# Patient Record
Sex: MALE | NOT HISPANIC OR LATINO | Employment: OTHER | URBAN - METROPOLITAN AREA
[De-identification: names, ages, dates, MRNs, and addresses within clinical notes are randomized per-mention and may not be internally consistent; named-entity substitution may affect disease eponyms.]

---

## 2017-02-23 ENCOUNTER — COMMUNICATION - HEALTHEAST (OUTPATIENT)
Dept: INTERNAL MEDICINE | Facility: CLINIC | Age: 72
End: 2017-02-23

## 2017-03-24 ENCOUNTER — RECORDS - HEALTHEAST (OUTPATIENT)
Dept: ADMINISTRATIVE | Facility: OTHER | Age: 72
End: 2017-03-24

## 2017-03-27 ENCOUNTER — RECORDS - HEALTHEAST (OUTPATIENT)
Dept: ADMINISTRATIVE | Facility: OTHER | Age: 72
End: 2017-03-27

## 2017-04-03 ENCOUNTER — RECORDS - HEALTHEAST (OUTPATIENT)
Dept: ADMINISTRATIVE | Facility: OTHER | Age: 72
End: 2017-04-03

## 2017-04-04 ENCOUNTER — RECORDS - HEALTHEAST (OUTPATIENT)
Dept: ADMINISTRATIVE | Facility: OTHER | Age: 72
End: 2017-04-04

## 2017-04-04 ENCOUNTER — COMMUNICATION - HEALTHEAST (OUTPATIENT)
Dept: SCHEDULING | Facility: CLINIC | Age: 72
End: 2017-04-04

## 2017-04-07 ENCOUNTER — RECORDS - HEALTHEAST (OUTPATIENT)
Dept: ADMINISTRATIVE | Facility: OTHER | Age: 72
End: 2017-04-07

## 2017-04-24 ENCOUNTER — RECORDS - HEALTHEAST (OUTPATIENT)
Dept: ADMINISTRATIVE | Facility: OTHER | Age: 72
End: 2017-04-24

## 2017-05-03 ENCOUNTER — RECORDS - HEALTHEAST (OUTPATIENT)
Dept: ADMINISTRATIVE | Facility: OTHER | Age: 72
End: 2017-05-03

## 2017-05-09 ENCOUNTER — RECORDS - HEALTHEAST (OUTPATIENT)
Dept: ADMINISTRATIVE | Facility: OTHER | Age: 72
End: 2017-05-09

## 2017-05-16 ENCOUNTER — OFFICE VISIT - HEALTHEAST (OUTPATIENT)
Dept: INTERNAL MEDICINE | Facility: CLINIC | Age: 72
End: 2017-05-16

## 2017-05-16 DIAGNOSIS — R42 VERTIGO: ICD-10-CM

## 2017-05-16 DIAGNOSIS — E78.00 PURE HYPERCHOLESTEROLEMIA: ICD-10-CM

## 2017-05-16 DIAGNOSIS — E55.9 VITAMIN D DEFICIENCY: ICD-10-CM

## 2017-05-16 DIAGNOSIS — S86.012A ACHILLES RUPTURE, LEFT: ICD-10-CM

## 2017-05-16 DIAGNOSIS — E06.3 CHRONIC LYMPHOCYTIC THYROIDITIS: ICD-10-CM

## 2017-05-16 DIAGNOSIS — C61 MALIGNANT NEOPLASM OF PROSTATE (H): ICD-10-CM

## 2017-05-16 LAB
CHOLEST SERPL-MCNC: 196 MG/DL
FASTING STATUS PATIENT QL REPORTED: YES
HDLC SERPL-MCNC: 32 MG/DL
LDLC SERPL CALC-MCNC: 114 MG/DL
PSA SERPL-MCNC: 15.9 NG/ML (ref 0–6.5)
TRIGL SERPL-MCNC: 249 MG/DL

## 2017-05-16 ASSESSMENT — MIFFLIN-ST. JEOR: SCORE: 1594.01

## 2017-05-19 ENCOUNTER — COMMUNICATION - HEALTHEAST (OUTPATIENT)
Dept: INTERNAL MEDICINE | Facility: CLINIC | Age: 72
End: 2017-05-19

## 2017-05-20 ENCOUNTER — COMMUNICATION - HEALTHEAST (OUTPATIENT)
Dept: INTERNAL MEDICINE | Facility: CLINIC | Age: 72
End: 2017-05-20

## 2017-06-13 ENCOUNTER — RECORDS - HEALTHEAST (OUTPATIENT)
Dept: ADMINISTRATIVE | Facility: OTHER | Age: 72
End: 2017-06-13

## 2017-08-10 ENCOUNTER — RECORDS - HEALTHEAST (OUTPATIENT)
Dept: ADMINISTRATIVE | Facility: OTHER | Age: 72
End: 2017-08-10

## 2017-08-17 ENCOUNTER — RECORDS - HEALTHEAST (OUTPATIENT)
Dept: ADMINISTRATIVE | Facility: OTHER | Age: 72
End: 2017-08-17

## 2017-08-18 ENCOUNTER — RECORDS - HEALTHEAST (OUTPATIENT)
Dept: ADMINISTRATIVE | Facility: OTHER | Age: 72
End: 2017-08-18

## 2017-10-05 ENCOUNTER — RECORDS - HEALTHEAST (OUTPATIENT)
Dept: ADMINISTRATIVE | Facility: OTHER | Age: 72
End: 2017-10-05

## 2017-10-26 ENCOUNTER — RECORDS - HEALTHEAST (OUTPATIENT)
Dept: ADMINISTRATIVE | Facility: OTHER | Age: 72
End: 2017-10-26

## 2017-11-03 ENCOUNTER — COMMUNICATION - HEALTHEAST (OUTPATIENT)
Dept: INTERNAL MEDICINE | Facility: CLINIC | Age: 72
End: 2017-11-03

## 2017-11-07 ENCOUNTER — OFFICE VISIT - HEALTHEAST (OUTPATIENT)
Dept: INTERNAL MEDICINE | Facility: CLINIC | Age: 72
End: 2017-11-07

## 2017-11-07 ENCOUNTER — COMMUNICATION - HEALTHEAST (OUTPATIENT)
Dept: INTERNAL MEDICINE | Facility: CLINIC | Age: 72
End: 2017-11-07

## 2017-11-07 DIAGNOSIS — E06.3 CHRONIC LYMPHOCYTIC THYROIDITIS: ICD-10-CM

## 2017-11-07 DIAGNOSIS — C61 MALIGNANT NEOPLASM OF PROSTATE (H): ICD-10-CM

## 2017-11-07 DIAGNOSIS — E78.00 HYPERCHOLESTEREMIA: ICD-10-CM

## 2017-11-07 DIAGNOSIS — M79.606 LEG PAIN: ICD-10-CM

## 2017-11-07 DIAGNOSIS — E03.9 HYPOTHYROIDISM: ICD-10-CM

## 2017-11-07 LAB
CHOLEST SERPL-MCNC: 218 MG/DL
FASTING STATUS PATIENT QL REPORTED: YES
HDLC SERPL-MCNC: 33 MG/DL
LDLC SERPL CALC-MCNC: 146 MG/DL
TRIGL SERPL-MCNC: 193 MG/DL

## 2017-11-07 ASSESSMENT — MIFFLIN-ST. JEOR: SCORE: 1603.08

## 2017-11-08 ENCOUNTER — COMMUNICATION - HEALTHEAST (OUTPATIENT)
Dept: INTERNAL MEDICINE | Facility: CLINIC | Age: 72
End: 2017-11-08

## 2017-11-09 ENCOUNTER — COMMUNICATION - HEALTHEAST (OUTPATIENT)
Dept: INTERNAL MEDICINE | Facility: CLINIC | Age: 72
End: 2017-11-09

## 2017-11-10 ENCOUNTER — RECORDS - HEALTHEAST (OUTPATIENT)
Dept: ADMINISTRATIVE | Facility: OTHER | Age: 72
End: 2017-11-10

## 2017-11-18 ENCOUNTER — COMMUNICATION - HEALTHEAST (OUTPATIENT)
Dept: INTERNAL MEDICINE | Facility: CLINIC | Age: 72
End: 2017-11-18

## 2017-11-20 ENCOUNTER — RECORDS - HEALTHEAST (OUTPATIENT)
Dept: ADMINISTRATIVE | Facility: OTHER | Age: 72
End: 2017-11-20

## 2017-11-21 ENCOUNTER — RECORDS - HEALTHEAST (OUTPATIENT)
Dept: ADMINISTRATIVE | Facility: OTHER | Age: 72
End: 2017-11-21

## 2017-11-28 ENCOUNTER — COMMUNICATION - HEALTHEAST (OUTPATIENT)
Dept: INTERNAL MEDICINE | Facility: CLINIC | Age: 72
End: 2017-11-28

## 2017-12-04 ENCOUNTER — RECORDS - HEALTHEAST (OUTPATIENT)
Dept: ADMINISTRATIVE | Facility: OTHER | Age: 72
End: 2017-12-04

## 2017-12-07 ENCOUNTER — COMMUNICATION - HEALTHEAST (OUTPATIENT)
Dept: INTERNAL MEDICINE | Facility: CLINIC | Age: 72
End: 2017-12-07

## 2017-12-07 ENCOUNTER — COMMUNICATION - HEALTHEAST (OUTPATIENT)
Dept: SCHEDULING | Facility: CLINIC | Age: 72
End: 2017-12-07

## 2017-12-08 ENCOUNTER — RECORDS - HEALTHEAST (OUTPATIENT)
Dept: ADMINISTRATIVE | Facility: OTHER | Age: 72
End: 2017-12-08

## 2017-12-13 ENCOUNTER — OFFICE VISIT - HEALTHEAST (OUTPATIENT)
Dept: INTERNAL MEDICINE | Facility: CLINIC | Age: 72
End: 2017-12-13

## 2017-12-13 DIAGNOSIS — E03.9 HYPOTHYROIDISM, UNSPECIFIED TYPE: ICD-10-CM

## 2017-12-13 DIAGNOSIS — K57.92 ACUTE DIVERTICULITIS: ICD-10-CM

## 2017-12-13 DIAGNOSIS — R20.0 LEG NUMBNESS: ICD-10-CM

## 2017-12-13 DIAGNOSIS — R29.898 LEG WEAKNESS: ICD-10-CM

## 2017-12-13 DIAGNOSIS — C61 MALIGNANT NEOPLASM OF PROSTATE (H): ICD-10-CM

## 2017-12-13 DIAGNOSIS — E78.00 PURE HYPERCHOLESTEROLEMIA: ICD-10-CM

## 2017-12-13 LAB — PSA SERPL-MCNC: 15.2 NG/ML (ref 0–6.5)

## 2017-12-13 ASSESSMENT — MIFFLIN-ST. JEOR: SCORE: 1612.16

## 2017-12-14 ENCOUNTER — COMMUNICATION - HEALTHEAST (OUTPATIENT)
Dept: INTERNAL MEDICINE | Facility: CLINIC | Age: 72
End: 2017-12-14

## 2018-01-15 ENCOUNTER — COMMUNICATION - HEALTHEAST (OUTPATIENT)
Dept: INTERNAL MEDICINE | Facility: CLINIC | Age: 73
End: 2018-01-15

## 2018-01-18 ENCOUNTER — RECORDS - HEALTHEAST (OUTPATIENT)
Dept: ADMINISTRATIVE | Facility: OTHER | Age: 73
End: 2018-01-18

## 2018-01-24 ENCOUNTER — RECORDS - HEALTHEAST (OUTPATIENT)
Dept: ADMINISTRATIVE | Facility: OTHER | Age: 73
End: 2018-01-24

## 2018-01-29 ENCOUNTER — COMMUNICATION - HEALTHEAST (OUTPATIENT)
Dept: INTERNAL MEDICINE | Facility: CLINIC | Age: 73
End: 2018-01-29

## 2018-02-08 ENCOUNTER — OFFICE VISIT - HEALTHEAST (OUTPATIENT)
Dept: INTERNAL MEDICINE | Facility: CLINIC | Age: 73
End: 2018-02-08

## 2018-02-08 ENCOUNTER — COMMUNICATION - HEALTHEAST (OUTPATIENT)
Dept: INTERNAL MEDICINE | Facility: CLINIC | Age: 73
End: 2018-02-08

## 2018-02-08 DIAGNOSIS — D86.9 SARCOIDOSIS: ICD-10-CM

## 2018-02-08 DIAGNOSIS — E03.9 HYPOTHYROIDISM, UNSPECIFIED TYPE: ICD-10-CM

## 2018-02-08 DIAGNOSIS — Z01.818 PRE-OP EXAM: ICD-10-CM

## 2018-02-08 DIAGNOSIS — C61 MALIGNANT NEOPLASM OF PROSTATE (H): ICD-10-CM

## 2018-02-08 DIAGNOSIS — E78.00 PURE HYPERCHOLESTEROLEMIA: ICD-10-CM

## 2018-02-08 DIAGNOSIS — I45.10 RIGHT BUNDLE BRANCH BLOCK: ICD-10-CM

## 2018-02-08 DIAGNOSIS — R49.0 PERSISTENT HOARSENESS: ICD-10-CM

## 2018-02-08 LAB
ANION GAP SERPL CALCULATED.3IONS-SCNC: 10 MMOL/L (ref 5–18)
ATRIAL RATE - MUSE: 56 BPM
BUN SERPL-MCNC: 15 MG/DL (ref 8–28)
CALCIUM SERPL-MCNC: 9 MG/DL (ref 8.5–10.5)
CHLORIDE BLD-SCNC: 109 MMOL/L (ref 98–107)
CO2 SERPL-SCNC: 22 MMOL/L (ref 22–31)
CREAT SERPL-MCNC: 0.88 MG/DL (ref 0.7–1.3)
DIASTOLIC BLOOD PRESSURE - MUSE: NORMAL MMHG
ERYTHROCYTE [DISTWIDTH] IN BLOOD BY AUTOMATED COUNT: 11.8 % (ref 11–14.5)
GFR SERPL CREATININE-BSD FRML MDRD: >60 ML/MIN/1.73M2
GLUCOSE BLD-MCNC: 108 MG/DL (ref 70–125)
HCT VFR BLD AUTO: 47.5 % (ref 40–54)
HGB BLD-MCNC: 15.5 G/DL (ref 14–18)
INTERPRETATION ECG - MUSE: NORMAL
MCH RBC QN AUTO: 30.1 PG (ref 27–34)
MCHC RBC AUTO-ENTMCNC: 32.7 G/DL (ref 32–36)
MCV RBC AUTO: 92 FL (ref 80–100)
P AXIS - MUSE: 14 DEGREES
PLATELET # BLD AUTO: 241 THOU/UL (ref 140–440)
PMV BLD AUTO: 8.6 FL (ref 7–10)
POTASSIUM BLD-SCNC: 3.9 MMOL/L (ref 3.5–5)
PR INTERVAL - MUSE: 184 MS
QRS DURATION - MUSE: 122 MS
QT - MUSE: 444 MS
QTC - MUSE: 428 MS
R AXIS - MUSE: -26 DEGREES
RBC # BLD AUTO: 5.16 MILL/UL (ref 4.4–6.2)
SODIUM SERPL-SCNC: 141 MMOL/L (ref 136–145)
SYSTOLIC BLOOD PRESSURE - MUSE: NORMAL MMHG
T AXIS - MUSE: 6 DEGREES
VENTRICULAR RATE- MUSE: 56 BPM
WBC: 6 THOU/UL (ref 4–11)

## 2018-02-08 ASSESSMENT — MIFFLIN-ST. JEOR: SCORE: 1603.08

## 2018-02-15 ENCOUNTER — RECORDS - HEALTHEAST (OUTPATIENT)
Dept: ADMINISTRATIVE | Facility: OTHER | Age: 73
End: 2018-02-15

## 2018-02-19 ENCOUNTER — HOSPITAL ENCOUNTER (OUTPATIENT)
Dept: CARDIOLOGY | Facility: CLINIC | Age: 73
Discharge: HOME | End: 2018-02-19
Attending: INTERNAL MEDICINE

## 2018-02-19 DIAGNOSIS — Z01.818 PRE-OP EXAM: ICD-10-CM

## 2018-02-19 DIAGNOSIS — I45.10 RIGHT BUNDLE BRANCH BLOCK: ICD-10-CM

## 2018-02-19 LAB
AORTIC ROOT: 3.1 CM
ASCENDING AORTA: 4.5 CM
BSA FOR ECHO PROCEDURE: 2.06 M2
CV BLOOD PRESSURE: NORMAL MMHG
CV ECHO HEIGHT: 70 IN
CV ECHO WEIGHT: 190 LBS
DOP CALC LVOT AREA: 3.8 CM2
DOP CALC LVOT DIAMETER: 2.2 CM
DOP CALC LVOT PEAK VEL: 99.4 CM/S
DOP CALC LVOT STROKE VOLUME: 90 CM3
DOP CALCLVOT PEAK VEL VTI: 23.7 CM
EJECTION FRACTION: 67 % (ref 55–75)
FRACTIONAL SHORTENING: 40.5 % (ref 28–44)
INTERVENTRICULAR SEPTUM IN END DIASTOLE: 1.3 CM (ref 0.6–1)
IVS/PW RATIO: 1.2
LA AREA 1: 19.2 CM2
LA AREA 2: 19.2 CM2
LEFT ATRIUM LENGTH: 5 CM
LEFT ATRIUM SIZE: 3.7 CM
LEFT ATRIUM TO AORTIC ROOT RATIO: 1.19 NO UNITS
LEFT ATRIUM VOLUME INDEX: 30.4 ML/M2
LEFT ATRIUM VOLUME: 62.7 ML
LEFT VENTRICLE CARDIAC INDEX: 2.4 L/MIN/M2
LEFT VENTRICLE CARDIAC OUTPUT: 5 L/MIN
LEFT VENTRICLE DIASTOLIC VOLUME INDEX: 39.3 CM3/M2 (ref 34–74)
LEFT VENTRICLE DIASTOLIC VOLUME: 81 CM3 (ref 62–150)
LEFT VENTRICLE HEART RATE: 55 BPM
LEFT VENTRICLE MASS INDEX: 86.5 G/M2
LEFT VENTRICLE SYSTOLIC VOLUME INDEX: 13.1 CM3/M2 (ref 11–31)
LEFT VENTRICLE SYSTOLIC VOLUME: 27 CM3 (ref 21–61)
LEFT VENTRICULAR INTERNAL DIMENSION IN DIASTOLE: 4.2 CM (ref 4.2–5.8)
LEFT VENTRICULAR INTERNAL DIMENSION IN SYSTOLE: 2.5 CM (ref 2.5–4)
LEFT VENTRICULAR MASS: 178.2 G
LEFT VENTRICULAR OUTFLOW TRACT MEAN GRADIENT: 2 MMHG
LEFT VENTRICULAR OUTFLOW TRACT MEAN VELOCITY: 68.2 CM/S
LEFT VENTRICULAR OUTFLOW TRACT PEAK GRADIENT: 4 MMHG
LEFT VENTRICULAR POSTERIOR WALL IN END DIASTOLE: 1.1 CM (ref 0.6–1)
LV STROKE VOLUME INDEX: 43.7 ML/M2
MITRAL VALVE E/A RATIO: 0.9
MV AVERAGE E/E' RATIO: 10.7 CM/S
MV DECELERATION TIME: 183 MS
MV E'TISSUE VEL-LAT: 6.73 CM/S
MV E'TISSUE VEL-MED: 5.17 CM/S
MV LATERAL E/E' RATIO: 9.5
MV MEDIAL E/E' RATIO: 12.3
MV PEAK A VELOCITY: 69.6 CM/S
MV PEAK E VELOCITY: 63.7 CM/S
NUC REST DIASTOLIC VOLUME INDEX: 3040 LBS
NUC REST SYSTOLIC VOLUME INDEX: 70 IN
RIGHT VENTRICULAR INTERNAL DIMENSION IN DYSTOLE: 3.6 CM
TRICUSPID REGURGITATION PEAK PRESSURE GRADIENT: 16.5 MMHG
TRICUSPID VALVE ANULAR PLANE SYSTOLIC EXCURSION: 2.3 CM
TRICUSPID VALVE PEAK REGURGITANT VELOCITY: 203 CM/S

## 2018-02-19 ASSESSMENT — MIFFLIN-ST. JEOR: SCORE: 1603.08

## 2018-02-21 ENCOUNTER — COMMUNICATION - HEALTHEAST (OUTPATIENT)
Dept: INTERNAL MEDICINE | Facility: CLINIC | Age: 73
End: 2018-02-21

## 2018-02-22 ENCOUNTER — RECORDS - HEALTHEAST (OUTPATIENT)
Dept: ADMINISTRATIVE | Facility: OTHER | Age: 73
End: 2018-02-22

## 2018-02-26 ENCOUNTER — COMMUNICATION - HEALTHEAST (OUTPATIENT)
Dept: INTERNAL MEDICINE | Facility: CLINIC | Age: 73
End: 2018-02-26

## 2018-02-26 ASSESSMENT — MIFFLIN-ST. JEOR: SCORE: 1603.08

## 2018-02-27 ENCOUNTER — ANESTHESIA - HEALTHEAST (OUTPATIENT)
Dept: SURGERY | Facility: HOSPITAL | Age: 73
End: 2018-02-27

## 2018-02-27 ENCOUNTER — RECORDS - HEALTHEAST (OUTPATIENT)
Dept: ADMINISTRATIVE | Facility: OTHER | Age: 73
End: 2018-02-27

## 2018-02-27 ENCOUNTER — SURGERY - HEALTHEAST (OUTPATIENT)
Dept: SURGERY | Facility: HOSPITAL | Age: 73
End: 2018-02-27

## 2018-02-27 ASSESSMENT — MIFFLIN-ST. JEOR: SCORE: 1607.62

## 2018-03-07 ENCOUNTER — TRANSFERRED RECORDS (OUTPATIENT)
Dept: HEALTH INFORMATION MANAGEMENT | Facility: CLINIC | Age: 73
End: 2018-03-07

## 2018-03-07 ENCOUNTER — COMMUNICATION - HEALTHEAST (OUTPATIENT)
Dept: SCHEDULING | Facility: CLINIC | Age: 73
End: 2018-03-07

## 2018-03-13 ENCOUNTER — RECORDS - HEALTHEAST (OUTPATIENT)
Dept: ADMINISTRATIVE | Facility: OTHER | Age: 73
End: 2018-03-13

## 2018-03-14 ENCOUNTER — OFFICE VISIT - HEALTHEAST (OUTPATIENT)
Dept: INTERNAL MEDICINE | Facility: CLINIC | Age: 73
End: 2018-03-14

## 2018-03-14 DIAGNOSIS — R49.0 HOARSENESS: ICD-10-CM

## 2018-03-14 DIAGNOSIS — R63.4 ABNORMAL WEIGHT LOSS: ICD-10-CM

## 2018-03-14 DIAGNOSIS — C61 MALIGNANT NEOPLASM OF PROSTATE (H): ICD-10-CM

## 2018-03-14 DIAGNOSIS — N50.9 SCROTAL LESION: ICD-10-CM

## 2018-03-14 DIAGNOSIS — K57.32 DIVERTICULITIS OF LARGE INTESTINE WITHOUT PERFORATION OR ABSCESS WITHOUT BLEEDING: ICD-10-CM

## 2018-03-14 DIAGNOSIS — F43.20 ADJUSTMENT DISORDER: ICD-10-CM

## 2018-03-14 LAB
ALBUMIN SERPL-MCNC: 3.7 G/DL (ref 3.5–5)
ALP SERPL-CCNC: 90 U/L (ref 45–120)
ALT SERPL W P-5'-P-CCNC: 86 U/L (ref 0–45)
ANION GAP SERPL CALCULATED.3IONS-SCNC: 12 MMOL/L (ref 5–18)
AST SERPL W P-5'-P-CCNC: 55 U/L (ref 0–40)
BILIRUB SERPL-MCNC: 0.3 MG/DL (ref 0–1)
BUN SERPL-MCNC: 19 MG/DL (ref 8–28)
CALCIUM SERPL-MCNC: 9.5 MG/DL (ref 8.5–10.5)
CHLORIDE BLD-SCNC: 105 MMOL/L (ref 98–107)
CO2 SERPL-SCNC: 20 MMOL/L (ref 22–31)
CREAT SERPL-MCNC: 1.11 MG/DL (ref 0.7–1.3)
ERYTHROCYTE [DISTWIDTH] IN BLOOD BY AUTOMATED COUNT: 11.9 % (ref 11–14.5)
GFR SERPL CREATININE-BSD FRML MDRD: >60 ML/MIN/1.73M2
GLUCOSE BLD-MCNC: 97 MG/DL (ref 70–125)
HCT VFR BLD AUTO: 45.9 % (ref 40–54)
HGB BLD-MCNC: 15.3 G/DL (ref 14–18)
MCH RBC QN AUTO: 30.4 PG (ref 27–34)
MCHC RBC AUTO-ENTMCNC: 33.4 G/DL (ref 32–36)
MCV RBC AUTO: 91 FL (ref 80–100)
PLATELET # BLD AUTO: 380 THOU/UL (ref 140–440)
PMV BLD AUTO: 8.5 FL (ref 7–10)
POTASSIUM BLD-SCNC: 4.3 MMOL/L (ref 3.5–5)
PROT SERPL-MCNC: 7.6 G/DL (ref 6–8)
RBC # BLD AUTO: 5.03 MILL/UL (ref 4.4–6.2)
SODIUM SERPL-SCNC: 137 MMOL/L (ref 136–145)
WBC: 9.2 THOU/UL (ref 4–11)

## 2018-03-14 ASSESSMENT — MIFFLIN-ST. JEOR: SCORE: 1571.33

## 2018-03-15 ENCOUNTER — COMMUNICATION - HEALTHEAST (OUTPATIENT)
Dept: INTERNAL MEDICINE | Facility: CLINIC | Age: 73
End: 2018-03-15

## 2018-03-16 ENCOUNTER — RECORDS - HEALTHEAST (OUTPATIENT)
Dept: ADMINISTRATIVE | Facility: OTHER | Age: 73
End: 2018-03-16

## 2018-04-04 ENCOUNTER — OFFICE VISIT - HEALTHEAST (OUTPATIENT)
Dept: INTERNAL MEDICINE | Facility: CLINIC | Age: 73
End: 2018-04-04

## 2018-04-04 DIAGNOSIS — R63.4 WEIGHT LOSS: ICD-10-CM

## 2018-04-04 DIAGNOSIS — E03.9 HYPOTHYROIDISM, UNSPECIFIED TYPE: ICD-10-CM

## 2018-04-04 DIAGNOSIS — C61 MALIGNANT NEOPLASM OF PROSTATE (H): ICD-10-CM

## 2018-04-04 DIAGNOSIS — E78.00 PURE HYPERCHOLESTEROLEMIA: ICD-10-CM

## 2018-04-04 DIAGNOSIS — F43.21 ADJUSTMENT DISORDER WITH DEPRESSED MOOD: ICD-10-CM

## 2018-04-04 LAB
ALBUMIN SERPL-MCNC: 3.5 G/DL (ref 3.5–5)
ALP SERPL-CCNC: 101 U/L (ref 45–120)
ALT SERPL W P-5'-P-CCNC: 11 U/L (ref 0–45)
ANION GAP SERPL CALCULATED.3IONS-SCNC: 9 MMOL/L (ref 5–18)
AST SERPL W P-5'-P-CCNC: 11 U/L (ref 0–40)
BILIRUB SERPL-MCNC: 0.5 MG/DL (ref 0–1)
BUN SERPL-MCNC: 14 MG/DL (ref 8–28)
CALCIUM SERPL-MCNC: 9.3 MG/DL (ref 8.5–10.5)
CHLORIDE BLD-SCNC: 108 MMOL/L (ref 98–107)
CHOLEST SERPL-MCNC: 187 MG/DL
CO2 SERPL-SCNC: 25 MMOL/L (ref 22–31)
CREAT SERPL-MCNC: 0.84 MG/DL (ref 0.7–1.3)
ERYTHROCYTE [DISTWIDTH] IN BLOOD BY AUTOMATED COUNT: 12.3 % (ref 11–14.5)
FASTING STATUS PATIENT QL REPORTED: NO
GFR SERPL CREATININE-BSD FRML MDRD: >60 ML/MIN/1.73M2
GLUCOSE BLD-MCNC: 89 MG/DL (ref 70–125)
HCT VFR BLD AUTO: 45.2 % (ref 40–54)
HDLC SERPL-MCNC: 33 MG/DL
HGB BLD-MCNC: 15 G/DL (ref 14–18)
LDLC SERPL CALC-MCNC: 120 MG/DL
MCH RBC QN AUTO: 30.2 PG (ref 27–34)
MCHC RBC AUTO-ENTMCNC: 33.1 G/DL (ref 32–36)
MCV RBC AUTO: 91 FL (ref 80–100)
PLATELET # BLD AUTO: 236 THOU/UL (ref 140–440)
PMV BLD AUTO: 8.1 FL (ref 7–10)
POTASSIUM BLD-SCNC: 4.4 MMOL/L (ref 3.5–5)
PROT SERPL-MCNC: 7 G/DL (ref 6–8)
RBC # BLD AUTO: 4.95 MILL/UL (ref 4.4–6.2)
SODIUM SERPL-SCNC: 142 MMOL/L (ref 136–145)
TRIGL SERPL-MCNC: 171 MG/DL
TSH SERPL DL<=0.005 MIU/L-ACNC: 4.57 UIU/ML (ref 0.3–5)
WBC: 5.4 THOU/UL (ref 4–11)

## 2018-04-04 ASSESSMENT — MIFFLIN-ST. JEOR: SCORE: 1594.01

## 2018-04-05 ENCOUNTER — COMMUNICATION - HEALTHEAST (OUTPATIENT)
Dept: INTERNAL MEDICINE | Facility: CLINIC | Age: 73
End: 2018-04-05

## 2018-06-04 ENCOUNTER — COMMUNICATION - HEALTHEAST (OUTPATIENT)
Dept: INTERNAL MEDICINE | Facility: CLINIC | Age: 73
End: 2018-06-04

## 2018-06-04 ENCOUNTER — OFFICE VISIT - HEALTHEAST (OUTPATIENT)
Dept: INTERNAL MEDICINE | Facility: CLINIC | Age: 73
End: 2018-06-04

## 2018-06-04 DIAGNOSIS — F43.21 ADJUSTMENT DISORDER WITH DEPRESSED MOOD: ICD-10-CM

## 2018-06-04 DIAGNOSIS — C61 MALIGNANT NEOPLASM OF PROSTATE (H): ICD-10-CM

## 2018-06-04 DIAGNOSIS — R32 URINARY INCONTINENCE: ICD-10-CM

## 2018-06-04 LAB
ALBUMIN UR-MCNC: NEGATIVE MG/DL
APPEARANCE UR: CLEAR
BILIRUB UR QL STRIP: NEGATIVE
COLOR UR AUTO: YELLOW
GLUCOSE UR STRIP-MCNC: NEGATIVE MG/DL
HGB UR QL STRIP: NEGATIVE
KETONES UR STRIP-MCNC: NEGATIVE MG/DL
LEUKOCYTE ESTERASE UR QL STRIP: NEGATIVE
NITRATE UR QL: NEGATIVE
PH UR STRIP: 5 [PH] (ref 5–8)
SP GR UR STRIP: >=1.03 (ref 1–1.03)
UROBILINOGEN UR STRIP-ACNC: NORMAL

## 2018-06-04 ASSESSMENT — MIFFLIN-ST. JEOR: SCORE: 1607.62

## 2018-06-07 ENCOUNTER — RECORDS - HEALTHEAST (OUTPATIENT)
Dept: ADMINISTRATIVE | Facility: OTHER | Age: 73
End: 2018-06-07

## 2018-06-14 ENCOUNTER — RECORDS - HEALTHEAST (OUTPATIENT)
Dept: ADMINISTRATIVE | Facility: OTHER | Age: 73
End: 2018-06-14

## 2018-07-26 ENCOUNTER — OFFICE VISIT - HEALTHEAST (OUTPATIENT)
Dept: INTERNAL MEDICINE | Facility: CLINIC | Age: 73
End: 2018-07-26

## 2018-07-26 ENCOUNTER — COMMUNICATION - HEALTHEAST (OUTPATIENT)
Dept: SCHEDULING | Facility: CLINIC | Age: 73
End: 2018-07-26

## 2018-07-26 ENCOUNTER — COMMUNICATION - HEALTHEAST (OUTPATIENT)
Dept: INTERNAL MEDICINE | Facility: CLINIC | Age: 73
End: 2018-07-26

## 2018-07-26 ENCOUNTER — HOSPITAL ENCOUNTER (OUTPATIENT)
Dept: CT IMAGING | Facility: CLINIC | Age: 73
Discharge: HOME | End: 2018-07-26
Attending: INTERNAL MEDICINE

## 2018-07-26 DIAGNOSIS — R10.32 ABDOMINAL PAIN, LEFT LOWER QUADRANT: ICD-10-CM

## 2018-07-26 LAB
CREAT BLD-MCNC: 1 MG/DL
POC GFR AMER AF HE - HISTORICAL: >60 ML/MIN/1.73M2
POC GFR NON AMER AF HE - HISTORICAL: >60 ML/MIN/1.73M2

## 2018-07-26 ASSESSMENT — MIFFLIN-ST. JEOR: SCORE: 1625.76

## 2018-07-27 ENCOUNTER — COMMUNICATION - HEALTHEAST (OUTPATIENT)
Dept: INTERNAL MEDICINE | Facility: CLINIC | Age: 73
End: 2018-07-27

## 2018-08-13 ENCOUNTER — RECORDS - HEALTHEAST (OUTPATIENT)
Dept: ADMINISTRATIVE | Facility: OTHER | Age: 73
End: 2018-08-13

## 2018-09-04 ENCOUNTER — OFFICE VISIT - HEALTHEAST (OUTPATIENT)
Dept: INTERNAL MEDICINE | Facility: CLINIC | Age: 73
End: 2018-09-04

## 2018-09-04 DIAGNOSIS — Z87.19 HX OF DIVERTICULITIS OF COLON: ICD-10-CM

## 2018-09-04 DIAGNOSIS — F43.22 ADJUSTMENT DISORDER WITH ANXIOUS MOOD: ICD-10-CM

## 2018-09-04 DIAGNOSIS — L98.9 HAND LESION: ICD-10-CM

## 2018-09-04 DIAGNOSIS — C61 MALIGNANT NEOPLASM OF PROSTATE (H): ICD-10-CM

## 2018-09-04 DIAGNOSIS — H26.9 CATARACTS, BILATERAL: ICD-10-CM

## 2018-09-04 DIAGNOSIS — N52.9 MALE ERECTILE DISORDER: ICD-10-CM

## 2018-09-04 ASSESSMENT — MIFFLIN-ST. JEOR: SCORE: 1607.62

## 2018-09-06 ENCOUNTER — RECORDS - HEALTHEAST (OUTPATIENT)
Dept: ADMINISTRATIVE | Facility: OTHER | Age: 73
End: 2018-09-06

## 2018-09-17 ENCOUNTER — RECORDS - HEALTHEAST (OUTPATIENT)
Dept: ADMINISTRATIVE | Facility: OTHER | Age: 73
End: 2018-09-17

## 2018-10-08 ENCOUNTER — RECORDS - HEALTHEAST (OUTPATIENT)
Dept: ADMINISTRATIVE | Facility: OTHER | Age: 73
End: 2018-10-08

## 2018-10-18 ENCOUNTER — RECORDS - HEALTHEAST (OUTPATIENT)
Dept: ADMINISTRATIVE | Facility: OTHER | Age: 73
End: 2018-10-18

## 2018-11-20 ENCOUNTER — OFFICE VISIT - HEALTHEAST (OUTPATIENT)
Dept: INTERNAL MEDICINE | Facility: CLINIC | Age: 73
End: 2018-11-20

## 2018-11-20 DIAGNOSIS — C61 MALIGNANT NEOPLASM OF PROSTATE (H): ICD-10-CM

## 2018-11-20 DIAGNOSIS — Z01.818 PREOPERATIVE EXAMINATION: ICD-10-CM

## 2018-11-20 DIAGNOSIS — H26.9 CATARACT OF RIGHT EYE, UNSPECIFIED CATARACT TYPE: ICD-10-CM

## 2018-11-20 ASSESSMENT — MIFFLIN-ST. JEOR: SCORE: 1630.3

## 2018-12-17 ENCOUNTER — RECORDS - HEALTHEAST (OUTPATIENT)
Dept: ADMINISTRATIVE | Facility: OTHER | Age: 73
End: 2018-12-17

## 2019-01-21 ENCOUNTER — RECORDS - HEALTHEAST (OUTPATIENT)
Dept: ADMINISTRATIVE | Facility: OTHER | Age: 74
End: 2019-01-21

## 2019-02-04 ENCOUNTER — RECORDS - HEALTHEAST (OUTPATIENT)
Dept: ADMINISTRATIVE | Facility: OTHER | Age: 74
End: 2019-02-04

## 2019-03-07 ENCOUNTER — RECORDS - HEALTHEAST (OUTPATIENT)
Dept: ADMINISTRATIVE | Facility: OTHER | Age: 74
End: 2019-03-07

## 2019-03-14 ENCOUNTER — RECORDS - HEALTHEAST (OUTPATIENT)
Dept: ADMINISTRATIVE | Facility: OTHER | Age: 74
End: 2019-03-14

## 2019-03-26 ENCOUNTER — COMMUNICATION - HEALTHEAST (OUTPATIENT)
Dept: INTERNAL MEDICINE | Facility: CLINIC | Age: 74
End: 2019-03-26

## 2019-04-02 ENCOUNTER — OFFICE VISIT - HEALTHEAST (OUTPATIENT)
Dept: INTERNAL MEDICINE | Facility: CLINIC | Age: 74
End: 2019-04-02

## 2019-04-02 DIAGNOSIS — K57.32 DIVERTICULITIS OF LARGE INTESTINE WITHOUT PERFORATION OR ABSCESS WITHOUT BLEEDING: ICD-10-CM

## 2019-04-02 DIAGNOSIS — F43.22 ADJUSTMENT DISORDER WITH ANXIOUS MOOD: ICD-10-CM

## 2019-04-02 DIAGNOSIS — C61 MALIGNANT NEOPLASM OF PROSTATE (H): ICD-10-CM

## 2019-04-02 ASSESSMENT — MIFFLIN-ST. JEOR: SCORE: 1621.23

## 2019-05-10 ENCOUNTER — COMMUNICATION - HEALTHEAST (OUTPATIENT)
Dept: INTERNAL MEDICINE | Facility: CLINIC | Age: 74
End: 2019-05-10

## 2019-05-16 ENCOUNTER — COMMUNICATION - HEALTHEAST (OUTPATIENT)
Dept: INTERNAL MEDICINE | Facility: CLINIC | Age: 74
End: 2019-05-16

## 2019-05-20 ENCOUNTER — OFFICE VISIT - HEALTHEAST (OUTPATIENT)
Dept: INTERNAL MEDICINE | Facility: CLINIC | Age: 74
End: 2019-05-20

## 2019-05-20 DIAGNOSIS — E78.00 PURE HYPERCHOLESTEROLEMIA: ICD-10-CM

## 2019-05-20 DIAGNOSIS — R45.89 ANXIETY ABOUT HEALTH: ICD-10-CM

## 2019-05-20 DIAGNOSIS — Z87.19 HX OF DIVERTICULITIS OF COLON: ICD-10-CM

## 2019-05-20 DIAGNOSIS — C61 MALIGNANT NEOPLASM OF PROSTATE (H): ICD-10-CM

## 2019-05-20 DIAGNOSIS — E03.9 HYPOTHYROIDISM, UNSPECIFIED TYPE: ICD-10-CM

## 2019-05-20 ASSESSMENT — MIFFLIN-ST. JEOR: SCORE: 1625.76

## 2019-08-06 ENCOUNTER — RECORDS - HEALTHEAST (OUTPATIENT)
Dept: ADMINISTRATIVE | Facility: OTHER | Age: 74
End: 2019-08-06

## 2019-10-16 ENCOUNTER — RECORDS - HEALTHEAST (OUTPATIENT)
Dept: ADMINISTRATIVE | Facility: OTHER | Age: 74
End: 2019-10-16

## 2019-10-24 ENCOUNTER — OFFICE VISIT - HEALTHEAST (OUTPATIENT)
Dept: INTERNAL MEDICINE | Facility: CLINIC | Age: 74
End: 2019-10-24

## 2019-10-24 DIAGNOSIS — Z12.11 SCREEN FOR COLON CANCER: ICD-10-CM

## 2019-10-24 DIAGNOSIS — Z00.00 ENCOUNTER FOR MEDICARE ANNUAL WELLNESS EXAM: ICD-10-CM

## 2019-10-24 DIAGNOSIS — B35.3 TINEA PEDIS OF BOTH FEET: ICD-10-CM

## 2019-10-24 DIAGNOSIS — E04.2 NONTOXIC MULTINODULAR GOITER: ICD-10-CM

## 2019-10-24 DIAGNOSIS — E78.5 DYSLIPIDEMIA: ICD-10-CM

## 2019-10-24 DIAGNOSIS — E06.3 HYPOTHYROIDISM DUE TO HASHIMOTO'S THYROIDITIS: ICD-10-CM

## 2019-10-24 DIAGNOSIS — C61 MALIGNANT NEOPLASM OF PROSTATE (H): ICD-10-CM

## 2019-10-24 LAB
ALBUMIN SERPL-MCNC: 3.8 G/DL (ref 3.5–5)
ALP SERPL-CCNC: 116 U/L (ref 45–120)
ALT SERPL W P-5'-P-CCNC: 17 U/L (ref 0–45)
ANION GAP SERPL CALCULATED.3IONS-SCNC: 8 MMOL/L (ref 5–18)
AST SERPL W P-5'-P-CCNC: 15 U/L (ref 0–40)
BILIRUB SERPL-MCNC: 0.8 MG/DL (ref 0–1)
BUN SERPL-MCNC: 14 MG/DL (ref 8–28)
CALCIUM SERPL-MCNC: 9.4 MG/DL (ref 8.5–10.5)
CHLORIDE BLD-SCNC: 106 MMOL/L (ref 98–107)
CHOLEST SERPL-MCNC: 192 MG/DL
CO2 SERPL-SCNC: 26 MMOL/L (ref 22–31)
CREAT SERPL-MCNC: 1.03 MG/DL (ref 0.7–1.3)
FASTING STATUS PATIENT QL REPORTED: YES
GFR SERPL CREATININE-BSD FRML MDRD: >60 ML/MIN/1.73M2
GLUCOSE BLD-MCNC: 88 MG/DL (ref 70–125)
HDLC SERPL-MCNC: 34 MG/DL
LDLC SERPL CALC-MCNC: 116 MG/DL
POTASSIUM BLD-SCNC: 4.1 MMOL/L (ref 3.5–5)
PROT SERPL-MCNC: 6.5 G/DL (ref 6–8)
SODIUM SERPL-SCNC: 140 MMOL/L (ref 136–145)
TRIGL SERPL-MCNC: 210 MG/DL
TSH SERPL DL<=0.005 MIU/L-ACNC: 4.39 UIU/ML (ref 0.3–5)

## 2019-10-24 ASSESSMENT — MIFFLIN-ST. JEOR: SCORE: 1621.23

## 2019-10-25 ENCOUNTER — COMMUNICATION - HEALTHEAST (OUTPATIENT)
Dept: INTERNAL MEDICINE | Facility: CLINIC | Age: 74
End: 2019-10-25

## 2019-10-25 DIAGNOSIS — Z00.00 HEALTH CARE MAINTENANCE: ICD-10-CM

## 2019-10-25 DIAGNOSIS — E78.00 HYPERCHOLESTEROLEMIA: ICD-10-CM

## 2019-11-04 ENCOUNTER — COMMUNICATION - HEALTHEAST (OUTPATIENT)
Dept: INTERNAL MEDICINE | Facility: CLINIC | Age: 74
End: 2019-11-04

## 2019-11-04 DIAGNOSIS — B35.3 TINEA PEDIS OF BOTH FEET: ICD-10-CM

## 2019-11-19 ENCOUNTER — HOSPITAL ENCOUNTER (OUTPATIENT)
Dept: CT IMAGING | Facility: CLINIC | Age: 74
Discharge: HOME | End: 2019-11-19
Attending: INTERNAL MEDICINE

## 2019-11-19 DIAGNOSIS — E78.00 HYPERCHOLESTEROLEMIA: ICD-10-CM

## 2019-11-19 DIAGNOSIS — Z00.00 HEALTH CARE MAINTENANCE: ICD-10-CM

## 2019-11-19 LAB
CV CALCIUM SCORE AGATSTON LM: 0
CV CALCIUM SCORING AGATSON LAD: 21
CV CALCIUM SCORING AGATSTON CX: 0
CV CALCIUM SCORING AGATSTON RCA: 0
CV CALCIUM SCORING AGATSTON TOTAL: 21

## 2019-11-20 ENCOUNTER — COMMUNICATION - HEALTHEAST (OUTPATIENT)
Dept: INTERNAL MEDICINE | Facility: CLINIC | Age: 74
End: 2019-11-20

## 2019-11-20 DIAGNOSIS — I25.10 ASYMPTOMATIC ARTERIOSCLEROSIS OF CORONARY ARTERY: ICD-10-CM

## 2019-11-20 DIAGNOSIS — I71.20 THORACIC AORTIC ANEURYSM WITHOUT RUPTURE (H): ICD-10-CM

## 2019-11-20 DIAGNOSIS — I71.20: ICD-10-CM

## 2019-11-21 ENCOUNTER — COMMUNICATION - HEALTHEAST (OUTPATIENT)
Dept: INTERNAL MEDICINE | Facility: CLINIC | Age: 74
End: 2019-11-21

## 2019-11-22 ENCOUNTER — AMBULATORY - HEALTHEAST (OUTPATIENT)
Dept: INTERNAL MEDICINE | Facility: CLINIC | Age: 74
End: 2019-11-22

## 2019-11-22 DIAGNOSIS — I25.10 ASYMPTOMATIC ARTERIOSCLEROSIS OF CORONARY ARTERY: ICD-10-CM

## 2019-11-22 DIAGNOSIS — I71.20 THORACIC AORTIC ANEURYSM WITHOUT RUPTURE (H): ICD-10-CM

## 2019-12-02 ENCOUNTER — COMMUNICATION - HEALTHEAST (OUTPATIENT)
Dept: INTERNAL MEDICINE | Facility: CLINIC | Age: 74
End: 2019-12-02

## 2019-12-03 ENCOUNTER — DOCUMENTATION ONLY (OUTPATIENT)
Dept: CARE COORDINATION | Facility: CLINIC | Age: 74
End: 2019-12-03

## 2019-12-04 ENCOUNTER — AMBULATORY - HEALTHEAST (OUTPATIENT)
Dept: INTERNAL MEDICINE | Facility: CLINIC | Age: 74
End: 2019-12-04

## 2019-12-04 DIAGNOSIS — I71.20 THORACIC AORTIC ANEURYSM WITHOUT RUPTURE (H): ICD-10-CM

## 2019-12-08 NOTE — TELEPHONE ENCOUNTER
RECORDS RECEIVED FROM: External  Lazaro notes TAA // per patient // Refd by PCP Dr Angel Hernandez @ [facility] // wants referral to see Dr Haider Mathews   DATE RECEIVED: 2.4.2020   NOTES STATUS DETAILS   OFFICE NOTE from referring provider    Care Everywhere 11.20.19 telephone encounter  10.24.19   2.8.18   OFFICE NOTE from other cardiologist    N/A    DISCHARGE SUMMARY from hospital    N/A    DISCHARGE REPORT from the ER   N/A    OPERATIVE REPORT    N/A    MEDICATION LIST   Care Everywhere    LABS     BMP   Care Everywhere 3.8.18   CBC   N/A    CMP   Care Everywhere 10.24.19   Lipids   Care Everywhere 10.24.19   TSH   N/A    DIAGNOSTIC PROCEDURES     EKG   In process 3.8.18 EKG strips, HE  2.27.18 EKG strip, HE   Monitor Reports   N/A    IMAGING (DISC & REPORT)      Echo   In process 2.8.18 HE   Stress Tests   N/A    Cath   N/A    MRI/MRA   N/A    CT/CTA   In process 11.19.19 CT cardiac [facility]  11.19.19 CT chest,      12.8.19 1:12 PM MJ  Requested CT images, ekg strips and echo image from [facility]'s   12-16: Verified images are in PACS, sent EKG strips to scanning

## 2019-12-10 ENCOUNTER — OFFICE VISIT - HEALTHEAST (OUTPATIENT)
Dept: CARDIOLOGY | Facility: CLINIC | Age: 74
End: 2019-12-10

## 2019-12-10 DIAGNOSIS — I71.20 THORACIC AORTIC ANEURYSM WITHOUT RUPTURE (H): ICD-10-CM

## 2019-12-10 ASSESSMENT — MIFFLIN-ST. JEOR: SCORE: 1607.62

## 2019-12-12 ENCOUNTER — COMMUNICATION - HEALTHEAST (OUTPATIENT)
Dept: INTERNAL MEDICINE | Facility: CLINIC | Age: 74
End: 2019-12-12

## 2019-12-16 ENCOUNTER — COMMUNICATION - HEALTHEAST (OUTPATIENT)
Dept: INTERNAL MEDICINE | Facility: CLINIC | Age: 74
End: 2019-12-16

## 2019-12-16 ENCOUNTER — RECORDS - HEALTHEAST (OUTPATIENT)
Dept: ADMINISTRATIVE | Facility: OTHER | Age: 74
End: 2019-12-16

## 2019-12-18 ENCOUNTER — COMMUNICATION - HEALTHEAST (OUTPATIENT)
Dept: INTERNAL MEDICINE | Facility: CLINIC | Age: 74
End: 2019-12-18

## 2019-12-27 ENCOUNTER — OFFICE VISIT - HEALTHEAST (OUTPATIENT)
Dept: INTERNAL MEDICINE | Facility: CLINIC | Age: 74
End: 2019-12-27

## 2019-12-27 DIAGNOSIS — E78.00 PURE HYPERCHOLESTEROLEMIA: ICD-10-CM

## 2019-12-27 DIAGNOSIS — I25.9 ASYMPTOMATIC CORONARY HEART DISEASE: ICD-10-CM

## 2019-12-27 DIAGNOSIS — R45.89 ANXIETY ABOUT HEALTH: ICD-10-CM

## 2019-12-27 DIAGNOSIS — I25.10 ASYMPTOMATIC ARTERIOSCLEROSIS OF CORONARY ARTERY: ICD-10-CM

## 2019-12-27 DIAGNOSIS — I71.20 THORACIC AORTIC ANEURYSM WITHOUT RUPTURE (H): ICD-10-CM

## 2019-12-27 ASSESSMENT — MIFFLIN-ST. JEOR: SCORE: 1603.08

## 2020-02-04 ENCOUNTER — PRE VISIT (OUTPATIENT)
Dept: CARDIOLOGY | Facility: CLINIC | Age: 75
End: 2020-02-04

## 2020-02-08 ENCOUNTER — HEALTH MAINTENANCE LETTER (OUTPATIENT)
Age: 75
End: 2020-02-08

## 2020-02-26 ENCOUNTER — RECORDS - HEALTHEAST (OUTPATIENT)
Dept: ADMINISTRATIVE | Facility: OTHER | Age: 75
End: 2020-02-26

## 2020-02-29 ENCOUNTER — COMMUNICATION - HEALTHEAST (OUTPATIENT)
Dept: INTERNAL MEDICINE | Facility: CLINIC | Age: 75
End: 2020-02-29

## 2020-03-04 ENCOUNTER — COMMUNICATION - HEALTHEAST (OUTPATIENT)
Dept: SCHEDULING | Facility: CLINIC | Age: 75
End: 2020-03-04

## 2020-03-05 ENCOUNTER — AMBULATORY - HEALTHEAST (OUTPATIENT)
Dept: CARE COORDINATION | Facility: CLINIC | Age: 75
End: 2020-03-05

## 2020-03-05 ENCOUNTER — COMMUNICATION - HEALTHEAST (OUTPATIENT)
Dept: NURSING | Facility: CLINIC | Age: 75
End: 2020-03-05

## 2020-03-05 DIAGNOSIS — Z85.46 H/O PROSTATE CANCER: ICD-10-CM

## 2020-03-05 DIAGNOSIS — E78.00 PURE HYPERCHOLESTEROLEMIA: ICD-10-CM

## 2020-03-05 DIAGNOSIS — D86.9 SARCOIDOSIS: ICD-10-CM

## 2020-03-05 DIAGNOSIS — I25.9 ASYMPTOMATIC CORONARY HEART DISEASE: ICD-10-CM

## 2020-03-06 ENCOUNTER — RECORDS - HEALTHEAST (OUTPATIENT)
Dept: ADMINISTRATIVE | Facility: OTHER | Age: 75
End: 2020-03-06

## 2020-03-09 ENCOUNTER — OFFICE VISIT - HEALTHEAST (OUTPATIENT)
Dept: INTERNAL MEDICINE | Facility: CLINIC | Age: 75
End: 2020-03-09

## 2020-03-09 DIAGNOSIS — I25.9 ASYMPTOMATIC CORONARY HEART DISEASE: ICD-10-CM

## 2020-03-09 DIAGNOSIS — G45.3 AMAUROSIS FUGAX: ICD-10-CM

## 2020-03-09 DIAGNOSIS — F41.9 ANXIETY: ICD-10-CM

## 2020-03-09 DIAGNOSIS — R07.9 CHEST PAIN, UNSPECIFIED TYPE: ICD-10-CM

## 2020-03-09 DIAGNOSIS — R00.0 TACHYCARDIA: ICD-10-CM

## 2020-03-09 DIAGNOSIS — I71.20 THORACIC AORTIC ANEURYSM WITHOUT RUPTURE (H): ICD-10-CM

## 2020-03-09 DIAGNOSIS — R42 DIZZINESS: ICD-10-CM

## 2020-03-09 DIAGNOSIS — H53.9 VISUAL DISTURBANCE: ICD-10-CM

## 2020-03-09 LAB
C REACTIVE PROTEIN LHE: <0.1 MG/DL (ref 0–0.8)
ERYTHROCYTE [SEDIMENTATION RATE] IN BLOOD BY WESTERGREN METHOD: 3 MM/HR (ref 0–15)

## 2020-03-09 ASSESSMENT — MIFFLIN-ST. JEOR: SCORE: 1607.62

## 2020-03-10 ENCOUNTER — AMBULATORY - HEALTHEAST (OUTPATIENT)
Dept: INTERNAL MEDICINE | Facility: CLINIC | Age: 75
End: 2020-03-10

## 2020-03-10 ENCOUNTER — COMMUNICATION - HEALTHEAST (OUTPATIENT)
Dept: INTERNAL MEDICINE | Facility: CLINIC | Age: 75
End: 2020-03-10

## 2020-03-10 DIAGNOSIS — R07.9 CHEST PAIN, UNSPECIFIED TYPE: ICD-10-CM

## 2020-03-11 ASSESSMENT — ANXIETY QUESTIONNAIRES
3. WORRYING TOO MUCH ABOUT DIFFERENT THINGS: SEVERAL DAYS
1. FEELING NERVOUS, ANXIOUS, OR ON EDGE: SEVERAL DAYS
GAD7 TOTAL SCORE: 5
2. NOT BEING ABLE TO STOP OR CONTROL WORRYING: SEVERAL DAYS
5. BEING SO RESTLESS THAT IT IS HARD TO SIT STILL: NOT AT ALL
7. FEELING AFRAID AS IF SOMETHING AWFUL MIGHT HAPPEN: SEVERAL DAYS
4. TROUBLE RELAXING: NOT AT ALL
6. BECOMING EASILY ANNOYED OR IRRITABLE: SEVERAL DAYS
IF YOU CHECKED OFF ANY PROBLEMS ON THIS QUESTIONNAIRE, HOW DIFFICULT HAVE THESE PROBLEMS MADE IT FOR YOU TO DO YOUR WORK, TAKE CARE OF THINGS AT HOME, OR GET ALONG WITH OTHER PEOPLE: SOMEWHAT DIFFICULT

## 2020-03-16 ENCOUNTER — AMBULATORY - HEALTHEAST (OUTPATIENT)
Dept: INTERNAL MEDICINE | Facility: CLINIC | Age: 75
End: 2020-03-16

## 2020-03-16 ENCOUNTER — COMMUNICATION - HEALTHEAST (OUTPATIENT)
Dept: INTERNAL MEDICINE | Facility: CLINIC | Age: 75
End: 2020-03-16

## 2020-03-16 DIAGNOSIS — R07.9 CHEST PAIN, UNSPECIFIED TYPE: ICD-10-CM

## 2020-03-19 ENCOUNTER — COMMUNICATION - HEALTHEAST (OUTPATIENT)
Dept: INTERNAL MEDICINE | Facility: CLINIC | Age: 75
End: 2020-03-19

## 2020-03-19 ENCOUNTER — COMMUNICATION - HEALTHEAST (OUTPATIENT)
Dept: CARDIOLOGY | Facility: CLINIC | Age: 75
End: 2020-03-19

## 2020-03-20 ENCOUNTER — OFFICE VISIT - HEALTHEAST (OUTPATIENT)
Dept: CARDIOLOGY | Facility: CLINIC | Age: 75
End: 2020-03-20

## 2020-03-20 ENCOUNTER — AMBULATORY - HEALTHEAST (OUTPATIENT)
Dept: CARE COORDINATION | Facility: CLINIC | Age: 75
End: 2020-03-20

## 2020-03-20 DIAGNOSIS — I25.9 ASYMPTOMATIC CORONARY HEART DISEASE: ICD-10-CM

## 2020-03-20 DIAGNOSIS — R00.2 PALPITATIONS: ICD-10-CM

## 2020-03-20 DIAGNOSIS — I10 ESSENTIAL HYPERTENSION: ICD-10-CM

## 2020-03-20 DIAGNOSIS — I71.21 ASCENDING AORTIC ANEURYSM (H): ICD-10-CM

## 2020-03-20 DIAGNOSIS — D86.9 SARCOIDOSIS: ICD-10-CM

## 2020-03-20 DIAGNOSIS — R07.89 CHEST DISCOMFORT: ICD-10-CM

## 2020-03-24 ENCOUNTER — COMMUNICATION - HEALTHEAST (OUTPATIENT)
Dept: NURSING | Facility: CLINIC | Age: 75
End: 2020-03-24

## 2020-03-27 ENCOUNTER — HOSPITAL ENCOUNTER (OUTPATIENT)
Dept: NUCLEAR MEDICINE | Facility: HOSPITAL | Age: 75
Discharge: HOME | End: 2020-03-27
Attending: INTERNAL MEDICINE

## 2020-03-27 ENCOUNTER — HOSPITAL ENCOUNTER (OUTPATIENT)
Dept: CARDIOLOGY | Facility: HOSPITAL | Age: 75
Discharge: HOME | End: 2020-03-27
Attending: INTERNAL MEDICINE

## 2020-03-27 DIAGNOSIS — R07.9 CHEST PAIN, UNSPECIFIED TYPE: ICD-10-CM

## 2020-03-27 DIAGNOSIS — R42 DIZZINESS: ICD-10-CM

## 2020-03-27 DIAGNOSIS — R00.0 TACHYCARDIA: ICD-10-CM

## 2020-03-27 LAB
CV STRESS CURRENT BP HE: NORMAL
CV STRESS CURRENT HR HE: 100
CV STRESS CURRENT HR HE: 104
CV STRESS CURRENT HR HE: 107
CV STRESS CURRENT HR HE: 109
CV STRESS CURRENT HR HE: 110
CV STRESS CURRENT HR HE: 112
CV STRESS CURRENT HR HE: 125
CV STRESS CURRENT HR HE: 125
CV STRESS CURRENT HR HE: 126
CV STRESS CURRENT HR HE: 129
CV STRESS CURRENT HR HE: 130
CV STRESS CURRENT HR HE: 64
CV STRESS CURRENT HR HE: 70
CV STRESS CURRENT HR HE: 71
CV STRESS CURRENT HR HE: 71
CV STRESS CURRENT HR HE: 73
CV STRESS CURRENT HR HE: 74
CV STRESS CURRENT HR HE: 76
CV STRESS CURRENT HR HE: 78
CV STRESS CURRENT HR HE: 82
CV STRESS CURRENT HR HE: 87
CV STRESS CURRENT HR HE: 89
CV STRESS CURRENT HR HE: 91
CV STRESS CURRENT HR HE: 95
CV STRESS CURRENT HR HE: 95
CV STRESS CURRENT HR HE: 96
CV STRESS DEVIATION TIME HE: NORMAL
CV STRESS ECHO PERCENT HR HE: NORMAL
CV STRESS EXERCISE STAGE HE: NORMAL
CV STRESS FINAL RESTING BP HE: NORMAL
CV STRESS FINAL RESTING HR HE: 73
CV STRESS MAX HR HE: 131
CV STRESS MAX TREADMILL GRADE HE: 14
CV STRESS MAX TREADMILL SPEED HE: 3.4
CV STRESS PEAK DIA BP HE: NORMAL
CV STRESS PEAK SYS BP HE: NORMAL
CV STRESS PHASE HE: NORMAL
CV STRESS PROTOCOL HE: NORMAL
CV STRESS RESTING PT POSITION HE: NORMAL
CV STRESS RESTING PT POSITION HE: NORMAL
CV STRESS ST DEVIATION AMOUNT HE: NORMAL
CV STRESS ST DEVIATION ELEVATION HE: NORMAL
CV STRESS ST EVELATION AMOUNT HE: NORMAL
CV STRESS TEST TYPE HE: NORMAL
CV STRESS TOTAL STAGE TIME MIN 1 HE: NORMAL
NUC STRESS EJECTION FRACTION: 70 %
RATE PRESSURE PRODUCT: NORMAL
STRESS ECHO BASELINE DIASTOLIC HE: 80
STRESS ECHO BASELINE HR: 64
STRESS ECHO BASELINE SYSTOLIC BP: 128
STRESS ECHO CALCULATED PERCENT HR: 89 %
STRESS ECHO LAST STRESS DIASTOLIC BP: 76
STRESS ECHO LAST STRESS HR: 130
STRESS ECHO LAST STRESS SYSTOLIC BP: 200
STRESS ECHO POST ESTIMATED WORKLOAD: 10.3
STRESS ECHO POST EXERCISE DUR MIN: 9
STRESS ECHO POST EXERCISE DUR SEC: 0
STRESS ECHO TARGET HR: 147

## 2020-03-30 ENCOUNTER — COMMUNICATION - HEALTHEAST (OUTPATIENT)
Dept: INTERNAL MEDICINE | Facility: CLINIC | Age: 75
End: 2020-03-30

## 2020-03-31 ENCOUNTER — COMMUNICATION - HEALTHEAST (OUTPATIENT)
Dept: INTERNAL MEDICINE | Facility: CLINIC | Age: 75
End: 2020-03-31

## 2020-03-31 ENCOUNTER — AMBULATORY - HEALTHEAST (OUTPATIENT)
Dept: INTERNAL MEDICINE | Facility: CLINIC | Age: 75
End: 2020-03-31

## 2020-03-31 ENCOUNTER — OFFICE VISIT - HEALTHEAST (OUTPATIENT)
Dept: INTERNAL MEDICINE | Facility: CLINIC | Age: 75
End: 2020-03-31

## 2020-03-31 DIAGNOSIS — I25.9 ASYMPTOMATIC CORONARY HEART DISEASE: ICD-10-CM

## 2020-03-31 DIAGNOSIS — R42 DIZZINESS: ICD-10-CM

## 2020-03-31 DIAGNOSIS — F41.9 ANXIETY: ICD-10-CM

## 2020-03-31 DIAGNOSIS — I10 ESSENTIAL HYPERTENSION: ICD-10-CM

## 2020-03-31 DIAGNOSIS — I71.21 ASCENDING AORTIC ANEURYSM (H): ICD-10-CM

## 2020-03-31 DIAGNOSIS — G45.3 AMAUROSIS FUGAX: ICD-10-CM

## 2020-03-31 ASSESSMENT — ANXIETY QUESTIONNAIRES
6. BECOMING EASILY ANNOYED OR IRRITABLE: SEVERAL DAYS
2. NOT BEING ABLE TO STOP OR CONTROL WORRYING: SEVERAL DAYS
IF YOU CHECKED OFF ANY PROBLEMS ON THIS QUESTIONNAIRE, HOW DIFFICULT HAVE THESE PROBLEMS MADE IT FOR YOU TO DO YOUR WORK, TAKE CARE OF THINGS AT HOME, OR GET ALONG WITH OTHER PEOPLE: NOT DIFFICULT AT ALL
7. FEELING AFRAID AS IF SOMETHING AWFUL MIGHT HAPPEN: SEVERAL DAYS
5. BEING SO RESTLESS THAT IT IS HARD TO SIT STILL: NOT AT ALL
GAD7 TOTAL SCORE: 5
4. TROUBLE RELAXING: NOT AT ALL
1. FEELING NERVOUS, ANXIOUS, OR ON EDGE: SEVERAL DAYS
3. WORRYING TOO MUCH ABOUT DIFFERENT THINGS: SEVERAL DAYS

## 2020-04-02 ENCOUNTER — HOSPITAL ENCOUNTER (OUTPATIENT)
Dept: CARDIOLOGY | Facility: HOSPITAL | Age: 75
Discharge: HOME | End: 2020-04-02
Attending: INTERNAL MEDICINE

## 2020-04-02 ENCOUNTER — COMMUNICATION - HEALTHEAST (OUTPATIENT)
Dept: INTERNAL MEDICINE | Facility: CLINIC | Age: 75
End: 2020-04-02

## 2020-04-02 DIAGNOSIS — R42 DIZZINESS: ICD-10-CM

## 2020-04-07 ENCOUNTER — VIRTUAL VISIT (OUTPATIENT)
Dept: CARDIOLOGY | Facility: CLINIC | Age: 75
End: 2020-04-07
Attending: INTERNAL MEDICINE
Payer: COMMERCIAL

## 2020-04-07 ENCOUNTER — TELEPHONE (OUTPATIENT)
Dept: CARDIOLOGY | Facility: CLINIC | Age: 75
End: 2020-04-07

## 2020-04-07 ENCOUNTER — RECORDS - HEALTHEAST (OUTPATIENT)
Dept: ADMINISTRATIVE | Facility: OTHER | Age: 75
End: 2020-04-07

## 2020-04-07 DIAGNOSIS — I20.89 STABLE ANGINA PECTORIS (H): ICD-10-CM

## 2020-04-07 DIAGNOSIS — E78.00 HYPERCHOLESTEROLEMIA: Primary | ICD-10-CM

## 2020-04-07 PROBLEM — I45.10 RIGHT BUNDLE BRANCH BLOCK: Status: ACTIVE | Noted: 2018-02-08

## 2020-04-07 PROBLEM — E55.9 VITAMIN D DEFICIENCY: Status: ACTIVE | Noted: 2020-04-07

## 2020-04-07 PROBLEM — I71.21 ASCENDING AORTIC ANEURYSM (H): Status: ACTIVE | Noted: 2020-03-31

## 2020-04-07 PROBLEM — I25.9 ASYMPTOMATIC CORONARY HEART DISEASE: Status: ACTIVE | Noted: 2019-12-27

## 2020-04-07 PROBLEM — D86.9 SARCOIDOSIS: Status: ACTIVE | Noted: 2020-04-07

## 2020-04-07 PROCEDURE — 99204 OFFICE O/P NEW MOD 45 MIN: CPT | Mod: TEL | Performed by: INTERNAL MEDICINE

## 2020-04-07 RX ORDER — PILOCARPINE HYDROCHLORIDE 20 MG/ML
1-2 SOLUTION/ DROPS OPHTHALMIC DAILY
COMMUNITY

## 2020-04-07 RX ORDER — GARLIC 200 MG
1 TABLET ORAL DAILY
COMMUNITY
End: 2021-09-08

## 2020-04-07 RX ORDER — LOSARTAN POTASSIUM 50 MG/1
50 TABLET ORAL DAILY
COMMUNITY
Start: 2020-03-31 | End: 2021-09-24

## 2020-04-07 RX ORDER — ASPIRIN 81 MG/1
81 TABLET ORAL EVERY OTHER DAY
COMMUNITY
Start: 2019-12-27 | End: 2024-01-24

## 2020-04-07 RX ORDER — ATORVASTATIN CALCIUM 40 MG/1
40 TABLET, FILM COATED ORAL DAILY
Qty: 90 TABLET | Refills: 3 | Status: SHIPPED | OUTPATIENT
Start: 2020-04-07 | End: 2021-09-20

## 2020-04-07 NOTE — PROGRESS NOTES
"CARDIOLOGY CONSULTATION    Referring Provider:  Angel Hernandez  Primary Care Provider:   Angel Hernandez  Indication for Consultation: Chest Discomfort, Thoracic Aortic Aneurysm    Artis Galvin is a 74 year old male who is being evaluated via a billable telephone visit.      The patient has been notified of following:     \"This telephone visit will be conducted via a call between you and your physician/provider. We have found that certain health care needs can be provided without the need for a physical exam.  This service lets us provide the care you need with a short phone conversation.  If a prescription is necessary we can send it directly to your pharmacy.  If lab work is needed we can place an order for that and you can then stop by our lab to have the test done at a later time.    Telephone visits are billed at different rates depending on your insurance coverage. During this emergency period, for some insurers they may be billed the same as an in-person visit.  Please reach out to your insurance provider with any questions.    If during the course of the call the physician/provider feels a telephone visit is not appropriate, you will not be charged for this service.\"    Patient has given verbal consent for Telephone visit?  Yes    Telephone Visit:  Start Time: 6:41 pm  Finish Time: 7:37 pm  Duration of Call: 56 minutes    HPI: Artis Galvin is a 74 year old male being seen today for evaluation of chest discomfort and thoracic aortic aneurysm.   The patient's risk factor profile is: (-) HTN, (-) DM, (-) hypercholesterolemia, (-) tobacco use, (+) fam Hx premature CAD.  There is a family history of TAAD (paternal).  The patient has no history of cardiovascular disease (CAD, CHF, arrhythmia, valvular heart disease).  The patient has no Hx of PAD or cerebrovascular disease.  Until recently the patient had not undergone prior cardiovascular evaluation and had never had an ECHO, stress study, cardiac " catheterization, or EP study.   The patient had a coronary calcium score (Nov 2019) of 21 placing him at the 25th percentile.  This was prompted by a discussion of whether to initiate a statin in this asymptomatic male with elevated cholesterol.    The patient had been in his good state of health until 3/4/2020 when he presented to [facility] with disequilibrium, lightheadedness, diaphoresis, and disorientation.  He reported 4 episodes of left eye left upper visual field loss.  ECG normal.  Troponin negative.  MRI with and without contrast and MRA of the carotids were unremarkable.  He was hydrated and given Ativan and instructed to follow up with ophthalmology.    The patient returned to [facility] on 3/19/2020 with 9 days of URI symptoms, culminating in generalized malaise, lightheadedness, nausea.  He also noted chest discomfort while in the ambulance.  He was given SL NTG without effect.      It has been 3 weeks since his last ER visit.  The patient described minor chest pain in the precordial area that waxed and waned.  The intensity was 1-2/10 and it did not interfere with his daily function.  He attributed it to working out.  The chest discomfort does not radiate.  He had noticed dyspnea, diaphoresis, and lightheadedness.    The patient denies exertional chest discomfort, GORMAN, PND, orthopnea, pedal edema, and syncope.  He has noted intermittent palpitations and has noticed  while resting in a chair.    The patient had a Biotelemetry event monitor ordered by the ER and it arrived today.      PAST MEDICAL HISTORY:  No past medical history on file.    CURRENT MEDICATIONS:  Current Outpatient Medications   Medication Sig     Ascorbic Acid (VITAMIN C) POWD Take 1 Tablespoonful by mouth daily     aspirin 81 MG EC tablet Take 81 mg by mouth daily     cholecalciferol 50 MCG (2000 UT) CAPS Take 1 capsule by mouth daily     losartan (COZAAR) 50 MG tablet Take 50 mg by mouth daily     pilocarpine  (PILOCAR) 2 % ophthalmic solution Apply 1 drop to eye 2 times daily     Zinc Sulfate (ZINC 15 PO) Take 1 capsule by mouth daily     No current facility-administered medications for this visit.        PAST SURGICAL HISTORY:  No past surgical history on file.    ALLERGIES  Patient has no known allergies.    FAMILY HX:  No family history on file.    SOCIAL HX:  Social History     Socioeconomic History     Marital status: Single     Spouse name: Not on file     Number of children: Not on file     Years of education: Not on file     Highest education level: Not on file   Occupational History     Not on file   Social Needs     Financial resource strain: Not on file     Food insecurity     Worry: Not on file     Inability: Not on file     Transportation needs     Medical: Not on file     Non-medical: Not on file   Tobacco Use     Smoking status: Not on file   Substance and Sexual Activity     Alcohol use: Not on file     Drug use: Not on file     Sexual activity: Not on file   Lifestyle     Physical activity     Days per week: Not on file     Minutes per session: Not on file     Stress: Not on file   Relationships     Social connections     Talks on phone: Not on file     Gets together: Not on file     Attends Jainism service: Not on file     Active member of club or organization: Not on file     Attends meetings of clubs or organizations: Not on file     Relationship status: Not on file     Intimate partner violence     Fear of current or ex partner: Not on file     Emotionally abused: Not on file     Physically abused: Not on file     Forced sexual activity: Not on file   Other Topics Concern     Not on file   Social History Narrative     Not on file       ROS:  Constitutional: No fever, chills, or sweats. No weight gain/loss.   HEENT: No visual disturbance, ear ache, epistaxis, sore throat.   Allergies/Immunologic: Negative.   Respiratory: No cough, hemoptysis.   Cardiovascular: As per HPI.   GI: No nausea, vomiting,  hematemesis, melena, or hematochezia.   : No urinary frequency, dysuria, or hematuria.   Integument: No rash.   Psychiatric: No anxiety / depression.   Neuro: No speech disturbance, focal sensory or motor deficit.   Endocrinology: No polyuria / polyphagia.   Musculoskeletal: No myalgia.    VITAL SIGNS:  /73  HR 67  There is no height or weight on file to calculate BMI.  Wt Readings from Last 2 Encounters:   No data found for Wt       PHYSICAL EXAM  Artis Galvin is a 74 year old male.in no acute distress.   No exam (teleconference)    LABS  10/24/2019  Chol 192    HDL 34      ECG:  3/19/2020  Sinus bradycardia with a rate of 59. Nonspecific T wave inversion in lead III, V1, unchanged from prior. Normal intervals. No ischemic ST or T wave morphology. When compared to previous EKG on March 4, 2020 there is no significant change. Impression: Sinus bradycardia, otherwise normal EKG.    EKG:  3/7/2018  NSR  RBBB    HOLTER: 3/30/2020  Normal Holter.  Intermittent right bundle-branch block aberrancy noted on 1 occasion.    ECHO: None    CARDIAC MRI: None    EXERCISE NUCLEAR STUDY: 3/27/2020  1.  There is no prior study for comparison.  2.  The patient's exercise capacity is above average.  No exercise induced   angina.  3.  Exercise stress ECG is negative for inducible myocardial ischemia.  4.  Exercise nuclear study is negative for inducible myocardial ischemia   or infarction.  5.  Normal left ventricular size, wall motion and function.  The calculated left ventricular ejection fraction is 70%.  6. Exercised: 85% APHR (147) achieved.  Exercised 9 minutes.    CARDIAC CA SCORE:  11/19/19   The total Agatston calcium score is 21. A calcium score in this range places the individual in the less than 25% percentile when compared to an  age and gender matched control group and implies a mild to moderate risk of cardiac events in the next ten years.  Moderate enlargement of the ascending thoracic aorta  suggested. Measures 4.6 cm. Incompletely visualized on this examination. Consider further evaluation with CT scan of the thoracic aorta or MRI.    CT abdomen and pelvis: 7/26/18   1. Ascending aortic 4.6 cm aneurysm.  2. Please refer to cardiologist's dictation for the cardiac CT report.    CORONARY CTA: None    CARDIAC CATH: None    ASSESSMENT AND PLAN:   1. Chest discomfort.  Typical and atypical symptoms.  The patient had an electrically positive stress study but the perfusion images were normal.  He and his daughter who is a nurse remain concerned.  As the patient also requires imaging of the aorta, a coronary CTA will address this.      2. Palpitations.  Symptoms may be related to SVT, AF, or NSVT.   Patient will apply the Biotel patch this afternoon.    3. Thoracic aortic aneurysm.    Check CT angiogram of chest to get assessment of aorta.  No documented familial history of TAAD.  Repeat CT scan one year later.    4. Cardiovascular Risk.  The patient's ACC/AHA 10 year risk score is 23.5%.  Positive coronary calcium 25th percentile.  Recommend Lipitor 40 mg every day.    FOLLOW UP:  1 year      CORONARY CTA: (5/5/2020)                                                                   IMPRESSION:  Borderline hemodynamically significant mid LAD stenosis. CT-FFR is 0.79, which falls to 0.75 in the distal LAD.      FINDINGS:  1.  LAD: CT-FFR after the moderate-severe mLAD stenosis is 0.79, which falls to 0.75 in the distal LAD.   2.  LCX: CT-FFR in the small distal circumflex is 0.83.   3.  RCA: CT-FFR after the mild distal stenosis is 0.85.        IMPRESSION:  1.  Moderate-severe mid-LAD stenosis. Images sent to [facility] for CT-FFR analysis, report to follow separately.   2.  Total Agatston score 27.6 placing the patient in the 16th  percentile when compared to age and gender matched control group.  3.  Mildly dilated proximal ascending aorta measuring 4.4 cm.  4.  Please review Radiology report for incidental  noncardiac findings that will follow separately.     FINDINGS:     CORONARY CALCIUM SCORE     Total Agatston calcium score: 27.6   Left main: 0  Left anterior descendin.3  Left circumflex: 0  Right coronary artery: 2.3   This places the patient in the 16th  percentile when compared to age and gender matched control group.     CORONARY ANGIOGRAPHY     DOMINANCE:   Right dominant system.   Normal coronary origins and course.     LEFT MAIN:   The LM is a large caliber but short vessel.   The left main arises normally from the left coronary cusp and is widely patent without detectable atherosclerosis or stenosis.      LEFT ANTERIOR DESCENDING:   The LAD is a large caliber vessel with a type III morphology. It gives rise to a small caliber D1, moderate caliber D2, and a small caliber D3.      Proximal LAD: Minimal (<25%) stenosis composed of calcified plaque.  Mid LAD: Moderate-severe (50-69%) stenosis composed of noncalcified plaque.  The remainder of the left anterior descending and its major diagonal branches are patent with minimal luminal irregularities.     LEFT CIRCUMFLEX:   The LCx is a moderate caliber vessel that gives rise to a moderate caliber OM1.   The caliber of the LCx tapers significantly after the origin of the OM1.  The left circumflex and its major branches are widely patent without detectable atherosclerosis or stenosis.     RIGHT CORONARY ARTERY:   The RCA is a large caliber dominant vessel.   Distal RCA: Mild (25-49%) stenosis composed of noncalcified plaque.  The remainder of the right coronary and the posterior descending  artery are patent with minimal luminal irregularities.     ADDITIONAL FINDINGS:   Normal pulmonary venous anatomy with all four pulmonary veins draining into the left atrium.    There is no left ventricular mass or thrombus.   Normal pericardial thickness. There is no pericardial effusion.  Please review Radiology report for incidental noncardiac findings that will follow  separately.      THORACIC AORTA EXAMINATION  1.  The proximal ascending aorta is mildly dilated (43.6 x 42.4 mm, indexed value 1.89 cm/m2). The aortic root, ascending aorta, arch, and descending aorta are normal in diameter. There is no dissection seen.  2.  The aortic arch is left sided. There is normal branching of the arch vessels. There is no coarctation seen.  3.  The main and proximal branch pulmonary arteries are normal in size.   4.  The systemic venous connections are normal.   5.  The cardiac chambers demonstrate normal atrioventricular and ventriculoarterial concordance.  6.  The pericardium is unremarkable.  There is no pericardial effusion.   7.  There is no intracardiac thrombus.     Bi-orthogonal luminal aortic dimensions are:   Aortic root measures 37.4 mm x 35.8 mm at the level of sinuses of Valsalva.   The sinotubular junction measures 38.0 mm x 36.1 mm.    Proximal ascending aorta measures 43.6 mm x 42.4 mm, at the level of the main pulmonary artery.   Distal ascending aorta measures 43.4 mm x 35.6 mm, proximal to the takeoff of the brachiocephalic artery.   Mid aortic arch measures 34.0 mm x 33.1 mm, proximal to the takeoff of the left subclavian artery.   Proximal descending thoracic aortic measures 31.9 mm x 29.9 mm, distal to the takeoff of the left subclavian artery.   Mid descending thoracic aorta measures 27.1 mm x 26.0 mm, at the level of the pulmonary veins.   Distal thoracic aorta measures 23.6 mm x 22.5 mm, at the level of the diaphragm.     RADIOLOGY READ:  Impression:  1. Sequelae of previous granulomatous disease. No acute airspace disease.  2. Ascending aortic aneurysm measuring 4.9 cm. This is unchanged since 11/19/2019.       ADDENDUM (5/6/2020): Coronary CTA shows single vessel CAD with borderline stenosis in mid LAD with FFR 0.79, diminishing to 0.75 in distal LAD.  While symptoms are atypical, the coronary CTA does raise concern.  I would like to have a follow up encounter with  patient this week and then discuss possible coronary angiography if he is having ongoing symptoms.    Elder Cazares MD    Divisions of Cardiology  [facility]  Patient Care Team:  Colby Brock MD as PCP - General (Internal Medicine)  Elder Cazares MD as MD (Internal Medicine - Interventional Cardiology)  Haider Mathews MD as MD (Cardiology)  COLBY BROCK

## 2020-04-07 NOTE — TELEPHONE ENCOUNTER
[facility] Call Center    Phone Message    May a detailed message be left on voicemail: yes     Reason for Call: Other: Pt unable to move appt any earlier today, but willing to do another day, please call to discuss or verify appt for this evening     Action Taken: Message routed to:  [facility] ([facility]): [facility]    Travel Screening: Not Applicable

## 2020-04-08 ENCOUNTER — AMBULATORY - HEALTHEAST (OUTPATIENT)
Dept: INTERNAL MEDICINE | Facility: CLINIC | Age: 75
End: 2020-04-08

## 2020-04-08 ENCOUNTER — COMMUNICATION - HEALTHEAST (OUTPATIENT)
Dept: INTERNAL MEDICINE | Facility: CLINIC | Age: 75
End: 2020-04-08

## 2020-04-08 ENCOUNTER — COMMUNICATION - HEALTHEAST (OUTPATIENT)
Dept: ADMINISTRATIVE | Facility: CLINIC | Age: 75
End: 2020-04-08

## 2020-04-08 DIAGNOSIS — G45.3 AMAUROSIS FUGAX OF LEFT EYE: ICD-10-CM

## 2020-04-10 ENCOUNTER — AMBULATORY - HEALTHEAST (OUTPATIENT)
Dept: LAB | Facility: CLINIC | Age: 75
End: 2020-04-10

## 2020-04-10 DIAGNOSIS — R20.0 NUMBNESS AND TINGLING: ICD-10-CM

## 2020-04-10 DIAGNOSIS — R20.2 NUMBNESS AND TINGLING: ICD-10-CM

## 2020-04-10 DIAGNOSIS — R20.2 NUMBNESS AND TINGLING SENSATION OF SKIN: ICD-10-CM

## 2020-04-10 DIAGNOSIS — R20.0 NUMBNESS AND TINGLING SENSATION OF SKIN: ICD-10-CM

## 2020-04-10 DIAGNOSIS — I10 ESSENTIAL HYPERTENSION: ICD-10-CM

## 2020-04-10 LAB
ANION GAP SERPL CALCULATED.3IONS-SCNC: 9 MMOL/L (ref 5–18)
BUN SERPL-MCNC: 12 MG/DL (ref 8–28)
C REACTIVE PROTEIN LHE: 0.2 MG/DL (ref 0–0.8)
CALCIUM SERPL-MCNC: 9 MG/DL (ref 8.5–10.5)
CHLORIDE BLD-SCNC: 108 MMOL/L (ref 98–107)
CO2 SERPL-SCNC: 25 MMOL/L (ref 22–31)
CREAT SERPL-MCNC: 0.91 MG/DL (ref 0.7–1.3)
ERYTHROCYTE [SEDIMENTATION RATE] IN BLOOD BY WESTERGREN METHOD: 5 MM/HR (ref 0–15)
FOLATE SERPL-MCNC: 12.5 NG/ML
GFR SERPL CREATININE-BSD FRML MDRD: >60 ML/MIN/1.73M2
GLUCOSE BLD-MCNC: 77 MG/DL (ref 70–125)
HBA1C MFR BLD: 5.4 % (ref 3.5–6)
POTASSIUM BLD-SCNC: 4.3 MMOL/L (ref 3.5–5)
SODIUM SERPL-SCNC: 142 MMOL/L (ref 136–145)
TSH SERPL DL<=0.005 MIU/L-ACNC: 5.1 UIU/ML (ref 0.3–5)
VIT B12 SERPL-MCNC: 379 PG/ML (ref 213–816)

## 2020-04-11 LAB — T PALLIDUM AB SER QL: NEGATIVE

## 2020-04-12 LAB — PYRIDOXAL PHOS SERPL-SCNC: 32.9 NMOL/L (ref 20–125)

## 2020-04-13 LAB — VIT B1 PYROPHOSHATE BLD-SCNC: 101 NMOL/L (ref 70–180)

## 2020-04-14 LAB
25(OH)D3 SERPL-MCNC: 29.7 NG/ML (ref 30–80)
ANA SER QL: 0.2 U
LYME TOTAL ANTIBODY - HISTORICAL: 0.01 INDEX VALUE
PATH ICD:: NORMAL
PROT PATTERN SERPL IFE-IMP: NORMAL
REVIEWING PATHOLOGIST: NORMAL

## 2020-04-17 ENCOUNTER — COMMUNICATION - HEALTHEAST (OUTPATIENT)
Dept: INTERNAL MEDICINE | Facility: CLINIC | Age: 75
End: 2020-04-17

## 2020-04-20 ENCOUNTER — COMMUNICATION - HEALTHEAST (OUTPATIENT)
Dept: INTERNAL MEDICINE | Facility: CLINIC | Age: 75
End: 2020-04-20

## 2020-04-22 ENCOUNTER — COMMUNICATION - HEALTHEAST (OUTPATIENT)
Dept: INTERNAL MEDICINE | Facility: CLINIC | Age: 75
End: 2020-04-22

## 2020-04-23 ENCOUNTER — OFFICE VISIT - HEALTHEAST (OUTPATIENT)
Dept: INTERNAL MEDICINE | Facility: CLINIC | Age: 75
End: 2020-04-23

## 2020-04-23 DIAGNOSIS — I25.9 ASYMPTOMATIC CORONARY HEART DISEASE: ICD-10-CM

## 2020-04-23 DIAGNOSIS — R42 DIZZINESS: ICD-10-CM

## 2020-04-23 DIAGNOSIS — R05.3 CHRONIC COUGH: ICD-10-CM

## 2020-04-23 DIAGNOSIS — R79.89 ELEVATED TSH: ICD-10-CM

## 2020-04-23 DIAGNOSIS — I71.21 ASCENDING AORTIC ANEURYSM (H): ICD-10-CM

## 2020-04-23 DIAGNOSIS — G45.3 AMAUROSIS FUGAX: ICD-10-CM

## 2020-04-23 DIAGNOSIS — I10 ESSENTIAL HYPERTENSION, BENIGN: ICD-10-CM

## 2020-04-27 ENCOUNTER — COMMUNICATION - HEALTHEAST (OUTPATIENT)
Dept: INTERNAL MEDICINE | Facility: CLINIC | Age: 75
End: 2020-04-27

## 2020-04-29 ENCOUNTER — COMMUNICATION - HEALTHEAST (OUTPATIENT)
Dept: INTERNAL MEDICINE | Facility: CLINIC | Age: 75
End: 2020-04-29

## 2020-04-30 ENCOUNTER — COMMUNICATION - HEALTHEAST (OUTPATIENT)
Dept: INTERNAL MEDICINE | Facility: CLINIC | Age: 75
End: 2020-04-30

## 2020-05-01 ENCOUNTER — COMMUNICATION - HEALTHEAST (OUTPATIENT)
Dept: INTERNAL MEDICINE | Facility: CLINIC | Age: 75
End: 2020-05-01

## 2020-05-01 DIAGNOSIS — I10 ESSENTIAL HYPERTENSION: ICD-10-CM

## 2020-05-01 DIAGNOSIS — I71.21 ASCENDING AORTIC ANEURYSM (H): ICD-10-CM

## 2020-05-05 ENCOUNTER — HOSPITAL ENCOUNTER (OUTPATIENT)
Dept: CT IMAGING | Facility: CLINIC | Age: 75
End: 2020-05-05
Attending: INTERNAL MEDICINE
Payer: COMMERCIAL

## 2020-05-05 VITALS
RESPIRATION RATE: 16 BRPM | DIASTOLIC BLOOD PRESSURE: 69 MMHG | OXYGEN SATURATION: 98 % | SYSTOLIC BLOOD PRESSURE: 112 MMHG

## 2020-05-05 DIAGNOSIS — I20.89 STABLE ANGINA PECTORIS (H): ICD-10-CM

## 2020-05-05 LAB
CREAT BLD-MCNC: 0.9 MG/DL (ref 0.66–1.25)
GFR SERPL CREATININE-BSD FRML MDRD: 83 ML/MIN/{1.73_M2}

## 2020-05-05 PROCEDURE — 71275 CT ANGIOGRAPHY CHEST: CPT

## 2020-05-05 PROCEDURE — 25000128 H RX IP 250 OP 636: Performed by: INTERNAL MEDICINE

## 2020-05-05 PROCEDURE — 25000132 ZZH RX MED GY IP 250 OP 250 PS 637: Performed by: INTERNAL MEDICINE

## 2020-05-05 PROCEDURE — 0504T CT FFR: CPT | Performed by: INTERNAL MEDICINE

## 2020-05-05 PROCEDURE — 75574 CT ANGIO HRT W/3D IMAGE: CPT | Mod: 26 | Performed by: INTERNAL MEDICINE

## 2020-05-05 PROCEDURE — 71275 CT ANGIOGRAPHY CHEST: CPT | Mod: 26 | Performed by: INTERNAL MEDICINE

## 2020-05-05 PROCEDURE — 75574 CT ANGIO HRT W/3D IMAGE: CPT

## 2020-05-05 PROCEDURE — 82565 ASSAY OF CREATININE: CPT

## 2020-05-05 PROCEDURE — 0503T CT FFR: CPT

## 2020-05-05 RX ORDER — METHYLPREDNISOLONE SODIUM SUCCINATE 125 MG/2ML
125 INJECTION, POWDER, LYOPHILIZED, FOR SOLUTION INTRAMUSCULAR; INTRAVENOUS
Status: DISCONTINUED | OUTPATIENT
Start: 2020-05-05 | End: 2020-05-06 | Stop reason: HOSPADM

## 2020-05-05 RX ORDER — IOPAMIDOL 755 MG/ML
120 INJECTION, SOLUTION INTRAVASCULAR ONCE
Status: COMPLETED | OUTPATIENT
Start: 2020-05-05 | End: 2020-05-05

## 2020-05-05 RX ORDER — NITROGLYCERIN 0.4 MG/1
.4-.8 TABLET SUBLINGUAL
Status: DISCONTINUED | OUTPATIENT
Start: 2020-05-05 | End: 2020-05-06 | Stop reason: HOSPADM

## 2020-05-05 RX ORDER — ONDANSETRON 2 MG/ML
4 INJECTION INTRAMUSCULAR; INTRAVENOUS
Status: DISCONTINUED | OUTPATIENT
Start: 2020-05-05 | End: 2020-05-06 | Stop reason: HOSPADM

## 2020-05-05 RX ORDER — METOPROLOL TARTRATE 50 MG
50-100 TABLET ORAL
Status: COMPLETED | OUTPATIENT
Start: 2020-05-05 | End: 2020-05-05

## 2020-05-05 RX ORDER — ACYCLOVIR 200 MG/1
0-1 CAPSULE ORAL
Status: DISCONTINUED | OUTPATIENT
Start: 2020-05-05 | End: 2020-05-06 | Stop reason: HOSPADM

## 2020-05-05 RX ORDER — DIPHENHYDRAMINE HCL 25 MG
25 CAPSULE ORAL
Status: DISCONTINUED | OUTPATIENT
Start: 2020-05-05 | End: 2020-05-06 | Stop reason: HOSPADM

## 2020-05-05 RX ORDER — DIPHENHYDRAMINE HYDROCHLORIDE 50 MG/ML
25-50 INJECTION INTRAMUSCULAR; INTRAVENOUS
Status: DISCONTINUED | OUTPATIENT
Start: 2020-05-05 | End: 2020-05-06 | Stop reason: HOSPADM

## 2020-05-05 RX ORDER — METOPROLOL TARTRATE 1 MG/ML
5-15 INJECTION, SOLUTION INTRAVENOUS
Status: DISCONTINUED | OUTPATIENT
Start: 2020-05-05 | End: 2020-05-06 | Stop reason: HOSPADM

## 2020-05-05 RX ADMIN — NITROGLYCERIN 0.8 MG: 0.4 TABLET SUBLINGUAL at 12:40

## 2020-05-05 RX ADMIN — IOPAMIDOL 120 ML: 755 INJECTION, SOLUTION INTRAVENOUS at 12:36

## 2020-05-05 RX ADMIN — METOPROLOL TARTRATE 25 MG: 25 TABLET ORAL at 12:15

## 2020-05-07 ENCOUNTER — TELEPHONE (OUTPATIENT)
Dept: CARDIOLOGY | Facility: CLINIC | Age: 75
End: 2020-05-07

## 2020-05-08 ENCOUNTER — COMMUNICATION - HEALTHEAST (OUTPATIENT)
Dept: INTERNAL MEDICINE | Facility: CLINIC | Age: 75
End: 2020-05-08

## 2020-05-08 ENCOUNTER — MYC MEDICAL ADVICE (OUTPATIENT)
Dept: CARDIOLOGY | Facility: CLINIC | Age: 75
End: 2020-05-08

## 2020-05-08 ENCOUNTER — RECORDS - HEALTHEAST (OUTPATIENT)
Dept: ADMINISTRATIVE | Facility: OTHER | Age: 75
End: 2020-05-08

## 2020-05-08 ENCOUNTER — VIRTUAL VISIT (OUTPATIENT)
Dept: CARDIOLOGY | Facility: CLINIC | Age: 75
End: 2020-05-08
Attending: INTERNAL MEDICINE
Payer: COMMERCIAL

## 2020-05-08 DIAGNOSIS — I20.89 STABLE ANGINA PECTORIS (H): Primary | ICD-10-CM

## 2020-05-08 PROCEDURE — 99214 OFFICE O/P EST MOD 30 MIN: CPT | Mod: 95 | Performed by: INTERNAL MEDICINE

## 2020-05-08 RX ORDER — ISOSORBIDE MONONITRATE 30 MG/1
30 TABLET, EXTENDED RELEASE ORAL DAILY
Qty: 30 TABLET | Refills: 11 | Status: SHIPPED | OUTPATIENT
Start: 2020-05-08 | End: 2020-06-01

## 2020-05-08 RX ORDER — NITROGLYCERIN 0.4 MG/1
TABLET SUBLINGUAL
Qty: 25 TABLET | Refills: 3 | Status: SHIPPED | OUTPATIENT
Start: 2020-05-08 | End: 2021-09-08

## 2020-05-08 RX ORDER — METOPROLOL SUCCINATE 25 MG/1
12.5 TABLET, EXTENDED RELEASE ORAL DAILY
Qty: 30 TABLET | Refills: 11 | Status: SHIPPED | OUTPATIENT
Start: 2020-05-08 | End: 2020-06-01

## 2020-05-08 NOTE — PROGRESS NOTES
"[facility] CARDIOLOGY FOLLOW UP VIDEO VISIT    Referring Provider:  Angel Hernandez  Primary Care Provider:   Angel Hernandez  Indication for Consultation: Chest Discomfort, Thoracic Aortic Aneurysm    Artis Galvin is a 75 year old male who is being evaluated via a billable video visit.      The patient has been notified of following:     \"This video visit will be conducted via a call between you and your physician/provider. We have found that certain health care needs can be provided without the need for an in-person physical exam.  This service lets us provide the care you need with a video conversation.  If a prescription is necessary we can send it directly to your pharmacy.  If lab work is needed we can place an order for that and you can then stop by our lab to have the test done at a later time.    Video visits are billed at different rates depending on your insurance coverage.  Please reach out to your insurance provider with any questions.    If during the course of the call the physician/provider feels a video visit is not appropriate, you will not be charged for this service.\"    Patient has given verbal consent for Video visit? Yes    How would you like to obtain your AVS? [place]    Patient would like the video invitation sent by: Text to cell phone: [phone]      Video-Visit Details  Type of service:  Video Visit  Video start time: 1:58 pm  Video end time: 2:20 PM  Total video time: 22 min  Originating Location (pt. Location): Home  Distant Location (provider location):  Provider's home  Mode of Communication: Video Conference via [email or web address]      HPI: Artis Galvin is a 75 year old male being seen today for evaluation of chest discomfort and thoracic aortic aneurysm.   The patient's risk factor profile is: (-) HTN, (-) DM, (-) hypercholesterolemia, (-) tobacco use, (+) fam Hx premature CAD.  There is a family history of TAAD (paternal).  The patient has no history of cardiovascular disease " (CAD, CHF, arrhythmia, valvular heart disease).  The patient has no Hx of PAD or cerebrovascular disease.  Until recently the patient had not undergone prior cardiovascular evaluation and had never had an ECHO, stress study, cardiac catheterization, or EP study.   The patient had a coronary calcium score (Nov 2019) of 21 placing him at the 25th percentile.  This was prompted by a discussion of whether to initiate a statin in this asymptomatic male with elevated cholesterol.    The patient had been in his good state of health until 3/4/2020 when he presented to [facility] with disequilibrium, lightheadedness, diaphoresis, and disorientation.  He reported 4 episodes of left eye left upper visual field loss.  ECG normal.  Troponin negative.  MRI with and without contrast and MRA of the carotids were unremarkable.  He was hydrated and given Ativan and instructed to follow up with ophthalmology.    The patient returned to [facility] on 3/19/2020 with 9 days of URI symptoms, culminating in generalized malaise, lightheadedness, nausea.  He also noted chest discomfort while in the ambulance.  He was given SL NTG without effect.      I  performed the original consultation a month ago.  The patient described minor chest pain in the precordial area that waxed and waned.  The intensity was 1-2/10 and it did not interfere with his daily function.  He attributed it to working out.  The chest discomfort does not radiate.  He had noticed dyspnea, diaphoresis, and lightheadedness.    The patient denies exertional chest discomfort, GORMAN, PND, orthopnea, pedal edema, and syncope.  He has noted intermittent palpitations and has noticed  while resting in a chair.    The patient had a Biotelemetry event monitor ordered by the ER and it arrived today.  The monitor showed NSR with a single symptomatic episode of AVNRT as well as symptomatic PACs and PVCs.    He had coronary CTA (5/6/2020) showing single vessel CAD  with borderline stenosis in mid LAD with FFR 0.79, diminishing to 0.75 in distal LAD.      It has been a month since his initial visit.  His chest discomfort has increased mildly in frequency     His ambulatory BPs are running in the 140s on Losartan 50 mg every day at home.    PAST MEDICAL HISTORY:  No past medical history on file.    CURRENT MEDICATIONS:  Current Outpatient Medications   Medication Sig     Ascorbic Acid (VITAMIN C) POWD Take 1 Tablespoonful by mouth daily     aspirin 81 MG EC tablet Take 81 mg by mouth daily     atorvastatin (LIPITOR) 40 MG tablet Take 1 tablet (40 mg) by mouth daily     cholecalciferol 50 MCG (2000 UT) CAPS Take 1 capsule by mouth daily     losartan (COZAAR) 50 MG tablet Take 50 mg by mouth daily     pilocarpine (PILOCAR) 2 % ophthalmic solution Apply 1 drop to eye 2 times daily     Zinc Sulfate (ZINC 15 PO) Take 1 capsule by mouth daily     No current facility-administered medications for this visit.        PAST SURGICAL HISTORY:  No past surgical history on file.    ALLERGIES  Patient has no known allergies.    FAMILY HX:  No family history on file.    SOCIAL HX:  Social History     Socioeconomic History     Marital status: Single     Spouse name: Not on file     Number of children: Not on file     Years of education: Not on file     Highest education level: Not on file   Occupational History     Not on file   Social Needs     Financial resource strain: Not on file     Food insecurity     Worry: Not on file     Inability: Not on file     Transportation needs     Medical: Not on file     Non-medical: Not on file   Tobacco Use     Smoking status: Not on file   Substance and Sexual Activity     Alcohol use: Not on file     Drug use: Not on file     Sexual activity: Not on file   Lifestyle     Physical activity     Days per week: Not on file     Minutes per session: Not on file     Stress: Not on file   Relationships     Social connections     Talks on phone: Not on file     Gets  together: Not on file     Attends [employer] service: Not on file     Active member of club or organization: Not on file     Attends meetings of clubs or organizations: Not on file     Relationship status: Not on file     Intimate partner violence     Fear of current or ex partner: Not on file     Emotionally abused: Not on file     Physically abused: Not on file     Forced sexual activity: Not on file   Other Topics Concern     Not on file   Social History Narrative     Not on file       ROS:  Constitutional: No fever, chills, or sweats. No weight gain/loss.   HEENT: No visual disturbance, ear ache, epistaxis, sore throat.   Allergies/Immunologic: Negative.   Respiratory: No cough, hemoptysis.   Cardiovascular: As per HPI.   GI: No nausea, vomiting, hematemesis, melena, or hematochezia.   : No urinary frequency, dysuria, or hematuria.   Integument: No rash.   Psychiatric: No anxiety / depression.   Neuro: No speech disturbance, focal sensory or motor deficit.   Endocrinology: No polyuria / polyphagia.   Musculoskeletal: No myalgia.    VITAL SIGNS:  /73  HR 67  There is no height or weight on file to calculate BMI.  Wt Readings from Last 2 Encounters:   No data found for Wt       PHYSICAL EXAM  Artis Galvin is a 75 year old male.in no acute distress.   No exam (teleconference)    LABS  10/24/2019  Chol 192    HDL 34      ECG:  3/19/2020  Sinus bradycardia with a rate of 59. Nonspecific T wave inversion in lead III, V1, unchanged from prior. Normal intervals. No ischemic ST or T wave morphology. When compared to previous EKG on March 4, 2020 there is no significant change. Impression: Sinus bradycardia, otherwise normal EKG.    EKG:  3/7/2018  NSR  RBBB    HOLTER: 3/30/2020  Normal Holter.  Intermittent right bundle-branch block aberrancy noted on 1 occasion.    CARDIAC MONITOR: Apr 2020  IMPRESSION:  1.  A multi-day  monitor was done from 04/09/2020 through 04/22/2020.  2.  Sinus rhythm is  predominant; the average heart rate of 64 beats per minute, ranges between 50 to 145 beats per minute.  3.  No bradycardia or pauses seen.  On occasion, there is some MT   prolongation.  4.  Episode of supraventricular tachycardia noted 04/11 at 12:06.  This was associated with a heart racing sensation.  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  5.  The patient had symptoms of skipped beats associated with PACs or singular PVCs.  6.  A number of episodes of chest pain were not associated with   arrhythmias or ectopic beats. PVCs were rare and no complex ventricular ectopy is seen;  and the PVC burden was less  than 1%.  There is a  Short shruti of idioventricular type consecutive PVCs at rates less  than 100 beats per minute that was asymptomatic.  9.  Atrial ectopy is infrequent with less than 1% atrial ectopy.    DISCUSSION:    1.  Episode of supraventricular tachycardia at about 145 beats per minute.  This appears to be an AVNRT pattern and is triggered by a PAC and is associated with sensation of heart racing.  2.  Multiple symptoms of chest pain are not associated with arrhythmias or ectopy  but rhythm monitor is not a sensitive way to assess for myocardial   ischemia and further testing may be indicated to evaluate multiple episodes of chest pain.    ECHO: None    CARDIAC MRI: None    EXERCISE NUCLEAR STUDY: 3/27/2020  1.  There is no prior study for comparison.  2.  The patient's exercise capacity is above average.  No exercise induced   angina.  3.  Exercise stress ECG is negative for inducible myocardial ischemia.  4.  Exercise nuclear study is negative for inducible myocardial ischemia   or infarction.  5.  Normal left ventricular size, wall motion and function.  The calculated left ventricular ejection fraction is 70%.  6. Exercised: 85% APHR (147) achieved.  Exercised 9 minutes.    CARDIAC CA SCORE:  11/19/19   The total Agatston calcium score is 21. A calcium score in this  range places the individual in the less than 25% percentile when compared to an  age and gender matched control group and implies a mild to moderate risk of cardiac events in the next ten years.  Moderate enlargement of the ascending thoracic aorta suggested. Measures 4.6 cm. Incompletely visualized on this examination. Consider further evaluation with CT scan of the thoracic aorta or MRI.    CT abdomen and pelvis: 18   1. Ascending aortic 4.6 cm aneurysm.  2. Please refer to cardiologist's dictation for the cardiac CT report.    CORONARY CTA: (2020)                                                                   IMPRESSION:  Borderline hemodynamically significant mid LAD stenosis. CT-FFR is 0.79, which falls to 0.75 in the distal LAD.      FINDINGS:  1.  LAD: CT-FFR after the moderate-severe mLAD stenosis is 0.79, which falls to 0.75 in the distal LAD.   2.  LCX: CT-FFR in the small distal circumflex is 0.83.   3.  RCA: CT-FFR after the mild distal stenosis is 0.85.        IMPRESSION:  1.  Moderate-severe mid-LAD stenosis. Images sent to Vessel for CT-FFR analysis, report to follow separately.   2.  Total Agatston score 27.6 placing the patient in the 16th  percentile when compared to age and gender matched control group.  3.  Mildly dilated proximal ascending aorta measuring 4.4 cm.  4.  Please review Radiology report for incidental noncardiac findings that will follow separately.     FINDINGS:     CORONARY CALCIUM SCORE     Total Agatston calcium score: 27.6   Left main: 0  Left anterior descendin.3  Left circumflex: 0  Right coronary artery: 2.3   This places the patient in the 16th  percentile when compared to age and gender matched control group.     CORONARY ANGIOGRAPHY     DOMINANCE:   Right dominant system.   Normal coronary origins and course.     LEFT MAIN:   The LM is a large caliber but short vessel.   The left main arises normally from the left coronary cusp and is widely patent  without detectable atherosclerosis or stenosis.      LEFT ANTERIOR DESCENDING:   The LAD is a large caliber vessel with a type III morphology. It gives rise to a small caliber D1, moderate caliber D2, and a small caliber D3.      Proximal LAD: Minimal (<25%) stenosis composed of calcified plaque.  Mid LAD: Moderate-severe (50-69%) stenosis composed of noncalcified plaque.  The remainder of the left anterior descending and its major diagonal branches are patent with minimal luminal irregularities.     LEFT CIRCUMFLEX:   The LCx is a moderate caliber vessel that gives rise to a moderate caliber OM1.   The caliber of the LCx tapers significantly after the origin of the OM1.  The left circumflex and its major branches are widely patent without detectable atherosclerosis or stenosis.     RIGHT CORONARY ARTERY:   The RCA is a large caliber dominant vessel.   Distal RCA: Mild (25-49%) stenosis composed of noncalcified plaque.  The remainder of the right coronary and the posterior descending  artery are patent with minimal luminal irregularities.     ADDITIONAL FINDINGS:   Normal pulmonary venous anatomy with all four pulmonary veins draining into the left atrium.    There is no left ventricular mass or thrombus.   Normal pericardial thickness. There is no pericardial effusion.  Please review Radiology report for incidental noncardiac findings that will follow separately.      THORACIC AORTA EXAMINATION  1.  The proximal ascending aorta is mildly dilated (43.6 x 42.4 mm, indexed value 1.89 cm/m2). The aortic root, ascending aorta, arch, and descending aorta are normal in diameter. There is no dissection seen.  2.  The aortic arch is left sided. There is normal branching of the arch vessels. There is no coarctation seen.  3.  The main and proximal branch pulmonary arteries are normal in size.   4.  The systemic venous connections are normal.   5.  The cardiac chambers demonstrate normal atrioventricular and  ventriculoarterial concordance.  6.  The pericardium is unremarkable.  There is no pericardial effusion.   7.  There is no intracardiac thrombus.     Bi-orthogonal luminal aortic dimensions are:   Aortic root measures 37.4 mm x 35.8 mm at the level of sinuses of Valsalva.   The sinotubular junction measures 38.0 mm x 36.1 mm.    Proximal ascending aorta measures 43.6 mm x 42.4 mm, at the level of the main pulmonary artery.   Distal ascending aorta measures 43.4 mm x 35.6 mm, proximal to the takeoff of the brachiocephalic artery.   Mid aortic arch measures 34.0 mm x 33.1 mm, proximal to the takeoff of the left subclavian artery.   Proximal descending thoracic aortic measures 31.9 mm x 29.9 mm, distal to the takeoff of the left subclavian artery.   Mid descending thoracic aorta measures 27.1 mm x 26.0 mm, at the level of the pulmonary veins.   Distal thoracic aorta measures 23.6 mm x 22.5 mm, at the level of the diaphragm.     RADIOLOGY READ:  Impression:  1. Sequelae of previous granulomatous disease. No acute airspace disease.  2. Ascending aortic aneurysm measuring 4.9 cm. This is unchanged since 11/19/2019.     CARDIAC CATH: None    ASSESSMENT AND PLAN:   1. Chest discomfort.  Typical and atypical symptoms.  The patient had an electrically positive stress study but the perfusion images were normal.  His coronary CTA reflects 3V CAD with borderline positive disease in the mid LAD.  Options include medical therapy and coronary angiography with revascularization.  He is currently on ASA 81 mg every day.  His resting HR is 55-65.  I will start Toprol XL 12.5 mg every day. I will also provide Imdur 30 mg every day.  If he has increasing chest discomfort (frequency, severity), I would recommend we proceed with coronary angiography.    2. Palpitations.  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 His daughter is ICU nurse and notes he has been recording a tachyarrhythmia (Kardia).  Trial of Toprol XL 12.5 mg every  day.    3. Thoracic aortic aneurysm.   No documented familial history of TAAD.  CT (5/5/20) showed 4.9 cm aneurysm, unchanged from Nov 2019.  Repeat CT scan one year later.    4. Cardiovascular Risk.  The patient's ACC/AHA 10 year risk score is 23.5%.  Positive coronary calcium 25th percentile.  Recommend Lipitor 40 mg every day.    5. HTN.  BP borderline.  Continue Losartan 50 mg every day.  Add Toprol XL 12.5 mg every day.    6. Hypercholesterolemia.  Lipitor 40 mg every day.    FOLLOW UP:  1 year      ADDENDUM (5/26/2020):    I reviewed the chart again.   You can relate the following to him:    1. His chest pain was not classic for coronary artery disease.  2. His stress nuclear study was POSITIVE from the review of the ECG but it was NEGATIVE with regard to the perfusion images.  3. For this reason, I did a coronary CTA expecting it to be negative.  However, it shows 3 vessel CAD with FFRs ranging 0.79 - 0.85.   In regard to calcium score, it was on the lower end relative to others his age / gender, but that is no longer an issue now that we have imaging of the coronaries with contrast on CT.  4. In the event his chest pain improved with the increase in  beta blocker and Imdur, I would have continued to treat him medically.  5. However, if his symptoms continue on good medical therapy, I think we are obliged to proceed with coronary angiography for evaluation of stable coronary artery disease.    6. In regard to the other symptoms, many seem extracardiac, possibly neurologic, secondary to HTN, or related to anxiety / depression.    CT CHEST:  7/15/21                                                    IMPRESSION:  1. Ascending thoracic aorta measures 42 mm in diameter.   2. 29.8 x 19.9 x 32.7 mm heterogenously enhancing right thyroid mass.  Further evaluation with ultrasound could be considered.   3. Enhancing left inguinal lymph node measures 12 mm in short axis. No iliac chain lymphadenopathy. Lymph node may be  reactive but malignancy cannot be excluded.   4. Prostatectomy postoperative changes. Sclerotic foci and pelvic bones bilateral femoral heads may be bone islands, however, sclerotic metastases cannot be excluded.   5. Sub-6 mm pulmonary nodules. Per Fleischner Society recommendations, if the patient is at low risk for lung cancer, no further follow-up is needed. If the patient is at high risk for lung cancer, optional 12 month follow-up chest CT could be considered.     ADDENDUM (7/19/2021): Endocrinology referral regarding thyroid mass.  Regarding inguinal lymph node, refer to urology as this may be related to prior Hx prostate cancer.   Finally, repeat chest CT scan in one year to ensure pulmonary nodules are not concealing lung CA.    MD Elder Bowles MD    Divisions of Cardiology  [place], [place]    CC  Patient Care Team:  Colby Hernandez MD as PCP - General (Internal Medicine)  Elder Cazares MD as MD (Internal Medicine - Interventional Cardiology)  Haider Mathews MD as MD (Cardiology)  COLBY HERNANDEZ

## 2020-05-08 NOTE — PATIENT INSTRUCTIONS
It was a pleasure to see you in the cardiology clinic today.    If you have any questions, you can reach my nurse, Jennifer Henson, at [phone].  Press Option #1 for the [facility], and then press Option #f 4 or nursing.    Note the new medications:     1. Toprol XL 12.5 mg, one daily    2. Imdur 30 mg, one daily      Stop the following medications: None    The results from today include: None    Tests ordered today: None    I would like you to follow up with Dr. Cazares in 2 weeks regarding recurrent chest discomfort.    If chest pain increases in frequency or severity, you will need to have a coronary angiogram and possible coronary revascularization.    Sincerely,      Elder Cazares MD     [facility]          Radiology Consult to Cardiology, 5/5/2020 1:29 PM     History: Coronary artery disease evaluation. Intermediate to high  risk. Normal angiogram 2 years ago.     Comparison: Cardiac CT 11/19/2019     Technique: Cardiac CT was performed by cardiology. Interpretation of  the noncardiac portions of the study was requested.  Field of view  extending from approximately the melecio to the upper abdomen.     Contrast dose: iopamidol (ISOVUE-370) solution 120 mL     Findings:  Cardiac size is normal without pericardial effusion. Mild coronary  calcium. Ascending aorta measures 4.9 cm. Main pulmonary artery is  normal in caliber.     No focal consolidation or suspicious pulmonary nodule. No pneumothorax  or pleural effusion. Scattered calcified pulmonary granuloma. Imaged  portions of the central airways are clear. No lymphadenopathy  demonstrated.  Visualized portions of the esophagus are normal.      Limited evaluation of the upper abdomen is unremarkable.     No suspicious osseous abnormality.                                                                       Impression:  1. Sequelae of previous granulomatous disease. No acute  airspace  disease.  2. Ascending aortic aneurysm measuring 4.9 cm. This is unchanged since  11/19/2019.   3. Please see cardiology dictation for detailed findings related to  the heart.        I have personally reviewed the examination and initial interpretation  and I agree with the findings.     PATEL PHILLIPS MD

## 2020-05-09 ENCOUNTER — COMMUNICATION - HEALTHEAST (OUTPATIENT)
Dept: INTERNAL MEDICINE | Facility: CLINIC | Age: 75
End: 2020-05-09

## 2020-05-11 ENCOUNTER — MYC MEDICAL ADVICE (OUTPATIENT)
Dept: CARDIOLOGY | Facility: CLINIC | Age: 75
End: 2020-05-11

## 2020-05-12 ENCOUNTER — MYC MEDICAL ADVICE (OUTPATIENT)
Dept: CARDIOLOGY | Facility: CLINIC | Age: 75
End: 2020-05-12

## 2020-05-14 NOTE — TELEPHONE ENCOUNTER
S-(situation): patient still has irritation and pain but it has diminished since starting the metoprolol and Imdur. Has been going on walks without any symptoms. Notices the chest irritation mostly at rest.  BP's are 110-143 over 58/82 with heart rates in the 50s to low 60s.    B-(background): The patient described minor chest pain in the precordial area that waxed waned.  The intensity was 1-2/10 and it did not interfere with his daily function.  He attributed it to working out.  The chest discomfort does not radiate.  He had noticed dyspnea, diaphoresis, and lightheadedness.     The patient denies exertional chest discomfort, GORMAN, PND, orthopnea, pedal edema, and syncope.  He has noted intermittent palpitations and has noticed  while resting in a chair.     The patient had a Biotelemetry event monitor ordered by the ER and it arrived today.  The monitor showed NSR with a single symptomatic episode of AVNRT as well as symptomatic PACs and PVCs.     He had coronary CTA (5/6/2020) showing single vessel CAD with borderline stenosis in mid LAD with FFR 0.79, diminishing to 0.75 in distal LAD.       It has been a month since his initial visit.  His chest discomfort has increased mildly in frequency      His ambulatory BPs are running in the 140s on Losartan 50 mg every day at home.    A-(assessment): no discernable difference with meds    R-(recommendations): call in one week for update

## 2020-05-21 ENCOUNTER — COMMUNICATION - HEALTHEAST (OUTPATIENT)
Dept: INTERNAL MEDICINE | Facility: CLINIC | Age: 75
End: 2020-05-21

## 2020-05-22 ENCOUNTER — MYC MEDICAL ADVICE (OUTPATIENT)
Dept: CARDIOLOGY | Facility: CLINIC | Age: 75
End: 2020-05-22

## 2020-05-26 ENCOUNTER — COMMUNICATION - HEALTHEAST (OUTPATIENT)
Dept: INTERNAL MEDICINE | Facility: CLINIC | Age: 75
End: 2020-05-26

## 2020-05-26 ENCOUNTER — OFFICE VISIT - HEALTHEAST (OUTPATIENT)
Dept: INTERNAL MEDICINE | Facility: CLINIC | Age: 75
End: 2020-05-26

## 2020-05-26 DIAGNOSIS — F41.1 GENERALIZED ANXIETY DISORDER: ICD-10-CM

## 2020-05-26 DIAGNOSIS — I25.9 ASYMPTOMATIC CORONARY HEART DISEASE: ICD-10-CM

## 2020-05-26 DIAGNOSIS — R42 DIZZINESS: ICD-10-CM

## 2020-05-26 DIAGNOSIS — I47.10 SVT (SUPRAVENTRICULAR TACHYCARDIA) (H): ICD-10-CM

## 2020-05-26 LAB
ALBUMIN SERPL-MCNC: 4.1 G/DL (ref 3.5–5)
ALP SERPL-CCNC: 131 U/L (ref 45–120)
ALT SERPL W P-5'-P-CCNC: 36 U/L (ref 0–45)
ANION GAP SERPL CALCULATED.3IONS-SCNC: 9 MMOL/L (ref 5–18)
AST SERPL W P-5'-P-CCNC: 23 U/L (ref 0–40)
BILIRUB SERPL-MCNC: 0.8 MG/DL (ref 0–1)
BUN SERPL-MCNC: 14 MG/DL (ref 8–28)
CALCIUM SERPL-MCNC: 9.6 MG/DL (ref 8.5–10.5)
CHLORIDE BLD-SCNC: 106 MMOL/L (ref 98–107)
CHOLEST SERPL-MCNC: 125 MG/DL
CO2 SERPL-SCNC: 27 MMOL/L (ref 22–31)
CREAT SERPL-MCNC: 0.98 MG/DL (ref 0.7–1.3)
ERYTHROCYTE [DISTWIDTH] IN BLOOD BY AUTOMATED COUNT: 11.1 % (ref 11–14.5)
FASTING STATUS PATIENT QL REPORTED: YES
GFR SERPL CREATININE-BSD FRML MDRD: >60 ML/MIN/1.73M2
GLUCOSE BLD-MCNC: 91 MG/DL (ref 70–125)
HCT VFR BLD AUTO: 46.8 % (ref 40–54)
HDLC SERPL-MCNC: 35 MG/DL
HGB BLD-MCNC: 16 G/DL (ref 14–18)
LDLC SERPL CALC-MCNC: 60 MG/DL
MCH RBC QN AUTO: 32 PG (ref 27–34)
MCHC RBC AUTO-ENTMCNC: 34.2 G/DL (ref 32–36)
MCV RBC AUTO: 93 FL (ref 80–100)
PLATELET # BLD AUTO: 239 THOU/UL (ref 140–440)
PMV BLD AUTO: 8.3 FL (ref 7–10)
POTASSIUM BLD-SCNC: 5.1 MMOL/L (ref 3.5–5)
PROT SERPL-MCNC: 7.1 G/DL (ref 6–8)
RBC # BLD AUTO: 5.01 MILL/UL (ref 4.4–6.2)
SODIUM SERPL-SCNC: 142 MMOL/L (ref 136–145)
TRIGL SERPL-MCNC: 149 MG/DL
WBC: 7.4 THOU/UL (ref 4–11)

## 2020-05-26 ASSESSMENT — PATIENT HEALTH QUESTIONNAIRE - PHQ9: SUM OF ALL RESPONSES TO PHQ QUESTIONS 1-9: 5

## 2020-05-26 ASSESSMENT — ANXIETY QUESTIONNAIRES
2. NOT BEING ABLE TO STOP OR CONTROL WORRYING: SEVERAL DAYS
5. BEING SO RESTLESS THAT IT IS HARD TO SIT STILL: NOT AT ALL
IF YOU CHECKED OFF ANY PROBLEMS ON THIS QUESTIONNAIRE, HOW DIFFICULT HAVE THESE PROBLEMS MADE IT FOR YOU TO DO YOUR WORK, TAKE CARE OF THINGS AT HOME, OR GET ALONG WITH OTHER PEOPLE: SOMEWHAT DIFFICULT
6. BECOMING EASILY ANNOYED OR IRRITABLE: MORE THAN HALF THE DAYS
7. FEELING AFRAID AS IF SOMETHING AWFUL MIGHT HAPPEN: SEVERAL DAYS
GAD7 TOTAL SCORE: 8
4. TROUBLE RELAXING: SEVERAL DAYS
3. WORRYING TOO MUCH ABOUT DIFFERENT THINGS: SEVERAL DAYS
1. FEELING NERVOUS, ANXIOUS, OR ON EDGE: MORE THAN HALF THE DAYS

## 2020-05-26 ASSESSMENT — MIFFLIN-ST. JEOR: SCORE: 1603.08

## 2020-05-27 ENCOUNTER — COMMUNICATION - HEALTHEAST (OUTPATIENT)
Dept: INTERNAL MEDICINE | Facility: CLINIC | Age: 75
End: 2020-05-27

## 2020-05-27 ENCOUNTER — HOSPITAL ENCOUNTER (OUTPATIENT)
Dept: ULTRASOUND IMAGING | Facility: HOSPITAL | Age: 75
Discharge: HOME | End: 2020-05-27
Attending: INTERNAL MEDICINE

## 2020-05-27 DIAGNOSIS — G45.3 AMAUROSIS FUGAX: ICD-10-CM

## 2020-05-27 LAB
ATRIAL RATE - MUSE: 54 BPM
DIASTOLIC BLOOD PRESSURE - MUSE: NORMAL
INTERPRETATION ECG - MUSE: NORMAL
P AXIS - MUSE: 39 DEGREES
PR INTERVAL - MUSE: 174 MS
QRS DURATION - MUSE: 92 MS
QT - MUSE: 430 MS
QTC - MUSE: 407 MS
R AXIS - MUSE: 5 DEGREES
SYSTOLIC BLOOD PRESSURE - MUSE: NORMAL
T AXIS - MUSE: 8 DEGREES
VENTRICULAR RATE- MUSE: 54 BPM

## 2020-05-29 ENCOUNTER — COMMUNICATION - HEALTHEAST (OUTPATIENT)
Dept: INTERNAL MEDICINE | Facility: CLINIC | Age: 75
End: 2020-05-29

## 2020-05-31 ENCOUNTER — COMMUNICATION - HEALTHEAST (OUTPATIENT)
Dept: INTERNAL MEDICINE | Facility: CLINIC | Age: 75
End: 2020-05-31

## 2020-06-01 ENCOUNTER — COMMUNICATION - HEALTHEAST (OUTPATIENT)
Dept: INTERNAL MEDICINE | Facility: CLINIC | Age: 75
End: 2020-06-01

## 2020-06-01 ENCOUNTER — MYC MEDICAL ADVICE (OUTPATIENT)
Dept: CARDIOLOGY | Facility: CLINIC | Age: 75
End: 2020-06-01

## 2020-06-01 ENCOUNTER — TELEPHONE (OUTPATIENT)
Dept: CARDIOLOGY | Facility: CLINIC | Age: 75
End: 2020-06-01

## 2020-06-01 DIAGNOSIS — I20.89 STABLE ANGINA PECTORIS (H): ICD-10-CM

## 2020-06-01 DIAGNOSIS — Z87.19 HX OF DIVERTICULITIS OF COLON: ICD-10-CM

## 2020-06-01 RX ORDER — METOPROLOL SUCCINATE 25 MG/1
12.5 TABLET, EXTENDED RELEASE ORAL DAILY
Qty: 45 TABLET | Refills: 3 | Status: SHIPPED | OUTPATIENT
Start: 2020-06-01 | End: 2022-06-20

## 2020-06-01 RX ORDER — ISOSORBIDE MONONITRATE 30 MG/1
30 TABLET, EXTENDED RELEASE ORAL DAILY
Qty: 90 TABLET | Refills: 3 | Status: SHIPPED | OUTPATIENT
Start: 2020-06-01 | End: 2021-10-12

## 2020-06-01 NOTE — TELEPHONE ENCOUNTER
S-(situation): headache, dizzy,  Nausea, has a side ache and stomach issues presently, he believes his diverticulitis is causing him issues. He saw his PCP last week and he decreased the Imdur from 30 mg daily to 15 mg daily. Patient remains on metoprolol 12.5 mg daily. Thinks his chest pain has lessened but is not completely gone .  Relayed following information from Dr. Cazares:  1. His chest pain was not classic for coronary artery disease.   2. His stress nuclear study was POSITIVE from the review of the ECG but it was NEGATIVE with regard to the perfusion images.   3. For this reason, I did a coronary CTA expecting it to be negative.  However, it shows 3 vessel CAD with FFRs ranging 0.79 - 0.85.   In regard to calcium score, it was on the lower end relative to others his age / gender, but that is no longer an issue now that we have imaging of the coronaries with contrast on CT.   4. In the event his chest pain improved with the increase in  beta blocker and Imdur, I would have continued to treat him medically.   5. However, if his symptoms continue on good medical therapy, I think we are obliged to proceed with coronary angiography for evaluation of stable coronary artery disease.     6. In regard to the other symptoms, many seem extracardiac, possibly neurologic, secondary to HTN, or related to anxiety / depression.    B-(background): Artis Galvin is a 74 year old male being seen today for evaluation of chest discomfort and thoracic aortic aneurysm    A-(assessment): continues to have atypical angina    R-(recommendations): will call back in one week to check symptoms

## 2020-06-02 ENCOUNTER — COMMUNICATION - HEALTHEAST (OUTPATIENT)
Dept: INTERNAL MEDICINE | Facility: CLINIC | Age: 75
End: 2020-06-02

## 2020-06-02 DIAGNOSIS — Z87.19 HX OF DIVERTICULITIS OF COLON: ICD-10-CM

## 2020-06-03 ENCOUNTER — MYC MEDICAL ADVICE (OUTPATIENT)
Dept: CARDIOLOGY | Facility: CLINIC | Age: 75
End: 2020-06-03

## 2020-06-08 ENCOUNTER — TELEPHONE (OUTPATIENT)
Dept: CARDIOLOGY | Facility: CLINIC | Age: 75
End: 2020-06-08

## 2020-06-08 DIAGNOSIS — R53.83 FATIGUE, UNSPECIFIED TYPE: Primary | ICD-10-CM

## 2020-06-08 NOTE — TELEPHONE ENCOUNTER
Patient still has c/o fatigue and is easily exhausted with activity. He does notice an improvement in chest pain.   Current Outpatient Medications   Medication     Ascorbic Acid (VITAMIN C) POWD     aspirin 81 MG EC tablet     atorvastatin (LIPITOR) 40 MG tablet     cholecalciferol 50 MCG (2000 UT) CAPS     isosorbide mononitrate (IMDUR) 30 MG 24 hr tablet     losartan (COZAAR) 50 MG tablet     metoprolol succinate ER (TOPROL-XL) 25 MG 24 hr tablet     nitroGLYcerin (NITROSTAT) 0.4 MG sublingual tablet     pilocarpine (PILOCAR) 2 % ophthalmic solution     Zinc Sulfate (ZINC 15 PO)     No current facility-administered medications for this visit.       He is concerned that his metoprolol could be contributing to his fatigue.

## 2020-06-09 ENCOUNTER — COMMUNICATION - HEALTHEAST (OUTPATIENT)
Dept: INTERNAL MEDICINE | Facility: CLINIC | Age: 75
End: 2020-06-09

## 2020-06-15 ENCOUNTER — COMMUNICATION - HEALTHEAST (OUTPATIENT)
Dept: INTERNAL MEDICINE | Facility: CLINIC | Age: 75
End: 2020-06-15

## 2020-06-16 ENCOUNTER — OFFICE VISIT - HEALTHEAST (OUTPATIENT)
Dept: INTERNAL MEDICINE | Facility: CLINIC | Age: 75
End: 2020-06-16

## 2020-06-16 DIAGNOSIS — F43.23 ADJUSTMENT DISORDER WITH MIXED ANXIETY AND DEPRESSED MOOD: ICD-10-CM

## 2020-06-16 DIAGNOSIS — C61 MALIGNANT NEOPLASM OF PROSTATE (H): ICD-10-CM

## 2020-06-16 DIAGNOSIS — I25.9 ASYMPTOMATIC CORONARY HEART DISEASE: ICD-10-CM

## 2020-06-16 DIAGNOSIS — I71.21 ASCENDING AORTIC ANEURYSM (H): ICD-10-CM

## 2020-06-16 DIAGNOSIS — K57.32 DIVERTICULITIS OF COLON: ICD-10-CM

## 2020-06-16 ASSESSMENT — ANXIETY QUESTIONNAIRES
IF YOU CHECKED OFF ANY PROBLEMS ON THIS QUESTIONNAIRE, HOW DIFFICULT HAVE THESE PROBLEMS MADE IT FOR YOU TO DO YOUR WORK, TAKE CARE OF THINGS AT HOME, OR GET ALONG WITH OTHER PEOPLE: SOMEWHAT DIFFICULT
4. TROUBLE RELAXING: NOT AT ALL
1. FEELING NERVOUS, ANXIOUS, OR ON EDGE: SEVERAL DAYS
7. FEELING AFRAID AS IF SOMETHING AWFUL MIGHT HAPPEN: SEVERAL DAYS
6. BECOMING EASILY ANNOYED OR IRRITABLE: SEVERAL DAYS
3. WORRYING TOO MUCH ABOUT DIFFERENT THINGS: SEVERAL DAYS
GAD7 TOTAL SCORE: 4
5. BEING SO RESTLESS THAT IT IS HARD TO SIT STILL: NOT AT ALL
2. NOT BEING ABLE TO STOP OR CONTROL WORRYING: NOT AT ALL

## 2020-06-16 ASSESSMENT — PATIENT HEALTH QUESTIONNAIRE - PHQ9: SUM OF ALL RESPONSES TO PHQ QUESTIONS 1-9: 10

## 2020-06-25 ENCOUNTER — COMMUNICATION - HEALTHEAST (OUTPATIENT)
Dept: INTERNAL MEDICINE | Facility: CLINIC | Age: 75
End: 2020-06-25

## 2020-06-25 ENCOUNTER — AMBULATORY - HEALTHEAST (OUTPATIENT)
Dept: LAB | Facility: HOSPITAL | Age: 75
End: 2020-06-25

## 2020-06-25 ENCOUNTER — HOSPITAL ENCOUNTER (OUTPATIENT)
Dept: CARDIOLOGY | Facility: HOSPITAL | Age: 75
Discharge: HOME | End: 2020-06-25
Attending: INTERNAL MEDICINE

## 2020-06-25 DIAGNOSIS — G45.3 AMAUROSIS FUGAX OF LEFT EYE: ICD-10-CM

## 2020-06-25 DIAGNOSIS — R53.83 FATIGUE: ICD-10-CM

## 2020-06-25 LAB
ANION GAP SERPL CALCULATED.3IONS-SCNC: 5 MMOL/L (ref 5–18)
AORTIC ROOT: 3.6 CM
AORTIC VALVE MEAN VELOCITY: 92.3 CM/S
AR DECEL SLOPE: 2270 MM/S2
AR PEAK VELOCITY: 299 CM/S
ASCENDING AORTA: 4.7 CM
AV DIMENSIONLESS INDEX VTI: 0.9
AV MEAN GRADIENT: 4 MMHG
AV PEAK GRADIENT: 8 MMHG
AV REGURGITANT PEAK GRADIENT: 35.8 MMHG
AV REGURGITATION PRESSURE HALF TIME: 386 MS
AV VALVE AREA: 5.8 CM2
AV VELOCITY RATIO: 0.9
BSA FOR ECHO PROCEDURE: 2.06 M2
BUN SERPL-MCNC: 12 MG/DL (ref 8–28)
CALCIUM SERPL-MCNC: 8.8 MG/DL (ref 8.5–10.5)
CHLORIDE BLD-SCNC: 110 MMOL/L (ref 98–107)
CO2 SERPL-SCNC: 29 MMOL/L (ref 22–31)
CREAT SERPL-MCNC: 1.01 MG/DL (ref 0.7–1.3)
CV BLOOD PRESSURE: ABNORMAL MMHG
CV ECHO HEIGHT: 70 IN
CV ECHO WEIGHT: 190 LBS
DOP CALC AO PEAK VEL: 141 CM/S
DOP CALC AO VTI: 32.1 CM
DOP CALC LVOT AREA: 6.15 CM2
DOP CALC LVOT DIAMETER: 2.8 CM
DOP CALC LVOT PEAK VEL: 129 CM/S
DOP CALC LVOT STROKE VOLUME: 184.6 CM3
DOP CALCLVOT PEAK VEL VTI: 30 CM
EJECTION FRACTION: 72 % (ref 55–75)
ERYTHROCYTE [DISTWIDTH] IN BLOOD BY AUTOMATED COUNT: 12.7 % (ref 11–14.5)
FRACTIONAL SHORTENING: 45.3 % (ref 28–44)
GFR SERPL CREATININE-BSD FRML MDRD: >60 ML/MIN/1.73M2
GLUCOSE BLD-MCNC: 121 MG/DL (ref 70–125)
HCT VFR BLD AUTO: 44.6 % (ref 40–54)
HGB BLD-MCNC: 14.8 G/DL (ref 14–18)
INTERVENTRICULAR SEPTUM IN END DIASTOLE: 1.32 CM (ref 0.6–1)
IVS/PW RATIO: 0.9
LA AREA 1: 19.1 CM2
LA AREA 2: 22.4 CM2
LEFT ATRIUM LENGTH: 5.6 CM
LEFT ATRIUM SIZE: 4.1 CM
LEFT ATRIUM TO AORTIC ROOT RATIO: 1.14 NO UNITS
LEFT ATRIUM VOLUME INDEX: 31.5 ML/M2
LEFT ATRIUM VOLUME: 64.9 ML
LEFT VENTRICLE CARDIAC INDEX: 4.8 L/MIN/M2
LEFT VENTRICLE CARDIAC OUTPUT: 9.8 L/MIN
LEFT VENTRICLE DIASTOLIC VOLUME INDEX: 56.3 CM3/M2 (ref 34–74)
LEFT VENTRICLE DIASTOLIC VOLUME: 116 CM3 (ref 62–150)
LEFT VENTRICLE HEART RATE: 53 BPM
LEFT VENTRICLE MASS INDEX: 114.1 G/M2
LEFT VENTRICLE SYSTOLIC VOLUME INDEX: 15.5 CM3/M2 (ref 11–31)
LEFT VENTRICLE SYSTOLIC VOLUME: 32 CM3 (ref 21–61)
LEFT VENTRICULAR INTERNAL DIMENSION IN DIASTOLE: 4.48 CM (ref 4.2–5.8)
LEFT VENTRICULAR INTERNAL DIMENSION IN SYSTOLE: 2.45 CM (ref 2.5–4)
LEFT VENTRICULAR MASS: 235 G
LEFT VENTRICULAR OUTFLOW TRACT MEAN GRADIENT: 3 MMHG
LEFT VENTRICULAR OUTFLOW TRACT MEAN VELOCITY: 82.9 CM/S
LEFT VENTRICULAR OUTFLOW TRACT PEAK GRADIENT: 7 MMHG
LEFT VENTRICULAR POSTERIOR WALL IN END DIASTOLE: 1.39 CM (ref 0.6–1)
LV STROKE VOLUME INDEX: 89.6 ML/M2
MCH RBC QN AUTO: 30.5 PG (ref 27–34)
MCHC RBC AUTO-ENTMCNC: 33.2 G/DL (ref 32–36)
MCV RBC AUTO: 92 FL (ref 80–100)
MITRAL VALVE DECELERATION SLOPE: 2110 MM/S2
MITRAL VALVE E/A RATIO: 0.9
MITRAL VALVE PRESSURE HALF-TIME: 84 MS
MV AVERAGE E/E' RATIO: 7.6 CM/S
MV DECELERATION TIME: 285 MS
MV E'TISSUE VEL-LAT: 9.48 CM/S
MV E'TISSUE VEL-MED: 6.38 CM/S
MV LATERAL E/E' RATIO: 6.3
MV MEDIAL E/E' RATIO: 9.4
MV PEAK A VELOCITY: 69.8 CM/S
MV PEAK E VELOCITY: 60.1 CM/S
MV VALVE AREA PRESSURE 1/2 METHOD: 2.6 CM2
NUC REST DIASTOLIC VOLUME INDEX: 3040 LBS
NUC REST SYSTOLIC VOLUME INDEX: 70 IN
PLATELET # BLD AUTO: 242 THOU/UL (ref 140–440)
PMV BLD AUTO: 10.6 FL (ref 8.5–12.5)
POTASSIUM BLD-SCNC: 4.2 MMOL/L (ref 3.5–5)
PR MAX PG: 3 MMHG
PR PEAK VELOCITY: 82.8 CM/S
RBC # BLD AUTO: 4.86 MILL/UL (ref 4.4–6.2)
SODIUM SERPL-SCNC: 144 MMOL/L (ref 136–145)
TRICUSPID REGURGITATION PEAK PRESSURE GRADIENT: 19.9 MMHG
TRICUSPID VALVE ANULAR PLANE SYSTOLIC EXCURSION: 2.7 CM
TRICUSPID VALVE PEAK REGURGITANT VELOCITY: 223 CM/S
TSH SERPL DL<=0.005 MIU/L-ACNC: 4.88 UIU/ML (ref 0.3–5)
WBC: 6.2 THOU/UL (ref 4–11)

## 2020-06-25 ASSESSMENT — MIFFLIN-ST. JEOR: SCORE: 1603.08

## 2020-06-29 ENCOUNTER — COMMUNICATION - HEALTHEAST (OUTPATIENT)
Dept: INTERNAL MEDICINE | Facility: CLINIC | Age: 75
End: 2020-06-29

## 2020-06-29 ENCOUNTER — MYC MEDICAL ADVICE (OUTPATIENT)
Dept: CARDIOLOGY | Facility: CLINIC | Age: 75
End: 2020-06-29

## 2020-07-01 ENCOUNTER — COMMUNICATION - HEALTHEAST (OUTPATIENT)
Dept: INTERNAL MEDICINE | Facility: CLINIC | Age: 75
End: 2020-07-01

## 2020-07-13 ENCOUNTER — OFFICE VISIT - HEALTHEAST (OUTPATIENT)
Dept: INTERNAL MEDICINE | Facility: CLINIC | Age: 75
End: 2020-07-13

## 2020-07-13 DIAGNOSIS — K57.32 DIVERTICULITIS OF COLON: ICD-10-CM

## 2020-07-13 DIAGNOSIS — I25.9 ASYMPTOMATIC CORONARY HEART DISEASE: ICD-10-CM

## 2020-07-13 DIAGNOSIS — R10.32 LLQ ABDOMINAL PAIN: ICD-10-CM

## 2020-07-13 DIAGNOSIS — F43.23 ADJUSTMENT DISORDER WITH MIXED ANXIETY AND DEPRESSED MOOD: ICD-10-CM

## 2020-07-13 DIAGNOSIS — I71.21 ASCENDING AORTIC ANEURYSM (H): ICD-10-CM

## 2020-07-13 ASSESSMENT — PATIENT HEALTH QUESTIONNAIRE - PHQ9: SUM OF ALL RESPONSES TO PHQ QUESTIONS 1-9: 10

## 2020-07-15 ENCOUNTER — MYC MEDICAL ADVICE (OUTPATIENT)
Dept: CARDIOLOGY | Facility: CLINIC | Age: 75
End: 2020-07-15

## 2020-07-15 DIAGNOSIS — I71.21 ASCENDING AORTIC ANEURYSM (H): Primary | ICD-10-CM

## 2020-07-16 ENCOUNTER — HOSPITAL ENCOUNTER (OUTPATIENT)
Dept: CT IMAGING | Facility: HOSPITAL | Age: 75
Discharge: HOME | End: 2020-07-16
Attending: INTERNAL MEDICINE

## 2020-07-16 DIAGNOSIS — K57.32 DIVERTICULITIS OF COLON: ICD-10-CM

## 2020-07-16 DIAGNOSIS — R10.32 LLQ ABDOMINAL PAIN: ICD-10-CM

## 2020-07-16 NOTE — TELEPHONE ENCOUNTER
Checking in with patient about his heart health. States he is not having any new issues. Did state that he has had a couple of times when he had jaw pain for 1-2 seconds.  C/O chest pressure when he lying down. Plan to visit with patient in six months.

## 2020-07-21 ENCOUNTER — COMMUNICATION - HEALTHEAST (OUTPATIENT)
Dept: INTERNAL MEDICINE | Facility: CLINIC | Age: 75
End: 2020-07-21

## 2020-07-30 ENCOUNTER — COMMUNICATION - HEALTHEAST (OUTPATIENT)
Dept: INTERNAL MEDICINE | Facility: CLINIC | Age: 75
End: 2020-07-30

## 2020-07-30 DIAGNOSIS — I10 ESSENTIAL HYPERTENSION: ICD-10-CM

## 2020-07-30 DIAGNOSIS — I71.21 ASCENDING AORTIC ANEURYSM (H): ICD-10-CM

## 2020-08-04 ENCOUNTER — COMMUNICATION - HEALTHEAST (OUTPATIENT)
Dept: INTERNAL MEDICINE | Facility: CLINIC | Age: 75
End: 2020-08-04

## 2020-08-04 ENCOUNTER — HOSPITAL ENCOUNTER (OUTPATIENT)
Dept: CT IMAGING | Facility: HOSPITAL | Age: 75
Discharge: HOME | End: 2020-08-04
Attending: INTERNAL MEDICINE

## 2020-08-04 DIAGNOSIS — I25.10 ASYMPTOMATIC ARTERIOSCLEROSIS OF CORONARY ARTERY: ICD-10-CM

## 2020-08-04 DIAGNOSIS — I71.20 THORACIC AORTIC ANEURYSM WITHOUT RUPTURE (H): ICD-10-CM

## 2020-08-04 LAB
CREAT BLD-MCNC: 1.1 MG/DL (ref 0.7–1.3)
GFR SERPL CREATININE-BSD FRML MDRD: >60 ML/MIN/1.73M2

## 2020-08-05 ENCOUNTER — COMMUNICATION - HEALTHEAST (OUTPATIENT)
Dept: INTERNAL MEDICINE | Facility: CLINIC | Age: 75
End: 2020-08-05

## 2020-08-18 ENCOUNTER — OFFICE VISIT - HEALTHEAST (OUTPATIENT)
Dept: INTERNAL MEDICINE | Facility: CLINIC | Age: 75
End: 2020-08-18

## 2020-08-18 DIAGNOSIS — I25.9 ASYMPTOMATIC CORONARY HEART DISEASE: ICD-10-CM

## 2020-08-18 DIAGNOSIS — F43.23 ADJUSTMENT DISORDER WITH MIXED ANXIETY AND DEPRESSED MOOD: ICD-10-CM

## 2020-08-18 DIAGNOSIS — I71.21 ASCENDING AORTIC ANEURYSM (H): ICD-10-CM

## 2020-08-18 DIAGNOSIS — M62.830 BACK MUSCLE SPASM: ICD-10-CM

## 2020-08-18 DIAGNOSIS — K57.32 DIVERTICULITIS OF COLON: ICD-10-CM

## 2020-08-18 ASSESSMENT — ANXIETY QUESTIONNAIRES
5. BEING SO RESTLESS THAT IT IS HARD TO SIT STILL: NOT AT ALL
1. FEELING NERVOUS, ANXIOUS, OR ON EDGE: SEVERAL DAYS
3. WORRYING TOO MUCH ABOUT DIFFERENT THINGS: SEVERAL DAYS
2. NOT BEING ABLE TO STOP OR CONTROL WORRYING: SEVERAL DAYS
GAD7 TOTAL SCORE: 4
6. BECOMING EASILY ANNOYED OR IRRITABLE: SEVERAL DAYS
4. TROUBLE RELAXING: NOT AT ALL
7. FEELING AFRAID AS IF SOMETHING AWFUL MIGHT HAPPEN: NOT AT ALL
IF YOU CHECKED OFF ANY PROBLEMS ON THIS QUESTIONNAIRE, HOW DIFFICULT HAVE THESE PROBLEMS MADE IT FOR YOU TO DO YOUR WORK, TAKE CARE OF THINGS AT HOME, OR GET ALONG WITH OTHER PEOPLE: SOMEWHAT DIFFICULT

## 2020-08-18 ASSESSMENT — PATIENT HEALTH QUESTIONNAIRE - PHQ9: SUM OF ALL RESPONSES TO PHQ QUESTIONS 1-9: 7

## 2020-08-18 ASSESSMENT — MIFFLIN-ST. JEOR: SCORE: 1603.08

## 2020-09-11 ENCOUNTER — RECORDS - HEALTHEAST (OUTPATIENT)
Dept: ADMINISTRATIVE | Facility: OTHER | Age: 75
End: 2020-09-11

## 2020-09-15 ENCOUNTER — AMBULATORY - HEALTHEAST (OUTPATIENT)
Dept: NURSING | Facility: CLINIC | Age: 75
End: 2020-09-15

## 2020-09-15 DIAGNOSIS — F43.23 ADJUSTMENT DISORDER WITH MIXED ANXIETY AND DEPRESSED MOOD: ICD-10-CM

## 2020-09-15 DIAGNOSIS — I25.9 ASYMPTOMATIC CORONARY HEART DISEASE: ICD-10-CM

## 2020-09-15 DIAGNOSIS — K57.30 DIVERTICULOSIS OF LARGE INTESTINE: ICD-10-CM

## 2020-09-16 ENCOUNTER — COMMUNICATION - HEALTHEAST (OUTPATIENT)
Dept: NURSING | Facility: CLINIC | Age: 75
End: 2020-09-16

## 2020-09-21 ENCOUNTER — OFFICE VISIT - HEALTHEAST (OUTPATIENT)
Dept: INTERNAL MEDICINE | Facility: CLINIC | Age: 75
End: 2020-09-21

## 2020-09-21 DIAGNOSIS — I25.9 ASYMPTOMATIC CORONARY HEART DISEASE: ICD-10-CM

## 2020-09-21 DIAGNOSIS — I71.21 ASCENDING AORTIC ANEURYSM (H): ICD-10-CM

## 2020-09-21 DIAGNOSIS — F43.23 ADJUSTMENT DISORDER WITH MIXED ANXIETY AND DEPRESSED MOOD: ICD-10-CM

## 2020-09-21 DIAGNOSIS — K57.30 DIVERTICULOSIS OF LARGE INTESTINE WITHOUT HEMORRHAGE: ICD-10-CM

## 2020-09-21 ASSESSMENT — PATIENT HEALTH QUESTIONNAIRE - PHQ9: SUM OF ALL RESPONSES TO PHQ QUESTIONS 1-9: 6

## 2020-10-03 ENCOUNTER — COMMUNICATION - HEALTHEAST (OUTPATIENT)
Dept: INTERNAL MEDICINE | Facility: CLINIC | Age: 75
End: 2020-10-03

## 2020-10-05 ENCOUNTER — COMMUNICATION - HEALTHEAST (OUTPATIENT)
Dept: INTERNAL MEDICINE | Facility: CLINIC | Age: 75
End: 2020-10-05

## 2020-10-05 DIAGNOSIS — F43.23 ADJUSTMENT DISORDER WITH MIXED ANXIETY AND DEPRESSED MOOD: ICD-10-CM

## 2020-10-08 ENCOUNTER — RECORDS - HEALTHEAST (OUTPATIENT)
Dept: ADMINISTRATIVE | Facility: OTHER | Age: 75
End: 2020-10-08

## 2020-10-12 ENCOUNTER — COMMUNICATION - HEALTHEAST (OUTPATIENT)
Dept: INTERNAL MEDICINE | Facility: CLINIC | Age: 75
End: 2020-10-12

## 2020-10-13 ENCOUNTER — OFFICE VISIT - HEALTHEAST (OUTPATIENT)
Dept: PODIATRY | Facility: CLINIC | Age: 75
End: 2020-10-13

## 2020-10-13 DIAGNOSIS — B35.1 NAIL FUNGUS: ICD-10-CM

## 2020-10-13 DIAGNOSIS — L60.1 ONYCHOLYSIS OF TOENAIL: ICD-10-CM

## 2020-10-13 ASSESSMENT — MIFFLIN-ST. JEOR: SCORE: 1594.92

## 2020-10-27 ENCOUNTER — COMMUNICATION - HEALTHEAST (OUTPATIENT)
Dept: INTERNAL MEDICINE | Facility: CLINIC | Age: 75
End: 2020-10-27

## 2020-11-04 ENCOUNTER — OFFICE VISIT - HEALTHEAST (OUTPATIENT)
Dept: INTERNAL MEDICINE | Facility: CLINIC | Age: 75
End: 2020-11-04

## 2020-11-04 DIAGNOSIS — R25.3 MUSCLE TWITCH: ICD-10-CM

## 2020-11-04 DIAGNOSIS — R49.0 PERSISTENT HOARSENESS: ICD-10-CM

## 2020-11-04 DIAGNOSIS — E06.3 CHRONIC LYMPHOCYTIC THYROIDITIS: ICD-10-CM

## 2020-11-04 DIAGNOSIS — F43.23 ADJUSTMENT DISORDER WITH MIXED ANXIETY AND DEPRESSED MOOD: ICD-10-CM

## 2020-11-04 ASSESSMENT — MIFFLIN-ST. JEOR: SCORE: 1598.55

## 2020-11-04 ASSESSMENT — PATIENT HEALTH QUESTIONNAIRE - PHQ9: SUM OF ALL RESPONSES TO PHQ QUESTIONS 1-9: 6

## 2020-11-05 LAB
ALBUMIN SERPL-MCNC: 4 G/DL (ref 3.5–5)
ALP SERPL-CCNC: 126 U/L (ref 45–120)
ALT SERPL W P-5'-P-CCNC: 29 U/L (ref 0–45)
ANION GAP SERPL CALCULATED.3IONS-SCNC: 8 MMOL/L (ref 5–18)
AST SERPL W P-5'-P-CCNC: 20 U/L (ref 0–40)
BILIRUB SERPL-MCNC: 0.5 MG/DL (ref 0–1)
BUN SERPL-MCNC: 17 MG/DL (ref 8–28)
CALCIUM SERPL-MCNC: 9.3 MG/DL (ref 8.5–10.5)
CHLORIDE BLD-SCNC: 108 MMOL/L (ref 98–107)
CK SERPL-CCNC: 97 U/L (ref 30–190)
CO2 SERPL-SCNC: 25 MMOL/L (ref 22–31)
CREAT SERPL-MCNC: 1.15 MG/DL (ref 0.7–1.3)
GFR SERPL CREATININE-BSD FRML MDRD: >60 ML/MIN/1.73M2
GLUCOSE BLD-MCNC: 87 MG/DL (ref 70–125)
MAGNESIUM SERPL-MCNC: 2.1 MG/DL (ref 1.8–2.6)
POTASSIUM BLD-SCNC: 4.7 MMOL/L (ref 3.5–5)
PROT SERPL-MCNC: 6.9 G/DL (ref 6–8)
SODIUM SERPL-SCNC: 141 MMOL/L (ref 136–145)
T4 FREE SERPL-MCNC: 0.8 NG/DL (ref 0.7–1.8)
TSH SERPL DL<=0.005 MIU/L-ACNC: 5.15 UIU/ML (ref 0.3–5)

## 2020-11-06 ENCOUNTER — COMMUNICATION - HEALTHEAST (OUTPATIENT)
Dept: INTERNAL MEDICINE | Facility: CLINIC | Age: 75
End: 2020-11-06

## 2020-11-07 ENCOUNTER — HEALTH MAINTENANCE LETTER (OUTPATIENT)
Age: 75
End: 2020-11-07

## 2020-11-09 ENCOUNTER — OFFICE VISIT - HEALTHEAST (OUTPATIENT)
Dept: OTOLARYNGOLOGY | Facility: CLINIC | Age: 75
End: 2020-11-09

## 2020-11-09 DIAGNOSIS — J38.7 PRESBYLARYNGES: ICD-10-CM

## 2020-11-16 ENCOUNTER — HOSPITAL ENCOUNTER (OUTPATIENT)
Dept: PHYSICAL MEDICINE AND REHAB | Facility: CLINIC | Age: 75
Discharge: HOME | End: 2020-11-16
Attending: INTERNAL MEDICINE

## 2020-11-16 DIAGNOSIS — R25.3 MUSCLE TWITCH: ICD-10-CM

## 2020-11-17 ENCOUNTER — COMMUNICATION - HEALTHEAST (OUTPATIENT)
Dept: INTERNAL MEDICINE | Facility: CLINIC | Age: 75
End: 2020-11-17

## 2020-11-28 ENCOUNTER — RECORDS - HEALTHEAST (OUTPATIENT)
Dept: ADMINISTRATIVE | Facility: OTHER | Age: 75
End: 2020-11-28

## 2020-11-29 ENCOUNTER — COMMUNICATION - HEALTHEAST (OUTPATIENT)
Dept: INTERNAL MEDICINE | Facility: CLINIC | Age: 75
End: 2020-11-29

## 2020-11-29 DIAGNOSIS — K86.2 PANCREATIC CYST: ICD-10-CM

## 2020-12-10 ENCOUNTER — PATIENT OUTREACH (OUTPATIENT)
Dept: GASTROENTEROLOGY | Facility: CLINIC | Age: 75
End: 2020-12-10

## 2020-12-10 NOTE — TELEPHONE ENCOUNTER
"Pt referred from Dr Cazares/t finding of pancreatic cyst on imaging.    Per Dr. Austin:   I will have my RNCC reach out today to gauge symptoms. Ill have to look through the chart to help understand if this is related to pancreatitis or if this is a premalignant cystic lesion. Based on his symptoms and what we surmise, we may go straight to endoscopy for decompression or sampling or start with a clinic visit.     Chart review:  CT 11-28  IMPRESSION:   1.  Acute uncomplicated sigmoid diverticulitis.  7 mm cystic lesion pancreatic neck follow-up per the attached reference is recommended. This would include a CT or MRI in 2-3 years.    Hx of divertitulitis, ER visits w/ abd pain in November 2020, September 2020. No noted elevations of lipase in chart. No panc cysts seen in prior CT in July 2020    Called patient to discuss symptoms. Other than diverticulitis x2, no other GI issues. Mild pain now, on LLQ/LUQ \"sideache\", related to prior diverticulitis flare. No weight loss.     Called [facility] to obtain prior imaging. Requests can be faxed on [phone], will f/ up with Dr. Austin regarding plan.    ML  "

## 2020-12-11 ENCOUNTER — DOCUMENTATION ONLY (OUTPATIENT)
Dept: GASTROENTEROLOGY | Facility: CLINIC | Age: 75
End: 2020-12-11

## 2020-12-11 ENCOUNTER — PATIENT OUTREACH (OUTPATIENT)
Dept: GASTROENTEROLOGY | Facility: CLINIC | Age: 75
End: 2020-12-11

## 2020-12-11 NOTE — TELEPHONE ENCOUNTER
Called patient, virtual clinic scheduled for 12/17 w. / Dr. Austin to discuss pancreatic cyst.    ML

## 2020-12-11 NOTE — PROGRESS NOTES
Late entry:    12/10/20    Faxed request for images for CT abd pelvis, done on 11/28/20, to be pushed to our [employer] PACS.     Faxed to [facility] at [phone].      Also called [facility] to push images over for CT done on 7/16/20    Uriel Roblero LPN

## 2020-12-14 ENCOUNTER — COMMUNICATION - HEALTHEAST (OUTPATIENT)
Dept: INTERNAL MEDICINE | Facility: CLINIC | Age: 75
End: 2020-12-14

## 2020-12-15 ENCOUNTER — DOCUMENTATION ONLY (OUTPATIENT)
Dept: GASTROENTEROLOGY | Facility: CLINIC | Age: 75
End: 2020-12-15

## 2020-12-15 NOTE — PROGRESS NOTES
Called film room to see if images got pushed over for CT -11/28/20. Per Miles, nothing has been pushed over.     Contacted [facility] again. It looks like images failed to be pushed over. Gentleman on the phone will look into this and give me a call back.     Uriel Roblero, ELLISN

## 2020-12-15 NOTE — PROGRESS NOTES
Called [facility] room-Radiology at [phone] to have them push CT images from 11/28/20 over, as Pt is being seeing on 12/17/20.  Images will be pushed over in the next few minutes.    Uriel Roblero LPN

## 2020-12-17 ENCOUNTER — RECORDS - HEALTHEAST (OUTPATIENT)
Dept: ADMINISTRATIVE | Facility: OTHER | Age: 75
End: 2020-12-17

## 2020-12-17 ENCOUNTER — HOSPITAL ENCOUNTER (INPATIENT)
Dept: GENERAL RADIOLOGY | Facility: CLINIC | Age: 75
End: 2020-12-17
Attending: INTERNAL MEDICINE
Payer: COMMERCIAL

## 2020-12-17 ENCOUNTER — VIRTUAL VISIT (OUTPATIENT)
Dept: GASTROENTEROLOGY | Facility: CLINIC | Age: 75
End: 2020-12-17
Payer: COMMERCIAL

## 2020-12-17 VITALS — HEIGHT: 70 IN | BODY MASS INDEX: 26.48 KG/M2 | WEIGHT: 185 LBS

## 2020-12-17 DIAGNOSIS — K86.2 PANCREATIC CYST: ICD-10-CM

## 2020-12-17 DIAGNOSIS — K86.2 PANCREATIC CYST: Primary | ICD-10-CM

## 2020-12-17 PROCEDURE — 74150 CT ABDOMEN W/O CONTRAST: CPT | Mod: 26 | Performed by: RADIOLOGY

## 2020-12-17 PROCEDURE — 99204 OFFICE O/P NEW MOD 45 MIN: CPT | Mod: 95 | Performed by: INTERNAL MEDICINE

## 2020-12-17 RX ORDER — BUPROPION HYDROCHLORIDE 150 MG/1
TABLET ORAL
COMMUNITY
Start: 2020-10-05 | End: 2021-09-08

## 2020-12-17 RX ORDER — LOSARTAN POTASSIUM 25 MG/1
100 TABLET ORAL
COMMUNITY
Start: 2020-03-20 | End: 2021-09-08

## 2020-12-17 ASSESSMENT — PAIN SCALES - GENERAL: PAINLEVEL: NO PAIN (0)

## 2020-12-17 ASSESSMENT — MIFFLIN-ST. JEOR: SCORE: 1585.4

## 2020-12-17 NOTE — LETTER
"  12/17/2020      RE: Artis Galvin  [address] [address]      Dear Colleague,    Thank you for referring your patient, Artis Galvin, to the [facility]. Please see a copy of my visit note below.    The patient has been notified of following:     \"This video visit will be conducted via a call between you and your physician/provider. We have found that certain health care needs can be provided without the need for an in-person physical exam.  This service lets us provide the care you need with a video conversation.  If a prescription is necessary we can send it directly to your pharmacy.  If lab work is needed we can place an order for that and you can then stop by our lab to have the test done at a later time.    Video visits are billed at different rates depending on your insurance coverage.  Please reach out to your insurance provider with any questions.    If during the course of the call the physician/provider feels a video visit is not appropriate, you will not be charged for this service.\"    Patient has given verbal consent for Video visit? Yes  How would you like to obtain your AVS? My Chart  If you are dropped from the video visit, the video invite should be resent to: Cell phone  Will anyone else be joining your video visit? No  {If patient encounters technical issues they should call [phone]       Video-Visit Details    Type of service:  Video Visit    Video Start Time: 0745  Video End Time: 0830    Originating Location (pt. Location): Home    Distant Location (provider location):  [facility]     Platform used for Video Visit: [email or web address]      Referring provider  Angel Bowles  Query Incidental pancreatic cyst    HPI  Mr Galvin is a 76yo gentleman with a known aortic aneurysm and a history of diverticulitis who had left lower quadrant pain prompting a CT of the abdomen and " pelvis. While uncomplicated diverticulitis was again found, an incidental 7mm pancreatic cyst was also demonstrated. This cyst was without concerning feature of main duct dilation or associated solid mass. He managed the diverticulitis with oral antibiotics.    Importantly, Mr Galvin has no personal or family history of pancreatic disease. He does not drink alcohol or smoke tobacco. He is just over his BMI goal and he is not diabetic.    He had prostate cancer and this led to a prostectomy.    Medications  Current Outpatient Medications   Medication     aspirin 81 MG EC tablet     atorvastatin (LIPITOR) 40 MG tablet     buPROPion (WELLBUTRIN XL) 150 MG 24 hr tablet     cholecalciferol 50 MCG (2000 UT) CAPS     isosorbide mononitrate (IMDUR) 30 MG 24 hr tablet     losartan (COZAAR) 25 MG tablet     metoprolol succinate ER (TOPROL-XL) 25 MG 24 hr tablet     nitroGLYcerin (NITROSTAT) 0.4 MG sublingual tablet     pilocarpine (PILOCAR) 2 % ophthalmic solution     Zinc Sulfate (ZINC 15 PO)     Ascorbic Acid (VITAMIN C) POWD     losartan (COZAAR) 50 MG tablet     No current facility-administered medications for this visit.      Past Medical  Problem Noted Date   Primary osteoarthritis of left knee 06/26/2017   Chronic pain of left knee 06/26/2017   Rupture of left Achilles tendon 04/03/2017   Pain in joint, shoulder region 07/22/2011   Osteoarthritis of glenohumeral joint, bilateral 07/22/2011   Bunion 09/09/2010   Plantar wart 09/09/2010   Tinea pedis 09/09/2010   Pain in joint, shoulder region 02/01/2008   Other abnormal blood chemistry 02/01/2008   Overview:     elevated ESR 46,44       Aortic aneurysm (HC) 2018     High cholesterol         Past Surgical  Hx shoulder scope 9/30/2008 Right Dr. Batista GH debridement, SAD, AC joint, coracoid     PROSTATECTOMY 1/1/2018 - 12/31/2018        Social History  Never Smoker           Smokeless Tobacco: Never Used           Alcohol Use Drinks/Week oz/Week Comments   Not Currently            Sex Assigned at Birth Date Recorded   Not on file       Family History  No Known Problems Father       No Known Problems Mother       Osteoporosis Sister         Relation Name Status Comments   Father         Mother         Sister           Objective:  Reported vitals:  There were no vitals taken for this visit.   GEN: Healthy, alert and no distress  PSYCH: Alert and oriented times 3; coherent speech, normal   rate and volume, able to articulate logical thoughts, able   to abstract reason, no tangential thoughts, no hallucinations   or delusions, affect seems normal  RESP: No cough, no audible wheezing, able to talk in full sentences  Remainder of exam unable to be completed due to virtual visit     Outside Imaging summaries:    Assessment and plan:  Mr Galvin is a 74yo gentleman with an incidental finding of a subcentimeter pancreatic cyst without any concomitant concerning history of imaging findings. 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  That said, as there is a small likelihood of progression to malignancy, most typically demonstrated by progressive growth in size, we will organize serial noninvasive MRI imaging for surveillance, the next to occur in one year. With any concerning finding, such as rapid growth, solid lesion, or main duct dilation, we will the reflex an endoscopic ultrasound for closer evaluation and sampling.    He had a CT scan of the abdomen 4m earlier at [facility]. We will ask or radiologists for an over-read of this image, specifically asking to identify the presence (or absence) of this cystic lesion seen on subsequent imaging. If the over-read of the CT does NOT see the lesion we will shorten the interval MRI to 6m.    We discussed the possibility of surgical consultation regarding his recurrent diverticulitis and I asked that he reach out to me if he wished  for a referral.    It was a pleasure to participate in the care of this patient; please contact us with any further questions.  A total of 45 minutes was spent in evaluation with this patient, >50% of which was counseling regarding the above delineated issues.    Jamie Austin MD PhD GRANT LARA  Director of Endoscopy  Associate Professor of Medicine, Surgery and Pediatrics  Interventional and Therapeutic Endoscopy    [facility]  Division of Gastroenterology and Hepatology  [facility] [address]  [address] [id number]    New Consultations  [phone]  Procedure Scheduling [phone]  Clinical Nurse Coordinator [phone]  Clinical Fax   [phone]  Administrative   [phone]  Administrative Fax  [phone]

## 2020-12-17 NOTE — PATIENT INSTRUCTIONS
Follow up:    Dr. Austin has outlined the following steps after your recent clinic visit:    1)  Due for next surveillance MRI imaging in one year. However, we are having our Radiologist do a over-read on the CT done on 7/16/20 at [facility]. If the over-read of the CT does NOT see the lesion we will have you schedule the MRI in 6 months instead. We will contact you to let you know.       Please call with any questions or concerns regarding your clinic visit today.    It is a pleasure being involved in your health care.    Contacts post-consultation depending on your need:    Schedule Clinic Appointments            [phone] # 1   M-F 7:30 - 5 pm    Karina Harris RN Care Coordinator  [phone]    Uriel Roblero LPN    [phone]     OR Procedure Scheduling                             [phone]    My Chart is available 24 hours a day and is a secure way to access your records and communicate with your care team.  I strongly recommend signing up if you haven't already done so, if you are comfortable with computers.  If you would like to inquire about this or are having problems with My Chart access, you may call [phone] or go online at [email or web address].  Please allow at least 24 hours for a response and extra time on weekends and Holidays.

## 2020-12-17 NOTE — PROGRESS NOTES
"The patient has been notified of following:     \"This video visit will be conducted via a call between you and your physician/provider. We have found that certain health care needs can be provided without the need for an in-person physical exam.  This service lets us provide the care you need with a video conversation.  If a prescription is necessary we can send it directly to your pharmacy.  If lab work is needed we can place an order for that and you can then stop by our lab to have the test done at a later time.    Video visits are billed at different rates depending on your insurance coverage.  Please reach out to your insurance provider with any questions.    If during the course of the call the physician/provider feels a video visit is not appropriate, you will not be charged for this service.\"    Patient has given verbal consent for Video visit? Yes  How would you like to obtain your AVS? My Chart  If you are dropped from the video visit, the video invite should be resent to: Cell phone  Will anyone else be joining your video visit? No  {If patient encounters technical issues they should call [phone]     Video-Visit Details    Type of service:  Video Visit    Video Start Time: 0745  Video End Time: 0830    Originating Location (pt. Location): Home    Distant Location (provider location):  [facility] [place]     Platform used for Video Visit: [facility]    Referring provider  Angel Bowles  Query Incidental pancreatic cyst    HPI  Mr Galvin is a 76yo gentleman with a known aortic aneurysm and a history of diverticulitis who had left lower quadrant pain prompting a CT of the abdomen and pelvis. While uncomplicated diverticulitis was again found, an incidental 7mm pancreatic cyst was also demonstrated. This cyst was without concerning feature of main duct dilation or associated solid mass. He managed the diverticulitis with oral antibiotics.    Importantly, Mr" Kamar has no personal or family history of pancreatic disease. He does not drink alcohol or smoke tobacco. He is just over his BMI goal and he is not diabetic.    He had prostate cancer and this led to a prostectomy.    Medications  Current Outpatient Medications   Medication     aspirin 81 MG EC tablet     atorvastatin (LIPITOR) 40 MG tablet     buPROPion (WELLBUTRIN XL) 150 MG 24 hr tablet     cholecalciferol 50 MCG (2000 UT) CAPS     isosorbide mononitrate (IMDUR) 30 MG 24 hr tablet     losartan (COZAAR) 25 MG tablet     metoprolol succinate ER (TOPROL-XL) 25 MG 24 hr tablet     nitroGLYcerin (NITROSTAT) 0.4 MG sublingual tablet     pilocarpine (PILOCAR) 2 % ophthalmic solution     Zinc Sulfate (ZINC 15 PO)     Ascorbic Acid (VITAMIN C) POWD     losartan (COZAAR) 50 MG tablet     No current facility-administered medications for this visit.      Past Medical  Problem Noted Date   Primary osteoarthritis of left knee 06/26/2017   Chronic pain of left knee 06/26/2017   Rupture of left Achilles tendon 04/03/2017   Pain in joint, shoulder region 07/22/2011   Osteoarthritis of glenohumeral joint, bilateral 07/22/2011   Bunion 09/09/2010   Plantar wart 09/09/2010   Tinea pedis 09/09/2010   Pain in joint, shoulder region 02/01/2008   Other abnormal blood chemistry 02/01/2008   Overview:     elevated ESR 46,44       Aortic aneurysm (HC) 2018     High cholesterol         Past Surgical  Hx shoulder scope 9/30/2008 Right Dr. Batista GH debridement, SAD, AC joint, coracoid     PROSTATECTOMY 1/1/2018 - 12/31/2018        Social History  Never Smoker           Smokeless Tobacco: Never Used           Alcohol Use Drinks/Week oz/Week Comments   Not Currently           Sex Assigned at Birth Date Recorded   Not on file       Family History  No Known Problems Father       No Known Problems Mother       Osteoporosis Sister         Relation Name Status Comments   Father         Mother         Sister           Objective:  Reported vitals:   There were no vitals taken for this visit.   GEN: Healthy, alert and no distress  PSYCH: Alert and oriented times 3; coherent speech, normal   rate and volume, able to articulate logical thoughts, able   to abstract reason, no tangential thoughts, no hallucinations   or delusions, affect seems normal  RESP: No cough, no audible wheezing, able to talk in full sentences  Remainder of exam unable to be completed due to virtual visit     Outside Imaging summaries:    Assessment and plan:  Mr Galvin is a 76yo gentleman with an incidental finding of a subcentimeter pancreatic cyst without any concomitant concerning history of imaging findings. 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  That said, as there is a small likelihood of progression to malignancy, most typically demonstrated by progressive growth in size, we will organize serial noninvasive MRI imaging for surveillance, the next to occur in one year. With any concerning finding, such as rapid growth, solid lesion, or main duct dilation, we will the reflex an endoscopic ultrasound for closer evaluation and sampling.    He had a CT scan of the abdomen 4m earlier at [facility]. We will ask or radiologists for an over-read of this image, specifically asking to identify the presence (or absence) of this cystic lesion seen on subsequent imaging. If the over-read of the CT does NOT see the lesion we will shorten the interval MRI to 6m.    We discussed the possibility of surgical consultation regarding his recurrent diverticulitis and I asked that he reach out to me if he wished for a referral.    It was a pleasure to participate in the care of this patient; please contact us with any further questions.  A total of 45 minutes was spent in evaluation with this patient, >50% of which was counseling regarding the above delineated issues.    Jamie DESIR  Norman RIVAS PhD GRANT LARA  Director of Endoscopy  Associate Professor of Medicine, Surgery and Pediatrics  Interventional and Therapeutic Endoscopy    [facility]  Division of Gastroenterology and Hepatology  [facility] [address] [place], [place] [address]    New Consultations  [phone]  Procedure Scheduling [phone]  Clinical Nurse Coordinator [phone]  Clinical Fax   [phone]  Administrative   [phone]  Administrative Fax  [phone]

## 2020-12-17 NOTE — NURSING NOTE
"Chief Complaint   Patient presents with     New Patient     incidental finding pancreatic cyst- 7mm       Vitals:    12/17/20 0724   Weight: 83.9 kg (185 lb)   Height: 1.778 m (5' 10\")       Body mass index is 26.54 kg/m .      Uriel Roblero LPN                        "

## 2020-12-18 ENCOUNTER — TELEPHONE (OUTPATIENT)
Dept: GASTROENTEROLOGY | Facility: CLINIC | Age: 75
End: 2020-12-18

## 2020-12-18 NOTE — TELEPHONE ENCOUNTER
Called Pt to follow up on clinic yesterday with Dr. Austin. Pt understands plan moving forward and has no questions. Discussed will reach out to Pt to let him know if MRI is due in 6 or 12 months after over-read of CT is complete.     Uriel Roblero LPN

## 2020-12-23 ENCOUNTER — TELEPHONE (OUTPATIENT)
Dept: GASTROENTEROLOGY | Facility: CLINIC | Age: 75
End: 2020-12-23

## 2020-12-23 NOTE — TELEPHONE ENCOUNTER
"Per Dr. Austin's letter re CT overread,    \"We are writing to inform you of your test results. In short, the cyst seen on your most recent imaging was also seen on the CT months back. Moreover, the characteristics were also similar then and not concerning. As discussed previously, our plan includes surveillance MRI in one year.\"    Called to let Pt know MRI due in 1 year. No answer. Left VM for Pt to call back if he has any questions. Pt entered in recall to have MRI in 1 year.      Uriel Roblero LPN    "

## 2021-01-08 ENCOUNTER — RECORDS - HEALTHEAST (OUTPATIENT)
Dept: ADMINISTRATIVE | Facility: OTHER | Age: 76
End: 2021-01-08

## 2021-01-20 ENCOUNTER — COMMUNICATION - HEALTHEAST (OUTPATIENT)
Dept: INTERNAL MEDICINE | Facility: CLINIC | Age: 76
End: 2021-01-20

## 2021-01-20 DIAGNOSIS — F43.23 ADJUSTMENT DISORDER WITH MIXED ANXIETY AND DEPRESSED MOOD: ICD-10-CM

## 2021-03-01 ENCOUNTER — COMMUNICATION - HEALTHEAST (OUTPATIENT)
Dept: INTERNAL MEDICINE | Facility: CLINIC | Age: 76
End: 2021-03-01

## 2021-03-05 ENCOUNTER — RECORDS - HEALTHEAST (OUTPATIENT)
Dept: ADMINISTRATIVE | Facility: OTHER | Age: 76
End: 2021-03-05

## 2021-03-08 ENCOUNTER — COMMUNICATION - HEALTHEAST (OUTPATIENT)
Dept: INTERNAL MEDICINE | Facility: CLINIC | Age: 76
End: 2021-03-08

## 2021-03-09 ENCOUNTER — OFFICE VISIT - HEALTHEAST (OUTPATIENT)
Dept: INTERNAL MEDICINE | Facility: CLINIC | Age: 76
End: 2021-03-09

## 2021-03-09 DIAGNOSIS — I10 ESSENTIAL HYPERTENSION, BENIGN: ICD-10-CM

## 2021-03-09 DIAGNOSIS — L60.8 CHANGE IN NAIL APPEARANCE: ICD-10-CM

## 2021-03-09 DIAGNOSIS — I71.21 ASCENDING AORTIC ANEURYSM (H): ICD-10-CM

## 2021-03-09 DIAGNOSIS — K57.32 DIVERTICULITIS OF COLON: ICD-10-CM

## 2021-03-09 DIAGNOSIS — F43.23 ADJUSTMENT DISORDER WITH MIXED ANXIETY AND DEPRESSED MOOD: ICD-10-CM

## 2021-03-09 DIAGNOSIS — C61 MALIGNANT NEOPLASM OF PROSTATE (H): ICD-10-CM

## 2021-03-09 ASSESSMENT — ANXIETY QUESTIONNAIRES
IF YOU CHECKED OFF ANY PROBLEMS ON THIS QUESTIONNAIRE, HOW DIFFICULT HAVE THESE PROBLEMS MADE IT FOR YOU TO DO YOUR WORK, TAKE CARE OF THINGS AT HOME, OR GET ALONG WITH OTHER PEOPLE: SOMEWHAT DIFFICULT
GAD7 TOTAL SCORE: 5
4. TROUBLE RELAXING: NOT AT ALL
2. NOT BEING ABLE TO STOP OR CONTROL WORRYING: SEVERAL DAYS
7. FEELING AFRAID AS IF SOMETHING AWFUL MIGHT HAPPEN: NOT AT ALL
3. WORRYING TOO MUCH ABOUT DIFFERENT THINGS: SEVERAL DAYS
1. FEELING NERVOUS, ANXIOUS, OR ON EDGE: SEVERAL DAYS
5. BEING SO RESTLESS THAT IT IS HARD TO SIT STILL: NOT AT ALL
6. BECOMING EASILY ANNOYED OR IRRITABLE: MORE THAN HALF THE DAYS

## 2021-03-09 ASSESSMENT — MIFFLIN-ST. JEOR: SCORE: 1616.69

## 2021-03-09 ASSESSMENT — PATIENT HEALTH QUESTIONNAIRE - PHQ9: SUM OF ALL RESPONSES TO PHQ QUESTIONS 1-9: 5

## 2021-03-27 ENCOUNTER — HEALTH MAINTENANCE LETTER (OUTPATIENT)
Age: 76
End: 2021-03-27

## 2021-03-30 ENCOUNTER — COMMUNICATION - HEALTHEAST (OUTPATIENT)
Dept: INTERNAL MEDICINE | Facility: CLINIC | Age: 76
End: 2021-03-30

## 2021-04-07 ENCOUNTER — COMMUNICATION - HEALTHEAST (OUTPATIENT)
Dept: INTERNAL MEDICINE | Facility: CLINIC | Age: 76
End: 2021-04-07

## 2021-04-21 ENCOUNTER — RECORDS - HEALTHEAST (OUTPATIENT)
Dept: GENERAL RADIOLOGY | Facility: CLINIC | Age: 76
End: 2021-04-21

## 2021-04-21 ENCOUNTER — OFFICE VISIT - HEALTHEAST (OUTPATIENT)
Dept: INTERNAL MEDICINE | Facility: CLINIC | Age: 76
End: 2021-04-21

## 2021-04-21 DIAGNOSIS — M54.6 ACUTE RIGHT-SIDED THORACIC BACK PAIN: ICD-10-CM

## 2021-04-21 DIAGNOSIS — C61 MALIGNANT NEOPLASM OF PROSTATE (H): ICD-10-CM

## 2021-04-21 DIAGNOSIS — I47.10 SVT (SUPRAVENTRICULAR TACHYCARDIA) (H): ICD-10-CM

## 2021-04-21 DIAGNOSIS — F43.23 ADJUSTMENT DISORDER WITH MIXED ANXIETY AND DEPRESSED MOOD: ICD-10-CM

## 2021-04-21 DIAGNOSIS — M54.6 PAIN IN THORACIC SPINE: ICD-10-CM

## 2021-04-21 DIAGNOSIS — I71.21 ASCENDING AORTIC ANEURYSM (H): ICD-10-CM

## 2021-04-21 DIAGNOSIS — I10 ESSENTIAL HYPERTENSION, BENIGN: ICD-10-CM

## 2021-04-21 LAB
ANION GAP SERPL CALCULATED.3IONS-SCNC: 9 MMOL/L (ref 5–18)
BUN SERPL-MCNC: 14 MG/DL (ref 8–28)
CALCIUM SERPL-MCNC: 8.9 MG/DL (ref 8.5–10.5)
CHLORIDE BLD-SCNC: 109 MMOL/L (ref 98–107)
CO2 SERPL-SCNC: 24 MMOL/L (ref 22–31)
CREAT SERPL-MCNC: 1.02 MG/DL (ref 0.7–1.3)
GFR SERPL CREATININE-BSD FRML MDRD: >60 ML/MIN/1.73M2
GLUCOSE BLD-MCNC: 113 MG/DL (ref 70–125)
POTASSIUM BLD-SCNC: 4.4 MMOL/L (ref 3.5–5)
SODIUM SERPL-SCNC: 142 MMOL/L (ref 136–145)

## 2021-04-21 ASSESSMENT — MIFFLIN-ST. JEOR: SCORE: 1603.08

## 2021-04-22 ENCOUNTER — COMMUNICATION - HEALTHEAST (OUTPATIENT)
Dept: INTERNAL MEDICINE | Facility: CLINIC | Age: 76
End: 2021-04-22

## 2021-04-22 ENCOUNTER — MYC MEDICAL ADVICE (OUTPATIENT)
Dept: CARDIOLOGY | Facility: CLINIC | Age: 76
End: 2021-04-22

## 2021-04-22 DIAGNOSIS — I71.21 ASCENDING AORTIC ANEURYSM (H): Primary | ICD-10-CM

## 2021-04-23 ENCOUNTER — COMMUNICATION - HEALTHEAST (OUTPATIENT)
Dept: INTERNAL MEDICINE | Facility: CLINIC | Age: 76
End: 2021-04-23

## 2021-04-23 ASSESSMENT — ANXIETY QUESTIONNAIRES
GAD7 TOTAL SCORE: 4
4. TROUBLE RELAXING: NOT AT ALL
6. BECOMING EASILY ANNOYED OR IRRITABLE: SEVERAL DAYS
3. WORRYING TOO MUCH ABOUT DIFFERENT THINGS: SEVERAL DAYS
IF YOU CHECKED OFF ANY PROBLEMS ON THIS QUESTIONNAIRE, HOW DIFFICULT HAVE THESE PROBLEMS MADE IT FOR YOU TO DO YOUR WORK, TAKE CARE OF THINGS AT HOME, OR GET ALONG WITH OTHER PEOPLE: SOMEWHAT DIFFICULT
2. NOT BEING ABLE TO STOP OR CONTROL WORRYING: NOT AT ALL
7. FEELING AFRAID AS IF SOMETHING AWFUL MIGHT HAPPEN: SEVERAL DAYS
1. FEELING NERVOUS, ANXIOUS, OR ON EDGE: SEVERAL DAYS
5. BEING SO RESTLESS THAT IT IS HARD TO SIT STILL: NOT AT ALL

## 2021-04-23 ASSESSMENT — PATIENT HEALTH QUESTIONNAIRE - PHQ9: SUM OF ALL RESPONSES TO PHQ QUESTIONS 1-9: 5

## 2021-04-26 ENCOUNTER — TRANSCRIBE ORDERS (OUTPATIENT)
Dept: OTHER | Age: 76
End: 2021-04-26

## 2021-04-26 DIAGNOSIS — I47.10 SVT (SUPRAVENTRICULAR TACHYCARDIA) (H): Primary | ICD-10-CM

## 2021-05-19 ENCOUNTER — OFFICE VISIT - HEALTHEAST (OUTPATIENT)
Dept: INTERNAL MEDICINE | Facility: CLINIC | Age: 76
End: 2021-05-19

## 2021-05-19 DIAGNOSIS — M54.6 ACUTE RIGHT-SIDED THORACIC BACK PAIN: ICD-10-CM

## 2021-05-19 DIAGNOSIS — I71.21 ASCENDING AORTIC ANEURYSM (H): ICD-10-CM

## 2021-05-19 DIAGNOSIS — C61 PROSTATE CANCER METASTATIC TO INTRAPELVIC LYMPH NODE (H): ICD-10-CM

## 2021-05-19 DIAGNOSIS — F43.23 ADJUSTMENT DISORDER WITH MIXED ANXIETY AND DEPRESSED MOOD: ICD-10-CM

## 2021-05-19 DIAGNOSIS — I10 ESSENTIAL HYPERTENSION, BENIGN: ICD-10-CM

## 2021-05-19 DIAGNOSIS — C77.5 PROSTATE CANCER METASTATIC TO INTRAPELVIC LYMPH NODE (H): ICD-10-CM

## 2021-05-19 DIAGNOSIS — I25.9 ASYMPTOMATIC CORONARY HEART DISEASE: ICD-10-CM

## 2021-05-19 ASSESSMENT — ANXIETY QUESTIONNAIRES
GAD7 TOTAL SCORE: 0
3. WORRYING TOO MUCH ABOUT DIFFERENT THINGS: NOT AT ALL
4. TROUBLE RELAXING: NOT AT ALL
7. FEELING AFRAID AS IF SOMETHING AWFUL MIGHT HAPPEN: NOT AT ALL
5. BEING SO RESTLESS THAT IT IS HARD TO SIT STILL: NOT AT ALL
1. FEELING NERVOUS, ANXIOUS, OR ON EDGE: NOT AT ALL
6. BECOMING EASILY ANNOYED OR IRRITABLE: NOT AT ALL
2. NOT BEING ABLE TO STOP OR CONTROL WORRYING: NOT AT ALL

## 2021-05-19 ASSESSMENT — PATIENT HEALTH QUESTIONNAIRE - PHQ9: SUM OF ALL RESPONSES TO PHQ QUESTIONS 1-9: 1

## 2021-05-19 ASSESSMENT — MIFFLIN-ST. JEOR: SCORE: 1614.71

## 2021-05-26 ASSESSMENT — PATIENT HEALTH QUESTIONNAIRE - PHQ9
SUM OF ALL RESPONSES TO PHQ QUESTIONS 1-9: 7
SUM OF ALL RESPONSES TO PHQ QUESTIONS 1-9: 10

## 2021-05-27 VITALS
OXYGEN SATURATION: 96 % | SYSTOLIC BLOOD PRESSURE: 118 MMHG | HEART RATE: 64 BPM | BODY MASS INDEX: 27.57 KG/M2 | WEIGHT: 192.56 LBS | RESPIRATION RATE: 18 BRPM | DIASTOLIC BLOOD PRESSURE: 58 MMHG | HEIGHT: 70 IN

## 2021-05-27 ASSESSMENT — PATIENT HEALTH QUESTIONNAIRE - PHQ9
SUM OF ALL RESPONSES TO PHQ QUESTIONS 1-9: 5
SUM OF ALL RESPONSES TO PHQ QUESTIONS 1-9: 1
SUM OF ALL RESPONSES TO PHQ QUESTIONS 1-9: 6
SUM OF ALL RESPONSES TO PHQ QUESTIONS 1-9: 6
SUM OF ALL RESPONSES TO PHQ QUESTIONS 1-9: 10

## 2021-05-27 NOTE — PROGRESS NOTES
Office Visit - Follow Up   Artis Galvin   73 y.o. male    Date of Visit: 4/2/2019    Chief Complaint   Patient presents with     Abdominal Pain     x 5 days - dull pain, gas, bloating - (left lower quadrant)         Assessment and Plan   1. Diverticulitis of large intestine without perforation or abscess without bleeding  He seems better.  It is unclear if he has had diverticulitis with this event or not.  I counseled patient that should he have another episode similar to his previous episodes he was given a prescription for Cipro and Flagyl to start right away.  He should be seen if things are not improving or they are worsening despite this.  He is in agreement with that plan.  I tried to reassure him about cancers today.  He will have colonoscopy again next year as planned.  - metroNIDAZOLE (FLAGYL) 500 MG tablet; Take 1 tablet (500 mg total) by mouth 3 (three) times a day for 10 days.  Dispense: 30 tablet; Refill: 0  - ciprofloxacin HCl (CIPRO) 500 MG tablet; Take 1 tablet (500 mg total) by mouth 2 (two) times a day for 10 days.  Dispense: 20 tablet; Refill: 0    2. Adjustment disorder with anxious mood  He does have some mild anxiety.  Does not feel the need for anything at this time.  Readdress at his next visit.    3. Carcinoma Of The Prostate Gland  Disease-free at this time.  He has had no return of erectile function which makes him sad but he seems to be doing well otherwise.        Return in about 2 months (around 6/2/2019) for Annual physical.     History of Present Illness   This 73 y.o. old Binu comes in today as an urgent add-on for evaluation of abdominal pain and bloating in his left lower quadrant.  Similar to his previous episodes of diverticulitis.  It flared up last week.  Called and they did not schedule him until today.  He is actually feeling a little bit better now.  He has a history of recurrent diverticulitis.  He gets anxious about this.  He is worried he could have another cancer.   "He has prostate cancer which is been removed and his most recent PSA was not detected.  He is pleased about that but he is worried.  He is probably going to get some sort of sediment out of the mistaken identity issue that he had.    Review of Systems: A comprehensive review of systems was negative except as noted.     Medications, Allergies and Problem List   Reviewed, reconciled and updated     Physical Exam   General Appearance:   Pleasant gentleman in no distress.  Vital signs are noted.  Abdomen soft nontender nondistended normoactive bowel sounds.  No paraspinal megaly other masses.    /74 (Patient Site: Right Arm, Patient Position: Sitting, Cuff Size: Adult Regular)   Pulse 64   Temp 97.6  F (36.4  C)   Ht 5' 10\" (1.778 m)   Wt 194 lb (88 kg)   SpO2 96%   BMI 27.84 kg/m           Additional Information   Current Outpatient Medications   Medication Sig Dispense Refill     ketoconazole (NIZORAL) 2 % cream Apply topically 2 (two) times a day. (Patient taking differently: Apply topically 2 (two) times a day as needed. ) 60 g 3     pilocarpine (PILOCAR) 2 % ophthalmic solution Administer 1 drop to both eyes 2 (two) times a day.        aspirin 81 MG EC tablet Take 81 mg by mouth daily.       cholecalciferol, vitamin D3, 2,000 unit cap Take 1 capsule by mouth daily.        ciprofloxacin HCl (CIPRO) 500 MG tablet Take 1 tablet (500 mg total) by mouth 2 (two) times a day for 10 days. 20 tablet 0     LACTOBACILLUS ACIDOPHILUS (PROBIOTIC ORAL) Take 1 capsule by mouth daily.       metroNIDAZOLE (FLAGYL) 500 MG tablet Take 1 tablet (500 mg total) by mouth 3 (three) times a day for 10 days. 30 tablet 0     naproxen sodium (ALEVE) 220 MG tablet Take 220 mg by mouth 2 (two) times a day as needed for pain.       No current facility-administered medications for this visit.      No Known Allergies  Social History     Tobacco Use     Smoking status: Never Smoker     Smokeless tobacco: Never Used   Substance Use " Topics     Alcohol use: Yes     Comment: rarely     Drug use: No       Review and/or order of clinical lab tests:  Review and/or order of radiology tests:  Review and/or order of medicine tests:  Discussion of test results with performing physician:  Decision to obtain old records and/or obtain history from someone other than the patient:  Review and summarization of old records and/or obtaining history from someone other than the patient and.or discussion of case with another health care provider:  Independent visualization of image, tracing or specimen itself:    Time: total time spent with the patient was 25 minutes of which >50% was spent in counseling and coordination of care     Angel Hernandez MD

## 2021-05-28 ASSESSMENT — ANXIETY QUESTIONNAIRES
GAD7 TOTAL SCORE: 4
GAD7 TOTAL SCORE: 5
GAD7 TOTAL SCORE: 5
GAD7 TOTAL SCORE: 4
GAD7 TOTAL SCORE: 5
GAD7 TOTAL SCORE: 0
GAD7 TOTAL SCORE: 4
GAD7 TOTAL SCORE: 8

## 2021-05-28 NOTE — PROGRESS NOTES
Office Visit - Follow Up   Artis Galvin   74 y.o. male    Date of Visit: 5/20/2019    Chief Complaint   Patient presents with     Follow-up     6 mo f/u - not fasting         Assessment and Plan   1. Hypothyroidism, unspecified type  Seems euthyroid.  Continue current regimen.  TSH next visit.    2. Hx of diverticulitis of colon  Reassurance today.  I did  patient that a low-dose of doxycycline is not going to help prevent diverticulitis.    3. Carcinoma Of The Prostate Gland  He recently had some sort of testing that is genetic testing.  I do not have any of those results and we will try to get those.    4. Pure hypercholesterolemia  Will return fasting next visit.  On no medications at this time.  We discussed aspirin for primary prevention.  He does not have a lot of risk factors for coronary disease.  He can discontinue the aspirin if he wishes to.    He has general anxiety about his health, etc.  I counseled him today on this.  He does not think he has issues with anxiety that he would want medicines for.        Return in about 6 months (around 11/20/2019) for Annual physical.     History of Present Illness   This 74 y.o. old Binu comes in today for follow-up.  He is nervous that he could get diverticulitis and need a colostomy or ileostomy.  He is very worried about that.  He still has some minimal amount of discomfort after his treatment for diverticulitis.  He has an appointment to see his urologist soon.  He has a colonoscopy planned for next year I believe.  He wonders if he needs to be on aspirin anymore.  He is following with his eye doctor and is on an antibiotic.  Wonders if that is going to help prevent diverticulitis.  He denies other new somatic concerns for me.    Review of Systems: A comprehensive review of systems was negative except as noted.     Medications, Allergies and Problem List   Reviewed, reconciled and updated     Physical Exam   General Appearance:   Pleasant gentleman in  "no distress vital signs are noted good spirits today    /64 (Patient Site: Right Arm, Patient Position: Sitting, Cuff Size: Adult Regular)   Pulse 62   Ht 5' 10\" (1.778 m)   Wt 195 lb (88.5 kg)   SpO2 96%   BMI 27.98 kg/m           Additional Information   Current Outpatient Medications   Medication Sig Dispense Refill     cholecalciferol, vitamin D3, 2,000 unit cap Take 1 capsule by mouth daily.        doxycycline (VIBRAMYCIN) 50 MG capsule Take 50 mg by mouth daily.       ketoconazole (NIZORAL) 2 % cream Apply topically 2 (two) times a day. (Patient taking differently: Apply topically 2 (two) times a day as needed. ) 60 g 3     LACTOBACILLUS ACIDOPHILUS (PROBIOTIC ORAL) Take 1 capsule by mouth daily.       naproxen sodium (ALEVE) 220 MG tablet Take 220 mg by mouth 2 (two) times a day as needed for pain.       pilocarpine (PILOCAR) 2 % ophthalmic solution Administer 1 drop to both eyes 2 (two) times a day.        No current facility-administered medications for this visit.      No Known Allergies  Social History     Tobacco Use     Smoking status: Never Smoker     Smokeless tobacco: Never Used   Substance Use Topics     Alcohol use: Yes     Comment: rarely     Drug use: No       Review and/or order of clinical lab tests:  Review and/or order of radiology tests:  Review and/or order of medicine tests:  Discussion of test results with performing physician:  Decision to obtain old records and/or obtain history from someone other than the patient:  Review and summarization of old records and/or obtaining history from someone other than the patient and.or discussion of case with another health care provider:  Independent visualization of image, tracing or specimen itself:    Time: total time spent with the patient was 25 minutes of which >50% was spent in counseling and coordination of care     Angel Hernandez MD  "

## 2021-05-28 NOTE — TELEPHONE ENCOUNTER
Who is calling:  Patient.  Reason for Call:  Patient states he had received a message in his My Chart with test results, but nothing was in the message.  Please advise what the My Chart message was.  Date of last appointment with primary care: 4/2/2019  Okay to leave a detailed message: Yes

## 2021-05-30 VITALS — WEIGHT: 188 LBS | HEIGHT: 70 IN | BODY MASS INDEX: 26.92 KG/M2

## 2021-05-31 VITALS — WEIGHT: 192 LBS | HEIGHT: 70 IN | BODY MASS INDEX: 27.49 KG/M2

## 2021-05-31 VITALS — WEIGHT: 190 LBS | HEIGHT: 70 IN | BODY MASS INDEX: 27.2 KG/M2

## 2021-06-01 VITALS — HEIGHT: 70 IN | BODY MASS INDEX: 27.35 KG/M2 | WEIGHT: 191 LBS

## 2021-06-01 VITALS — BODY MASS INDEX: 26.92 KG/M2 | HEIGHT: 70 IN | WEIGHT: 188 LBS

## 2021-06-01 VITALS — BODY MASS INDEX: 27.35 KG/M2 | WEIGHT: 191 LBS | HEIGHT: 70 IN

## 2021-06-01 VITALS — WEIGHT: 195 LBS | HEIGHT: 70 IN | BODY MASS INDEX: 27.92 KG/M2

## 2021-06-01 VITALS — WEIGHT: 191 LBS | BODY MASS INDEX: 27.35 KG/M2 | HEIGHT: 70 IN

## 2021-06-01 VITALS — HEIGHT: 70 IN | BODY MASS INDEX: 27.2 KG/M2 | WEIGHT: 190 LBS

## 2021-06-01 VITALS — WEIGHT: 190 LBS | BODY MASS INDEX: 27.2 KG/M2 | HEIGHT: 70 IN

## 2021-06-01 VITALS — WEIGHT: 183 LBS | HEIGHT: 70 IN | BODY MASS INDEX: 26.2 KG/M2

## 2021-06-02 ENCOUNTER — COMMUNICATION - HEALTHEAST (OUTPATIENT)
Dept: INTERNAL MEDICINE | Facility: CLINIC | Age: 76
End: 2021-06-02

## 2021-06-02 VITALS — WEIGHT: 194 LBS | BODY MASS INDEX: 27.77 KG/M2 | HEIGHT: 70 IN

## 2021-06-02 VITALS — BODY MASS INDEX: 28.06 KG/M2 | WEIGHT: 196 LBS | HEIGHT: 70 IN

## 2021-06-02 DIAGNOSIS — I10 ESSENTIAL HYPERTENSION, BENIGN: ICD-10-CM

## 2021-06-02 DIAGNOSIS — I71.21 ASCENDING AORTIC ANEURYSM (H): ICD-10-CM

## 2021-06-02 NOTE — TELEPHONE ENCOUNTER
Spoke with patient and he stated he would not want to start a statin at this time but will seriously think about it.  (result letter mailed)  Namrata Banegas CSS

## 2021-06-02 NOTE — TELEPHONE ENCOUNTER
----- Message from Angel Hernandez MD sent at 10/24/2019  6:15 PM CDT -----  Please call pt and send letter:    Binu, would you reconsider taking at least a low dose of a statin to prevent a heart attack and/or stroke?  Aside from your cholesterol that is not very good, the rest of these are fine.    Please let me know what he says.

## 2021-06-02 NOTE — PROGRESS NOTES
Assessment and Plan:   1. Encounter for Medicare annual wellness exam  I have urged flu shot but he declines.  I did  him about this extensively.  I also urged tetanus booster and shingles vaccination through his pharmacy.    2. Screen for colon cancer  Due for colonoscopy this next year.  - Ambulatory referral for Colonoscopy    3. Tinea pedis of both feet  Renewed his Nizoral cream.  - ketoconazole (NIZORAL) 2 % cream; Apply topically 2 (two) times a day as needed.  Dispense: 30 g; Refill: 1    4. Dyslipidemia  Lipid profile today for monitoring.  - Lipid Cascade  - Comprehensive Metabolic Panel    5. Nontoxic multinodular goiter  Asymptomatic and seems you thyroid.  Check TSH.    6. Hypothyroidism due to Hashimoto's thyroiditis  As above.  - Thyroid Cascade    7. Carcinoma Of The Prostate Gland  He has been disease free.  Only minimal urinary symptoms at this time.  Good results with the Trimix.        The patient's current medical problems were reviewed.    I have had an Advance Directives discussion with the patient.  The following health maintenance schedule was reviewed with the patient and provided in printed form in the after visit summary:   Health Maintenance   Topic Date Due     HEPATITIS C SCREENING  1945     ADVANCE CARE PLANNING  05/03/1964     ZOSTER VACCINES (2 of 2) 03/09/2015     PNEUMOCOCCAL IMMUNIZATION 65+ LOW/MEDIUM RISK (2 of 2 - PPSV23) 04/28/2016     MEDICARE ANNUAL WELLNESS VISIT  05/09/2017     TD 18+ HE  06/09/2019     INFLUENZA VACCINE RULE BASED (1) 08/01/2019     COLONOSCOPY  03/24/2020     FALL RISK ASSESSMENT  04/02/2020        Subjective:   Chief Complaint: Artis Galvin is an 74 y.o. male here for an Annual Wellness visit.   HPI: Binu comes in today for his annual wellness.  He reports he is doing well.  He is very active.  He plans to go to [facility] this fall.  His girlfriends mother lives there.  He denies somatic concerns for me today.  He needs a refill of  ketoconazole cream for tinea pedis.  He is in good spirits.  He is using some Trymex for erections and it worked well.  Pleased about that.    Review of Systems:    Please see above.  The rest of the review of systems are negative for all systems.    Patient Care Team:  Angel Hernandez MD as PCP - General  Angel Hernandez MD as Assigned PCP     Patient Active Problem List   Diagnosis     Joint Pain, Localized In The Shoulder     Sarcoidosis     Carcinoma Of The Prostate Gland     Vitamin D Deficiency     Pure hypercholesterolemia     Serum Enzyme Levels - Alkaline Phosphatase Elevated     Nontoxic Multinodular Goiter     Hypothyroidism     Lump In / On The Skin     Hashimoto's Thyroiditis     Episcleritis     Colonic Diverticulosis     Hx of diverticulitis of colon     Right bundle branch block     Malaise     H/O prostate cancer     Past Medical History:   Diagnosis Date     Abdominal pain, unspecified site      Abnormal thyroid ultrasound      BBB (bundle branch block)     right     Chronic lymphocytic thyroiditis      Diverticulosis of colon (without mention of hemorrhage)      Localized superficial swelling, mass, or lump      Malignant neoplasm of prostate (H)      Nontoxic multinodular goiter      Other and unspecified hyperlipidemia      Other nonspecific abnormal serum enzyme levels      Pain in joint, shoulder region      Persistent hoarseness      Sarcoidosis      Scleritis, unspecified      Unspecified hypothyroidism      Unspecified vitamin D deficiency       Past Surgical History:   Procedure Laterality Date     MD LAP,PROSTATECTOMY,RADICAL,W/NERVE SPARE,INCL ROBOTIC Bilateral 2/27/2018    Procedure: ROBOTIC ASSISTED RADICAL RETROPUBIC PROSTATECTOMY, BILATERAL PELVIC LYMPH NODE DISSECTION LYSIS OF ADHESIONS;  Surgeon: Wale Rodriguez MD;  Location: [facility];  Service: Urology     MD RADIAL KERATOTOMY      Description: Cornea Radial Keratotomy;  Recorded: 02/18/2014;     MD SHLDR  ARTHROSCOP,PART ACROMIOPLAS Right     Description: Shoulder Arthroscopy, Space Decompression And Acromioplasty;  Recorded: 02/18/2014;      Family History   Problem Relation Age of Onset     No Medical Problems Mother      Coronary artery disease Father      Aortic aneurysm Father      Prostate cancer Maternal Grandfather      Prostate cancer Maternal Uncle       Social History     Socioeconomic History     Marital status: Single     Spouse name: Not on file     Number of children: Not on file     Years of education: Not on file     Highest education level: Not on file   Occupational History     Not on file   Social Needs     Financial resource strain: Not on file     Food insecurity:     Worry: Not on file     Inability: Not on file     Transportation needs:     Medical: Not on file     Non-medical: Not on file   Tobacco Use     Smoking status: Never Smoker     Smokeless tobacco: Never Used   Substance and Sexual Activity     Alcohol use: Yes     Comment: rarely     Drug use: No     Sexual activity: Not on file   Lifestyle     Physical activity:     Days per week: Not on file     Minutes per session: Not on file     Stress: Not on file   Relationships     Social connections:     Talks on phone: Not on file     Gets together: Not on file     Attends Jewish service: Not on file     Active member of club or organization: Not on file     Attends meetings of clubs or organizations: Not on file     Relationship status: Not on file     Intimate partner violence:     Fear of current or ex partner: Not on file     Emotionally abused: Not on file     Physically abused: Not on file     Forced sexual activity: Not on file   Other Topics Concern     Not on file   Social History Narrative    Single. . 4 grown daughters.        Currently unemployed.      Current Outpatient Medications   Medication Sig Dispense Refill     cholecalciferol, vitamin D3, 2,000 unit cap Take 1 capsule by mouth daily.        ketoconazole  "(NIZORAL) 2 % cream Apply topically 2 (two) times a day as needed. 30 g 1     pilocarpine (PILOCAR) 2 % ophthalmic solution Administer 1 drop to both eyes 2 (two) times a day.        doxycycline (VIBRAMYCIN) 50 MG capsule Take 50 mg by mouth daily.       LACTOBACILLUS ACIDOPHILUS (PROBIOTIC ORAL) Take 1 capsule by mouth daily.       No current facility-administered medications for this visit.       Objective:   Vital Signs:   Visit Vitals  /70 (Patient Site: Right Arm, Patient Position: Sitting, Cuff Size: Adult Regular)   Pulse 60   Ht 5' 10\" (1.778 m)   Wt 194 lb (88 kg)   SpO2 98%   BMI 27.84 kg/m         VisionScreening:  No exam data present     PHYSICAL EXAM  This a pleasant gentleman appears younger than his age.  HEENT is unremarkable.  Neck supple without adenopathy and no carotid bruits.  Lungs are clear.  Heart is regular.  Abdomen is soft and nontender.  Extremities without edema.  Genitourinary exam is normal.  Rectal exam deferred.  No focal neurologic deficits.    Assessment Results 10/24/2019   Activities of Daily Living No help needed   Instrumental Activities of Daily Living No help needed   Get Up and Go Score Less than 12 seconds   Mini Cog Total Score 5   Some recent data might be hidden     A Mini-Cog score of 0-2 suggests the possibility of dementia, score of 3-5 suggests no dementia    Identified Health Risks:     The patient was counseled and encouraged to consider modifying their diet and eating habits. He was provided with information on recommended healthy diet options.  The patient was provided with written information regarding signs of hearing loss.  Information regarding advance directives (living pantoja), including where he can download the appropriate form, was provided to the patient via the AVS.       "

## 2021-06-03 VITALS
BODY MASS INDEX: 27.77 KG/M2 | OXYGEN SATURATION: 98 % | WEIGHT: 194 LBS | DIASTOLIC BLOOD PRESSURE: 70 MMHG | HEART RATE: 60 BPM | SYSTOLIC BLOOD PRESSURE: 124 MMHG | HEIGHT: 70 IN

## 2021-06-03 VITALS — HEIGHT: 70 IN | BODY MASS INDEX: 27.92 KG/M2 | WEIGHT: 195 LBS

## 2021-06-03 NOTE — TELEPHONE ENCOUNTER
Please refer patient to vascular surgery.  Diagnosis is TAA.   they can go over his questions with him.

## 2021-06-03 NOTE — TELEPHONE ENCOUNTER
Information packet mailed out to patient's home address today. I have connected with our specialty schedulers regarding future CTA orders. Best option is to postpone this order until closer to recheck date (May 2020) so there aren't any scheduling errors.     This message will be forward to specialty schedulers to postpone till May 2020. Encounter will be routed back to MD/CA to enter in orders for scheduling.

## 2021-06-03 NOTE — TELEPHONE ENCOUNTER
If he is taking the cholesterol medication, he really does not need to have a heart scan.  However if he wants to do a heart scan he may.  This is an out-of-pocket expense of about $100.  If his heart scan would be completely clean, then I would be okay with him not taking the medication.  If he wants to proceed with a heart scan, then please place order for ct calcium score.  Diagnosis is health maintenance.

## 2021-06-03 NOTE — TELEPHONE ENCOUNTER
Patient Returning Call  Reason for call:  Patient stated he is returning a call he received from Angel Hernandez MD yesterday evening, 11/19/2019.  Information relayed to patient:  No information available to relay to the patient.   Patient has additional questions:  Yes  If YES, what are your questions/concerns:  Patient would like to know what the call is regarding.   Okay to leave a detailed message?: Yes   [phone]    Patient stated he will only be able to be reached at the number above today.

## 2021-06-03 NOTE — TELEPHONE ENCOUNTER
Result Notes for CT Chest Over Read     Notes recorded by Angel Hernandez MD on 11/19/2019 at 6:26 PM CST  Called and left message to call back for results

## 2021-06-03 NOTE — TELEPHONE ENCOUNTER
Called and spoke with patient at length today.  Extensive discussion regarding his thoracic aortic aneurysm.  Echo did not reveal a bicuspid aortic valve recently.  Heart rates and blood pressures have been good but I told Binu that I would like to get his blood pressure in the 100-1 teen range.  I am going to start a low-dose of metoprolol 12.5 mg.  Also will begin rosuvastatin 10 mg daily.  He will return in 1 month for labs and blood pressure follow-up.  Side effects of these medications were discussed.  He will need a CT angiogram of his chest to be done in 6 months.  He did have abdominal imaging within the last year which does not show abdominal aneurysm.  I have asked that he check with his family to make sure that there is no family history of aneurysm.  Sounds like his father had peripheral vascular disease.  He is very hesitant about all of this.  I tried to explain to him and I will send him some patient information regarding thoracic aortic aneurysm.  Of note he may need to change providers due to insurance reasons.  He may stick with me and I did tell him that he can get labs and imaging elsewhere if need be.

## 2021-06-03 NOTE — TELEPHONE ENCOUNTER
Would you like to see patient to discuss these concerns? If so, ok to use a blocked spot? Thank you.    Ainsley Templeton, CMA

## 2021-06-03 NOTE — TELEPHONE ENCOUNTER
Dr. Zepeda,  Would you be willing to print prescriptions requested?    Please advise.  Thank you.  Margaret CAMPBELL, BRYAN/CMT....................7:22 AM

## 2021-06-03 NOTE — TELEPHONE ENCOUNTER
Left detailed message for the patient that the prescriptions are at the  and ready for him to .  Asked that he call with any further questions.  Margaret CAMPBELL CMA/ALEX....................4:49 PM

## 2021-06-03 NOTE — TELEPHONE ENCOUNTER
Please put a new prescription for the 60 g per his request.  Please also update the immunization record.

## 2021-06-03 NOTE — TELEPHONE ENCOUNTER
Ramón Schmid.  The doctors that repair these are cardiothoracic surgeons.  If you would like to speak with a cardiologist and have them follow you, that would be fine with me.  A vascular surgeon would probably do it as well.  A radiologist would not.  It would be nice to get that scan that you had done in 2018.  We do not have any record of that.  Let us have you meet with 1 of our cardiologist and they can answer some of your questions.  I think they might be more amenable to answering some of your questions than the surgeon would be.    Please place referral to cardiology.  Diagnosis is TAA.  Thank you

## 2021-06-04 VITALS
HEART RATE: 64 BPM | SYSTOLIC BLOOD PRESSURE: 110 MMHG | BODY MASS INDEX: 27.2 KG/M2 | WEIGHT: 190 LBS | TEMPERATURE: 97.5 F | DIASTOLIC BLOOD PRESSURE: 62 MMHG | HEIGHT: 70 IN

## 2021-06-04 VITALS — BODY MASS INDEX: 27.2 KG/M2 | WEIGHT: 190 LBS | HEIGHT: 70 IN

## 2021-06-04 VITALS
OXYGEN SATURATION: 97 % | HEIGHT: 70 IN | DIASTOLIC BLOOD PRESSURE: 82 MMHG | WEIGHT: 190 LBS | SYSTOLIC BLOOD PRESSURE: 130 MMHG | BODY MASS INDEX: 27.2 KG/M2 | HEART RATE: 68 BPM

## 2021-06-04 VITALS
HEIGHT: 70 IN | WEIGHT: 191 LBS | HEART RATE: 67 BPM | DIASTOLIC BLOOD PRESSURE: 80 MMHG | SYSTOLIC BLOOD PRESSURE: 128 MMHG | BODY MASS INDEX: 27.35 KG/M2 | RESPIRATION RATE: 14 BRPM

## 2021-06-04 VITALS
SYSTOLIC BLOOD PRESSURE: 118 MMHG | TEMPERATURE: 98.1 F | HEIGHT: 70 IN | OXYGEN SATURATION: 96 % | BODY MASS INDEX: 27.2 KG/M2 | WEIGHT: 190 LBS | DIASTOLIC BLOOD PRESSURE: 62 MMHG | HEART RATE: 60 BPM

## 2021-06-04 VITALS
BODY MASS INDEX: 26.69 KG/M2 | WEIGHT: 186 LBS | DIASTOLIC BLOOD PRESSURE: 86 MMHG | TEMPERATURE: 96.2 F | SYSTOLIC BLOOD PRESSURE: 156 MMHG | HEART RATE: 77 BPM

## 2021-06-04 VITALS
DIASTOLIC BLOOD PRESSURE: 78 MMHG | OXYGEN SATURATION: 97 % | WEIGHT: 191 LBS | HEIGHT: 70 IN | HEART RATE: 80 BPM | BODY MASS INDEX: 27.35 KG/M2 | SYSTOLIC BLOOD PRESSURE: 119 MMHG

## 2021-06-04 NOTE — TELEPHONE ENCOUNTER
"Mychart messages re CD  I had a  C-11 Acetate PET scan done in [place] on 2-12-18 (21 months ago)   I don't know if you have access to this.  I talked to the  yesterday.  He said it   would show that area as it was a full body scan.  I have the CD.  Knowing this,   what would be the next/best course of action?  Radiologist?  Cardiologist?  Surgeon?     Good afternoon Dr. David Wisdom has reviewed your message and states the following:\"Please have him get the CD of the scan he had in [place] and submit to radiology to compare scans here.  He should not pay someone in [place] to do this.\"  Please let us know if there is anything else that we can help you with.   Thank you.     "

## 2021-06-04 NOTE — TELEPHONE ENCOUNTER
Patient has obtained the CD with the needed scans. Should he bring it to our clinic to submit for review or cardiologist?  Namrata Banegas CSS

## 2021-06-04 NOTE — TELEPHONE ENCOUNTER
Scarlett as mentioned earlier please make sure patient gets his CD back after comparison, thank you.

## 2021-06-04 NOTE — TELEPHONE ENCOUNTER
Please have him get the CD of the scan he had in [place] and submit to radiology to compare scans here.  He should not pay someone in [place] to do this.

## 2021-06-04 NOTE — TELEPHONE ENCOUNTER
Pt wants to make sure he gets the disc / films back as it cost him a trip to [place] to go and get it and does not want it to end up in a drawer somewhere. Please call him when it gets back to to our office and he will come and pick it up.

## 2021-06-04 NOTE — TELEPHONE ENCOUNTER
I am not the correct person to be relaying this message to.  Whoever is helping us with this, please let them know.

## 2021-06-04 NOTE — TELEPHONE ENCOUNTER
Please ask Scarlett to help us with this.  We should be able to have him bring it here and we can get it to the correct people I would think.

## 2021-06-04 NOTE — TELEPHONE ENCOUNTER
Patient message:  Met with a Dr. Norman yesterday at [facility]. You should get her notes.   She seemed to be saying don't exercise.     I have the CD of the scan done in Feb. of 2018.  I talked to the Doc in [place]. that did it.   He said it should show that area, but that great care needed to be taken   to make sure that the measurements match.     Dr. Hernandez response:  Please have him get the CD of the scan he had in [place] and submit to radiology to compare scans here.  He should not pay someone in [place] to do this.    Please ask Scarlett to help us with this.  We should be able to have him bring it here and we can get it to the correct people I would think

## 2021-06-04 NOTE — PROGRESS NOTES
Office Visit - Follow Up   Artis Galvin   74 y.o. male    Date of Visit: 12/27/2019    Chief Complaint   Patient presents with     Follow-up     review medications - currently holding medicaitons for Metoprolol/Crestor         Assessment and Plan   1. Thoracic aortic aneurysm without rupture (H)  Lengthy discussion with patient.  I have suggested he begin the metoprolol at bedtime to minimize side effects.  Watch for's blood pressure and watch for side effects.  Let me know if he develops any.  I urged him to start the Crestor as well.  We should reimage his aneurysm in 1 year with CT scan dedicated.  He did have a PET CT done in [place] back in 2018 which confirmed an aneurysm at that time as well which was unchanged.  That is good news.  40 minutes was spent with patient today all of which was spent counseling regarding all of these issues.  - rosuvastatin (CRESTOR) 10 MG tablet; Take 1 tablet (10 mg total) by mouth daily.  Dispense: 90 tablet; Refill: 3    2. Pure hypercholesterolemia  As above.  Urged starting rosuvastatin but he is resistant.  I do not think he is going to start that at this time.    3. Asymptomatic coronary heart disease  As above.    4. Anxiety about health  He is quite anxious.  I have tried to impress upon him current medical guidelines and why I have recommended these medications.    5. Asymptomatic arteriosclerosis of coronary artery  As above.  - aspirin 81 MG EC tablet; Take 1 tablet (81 mg total) by mouth daily.; Refill: 0  - rosuvastatin (CRESTOR) 10 MG tablet; Take 1 tablet (10 mg total) by mouth daily.  Dispense: 90 tablet; Refill: 3        Return in about 3 months (around 3/27/2020) for Recheck.     History of Present Illness   This 74 y.o. old very nice but anxious gentleman comes in today for follow-up of his recent diagnosis of thoracic aortic aneurysm.  This was seen incidentally on chest imaging when he had a CT calcium score done.  He was found to have mild coronary  "disease as well.  I suggested low-dose beta-blocker and statin therapy.  He was very anxious about the doing that so he met with a cardiologist who also confirmed that he should start taking this medication.  However he comes in today and states that he still has not taken either of these.  He has a lot of questions about potential side effects etc.  He is brings in blood pressures have been running in the 130s for the most part systolic.  Heart rates are in the 60s.  He is afraid that he is going to \"pass out\" when he stands up when he takes metoprolol.  He is also been resistant to taking the Crestor.  He wants to \"try diet.\"  He has an appointment to meet with a cardiac provider at the [facility] in the near future as well.  He denies any chest pains.  He wonders if he can continue to exercise.  He enjoys exercise and plays [employer].    Review of Systems: A comprehensive review of systems was negative except as noted.     Medications, Allergies and Problem List   Reviewed, reconciled and updated  Post Discharge Medication Reconciliation Status:      Physical Exam   General Appearance:   Very pleasant gentleman in no distress.  Vital signs are noted.  Mildly anxious as always.    /82 (Patient Site: Right Arm, Patient Position: Sitting, Cuff Size: Adult Regular)   Pulse 68   Ht 5' 10\" (1.778 m)   Wt 190 lb (86.2 kg)   SpO2 97%   BMI 27.26 kg/m           Additional Information   Current Outpatient Medications   Medication Sig Dispense Refill     cholecalciferol, vitamin D3, 2,000 unit cap Take 1 capsule by mouth daily.        doxycycline (VIBRAMYCIN) 50 MG capsule Take 50 mg by mouth daily.       ketoconazole (NIZORAL) 2 % cream Apply topically 2 (two) times a day as needed. 60 g 1     LACTOBACILLUS ACIDOPHILUS (PROBIOTIC ORAL) Take 1 capsule by mouth daily.       pilocarpine (PILOCAR) 2 % ophthalmic solution Administer 1 drop to both eyes 2 (two) times a day.        aspirin 81 " MG EC tablet Take 1 tablet (81 mg total) by mouth daily.  0     metoprolol succinate (TOPROL-XL) 25 MG Take 0.5 tablets (12.5 mg total) by mouth daily. 45 tablet 3     rosuvastatin (CRESTOR) 10 MG tablet Take 1 tablet (10 mg total) by mouth daily. 90 tablet 3     No current facility-administered medications for this visit.      No Known Allergies  Social History     Tobacco Use     Smoking status: Never Smoker     Smokeless tobacco: Never Used   Substance Use Topics     Alcohol use: Yes     Comment: rarely     Drug use: No       Review and/or order of clinical lab tests:  Review and/or order of radiology tests:  Review and/or order of medicine tests:  Discussion of test results with performing physician:  Decision to obtain old records and/or obtain history from someone other than the patient:  Review and summarization of old records and/or obtaining history from someone other than the patient and.or discussion of case with another health care provider:  Independent visualization of image, tracing or specimen itself:    Time: total time spent with the patient was 40 minutes of which >50% was spent in counseling and coordination of care     Angel Hernandez MD

## 2021-06-04 NOTE — TELEPHONE ENCOUNTER
Binu has dropped of his CDI imaging today. I have given the CD to Scarlett (Clinic Xray) to submit for comparison. I have also notify Scarlett that patient's does want the CD back and to make note of this.

## 2021-06-04 NOTE — TELEPHONE ENCOUNTER
Who is calling:  Patient  Reason for Call:  Patient is questioning who is Scarlett? Patient is questioning where is he supposed to bring the PET scan CD to, which radiology. Patient would like a call back.  Date of last appointment with primary care: 10/24/19  Okay to leave a detailed message: Yes    [phone]

## 2021-06-05 VITALS
BODY MASS INDEX: 27.63 KG/M2 | HEART RATE: 74 BPM | HEIGHT: 70 IN | OXYGEN SATURATION: 95 % | TEMPERATURE: 97.8 F | SYSTOLIC BLOOD PRESSURE: 130 MMHG | WEIGHT: 193 LBS | DIASTOLIC BLOOD PRESSURE: 72 MMHG

## 2021-06-05 VITALS
HEIGHT: 70 IN | SYSTOLIC BLOOD PRESSURE: 120 MMHG | OXYGEN SATURATION: 96 % | DIASTOLIC BLOOD PRESSURE: 62 MMHG | BODY MASS INDEX: 27.2 KG/M2 | HEART RATE: 64 BPM | WEIGHT: 190 LBS

## 2021-06-05 VITALS
SYSTOLIC BLOOD PRESSURE: 148 MMHG | HEART RATE: 64 BPM | RESPIRATION RATE: 18 BRPM | WEIGHT: 188.2 LBS | HEIGHT: 70 IN | DIASTOLIC BLOOD PRESSURE: 80 MMHG | TEMPERATURE: 97.7 F | BODY MASS INDEX: 26.94 KG/M2

## 2021-06-05 VITALS
WEIGHT: 189 LBS | BODY MASS INDEX: 27.06 KG/M2 | HEART RATE: 66 BPM | OXYGEN SATURATION: 94 % | SYSTOLIC BLOOD PRESSURE: 118 MMHG | DIASTOLIC BLOOD PRESSURE: 60 MMHG | HEIGHT: 70 IN

## 2021-06-06 NOTE — TELEPHONE ENCOUNTER
Specialty  please review message below and referred to new orders and contact patient for scheduling, thanks much.

## 2021-06-06 NOTE — TELEPHONE ENCOUNTER
Dr. Hernandez,    Please advise regarding patients mychart message/questions.    Thank you.    Estefany NARANJO LPN .......... 1:07 PM  03/10/20

## 2021-06-06 NOTE — TELEPHONE ENCOUNTER
Let us just go with the pharmacologic test.  It is already been ordered.  If the cardiologist wants to change it to an exercise one they will.

## 2021-06-06 NOTE — PROGRESS NOTES
Clinic Care Coordination Contact  [facility]/Voicemail       Clinical Data: Care Coordinator Outreach  Outreach attempted x 2.  Left message on patient's voicemail with call back information and requested return call.  Plan: Care Coordinator will send care coordination introduction letter with care coordinator contact information and explanation of care coordination services via [employer]. Care Coordinator will do no further outreaches at this time.

## 2021-06-06 NOTE — TELEPHONE ENCOUNTER
If he has another episode, he needs to go to the emergency room immediately.  Please have him come in soonest available for an evaluation.  Please make sure he is taking 81 mg aspirin every day.  He should not be sending these urgent types of medical issues via [employer].

## 2021-06-06 NOTE — PROGRESS NOTES
Office Visit - Follow Up   Artis Galvin   74 y.o. male    Date of Visit: 3/9/2020    Chief Complaint   Patient presents with     Hospital Visit Follow Up     ER fu - still has dizziness      Advice Only     stopped taking crestor, asa, and metoprolol since x 1 month          Assessment and Plan   1. Amaurosis fugax  Concerning.  I have tried to impress upon him that I want him to stay on the baby aspirin for now until he is evaluated further.  Will check sed rate and CRP.  Check a carotid ultrasound.  We will have him meet with neurology.  - Erythrocyte Sedimentation Rate  - C-Reactive Protein(CRP)  - US Carotid Bilateral; Future  - Ambulatory referral to Neurology    2. Visual disturbance  As above.    3. Dizziness  He is having these episodes where it sounds like he is having tachycardia.  I question whether he could be having atrial fibrillation or other arrhythmia.  Check Holter.  Low threshold to do a event recorder.  - Holter Monitor; Future    4. Chest pain, unspecified type  He had this chest burning sensation or heartburn sensation when his heart rate was in the 130s.  Will do stress testing for further evaluation.  - NM Exercise Stress Test; Future    5. Thoracic aortic aneurysm without rupture (H)  Blood pressure and heart rate are not bad.  He is very resistant to taking medications.  I have told him to go ahead and hold the rest rosuvastatin and metoprolol for now because I am wondering if his anxiety over taking these is causing some of these issues.    6. Asymptomatic coronary heart disease  As above.    7. Tachycardia  As above.  - Holter Monitor; Future    8. Anxiety  Anxiety is quite profound here.  We discussed starting some sertraline or similar medications.  He is resistant to that today.  We will have to readdress when he returns in 3 weeks.        Return in about 4 weeks (around 4/6/2020).     History of Present Illness   This 74 y.o. old patient comes in today for follow-up of recent ER  "visit he has had as well as for further evaluation of multiple other concerns.  He spent about 10 days in [place] getting his girlfriends mom mother into a nursing home.  They had to clean up her house which was apparently hoarding house and very filthy.  It was all very difficult for him.  He notes that he is not been well.  He has had at least 4 episodes of what sounds like amaurosis fugax where his vision will have a blind pulled down over one eye.  He then was having some dizziness like he was going to pass out or \"go out\".  He went to the emergency room where he had an MRA of the head that was normal.  He sent him home.  He has been very worried about all this.  Anxious.  Someone is told him it could be that he is having anxiety attacks causing his symptoms.  He has had some episodes of tachycardia where he will check his blood pressure and his heart rate will be going fast and he will note a heart burn.  He does do some exercise but has no chest pains with that.  Again he is very worried about all of a sudden very anxious.  He has a TAA for which he was placed on beta-blocker and rosuvastatin but he is not been taking that because he does not want to have side effects.  He has not been taking either of them.  He maybe took the metoprolol for a few days.    Review of Systems: A comprehensive review of systems was negative except as noted.     Medications, Allergies and Problem List   Reviewed, reconciled and updated  Post Discharge Medication Reconciliation Status:      Physical Exam   General Appearance:   Pleasant gentleman.  Mildly anxious.  Heart is regular today.  No focal neurologic deficits.    /78 (Patient Site: Right Arm, Patient Position: Sitting, Cuff Size: Adult Regular)   Pulse 80   Ht 5' 10\" (1.778 m)   Wt 191 lb (86.6 kg)   SpO2 97%   BMI 27.41 kg/m           Additional Information   Current Outpatient Medications   Medication Sig Dispense Refill     cholecalciferol, vitamin D3, 2,000 " unit cap Take 1 capsule by mouth daily.        ketoconazole (NIZORAL) 2 % cream Apply topically 2 (two) times a day as needed. 60 g 1     pilocarpine (PILOCAR) 2 % ophthalmic solution Administer 1 drop to both eyes 2 (two) times a day.        aspirin 81 MG EC tablet Take 1 tablet (81 mg total) by mouth daily.  0     metoprolol succinate (TOPROL-XL) 25 MG Take 0.5 tablets (12.5 mg total) by mouth daily. 45 tablet 3     rosuvastatin (CRESTOR) 10 MG tablet Take 1 tablet (10 mg total) by mouth daily. 90 tablet 3     No current facility-administered medications for this visit.      No Known Allergies  Social History     Tobacco Use     Smoking status: Never Smoker     Smokeless tobacco: Never Used   Substance Use Topics     Alcohol use: Yes     Comment: rarely     Drug use: No       Review and/or order of clinical lab tests:  Review and/or order of radiology tests:  Review and/or order of medicine tests:  Discussion of test results with performing physician:  Decision to obtain old records and/or obtain history from someone other than the patient:  Review and summarization of old records and/or obtaining history from someone other than the patient and.or discussion of case with another health care provider:  Independent visualization of image, tracing or specimen itself:    Time: total time spent with the patient was 40 minutes of which >50% was spent in counseling and coordination of care     Angel Hernandez MD

## 2021-06-06 NOTE — TELEPHONE ENCOUNTER
CMT: Please contact patient regarding the stress test message that he left earlier and I replied to.  I also would recommend that he contact our office 24/7 if he develops any symptoms of infection such as fever, etc. Taking prophylactic antibiotics is not going to be of any help here.  Please stay home and away from others.  STAY HEALTHY!

## 2021-06-06 NOTE — TELEPHONE ENCOUNTER
New Appointment Needed  What is the reason for the visit:    Inpatient/ED Follow Up Appt Request  At what hospital or facility were you seen?: [facility]  What is the reason you were seen?: Disoriented / dizzy   What date were you admitted?: date: 03/04  What date were you discharged?: date: 03/04  What was the recommended timeframe for your follow up appointment?: 2 days  Provider Preference: PCP only  How soon do you need to be seen?: tomorrow  Waitlist offered?: No  Okay to leave a detailed message:  Yes    Please call patient as soon as able to go over options.

## 2021-06-06 NOTE — TELEPHONE ENCOUNTER
I think you should have the stress test.  If you are worried about stress on the aorta we can switch it to a pharmacologic stress test.    Clinical assistant: Please update the med list.  I would prefer he not take the lorazepam on a very frequent basis.  We can discuss medications for anxiety at our next visit.  I will place order for the pharmacologic test.  Please cancel the  exercise test.

## 2021-06-06 NOTE — TELEPHONE ENCOUNTER
Please advise with your thoughts. I will call for the records. Should patient see you? If so, fit him in? Or next available? Thank you.    Ainsley Templeton, CMA

## 2021-06-06 NOTE — PROGRESS NOTES
Clinic Care Coordination Contact  [facility]/Voicemail       Clinical Data: Care Coordinator Outreach  Outreach attempted x 1.  Left message on patient's voicemail with call back information and requested return call.  Plan:   Care Coordinator will try to reach patient again in 1-2 business days.

## 2021-06-07 ENCOUNTER — RECORDS - HEALTHEAST (OUTPATIENT)
Dept: INTERNAL MEDICINE | Facility: CLINIC | Age: 76
End: 2021-06-07

## 2021-06-07 NOTE — TELEPHONE ENCOUNTER
TYRELL - I called and spoke with Binu to follow up. He is scheduled to do a Telephone Visit with Dr. Minoo Roe tomorrow 3/20/20 at 12:50 pm. I offered to schedule ER follow-up Telephone visit with you however he wants to see how the call tomorrow goes first.    Advised Binu that he can send [facility] message or call clinic to schedule Telephone visit with PCP. Please let me know if any further is needed, thank you.

## 2021-06-07 NOTE — TELEPHONE ENCOUNTER
TYRELL  Copied message to telephone encounter and sent to RN triage for further evaluation.  Namrata Banegas CSS

## 2021-06-07 NOTE — PROGRESS NOTES
"Artis Galvin is a 74 y.o. male who is being evaluated via a billable telephone visit.      The patient has been notified of following:     \"This telephone visit will be conducted via a call between you and your physician/provider. We have found that certain health care needs can be provided without the need for a physical exam.  This service lets us provide the care you need with a short phone conversation.  If a prescription is necessary we can send it directly to your pharmacy.  If lab work is needed we can place an order for that and you can then stop by our lab to have the test done at a later time.    If during the course of the call the physician/provider feels a telephone visit is not appropriate, you will not be charged for this service.\"     Patient has given verbal consent to a Telephone visit? Yes    Artis Galvin complains of    Chief Complaint   Patient presents with     Test Results     review a recent stress test/Holter - ? what's the next step        I have reviewed and updated the patient's Past Medical History, Social History, Family History and Medication List.    ALLERGIES  Patient has no known allergies.    Additional provider notes: I spoke with Binu today on a phone visit.  Extensive visit ensued.  Over 30 minutes was spent.  Binu has had a difficult last month or 2.  He returned from [facility] where he was helping his demented mother-in-law move out of her home (which was a [employer] home) into some sort of assisted living.  It was a very difficult situation for him.  After he got back he started having significant physical symptoms including amaurosis fugax type symptoms as well as lightheadedness symptoms.  He sought emergency care twice.  We thought that perhaps anxiety was causing some of it.  He does tell me that he has had no further amaurosis type symptoms, but he has had the \"start of the lightheadedness\" since I talked to him last and he will sometimes take a rare lorazepam which she " had laying around for many years ago and that will help his symptoms go away.  He has had extensive evaluation including MRA and MRI of the head and neck.  No carotid or cerebral vascular disease seen.  He has had Holter monitor which shows no arrhythmias and he has had a normal stress test.  He does have high blood pressure and thoracic aortic aneurysm.  He met with a cardiologist and they added losartan.  Blood pressures been running high.  140s at home.  He is very worried about all of this.  He is worried about his daughter who works in the ICU at the [facility].  He has an appointment with a neurologist in the next week or so.  He has an appointment with the another cardiologist at the [facility] in the next week or so as well.  Blood pressures primarily high in the morning tells me.  Comes down as the day goes on.  Never is below 120 though.    Assessment/Plan:  1. Essential hypertension  Uncontrolled.  With his aneurysm we want to get his pressures under 120 consistently.  Increase losartan.  He will take in the evening to prevent that morning spike.  He is to come in for lab work in a week.  - losartan (COZAAR) 50 MG tablet; Take 1 tablet (50 mg total) by mouth daily.  Dispense: 90 tablet; Refill: 1  - Basic Metabolic Panel; Future    2. Ascending aortic aneurysm (H)  As above.  He refuses to take statin.  - losartan (COZAAR) 50 MG tablet; Take 1 tablet (50 mg total) by mouth daily.  Dispense: 90 tablet; Refill: 1    3. Dizziness  We will have him undergo event recorder as per the Dr. Roe's recommendation.  - REYNOLD Monitor Hook-Up; Future    4. Amaurosis fugax  He will follow-up with neurologist as planned.    5. Anxiety  I counseled patient on his anxiety again today.  If he feels that lorazepam helps him we could consider taking medication for prevention of anxiety.  We can readdress that at our next visit.    6. Asymptomatic coronary heart disease  As above, he refuses statin.  Continue  low-dose aspirin.      Phone call duration:  Over 30  minutes

## 2021-06-07 NOTE — TELEPHONE ENCOUNTER
CTA chest order placed to be co-sign. Please contact patient to schedule appt as appropriate, thank you.

## 2021-06-07 NOTE — TELEPHONE ENCOUNTER
They might be doing a phone visit with you Binu.  We're trying to keep people out of the office currently.  If they are not going to address your situation, then let us touch base with a phone visit next week early on.    Elinor, let us schedule a 40-minute phone visit for ER follow-up on Monday even if he does do a rapid Access phone visit.

## 2021-06-07 NOTE — TELEPHONE ENCOUNTER
Please review messages below and advise. Did you want the CTA recheck order placed at this time? I know Binu has been following Dr. Roe & Dr. Cazares. Please let me know, thanks.

## 2021-06-07 NOTE — TELEPHONE ENCOUNTER
Received another phone call from Artis, he shares unhappiness with Dr. Roe's recommendations. He feels that his concerns are urgent and does not appreciate being turned away. Unfortunately, the last conversation was cut short due to pt hanging up abruptly and caller was unable to offer him a telephone visit. Artis declines this offer and wishes to cancel the appointment all together. He doesn't feel like he's able to express his symptoms and concerns adequately over the phone.     Received another phone call from pt's daughter who apologized to caller on pt's behalf. Pt and daughter would like to keep this appointment as a telephone visit to discuss Dr. Roe's recommendations. I informed daughter that we will keep 12:50 appointment as a telephone visit and to expect a phone call at 12:50.       Dr. Roe, TYRELL pt and daughter called back and they would like to keep appointment tomorrow but is agreeable to switching this to a telephone visit. Msg was sent to scheduling.

## 2021-06-07 NOTE — TELEPHONE ENCOUNTER
Please have him go to 100 mg daily.  Please place new prescription for authorization.  #90 with no refills.

## 2021-06-07 NOTE — TELEPHONE ENCOUNTER
Fine for these orders.  The copy of the orders needs to be placed on chart and the results need to go to the ordering provider--Dr. Madrigal.

## 2021-06-07 NOTE — PROGRESS NOTES
Review of Systems - General ROS:    Psychological ROS: positive for - WNL  Ophthalmic ROS: positive for - blurry vision  ENT ROS: positive for - nasal congestion  Allergy and Immunology ROS: negative  positive for - WNL  Hematological and Lymphatic ROS: positive for - WNL  Endocrine ROS: positive for - WNL  Respiratory ROS: WNL  Cardiovascular ROS: positive for - chest pain, irregular heartbeat, palpitations and rapid heart rate  Gastrointestinal ROS: no abdominal pain, change in bowel habits, or black or bloody stools  negative for - WNL  Genito-Urinary ROS: positive for - WNL  Musculoskeletal ROS: positive for - joint pain  Neurological ROS: positive for - dizziness, impaired coordination/balance and visual changes    PT Daughter is a nurse and will be joining on call.    Shawna Espinoza, CMA

## 2021-06-07 NOTE — TELEPHONE ENCOUNTER
Dr. Roe, pt was placed in R[facility] tomorrow after ED visit today for cough and mild left-sided persistant chest pain for 9-10 days. He recently traveled to [place]. Please review, can we offer telephone visit vs in-person visit for this patient? What do you recommend for Artis?

## 2021-06-07 NOTE — PROGRESS NOTES
"Artis Galvin is a 74 y.o. male who is being evaluated via a billable video visit.      The patient has been notified of following:     \"This video visit will be conducted via a call between you and your physician/provider. We have found that certain health care needs can be provided without the need for an in-person physical exam.  This service lets us provide the care you need with a video conversation.  If a prescription is necessary we can send it directly to your pharmacy.  If lab work is needed we can place an order for that and you can then stop by our lab to have the test done at a later time.    Video visits are billed at different rates depending on your insurance coverage. Please reach out to your insurance provider with any questions.    If during the course of the call the physician/provider feels a video visit is not appropriate, you will not be charged for this service.\"    Patient has given verbal consent to a Video visit? Yes    Patient would like to receive their AVS by AVS Preference: Getachew.    Patient would like the video invitation sent by: Text to cell phone: [phone]    Will anyone else be joining your video visit? No        Video Start Time: 1402    Additional provider notes:     Office Visit - Follow Up   Artis Galvin   74 y.o. male    Date of Visit: 4/23/2020    Chief Complaint   Patient presents with     Follow-up     Video CALL # [phone], review all labs results         Assessment and Plan   1. Asymptomatic coronary heart disease  I did discuss with patient he should continue with the baby aspirin especially given his amaurosis as well as begin high intensity statin per cardiology recommendations.  I did send a prescription to the correct pharmacy.  - atorvastatin (LIPITOR) 40 MG tablet; Take 1 tablet (40 mg total) by mouth daily.  Dispense: 90 tablet; Refill: 1    2. Ascending aortic aneurysm (H)  Continue to monitor.  Continue blood pressure control.    3. Elevated " TSH  We will recheck thyroid cascade next visit.  He does note some mild fatigue and some constipation.  Low threshold to start some thyroid replacement if TSH remains elevated next visit.    4. Essential hypertension, benign  Fair control.  Would prefer better control.  Will follow blood pressure closely.  He has been resistant to more medication and he was dizzy on the 50 mg.  Still with some mild dizziness.  Continue to follow closely.    5. Chronic cough  Possibly due to losartan but we reviewed his imaging and that was all okay.  We will readdress next visit.    6. Amaurosis fugax  No further episodes.  Continue aspirin and begin high intensity statin per cardiology.    7. Dizziness  Still mild and intermittent.  He has a event recorder which is on and will be finished up here soon.  We will try to await the results of that.        Return in about 5 weeks (around 5/28/2020) for Recheck.     History of Present Illness   This 74 y.o. old Binu join me for a video visit today.  He reports he has been doing well.  He has no symptoms of amaurosis.  No coronary symptoms.  He did meet with a new cardiologist who recommended he start taking Lipitor.  He was sent to the wrong pharmacy.  He has been resistant to taking statins when I have tried to get him on it.  He wonders if he should remain on his aspirin.  He did have an extensive evaluation for his neurologist.  TSH was minimally elevated.  He wonders about that.  Several daughters with thyroid abnormalities.  Patient himself has a former history of Hashimoto's but he does not know anything about that.  He does have a chronic cough that he has had since January.  It is just dry and minimal.  His dizziness is better.  Blood pressures not where I would like it but it is better.  120s for the most part.  He dislikes taking medications.    Review of Systems: A comprehensive review of systems was negative except as noted.     Medications, Allergies and Problem List    Reviewed, reconciled and updated  Post Discharge Medication Reconciliation Status:      Physical Exam   General Appearance:   Pleasant gentleman in good spirits.  No cough through the interview.    There were no vitals taken for this visit.         Additional Information   Current Outpatient Medications   Medication Sig Dispense Refill     aspirin 81 MG EC tablet Take 1 tablet (81 mg total) by mouth daily.  0     cholecalciferol, vitamin D3, 2,000 unit cap Take 1 capsule by mouth daily.        cyanocobalamin, vitamin B-12, (VITAMIN B-12 ORAL) Take 1 tablet by mouth daily. 2500 mcg       ketoconazole (NIZORAL) 2 % cream Apply topically 2 (two) times a day as needed. 60 g 1     losartan (COZAAR) 50 MG tablet Take 1 tablet (50 mg total) by mouth daily. 90 tablet 1     NON FORMULARY Vitamin C powder - mix 1 tablespoon with 8 oz of water/juice       pilocarpine (PILOCAR) 2 % ophthalmic solution Administer 1 drop to both eyes 2 (two) times a day.        ZINC ORAL Take 1 capsule by mouth daily. 15 mg       atorvastatin (LIPITOR) 40 MG tablet Take 1 tablet (40 mg total) by mouth daily. 90 tablet 1     No current facility-administered medications for this visit.      No Known Allergies  Social History     Tobacco Use     Smoking status: Never Smoker     Smokeless tobacco: Never Used   Substance Use Topics     Alcohol use: Yes     Comment: rarely     Drug use: No       Review and/or order of clinical lab tests:  Review and/or order of radiology tests:  Review and/or order of medicine tests:  Discussion of test results with performing physician:  Decision to obtain old records and/or obtain history from someone other than the patient:  Review and summarization of old records and/or obtaining history from someone other than the patient and.or discussion of case with another health care provider:  Independent visualization of image, tracing or specimen itself:    Time: not applicable     Angel Hernandez MD      Video-Visit  Details    Type of service:  Video Visit    Video End Time (time video stopped): 4223  Originating Location (pt. Location): Home    Distant Location (provider location):  [facility] INTERNAL MEDICINE     Mode of Communication:  Video Conference via Doximity      Angel Hernandez MD

## 2021-06-07 NOTE — TELEPHONE ENCOUNTER
Leilani Acosta for ECHO with Bubble order as requested in the original message from the patient?  Margaret CAMPBELL, BRYAN/CMT....................11:04 AM

## 2021-06-07 NOTE — TELEPHONE ENCOUNTER
Patient has labs on 04/10 should he be seen or is it ok to wait until after May? We are now scrubbing all lab schedules.

## 2021-06-07 NOTE — PROGRESS NOTES
The clinic Community Health Worker talked with the patient today at the request of the PCP to discuss possible Clinic Care Coordination enrollment.  The service was described to the patient and immediate needs were discussed.  The patient declined enrollement at this time.  The PCP is encouraged to refer in the future if the patient's needs change.    Patient has all the resources and support he needs at home.

## 2021-06-07 NOTE — TELEPHONE ENCOUNTER
"RN triage returning call to patient.  He is reporting what he describes as \"Irritation that has been intermittentfor quite some time\"  This feeling is described as mild, dull pain in upper left chest above the nipple to the left of nipple(patient states pain is where he thinks the heart is), pain is rated 2-4 and has been constant since yesterday.  Patient states he feels okay,was active yesterday, no radiation of pain to neck jaw shoulder.   Has had cardiology work up recently and nothing was found except for aortic aneurysm  History of hypertension  Patient talking well on phone.    Gave disposition for 911 for immediate evaluation, patient declined this and ED visit now.  Patient would like PCP to review. He has been seen by Dr. Roe with [facility] and by Dr. Cazares at [facility]. Patient wonders if he should be talking to one of those doctors.   Please review and advise thank you. Reviewed signs and symptoms of when to call back.  Julianna Hernandez, RN  Care Connection Triage Nurse  9:35 AM  4/30/2020        Reason for Disposition    Chest pain lasting longer than 5 minutes and ANY of the following:* Over 50 years old* Over 30 years old and at least one cardiac risk factor (i.e., high blood pressure, diabetes, high cholesterol, obesity, smoker or strong family history of heart disease)* Pain is crushing, pressure-like, or heavy * Took nitroglycerin and chest pain was not relieved* History of heart disease (i.e., angina, heart attack, bypass surgery, angioplasty, CHF)    Protocols used: CHEST PAIN-A-OH      "

## 2021-06-07 NOTE — TELEPHONE ENCOUNTER
"Spoke with Artis and shared with him Dr. Roe's recommendations. Explained to patient that given her review of his chart she does not feel like cardiology consult is necessary at this time. Dr. Roe recommends that patient should follow-up with PCP, Dr. Hernandez. Pt was unhappy with this response stating that \"he wishes someone would help him.\" Attempted to explain Dr. Roe's reasoning further further however, pt states to \"go ahead and cancel it then\" hung up the phone abruptly.    "

## 2021-06-07 NOTE — PATIENT INSTRUCTIONS - HE
1.  Begin losartan 25 mg daily  2.  Schedule Holter and exercise nuclear stress test in the next 1 week  3.  Follow-up with Dr. Norman in 6 weeks unless above testing abnormal.

## 2021-06-07 NOTE — TELEPHONE ENCOUNTER
Please call and triage patients symptoms, thank you.    (Copied from Disenia message)     Dear Mervat,  I have had what I would describe as mild-to-moderate  chest discomfort most of the day today.  I have had it several times   before, but it never lasts this long.  Who should I be seeing?  I have recently consulted with a Dr. Mittal at your clinic and a Dr. Cazares at the [facility].  Thanks  Binu Banegas CSS

## 2021-06-07 NOTE — TELEPHONE ENCOUNTER
"Received a call on line from patient's daughter, Lori who introduces herself as an ICU RN at the [employer] and his medical POA. OK per consent to communicate. She verbalized frustration that his appointment was felt to not be needed tomorrow in [facility]. She states that her dad was told that his appt was not necessary due to cardiac rule out in the ER and to follow up with PMD- MD going through schedule due to COVID-19. She understands this but felt that \"something is going on\" with her father and it needs to be addressed and should not be \"blown off\". Apologized for any inconvenience and certainly this is not what we are trying to portray at this time. Explained that actually the nurse whom her father spoke with earlier is on the other line with him now and implored her to discuss further with her dad. Also updated her that her dad did abruptly hang up on staff when trying to explain that this is being passed along as a message from the physician. She verbalized understanding of the process and does get it. The patient still has appt in place for tomorrow and has not been cancelled yet. She will discuss further with her father. She thanked writer for her time. -[facility]  "

## 2021-06-07 NOTE — TELEPHONE ENCOUNTER
I would recommend that this patient follow-up with his primary physician.  He had what sounds like persistent chest pain for 2 weeks now with a normal ECG and troponin level in the ED which would effectively rule out acute ischemic cause.  I do not feel he needs to be seen in rapid access clinic given those findings.  I would be more concerned about his respiratory issues and recent travel and thus exposure to our staff.    ILDEFONSO

## 2021-06-08 NOTE — TELEPHONE ENCOUNTER
Please put cipro and  Levaquin as allergies.  Tendon rupture is very rare with these meds.   Please let him know.    Have him stop the flagyl.  Please start Augmentin 875 mg po two times a day for 10 days.  Please put that in for auth.  Thanks.

## 2021-06-08 NOTE — TELEPHONE ENCOUNTER
See other note.  Need all records.  Please set him up for follow up video this week, or we can discuss when he comes in in a couple weeks.

## 2021-06-08 NOTE — TELEPHONE ENCOUNTER
Binu, I don't have his notes, but I did review the CTA.   You do have plaque, and a lesion in the LAD that he feels is causing your symptoms, based upon this scan.  Your stress test was read as normal, so I am not sure what he was referring to there.  I guess that is why he put you on these new medicines.  Are you feeling better?      Clinical assist, please get all recent cardiology notes for review as well.   Thanks.

## 2021-06-08 NOTE — TELEPHONE ENCOUNTER
He should go to the ER where Dr. Cazares works with this ecg.  Call 911.  Please contact pt via phone now.

## 2021-06-08 NOTE — PROGRESS NOTES
Office Visit - Follow Up   Artis Galvin   75 y.o. male    Date of Visit: 5/26/2020    Chief Complaint   Patient presents with     Dizziness     ongoing dizziness - review REYNOLD  hook up        Assessment and Plan   1. Asymptomatic coronary heart disease  EKG today is normal.  I think a lot of his side effects that he is having are potentially due to all the medication he is on.  I have asked that he cut the isosorbide back as I am not sure how helpful that is at this point anyway.  A lot of his chest pains are occurring more when he is lying down and sitting at home not with exertion.  He is going to follow-up with his cardiologist ASAP.  Labs today for monitoring.  - Electrocardiogram Perform and Read  - isosorbide mononitrate (IMDUR) 30 MG 24 hr tablet; Take 0.5 tablets (15 mg total) by mouth daily.  Dispense: 30 tablet; Refill: 0  - Ambulatory referral to Cardiology  - Comprehensive Metabolic Panel  - Lipid Cascade FASTING    2. Dizziness  As above.  We will cut back the isosorbide to half tablet daily.  - Ambulatory referral to Cardiology  - HM2(CBC w/o Differential)    3. Generalized anxiety disorder  Patient is getting anxious and depressed over all of this.  I think he benefit from medication.  We discussed this at length today.  He has an old lorazepam prescription that he takes.  He does not want to take anything daily.  I have asked that he consider trying some sertraline for daily for his anxiety and mood overall of this.    4. SVT (supraventricular tachycardia) (H)  Continue the metoprolol low-dose 12.5 mg.  Currently in sinus rhythm.        Return in about 2 weeks (around 6/9/2020) for Recheck.     History of Present Illness   This 75 y.o. old Binu comes in today for follow-up.  He is not been doing very well.  He has had a very difficult few months.  He was diagnosed with a thoracic aortic aneurysm.  I tried to get him on blood pressure medicines and statin.  Around that time he saw a cardiologist  "who also diagnosed him with nonocclusive coronary disease.  He has been having symptoms of chest pain and dyspnea on exertion times.  Some of his chest pains I think been anxiety related.  Regardless he was placed on anti-anginal as well as metoprolol because of episodes of SVT on Holter.  He feels very poorly on these medications.  He feels lightheaded and dizzy and feels like he is going to \"be on his way out\".  He takes very little medication traditionally because he dislikes taking medications.  He is getting down and anxious about all of this as well.  He has not followed up with a cardiologist.  He is now seeing a Dr. Debora lovett at the [facility].    Review of Systems: A comprehensive review of systems was negative except as noted.     Medications, Allergies and Problem List   Reviewed, reconciled and updated  Post Discharge Medication Reconciliation Status:      Physical Exam   General Appearance:   Pleasant gentleman.  He seems down today.  Not his usual self.  Heart is regular.  The HQ 9 and ROMERO 7 are noted.    /62   Pulse 64   Temp 97.5  F (36.4  C)   Ht 5' 10\" (1.778 m)   Wt 190 lb (86.2 kg)   BMI 27.26 kg/m           Additional Information   Current Outpatient Medications   Medication Sig Dispense Refill     aspirin 81 MG EC tablet Take 1 tablet (81 mg total) by mouth daily.  0     atorvastatin (LIPITOR) 40 MG tablet Take 1 tablet (40 mg total) by mouth daily. 90 tablet 1     cholecalciferol, vitamin D3, 2,000 unit cap Take 1 capsule by mouth daily.        cyanocobalamin, vitamin B-12, (VITAMIN B-12 ORAL) Take 1 tablet by mouth daily. 2500 mcg       isosorbide mononitrate (IMDUR) 30 MG 24 hr tablet Take 0.5 tablets (15 mg total) by mouth daily. 30 tablet 0     ketoconazole (NIZORAL) 2 % cream Apply topically 2 (two) times a day as needed. 60 g 1     losartan (COZAAR) 100 MG tablet Take 1 tablet (100 mg total) by mouth daily. 90 tablet 0     metoprolol succinate (TOPROL-XL) 25 MG " Take 12.5 mg by mouth daily.       nitroglycerin (NITROSTAT) 0.4 MG SL tablet For chest pain place 1 tablet under the tongue every 5 minutes for 3 doses. If symptoms persist 5 minutes after 1st dose call 911.       pilocarpine (PILOCAR) 2 % ophthalmic solution Administer 1 drop to both eyes 2 (two) times a day.        No current facility-administered medications for this visit.      No Known Allergies  Social History     Tobacco Use     Smoking status: Never Smoker     Smokeless tobacco: Never Used   Substance Use Topics     Alcohol use: Yes     Comment: rarely     Drug use: No       Review and/or order of clinical lab tests:  Review and/or order of radiology tests:  Review and/or order of medicine tests:  Discussion of test results with performing physician:  Decision to obtain old records and/or obtain history from someone other than the patient:  Review and summarization of old records and/or obtaining history from someone other than the patient and.or discussion of case with another health care provider:  Independent visualization of image, tracing or specimen itself:    Time: total time spent with the patient was 40 minutes of which >50% was spent in counseling and coordination of care     Angel Hernandez MD

## 2021-06-08 NOTE — TELEPHONE ENCOUNTER
Tried pt's home and spoke with wife who said to call pt on his cell. Called cell phone 4 times to start office visit and L/m at [phone]. Pt will need to reschedule appt when he calls back. Thanks.

## 2021-06-08 NOTE — TELEPHONE ENCOUNTER
I don't have any of the info.  Not available on Care Everywhere.  Please get info for me to review.  Let pt know I haven't seen anything yet.

## 2021-06-08 NOTE — PROGRESS NOTES
"Artis Galvin is a 75 y.o. male who is being evaluated via a billable video visit.      The patient has been notified of following:     \"This video visit will be conducted via a call between you and your physician/provider. We have found that certain health care needs can be provided without the need for an in-person physical exam.  This service lets us provide the care you need with a video conversation.  If a prescription is necessary we can send it directly to your pharmacy.  If lab work is needed we can place an order for that and you can then stop by our lab to have the test done at a later time.    Video visits are billed at different rates depending on your insurance coverage. Please reach out to your insurance provider with any questions.    If during the course of the call the physician/provider feels a video visit is not appropriate, you will not be charged for this service.\"    Patient has given verbal consent to a Video visit? Yes    Will anyone else be joining your video visit? No        Video Start Time: 1507    Additional provider notes:     Office Visit - Follow Up   Artis Galvin   75 y.o. male    Date of Visit: 6/16/2020    Chief Complaint   Patient presents with     Coronary Artery Disease     Dox Video: [phone] - pt reports fatigue sxs's are still the same; dizziness sxs's seems ok      Abdominal Pain     still has low level Lt side lower abd aches (finished Augmentin x 2 days ago)        Assessment and Plan   1. Diverticulitis of colon  He will let me know if the pain does not completely resolve.  At which point I would put him back on the antibiotics and I would scan him.  He is due for colonoscopy and I urged him to proceed with that as soon as they feel they can do that per their protocol.    2. Adjustment disorder with mixed anxiety and depressed mood  Given his poor motivation and depressed mood I think that Wellbutrin might be a good option for him.  150 mg and see me back in 2 " weeks.  I like to see him in clinic so we can do a blood pressure check at that time.  - buPROPion (WELLBUTRIN XL) 150 MG 24 hr tablet; Take 1 tablet (150 mg total) by mouth every morning.  Dispense: 30 tablet; Refill: 2    3. Ascending aortic aneurysm (H)  Continue medications per cardiology.    4. Carcinoma Of The Prostate Gland  Continue close follow-up with urology.    5. Asymptomatic coronary heart disease  Reassurance  Continue medications as prescribed by cardiology.  Follow-up with them soon.        Return in about 2 weeks (around 6/30/2020) for Video Visit.     History of Present Illness   This 75 y.o. old Binu joins me for video visit today.  He has been down and anxious being placed on medications for heart disease.  He also was recently found out to have a thoracic aneurysm and also was diagnosed within the last year or 2 of prostate cancer and had prostatectomy.  All of this I think is been weighing on him and he has been down.  He notes poor motivation.  He is also had bouts of anxiety.  He has been resistant to taking medications.  Complicating things recently is that he had another episode of diverticulitis.  He is off antibiotics now.  Pain is at a 1 out of 10.  Has not gone however.  Bowels are okay.  He has not met with a cardiologist as I had asked him to.  He plans to do so soon.  He is hoping that some of the medications that the cardiologist put him on could be adjusted or discontinued and that would make him feel better.    Review of Systems: A comprehensive review of systems was negative except as noted.     Medications, Allergies and Problem List   Reviewed, reconciled and updated  Post Discharge Medication Reconciliation Status:      Physical Exam   General Appearance:   Pleasant gentleman in no distress.  Somewhat dysthymic.    There were no vitals taken for this visit.         Additional Information   Current Outpatient Medications   Medication Sig Dispense Refill     aspirin 81 MG EC  tablet Take 1 tablet (81 mg total) by mouth daily.  0     atorvastatin (LIPITOR) 40 MG tablet Take 1 tablet (40 mg total) by mouth daily. 90 tablet 1     cholecalciferol, vitamin D3, 2,000 unit cap Take 1 capsule by mouth daily.        cyanocobalamin, vitamin B-12, (VITAMIN B-12 ORAL) Take 1 tablet by mouth daily. 2500 mcg       isosorbide mononitrate (IMDUR) 30 MG 24 hr tablet Take 0.5 tablets (15 mg total) by mouth daily. 30 tablet 0     ketoconazole (NIZORAL) 2 % cream Apply topically 2 (two) times a day as needed. 60 g 1     losartan (COZAAR) 100 MG tablet Take 1 tablet (100 mg total) by mouth daily. 90 tablet 0     metoprolol succinate (TOPROL-XL) 25 MG Take 12.5 mg by mouth daily.       nitroglycerin (NITROSTAT) 0.4 MG SL tablet For chest pain place 1 tablet under the tongue every 5 minutes for 3 doses. If symptoms persist 5 minutes after 1st dose call 911.       pilocarpine (PILOCAR) 2 % ophthalmic solution Administer 1 drop to both eyes 2 (two) times a day.        buPROPion (WELLBUTRIN XL) 150 MG 24 hr tablet Take 1 tablet (150 mg total) by mouth every morning. 30 tablet 2     No current facility-administered medications for this visit.      Allergies   Allergen Reactions     Ciprofloxacin      Levaquin [Levofloxacin]      Social History     Tobacco Use     Smoking status: Never Smoker     Smokeless tobacco: Never Used   Substance Use Topics     Alcohol use: Yes     Comment: rarely     Drug use: No       Review and/or order of clinical lab tests:  Review and/or order of radiology tests:  Review and/or order of medicine tests:  Discussion of test results with performing physician:  Decision to obtain old records and/or obtain history from someone other than the patient:  Review and summarization of old records and/or obtaining history from someone other than the patient and.or discussion of case with another health care provider:  Independent visualization of image, tracing or specimen itself:    Time: not  applicable     Angel Hernandez MD      Video-Visit Details    Type of service:  Video Visit    Video End Time (time video stopped): 1529  Originating Location (pt. Location): Home    Distant Location (provider location):  [facility] INTERNAL MEDICINE     Platform used for Video Visit: [facility]      Angel Hernandez MD

## 2021-06-09 NOTE — PROGRESS NOTES
"Artis Galvin is a 75 y.o. male who is being evaluated via a billable video visit.      The patient has been notified of following:     \"This video visit will be conducted via a call between you and your physician/provider. We have found that certain health care needs can be provided without the need for an in-person physical exam.  This service lets us provide the care you need with a video conversation.  If a prescription is necessary we can send it directly to your pharmacy.  If lab work is needed we can place an order for that and you can then stop by our lab to have the test done at a later time.    Video visits are billed at different rates depending on your insurance coverage. Please reach out to your insurance provider with any questions.    If during the course of the call the physician/provider feels a video visit is not appropriate, you will not be charged for this service.\"    Patient has given verbal consent to a Video visit? Yes  How would you like to obtain your AVS? AVS Preference: MyChart.  Patient would like the video invitation sent by: Send to e-mail at: [email or web address]  Will anyone else be joining your video visit? No        Video Start Time: 1521    Additional provider notes:     Office Visit - Follow Up   Artis Galvin   75 y.o. male    Date of Visit: 7/13/2020    Chief Complaint   Patient presents with     Diverticulitis     [facility]well Video pt reports he still has mild sxs's Lt lower abd pains - discuss next steps (imaging or referral)      Depression     pt reports that he hasn't started on wellbutrin due to current low back issues         Assessment and Plan   1. LLQ abdominal pain  He is due for colonoscopy and I believe that has been ordered.  We want to make sure there is no active inflammation when they do it.  Given his persistent symptoms we will reimage.  - CT Abdomen Pelvis With Oral Without IV Contrast; Future    2. Diverticulitis of colon  As above.  - CT Abdomen Pelvis With " Oral Without IV Contrast; Future    3. Asymptomatic coronary heart disease  Continue current regimen.  Urged him to take his medications as prescribed.  - atorvastatin (LIPITOR) 40 MG tablet; Take 1 tablet (40 mg total) by mouth daily.  Dispense: 90 tablet; Refill: 1    4. Adjustment disorder with mixed anxiety and depressed mood  Urged him to try the Wellbutrin.  Follow-up with me after.    5. Ascending aortic aneurysm (H)  Continue blood pressure medications and statin.    6. Foot symptom  He notes rash and nail changes.  I reassured him about onychomycosis but he would like to see a podiatrist.  We will set that up for him.  - Ambulatory referral to Podiatry        Return in about 4 weeks (around 8/10/2020) for Recheck.     History of Present Illness   This 75 y.o. old Binu joins me for another video visit.  He has not been feeling good.  He has depressed and anxious mood.  I want to try him on some Wellbutrin and he has been resistant to starting it.  He now thinks he will try it.  He also continues to have some low-grade left lower quadrant abdominal pain.  History of diverticulitis.  Currently not on antibiotics but he is supposed to be getting colonoscopy soon.  Denies chest pains or shortness of breath.  Has not followed up with cardiology.    Review of Systems: A comprehensive review of systems was negative except as noted.     Medications, Allergies and Problem List   Reviewed, reconciled and updated  Post Discharge Medication Reconciliation Status:      Physical Exam   General Appearance:   Pleasant gentleman.  He is doing a little bit of joking today so he seems to be in a little better spirits.    There were no vitals taken for this visit.         Additional Information   Current Outpatient Medications   Medication Sig Dispense Refill     aspirin 81 MG EC tablet Take 1 tablet (81 mg total) by mouth daily.  0     atorvastatin (LIPITOR) 40 MG tablet Take 1 tablet (40 mg total) by mouth daily. 90 tablet 1      buPROPion (WELLBUTRIN XL) 150 MG 24 hr tablet Take 1 tablet (150 mg total) by mouth every morning. 30 tablet 2     cholecalciferol, vitamin D3, 2,000 unit cap Take 1 capsule by mouth daily.        cyanocobalamin, vitamin B-12, (VITAMIN B-12 ORAL) Take 1 tablet by mouth daily. 2500 mcg       isosorbide mononitrate (IMDUR) 30 MG 24 hr tablet Take 0.5 tablets (15 mg total) by mouth daily. 30 tablet 0     ketoconazole (NIZORAL) 2 % cream Apply topically 2 (two) times a day as needed. 60 g 1     losartan (COZAAR) 100 MG tablet Take 1 tablet (100 mg total) by mouth daily. 90 tablet 0     metoprolol succinate (TOPROL-XL) 25 MG Take 12.5 mg by mouth daily.       nitroglycerin (NITROSTAT) 0.4 MG SL tablet For chest pain place 1 tablet under the tongue every 5 minutes for 3 doses. If symptoms persist 5 minutes after 1st dose call 911.       pilocarpine (PILOCAR) 2 % ophthalmic solution Administer 1 drop to both eyes 2 (two) times a day.        No current facility-administered medications for this visit.      Allergies   Allergen Reactions     Ciprofloxacin      Levaquin [Levofloxacin]      Social History     Tobacco Use     Smoking status: Never Smoker     Smokeless tobacco: Never Used   Substance Use Topics     Alcohol use: Yes     Comment: rarely     Drug use: No       Review and/or order of clinical lab tests:  Review and/or order of radiology tests:  Review and/or order of medicine tests:  Discussion of test results with performing physician:  Decision to obtain old records and/or obtain history from someone other than the patient:  Review and summarization of old records and/or obtaining history from someone other than the patient and.or discussion of case with another health care provider:  Independent visualization of image, tracing or specimen itself:    Time: not applicable     Angel Hernandez MD      Video-Visit Details    Type of service:  Video Visit    Video End Time (time video stopped): 1540  Originating  Location (pt. Location): Home    Distant Location (provider location):  [facility] INTERNAL MEDICINE     Platform used for Video Visit: Valerie Hernandez MD

## 2021-06-10 NOTE — PROGRESS NOTES
"  Office Visit - Follow Up   Artis Galvin   75 y.o. male    Date of Visit: 8/18/2020    Chief Complaint   Patient presents with     Depression     1 mo medication f/u - pt reports that sometimes he feel nauseated      Diverticulitis     pain pt reports no current abd pains - pt has no current colonoscopy appt scheduled  (wanting to hold this off)        Assessment and Plan   1. Adjustment disorder with mixed anxiety and depressed mood  Better control.  He will increase the Wellbutrin to 300 mg daily for the next week.  We will see how he does with that.  I am hopeful that he will have some improvement at home on a continue that dosing.  He will let me know if he wants to do that.    2. Back muscle spasm  I suggested Tylenol.  Offered muscle relaxant and he declines.  Urged against the nonsteroidals given his coronary disease at [place].    3. Ascending aortic aneurysm (H)  Recent CT scan was stable.    4. Asymptomatic coronary heart disease  Continue lipid-lowering medications and beta-blocker.    5. Diverticulitis of colon  He is due for his colonoscopy and he promises me he will get that set up soon.        Return in about 4 weeks (around 9/15/2020) for Video Visit.     History of Present Illness   This 75 y.o. old Binu comes in today for follow-up of his anxiety and depression.  I put him on Wellbutrin which he resisted taking for some time and he comes in today for follow-up.  He reports he is tolerating without difficulty.  He still feels a lot of poor motivation like he is \"getting dragged down\".  He attributes this to his medications.  He does not feel any better or worse in terms of that on the Wellbutrin.  He does not feel like he has more energy with that.  He attributes all of his symptoms to his medications which he was placed on for coronary disease as well as for thoracic aneurysm.  We reviewed his CT scan which showed stability in his aneurysm.  He has had 2 weeks of a back spasm in his thoracic " "back.  It was better today but then he tweaked it again and is now spasming at times with certain movements.  No trauma.  He still has not gotten set up for his colonoscopy yet.  He has recurrent diverticulitis and needs a colonoscopy.  We discussed that today.  He otherwise denies other concerns for me.    Review of Systems: A comprehensive review of systems was negative except as noted.     Medications, Allergies and Problem List   Reviewed, reconciled and updated  Post Discharge Medication Reconciliation Status:      Physical Exam   General Appearance:   Pleasant gentleman in no distress.  PHQ 9 and ROMERO 7 are noted.    /62   Pulse 60   Temp 98.1  F (36.7  C)   Ht 5' 10\" (1.778 m)   Wt 190 lb (86.2 kg)   SpO2 96%   BMI 27.26 kg/m           Additional Information   Current Outpatient Medications   Medication Sig Dispense Refill     aspirin 81 MG EC tablet Take 1 tablet (81 mg total) by mouth daily.  0     atorvastatin (LIPITOR) 40 MG tablet Take 1 tablet (40 mg total) by mouth daily. 90 tablet 1     buPROPion (WELLBUTRIN XL) 150 MG 24 hr tablet Take 1 tablet (150 mg total) by mouth every morning. 30 tablet 2     cholecalciferol, vitamin D3, 2,000 unit cap Take 1 capsule by mouth daily.        cyanocobalamin, vitamin B-12, (VITAMIN B-12 ORAL) Take 1 tablet by mouth daily. 2500 mcg       isosorbide mononitrate (IMDUR) 30 MG 24 hr tablet Take 0.5 tablets (15 mg total) by mouth daily. 30 tablet 0     ketoconazole (NIZORAL) 2 % cream Apply topically 2 (two) times a day as needed. 60 g 1     losartan (COZAAR) 100 MG tablet TAKE ONE TABLET BY MOUTH ONE TIME DAILY  90 tablet 3     metoprolol succinate (TOPROL-XL) 25 MG Take 12.5 mg by mouth daily.       nitroglycerin (NITROSTAT) 0.4 MG SL tablet For chest pain place 1 tablet under the tongue every 5 minutes for 3 doses. If symptoms persist 5 minutes after 1st dose call 911.       pilocarpine (PILOCAR) 2 % ophthalmic solution Administer 1 drop to both eyes 2 " (two) times a day.        No current facility-administered medications for this visit.      Allergies   Allergen Reactions     Ciprofloxacin      Levaquin [Levofloxacin]      Social History     Tobacco Use     Smoking status: Never Smoker     Smokeless tobacco: Never Used   Substance Use Topics     Alcohol use: Yes     Comment: rarely     Drug use: No       Review and/or order of clinical lab tests:  Review and/or order of radiology tests:  Review and/or order of medicine tests:  Discussion of test results with performing physician:  Decision to obtain old records and/or obtain history from someone other than the patient:  Review and summarization of old records and/or obtaining history from someone other than the patient and.or discussion of case with another health care provider:  Independent visualization of image, tracing or specimen itself:    Time: not applicable     Angel Hernandez MD

## 2021-06-10 NOTE — PROGRESS NOTES
Office Visit - Follow Up   Artis Galvin   72 y.o. male    Date of Visit: 5/16/2017    Chief Complaint   Patient presents with     Hyperlipidemia     6 mo f/u - fasting         Assessment and Plan   1. Carcinoma Of The Prostate Gland  He will continue to follow with his urologist.  Check PSA per his request.  We discussed the pros and cons of finasteride.  He does not seem ready for prostatectomy.  - PSA, Diagnostic (Prostatic-Specific Antigen)    2. Hashimoto's Thyroiditis  Seems euthyroid.  Check labs today for monitoring.  - Thyroid Stimulating Hormone (TSH)  - T4, Free    3. Pure hypercholesterolemia  He refuses statins.  Labs today for monitoring.  - Lipid Cascade  - Comprehensive Metabolic Panel    4. Vitamin D deficiency  Continue current regimen.  Last level looked good.    5. Achilles rupture, left  Would like to get the records from his orthopedist.    6. Vertigo  He is to be seen if he has recurrences.  - HM2(CBC w/o Differential)        Return in about 6 months (around 11/16/2017) for Recheck.     History of Present Illness   This 72 y.o. old patient comes in today for follow-up of his multiple medical problems.  He has prostate cancer and has refused to undergo surgery.  He has low-grade disease.  He does have very large prostate and a very high PSA.  He is having a lot of urinary symptoms.  His urologist suggested finasteride but he has not started taking it.  He wonders if that should be something he tries.    He has Hashimoto's thyroiditis.  Is due for TSH.  He has no symptoms of hyper or hypothyroidism.  He has hypercholesterolemia and refuses to take statin therapy.  Currently he would qualify for statins based on current guidelines.    Otherwise Binu is not had a very good few months.  He tore his left Achilles.  He is currently getting conservative management and is currently in a boot.  This has been down.  He has not been able get around very well.  He saw some swelling and discomfort in  "that left leg.  Follows closely with orthopedics.  I do not have any of those records.    He notes that he has had some episodes of lightheadedness.  These are fairly in frequent.  Last one was a month or 2 ago.  He felt kind of nauseated and vertiginous.  He cannot really give me any other details.  His heart rate was slow and normal he reports.  It was not racing.  He had no other neurologic symptoms.    Review of Systems: A comprehensive review of systems was negative except as noted.     Medications, Allergies and Problem List   Reviewed and updated     Physical Exam   General Appearance:   Pleasant gentleman.  Good spirits.  He is wearing a boot.  Heart is regular.    /80 (Patient Site: Right Arm, Patient Position: Sitting, Cuff Size: Adult Regular)  Pulse 70  Ht 5' 10\" (1.778 m)  Wt 188 lb (85.3 kg)  SpO2 96%  BMI 26.98 kg/m2         Additional Information   Current Outpatient Prescriptions   Medication Sig Dispense Refill     ascorbic acid, vitamin C, (ASCORBIC ACID WITH HAO HIPS) 500 MG tablet Take 500 mg by mouth daily.       aspirin 81 MG EC tablet Take 162 mg by mouth 2 (two) times a day.       cholecalciferol, vitamin D3, 2,000 unit cap Take 1 capsule by mouth daily.        ketoconazole (NIZORAL) 2 % cream Apply topically 2 (two) times a day. 60 g 3     naproxen sodium (ALEVE) 220 MG tablet Take 220 mg by mouth as needed.        pilocarpine (PILOCAR) 2 % ophthalmic solution 1 drop 4 (four) times a day.       No current facility-administered medications for this visit.      No Known Allergies  Social History   Substance Use Topics     Smoking status: Never Smoker     Smokeless tobacco: Never Used     Alcohol use Yes      Comment: social       Review and/or order of clinical lab tests:  Review and/or order of radiology tests:  Review and/or order of medicine tests:  Discussion of test results with performing physician:  Decision to obtain old records and/or obtain history from someone other " than the patient:  Review and summarization of old records and/or obtaining history from someone other than the patient and.or discussion of case with another health care provider:  Independent visualization of image, tracing or specimen itself:    Time: total time spent with the patient was 25 minutes of which >50% was spent in counseling and coordination of care     Angel Hernandez MD

## 2021-06-10 NOTE — TELEPHONE ENCOUNTER
Refill Approved    Rx renewed per Medication Renewal Policy. Medication was last renewed on 5/4/20.    Carol Estrada, [facility] Triage/Med Refill 7/31/2020     Requested Prescriptions   Pending Prescriptions Disp Refills     losartan (COZAAR) 100 MG tablet [Pharmacy Med Name: Losartan Potassium Oral Tablet 100 MG] 90 tablet 0     Sig: TAKE ONE TABLET BY MOUTH ONE TIME DAILY       Angiotensin Receptor Blocker Protocol Passed - 7/30/2020 10:34 AM        Passed - PCP or prescribing provider visit in past 12 months       Last office visit with prescriber/PCP: 5/26/2020 Angel Hernandez MD OR same dept: 3/9/2020 Angel Hernandez MD OR same specialty: 5/26/2020 Angel Hernandez MD  Last physical: 10/24/2019 Last MTM visit: Visit date not found   Next visit within 3 mo: Visit date not found  Next physical within 3 mo: Visit date not found  Prescriber OR PCP: Angel Hernandez MD  Last diagnosis associated with med order: 1. Essential hypertension  - losartan (COZAAR) 100 MG tablet [Pharmacy Med Name: Losartan Potassium Oral Tablet 100 MG]; TAKE ONE TABLET BY MOUTH ONE TIME DAILY   Dispense: 90 tablet; Refill: 0    2. Ascending aortic aneurysm (H)  - losartan (COZAAR) 100 MG tablet [Pharmacy Med Name: Losartan Potassium Oral Tablet 100 MG]; TAKE ONE TABLET BY MOUTH ONE TIME DAILY   Dispense: 90 tablet; Refill: 0    If protocol passes may refill for 12 months if within 3 months of last provider visit (or a total of 15 months).             Passed - Serum potassium within the past 12 months     Lab Results   Component Value Date    Potassium 4.2 06/25/2020             Passed - Blood pressure filed in past 12 months     BP Readings from Last 1 Encounters:   06/25/20 124/82             Passed - Serum creatinine within the past 12 months     Creatinine   Date Value Ref Range Status   06/25/2020 1.01 0.70 - 1.30 mg/dL Final

## 2021-06-11 NOTE — PROGRESS NOTES
Clinic Care Coordination Contact  Community Health Worker Initial Outreach            Patient accepts CC: No, Patient is doing well, no CCC needs at this time. . Patient will be sent Care Coordination introduction letter for future reference.   No letter sent.

## 2021-06-11 NOTE — PROGRESS NOTES
"Artis Galvin is a 75 y.o. male who is being evaluated via a billable video visit.      The patient has been notified of following:     \"This video visit will be conducted via a call between you and your physician/provider. We have found that certain health care needs can be provided without the need for an in-person physical exam.  This service lets us provide the care you need with a video conversation.  If a prescription is necessary we can send it directly to your pharmacy.  If lab work is needed we can place an order for that and you can then stop by our lab to have the test done at a later time.    Video visits are billed at different rates depending on your insurance coverage. Please reach out to your insurance provider with any questions.    If during the course of the call the physician/provider feels a video visit is not appropriate, you will not be charged for this service.\"    Patient has given verbal consent to a Video visit? Yes  How would you like to obtain your AVS? AVS Preference: [employer].  If dropped by the video visit, the video invitation should be sent to: Text to cell phone: [phone]  Will anyone else be joining your video visit? No        Video Start Time: 1525    Additional provider notes:     Office Visit - Follow Up   Artis Galvin   75 y.o. male    Date of Visit: 9/21/2020    Chief Complaint   Patient presents with     Depression     Dox Video: [phone] - only taking Wellbutrin 150 mg due to hand tremors when taking 300 mg dose      Abdominal Pain     [facility] visit on 9/11/20 - pt reports no current abd pains - pt reports that he hasn't scheduled his colonoscopy         Assessment and Plan   1. Adjustment disorder with mixed anxiety and depressed mood  Much better control with Wellbutrin at 150 mg.  Continue same.  We will continue this dose until I see him again in the spring.  2. Diverticulosis of large intestine without hemorrhage  I have urged colonoscopy as he is overdue.    3. " Foot symptom    - Ambulatory referral to Podiatry    4. Asymptomatic coronary heart disease  Continue lipid-lowering therapies and he will return fasting for his next visit.    5. Ascending aortic aneurysm (H)  Most recent scan was stable.  Continue blood pressure control.        Return in about 6 months (around 3/21/2021) for AWV.     History of Present Illness   This 75 y.o. old Binu joins me on a video visit today.  He reports he is doing pretty good.  His mood and anxiety seem to be better with the Wellbutrin.  He is simply pretty pleased with how things are going.  He did increase the dose to 300 mg but he got a tremor in his hands so he cut back to the 150s.  He is doing well with this.  He also had another episode of abdominal pain went to the emergency room.  He was tested for COVID.  They did not scan his belly.  They sent him home and is symptoms are resolved.  He is overdue for his colonoscopy.  He would like to meet with a foot doctor.  I have ordered that in the past but still has not gotten set up.  He has onychomycosis in various foot ailments.    Review of Systems: A comprehensive review of systems was negative except as noted.     Medications, Allergies and Problem List   Reviewed, reconciled and updated  Post Discharge Medication Reconciliation Status:      Physical Exam   General Appearance:   Pleasant gentleman in good spirits and looks well.    There were no vitals taken for this visit.         Additional Information   Current Outpatient Medications   Medication Sig Dispense Refill     aspirin 81 MG EC tablet Take 1 tablet (81 mg total) by mouth daily.  0     atorvastatin (LIPITOR) 40 MG tablet Take 1 tablet (40 mg total) by mouth daily. 90 tablet 1     cholecalciferol, vitamin D3, 2,000 unit cap Take 1 capsule by mouth daily.        cyanocobalamin, vitamin B-12, (VITAMIN B-12 ORAL) Take 1 tablet by mouth daily. 2500 mcg       isosorbide mononitrate (IMDUR) 30 MG 24 hr tablet Take 0.5 tablets  (15 mg total) by mouth daily. 30 tablet 0     ketoconazole (NIZORAL) 2 % cream Apply topically 2 (two) times a day as needed. 60 g 1     losartan (COZAAR) 100 MG tablet TAKE ONE TABLET BY MOUTH ONE TIME DAILY  90 tablet 3     metoprolol succinate (TOPROL-XL) 25 MG Take 12.5 mg by mouth daily.       nitroglycerin (NITROSTAT) 0.4 MG SL tablet For chest pain place 1 tablet under the tongue every 5 minutes for 3 doses. If symptoms persist 5 minutes after 1st dose call 911.       pilocarpine (PILOCAR) 2 % ophthalmic solution Administer 1 drop to both eyes 2 (two) times a day.        buPROPion (WELLBUTRIN XL) 150 MG 24 hr tablet Take 2 tablets (300 mg total) by mouth every morning. 30 tablet 2     No current facility-administered medications for this visit.      Allergies   Allergen Reactions     Ciprofloxacin      Levaquin [Levofloxacin]      Social History     Tobacco Use     Smoking status: Never Smoker     Smokeless tobacco: Never Used   Substance Use Topics     Alcohol use: Yes     Comment: rarely     Drug use: No       Review and/or order of clinical lab tests:  Review and/or order of radiology tests:  Review and/or order of medicine tests:  Discussion of test results with performing physician:  Decision to obtain old records and/or obtain history from someone other than the patient:  Review and summarization of old records and/or obtaining history from someone other than the patient and.or discussion of case with another health care provider:  Independent visualization of image, tracing or specimen itself:    Time: not applicable     Angel Hernandez MD      Video-Visit Details    Type of service:  Video Visit    Video End Time (time video stopped): 1546  Originating Location (pt. Location): Home    Distant Location (provider location):  [facility] INTERNAL MEDICINE     Platform used for Video Visit: IZI-collecte[employer] Interventions      Angel Hernandez MD

## 2021-06-12 NOTE — PROGRESS NOTES
FOOT AND ANKLE SURGERY/PODIATRY CONSULT NOTE         ASSESSMENT:   Onychomycosis left great toenail  Onycholysis left great toenail      TREATMENT:  Removed left great toenail today applied sterile bandage.  The patient was given a prescription for Penlac to be applied as directed.  I informed the patient he will have a new nail to reach grow over the next several months.  He is to return to the clinic as needed.        HPI: I was asked to see Artis Galvin today to evaluate and treat painful loose discolored left great toenail.  The patient indicated over the past several days he noticed that the nail was quite loose.  He does not recall any particular trauma to the nail.  He has noticed some severe discoloration of the toenail over the past several months.  He has not had any redness, swelling, drainage or bleeding.  He is extremely active.  He exercises daily.  He denies any other previous treatment.  The patient was seen in consultation at the request of Angel Hernandez MD for evaluation and treatment of discolored painful left great toenail.     Past Medical History:   Diagnosis Date     Abdominal pain, unspecified site      Abnormal thyroid ultrasound      BBB (bundle branch block)     right     Chronic lymphocytic thyroiditis      Diverticulosis of colon (without mention of hemorrhage)      Localized superficial swelling, mass, or lump      Malignant neoplasm of prostate (H)      Nontoxic multinodular goiter      Other and unspecified hyperlipidemia      Other nonspecific abnormal serum enzyme levels      Pain in joint, shoulder region      Persistent hoarseness      Sarcoidosis      Scleritis, unspecified      Unspecified hypothyroidism      Unspecified vitamin D deficiency        Past Surgical History:   Procedure Laterality Date     AK LAP,PROSTATECTOMY,RADICAL,W/NERVE SPARE,INCL ROBOTIC Bilateral 2/27/2018    Procedure: ROBOTIC ASSISTED RADICAL RETROPUBIC PROSTATECTOMY, BILATERAL PELVIC LYMPH NODE  DISSECTION LYSIS OF ADHESIONS;  Surgeon: Wale Rodriguez MD;  Location: [facility] [place];  Service: Urology     NM RADIAL KERATOTOMY      Description: Cornea Radial Keratotomy;  Recorded: 02/18/2014;     NM SHLDR ARTHROSCOP,PART ACROMIOPLAS Right     Description: Shoulder Arthroscopy, Space Decompression And Acromioplasty;  Recorded: 02/18/2014;       Allergies   Allergen Reactions     Ciprofloxacin      Levaquin [Levofloxacin]          Current Outpatient Medications:      aspirin 81 MG EC tablet, Take 1 tablet (81 mg total) by mouth daily., Disp: , Rfl: 0     atorvastatin (LIPITOR) 40 MG tablet, Take 1 tablet (40 mg total) by mouth daily., Disp: 90 tablet, Rfl: 1     buPROPion (WELLBUTRIN XL) 150 MG 24 hr tablet, Take 1 tablet (150 mg total) by mouth every morning., Disp: 90 tablet, Rfl: 3     cholecalciferol, vitamin D3, 2,000 unit cap, Take 1 capsule by mouth daily. , Disp: , Rfl:      isosorbide mononitrate (IMDUR) 30 MG 24 hr tablet, Take 0.5 tablets (15 mg total) by mouth daily., Disp: 30 tablet, Rfl: 0     ketoconazole (NIZORAL) 2 % cream, Apply topically 2 (two) times a day as needed., Disp: 60 g, Rfl: 1     losartan (COZAAR) 100 MG tablet, TAKE ONE TABLET BY MOUTH ONE TIME DAILY , Disp: 90 tablet, Rfl: 3     metoprolol succinate (TOPROL-XL) 25 MG, Take 12.5 mg by mouth daily., Disp: , Rfl:      pilocarpine (PILOCAR) 2 % ophthalmic solution, Administer 1 drop to both eyes 2 (two) times a day. , Disp: , Rfl:      nitroglycerin (NITROSTAT) 0.4 MG SL tablet, For chest pain place 1 tablet under the tongue every 5 minutes for 3 doses. If symptoms persist 5 minutes after 1st dose call 911., Disp: , Rfl:     Family History   Problem Relation Age of Onset     No Medical Problems Mother      Coronary artery disease Father      Aortic aneurysm Father      Prostate cancer Maternal Grandfather      Prostate cancer Maternal Uncle        Social History     Socioeconomic History     Marital status: Single     Spouse  name: Not on file     Number of children: Not on file     Years of education: Not on file     Highest education level: Not on file   Occupational History     Not on file   Social Needs     Financial resource strain: Not on file     Food insecurity     Worry: Not on file     Inability: Not on file     Transportation needs     Medical: Not on file     Non-medical: Not on file   Tobacco Use     Smoking status: Never Smoker     Smokeless tobacco: Never Used   Substance and Sexual Activity     Alcohol use: Yes     Comment: rarely     Drug use: No     Sexual activity: Not on file   Lifestyle     Physical activity     Days per week: Not on file     Minutes per session: Not on file     Stress: Not on file   Relationships     Social connections     Talks on phone: Not on file     Gets together: Not on file     Attends Sabianism service: Not on file     Active member of club or organization: Not on file     Attends meetings of clubs or organizations: Not on file     Relationship status: Not on file     Intimate partner violence     Fear of current or ex partner: Not on file     Emotionally abused: Not on file     Physically abused: Not on file     Forced sexual activity: Not on file   Other Topics Concern     Not on file   Social History Narrative    Single. . 4 grown daughters.        Currently unemployed.       Review of Systems - Patient denies fever, chills, rash, wound, stiffness, limping, numbness, weakness, heart burn, blood in stool, chest pain with activity, calf pain when walking, shortness of breath with activity, chronic cough, easy bleeding/bruising, swelling of ankles, excessive thirst, fatigue, depression, anxiety.  Patient admits to loosely attached left great toenail.      OBJECTIVE:  Appearance: alert, well appearing, and in no distress.    Vitals:    10/13/20 1328   BP: 148/80   Pulse: 64   Resp: 18   Temp: 97.7  F (36.5  C)       BMI= Body mass index is 27 kg/m .    General appearance: Patient is  alert and fully cooperative with history & exam.  No sign of distress is noted during the visit.  Psychiatric: Affect is pleasant & appropriate.  Patient appears motivated to improve health.  Respiratory: Breathing is regular & unlabored while sitting.  HEENT: Hearing is intact to spoken word.  Speech is clear.  No gross evidence of visual impairment that would impact ambulation.    Vascular: Dorsalis pedis and posterior tibial pulses are palpable. There is pedal hair growth bilaterally.  CFT < 3 sec from anterior tibial surface to distal digits bilaterally. There is no appreciable edema noted.  Dermatologic: The left great toenail is severely discolored.  It is loosely attached to the underlying nail bed.  Subungual debris left great toenail is noted.  Turgor and texture are within normal limits. No coloration or temperature changes. No primary or secondary lesions noted.  Neurologic: All epicritic and proprioceptive sensations are grossly intact bilaterally.  Musculoskeletal: All active and passive ankle, subtalar, midtarsal, and 1st MPJ range of motion are grossly intact without pain or crepitus, with the exception of none. Manual muscle strength is within normal limits bilaterally. All dorsiflexors, plantarflexors, invertors, evertors are intact bilaterally.  Mild tenderness present to the left great toenail on palpation.  No tenderness to the left great toe with range of motion. Calf is soft/non-tender without warmth/induration    Imaging:         No results found.       Renato Edwards; JOE  [facility] Foot & Ankle Surgery/Podiatry

## 2021-06-12 NOTE — PROGRESS NOTES
HISTORY OF PRESENT ILLNESS  Asked to see by Dr. Hernandez for evaluation of cough. Patient reports that he was at the doctor's office for another issue and Dr. Hernandez noticed that he seemed hoarse. The patient can't pin down a trigger. Some occasions he can barely talk. No problems swallowing. No pain.    REVIEW OF SYSTEMS  Review of Systems: a 10-system review was performed. Pertinent positives are noted in the HPI and on a separate scanned document in the chart.    PMH, PSH, FH and SH has documented in the EHR.      EXAM    CONSTITUTIONAL  General Appearance:  Normal, well developed, well nourished, no obvious distress  Ability to Communicate:  communicates appropriately.    HEAD AND FACE  Appearance and Symmetry:  Normal, no scalp or facial scarring or suspicious lesions.  Paranasal sinuses tenderness:  Normal, Paranasal sinuses non tender    EYE  Normal external eye, conjunctiva, lids, cornea.     EARS  Clinical speech reception threshold:  Normal, able to hear normal speech.  Auricle:  Normal, Auricles without scars, lesions, masses.  External auditory canal:  Normal, External auditory canal normal.  Tympanic membrane:  Normal, Tympanic membranes normal without swelling or erythema.  Tympanic membrane mobility:  Normal, Normal tympanic membrane mobility.    NOSE (speculum or scope)  Architecture:  Normal, Grossly normal external nasal architecture with no masses or lesions.  Mucosa:  Normal mucosa, No polyps or masses.  Septum:  Normal, Septum non-obstructing.  Turbinates:  Normal, No turbinate abnormalities    ORAL CAVITY AND OROPHARYNX  Lips:  Normal.  Dental and gingiva:  Normal, No obvious dental or gingival disease.  Mucosa:  Normal, Moist mucous membranes.  Tongue:  Normal, Tongue mobile with no mucosal abnormalities  Hard and soft palate:  Normal, Hard and soft palate without cleft or mucosal lesions.  Oral pharynx:  Normal, Posterior pharynx without lesions or remarkable asymmetry.  Saliva:  Normal, Clear  saliva.  Masses:  Normal, No palpable masses or pathologically enlarged lymph nodes.    LARYNGOSCOPY  Risks and benefit of Flexible laryngeal endoscopy were discussed with the patient and they wished to proceed. Patient tolerated the procedure well.  See exam note for findings.    LARYNX AND HYPOPHARYNX (scope)  Voice Quality:  Normal voice quality.  Supra glottis:  Normal, No edema or erythema.  Epiglottis:  Normal, No epiglottic mass or mucosal lesions.  Pyriform sinuses:  Normal, Pyriform sinuses clear.  Vocal cords appearance:  Normal, Vocal cord motion symmetric.  Vocal cord motion:  Normal, Vocal cord motion symmetric  Endolarynx:  Normal, No Endolaryngeal mass or mucosal lesions.    NECK  Masses/lymph nodes:  Normal, No worrisome neck masses or lymph nodes.  Salivary glands:  Normal, Parotid and submandibular glands.  Trachea and larynx position:  Normal, Trachea and larynx midline.  Thyroid:  Normal, No thyroid abnormality.  Tenderness:  Normal, No cervical tenderness.  Suppleness:  Normal, Neck supple    CARDIOVASCULAR  Regular rate and rhythm.  No cyanosis, clubbing or edema.    PULSES  Carotid pulses normal    NEUROLOGICAL  Speech pattern:  Normal, Proasaic    RESPIRATORY  Symmetry and Respiratory effort:  Normal, Symmetric chest movement and expansion with no increased intercostal retractions or use of accessory muscles.     IMPRESSION  Presbylarynges. No evidence of laryngeal pathology. I discussed findings with the patient.    RECOMMENDATION  I discussed speech therapy as an option if he wanted to strengthen his voice. He declined preferring to live with the symptoms since there was no evidence of serious pathology.    Jesus Noriega MD

## 2021-06-12 NOTE — PROGRESS NOTES
Office Visit - Follow Up   Artis Galvin   75 y.o. male    Date of Visit: 11/4/2020    Chief Complaint   Patient presents with     Hand Injury     left hand, thumb and index move on there own, about 1-2 month        Assessment and Plan   1. Muscle twitch  Unknown etiology.  Labs for evaluation EMG and low threshold to refer back to neurology.  - EMG- Left Arm; Future  - Comprehensive Metabolic Panel  - CK Total  - Thyroid Cascade  - Magnesium    2. Persistent hoarseness  Unclear as to the etiology.  He has had recent chest scans which looked okay.  We will have him meet with ENT.  - Ambulatory referral to ENT    3. Hashimoto's Thyroiditis  Check TSH.    4. Adjustment disorder with mixed anxiety and depressed mood  Mood is good with current regimen.  Continue same.        Return in about 4 months (around 3/4/2021) for AWV.     History of Present Illness   This 75 y.o. old Binu comes in today for follow-up.  He is here actually for urgent evaluation of a twitching or spasming of his thumb and second finger of the left hand.  This is an involuntary movement.  Sometimes it will actually cause him to drop things.  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  There is been no other new neurologic concerns.  No other focal weakness or numbness.  No pain in his arm or neck.  He does note a persistent hoarseness.  Has a remote history of Hashimoto's.  Thyroid testing has been normal recently.  He was placed on Wellbutrin for his mood and tells me that that has been pretty good.    Review of Systems: A comprehensive review of systems was negative except as noted.     Medications, Allergies and Problem List   Reviewed, reconciled and updated  Post Discharge Medication Reconciliation Status:      Physical Exam   General Appearance:   Pleasant gentleman no distress.  He does have a involuntary spasm or twitching of his left hand.  Most notable in  "his first and second digits.  No weakness.  No muscle atrophy.    /60   Pulse 66   Ht 5' 10\" (1.778 m)   Wt 189 lb (85.7 kg)   SpO2 94%   BMI 27.12 kg/m           Additional Information   Current Outpatient Medications   Medication Sig Dispense Refill     aspirin 81 MG EC tablet Take 1 tablet (81 mg total) by mouth daily.  0     atorvastatin (LIPITOR) 40 MG tablet Take 1 tablet (40 mg total) by mouth daily. 90 tablet 1     buPROPion (WELLBUTRIN XL) 150 MG 24 hr tablet Take 1 tablet (150 mg total) by mouth every morning. 90 tablet 3     cholecalciferol, vitamin D3, 2,000 unit cap Take 1 capsule by mouth daily.        ciclopirox (PENLAC) 8 % solution Apply topically daily. Apply daily to toenals 1 Bottle 6     isosorbide mononitrate (IMDUR) 30 MG 24 hr tablet Take 0.5 tablets (15 mg total) by mouth daily. 30 tablet 0     losartan (COZAAR) 100 MG tablet TAKE ONE TABLET BY MOUTH ONE TIME DAILY  90 tablet 3     metoprolol succinate (TOPROL-XL) 25 MG Take 12.5 mg by mouth daily.       pilocarpine (PILOCAR) 2 % ophthalmic solution Administer 1 drop to both eyes 2 (two) times a day.        ketoconazole (NIZORAL) 2 % cream Apply topically 2 (two) times a day as needed. 60 g 1     nitroglycerin (NITROSTAT) 0.4 MG SL tablet For chest pain place 1 tablet under the tongue every 5 minutes for 3 doses. If symptoms persist 5 minutes after 1st dose call 911.       No current facility-administered medications for this visit.      Allergies   Allergen Reactions     Ciprofloxacin      Levaquin [Levofloxacin]      Social History     Tobacco Use     Smoking status: Never Smoker     Smokeless tobacco: Never Used   Substance Use Topics     Alcohol use: Yes     Comment: rarely     Drug use: No       Review and/or order of clinical lab tests:  Review and/or order of radiology tests:  Review and/or order of medicine tests:  Discussion of test results with performing physician:  Decision to obtain old records and/or obtain history " from someone other than the patient:  Review and summarization of old records and/or obtaining history from someone other than the patient and.or discussion of case with another health care provider:  Independent visualization of image, tracing or specimen itself:    Time: not applicable     Angel Hernandez MD

## 2021-06-12 NOTE — TELEPHONE ENCOUNTER
He doesn't cut toenails.  If you want to see someone else, I would suggest Dr. Crawford in [place].

## 2021-06-13 NOTE — TELEPHONE ENCOUNTER
----- Message from Angel Hernandez MD sent at 11/16/2020  5:25 PM CST -----      ----- Message -----  From: Sajan Fritz DO  Sent: 11/16/2020   1:27 PM CST  To: Angel Hernandez MD

## 2021-06-13 NOTE — TELEPHONE ENCOUNTER
Called patient and relayed message from PCP. Report printed and put in PCP inbox to review. Called and spoke with Margaret (x-ray in the clinic) and she is looking into how to compare reports from different facilities.     Ainsley Templeton, CMA

## 2021-06-13 NOTE — PROGRESS NOTES
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  I do not know how this would explain your particular symptoms.  If your symptoms change or do not improve, you should let me know and we will have you meet with a neurologist.

## 2021-06-13 NOTE — TELEPHONE ENCOUNTER
Please let Binu know that I reviewed all his old reports and there is no mention of a pancreatic cyst there.  I think it would be best if he meet with gastroenterology to follow.  Please place that order and let him know.

## 2021-06-13 NOTE — TELEPHONE ENCOUNTER
Spoke with patient and relayed message from PCP. He will keep us posted.     Ainsley Templeton, CMA

## 2021-06-13 NOTE — PROGRESS NOTES
"Patient presents at the request of Dr. Angel Hernandez for left upper extremity EMG.  He has a couple month history of left hand \"twitching \".  He shows me a video of his left thumb and index finger with fasciculations.  Has been in digit 5 as well.  No numbness tingling or pain in the hand.  Feels he does drop things on occasion.  He is left-handed.  Symptoms are improving.  No weakness.  On exam he has no visible fasciculations.  He has normal strength, reflexes, and sensation of the upper extremities.    EMG/NCS  results: Please see scanned full report    Comment NCS: Abnormal study  1.  Absent left ulnar SNAP and transcarpal study.  2.  Mild focal slowing without amplitude drop of the left ulnar CMAP across the elbow.  3.  Left ulnar motor inching across the elbow recording at ADM: Mild focal slowing without amplitude drop localizing to the medial epicondyle.  4. Normal left median and radial SNAPs, median transcarpal study, median CMAP.    Comment EMG: Abnormal study  1.  Mild increased insertional activity with a few fasciculations in the left FDI and borderline reduced recruitment.  Remainder left upper extremity needle EMG normal    Interpretation: Abnormal study: There is electrodiagnostic evidence of:    1.  Left ulnar neuropathy localizing to the medial epicondyle.      2. There is no electrodiagnostic evidence of cervical radiculopathy, brachial plexopathy, or median neuropathy in the left upper extremity.    Note: Discussed keeping the left elbow straight when sleeping and avoid leaning on his left elbow and working on the computer to see if his symptoms continue to improve.  We will follow-up with Dr. Hernandez for further recommendations.    The testing was completed in its entirety by the physician.       It was our pleasure caring for your patient today, if there any questions or concerns please do not hesitate to contact us.    "

## 2021-06-13 NOTE — PATIENT INSTRUCTIONS - HE
Thank you for choosing the [facility] for your EMG testing.    The ordering provider will receive your final EMG results within the next few days.  Please follow up with your provider for the results and further treatment recommendations.

## 2021-06-13 NOTE — TELEPHONE ENCOUNTER
Margaret from x-ray spoke with [facility] regarding doing the comparison. They typically do not do comparisons on exams from other locations. They did say if YOU call [phone] they will get a comparison radiologist on the line to review what you are asking for and do it by phone as a courtesy.     Ainsley Templeton, [facility] [address]

## 2021-06-13 NOTE — TELEPHONE ENCOUNTER
Please advise. The CT and notes are in the 11/28/20 ED encounter. CT at the bottom of the report. Thank you.    Ainsley Templeton, CMA

## 2021-06-13 NOTE — TELEPHONE ENCOUNTER
I did respond to his question.  Please see my response and call him with my response and call radiology per my previous request.  Thanks.

## 2021-06-13 NOTE — TELEPHONE ENCOUNTER
Plese call pt:  Those abx are fine.  Hope he is feeling better.      I need the actual CT reports for our chart, so please call over and get them for scanning.  Also, it appears this cyst is new--never mentioned on previous scans--and he has had several.  Please call radiology and have all his scans over the past few years compared to see if this truly is new, and if it is, I would suggest a visit with gastroenterology.

## 2021-06-14 NOTE — PROGRESS NOTES
Office Visit - Follow Up   Artis Galvin   72 y.o. male    Date of Visit: 11/7/2017    Chief Complaint   Patient presents with     Prostate Cancer     scheduled to see Dr. Cuellar on 11/10/17 and then Newman on 11/20/17        Assessment and Plan   1. Carcinoma Of The Prostate Gland  I counseled patient at length on this.  I feel that with his obstructive symptoms and his cancer that he should probably proceed with surgery.  I think that is going to give him the best results.  He will discuss it further at [facility] as well.  I will see him back after that peer  - Comprehensive Metabolic Panel    2. Hashimoto's Thyroiditis  Given his fatigue etc. check TSH per  - Thyroid Cascade    3. Hypothyroidism  As above.  - Thyroid Cascade    4. Hypercholesteremia  Lipids today as he is fasting.  - Lipid Cascade    5. Leg pain  I am concerned about potential for's spinal stenosis causing his lower extremity pains weakness and numbness.  Check MRI of spine peer see him back next month.  - CK Total  - MR Lumbar Spine Without Contrast; Future        Return in about 4 weeks (around 12/5/2017) for Recheck.     History of Present Illness   This 72 y.o. old Binu comes in today for follow-up urgently for evaluation of discussion of his prostate cancer.  He has had prostate cancer on watching and waiting some time now.  He has been resistant to having surgery because he has been concerned about potential side effects.  Recently had another biopsy which showed worsening and more volume of his cancer.  He is a 4+3 and a couple samples as well.  His urologist wants him to have prostatectomy at this time.  He still resistant to that.  He did get an appointment at the [facility] after pulling some strings.  He will be seeing them soon.  He also wonders about proton beam therapy.  He also notes that over the last few months he has not been able to walk more than just a few 100 feet.  Pains in his legs.  He also notes numbness in his  "feet.  He wonders if he has poor circulation.  He states that she has been a bad year for him.  No falls.  No bowel issues.  He is having increasing urinary obstruction symptoms.    Review of Systems: A comprehensive review of systems was negative except as noted.     Medications, Allergies and Problem List   Reviewed and updated     Physical Exam   General Appearance:   Pleasant gentleman.  Does not appear acutely ill.  His distal pulses are intact.    /80 (Patient Site: Right Arm, Patient Position: Sitting, Cuff Size: Adult Regular)  Pulse (!) 58  Ht 5' 10\" (1.778 m)  Wt 190 lb (86.2 kg)  SpO2 98%  BMI 27.26 kg/m2         Additional Information   Current Outpatient Prescriptions   Medication Sig Dispense Refill     ascorbic acid, vitamin C, (ASCORBIC ACID WITH HAO HIPS) 500 MG tablet Take 500 mg by mouth daily.       aspirin 81 MG EC tablet Take 162 mg by mouth 2 (two) times a day.       cholecalciferol, vitamin D3, 2,000 unit cap Take 1 capsule by mouth daily.        ketoconazole (NIZORAL) 2 % cream Apply topically 2 (two) times a day. 60 g 3     naproxen sodium (ALEVE) 220 MG tablet Take 220 mg by mouth as needed.        pilocarpine (PILOCAR) 2 % ophthalmic solution 1 drop 4 (four) times a day.       No current facility-administered medications for this visit.      No Known Allergies  Social History   Substance Use Topics     Smoking status: Never Smoker     Smokeless tobacco: Never Used     Alcohol use Yes      Comment: social       Review and/or order of clinical lab tests:  Review and/or order of radiology tests:  Review and/or order of medicine tests:  Discussion of test results with performing physician:  Decision to obtain old records and/or obtain history from someone other than the patient:  Review and summarization of old records and/or obtaining history from someone other than the patient and.or discussion of case with another health care provider:  Independent visualization of image, " tracing or specimen itself:    Time: total time spent with the patient was 25 minutes of which >50% was spent in counseling and coordination of care     Angel Hernandez MD

## 2021-06-14 NOTE — PROGRESS NOTES
Office Visit - Follow Up   Artis Galvin   72 y.o. male    Date of Visit: 12/13/2017    Chief Complaint   Patient presents with     Prostate Cancer     4 weeks f/u      Hyperlipidemia     fasting today        Assessment and Plan   1. Carcinoma Of The Prostate Gland  Check PSA per his request on the Proscar.  Counseled at length today on his prostate cancer and I have urged him to proceed with surgery with the surgeon he feels most comfortable with.  - PSA, Diagnostic (Prostatic-Specific Antigen)    2. Acute diverticulitis  Continue antibiotics.  Suggested probiotic.    3. Hypothyroidism, unspecified type  Recent TSH looked good.    4. Leg numbness  Unclear as to the etiology of the numbness and he also notes itching.  Sounds like a neuropathic process of some sort.  May need EMG.  Have him see a neurologist at some point.  - Ambulatory referral to Neurology    5. Leg weakness  Likely due to his previous surgery.  - Ambulatory referral to Neurology    6. Pure hypercholesterolemia  Labs revealed worsening lesser last month.  Urged him to be better about his diet and exercise.        Return in about 3 months (around 3/13/2018), or if symptoms worsen or fail to improve.     History of Present Illness   This 72 y.o. old Binu comes in today for follow-up.  Extensive his visit ensued.  He has multiple medical problems and multiple concerns today.  He has prostate cancer and he is on the fence about who he should have surgery.  He did finally decide that he is going to have surgery but is tenderness between [facility] and local urologist whom he is followed for years.  He was recently seen for acute diverticulitis and is currently taking medication for this.  He is having some loose stools.  Wonders if she take probiotic.  He had difficulty having orgasm recently and wonders if that is due to Proscar.  He continues to have the numbness and weakness in his legs.  He has a footdrop on the left likely due to previous  "Achilles surgery.  However he is worried about the numbness and itching and neuropathic type symptoms.  LS spine MRI was unrevealing for a suggestive lesion.  He denies other new somatic concerns.  He is trying to be better about his diet.  He has hypercholesterolemia.    Review of Systems: A comprehensive review of systems was negative except as noted.     Medications, Allergies and Problem List   Reviewed and updated     Physical Exam   General Appearance:   Pleasant gentleman.  Mildly anxious.  Abdomen soft nontender.  He does have a left plantar flexion weakness.    /74 (Patient Site: Right Arm, Patient Position: Sitting, Cuff Size: Adult Regular)  Pulse 66  Ht 5' 10\" (1.778 m)  Wt 192 lb (87.1 kg)  SpO2 97%  BMI 27.55 kg/m2         Additional Information   Current Outpatient Prescriptions   Medication Sig Dispense Refill     aspirin 81 MG EC tablet Take 162 mg by mouth 2 (two) times a day.       cholecalciferol, vitamin D3, 2,000 unit cap Take 1 capsule by mouth daily.        ciprofloxacin HCl (CIPRO) 500 MG tablet Take 500 mg by mouth 2 (two) times a day.       finasteride (PROSCAR) 5 mg tablet Take 1 tablet by mouth daily.       ketoconazole (NIZORAL) 2 % cream Apply topically 2 (two) times a day. 60 g 3     metroNIDAZOLE (FLAGYL) 500 MG tablet Take 500 mg by mouth 2 (two) times a day.       naproxen sodium (ALEVE) 220 MG tablet Take 220 mg by mouth as needed.        pilocarpine (PILOCAR) 2 % ophthalmic solution 1 drop 4 (four) times a day.       No current facility-administered medications for this visit.      No Known Allergies  Social History   Substance Use Topics     Smoking status: Never Smoker     Smokeless tobacco: Never Used     Alcohol use Yes      Comment: social       Review and/or order of clinical lab tests:  Review and/or order of radiology tests:  Review and/or order of medicine tests:  Discussion of test results with performing physician:  Decision to obtain old records and/or " obtain history from someone other than the patient:  Review and summarization of old records and/or obtaining history from someone other than the patient and.or discussion of case with another health care provider:  Independent visualization of image, tracing or specimen itself:    Time: total time spent with the patient was 40 minutes of which >50% was spent in counseling and coordination of care     Angel Hernandez MD

## 2021-06-15 NOTE — PROGRESS NOTES
Preoperative Exam    Scheduled Procedure: Prostatectomy  Surgery Date:  2/27/18  Surgery Location: [facility], fax [phone]    Surgeon:  Dr. Wale Cuellar    Assessment/Plan:     1. Carcinoma Of The Prostate Gland  I counseled patient again on his prostate cancer.  He is apparently going to be getting some sort of fancy PET scan down in [place] this weekend.  I have asked that he get those results to me.  - HM2(CBC w/o Differential)  - Basic Metabolic Panel    2. Pre-op exam  Proceed with surgery as planned.  Instructions for perioperative med management given.  - Electrocardiogram Perform and Read  - HM2(CBC w/o Differential)  - Basic Metabolic Panel  - Echo Complete; Future    3. Hypothyroidism, unspecified type  Recent TSH normal.    4. Pure hypercholesterolemia  Continue current regimen and proved lifestyle modification peer    5. Sarcoidosis  No symptoms    6. Right bundle branch block  New, will check echo.  - Echo Complete; Future    7. Persistent hoarseness    - Ambulatory referral to ENT    Surgical Procedure Risk: Intermediate (reported cardiac risk generally 1-5%)  Have you had prior anesthesia?: Yes  Have you or any family members had a previous anesthesia reaction:  No  Do you or any family members have a history of a clotting or bleeding disorder?: No  Cardiac Risk Assessment: no increased risk for major cardiac complications    Patient approved for surgery with general or local anesthesia.        Functional Status: Independent  Patient plans to recover at home with family.     Subjective:      Artis Galvin is a 72 y.o. male who presents for a preoperative consultation.  He has known prostate enlargement with significant prostate cancer as well.  He is going to have a radical prostatectomy via robot.  He is very worried about the surgery and has been for some time.  He is worried about the sexual side effects and urinary side effects.  He is explored his other options and understands  that prostatectomy is going to be basically the only option at this time save for no treatment at all.  Aside from some chronic hoarseness for the last few weeks, he denies other somatic concerns.  He is mainly just worried about this procedure or the after effects.    All other systems reviewed and are negative, other than those listed in the HPI.    Pertinent History  Do you have difficulty breathing or chest pain after walking up a flight of stairs: No  History of obstructive sleep apnea: No  Steroid use in the last 6 months: No  Frequent Aspirin/NSAID use: Yes: Daily baby asa 81  Prior Blood Transfusion: No  Prior Blood Transfusion Reaction: No  If for some reason prior to, during or after the procedure, if it is medically indicated, would you be willing to have a blood transfusion?:  There is no transfusion refusal.    Current Outpatient Prescriptions   Medication Sig Dispense Refill     aspirin 81 MG EC tablet Take 81 mg by mouth daily.        cholecalciferol, vitamin D3, 2,000 unit cap Take 1 capsule by mouth daily.        finasteride (PROSCAR) 5 mg tablet Take 1 tablet by mouth daily.       ketoconazole (NIZORAL) 2 % cream Apply topically 2 (two) times a day. 60 g 3     pilocarpine (PILOCAR) 2 % ophthalmic solution 1 drop 4 (four) times a day.       No current facility-administered medications for this visit.         No Known Allergies    Patient Active Problem List   Diagnosis     Joint Pain, Localized In The Shoulder     Sarcoidosis     Carcinoma Of The Prostate Gland     Vitamin D Deficiency     Pure hypercholesterolemia     Serum Enzyme Levels - Alkaline Phosphatase Elevated     Nontoxic Multinodular Goiter     Hypothyroidism     Lump In / On The Skin     Hashimoto's Thyroiditis     Episcleritis     Colonic Diverticulosis     Abdominal Pain     Hx of diverticulitis of colon       Past Medical History:   Diagnosis Date     Abdominal pain, unspecified site      Abnormal thyroid ultrasound      Chronic  "lymphocytic thyroiditis      Diverticulosis of colon (without mention of hemorrhage)      Localized superficial swelling, mass, or lump      Malignant neoplasm of prostate      Nontoxic multinodular goiter      Other and unspecified hyperlipidemia      Other nonspecific abnormal serum enzyme levels      Pain in joint, shoulder region      Sarcoidosis      Scleritis, unspecified      Unspecified hypothyroidism      Unspecified vitamin D deficiency        Past Surgical History:   Procedure Laterality Date     HI RADIAL KERATOTOMY      Description: Cornea Radial Keratotomy;  Recorded: 02/18/2014;     HI SHLDR ARTHROSCOP,PART ACROMIOPLAS      Description: Shoulder Arthroscopy, Space Decompression And Acromioplasty;  Recorded: 02/18/2014;       Social History     Social History     Marital status: Single     Spouse name: N/A     Number of children: N/A     Years of education: N/A     Occupational History     Not on file.     Social History Main Topics     Smoking status: Never Smoker     Smokeless tobacco: Never Used     Alcohol use Yes      Comment: social     Drug use: Not on file     Sexual activity: Not on file     Other Topics Concern     Not on file     Social History Narrative    Single. . 4 grown daughters.        03/22/17 - Currently unemployed.       Patient Care Team:  Angel Hernandez MD as PCP - General          Objective:     Vitals:    02/08/18 1141   BP: 110/62   Pulse: 62   SpO2: 97%   Weight: 190 lb (86.2 kg)   Height: 5' 10\" (1.778 m)         Physical Exam:  /62 (Patient Site: Right Arm, Patient Position: Sitting, Cuff Size: Adult Regular)  Pulse 62  Ht 5' 10\" (1.778 m)  Wt 190 lb (86.2 kg)  SpO2 97%  BMI 27.26 kg/m2    General Appearance:    Alert, cooperative, no distress, appears stated age   Head:    Normocephalic, without obvious abnormality, atraumatic   Eyes:    PERRL, conjunctiva/corneas clear, EOM's intact, fundi     benign, both eyes        Ears:    Normal TM's and external " ear canals, both ears   Nose:   Nares normal, septum midline, mucosa normal, no drainage    or sinus tenderness   Throat:   Lips, mucosa, and tongue normal; teeth and gums normal   Neck:   Supple, symmetrical, trachea midline, no adenopathy;        thyroid:  No enlargement/tenderness/nodules; no carotid    bruit or JVD   Back:     Symmetric, no curvature, ROM normal, no CVA tenderness   Lungs:     Clear to auscultation bilaterally, respirations unlabored   Chest wall:    No tenderness or deformity   Heart:    Regular rate and rhythm, S1 and S2 normal, no murmur, rub    or gallop   Abdomen:     Soft, non-tender, bowel sounds active all four quadrants,     no masses, no organomegaly           Extremities:   Extremities normal, atraumatic, no cyanosis or edema   Pulses:   2+ and symmetric all extremities   Skin:   Skin color, texture, turgor normal, no rashes or lesions   Lymph nodes:   Cervical, supraclavicular, and axillary nodes normal   Neurologic:   CNII-XII intact. Normal strength, sensation and reflexes       throughout       Patient Instructions     Hold all supplements, aspirin and NSAIDs for 7 days prior to surgery.  Follow your surgeon's direction on when to stop eating and drinking prior to surgery.  I suspect this will be nothing to eat or drink after midnight the night prior to the procedure  Remove all jewelry and metal piercings before your surgery.   Morning of surgery do not take any of your medicines.      EKG: New right bundle branch block.  Sinus rhythm.    Labs:  Labs pending at this time.  Results will be reviewed when available.    Immunization History   Administered Date(s) Administered     DTP 06/09/2009     Pneumo Conj 13-V (2010&after) 04/07/2015     Pneumo Polysac 23-V 04/28/2011           Electronically signed by Angel Hernandez MD 02/08/18 11:42 AM

## 2021-06-15 NOTE — TELEPHONE ENCOUNTER
Please schedule him an appointment virtual or inperson for toe.  Please notify him how to make an appointment for a Covid vaccine.

## 2021-06-15 NOTE — PROGRESS NOTES
"  Office Visit - Follow Up   Artis Galvin   75 y.o. male    Date of Visit: 3/9/2021    Chief Complaint   Patient presents with     Toe Pain     Left toe pain/discomfort       Depression     came off x 2-3 weeks ago     Hypertension     stopped taking losartan x 1 month ago - was feeling woosy         Assessment and Plan   1. Ascending aortic aneurysm (H)  I again explained to patient my the importance of keeping his blood pressure very low.  Resume losartan at 50 mg at bedtime.  Hopefully we can prevent the \"wooziness during the day with this.  I have asked that he return in about a month and we will see how he is doing.  If he is having more problems with \"wooziness\" he is to let me know.    2. Carcinoma Of The Prostate Gland  We will try to get results from his recent urologic work-up including his PSAs.  He will be having another PSA soon.    3. Essential hypertension, benign  See #1.    4. Diverticulitis of colon  He is doing okay with the antibiotics.  I did discuss with patient potential of seeing a surgeon organ hold off until after radiation.    5. Adjustment disorder with mixed anxiety and depressed mood  He refuses to do medications at this point.  We did briefly talk about Zoloft and or venlafaxine and he does not want to think about this.  We will let me know if he reconsiders.    6.  Change in nail appearance.  Reassurance.  I would not recommend any further treatment of this nail.  It may grow out more normal in appearance.      Return in about 4 weeks (around 4/6/2021).     History of Present Illness   This 75 y.o. old Binu comes in today an extensive visit ensued.  He has a lot to discuss.  Initially the appointment was made for toenail issues but he was just in the emergency room with diverticulitis and fever.  He had an abrupt onset of fever and did not know what it was.  He thought he actually had Covid.  He has not been terribly careful about prevention of Covid.  He had a scan though that " "showed recurrent cyst sigmoid diverticulitis but he is on antibiotics.    He feels that the mood medication Wellbutrin was not working for him so he discontinued about a month ago.  He is feeling better from that standpoint.  He thinks his mood is better.  He does not want to \"feel like a zombie.  He is discouraged however about how things are going in this [place] with Covid and politics and the fact that he has recurrent prostate cancer.    His PSA has been going up.  He met with the radiation oncologist recently but he has been resistant to starting radiation.  I do not have any of those records.    He stopped taking his losartan sometime in the last few weeks.  He just felt kind of woozy and lightheaded so he thought that that was likely the culprit and stopped taking it.  He was on 100 mg.  He was on this for prevention of aneurysm enlargement and rupture.    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  He did see a gastroenterologist at the  who felt that this could be monitored I believe.  They also discussed potentially resecting his sigmoid but he does not want to think about that.    Review of Systems: A comprehensive review of systems was negative except as noted.     Medications, Allergies and Problem List   Reviewed, reconciled and updated  Post Discharge Medication Reconciliation Status:      Physical Exam   General Appearance:   Pleasant gentleman.  He has some irregularity of the nailbed where his nail was removed.  This is his left first toenail.  Somewhat ragged in appearance.  It is unclear if there is a new nail growing over it.    /72 (Patient Site: Left Arm, Patient Position: Sitting, Cuff Size: Adult Regular)   Pulse 74   Temp 97.8  F (36.6  C)   Ht 5' 10\" (1.778 m)   Wt 193 lb (87.5 kg)   SpO2 95%   BMI 27.69 kg/m           Additional Information   Current Outpatient Medications   Medication Sig Dispense Refill     aspirin 81 MG EC tablet Take 1 tablet (81 mg total) by mouth " daily.  0     atorvastatin (LIPITOR) 40 MG tablet Take 1 tablet (40 mg total) by mouth daily. 90 tablet 1     cefuroxime (CEFTIN) 500 MG tablet Take 1 tablet by mouth 2 (two) times a day.       cholecalciferol, vitamin D3, 2,000 unit cap Take 1 capsule by mouth daily.        isosorbide mononitrate (IMDUR) 30 MG 24 hr tablet Take 0.5 tablets (15 mg total) by mouth daily. 30 tablet 0     ketoconazole (NIZORAL) 2 % cream Apply topically 2 (two) times a day as needed. 60 g 1     metoprolol succinate (TOPROL-XL) 25 MG Take 12.5 mg by mouth daily.       metroNIDAZOLE (FLAGYL) 500 MG tablet Take 1 tablet by mouth 2 (two) times a day.       nitroglycerin (NITROSTAT) 0.4 MG SL tablet For chest pain place 1 tablet under the tongue every 5 minutes for 3 doses. If symptoms persist 5 minutes after 1st dose call 911.       pilocarpine (PILOCAR) 2 % ophthalmic solution Administer 1 drop to both eyes 2 (two) times a day.        No current facility-administered medications for this visit.      Allergies   Allergen Reactions     Ciprofloxacin      Levaquin [Levofloxacin]      Social History     Tobacco Use     Smoking status: Never Smoker     Smokeless tobacco: Never Used   Substance Use Topics     Alcohol use: Yes     Comment: rarely     Drug use: No       Review and/or order of clinical lab tests:  Review and/or order of radiology tests:  Review and/or order of medicine tests:  Discussion of test results with performing physician:  Decision to obtain old records and/or obtain history from someone other than the patient:  Review and summarization of old records and/or obtaining history from someone other than the patient and.or discussion of case with another health care provider:  Independent visualization of image, tracing or specimen itself:    Time: At least 50 minutes was spent on reviewing his chart and going over his multiple issues today.     Angel Hernandez

## 2021-06-16 ENCOUNTER — RECORDS - HEALTHEAST (OUTPATIENT)
Dept: INTERNAL MEDICINE | Facility: CLINIC | Age: 76
End: 2021-06-16

## 2021-06-16 NOTE — ANESTHESIA PREPROCEDURE EVALUATION
Anesthesia Evaluation      Patient summary reviewed   No history of anesthetic complications     Airway   Mallampati: I  Neck ROM: full   Pulmonary - negative ROS and normal exam                          Cardiovascular - normal exam  Exercise tolerance: > or = 4 METS  (+) dysrhythmias, ,     (-) murmur  ECG reviewed  Rhythm: regular  Rate: normal,    no murmur      Neuro/Psych - negative ROS     Endo/Other    (+) hypothyroidism,      GI/Hepatic/Renal - negative ROS      Other findings: Daughter is heterozygotic for pseudocholinesterase deficiency.  Echo min AI with normal EF      Dental - normal exam                        Anesthesia Plan  Planned anesthetic: general endotracheal    ASA 2   Induction: intravenous   Anesthetic plan and risks discussed with: patient and child/children  Anesthesia plan special considerations: antiemetics,   Post-op plan: routine recovery

## 2021-06-16 NOTE — TELEPHONE ENCOUNTER
Telephone Encounter by Elinor Rubin MA at 11/21/2019  9:47 AM     Author: Elinor Rubin MA Service: -- Author Type: Medical Assistant    Filed: 11/21/2019  9:51 AM Encounter Date: 11/20/2019 Status: Signed    : Elinor Rubin MA (Medical Assistant)          Angel Hernandez MD  You 15 hours ago (6:19 PM)      Please send patient information that is in my out box to patient.  Also please put in an order for future six-month follow-up CT angiogram chest. Dx is TAA. I did not want to put it in now because I did not want anyone to try to schedule it now.

## 2021-06-16 NOTE — ANESTHESIA CARE TRANSFER NOTE
Last vitals:   Vitals:    02/27/18 1240   BP: 145/77   Pulse: 80   Resp: 12   Temp: 36.9  C (98.4  F)   SpO2: 98%     Patient's level of consciousness is drowsy  Spontaneous respirations: yes  Maintains airway independently: yes  Dentition unchanged: yes  Oropharynx: oropharynx clear of all foreign objects    QCDR Measures:  ASA# 20 - Surgical Safety Checklist: WHO surgical safety checklist completed prior to induction  PQRS# 430 - Adult PONV Prevention: 4558F - Pt received => 2 anti-emetic agents (different classes) preop & intraop  ASA# 8 - Peds PONV Prevention: NA - Not pediatric patient, not GA or 2 or more risk factors NOT present  PQRS# 424 - Joaquina-op Temp Management: 4559F - At least one body temp DOCUMENTED => 35.5C or 95.9F within required timeframe  PQRS# 426 - PACU Transfer Protocol: - Transfer of care checklist used  ASA# 14 - Acute Post-op Pain: ASA14B - Patient did NOT experience pain >= 7 out of 10

## 2021-06-16 NOTE — PROGRESS NOTES
Office Visit - Follow Up   Artis Galvin   72 y.o. male    Date of Visit: 3/14/2018    Chief Complaint   Patient presents with     Hospital Visit Follow Up     feeling ok         Assessment and Plan   1. Carcinoma Of The Prostate Gland  Patient is doing quite well since his surgery actually in terms of his urinary function.  He does not feel the urine coming on but he has a good flow and does not have incontinence.  It sounds like he may need some adjuvant treatment but will await discussions.  I did  patient at length that he is made it through the surgery quite nicely and he should be pleased about all of that.  - HM2(CBC w/o Differential)  - Comprehensive Metabolic Panel    2. Diverticulitis of large intestine without perforation or abscess without bleeding  Continue with the current antibiotic regimen.    3. Abnormal weight loss  This is concerning.  He is not eating very well.  He is worried about eating things it is going to make his diverticulitis worse.  I suggested getting some Ensure or boost and taking 2 cans and daily.  I will see him back in about 3-4 weeks.    4. Adjustment disorder  He is very anxious and down about all of this we discussed medications for mood today.  He absolutely refuses this.    5. Scrotal lesion  I do not think that this is anything of concern.  I think there is probably just some fluid collection that has formed after surgery and not concerning.    6. Hoarseness  He should follow-up with ENT as previously suggested.        Return in about 4 weeks (around 4/11/2018) for Recheck.     History of Present Illness   This 72 y.o. old Binu comes in today for hospital follow-up accompanied by his daughter.  Extensive visit ensued.  He was very hesitant and put off prostate cancer surgery for quite some time.  He did undergo radical prostatectomy couple weeks ago.  This is a difficult surgery as the prostate had been attached to his rectum and then the pathology also was  "positive for positive margins.  He is nervous about that.  He then presented back to the hospital with abdominal pain thought to be possibly diverticulitis.  He was placed on broad-spectrum IV antibiotics and reimage.  There is no colon fistulas or other leakage seen.  He is been doing okay now at home.  He does have some loose stools which are improving.  He continues to have the hoarseness which he had prior to this.  He is all very worked up about this.  He is very worried about all of this.  He is not been eating very well.  He has been losing weight.  Daughter is been concerned about him.  He is no longer having fever.  He is trying to do some activity.  He climbed the stairs up to our fifth floor office and it was hard for him did do that he was short of breath once he got up here.  He sees the urologist soon.  They are going to be discussing ongoing treatment including radiation possibility.    Review of Systems: A comprehensive review of systems was negative except as noted.     Medications, Allergies and Problem List   Reviewed and updated, reconciled today.      Physical Exam   General Appearance:   Pleasant gentleman.  He looks pretty good today actually.    /80 (Patient Site: Right Arm, Patient Position: Sitting, Cuff Size: Adult Regular)  Pulse 64  Ht 5' 10\" (1.778 m)  Wt 183 lb (83 kg)  SpO2 98%  BMI 26.26 kg/m2    Abdominal sites are covered with Steri-Strip but without evidence of infection.  Abdomen is soft and nontender throughout.  Penis and scrotum appear normal.  Above his right testicle there may be a spermatocele or other similar type of benign finding.  No evidence of abscess etc.     Additional Information   Current Outpatient Prescriptions   Medication Sig Dispense Refill     ketoconazole (NIZORAL) 2 % cream Apply topically 2 (two) times a day. (Patient taking differently: Apply topically 2 (two) times a day as needed. ) 60 g 3     LACTOBACILLUS ACIDOPHILUS (PROBIOTIC ORAL) Take " 1 capsule by mouth daily.       metroNIDAZOLE (FLAGYL) 500 MG tablet Take 1 tablet (500 mg total) by mouth 3 (three) times a day for 8 days. 24 tablet 0     naproxen sodium (ALEVE) 220 MG tablet Take 220 mg by mouth 2 (two) times a day as needed for pain.       pilocarpine (PILOCAR) 2 % ophthalmic solution Administer 1 drop to both eyes 2 (two) times a day.        sulfamethoxazole-trimethoprim (SEPTRA DS) 800-160 mg per tablet Take 1 tablet by mouth 2 (two) times a day for 8 days. 16 tablet 0     aspirin 81 MG EC tablet Take 81 mg by mouth daily.        cholecalciferol, vitamin D3, 2,000 unit cap Take 1 capsule by mouth daily.        No current facility-administered medications for this visit.      No Known Allergies  Social History   Substance Use Topics     Smoking status: Never Smoker     Smokeless tobacco: Never Used     Alcohol use Yes      Comment: rarely       Review and/or order of clinical lab tests:  Review and/or order of radiology tests:  Review and/or order of medicine tests:  Discussion of test results with performing physician:  Decision to obtain old records and/or obtain history from someone other than the patient:  Review and summarization of old records and/or obtaining history from someone other than the patient and.or discussion of case with another health care provider:  Independent visualization of image, tracing or specimen itself:    Time: total time spent with the patient was 40 minutes of which >50% was spent in counseling and coordination of care     Angel Hernandez MD

## 2021-06-16 NOTE — TELEPHONE ENCOUNTER
He is not due for any further imaging of his aneurysm until August.  The pancreatic cyst is going to be imaged I believe in December.  Diverticulitis does not get imaged with MRI.  No additional MRI is needed at this time.  Please let him know.

## 2021-06-16 NOTE — ANESTHESIA POSTPROCEDURE EVALUATION
Patient: Artis Galvin  ROBOTIC ASSISTED RADICAL RETROPUBIC PROSTATECTOMY, BILATERAL PELVIC LYMPH NODE DISSECTION LYSIS OF ADHESIONS  Anesthesia type: general    Patient location: PACU  Last vitals:   Vitals:    02/27/18 1240   BP: 145/77   Pulse: 80   Resp: 12   Temp: 36.9  C (98.4  F)   SpO2: 98%     Post vital signs: stable  Level of consciousness: awake and responds to simple questions  Post-anesthesia pain: pain controlled  Post-anesthesia nausea and vomiting: no  Pulmonary: unassisted, return to baseline  Cardiovascular: stable and blood pressure at baseline  Hydration: adequate  Anesthetic events: no    QCDR Measures:  ASA# 11 - Joaquina-op Cardiac Arrest: ASA11B - Patient did NOT experience unanticipated cardiac arrest  ASA# 12 - Joaquina-op Mortality Rate: ASA12B - Patient did NOT die  ASA# 13 - PACU Re-Intubation Rate: ASA13B - Patient did NOT require a new airway mgmt  ASA# 10 - Composite Anes Safety: ASA10A - No serious adverse event    Additional Notes:

## 2021-06-16 NOTE — PROGRESS NOTES
Office Visit - Follow Up   Artis Galvin   75 y.o. male    Date of Visit: 4/21/2021    Chief Complaint   Patient presents with     Follow-up     discuss opinion on MRI (has postpone this currently) - scheduled to see Rad/Oncology on 5/4/21 at [facility]         Assessment and Plan   1. Essential hypertension, benign  Good control.  I have urged him to take his medications as prescribed.  - losartan (COZAAR) 50 MG tablet; Take 1 tablet (50 mg total) by mouth daily.  Dispense: 90 tablet; Refill: 3  - Basic Metabolic Panel    2. Ascending aortic aneurysm (H)  He will be due for imaging later this year.    3. Carcinoma Of The Prostate Gland  Follow-up with [facility] as planned.  Will check x-ray given his pain.  - XR Thoracic Spine 2 VWS; Future    4. Adjustment disorder with mixed anxiety and depressed mood  See PHQ-9 and ROMERO-7.  He declines medications today.    5. SVT (supraventricular tachycardia) (H)  Follow-up with his cardiologist.  It looks like he still having episodes.  - Ambulatory referral to Cardiology    6. Acute right-sided thoracic back pain  As above check spine x-ray.  I will see him back as planned next month after he meets with [facility].  - XR Thoracic Spine 2 VWS; Future        Return in about 4 weeks (around 5/19/2021) for Recheck.     History of Present Illness   This 75 y.o. old Binu comes in today for follow-up.  He has multiple medical problems.  He has hypertension and a TAA.  He has been resistant to taking medications.  He is now taking losartan at bedtime he tells me.  This is in addition to his other medications.  He has SVT.  He is on a low-dose of metoprolol cardiology but he is continuing to have episodes of short-lived tachycardia.  Usually will just last a few minutes he states.  Bothersome him to him.  He had some midthoracic back pain here recently.  It is quite pronounced.  He actually stopped taking all of his medications during this and took some ibuprofen.  Why he stopped his  "medications I am unsure.  He is better now but he wonders if he should have an x-ray.    He is going to be meeting with [facility] regarding his prostate cancer soon.  They are talking radiation but he is worried about that.  He is worried about damage to his bowel.  He wonders why he cannot get an MRI of his aneurysm in his pancreas at the same time.  We discussed that at length today.    His mood is been poor.  He does not wish to take any medications however.    Review of Systems: A comprehensive review of systems was negative except as noted.     Medications, Allergies and Problem List   Reviewed, reconciled and updated  Post Discharge Medication Reconciliation Status:      Physical Exam   General Appearance:   See PHQ-9 and ROMERO-7.  Mildly tender to palpation in the paraspinal muscles in the midthoracic region bilaterally.  No spinal tenderness to palpation.    /62 (Patient Site: Left Arm, Patient Position: Sitting, Cuff Size: Adult Regular)   Pulse 64   Ht 5' 10\" (1.778 m)   Wt 190 lb (86.2 kg)   SpO2 96%   BMI 27.26 kg/m           Additional Information   Current Outpatient Medications   Medication Sig Dispense Refill     aspirin 81 MG EC tablet Take 1 tablet (81 mg total) by mouth daily.  0     atorvastatin (LIPITOR) 40 MG tablet Take 1 tablet (40 mg total) by mouth daily. 90 tablet 1     cholecalciferol, vitamin D3, 2,000 unit cap Take 1 capsule by mouth daily.        isosorbide mononitrate (IMDUR) 30 MG 24 hr tablet Take 0.5 tablets (15 mg total) by mouth daily. 30 tablet 0     ketoconazole (NIZORAL) 2 % cream Apply topically 2 (two) times a day as needed. 60 g 1     metoprolol succinate (TOPROL-XL) 25 MG Take 12.5 mg by mouth daily.       nitroglycerin (NITROSTAT) 0.4 MG SL tablet For chest pain place 1 tablet under the tongue every 5 minutes for 3 doses. If symptoms persist 5 minutes after 1st dose call 911.       pilocarpine (PILOCAR) 2 % ophthalmic solution Administer 1 drop to both eyes 2 " (two) times a day.        losartan (COZAAR) 50 MG tablet Take 1 tablet (50 mg total) by mouth daily. 90 tablet 3     No current facility-administered medications for this visit.      Allergies   Allergen Reactions     Ciprofloxacin      Levaquin [Levofloxacin]      Social History     Tobacco Use     Smoking status: Never Smoker     Smokeless tobacco: Never Used   Substance Use Topics     Alcohol use: Yes     Comment: rarely     Drug use: No       Review and/or order of clinical lab tests:  Review and/or order of radiology tests:  Review and/or order of medicine tests:  Discussion of test results with performing physician:  Decision to obtain old records and/or obtain history from someone other than the patient:  Review and summarization of old records and/or obtaining history from someone other than the patient and.or discussion of case with another health care provider:  Independent visualization of image, tracing or specimen itself:    Time: not applicable     Angel Hernandez MD

## 2021-06-17 NOTE — PROGRESS NOTES
Office Visit - Follow Up   Artis Galvin   72 y.o. male    Date of Visit: 4/4/2018    Chief Complaint   Patient presents with     Follow-up     3 wks f/u - feels well         Assessment and Plan   1. Carcinoma Of The Prostate Gland  Reassurance today.  Continue close follow-up with his urologist.  I do not want to check the PSA as I believe it is too soon.  With regards to the material he found in the toilet, he should bring this up with the urologist next time he is in.  He should do the penile rehab.  Try to stay as positive as he can.  - Comprehensive Metabolic Panel  - HM2(CBC w/o Differential)    2. Hypothyroidism, unspecified type  Check TSH today.  - Thyroid Cascade    3. Weight loss  Tapered off and is now starting to gain back.    4. Pure hypercholesterolemia    - Lipid Cascade    5. Adjustment disorder with depressed mood  Counseled again on his mood.  He declines medications for mood he states is getting better.  Again urged a positive outlook.        Return in about 2 months (around 6/4/2018) for Recheck.     History of Present Illness   This 72 y.o. old Binu comes in today for follow-up of his prostate cancer and recent prostatectomy.  He was found to have disease that was outside of the prostate.  He is down about that.  He was losing weight postoperatively probably due to some depressed mood.  He states he is eating better.  He is trying to be better.  He is now taking Cialis he is depressed he is never going to be able to have sex again he believes.  He was told to take B12 by the neurologist to see if that would help his neuropathy.  He otherwise denies any new concerns.  He wants to get back into doing some exercise.  He was urinating the other day and then felt a pop and then something was seen in the toilet and it was a small plastic thing and he thinks it came out of his penis.    Review of Systems: A comprehensive review of systems was negative except as noted.     Medications, Allergies  "and Problem List   Reviewed and updated     Physical Exam   General Appearance:   Pleasant gentleman in no distress.  His weight is up.  He seems to be in better spirits.  He brings in a small plastic clamp like material.  Unclear what this is.    /84 (Patient Site: Right Arm, Patient Position: Sitting, Cuff Size: Adult Regular)  Pulse (!) 58  Ht 5' 10\" (1.778 m)  Wt 188 lb (85.3 kg)  SpO2 97%  BMI 26.98 kg/m2         Additional Information   Current Outpatient Prescriptions   Medication Sig Dispense Refill     ketoconazole (NIZORAL) 2 % cream Apply topically 2 (two) times a day. (Patient taking differently: Apply topically 2 (two) times a day as needed. ) 60 g 3     LACTOBACILLUS ACIDOPHILUS (PROBIOTIC ORAL) Take 1 capsule by mouth daily.       naproxen sodium (ALEVE) 220 MG tablet Take 220 mg by mouth 2 (two) times a day as needed for pain.       pilocarpine (PILOCAR) 2 % ophthalmic solution Administer 1 drop to both eyes 2 (two) times a day.        aspirin 81 MG EC tablet Take 81 mg by mouth daily.        cholecalciferol, vitamin D3, 2,000 unit cap Take 1 capsule by mouth daily.        No current facility-administered medications for this visit.      No Known Allergies  Social History   Substance Use Topics     Smoking status: Never Smoker     Smokeless tobacco: Never Used     Alcohol use Yes      Comment: rarely       Review and/or order of clinical lab tests:  Review and/or order of radiology tests:  Review and/or order of medicine tests:  Discussion of test results with performing physician:  Decision to obtain old records and/or obtain history from someone other than the patient:  Review and summarization of old records and/or obtaining history from someone other than the patient and.or discussion of case with another health care provider:  Independent visualization of image, tracing or specimen itself:    Time: total time spent with the patient was 25 minutes of which >50% was spent in counseling " and coordination of care     Angel Hernandez MD

## 2021-06-17 NOTE — PATIENT INSTRUCTIONS - HE
Patient Instructions by Angel Hernandez MD at 10/24/2019  8:00 AM     Author: Angel Hernandez MD Service: -- Author Type: Physician    Filed: 10/24/2019  8:36 AM Encounter Date: 10/24/2019 Status: Signed    : Angel Hernandez MD (Physician)         Patient Education   Understanding USDA MyPlate  The USDA (US Department of Agriculture) has guidelines to help you make healthy food choices. These are called MyPlate. MyPlate shows the food groups that make up healthy meals using the image of a place setting. Before you eat, think about the healthiest choices for what to put onto your plate or into your cup or bowl. To learn more about building a healthy plate, visit www.choosemyplate.gov.       The Food Groups    Fruits: Any fruit or 100% fruit juice counts as part of the Fruit Group. Fruits may be fresh, canned, frozen, or dried, and may be whole, cut-up, or pureed. Make half your plate fruits and vegetables.    Vegetables: Any vegetable or 100% vegetable juice counts as a member of the Vegetable Group. Vegetables may be fresh, frozen, canned, or dried. They can be served raw or cooked and may be whole, cut-up, or mashed. Make half your plate fruits and vegetables.     Grains: All foods made from grains are part of the Grains Group. These include wheat, rice, oats, cornmeal, and barley such as bread, pasta, oatmeal, cereal, tortillas, and grits. Grains should be no more than a quarter of your plate. At least half of your grains should be whole grains.    Protein: This group includes meat, poultry, seafood, beans and peas, eggs, processed soy products (like tofu), nuts (including nut butters), and seeds. Make protein choices no more than a quarter of your plate. Meat and poultry choices should be lean or low fat.    Dairy: All fluid milk products and foods made from milk that contain calcium, like yogurt and cheese are part of the Dairy Group. (Foods that have little calcium, such as cream, butter, and cream  cheese, are not part of the group.) Most dairy choices should be low-fat or fat-free.    Oils: These are fats that are liquid at room temperature. They include canola, corn, olive, soybean, and sunflower oil. Foods that are mainly oil include mayonnaise, certain salad dressings, and soft margarines. You should have only 5 to 7 teaspoons of oils a day. You probably already get this much from the food you eat.  Use [employer] to Help Build Your Meals  The Healthy Humans[employer] can help you plan and track your meals and activity. You can look up individual foods to see or compare their nutritional value. You can get guidelines for what and how much you should eat. You can compare your food choices. And you can assess personal physical activities and see ways you can improve. Go to www.[employer].gov/[employer] Healthcker/.    2733-9725 [employer]. [address] [place] [address]. All rights reserved. This information is not intended as a substitute for professional medical care. Always follow your healthcare professional's instructions.           Patient Education   Signs of Hearing Loss  Hearing loss is a problem shared by many people. In fact, it is one of the most common health conditions, particularly as people age. Most people over age 65 have some hearing loss, and by age 80, almost everyone does. Because hearing loss usually occurs slowly over the years, you may not realize your hearing ability has gotten worse.       Have your hearing checked  Contact your [facility] care provider if you:    Have to strain to hear normal conversation.    Have to watch other peoples faces very carefully to follow what theyre saying.    Need to ask people to repeat what theyve said.    Often misunderstand what people are saying.    Turn the volume of the television or radio up so high that others complain.    Feel that people are mumbling when theyre talking to you.    Find that the effort to hear leaves you feeling tired  and irritated.    Notice, when using the phone, that you hear better with 1 ear than the other.    1867-0585 [employer]. [address] [place] [address], [place] [address]. All rights reserved. This information is not intended as a substitute for professional medical care. Always follow your healthcare professional's instructions.         Patient Education   Understanding Advance Care Planning  Advance care planning is the process of deciding ones own future medical care. It helps ensure that if you cant speak for yourself, your wishes can still be carried out. The plan is a series of legal documents that note a persons wishes. The documents vary by state. Advance care planning may be done when a person has a serious illness that is expected to get worse. It may be done before major surgery. And it can help you and your family be prepared in case of a major illness or injury. Advance care planning helps with making decisions at these times.       A health care proxy is a person who acts as the voice of a patient when the patient cant speak for himself or herself. The name of this role varies by state. It may be called a Durable Medical Power of  or Durable Power of  for Healthcare. It may be called an agent, surrogate, or advocate. Or it may be called a representative or decision maker. It is an official duty that is identified by a legal document. The document also varies by state.    Why Is Advance Care Planning Important?  If a person communicates their healthcare wishes:    They will be given medical care that matches their values and goals.    Their family members will not be forced to make decisions in a crisis with no guidance.  Creating a Plan  Making an advance care plan is often done in 3 steps:    Thinking about ones wishes. To create an advance care plan, you should think about what kind of medical treatment you would want if you lose the ability to communicate. Are there any  situations in which you would refuse or stop treatment? Are there therapies you would want or not want? And whom do you want to make decisions for you? There are many places to learn more about how to plan for your care. Ask your doctor or  for resources.    Picking a health care proxy. This means choosing a trusted person to speak for you only when you cant speak for yourself. When you cannot make medical decisions, your proxy makes sure the instructions in your advance care plan are followed. A proxy does not make decisions based on his or her own opinions. They must put aside those opinions and values if needed, and carry out your wishes.    Filling out the legal documents. There are several kinds of legal documents for advance care planning. Each one tells health care providers your wishes. The documents may vary by state. They must be signed and may need to be witnessed or notarized. You can cancel or change them whenever you wish. Depending on your state, the documents may include a Healthcare Proxy form, Living Will, Durable Medical Power of , Advance Directive, or others.  The Familys Role  The best help a family can give is to support their loved ones wishes. Open and honest communication is vital. Family should express any concerns they have about the patients choices while the patient can still make decisions.    6438-1896 [employer]. [address] [place] [address], [place] [address]. All rights reserved. This information is not intended as a substitute for professional medical care. Always follow your healthcare professional's instructions.         Also, [employer] [place] offers a free, downloadable health care directive that allows you to share your treatment choices and personal preferences if you cannot communicate your wishes. It also allows you to appoint another person (called a health care agent) to make health care decisions if you are unable to do so. You can  download an advance directive by going here: http://www.[facility].org/honoring-choices.[email or web address]     Patient Education   Personalized Prevention Plan  You are due for the preventive services outlined below.  Your care team is available to assist you in scheduling these services.  If you have already completed any of these items, please share that information with your care team to update in your medical record.  Health Maintenance   Topic Date Due   ? HEPATITIS C SCREENING  1945   ? ADVANCE CARE PLANNING  05/03/1964   ? ZOSTER VACCINES (2 of 2) 03/09/2015   ? PNEUMOCOCCAL IMMUNIZATION 65+ LOW/MEDIUM RISK (2 of 2 - PPSV23) 04/28/2016   ? MEDICARE ANNUAL WELLNESS VISIT  05/09/2017   ? TD 18+ HE  06/09/2019   ? INFLUENZA VACCINE RULE BASED (1) 08/01/2019   ? COLONOSCOPY  03/24/2020   ? FALL RISK ASSESSMENT  04/02/2020

## 2021-06-17 NOTE — PROGRESS NOTES
Office Visit - Follow Up   Artis Galvin   76 y.o. male    Date of Visit: 5/19/2021    Chief Complaint   Patient presents with     Follow-up     From [place]        Assessment and Plan   1. Essential hypertension, benign  Good control.  Continue current regimen.  I do not know if his dizziness symptoms are actually due to his blood pressure medicines or not.  Some of his issues are when he turns his head.  We offered vestibular therapy but he is going to be really busy with radiation so we can hold off on that.  Can could consider cutting back on his medications of blood pressure goes down further.    2. 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  I urged him to proceed with treatment plan recommended by oncology.    3. Adjustment disorder with mixed anxiety and depressed mood  He does not wish to take anything.    4. Acute right-sided thoracic back pain  Improved.  Continue to monitor.    5. Asymptomatic coronary heart disease    - atorvastatin (LIPITOR) 40 MG tablet; Take 1 tablet (40 mg total) by mouth daily.  Dispense: 90 tablet; Refill: 3    6. Ascending aortic aneurysm (H)  I did urge him to continue with exercise.  Nothing too strenuous but I think it is good if he stays active.        Return in about 4 weeks (around 6/16/2021) for Recheck.     History of Present Illness   This 76 y.o. old Binu comes in today for follow-up.  Extensive visit ensued.  He arrived late to the visit as well.  Currently following at the [facility] for metastatic prostate cancer.  Looks like it is in the nodes.  He is contemplating doing radiation.  They have also talked about androgen deprivation therapy.  He is all very worked up about this.  Anxious and down.  He has been refusing medications.  Blood pressures been okay.  He fishes feels dizzy at times which he attributes to all of his medications.  His back pain is better.  Still present but much improved.  He wonders if he should  "continue exercising.  He wonders if he should be getting on a bike and getting his heart rate up with his a sending aneurysm.  He denies other new somatic concerns for me.    Review of Systems: A comprehensive review of systems was negative except as noted.     Medications, Allergies and Problem List   Reviewed, reconciled and updated  Post Discharge Medication Reconciliation Status:      Physical Exam   General Appearance:   Pleasant gentleman.  Somewhat dysthymic.  Vitals noted.    /58 (Patient Site: Left Arm, Patient Position: Sitting, Cuff Size: Adult Regular)   Pulse 64   Resp 18   Ht 5' 10\" (1.778 m)   Wt 192 lb 9 oz (87.3 kg)   SpO2 96%   BMI 27.63 kg/m           Additional Information   Current Outpatient Medications   Medication Sig Dispense Refill     aspirin 81 MG EC tablet Take 1 tablet (81 mg total) by mouth daily.  0     cholecalciferol, vitamin D3, 2,000 unit cap Take 1 capsule by mouth daily.        isosorbide mononitrate (IMDUR) 30 MG 24 hr tablet Take 0.5 tablets (15 mg total) by mouth daily. 30 tablet 0     ketoconazole (NIZORAL) 2 % cream Apply topically 2 (two) times a day as needed. 60 g 1     losartan (COZAAR) 50 MG tablet Take 1 tablet (50 mg total) by mouth daily. 90 tablet 3     metoprolol succinate (TOPROL-XL) 25 MG Take 12.5 mg by mouth daily.       nitroglycerin (NITROSTAT) 0.4 MG SL tablet For chest pain place 1 tablet under the tongue every 5 minutes for 3 doses. If symptoms persist 5 minutes after 1st dose call 911.       pilocarpine (PILOCAR) 2 % ophthalmic solution Administer 1 drop to both eyes 2 (two) times a day.        atorvastatin (LIPITOR) 40 MG tablet Take 1 tablet (40 mg total) by mouth daily. 90 tablet 3     No current facility-administered medications for this visit.      Allergies   Allergen Reactions     Ciprofloxacin      Levaquin [Levofloxacin]      Pollen Extracts Other (See Comments)     Social History     Tobacco Use     Smoking status: Never Smoker     " Smokeless tobacco: Never Used   Substance Use Topics     Alcohol use: Yes     Comment: rarely     Drug use: No       Review and/or order of clinical lab tests:  Review and/or order of radiology tests:  Review and/or order of medicine tests:  Discussion of test results with performing physician:  Decision to obtain old records and/or obtain history from someone other than the patient:  Review and summarization of old records and/or obtaining history from someone other than the patient and.or discussion of case with another health care provider:  Independent visualization of image, tracing or specimen itself:    Time: not applicable     Angel Hernandez MD

## 2021-06-18 NOTE — PROGRESS NOTES
Office Visit - Follow Up   Artis Galvin   73 y.o. male    Date of Visit: 6/4/2018    Chief Complaint   Patient presents with     Follow-up     2 mo f/u - not fasting         Assessment and Plan   1. Adjustment disorder with depressed mood  Lengthy discussion with patient.  He needs to remain positive.  We discussed medications for mood.  I would recommend potentially doing something like a Wellbutrin.  I also suggested a support group for those people who have had prostate cancer.  He is doing that to some extent.  He will let me know if he changes his mind about the medicines but does not want anything for his mood at this time peer    2. Carcinoma Of The Prostate Gland  He will be seeing the urologist later this summer.    3. Urinary incontinence  We will check urinalysis as he states last week was particularly bad for him.  Make sure he does not have a urinary tract infection.  - Urinalysis-UC if Indicated        Return in about 3 months (around 9/4/2018) for Recheck.     History of Present Illness   This 73 y.o. old Binu comes in today for follow-up.  He continues to be very down about his results from his prostate surgery.  He has no erectile function has urinary incontinence.  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  He has not followed up with the urologist yet for repeat PSA.  He does not want to have anything for mood at this time.  He is not doing the penile rehab program as he should.  He is hoping to go to [place] and may be to [facility] this summer.    Review of Systems: A comprehensive review of systems was negative except as noted.     Medications, Allergies and Problem List   Reviewed and updated     Physical Exam   General Appearance:   Pleasant gentleman.  Mood seems down.  Not tearful.    /80 (Patient Site: Right Arm, Patient Position: Sitting, Cuff Size: Adult Regular)  Pulse  "(!) 58  Ht 5' 10\" (1.778 m)  Wt 191 lb (86.6 kg)  SpO2 98%  BMI 27.41 kg/m2         Additional Information   Current Outpatient Prescriptions   Medication Sig Dispense Refill     aspirin 81 MG EC tablet Take 81 mg by mouth daily.        cholecalciferol, vitamin D3, 2,000 unit cap Take 1 capsule by mouth daily.        cyanocobalamin, vitamin B-12, (VITAMIN B-12 ORAL) Take 1 tablet by mouth daily. 1500 mg       ketoconazole (NIZORAL) 2 % cream Apply topically 2 (two) times a day. (Patient taking differently: Apply topically 2 (two) times a day as needed. ) 60 g 3     LACTOBACILLUS ACIDOPHILUS (PROBIOTIC ORAL) Take 1 capsule by mouth daily.       naproxen sodium (ALEVE) 220 MG tablet Take 220 mg by mouth 2 (two) times a day as needed for pain.       pilocarpine (PILOCAR) 2 % ophthalmic solution Administer 1 drop to both eyes 2 (two) times a day.        No current facility-administered medications for this visit.      No Known Allergies  Social History   Substance Use Topics     Smoking status: Never Smoker     Smokeless tobacco: Never Used     Alcohol use Yes      Comment: rarely       Review and/or order of clinical lab tests:  Review and/or order of radiology tests:  Review and/or order of medicine tests:  Discussion of test results with performing physician:  Decision to obtain old records and/or obtain history from someone other than the patient:  Review and summarization of old records and/or obtaining history from someone other than the patient and.or discussion of case with another health care provider:  Independent visualization of image, tracing or specimen itself:    Time: total time spent with the patient was 25 minutes of which >50% was spent in counseling and coordination of care     Angel Hernandez MD  "

## 2021-06-19 NOTE — LETTER
Letter by Angel Hernandez MD at      Author: Angel Hernandez MD Service: -- Author Type: --    Filed:  Encounter Date: 10/25/2019 Status: Signed         Artis Galvin  [address]  [place] [address]             October 25, 2019         Dear Mr. Galvin,    Below are the results from your recent visit:    Resulted Orders   Lipid Cascade   Result Value Ref Range    Cholesterol 192 <=199 mg/dL    Triglycerides 210 (H) <=149 mg/dL    HDL Cholesterol 34 (L) >=40 mg/dL    LDL Calculated 116 <=129 mg/dL    Patient Fasting > 8hrs? Yes    Comprehensive Metabolic Panel   Result Value Ref Range    Sodium 140 136 - 145 mmol/L    Potassium 4.1 3.5 - 5.0 mmol/L    Chloride 106 98 - 107 mmol/L    CO2 26 22 - 31 mmol/L    Anion Gap, Calculation 8 5 - 18 mmol/L    Glucose 88 70 - 125 mg/dL    BUN 14 8 - 28 mg/dL    Creatinine 1.03 0.70 - 1.30 mg/dL    GFR MDRD Af Amer >60 >60 mL/min/1.73m2    GFR MDRD Non Af Amer >60 >60 mL/min/1.73m2    Bilirubin, Total 0.8 0.0 - 1.0 mg/dL    Calcium 9.4 8.5 - 10.5 mg/dL    Protein, Total 6.5 6.0 - 8.0 g/dL    Albumin 3.8 3.5 - 5.0 g/dL    Alkaline Phosphatase 116 45 - 120 U/L    AST 15 0 - 40 U/L    ALT 17 0 - 45 U/L    Narrative    Fasting Glucose reference range is 70-99 mg/dL per  American Diabetes Association (ADA) guidelines.   Thyroid Cascade   Result Value Ref Range    TSH 4.39 0.30 - 5.00 uIU/mL       Binu, would you reconsider taking at least a low dose of a statin to prevent a heart attack and/or stroke?  Aside from your cholesterol that is not very good, the rest of these are fine.     Please call with questions or contact us using [employer].    Sincerely,        Electronically signed by Angel Hernandez MD

## 2021-06-19 NOTE — PROGRESS NOTES
[facility] Follow Up Note    Artis Galvin   73 y.o. male    Date of Visit: 7/26/2018    Chief Complaint   Patient presents with     Abdominal Pain     lower left, for a few day     Subjective  This is a 73-year-old patient of Dr. Angel Hernandez.  He comes in this morning with significant left lower quadrant abdominal pain.  This is started within the past 3-4 days.  There have been no changes in his diet.  He has minimal nausea but no vomiting.  He tells me that his stools are loose but it is not a actual diarrhea.  He denies any fever or chills.  He describes the pain as being crampy and coming and going.  It is located in the left lower quadrant of his abdomen.  About 5 months ago he had prostate surgery and also had an episode of diverticulitis at that time.  At the time of his diverticulitis his pain was primarily left upper quadrant rather than left lower quadrant.  No other recent changes in symptoms.    ROS A comprehensive review of systems was performed and was otherwise negative    Medications, allergies, and problem list were reviewed and updated    Exam  General Appearance:   On examination his blood pressure is 110/62.  Weight is 195 pounds and height is 70 inches.  BMI is 27.98.    Heart is in a sinus rhythm with a rate of 70 and no ectopy.    Abdomen is soft with no distention.  He is exquisitely tender in the left lower quadrant.  No obvious masses or organomegaly.    The patient is alert and oriented ×3.      Assessment/Plan  1. Abdominal pain, left lower quadrant  CT Abdomen Pelvis With Oral With IV Contrast     Acute left lower quadrant abdominal pain.  I think this is most consistent with another episode of acute diverticulitis.  We will send him over to the hospital for a CT scan and follow-up with him once I see the report later today.  If he does have diverticulitis again we will start him back on antibiotics.  He will follow-up with his own physician.  Body Mass Index  was not assessed due to The patient was in with an acute medical issue..    Hari Peacock MD      Current Outpatient Prescriptions on File Prior to Visit   Medication Sig     aspirin 81 MG EC tablet Take 81 mg by mouth daily.      cholecalciferol, vitamin D3, 2,000 unit cap Take 1 capsule by mouth daily.      cyanocobalamin, vitamin B-12, (VITAMIN B-12 ORAL) Take 1 tablet by mouth daily. 1500 mg     ketoconazole (NIZORAL) 2 % cream Apply topically 2 (two) times a day. (Patient taking differently: Apply topically 2 (two) times a day as needed. )     LACTOBACILLUS ACIDOPHILUS (PROBIOTIC ORAL) Take 1 capsule by mouth daily.     naproxen sodium (ALEVE) 220 MG tablet Take 220 mg by mouth 2 (two) times a day as needed for pain.     pilocarpine (PILOCAR) 2 % ophthalmic solution Administer 1 drop to both eyes 2 (two) times a day.      No current facility-administered medications on file prior to visit.      No Known Allergies  Social History   Substance Use Topics     Smoking status: Never Smoker     Smokeless tobacco: Never Used     Alcohol use Yes      Comment: rarely

## 2021-06-20 NOTE — LETTER
Letter by Angel Hernandez MD at      Author: Angel Hernandez MD Service: -- Author Type: --    Filed:  Encounter Date: 3/10/2020 Status: (Other)         Artis Galvin  [address]  [place] [address]             March 10, 2020         Dear Mr. Galvin,    Below are the results from your recent visit:    Resulted Orders   Erythrocyte Sedimentation Rate   Result Value Ref Range    Sed Rate 3 0 - 15 mm/hr   C-Reactive Protein(CRP)   Result Value Ref Range    CRP <0.1 0.0 - 0.8 mg/dL       Inflammatory markers are normal.     Please call with questions or contact us using [email or web address].    Sincerely,        Electronically signed by Angel Hernandez MD

## 2021-06-20 NOTE — LETTER
Letter by Angel Hernandez MD at      Author: Angel Hernandez MD Service: -- Author Type: --    Filed:  Encounter Date: 7/1/2020 Status: (Other)         Artis Galvin  [address] [place] [address]             July 1, 2020         Dear Mr. Galvin,    Below are the results from your recent visit:    Resulted Orders   HM2(CBC w/o Differential)   Result Value Ref Range    WBC 6.2 4.0 - 11.0 thou/uL    RBC 4.86 4.40 - 6.20 mill/uL    Hemoglobin 14.8 14.0 - 18.0 g/dL    Hematocrit 44.6 40.0 - 54.0 %    MCV 92 80 - 100 fL    MCH 30.5 27.0 - 34.0 pg    MCHC 33.2 32.0 - 36.0 g/dL    RDW 12.7 11.0 - 14.5 %    Platelets 242 140 - 440 thou/uL    MPV 10.6 8.5 - 12.5 fL   Thyroid Cascade   Result Value Ref Range    TSH 4.88 0.30 - 5.00 uIU/mL   Basic Metabolic Panel   Result Value Ref Range    Sodium 144 136 - 145 mmol/L    Potassium 4.2 3.5 - 5.0 mmol/L    Chloride 110 (H) 98 - 107 mmol/L    CO2 29 22 - 31 mmol/L    Anion Gap, Calculation 5 5 - 18 mmol/L    Glucose 121 70 - 125 mg/dL    Calcium 8.8 8.5 - 10.5 mg/dL    BUN 12 8 - 28 mg/dL    Creatinine 1.01 0.70 - 1.30 mg/dL    GFR MDRD Af Amer >60 >60 mL/min/1.73m2    GFR MDRD Non Af Amer >60 >60 mL/min/1.73m2    Narrative    Fasting Glucose reference range is 70-99 mg/dL per  American Diabetes Association (ADA) guidelines.       Your labs look good Binu.     Please call with questions or contact us using [employer].    Sincerely,        Electronically signed by Angel Hernandez MD

## 2021-06-20 NOTE — LETTER
Letter by Angel Hernandez MD at      Author: Angel Hernandez MD Service: -- Author Type: --    Filed:  Encounter Date: 6/29/2020 Status: (Other)         Artis Galvin  [address]  [place] [address]             June 29, 2020         Dear Mr. Galvin,    Below are the results from your recent visit:    Resulted Orders   Echo With Bubble Study   Result Value Ref Range    LV volume diastolic 116 62 - 150 cm3    LV volume systolic 32 21 - 61 cm3    HR 53 bpm    IVSd 1.32 (!) 0.6 - 1.0 cm    LVIDd 4.48 4.2 - 5.8 cm    LVIDs 2.45 (!) 2.5 - 4.0 cm    LVOT diam 2.8 cm    LVOT mean gradient 3 mmHg    LVOT peak VTI 30 cm    LVOT mean kendra 82.9 cm/s    LVOT peak kendra 129 cm/s    LVOT peak gradient 7 mmHg    LV PWd 1.39 (!) 0.6 - 1.0 cm    MV E' lat kendra 9.48 cm/s    MV E' med kendra 6.38 cm/s    AR decel slope 2,270 mm/s2    AR p 1/2 time 386 ms    AR peak kendra 299 cm/s    AV peak kendra 141 cm/s    AV mean kendra 92.3 cm/s    AV mean gradient 4 mmHg    AV VTI 32.1 cm    AO root 3.6 cm    AO ascending 4.7 cm    LA size 4.1 cm    LA/AO root ratio 1.14 no units    MV decel slope 2,110 mm/s2    MV decel time 285 ms    MV P 1/2 time 84 ms    MV peak A kendra 69.8 cm/s    MV peak E kendra 60.1 cm/s    MO peak kendra 82.8 cm/s    MO peak gradient 3 mmHg    TR peak kendra 223 cm/s    BSA 2.06 m2    Hieght 70 in    Weight 3,040 lbs    /82 mmHg    IVS/PW ratio 0.9     TR peak gradent 19.9 mmHg    LV FS 45.3 28 - 44 %    Echo LVEF calculated 72 55 - 75 %    LA volume 64.9 mL    LV mass 235.0 g    AV area 5.8 cm2    AV DIM IND kendra 0.9     MV area p 1/2 time 2.6 cm2    MV E/A Ratio 0.9     LVOT area 6.15 cm2    LVOT .6 cm3    AV peak gradient 8.0 mmHg    LV systolic volume index 15.5 11 - 31 cm3/m2    LV diastolic volume index 56.3 34 - 74 cm3/m2    LA volume index 31.5 mL/m2    LV mass index 114.1 g/m2    LV SVi 89.6 ml/m2    TAPSE 2.7 cm    MV med E/e' ratio 9.4     MV lat E/e' ratio 6.3     LV CO 9.8 l/min    LV Ci 4.8 l/min/m2     LA area 2 22.4 cm2    LA area 1 19.1 cm2    Height 70.0 in    Weight 190 lbs    MV Avg E/e' Ratio 7.6 cm/s    LA length 5.6 cm    AR peak gradient 35.8 mmHg    AV DIM IND VTI 0.9     Narrative      Moderate ascending aortic dilatation    Left ventricle ejection fraction is normal. The calculated left   ventricular ejection fraction is 72%.    Normal right ventricular size and systolic function.    Aortic valve sclerosis with mild aortic insufficiency    No evidence of patent foramen ovale based on color flow or bubble study.    When compared to the previous study dated 2/19/2018, no interval change.          ECHO is unchanged from 2018.     Please call with questions or contact us using [employer].    Sincerely,        Electronically signed by Angel Hernandez MD

## 2021-06-20 NOTE — LETTER
Letter by Angel Hernandez MD at      Author: Angel Hernandez MD Service: -- Author Type: --    Filed:  Encounter Date: 3/30/2020 Status: (Other)         Artis Galvin  [address]  [place] [address]             March 30, 2020         Dear Mr. Galvin,    Below are the results from your recent visit:    Resulted Orders   NM Exercise Stress Test   Result Value Ref Range    Pharmacologic Protocol  Manual Mode     Test Type Manual     Baseline HR 64     Baseline Systolic      Baseline Diastolic BP 80     Last Stress      Last Stress Systolic      Last Stress Diastolic BP 76     Target      PERCENT HR 85%     Exercise duration (min) 9     Exercise duration (sec) 0     Estimated workload 10.3     ST Deviation Elevation II 1.2mm     Deviation Time V3 -4.0mm     ST Elevation Amount II 1.4mm     ST Deviation Amount he V3 -3.2mm     Final Resting /76     Final Resting HR 73     Max Treadmill Speed 3.4     Max Treadmill Grade 14.0     Peak Systolic /76     Peak Diastolic /88     Max      Stress Phase Resting     Stress Resting Pt Position STANDING     Current HR 64     Current /80     Stress Phase Resting     Stress Resting Pt Position MANUAL EVENT     Current      Current /76     Stress Phase Stress     Stage Minute EXE 00:00     Exercise Stage Manual     Current HR 70     Current /80     Stress Phase Stress     Stage Minute EXE 01:00     Exercise Stage Manual     Current HR 87     Current /80     Stress Phase Stress     Stage Minute EXE 02:00     Exercise Stage Manual     Current HR 95     Current /80     Stress Phase Stress     Stage Minute EXE 02:25     Exercise Stage Manual     Current HR 96     Current /76     Stress Phase Stress     Stage Minute EXE 03:00     Exercise Stage Manual     Current HR 95     Current /76     Stress Phase Stress     Stage Minute EXE 04:00     Exercise Stage Manual     Current HR  104     Current /76     Stress Phase Stress     Stage Minute EXE 04:53     Exercise Stage Manual     Current      Current /76     Stress Phase Stress     Stage Minute EXE 05:00     Exercise Stage Manual     Current      Current /76     Stress Phase Stress     Stage Minute EXE 06:00     Exercise Stage Manual     Current      Current /76     Stress Phase Stress     Stage Minute EXE 06:56     Exercise Stage Manual     Current      Current /76     Stress Phase Stress     Stage Minute EXE 07:00     Exercise Stage Manual     Current      Current /76     Stress Phase Stress     Stage Minute EXE 07:43     Exercise Stage Manual     Current      Current /76     Stress Phase Stress     Stage Minute EXE 08:00     Exercise Stage Manual     Current      Current /76     Stress Phase Stress     Stage Minute EXE 09:00     Exercise Stage Manual     Current      Current /76     Stress Phase Stress     Stage Minute EXE 09:00     Exercise Stage Manual     Current      Current /76     Stress Phase Recovery     Stage Minute REC 00:14     Exercise Stage Recovery     Current      Current /76     Stress Phase Recovery     Stage Minute REC 00:59     Exercise Stage Recovery     Current      Current /76     Stress Phase Recovery     Stage Minute REC 01:15     Exercise Stage Recovery     Current      Current /88     Stress Phase Recovery     Stage Minute REC 01:50     Exercise Stage Recovery     Current HR 91     Current /86     Stress Phase Recovery     Stage Minute REC 01:59     Exercise Stage Recovery     Current HR 89     Current /86     Stress Phase Recovery     Stage Minute REC 02:59     Exercise Stage Recovery     Current HR 82     Current /86     Stress Phase Recovery     Stage Minute REC 03:14     Exercise Stage Recovery     Current HR 76     Current /70     Stress  Phase Recovery     Stage Minute REC 03:59     Exercise Stage Recovery     Current HR 78     Current /70     Stress Phase Recovery     Stage Minute REC 04:59     Exercise Stage Recovery     Current HR 71     Current /76     Stress Phase Recovery     Stage Minute REC 04:59     Exercise Stage Recovery     Current HR 71     Current /76     Stress Phase Recovery     Stage Minute REC 05:59     Exercise Stage Recovery     Current HR 74     Current /76     Stress Phase Recovery     Stage Minute REC 06:23     Exercise Stage Recovery     Current HR 73     Current /76     Calculated Percent HR 89 %    Rate Pressure Product 26,200.0     Left Ventricular EF 70 %    Narrative    ??  There is no prior study for comparison.  ??  The patient's exercise capacity is above average.  No exercise induced   angina.  ??  Exercise stress ECG is negative for inducible myocardial ischemia.  ??  Exercise nuclear study is negative for inducible myocardial ischemia   or infarction.  ??  Normal left ventricular size, wall motion and function.  The   calculated left ventricular ejection fraction is 70%.         Stress test is normal.     Please call with questions or contact us using [employer].    Sincerely,        Electronically signed by Angel Hernandez MD

## 2021-06-20 NOTE — LETTER
Letter by Angel Hernandez MD at      Author: Angel Hernandez MD Service: -- Author Type: --    Filed:  Encounter Date: 4/20/2020 Status: (Other)         Artis Galvin  [address]  [place] [address]             April 20, 2020         Dear Mr. Galvin,    Below are the results from your recent visit:    Resulted Orders   Antinuclear Antibodies Screen (TIARA)   Result Value Ref Range    TIARA Screen Cascade 0.2 <=2.9 U    Narrative    <1.0 negative  1.1-2.9 weakly positive  3.0-5.9 positive ( reflex)  > or=6.0 strongly positive   C-Reactive Protein (CRP)   Result Value Ref Range    CRP 0.2 0.0 - 0.8 mg/dL   Sedimentation Rate   Result Value Ref Range    Sed Rate 5 0 - 15 mm/hr   Folate, Serum   Result Value Ref Range    Folate 12.5 >=3.5 ng/mL   Glycosylated Hemoglobin A1C   Result Value Ref Range    Hemoglobin A1c 5.4 3.5 - 6.0 %   Immunofixation Electrophoresis, Serum   Result Value Ref Range    Immunofixation Electrophoresis, Serum No monoclonal component identified.      Path ICD: R20.0     Interpreted By: Miguel Ackerman MD     Lyme Disease Antibody, Total   Result Value Ref Range    Lyme Total Antibody 0.01 <0.90 Index Value    Narrative    Interpretation of Lyme Disease Total Antibody (IgG/IgM)  <0.90 Test Value=Negative  No detectable antibodies to B. burgdorferi. Patients  in early stages of infection may not produce  detectable levels of antibody. Antibiotic therapy  in early disease may prevent antibody production  from reaching detectable levels. Patients with  clinical history and/or symptoms suggestive of Lyme  disease but with negative test results should be  retested in 2-4 weeks.  0.90-<1.10 Test Value=Borderline  Suggests the presence of antibodies to B.  burgdorferi. Recommend repeat collection in 2-4  weeks.  >=1.10 Test Value=Positive  Indicates the presence of antibodies to B.  burgdorferi. False positive results can occur with  sera from syphilis patients. Cross-reactivity  may  occur with relapsing fever, [place] Spotted  fever, other spirochetal diseases, erythematosus,  EBV infection, or CMV infection. Clinical symptoms,  epidemiology of the case and other laboratory tests  should allow for distinction of these conditions from  Lyme disease.  It is recommended samples judged borderline or  positive be tested by a confirmatory method. If  clinical correlation warrants, reflex to a  confirmatory method is available.     Syphilis Screen, Cascade   Result Value Ref Range    Treponema Antibody (Syphilis) Negative Negative   Thyroid Stimulating Hormone (TSH)   Result Value Ref Range    TSH 5.10 (H) 0.30 - 5.00 uIU/mL   Thiamin (Vitamin B1), Whole Blood   Result Value Ref Range    Vitamin B1, Whole Blood 101 70 - 180 nmol/L      Comment:      INTERPRETIVE INFORMATION: Vitamin B1, Whole Blood    This assay measures the concentration of thiamine diphosphate   (TDP), the primary active form of vitamin B1. Approximately 90   percent of vitamin B1 present in whole blood is TDP. Thiamine and   thiamine monophosphate, which comprise the remaining 10 percent,   are not measured.    Test developed and characteristics determined by [employer] [facility]. See Compliance Statement B: [employer]  Performed by [facility],  [address] [place] [address] [phone]  [email or web address], Man Navarro MD, Lab. Director   Vitamin B6 (Pyridoxal 5-Phosphate)   Result Value Ref Range    Vitamin B6 (Pyridoxal 5-Phosphate) 32.9 20.0 - 125.0 nmol/L      Comment:      INTERPRETIVE INFORMATION: Vitamin B6 (Pyridoxal 5-Phosphate)    Pyridoxal 5'-phosphate measured in a specimen collected following   an 8-hour or overnight fast accurately indicates vitamin B6   nutritional status. Non-fasting specimen concentration reflects   recent vitamin intake.    Test developed and characteristics determined by [employer] [facility]. See Compliance Statement B: [employer]  Performed by [facility],  [address]  [place] [address] [phone]  [email or web address], Man Navarro MD, Lab. Director   Vitamin B12   Result Value Ref Range    Vitamin B-12 379 213 - 816 pg/mL   Vitamin D, Total (25-Hydroxy)   Result Value Ref Range    Vitamin D, Total (25-Hydroxy) 29.7 (L) 30.0 - 80.0 ng/mL    Narrative    Deficiency <10.0 ng/mL  Insufficiency 10.0-29.9 ng/mL  Sufficiency 30.0-80.0 ng/mL  Toxicity (possible) >100.0 ng/mL   Basic Metabolic Panel   Result Value Ref Range    Sodium 142 136 - 145 mmol/L    Potassium 4.3 3.5 - 5.0 mmol/L    Chloride 108 (H) 98 - 107 mmol/L    CO2 25 22 - 31 mmol/L    Anion Gap, Calculation 9 5 - 18 mmol/L    Glucose 77 70 - 125 mg/dL    Calcium 9.0 8.5 - 10.5 mg/dL    BUN 12 8 - 28 mg/dL    Creatinine 0.91 0.70 - 1.30 mg/dL    GFR MDRD Af Amer >60 >60 mL/min/1.73m2    GFR MDRD Non Af Amer >60 >60 mL/min/1.73m2    Narrative    Fasting Glucose reference range is 70-99 mg/dL per  American Diabetes Association (ADA) guidelines.       Please forward to ordering provider.       These look ok.  Thyroid could be getting a little underactive.  We will need to follow that.     Please call with questions or contact us using [employer].    Sincerely,        Electronically signed by Angel Hernandez MD

## 2021-06-20 NOTE — LETTER
Letter by Adalgisa Roper CHW at      Author: Adalgisa Roper CHW Service: -- Author Type: --    Filed:  Encounter Date: 3/5/2020 Status: (Other)       CARE COORDINATION  [facility]  [address]  [address]  [place] [address]     March 11, 2020    Artis Galvin  [address]  [place] [address]      Dear Artis,    I am a clinic care coordinator who works with Angel Hernandez MD at [phone]. I have been trying to reach you recently to introduce Clinic Care Coordination and to see if there was anything I could assist you with.  Below is a description of clinic care coordination and how I can further assist you.      The clinic care coordinator team is made up of a registered nurse,  and community health worker who understand the health care system. The goal of clinic care coordination is to help you manage your health and improve access to the health care system in the most efficient manner. The team can assist you in meeting your health care goals by providing education, coordinating services, strengthening the communication among your providers  and supporting you with any resource needs.    Please feel free to contact me, at [phone] with any questions or concerns. We are focused on providing you with the highest-quality healthcare experience possible and that all starts with you.     Sincerely,     Adalgisa Community Health Worker

## 2021-06-20 NOTE — LETTER
Letter by Colby Hernandez MD at      Author: Colby Hernandez MD Service: -- Author Type: --    Filed:  Encounter Date: 3/31/2020 Status: (Other)         Artis Galvin  [address]  [place] [address]             March 31, 2020         Dear Mr. Galvin,    Below are the results from your recent visit:    Resulted Orders   Holter Monitor    Narrative    HOLTER MONITOR REPORT    INTERPRETATION DATE:  03/30/2020    TEST DATE:  03/27/2020    INTERPRETATION:  Predominant rhythm was sinus rhythm with heart rates   ranging from  53 beats per minute to 110 beats per minute.  There were no significant   pauses or  bradycardia.  Rate dependent right bundle-branch block aberrancy was noted   on 1  occasion, had a heart rate of approximately 110 beats per minute.  Rare   atrial  premature beats were noted, 42 over 24 hours.  There were 2 atrial   couplets.  Only 5  ventricular premature beats were noted over 24 hours.  He noted symptoms   of chest  pain on 1 occasion, associated with normal sinus rhythm, rate 72 beats per   minute,  mild dizziness was also noted on 1 occasion associated with sinus rhythm   without  ectopy.  Rate 60 beats per minute without ectopy.    CONCLUSION:  Normal Holter.  Intermittent right bundle-branch block   aberrancy noted  on 1 occasion.      TATIANA JAMES MD  sn  D 03/30/2020 10:31:53  T 03/30/2020 11:25:42  R 03/30/2020 13:13:04  [id number]      cc: COLBY HERNANDEZ MD          Holter is normal.  Good news.     Please call with questions or contact us using [employer].    Sincerely,        Electronically signed by Colby Hernandez MD

## 2021-06-20 NOTE — LETTER
Letter by Angel Hernandez MD at      Author: Angel Hernandez MD Service: -- Author Type: --    Filed:  Encounter Date: 5/27/2020 Status: (Other)         Artis Galvin  [address] [place] [address]             May 27, 2020         Dear Mr. Galvin,    Below are the results from your recent visit:    Resulted Orders   Comprehensive Metabolic Panel   Result Value Ref Range    Sodium 142 136 - 145 mmol/L    Potassium 5.1 (H) 3.5 - 5.0 mmol/L    Chloride 106 98 - 107 mmol/L    CO2 27 22 - 31 mmol/L    Anion Gap, Calculation 9 5 - 18 mmol/L    Glucose 91 70 - 125 mg/dL    BUN 14 8 - 28 mg/dL    Creatinine 0.98 0.70 - 1.30 mg/dL    GFR MDRD Af Amer >60 >60 mL/min/1.73m2    GFR MDRD Non Af Amer >60 >60 mL/min/1.73m2    Bilirubin, Total 0.8 0.0 - 1.0 mg/dL    Calcium 9.6 8.5 - 10.5 mg/dL    Protein, Total 7.1 6.0 - 8.0 g/dL    Albumin 4.1 3.5 - 5.0 g/dL    Alkaline Phosphatase 131 (H) 45 - 120 U/L    AST 23 0 - 40 U/L    ALT 36 0 - 45 U/L    Narrative    Fasting Glucose reference range is 70-99 mg/dL per  American Diabetes Association (ADA) guidelines.   Lipid [place] FASTING   Result Value Ref Range    Cholesterol 125 <=199 mg/dL    Triglycerides 149 <=149 mg/dL    HDL Cholesterol 35 (L) >=40 mg/dL    LDL Calculated 60 <=129 mg/dL    Patient Fasting > 8hrs? Yes    HM2(CBC w/o Differential)   Result Value Ref Range    WBC 7.4 4.0 - 11.0 thou/uL    RBC 5.01 4.40 - 6.20 mill/uL    Hemoglobin 16.0 14.0 - 18.0 g/dL    Hematocrit 46.8 40.0 - 54.0 %    MCV 93 80 - 100 fL    MCH 32.0 27.0 - 34.0 pg    MCHC 34.2 32.0 - 36.0 g/dL    RDW 11.1 11.0 - 14.5 %    Platelets 239 140 - 440 thou/uL    MPV 8.3 7.0 - 10.0 fL       Labs look good Binu.  Cholesterol is excellent!     Please call with questions or contact us using [employer].    Sincerely,        Electronically signed by Angel Hernandez MD

## 2021-06-20 NOTE — LETTER
Letter by Angel Hernandez MD at      Author: Angel Hernandez MD Service: -- Author Type: --    Filed:  Encounter Date: 5/29/2020 Status: (Other)         Artis Galvin  [address]  [place] [address]             May 29, 2020         Dear Mr. Galvin,    Below are the results from your recent visit:    Resulted Orders   US Carotid Bilateral    Narrative    EXAM: US CAROTID BILATERAL  LOCATION: [facility]  DATE/TIME: 5/27/2020 11:54 AM    INDICATION: Amaurosis fugax  COMPARISON: None.  TECHNIQUE: Duplex exam performed utilizing 2D gray-scale imaging, Doppler interrogation with color-flow and spectral waveform analysis. The percent diameter stenosis is determined using NASCET criteria and Society of Radiologists in Ultrasound Consensus   Criteria.    FINDINGS:    RIGHT: Mild plaque at the bifurcation. The peak systolic velocity in the ICA is less than 125 cm/sec, consistent with less than 50% stenosis. Normal velocities in the ECA. Antegrade flow within the vertebral artery.     LEFT: Mild plaque at the bifurcation. The peak systolic velocity in the ICA is less than 125 cm/sec, consistent with less than 50% stenosis. Normal velocities in the ECA. Antegrade flow within the vertebral artery.    VELOCITY CHART:  CCA   Right: 75/15 cm/s   Left: 92/19 cm/s  ICA   Right: 90/18 cm/s   Left: 91/31 cm/s  ECA   Right: 117/12 cm/s   Left: 95/11 cm/s  ICA/CCA PSV Ratio   Right: 1.2   Left: 1.6      Impression    1.  Mild plaque formation, velocities consistent with less than 50% stenosis in the right internal carotid artery.  2.  Mild plaque formation, velocities consistent with less than 50% stenosis in the left internal carotid artery.  3.  Flow within the vertebral arteries is antegrade.       Mild plaque in the neck arteries.  Take your aspirin, lipitor, and blood pressure medicines to prevent this from worsening.     Please call with questions or contact us using  [employer].    Sincerely,        Electronically signed by Angel Hernandez MD

## 2021-06-20 NOTE — LETTER
Letter by Angel Hernandez MD at      Author: Angel Hernandez MD Service: -- Author Type: --    Filed:  Encounter Date: 4/27/2020 Status: (Other)         Artis Galvin  [address]  [place] [address]             April 27, 2020         Dear Mr. Galvin,    Below are the results from your recent visit:    Resulted Orders   REYNOLD Monitor Hook-Up    Narrative    PATIENT ACTIVATED EKG MONITOR    IMPRESSION:  1.  A multi-day  monitor was done from 04/09/2020 through 04/22/2020.  2.  Sinus rhythm is predominant; the average heart rate of 64 beats per   minute,  ranges between 50 to 145 beats per minute.  3.  No bradycardia or pauses seen.  On occasion, there is some OH   prolongation.  4.  Episode of supraventricular tachycardia noted 04/11 at 12:06.  This   was  associated with a heart racing sensation.  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  5.  The patient had symptoms of skipped beats associated with PACs or   singular PVCs.  6.  A number of episodes of chest pain were not associated with   arrhythmias or  ectopic beats.   PVCs were rare and no complex ventricular ectopy is seen;  and the PVC    burden was less  than 1%.  There is a  Short shruti of idioventricular type consecutive PVCs   at rates less  than 100 beats per minute that was asymptomatic.  9.  Atrial ectopy is infrequent with less than 1% atrial ectopy.    DISCUSSION:    1.  Episode of supraventricular tachycardia at about 145 beats per minute.    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  2.  Multiple symptoms of chest pain are not associated with arrhythmias or   ectopy  but rhythm monitor is not a sensitive way to assess for myocardial   ischemia and  further testing may be indicated to evaluate multiple episodes of chest   pain.      LAMAR CONNORS MD  tn  D 04/24/2020 14:40:17  T 04/24/2020 15:15:20  R 04/24/2020  15:53:58  [id number]      cc: COLBY HERNANDEZ MD          Heart monitor does show an episode of a fast abnormal heart rhythm.  Please follow up with the cardiologist to discuss.       Please call with questions or contact us using [employer].    Sincerely,        Electronically signed by Colby Hernandez MD

## 2021-06-20 NOTE — LETTER
Letter by Angel Hernandez MD at      Author: Angel Hernandez MD Service: -- Author Type: --    Filed:  Encounter Date: 7/21/2020 Status: (Other)         Artis Galvin  [address]  [place] [address]             July 21, 2020         Dear Mr. Galvin,    Below are the results from your recent visit:    Resulted Orders   CT Abdomen Pelvis With Oral With IV Contrast    Narrative    EXAM: CT ABDOMEN PELVIS W ORAL W IV CONTRAST  LOCATION: [facility]  DATE/TIME: 7/16/2020 5:01 PM    INDICATION: LLQ abdominal pain, diverticulitis suspected  COMPARISON: 07/26/2018  TECHNIQUE: CT scan of the abdomen and pelvis was performed following injection of IV contrast. Multiplanar reformats were obtained. Dose reduction techniques were used.  CONTRAST: Iohexol (Omni) 100 mL    FINDINGS:   LOWER CHEST: Normal.    HEPATOBILIARY: Normal.  PANCREAS: Normal.  SPLEEN: Normal.  ADRENAL GLANDS: Normal.  KIDNEYS/BLADDER: Normal.    BOWEL: Diverticulosis of the colon. No acute inflammatory change. Prior episode of colonic inflammation has resolved. No obstruction. Appendix normal.  LYMPH NODES: Normal.  VASCULATURE: Unremarkable.  PELVIC ORGANS: Prostatectomy. Bladder negative.    MUSCULOSKELETAL: Degenerative changes of hips.      Impression    1.  Patient does have extensive sigmoid diverticulosis but no definite active changes of diverticulitis. Resolution of inflammatory changes seen on the prior study.       No active diverticulitis.  Proceed with colonoscopy.     Please call with questions or contact us using [employer].    Sincerely,        Electronically signed by Angel Hernandez MD

## 2021-06-20 NOTE — PROGRESS NOTES
Office Visit - Follow Up   Artis Galvin   73 y.o. male    Date of Visit: 9/4/2018    Chief Complaint   Patient presents with     Follow-up     6 mo f/u - not fasting         Assessment and Plan   1. Hand lesion  Refer to hand surgery given the tenderness list of the lesion  - Ambulatory referral to Orthopedic Surgery    2. Carcinoma Of The Prostate Gland  Continue close follow up with urology.  He is getting better after surgery.  Reassurance.    3. Adjustment disorder with anxious mood  Reassurance.  Would not give him anything at this time.    4. Hx of diverticulitis of colon  If he has recurrent episodes I would recommend surgical evaluation to discuss sigmoidectomy.  He is not interested at this time.    5. Male erectile disorder  Continue with penile rehab per urology.    6. Cataracts, bilateral  He will be returning for preop in the next couple months.        Return in about 2 months (around 11/4/2018) for preop.     History of Present Illness   This 73 y.o. old patient comes in today for follow-up.  He states that he was pulled over by over by some police in [place] and it sounds like they got him confused with someone else and was told he cannot go to [employer] for his entire life or to the [employer] for a year.  It is unclear what this is all related to but he is very anxious about it.  He believes it is mistaken identity and he is having his son-in-law who is an  work on it.  His urinary symptoms are better although sexual side effects continue to plague him after his prostatectomy.  He is a painful cyst in his left palm of his hand.  Hurts to touch it.  An episode of diverticulitis for which she took Cipro and Flagyl.  He is worried about tendon rupture but he did end up taking the Cipro.  He states it was the worst one he is ever had.  He gets about one episode a year.  He is not interested in surgery.    Review of Systems: A comprehensive review of systems was negative except as  "noted.     Medications, Allergies and Problem List   Reviewed and updated     Physical Exam   General Appearance:   Pleasant gentleman in no distress.  He is down a few pounds but looks good and does not appear unwell.  Tender sister lesion of the flexor tendon overlying the palm on the left.  Looks like it could be Dupuytren's contracture actually.  /64 (Patient Site: Right Arm, Patient Position: Sitting, Cuff Size: Adult Regular)  Pulse 60  Ht 5' 10\" (1.778 m)  Wt 191 lb (86.6 kg)  SpO2 100%  BMI 27.41 kg/m2         Additional Information   Current Outpatient Prescriptions   Medication Sig Dispense Refill     aspirin 81 MG EC tablet Take 81 mg by mouth daily.        cholecalciferol, vitamin D3, 2,000 unit cap Take 1 capsule by mouth daily.        cyanocobalamin, vitamin B-12, (VITAMIN B-12 ORAL) Take 1 tablet by mouth daily. 1500 mg       ketoconazole (NIZORAL) 2 % cream Apply topically 2 (two) times a day. (Patient taking differently: Apply topically 2 (two) times a day as needed. ) 60 g 3     LACTOBACILLUS ACIDOPHILUS (PROBIOTIC ORAL) Take 1 capsule by mouth daily.       naproxen sodium (ALEVE) 220 MG tablet Take 220 mg by mouth 2 (two) times a day as needed for pain.       pilocarpine (PILOCAR) 2 % ophthalmic solution Administer 1 drop to both eyes 2 (two) times a day.        No current facility-administered medications for this visit.      No Known Allergies  Social History   Substance Use Topics     Smoking status: Never Smoker     Smokeless tobacco: Never Used     Alcohol use Yes      Comment: rarely       Review and/or order of clinical lab tests:  Review and/or order of radiology tests:  Review and/or order of medicine tests:  Discussion of test results with performing physician:  Decision to obtain old records and/or obtain history from someone other than the patient:  Review and summarization of old records and/or obtaining history from someone other than the patient and.or discussion of case " with another health care provider:  Independent visualization of image, tracing or specimen itself:    Time: total time spent with the patient was 25 minutes of which >50% was spent in counseling and coordination of care     Angel Hernandez MD

## 2021-06-20 NOTE — LETTER
Letter by Angel Hernandez MD at      Author: Angel Hernandez MD Service: -- Author Type: --    Filed:  Encounter Date: 12/18/2019 Status: Signed         Artis Galvin  [address]  [place] [address]             December 18, 2019         Dear Mr. Galvin,    Below are the results from your recent visit:    Resulted Orders   CT Chest Over Read    Addendum: 12/18/2019    Comparison is now made to a PET/CT 02/12/2018. At that time, the ascending aorta measured 4.5 cm, therefore there is no significant change.      Narrative    EXAM: OVERREAD: DETAILED [place] [facility] EXTRACARDIAC OVERREAD OF CARDIAC CT   LOCATION: [facility]  DATE/TIME: 11/19/2019 3:21 PM    INDICATION: Hyperlipidemia CAD risk, low, asymptomatic  TECHNIQUE: Dose reduction techniques were used.  CONTRAST: None.  COMPARISON: CT abdomen 7/26/2018    FINDINGS:    LIMITED CHEST: Negative.  LIMITED MEDIASTINUM: Ascending aortic 4.6 cm aneurysm.  LIMITED UPPER ABDOMEN: Negative.      Impression    CONCLUSION:    1.  Ascending aortic 4.6 cm aneurysm.  2.  Please refer to cardiologist's dictation for the cardiac CT report.         Please contact patient and let him know of the comparison.  The aneurysm was on scan in 2018, and there has been no appreciable change.  That is good news.  We still want good blood pressure control and good cholesterol to prevent ongoing enlargement however.     Please call with questions or contact us using [employer].    Sincerely,        Electronically signed by Angel Hernandez MD

## 2021-06-21 NOTE — PROGRESS NOTES
Preoperative Exam    Scheduled Procedure: Right Eye Cataract   Surgery Date:  12/4/18   Surgery Location: [facility]     Surgeon:  Dr. Joey Ramires     Assessment/Plan:     1. Cataract of right eye, unspecified cataract type  Proceed with procedure as planned.    2. Preoperative examination  He was given perioperative med management instructions.    3. Carcinoma Of The Prostate Gland  He is recovered nicely.  Still with some erectile dysfunction but very minimal urinary issues.  Reassurance again given.  Continue close follow-up with urology.  PSA has remained undetectable.    Surgical Procedure Risk: Low (reported cardiac risk generally < 1%)  Have you had prior anesthesia?: Yes  Have you or any family members had a previous anesthesia reaction:  No  Do you or any family members have a history of a clotting or bleeding disorder?: No  Cardiac Risk Assessment: no increased risk for major cardiac complications    Patient approved for surgery with general or local anesthesia.        Functional Status: Independent  Patient plans to recover at home with family.     Subjective:      Artis Galvin is a 73 y.o. male who presents for a preoperative consultation.  Patient with diminished vision for several years now found to have bilateral cataracts but right greater than left.  He is to have right cataract extraction soon.  He continues to have some issues with the best by legal team.  His  is working on that.  He is been trying some injected medication for erection.  He has not had intercourse with that yet.    All other systems reviewed and are negative, other than those listed in the HPI.    Pertinent History  Do you have difficulty breathing or chest pain after walking up a flight of stairs: No  History of obstructive sleep apnea: No  Steroid use in the last 6 months: No  Frequent Aspirin/NSAID use: No  Prior Blood Transfusion: No  Prior Blood Transfusion Reaction: No  If for some reason  prior to, during or after the procedure, if it is medically indicated, would you be willing to have a blood transfusion?:  There is no transfusion refusal.    Current Outpatient Medications   Medication Sig Dispense Refill     ketoconazole (NIZORAL) 2 % cream Apply topically 2 (two) times a day. (Patient taking differently: Apply topically 2 (two) times a day as needed. ) 60 g 3     LACTOBACILLUS ACIDOPHILUS (PROBIOTIC ORAL) Take 1 capsule by mouth daily.       naproxen sodium (ALEVE) 220 MG tablet Take 220 mg by mouth 2 (two) times a day as needed for pain.       pilocarpine (PILOCAR) 2 % ophthalmic solution Administer 1 drop to both eyes 2 (two) times a day.        aspirin 81 MG EC tablet Take 81 mg by mouth daily.        cholecalciferol, vitamin D3, 2,000 unit cap Take 1 capsule by mouth daily.        No current facility-administered medications for this visit.         No Known Allergies    Patient Active Problem List   Diagnosis     Joint Pain, Localized In The Shoulder     Sarcoidosis     Carcinoma Of The Prostate Gland     Vitamin D Deficiency     Pure hypercholesterolemia     Serum Enzyme Levels - Alkaline Phosphatase Elevated     Nontoxic Multinodular Goiter     Hypothyroidism     Lump In / On The Skin     Hashimoto's Thyroiditis     Episcleritis     Colonic Diverticulosis     Postoperative abdominal pain     Hx of diverticulitis of colon     Right bundle branch block     Diverticulitis     Malaise     H/O prostate cancer       Past Medical History:   Diagnosis Date     Abdominal pain, unspecified site      Abnormal thyroid ultrasound      BBB (bundle branch block)     right     Chronic lymphocytic thyroiditis      Diverticulosis of colon (without mention of hemorrhage)      Localized superficial swelling, mass, or lump      Malignant neoplasm of prostate (H)      Nontoxic multinodular goiter      Other and unspecified hyperlipidemia      Other nonspecific abnormal serum enzyme levels      Pain in joint,  "shoulder region      Persistent hoarseness      Sarcoidosis      Scleritis, unspecified      Unspecified hypothyroidism      Unspecified vitamin D deficiency        Past Surgical History:   Procedure Laterality Date     DC LAP,PROSTATECTOMY,RADICAL,W/NERVE SPARE,INCL ROBOTIC Bilateral 2/27/2018    Procedure: ROBOTIC ASSISTED RADICAL RETROPUBIC PROSTATECTOMY, BILATERAL PELVIC LYMPH NODE DISSECTION LYSIS OF ADHESIONS;  Surgeon: Wale Rodriguez MD;  Location: [facility] [place], [place];  Service: Urology     DC RADIAL KERATOTOMY      Description: Cornea Radial Keratotomy;  Recorded: 02/18/2014;     DC SHLDR ARTHROSCOP,PART ACROMIOPLAS Right     Description: Shoulder Arthroscopy, Space Decompression And Acromioplasty;  Recorded: 02/18/2014;       Social History     Socioeconomic History     Marital status: Single     Spouse name: Not on file     Number of children: Not on file     Years of education: Not on file     Highest education level: Not on file   Social Needs     Financial resource strain: Not on file     Food insecurity - worry: Not on file     Food insecurity - inability: Not on file     Transportation needs - medical: Not on file     Transportation needs - non-medical: Not on file   Occupational History     Not on file   Tobacco Use     Smoking status: Never Smoker     Smokeless tobacco: Never Used   Substance and Sexual Activity     Alcohol use: Yes     Comment: rarely     Drug use: No     Sexual activity: Not on file   Other Topics Concern     Not on file   Social History Narrative    Single. . 4 grown daughters.        Currently unemployed.             Objective:     Vitals:    11/20/18 1141   BP: 130/82   Pulse: 60   SpO2: 98%   Weight: 196 lb (88.9 kg)   Height: 5' 10\" (1.778 m)         Physical Exam:  /82 (Patient Site: Right Arm, Patient Position: Sitting, Cuff Size: Adult Regular)   Pulse 60   Ht 5' 10\" (1.778 m)   Wt 196 lb (88.9 kg)   SpO2 98%   BMI 28.12 kg/m      General Appearance:  "   Alert, cooperative, no distress, appears stated age   Head:    Normocephalic, without obvious abnormality, atraumatic   Eyes:    PERRL, conjunctiva/corneas cloudy.       Ears:    Normal TM's and external ear canals, both ears   Nose:   Nares normal, septum midline, mucosa normal, no drainage    or sinus tenderness   Throat:   Lips, mucosa, and tongue normal; teeth and gums normal   Neck:   Supple, symmetrical, trachea midline, no adenopathy;        thyroid:  No enlargement/tenderness/nodules; no carotid    bruit or JVD   Back:     Symmetric, no curvature, ROM normal, no CVA tenderness   Lungs:     Clear to auscultation bilaterally, respirations unlabored   Chest wall:    No tenderness or deformity   Heart:    Regular rate and rhythm, S1 and S2 normal, no murmur, rub    or gallop   Abdomen:     Soft, non-tender, bowel sounds active all four quadrants,     no masses, no organomegaly           Extremities:   Extremities normal, atraumatic, no cyanosis or edema   Pulses:   2+ and symmetric all extremities   Skin:   Skin color, texture, turgor normal, no rashes or lesions   Lymph nodes:   Cervical, supraclavicular, and axillary nodes normal   Neurologic:   CNII-XII intact. Normal strength, sensation and reflexes       throughout       Patient Instructions     Follow your surgeon's direction on when to stop eating and drinking prior to surgery.  Your surgeon will be managing your pain after your surgery.    Remove all jewelry and metal piercings before your surgery.   You may take your usual morning medicines with a sip of water.           Labs:  No labs were ordered during this visit    Immunization History   Administered Date(s) Administered     DTP 06/09/2009     Pneumo Conj 13-V (2010&after) 04/07/2015     Pneumo Polysac 23-V 04/28/2011           Electronically signed by Angel Hernandez MD 11/20/18 11:42 AM

## 2021-06-21 NOTE — LETTER
Letter by Angel Hernandez MD at      Author: Angel Hernandez MD Service: -- Author Type: --    Filed:  Encounter Date: 4/23/2021 Status: (Other)         Artis Galvin  [address]  [place] [address]             April 23, 2021         Dear Mr. Galvin,    Below are the results from your recent visit:    Resulted Orders   XR Thoracic Spine 2 VWS    Narrative    EXAM: XR THORACIC SPINE 2 VWS  LOCATION: [facility]  DATE/TIME: 4/21/2021 4:19 PM    INDICATION: Malignant neoplasm of prostate.  COMPARISON: CT chest 08/04/2020  TECHNIQUE: CR Thoracic Spine.      Impression    Mild broad left convexity thoracolumbar curvature. Alignment is otherwise normal. No acute compression fracture deformity. Mild midthoracic degenerative disc disease. Visualized lungs are clear.         No worrisome findings on your back xray Binu.  Would you want to see a physical therapist for your pain?  The spine clinic?   Or just give it some time?   Let me know.     Please call with questions or contact us using [employer].    Sincerely,        Electronically signed by Angel Hernandez MD

## 2021-06-21 NOTE — LETTER
Letter by Angel Hernandez MD at      Author: Angel Hernandez MD Service: -- Author Type: --    Filed:  Encounter Date: 11/6/2020 Status: (Other)         Artis Galvin  [address]  [place] [address]             November 6, 2020         Dear Mr. Galvin,    Below are the results from your recent visit:    Resulted Orders   Comprehensive Metabolic Panel   Result Value Ref Range    Sodium 141 136 - 145 mmol/L    Potassium 4.7 3.5 - 5.0 mmol/L    Chloride 108 (H) 98 - 107 mmol/L    CO2 25 22 - 31 mmol/L    Anion Gap, Calculation 8 5 - 18 mmol/L    Glucose 87 70 - 125 mg/dL    BUN 17 8 - 28 mg/dL    Creatinine 1.15 0.70 - 1.30 mg/dL    GFR MDRD Af Amer >60 >60 mL/min/1.73m2    GFR MDRD Non Af Amer >60 >60 mL/min/1.73m2    Bilirubin, Total 0.5 0.0 - 1.0 mg/dL    Calcium 9.3 8.5 - 10.5 mg/dL    Protein, Total 6.9 6.0 - 8.0 g/dL    Albumin 4.0 3.5 - 5.0 g/dL    Alkaline Phosphatase 126 (H) 45 - 120 U/L    AST 20 0 - 40 U/L    ALT 29 0 - 45 U/L    Narrative    Fasting Glucose reference range is 70-99 mg/dL per  American Diabetes Association (ADA) guidelines.   CK Total   Result Value Ref Range    CK, Total 97 30 - 190 U/L   Thyroid Cascade   Result Value Ref Range    TSH 5.15 (H) 0.30 - 5.00 uIU/mL   Magnesium   Result Value Ref Range    Magnesium 2.1 1.8 - 2.6 mg/dL   T4, Free   Result Value Ref Range    Free T4 0.8 0.7 - 1.8 ng/dL       Labs look stable Binu.  Nothing here would explain the twitching.     Please call with questions or contact us using [employer].    Sincerely,        Electronically signed by Angel Hernandez MD

## 2021-06-25 NOTE — TELEPHONE ENCOUNTER
If you are going to be starting treatments down at [place] why don't you follow-up with me after that.  I bet someone down there can address some of your issues such as the dizziness and nausea in the meantime.  If they cannot, please let me know and we can do a virtual visit.

## 2021-06-25 NOTE — TELEPHONE ENCOUNTER
TYRELL    I called Binu to follow up - he reports that he's already currently at the urgency room ([facility]). He had a CT abd scan already and is just waiting for the results. He will wait get results on scan and f/u with  physician plan/treatment.     His sxs's started x 1 week - he intially thought his sxs's were due to the start of radiation.     Sxs: nausea, abd pains, intermittent constipation along with diarrhea, low grade fevers, etc. No bloody stools.

## 2021-06-28 NOTE — PROGRESS NOTES
"Progress Notes by Minoo Roe MD at 3/20/2020 12:50 PM     Author: Minoo Roe MD Service: -- Author Type: Physician    Filed: 3/20/2020  4:47 PM Encounter Date: 3/20/2020 Status: Signed    : Minoo Roe MD (Physician)           The patient has been notified of following:     \"This telephone visit will be conducted via a call between you and your physician/provider. We have found that certain health care needs can be provided without the need for a physical exam.  This service lets us provide the care you need with a phone conversation.  If a prescription is necessary we can send it directly to your pharmacy.  If lab work is needed we can place an order for that and you can then stop by our lab to have the test done at a later time. If during the course of the call the physician/provider feels a telephone visit is not appropriate, you will not be charged for this service.\" Verbal consent has been obtained for this service by care team member:         HEART CARE PHONE ENCOUNTER        The patient has chosen to have the visit conducted as a telephone visit, to reduce risk of exposure given the current status of Coronavirus in our community. This telephone visit is being conducted via a call between the patient and physician/provider. Health care needs are being provided without a physical exam.     Assessment/Recommendations   Assessment:    1.  Exertional dyspnea and fatigue, etiology unclear.  May be related to elevated blood pressure, arrhythmia or underlying coronary artery disease.  He did have CT calcium score which was low at 21 although this does not tell us the extent of soft plaque.  Agree with recommendation to pursue nuclear stress testing which was ordered by the primary physician.  Also agree with decision to pursue Holter monitor given his symptoms of palpitation and elevated heart rate.  2.  Chest discomfort, atypical for myocardial ischemia.  He reports 2 months of low-grade chest " discomfort with negative enzymes and EKGs in the ED.  Again this may be related to elevated blood pressure in the setting of a known thoracic aortic aneurysm.  3.  Palpitations.  Patient's daughter, a critical care nurse, reports paramedics documented brief episode of bigeminal PVCs while in route to the ED.  No report of any arrhythmia in the ED.  Patient also reports an episode of tachycardia to a rate of 130 which felt fairly regular.  Agree with recommendation to pursue Holter.  If this is unrevealing, I would consider a 1 to 2-week event monitor if nothing identified during stress testing.  4.  Essential hypertension, untreated.  Patient's daughter reports that his systolic pressures have been between 140 and 170.  In the setting of his a sending aortic aneurysm, I told him we would want to get his systolic blood pressure between 110 and 120.  He never took the metoprolol which was prescribed previously which I would recommend holding given heart rates in the 50s while in the ED.  Have recommended initiation of losartan 25 mg daily.  He will continue to check blood pressures at home.  5.  Moderate ascending aortic aneurysm with CT calcium score documenting maximum diameter of 4.6 cm.    Plan:  1.  Begin losartan 25 mg daily.  2.  Schedule exercise nuclear stress test and Holter monitor as previously ordered by the patient's primary physician.  3.  If above testing negative, recommend follow-up with Dr. Norman in 6 to 8 weeks.      Follow Up Plan: Follow up in   I have reviewed the note as documented.  This accurately captures the substance of my conversation with the patient.    Total time of call between patient and provider was 32 minutes   Start Time:1255  Stop Time:1327       History of Present Illness/Subjective    Artis Galvin is a 74 y.o. male who is being evaluated via a billable telephone visit.    Mr. Galvin is a 74-year-old male with finding of mild coronary calcification and moderate ascending  aortic aneurysm, previously prescribed metoprolol succinate although the patient states he did not started who was referred from the ED for evaluation of chest discomfort and palpitations.  Patient states he returned from [facility] in early February after a very stressful trip.  Over the last 4 to 6 weeks, he has had vague anterior chest discomfort which has not been aggravated by breathing, movement or change in position.  Clearly has not worsened with his physical exercise.  He denies any associated shortness of breath or diaphoresis.  In addition, he has noted a decline in exercise capacity with symptoms of fatigue and dyspnea which have caused him to back off his workouts.  He also tells me he could not climb the flight of stairs to his primary physician's clinic which he normally can do.  He is also noted palpitations which consists of both rapid heart rate up to 130 which he noted on his blood pressure cuff as well as occasional flutterings in his chest.  In his last visit to the ED, the paramedics reportedly documented bigeminal PVCs although this was not mentioned in the ED physician note.  Patient does not feel unusually stressed at the present time.  His daughter, who accompanied him on the phone call, states his systolic blood pressures have been running high between 140 and 170 systolic.  The patient himself states he never picked up the prescription for metoprolol and the daughter has been concerned about him taking it due to low heart rates.  We talked about other options for blood pressure lowering including losartan.      I have reviewed and updated the patient's Past Medical History, Social History, Family History and Medication List.     Physical Examination not performed given phone encounter Review of Systems          20 point review of systems were reviewed with the patient all negative with the exception of above.                                      Medical History  Surgical History Family  History Social History   Past Medical History:   Diagnosis Date   ? Abdominal pain, unspecified site    ? Abnormal thyroid ultrasound    ? BBB (bundle branch block)     right   ? Chronic lymphocytic thyroiditis    ? Diverticulosis of colon (without mention of hemorrhage)    ? Localized superficial swelling, mass, or lump    ? Malignant neoplasm of prostate (H)    ? Nontoxic multinodular goiter    ? Other and unspecified hyperlipidemia    ? Other nonspecific abnormal serum enzyme levels    ? Pain in joint, shoulder region    ? Persistent hoarseness    ? Sarcoidosis    ? Scleritis, unspecified    ? Unspecified hypothyroidism    ? Unspecified vitamin D deficiency     Past Surgical History:   Procedure Laterality Date   ? AR LAP,PROSTATECTOMY,RADICAL,W/NERVE SPARE,INCL ROBOTIC Bilateral 2/27/2018    Procedure: ROBOTIC ASSISTED RADICAL RETROPUBIC PROSTATECTOMY, BILATERAL PELVIC LYMPH NODE DISSECTION LYSIS OF ADHESIONS;  Surgeon: Wale Rodriguez MD;  [place] [facility] [place]rology   ? AR RADIAL KERATOTOMY      Description: Cornea Radial Keratotomy;  Recorded: 02/18/2014;   ? AR SHLDR ARTHROSCOP,PART ACROMIOPLAS Right     Description: Shoulder Arthroscopy, Space Decompression And Acromioplasty;  Recorded: 02/18/2014;    Family History   Problem Relation Age of Onset   ? No Medical Problems Mother    ? Coronary artery disease Father    ? Aortic aneurysm Father    ? Prostate cancer Maternal Grandfather    ? Prostate cancer Maternal Uncle     Social History     Socioeconomic History   ? Marital status: Single     Spouse name: Not on file   ? Number of children: Not on file   ? Years of education: Not on file   ? Highest education level: Not on file   Occupational History   ? Not on file   Social Needs   ? Financial resource strain: Not on file   ? Food insecurity     Worry: Not on file     Inability: Not on file   ? Transportation needs     Medical: Not on file     Non-medical: Not on file   Tobacco Use    ? Smoking status: Never Smoker   ? Smokeless tobacco: Never Used   Substance and Sexual Activity   ? Alcohol use: Yes     Comment: rarely   ? Drug use: No   ? Sexual activity: Not on file   Lifestyle   ? Physical activity     Days per week: Not on file     Minutes per session: Not on file   ? Stress: Not on file   Relationships   ? Social connections     Talks on phone: Not on file     Gets together: Not on file     Attends Restorationist service: Not on file     Active member of club or organization: Not on file     Attends meetings of clubs or organizations: Not on file     Relationship status: Not on file   ? Intimate partner violence     Fear of current or ex partner: Not on file     Emotionally abused: Not on file     Physically abused: Not on file     Forced sexual activity: Not on file   Other Topics Concern   ? Not on file   Social History Narrative    Single. . 4 grown daughters.        Currently unemployed.          Medications  Allergies   Current Outpatient Medications   Medication Sig Dispense Refill   ? aspirin 81 MG EC tablet Take 1 tablet (81 mg total) by mouth daily.  0   ? cholecalciferol, vitamin D3, 2,000 unit cap Take 1 capsule by mouth daily.      ? ketoconazole (NIZORAL) 2 % cream Apply topically 2 (two) times a day as needed. 60 g 1   ? pilocarpine (PILOCAR) 2 % ophthalmic solution Administer 1 drop to both eyes 2 (two) times a day.      ? losartan (COZAAR) 25 MG tablet Take 1 tablet (25 mg total) by mouth daily. 30 tablet 6   ? metoprolol succinate (TOPROL-XL) 25 MG Take 0.5 tablets (12.5 mg total) by mouth daily. 45 tablet 3   ? rosuvastatin (CRESTOR) 10 MG tablet Take 1 tablet (10 mg total) by mouth daily. 90 tablet 3     No current facility-administered medications for this visit.     No Known Allergies      Lab Results    Chemistry/lipid CBC Cardiac Enzymes/BNP/TSH/INR   Lab Results   Component Value Date    CHOL 192 10/24/2019    HDL 34 (L) 10/24/2019    LDLCALC 116 10/24/2019     TRIG 210 (H) 10/24/2019    CREATININE 0.85 03/19/2020    BUN 14 03/19/2020    K 4.2 03/19/2020     03/19/2020     03/19/2020    CO2 22 03/19/2020    Lab Results   Component Value Date    WBC 8.0 03/19/2020    HGB 16.6 03/19/2020    HCT 49.4 03/19/2020    MCV 91 03/19/2020     03/19/2020    Lab Results   Component Value Date    CKTOTAL 77 11/07/2017    TROPONINI 0.01 03/19/2020    TSH 4.39 10/24/2019        Minoo Roe

## 2021-06-28 NOTE — PROGRESS NOTES
Progress Notes by Jeaneth Norman MD at 12/10/2019  3:30 PM     Author: Jeaneth Norman MD Service: -- Author Type: Physician    Filed: 12/10/2019  4:24 PM Encounter Date: 12/10/2019 Status: Signed    : Jeaneth Norman MD (Physician)             [facility] Heart Care Note    Thank you, Dr. Hernandez, for asking the [facility] Heart Care team to see Artis Galvin to evaluate ascending aorta aneurysm.    Assessment:    Artis Galvin is a 74 y.o. old male with a PMHx significant for HLP on rosuvastatin, hypothyroidism and carcinoma of the prostate gland who presents today for evaluation given recent findings of ascending aorta aneurysm.      ECG: Personally reviewed. normal EKG, normal sinus rhythm, unchanged from previous tracings.    ECHO (personnaly Reviewed):     Left ventricle ejection fraction is normal. The calculated left ventricular ejection fraction is 67%.    Normal right ventricular size and systolic function.    Thoracic Aorta: The ascending aorta is moderately dilated.    Aortic Valve: The valve is tricuspid. Mild aortic regurgitation.    No previous study for comparison.    Plan:  - start metoprolol as prescribed; target HR low 60s to reduce sheer stress on vascular walls.  - CT scan read suggested repeat testing for better visualization of the aneurysm in question; would recommend this and vascular surgery consulted as indicated after repeat imaging.     ______________________________________________________________________    Subjective:  CC: Today, Mr. Galvin denies any chest pain/back pain. He denies any symptoms of palpitations, lightheadedness, presyncope/syncope. He reports that he has had a fast heart rate every now and, but states it's rare and self-limiting. Declines Holter at this time. Mr. Galvin states that he hasn't started the prescribed Bblocker because he's hesitant to take too many meds.     ______________________________________________________________________      Review of Systems:   A complete 10 systems ROS was reviewed  And is negative except what is listed in the HPI.         Problem List:  Patient Active Problem List   Diagnosis   ? Joint Pain, Localized In The Shoulder   ? Sarcoidosis   ? Carcinoma Of The Prostate Gland   ? Vitamin D Deficiency   ? Pure hypercholesterolemia   ? Serum Enzyme Levels - Alkaline Phosphatase Elevated   ? Nontoxic Multinodular Goiter   ? Hypothyroidism   ? Lump In / On The Skin   ? Hashimoto's Thyroiditis   ? Episcleritis   ? Colonic Diverticulosis   ? Hx of diverticulitis of colon   ? Right bundle branch block   ? Malaise   ? H/O prostate cancer     Medical History:  Past Medical History:   Diagnosis Date   ? Abdominal pain, unspecified site    ? Abnormal thyroid ultrasound    ? BBB (bundle branch block)     right   ? Chronic lymphocytic thyroiditis    ? Diverticulosis of colon (without mention of hemorrhage)    ? Localized superficial swelling, mass, or lump    ? Malignant neoplasm of prostate (H)    ? Nontoxic multinodular goiter    ? Other and unspecified hyperlipidemia    ? Other nonspecific abnormal serum enzyme levels    ? Pain in joint, shoulder region    ? Persistent hoarseness    ? Sarcoidosis    ? Scleritis, unspecified    ? Unspecified hypothyroidism    ? Unspecified vitamin D deficiency      Surgical History:  Past Surgical History:   Procedure Laterality Date   ? CO LAP,PROSTATECTOMY,RADICAL,W/NERVE SPARE,INCL ROBOTIC Bilateral 2/27/2018    Procedure: ROBOTIC ASSISTED RADICAL RETROPUBIC PROSTATECTOMY, BILATERAL PELVIC LYMPH NODE DISSECTION LYSIS OF ADHESIONS;  Surgeon: Wale Rodriguez MD;  Location: [facility] [place];  Service: Urology   ? CO RADIAL KERATOTOMY      Description: Cornea Radial Keratotomy;  Recorded: 02/18/2014;   ? CO SHLDR ARTHROSCOP,PART ACROMIOPLAS Right     Description: Shoulder Arthroscopy, Space Decompression And  Acromioplasty;  Recorded: 02/18/2014;     Social History:  Social History     Socioeconomic History   ? Marital status: Single     Spouse name: Not on file   ? Number of children: Not on file   ? Years of education: Not on file   ? Highest education level: Not on file   Occupational History   ? Not on file   Social Needs   ? Financial resource strain: Not on file   ? Food insecurity:     Worry: Not on file     Inability: Not on file   ? Transportation needs:     Medical: Not on file     Non-medical: Not on file   Tobacco Use   ? Smoking status: Never Smoker   ? Smokeless tobacco: Never Used   Substance and Sexual Activity   ? Alcohol use: Yes     Comment: rarely   ? Drug use: No   ? Sexual activity: Not on file   Lifestyle   ? Physical activity:     Days per week: Not on file     Minutes per session: Not on file   ? Stress: Not on file   Relationships   ? Social connections:     Talks on phone: Not on file     Gets together: Not on file     Attends Confucianist service: Not on file     Active member of club or organization: Not on file     Attends meetings of clubs or organizations: Not on file     Relationship status: Not on file   ? Intimate partner violence:     Fear of current or ex partner: Not on file     Emotionally abused: Not on file     Physically abused: Not on file     Forced sexual activity: Not on file   Other Topics Concern   ? Not on file   Social History Narrative    Single. . 4 grown daughters.        Currently unemployed.           Family History:  Family History   Problem Relation Age of Onset   ? No Medical Problems Mother    ? Coronary artery disease Father    ? Aortic aneurysm Father    ? Prostate cancer Maternal Grandfather    ? Prostate cancer Maternal Uncle          Allergies:  No Known Allergies    Medications:  Current Outpatient Medications   Medication Sig Dispense Refill   ? ketoconazole (NIZORAL) 2 % cream Apply topically 2 (two) times a day as needed. 60 g 1   ? LACTOBACILLUS  "ACIDOPHILUS (PROBIOTIC ORAL) Take 1 capsule by mouth daily.     ? metoprolol succinate (TOPROL-XL) 25 MG Take 0.5 tablets (12.5 mg total) by mouth daily. 45 tablet 3   ? pilocarpine (PILOCAR) 2 % ophthalmic solution Administer 1 drop to both eyes 2 (two) times a day.      ? cholecalciferol, vitamin D3, 2,000 unit cap Take 1 capsule by mouth daily.      ? doxycycline (VIBRAMYCIN) 50 MG capsule Take 50 mg by mouth daily.     ? rosuvastatin (CRESTOR) 10 MG tablet Take 1 tablet (10 mg total) by mouth daily. 90 tablet 3     No current facility-administered medications for this visit.        Objective:   Vital signs:  /80 (Patient Site: Left Arm, Patient Position: Sitting, Cuff Size: Adult Regular)   Pulse 67   Resp 14   Ht 5' 10\" (1.778 m)   Wt 191 lb (86.6 kg)   BMI 27.41 kg/m        Physical Exam:    GENERAL APPEARANCE: Alert, cooperative and in no acute distress.   HEENT: No scleral icterus. Oral mucuos membranes pink and moist.   NECK: no JVD. No Hepatojugular reflux. Thyroid not visualized. No lymphadenopathy   CHEST: clear to auscultation   CARDIOVASCULAR: S1, S2 without murmur ,clicks or rubs. Brachial, radial and posterior tibial pulses are intact and symetric. No carotid bruits noted. No edema  ABDOMEN: Nontender. BS+. No bruits.   SKIN: No Xanthelasma   Musculoskeletal: No cyanosis, clubbing or swelling.      Lab Results:  LIPIDS:  Lab Results   Component Value Date    CHOL 192 10/24/2019    CHOL 187 04/04/2018    CHOL 218 (H) 11/07/2017     Lab Results   Component Value Date    HDL 34 (L) 10/24/2019    HDL 33 (L) 04/04/2018    HDL 33 (L) 11/07/2017     Lab Results   Component Value Date    LDLCALC 116 10/24/2019    LDLCALC 120 04/04/2018    LDLCALC 146 (H) 11/07/2017     Lab Results   Component Value Date    TRIG 210 (H) 10/24/2019    TRIG 171 (H) 04/04/2018    TRIG 193 (H) 11/07/2017     No components found for: CHOLHDL    BMP:  Lab Results   Component Value Date    CREATININE 1.03 10/24/2019    " BUN 14 10/24/2019     10/24/2019    K 4.1 10/24/2019     10/24/2019    CO2 26 10/24/2019         Diego Norman MD., S  [facility] HEART [facility]

## 2021-06-29 ENCOUNTER — COMMUNICATION - HEALTHEAST (OUTPATIENT)
Dept: INTERNAL MEDICINE | Facility: CLINIC | Age: 76
End: 2021-06-29

## 2021-06-30 ENCOUNTER — COMMUNICATION - HEALTHEAST (OUTPATIENT)
Dept: INTERNAL MEDICINE | Facility: CLINIC | Age: 76
End: 2021-06-30

## 2021-06-30 DIAGNOSIS — K57.30 DIVERTICULOSIS OF LARGE INTESTINE WITHOUT HEMORRHAGE: ICD-10-CM

## 2021-06-30 DIAGNOSIS — I10 HTN (HYPERTENSION): ICD-10-CM

## 2021-07-01 ENCOUNTER — RECORDS - HEALTHEAST (OUTPATIENT)
Dept: INTERNAL MEDICINE | Facility: CLINIC | Age: 76
End: 2021-07-01

## 2021-07-03 NOTE — ADDENDUM NOTE
Addendum Note by Esteban Gasca DPM at 10/13/2020  1:20 PM     Author: Esteban Gasca DPM Service: -- Author Type: Physician    Filed: 10/13/2020  2:04 PM Encounter Date: 10/13/2020 Status: Signed    : Esteban Gasca DPM (Physician)    Addended by: ESTEBAN GASCA on: 10/13/2020 02:04 PM        Modules accepted: Orders

## 2021-07-03 NOTE — ADDENDUM NOTE
Addendum Note by Elinor Fleming MA at 5/4/2020 11:18 AM     Author: Elinor Fleming MA Service: -- Author Type: Medical Assistant    Filed: 5/4/2020 11:18 AM Encounter Date: 11/20/2019 Status: Signed    : Elinor Fleming MA (Medical Assistant)    Addended by: ELINOR FLEMING on: 5/4/2020 11:18 AM        Modules accepted: Orders

## 2021-07-04 NOTE — TELEPHONE ENCOUNTER
Telephone Encounter by Angel Hernandez MD at 6/30/2021  3:37 PM     Author: Angel Hernandez MD Service: -- Author Type: Physician    Filed: 6/30/2021  3:38 PM Encounter Date: 6/30/2021 Status: Signed    : Angel Hernandez MD (Physician)       I would like to see him within 3 weeks -- in person--unless it is too difficult with his radiation.  When is he done with radiation?.  When did he end the antibiotics?  When did the pain start coming back?  Any fevers?  Any new symptoms?

## 2021-07-04 NOTE — TELEPHONE ENCOUNTER
Telephone Encounter by Elinor Rubin MA at 7/1/2021 10:27 AM     Author: Elinor Rubin MA Service: -- Author Type: Medical Assistant    Filed: 7/1/2021 10:29 AM Encounter Date: 7/1/2021 Status: Signed    : Elinor Rubin MA (Medical Assistant)       I called and left detailed message with recommendations below per PCP. Patient added to schedule for today. Response also sent via MECL[employer].

## 2021-07-04 NOTE — TELEPHONE ENCOUNTER
Telephone Encounter by Angel Hernandez MD at 7/1/2021 10:02 AM     Author: Angel Hernandez MD Service: -- Author Type: Physician    Filed: 7/1/2021 10:03 AM Encounter Date: 7/1/2021 Status: Signed    : Angel Hernandez MD (Physician)       I agree with the urgency room to get the scan to make sure you do not have a abscess etc.  We will put you in at 340 today.  If you want to come in you may.  If you get answers and do not feel the need to come in at that time you can just let me know.

## 2021-07-04 NOTE — TELEPHONE ENCOUNTER
Telephone Encounter by Elinor Rubin MA at 6/30/2021  3:15 PM     Author: Elinor Rubin MA Service: -- Author Type: Medical Assistant    Filed: 6/30/2021  3:15 PM Encounter Date: 6/28/2021 Status: Signed    : Elinor Rubin MA (Medical Assistant)       See other message

## 2021-07-04 NOTE — TELEPHONE ENCOUNTER
Telephone Encounter by Elinor Rubin MA at 6/30/2021  3:09 PM     Author: Elinor Rubin MA Service: -- Author Type: Medical Assistant    Filed: 6/30/2021  3:14 PM Encounter Date: 6/30/2021 Status: Signed    : Elinor Rubin MA (Medical Assistant)       Referred to recent mychart message as well    Rx for losartan 100 mg pended for renewal. Recent [place] consult note located in care everywhere from 6/23/21.     Patient also reports that he was also seen at  on 6/16/21 for diverticulitis (see report in care everywhere) and was treated with flagyl & ceftin x 10 days. His sxs's initially resolved however after course of abx he continues to have some left abd pains. Patient is wondering if his sxs's are still present. You also indicated that perhaps he should be seen in the next few weeks, he's wondering if we can do this virtually or do you preferred in person visit. Please advise, thank you.

## 2021-07-04 NOTE — TELEPHONE ENCOUNTER
Telephone Encounter by Teto Snider at 7/1/2021 10:17 AM     Author: Teto Snider Service: -- Author Type: Patient Access    Filed: 7/1/2021 10:18 AM Encounter Date: 6/30/2021 Status: Signed    : Teto Snider (Patient Access)       Dr. Hernandez does not have a morning opening for 40 min appointment on 7/22 or for the following two weeks. Binu cannot do an afternoon or evening appointment due to commuting to [place]. He says he is talking to Dr. Hernandez this afternoon and will get his advice on the follow up appointment.

## 2021-07-04 NOTE — TELEPHONE ENCOUNTER
Telephone Encounter by Angel Hernandez MD at 6/30/2021  4:15 PM     Author: Angel Hernandez MD Service: -- Author Type: Physician    Filed: 6/30/2021  4:16 PM Encounter Date: 6/30/2021 Status: Signed    : Angel Hernandez MD (Physician)       Lets refill his antibiotics for another week.  Is he doing the radiation down in [place]?  If so, I do not want to have him drive back to the [facility] for a visit with me.  We can do a virtual if you would prefer.

## 2021-07-04 NOTE — TELEPHONE ENCOUNTER
Telephone Encounter by Kaylin Rodriguez HUC at 6/30/2021  9:45 AM     Author: Kaylin Rodriguez HUC Service: -- Author Type: Coordinator    Filed: 6/30/2021  9:50 AM Encounter Date: 6/30/2021 Status: Signed    : Kaylin Rodriguez HUC (Coordinator)       Reason for Call: patient is calling back to speak to Elinor, writer unable to addend that encounter.  Started levothyroxine 6/23/21. [facility], due to TSH of 6.5.  Results on [facility] portal, ok with patient if provider wants to look.   Writer unable to make future appointment due to computer merge. Patient wcb to make appoinment.     Detailed comments: patient agreeable to 100mg of losartan would like it sent to [facility].    Phone Number Patient can be reached at:   Cell number on file:    Telephone Information:   Mobile [phone]       Best Time: mornings are better for call backs.    Can we leave a detailed message on this number?: Yes    Call taken on 6/30/2021 at 9:45 AM by Kaylin Rodriguez

## 2021-07-04 NOTE — TELEPHONE ENCOUNTER
Telephone Encounter by Elinor Rubin MA at 6/29/2021  3:20 PM     Author: Elinor Rubin MA Service: -- Author Type: Medical Assistant    Filed: 6/29/2021  3:20 PM Encounter Date: 6/28/2021 Status: Signed    : Elinor Rubin MA (Medical Assistant)       Left message for Binu to return call to review information/recommendations per Dr. Hernandez.

## 2021-07-04 NOTE — TELEPHONE ENCOUNTER
Telephone Encounter by Angel Hernandez MD at 7/1/2021  7:40 AM     Author: Angel Hernandez MD Service: -- Author Type: Physician    Filed: 7/1/2021  7:40 AM Encounter Date: 6/30/2021 Status: Signed    : Angel Hernandez MD (Physician)       Put him in for a long visit this afternoon, if he can come in, so we can discuss all of this I can examine him etc.

## 2021-07-04 NOTE — TELEPHONE ENCOUNTER
Telephone Encounter by Elinor Rubin MA at 6/30/2021  3:59 PM     Author: Elinor Rubin MA Service: -- Author Type: Medical Assistant    Filed: 6/30/2021  4:13 PM Encounter Date: 6/30/2021 Status: Signed    : Elinor Rubin MA (Medical Assistant)       Radiation won't be done till Mid August. Patient is willing to be seen in person if needed at MD request (We'll have  schedule this appt).    Patient started abx's on 6/16/21 until 6/25/21. His only symptom is mild left abd discomfort which started a few days after course of abx's . No diarrhea or changes in BM's, no fevers, or new sxs's. He is wondering if he needs additional abx's, etc.

## 2021-07-04 NOTE — TELEPHONE ENCOUNTER
Telephone Encounter by Elinor Rubin MA at 6/30/2021  4:54 PM     Author: Elinor Rubin MA Service: -- Author Type: Medical Assistant    Filed: 6/30/2021  4:56 PM Encounter Date: 6/30/2021 Status: Signed    : Elinor Rubin MA (Medical Assistant)       Patient notified information below and verbalized understanding. He would actually preferred to do a virtual visit, schedulers please contact patient for 3 weeks virtual appt with MD (40 Min).     Abx Rx's set up for auth

## 2021-07-07 NOTE — TELEPHONE ENCOUNTER
Please find out when and why he got put on levothyroxine.  I do not see any recent lab work from [facility].  Please reconcile med list.  Also, lets have him go up to the 100 mg of losartan and send a new prescription.  I wanted to see him after his radiation but perhaps he should come in in the next few weeks so that we can clarify things.  Have him bring all of his current medications to the appointment.

## 2021-07-13 ENCOUNTER — MYC MEDICAL ADVICE (OUTPATIENT)
Dept: CARDIOLOGY | Facility: CLINIC | Age: 76
End: 2021-07-13

## 2021-07-13 NOTE — TELEPHONE ENCOUNTER
Changing CT scan to this week, will work on getting him an appointment on August 26 with Dr. Cazares

## 2021-07-15 ENCOUNTER — ANCILLARY PROCEDURE (OUTPATIENT)
Dept: CT IMAGING | Facility: CLINIC | Age: 76
End: 2021-07-15
Attending: INTERNAL MEDICINE
Payer: COMMERCIAL

## 2021-07-15 DIAGNOSIS — I71.21 ASCENDING AORTIC ANEURYSM (H): ICD-10-CM

## 2021-07-15 PROCEDURE — 71275 CT ANGIOGRAPHY CHEST: CPT | Performed by: RADIOLOGY

## 2021-07-15 PROCEDURE — 74174 CTA ABD&PLVS W/CONTRAST: CPT | Performed by: RADIOLOGY

## 2021-07-15 RX ORDER — IOPAMIDOL 755 MG/ML
120 INJECTION, SOLUTION INTRAVASCULAR ONCE
Status: COMPLETED | OUTPATIENT
Start: 2021-07-15 | End: 2021-07-15

## 2021-07-15 RX ADMIN — IOPAMIDOL 120 ML: 755 INJECTION, SOLUTION INTRAVASCULAR at 16:32

## 2021-07-19 DIAGNOSIS — E07.9 THYROID MASS: Primary | ICD-10-CM

## 2021-07-19 NOTE — PROGRESS NOTES
Called [facility] with results. Scheduled with Dr. Cazares August 26. Endocrine referral made, patient follows with [facility] for prostate cancer.

## 2021-07-25 LAB
CREAT BLD-MCNC: 1 MG/DL (ref 0.7–1.3)
GFR SERPL CREATININE-BSD FRML MDRD: >60 ML/MIN/1.73M2

## 2021-08-02 ENCOUNTER — TELEPHONE (OUTPATIENT)
Dept: ENDOCRINOLOGY | Facility: CLINIC | Age: 76
End: 2021-08-02

## 2021-08-02 NOTE — TELEPHONE ENCOUNTER
Endocrine triage  The scheduled first available endocrine appointment timeframe is acceptable.  Recommend referring provider order the US as recommended by radiology for prior to the scheduled endocrine appt. We do not order tests on patients we haven't seen.     Salma Trimble MD

## 2021-08-02 NOTE — TELEPHONE ENCOUNTER
[facility] Call Center    Phone Message    May a detailed message be left on voicemail: yes     Reason for Call: Appointment Intake    Referring Provider Name: Dr Cazares    Diagnosis and/or Symptoms: Thyroid mass      Pt is scheduled next avail in person Oct 1. Sending for review. Writer offered video pt declined       Action Taken: Message routed to:  [facility] ([facility]): endo    Travel Screening: Not Applicable

## 2021-08-05 NOTE — TELEPHONE ENCOUNTER
RECORDS RECEIVED FROM: internal /ce    DATE RECEIVED: 10.1.21    NOTES (FOR ALL VISITS) STATUS DETAILS   OFFICE NOTES from referring provider Elder Carrington MD   OFFICE NOTES from other specialist na    ED NOTES na    OPERATIVE REPORT  (thyroid, pituitary, adrenal, parathyroid) na    MEDICATION LIST internal     IMAGING      DEXASCAN na    MRI (BRAIN) ce/internal  internal - HE- 3.4.20,    XR (Chest) ce/internal  [facility]- 3.19.20, 3.4.20,   internal - 3.19.20, 3.4.20,    CT (HEAD/NECK/CHEST/ABDOMEN) ce/internal  [facility]- 7/1/21, 11.28.20  [place]- 6.18.21, 6.16.21, 4.13.21, 3.5.21,   [facility]- 8/4/20, 7/16/20, 11.19.19  Internal- 7/15/21, 6.25.21, 8/4/20, 7.16.20, 5.5.20,    NUCLEAR  na    ULTRASOUND (HEAD/NECK) na    LABS     DIABETES: HBGA1C, CREATININE, FASTING LIPIDS, MICROALBUMIN URINE, POTASSIUM, TSH, T4    THYROID: TSH, T4, CBC, THYRODLONULIN, TOTAL T3, FREE T4, CALCITONIN, CEA internal /ce  [facility]- 7/1/21   HBGA1C- 4.10.20   LIPID- 5.26.20  T3- 7/23/21   TSH/T4- 7/23/21

## 2021-08-09 ENCOUNTER — TELEPHONE (OUTPATIENT)
Dept: NURSING | Facility: CLINIC | Age: 76
End: 2021-08-09

## 2021-08-09 DIAGNOSIS — K57.32 DIVERTICULITIS OF COLON: Primary | ICD-10-CM

## 2021-08-09 NOTE — TELEPHONE ENCOUNTER
Patient calling with moderate diverticulitis symptoms that started this past weekend.  He is going to [place] at 2:30 for radiation.  He is wondering if he can get an antibiotic called into pharmacy?   [facility] is his pharmacy of choice (listed in chart)  Writer will route high priority as patient is leaving soon.  Lupis Canales RN  [facility] Home Nurse Advisor  11:37 AM 8/9/2021  COVID 19 Nurse Triage Plan/Patient Instructions    Please be aware that novel coronavirus (COVID-19) may be circulating in the community. If you develop symptoms such as fever, cough, or SOB or if you have concerns about the presence of another infection including coronavirus (COVID-19), please contact your health care provider or visit [email or web address].     Disposition/Instructions    Home care recommended. Follow home care protocol based instructions.    Thank you for taking steps to prevent the spread of this virus.  o Limit your contact with others.  o Wear a simple mask to cover your cough.  o Wash your hands well and often.    Resources    [facility]: About COVID-19: [email or web address]    CDC: What to Do If You're Sick: www.cdc.gov/coronavirus/2019-ncov/about/steps-when-sick.html    CDC: Ending Home Isolation: www.cdc.gov/coronavirus/2019-ncov/hcp/disposition-in-home-patients.html     CDC: Caring for Someone: www.cdc.gov/coronavirus/2019-ncov/if-you-are-sick/care-for-someone.html     [facility]: Interim Guidance for Hospital Discharge to Home: www.health.[facility].[place]/diseases/coronavirus/hcp/hospdischarge.pdf    [facility] clinical trials (COVID-19 research studies): clinicalaffairs.[facility]/um-clinical-trials     Below are the COVID-19 hotlines at the [facility] ([facility]). Interpreters are available.   o For health questions: Call 208-542-5274 or 1-114.709.2869 (7 a.m. to 7 p.m.)  o For questions about schools and childcare: Call 128-125-8048 or 1-311.647.4371 (7 a.m. to 7  p.m.)

## 2021-08-09 NOTE — TELEPHONE ENCOUNTER
If He has symptoms of diverticulitis they may not want to do radiation on him today.  He needs to notify his radiation provider of his symptoms.  Okay for Augmentin 875 twice daily for 10 days.  I will send the prescription.

## 2021-09-05 ASSESSMENT — ENCOUNTER SYMPTOMS
MUSCLE WEAKNESS: 0
HEMATURIA: 0
FATIGUE: 1
DYSURIA: 0
FEVER: 0
STIFFNESS: 0
CONSTIPATION: 0
ARTHRALGIAS: 1
NECK PAIN: 0
BLOOD IN STOOL: 0
WEIGHT GAIN: 0
HALLUCINATIONS: 0
RECTAL PAIN: 0
VOMITING: 0
DIARRHEA: 1
WEIGHT LOSS: 0
MYALGIAS: 1
BOWEL INCONTINENCE: 0
DECREASED APPETITE: 0
BLOATING: 0
BACK PAIN: 1
POLYDIPSIA: 0
POLYPHAGIA: 0
INCREASED ENERGY: 0
HEARTBURN: 0
ALTERED TEMPERATURE REGULATION: 0
JAUNDICE: 0
ABDOMINAL PAIN: 1
NIGHT SWEATS: 1
NAUSEA: 0
JOINT SWELLING: 0
CHILLS: 0
FLANK PAIN: 0
MUSCLE CRAMPS: 0

## 2021-09-08 ENCOUNTER — OFFICE VISIT (OUTPATIENT)
Dept: CARDIOLOGY | Facility: CLINIC | Age: 76
End: 2021-09-08
Attending: INTERNAL MEDICINE
Payer: COMMERCIAL

## 2021-09-08 ENCOUNTER — OFFICE VISIT (OUTPATIENT)
Dept: INTERNAL MEDICINE | Facility: CLINIC | Age: 76
End: 2021-09-08
Payer: COMMERCIAL

## 2021-09-08 VITALS
OXYGEN SATURATION: 95 % | HEART RATE: 62 BPM | BODY MASS INDEX: 27.06 KG/M2 | HEIGHT: 70 IN | WEIGHT: 189 LBS | SYSTOLIC BLOOD PRESSURE: 118 MMHG | DIASTOLIC BLOOD PRESSURE: 62 MMHG | TEMPERATURE: 97.8 F

## 2021-09-08 VITALS
OXYGEN SATURATION: 96 % | WEIGHT: 189 LBS | HEIGHT: 71 IN | BODY MASS INDEX: 26.46 KG/M2 | HEART RATE: 61 BPM | SYSTOLIC BLOOD PRESSURE: 140 MMHG | DIASTOLIC BLOOD PRESSURE: 86 MMHG

## 2021-09-08 DIAGNOSIS — K57.32 DIVERTICULITIS OF COLON: ICD-10-CM

## 2021-09-08 DIAGNOSIS — R23.2 HOT FLASH IN MALE: ICD-10-CM

## 2021-09-08 DIAGNOSIS — I71.21 ASCENDING AORTIC ANEURYSM (H): ICD-10-CM

## 2021-09-08 DIAGNOSIS — F43.23 ADJUSTMENT DISORDER WITH MIXED ANXIETY AND DEPRESSED MOOD: ICD-10-CM

## 2021-09-08 DIAGNOSIS — C61 MALIGNANT NEOPLASM OF PROSTATE (H): ICD-10-CM

## 2021-09-08 DIAGNOSIS — I47.10 SVT (SUPRAVENTRICULAR TACHYCARDIA) (H): ICD-10-CM

## 2021-09-08 DIAGNOSIS — E06.3 HYPOTHYROIDISM DUE TO HASHIMOTO'S THYROIDITIS: ICD-10-CM

## 2021-09-08 DIAGNOSIS — I71.21 ASCENDING AORTIC ANEURYSM (H): Primary | ICD-10-CM

## 2021-09-08 DIAGNOSIS — E06.3 AUTOIMMUNE THYROIDITIS: ICD-10-CM

## 2021-09-08 DIAGNOSIS — L60.8 CHANGE IN NAIL APPEARANCE: ICD-10-CM

## 2021-09-08 DIAGNOSIS — I20.89 STABLE ANGINA PECTORIS (H): Primary | ICD-10-CM

## 2021-09-08 DIAGNOSIS — I10 ESSENTIAL HYPERTENSION, BENIGN: ICD-10-CM

## 2021-09-08 DIAGNOSIS — Z79.811 LONG TERM (CURRENT) USE OF AROMATASE INHIBITORS: ICD-10-CM

## 2021-09-08 PROBLEM — N52.31 ERECTILE DYSFUNCTION FOLLOWING RADICAL PROSTATECTOMY: Status: ACTIVE | Noted: 2018-06-07

## 2021-09-08 PROBLEM — K57.30 DIVERTICULAR DISEASE OF COLON: Status: ACTIVE | Noted: 2021-09-08

## 2021-09-08 PROBLEM — I25.9 CHRONIC ISCHEMIC HEART DISEASE: Status: ACTIVE | Noted: 2019-12-27

## 2021-09-08 PROBLEM — F43.29 ADJUSTMENT DISORDER WITH MIXED EMOTIONAL FEATURES: Status: ACTIVE | Noted: 2020-06-16

## 2021-09-08 PROCEDURE — 99215 OFFICE O/P EST HI 40 MIN: CPT | Mod: 25 | Performed by: INTERNAL MEDICINE

## 2021-09-08 PROCEDURE — G0463 HOSPITAL OUTPT CLINIC VISIT: HCPCS

## 2021-09-08 PROCEDURE — 80061 LIPID PANEL: CPT | Performed by: INTERNAL MEDICINE

## 2021-09-08 PROCEDURE — G0009 ADMIN PNEUMOCOCCAL VACCINE: HCPCS | Performed by: INTERNAL MEDICINE

## 2021-09-08 PROCEDURE — 90732 PPSV23 VACC 2 YRS+ SUBQ/IM: CPT | Performed by: INTERNAL MEDICINE

## 2021-09-08 PROCEDURE — 36415 COLL VENOUS BLD VENIPUNCTURE: CPT | Performed by: INTERNAL MEDICINE

## 2021-09-08 PROCEDURE — 99213 OFFICE O/P EST LOW 20 MIN: CPT | Mod: GC | Performed by: INTERNAL MEDICINE

## 2021-09-08 PROCEDURE — 80053 COMPREHEN METABOLIC PANEL: CPT | Performed by: INTERNAL MEDICINE

## 2021-09-08 RX ORDER — LEVOTHYROXINE SODIUM 50 UG/1
50 TABLET ORAL DAILY
COMMUNITY
Start: 2021-08-11 | End: 2021-10-01

## 2021-09-08 RX ORDER — NITROGLYCERIN 0.4 MG/1
TABLET SUBLINGUAL
Qty: 25 TABLET | Refills: 3 | Status: SHIPPED | OUTPATIENT
Start: 2021-09-08 | End: 2024-06-24

## 2021-09-08 RX ORDER — KETOCONAZOLE 20 MG/G
CREAM TOPICAL PRN
COMMUNITY
Start: 2019-11-04 | End: 2022-03-08

## 2021-09-08 RX ORDER — LEUPROLIDE ACETATE 7.5 MG
7.5 KIT SUBCUTANEOUS
COMMUNITY
End: 2021-12-08

## 2021-09-08 RX ORDER — ONDANSETRON 4 MG/1
4 TABLET, ORALLY DISINTEGRATING ORAL PRN
COMMUNITY
Start: 2021-06-15 | End: 2021-12-08

## 2021-09-08 ASSESSMENT — PAIN SCALES - GENERAL: PAINLEVEL: NO PAIN (0)

## 2021-09-08 ASSESSMENT — MIFFLIN-ST. JEOR
SCORE: 1598.55
SCORE: 1606.49

## 2021-09-08 NOTE — LETTER
9/8/2021      RE: Artis Galvin  [address]  [place] [address]       Dear Colleague,    Thank you for the opportunity to participate in the care of your patient, Artis Galvin, at the [facility] [place] at [facility]. Please see a copy of my visit note below.    Cardiology Clinic Note         Date of Service (when I saw the patient): 09/08/21    ASSESSMENT AND PLAN:     1. CAD per CTA/Chest discomfort.  Typical and atypical symptoms.  The patient had an electrically positive stress study but the perfusion images were normal.  His coronary CTA reflects mild to moderate CAD with borderline positive disease in the mid LAD.  Options include medical therapy and coronary angiography with revascularization.    He is currently on ASA 81 mg every day.    His resting HR is 55-65.   C/w Toprol XL 12.5 mg every day.   Imdur 30 mg every day.    If he has increasing chest discomfort (frequency, severity) in the future, I would recommend we proceed with coronary angiography.     2. Palpitations.  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 His daughter is ICU nurse and notes he has been recording a tachyarrhythmia (Kardia).  Trial of Toprol XL 12.5 mg every day.     3. Thoracic aortic aneurysm.   No documented familial history of TAAD.  CT (5/5/20) showed 4.9 cm aneurysm, unchanged from Nov 2019.  Repeat CTA scan one year later on 8/4/2020 showed stable TAA at 4.2 cm. Will need repeat CT in 1 year.    Increase losartan (if he takes 50mg go to 100 mg PO daily)      4. HTN.  SP 140s today. Increase losartan to 100 mg every day.  C/w Toprol XL 12.5 mg every day, imdur 30 mg PO daily .     5. Hypercholesterolemia.  Lipitor 40 mg every day.    6. Referred to endocrinology for thyroid mass        Baljit Saenz MD    Discussed with Dr Farr     History of Present Illness   Artis Galvin is a 75 year old male being seen today for  evaluation of chest discomfort and thoracic aortic aneurysm.   The patient's risk factor profile is: (-) HTN, (-) DM, (-) hypercholesterolemia, (-) tobacco use, (+) fam Hx premature CAD.  There is a family history of TAAD (paternal).  The patient has no history of CHF,  valvular heart disease.  The patient has no Hx of PAD or cerebrovascular disease.  Until recently the patient had not undergone prior cardiovascular evaluation and had never had an ECHO, stress study, cardiac catheterization, or EP study.   The patient had a coronary calcium score (Nov 2019) of 21 placing him at the 25th percentile.  This was prompted by a discussion of whether to initiate a statin in this asymptomatic male with elevated cholesterol.     The patient had been in his good state of health until 3/4/2020 when he presented to [facility] with disequilibrium, lightheadedness, diaphoresis, and disorientation.  He reported 4 episodes of left eye left upper visual field loss.  ECG normal.  Troponin negative.  MRI with and without contrast and MRA of the carotids were unremarkable.  He was hydrated and given Ativan and instructed to follow up with ophthalmology.     The patient returned to [facility] on 3/19/2020 with 9 days of URI symptoms, culminating in generalized malaise, lightheadedness, nausea.  He also noted chest discomfort while in the ambulance.  He was given SL NTG without effect.         Since that time the patient has described minor chest pain in the precordial area that waxed and waned.  The intensity was 1-2/10 and it did not interfere with his daily function.  He attributed it to working out.  The chest discomfort does not radiate.  He had noticed dyspnea, diaphoresis, and lightheadedness.     The patient denies exertional chest discomfort, GORMAN, PND, orthopnea, pedal edema, and syncope.  He has noted intermittent palpitations and has noticed  while resting in a chair.  The patient had a Biotelemetry  event monitor ordered by the ER which showed NSR with a single symptomatic episode of AVNRT as well as symptomatic PACs and PVCs.     He had coronary CTA (5/6/2020) showing single vessel CAD with borderline stenosis in mid LAD with FFR 0.79, diminishing to 0.75 in distal LAD.       On this visit he reports mild chest discomfort occasionally at rest that does not radiate, does not bother him, is not exacerbated by exertion and has not prompted him to take nitroglycerin. He denied any exertional symptoms with biking that he does 3 x per week and increasing his HR from 60 to 120 bpm.       Past Medical History    I have reviewed this patient's medical history and updated it with pertinent information if needed.   Past Medical History:   Diagnosis Date     Abdominal pain, unspecified site      Abnormal thyroid ultrasound      BBB (bundle branch block)     right     Chronic lymphocytic thyroiditis      Diverticulosis of colon (without mention of hemorrhage)      Localized superficial swelling, mass, or lump      Malignant neoplasm of prostate (H)      Nontoxic multinodular goiter      Other and unspecified hyperlipidemia      Other nonspecific abnormal serum enzyme levels      Pain in joint, shoulder region      Persistent hoarseness      Sarcoidosis      Scleritis, unspecified      Unspecified hypothyroidism      Unspecified vitamin D deficiency        Past Surgical History   I have reviewed this patient's surgical history and updated it with pertinent information if needed.  Past Surgical History:   Procedure Laterality Date     HC SHLDR ARTHROSCOP,PART ACROMIOPLAS Right     Description: Shoulder Arthroscopy, Space Decompression And Acromioplasty;  Recorded: 02/18/2014;     TX LAP,PROSTATECTOMY,RADICAL,W/NERVE SPARE,INCL ROBOTIC Bilateral 2/27/2018    Procedure: ROBOTIC ASSISTED RADICAL RETROPUBIC PROSTATECTOMY, BILATERAL PELVIC LYMPH NODE DISSECTION LYSIS OF ADHESIONS;  Surgeon: Wale Rodriguez MD;  Location: [facility];  Service: Urology     TN RADIAL KERATOTOMY      Description: Cornea Radial Keratotomy;  Recorded: 02/18/2014;       Prior to Admission Medications   Cannot display prior to admission medications because the patient has not been admitted in this contact.     Allergies   Allergies   Allergen Reactions     Ciprofloxacin Unknown     Levaquin [Levofloxacin] Unknown     Pollen Extracts [Pollen Extract] Other (See Comments)       Social History   I have reviewed this patient's social history and updated it with pertinent information if needed. Artis Galvin  reports that Sherrell Galvin has never smoked. Sherrell Galvin has never used smokeless tobacco. Sherrell Galvin reports current alcohol use. Sherrell Galvin reports that Sherrell Galvin does not use drugs.    Family History   I have reviewed this patient's family history and updated it with pertinent information if needed.   Family History   Problem Relation Age of Onset     No Known Problems Mother      Coronary Artery Disease Father      Aortic aneurysm Father      Prostate Cancer Maternal Grandfather      Prostate Cancer Maternal Uncle        Review of Systems   The 10 point Review of Systems is negative other than noted in the HPI or here.     Physical Exam                      Vital Signs with Ranges  BP: ()/()   Arterial Line BP: ()/()   0 lbs 0 oz    GEN: NAD, pleasant  HEENT: no icterus  CV: RRR, normal s1/s2, no murmurs/rubs/s3/s4, no heave.   CHEST: CTAB  ABD: soft, NT/ND, NABS  : no flank/suprapubic tenderness  NEURO: AA&Ox3, fluent/appropriate, motor grossly nonfocal  PSYCH: cooperative, affect appropriate    Data       CTA chest and abdomen 7/15/21   IMPRESSION:   1. Ascending thoracic aorta measures 42 mm in diameter.       2. 29.8 x 19.9 x 32.7 mm heterogenously enhancing right thyroid mass.   Further evaluation with ultrasound could be considered       3. Enhancing left inguinal lymph node measures 12 mm in short axis. No    iliac chain lymphadenopathy. Lymph node may be reactive but malignancy   cannot be excluded.       4. Prostatectomy postoperative changes. Sclerotic foci and pelvic   bones bilateral femoral heads may be bone islands, however, sclerotic   metastases cannot be excluded.       5. Sub-6 mm pulmonary nodules. Per Fleischner Society recommendations,   if the patient is at low risk for lung cancer, no further follow-up is   needed. If the patient is at high risk for lung cancer, optional 12   month follow-up chest CT could be considered     CTA chest with contrast 8/4/20   IMPRESSION:   1.  Stable 4.3 x 4.6 cm mid ascending aortic aneurysm.     LABS  10/24/2019  Chol 192    HDL 34       ECG:  3/19/2020  Sinus bradycardia with a rate of 59. Nonspecific T wave inversion in lead III, V1, unchanged from prior. Normal intervals. No ischemic ST or T wave morphology. When compared to previous EKG on March 4, 2020 there is no significant change. Impression: Sinus bradycardia, otherwise normal EKG.     EKG:  3/7/2018  NSR  RBBB     HOLTER: 3/30/2020  Normal Holter.  Intermittent right bundle-branch block aberrancy noted on 1 occasion.     CARDIAC MONITOR: Apr 2020  IMPRESSION:  1.  A multi-day  monitor was done from 04/09/2020 through 04/22/2020.  2.  Sinus rhythm is predominant; the average heart rate of 64 beats per minute, ranges between 50 to 145 beats per minute.  3.  No bradycardia or pauses seen.  On occasion, there is some AK   prolongation.  4.  Episode of supraventricular tachycardia noted 04/11 at 12:06.  This was associated with a heart racing sensation.  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  5.  The patient had symptoms of skipped beats associated with PACs or singular PVCs.  6.  A number of episodes of chest pain were not associated with   arrhythmias or ectopic beats. PVCs were rare and no complex ventricular ectopy is seen;  and the PVC burden was less  than 1%.   There is a  Short shruti of idioventricular type consecutive PVCs at rates less  than 100 beats per minute that was asymptomatic.  9.  Atrial ectopy is infrequent with less than 1% atrial ectopy.    DISCUSSION:    1.  Episode of supraventricular tachycardia at about 145 beats per minute.  This appears to be an AVNRT pattern and is triggered by a PAC and is associated with sensation of heart racing.  2.  Multiple symptoms of chest pain are not associated with arrhythmias or ectopy  but rhythm monitor is not a sensitive way to assess for myocardial   ischemia and further testing may be indicated to evaluate multiple episodes of chest pain.     ECHO: None     CARDIAC MRI: None    EXERCISE NUCLEAR STUDY: 3/27/2020  1.  There is no prior study for comparison.  2.  The patient's exercise capacity is above average.  No exercise induced   angina.  3.  Exercise stress ECG is negative for inducible myocardial ischemia.  4.  Exercise nuclear study is negative for inducible myocardial ischemia   or infarction.  5.  Normal left ventricular size, wall motion and function.  The calculated left ventricular ejection fraction is 70%.  6. Exercised: 85% APHR (147) achieved.  Exercised 9 minutes.    CARDIAC CA SCORE:  11/19/19   The total Agatston calcium score is 21. A calcium score in this range places the individual in the less than 25% percentile when compared to an  age and gender matched control group and implies a mild to moderate risk of cardiac events in the next ten years.  Moderate enlargement of the ascending thoracic aorta suggested. Measures 4.6 cm. Incompletely visualized on this examination. Consider further evaluation with CT scan of the thoracic aorta or MRI.     CT abdomen and pelvis: 7/26/18   1. Ascending aortic 4.6 cm aneurysm.  2. Please refer to cardiologist's dictation for the cardiac CT report.     CORONARY CTA: (5/5/2020)                                                                   IMPRESSION:  Borderline  hemodynamically significant mid LAD stenosis. CT-FFR is 0.79, which falls to 0.75 in the distal LAD.      FINDINGS:  1.  LAD: CT-FFR after the moderate-severe mLAD stenosis is 0.79, which falls to 0.75 in the distal LAD.   2.  LCX: CT-FFR in the small distal circumflex is 0.83.   3.  RCA: CT-FFR after the mild distal stenosis is 0.85.        IMPRESSION:  1.  Moderate-severe mid-LAD stenosis. Images sent to [facility]m XR for CT-FFR analysis, report to follow separately.   2.  Total Agatston score 27.6 placing the patient in the 16th  percentile when compared to age and gender matched control group.  3.  Mildly dilated proximal ascending aorta measuring 4.4 cm.  4.  Please review Radiology report for incidental noncardiac findings that will follow separately.     FINDINGS:     CORONARY CALCIUM SCORE     Total Agatston calcium score: 27.6   Left main: 0  Left anterior descendin.3  Left circumflex: 0  Right coronary artery: 2.3   This places the patient in the 16th  percentile when compared to age and gender matched control group.     CORONARY ANGIOGRAPHY     DOMINANCE:   Right dominant system.   Normal coronary origins and course.     LEFT MAIN:   The LM is a large caliber but short vessel.   The left main arises normally from the left coronary cusp and is widely patent without detectable atherosclerosis or stenosis.      LEFT ANTERIOR DESCENDING:   The LAD is a large caliber vessel with a type III morphology. It gives rise to a small caliber D1, moderate caliber D2, and a small caliber D3.      Proximal LAD: Minimal (<25%) stenosis composed of calcified plaque.  Mid LAD: Moderate-severe (50-69%) stenosis composed of noncalcified plaque.  The remainder of the left anterior descending and its major diagonal branches are patent with minimal luminal irregularities.     LEFT CIRCUMFLEX:   The LCx is a moderate caliber vessel that gives rise to a moderate caliber OM1.   The caliber of the LCx tapers significantly after the  origin of the OM1.  The left circumflex and its major branches are widely patent without detectable atherosclerosis or stenosis.     RIGHT CORONARY ARTERY:   The RCA is a large caliber dominant vessel.   Distal RCA: Mild (25-49%) stenosis composed of noncalcified plaque.  The remainder of the right coronary and the posterior descending  artery are patent with minimal luminal irregularities.     ADDITIONAL FINDINGS:   Normal pulmonary venous anatomy with all four pulmonary veins draining into the left atrium.    There is no left ventricular mass or thrombus.   Normal pericardial thickness. There is no pericardial effusion.  Please review Radiology report for incidental noncardiac findings that will follow separately.      THORACIC AORTA EXAMINATION  1.  The proximal ascending aorta is mildly dilated (43.6 x 42.4 mm, indexed value 1.89 cm/m2). The aortic root, ascending aorta, arch, and descending aorta are normal in diameter. There is no dissection seen.  2.  The aortic arch is left sided. There is normal branching of the arch vessels. There is no coarctation seen.  3.  The main and proximal branch pulmonary arteries are normal in size.   4.  The systemic venous connections are normal.   5.  The cardiac chambers demonstrate normal atrioventricular and ventriculoarterial concordance.  6.  The pericardium is unremarkable.  There is no pericardial effusion.   7.  There is no intracardiac thrombus.     Bi-orthogonal luminal aortic dimensions are:   Aortic root measures 37.4 mm x 35.8 mm at the level of sinuses of Valsalva.   The sinotubular junction measures 38.0 mm x 36.1 mm.    Proximal ascending aorta measures 43.6 mm x 42.4 mm, at the level of the main pulmonary artery.   Distal ascending aorta measures 43.4 mm x 35.6 mm, proximal to the takeoff of the brachiocephalic artery.   Mid aortic arch measures 34.0 mm x 33.1 mm, proximal to the takeoff of the left subclavian artery.   Proximal descending thoracic aortic  measures 31.9 mm x 29.9 mm, distal to the takeoff of the left subclavian artery.   Mid descending thoracic aorta measures 27.1 mm x 26.0 mm, at the level of the pulmonary veins.   Distal thoracic aorta measures 23.6 mm x 22.5 mm, at the level of the diaphragm.     RADIOLOGY READ:  Impression:  1. Sequelae of previous granulomatous disease. No acute airspace disease.  2. Ascending aortic aneurysm measuring 4.9 cm. This is unchanged since 11/19/2019.      CARDIAC CATH: None       Answers for HPI/ROS submitted by the patient on 9/5/2021  General Symptoms: Yes  Skin Symptoms: No  HENT Symptoms: No  EYE SYMPTOMS: No  HEART SYMPTOMS: No  LUNG SYMPTOMS: No  INTESTINAL SYMPTOMS: Yes  URINARY SYMPTOMS: Yes  GYNECOLOGIC SYMPTOMS: No  REPRODUCTIVE SYMPTOMS: No  BREAST SYMPTOMS: No  SKELETAL SYMPTOMS: Yes  BLOOD SYMPTOMS: No  NERVOUS SYSTEM SYMPTOMS: No  MENTAL HEALTH SYMPTOMS: No  Fever: No  Loss of appetite: No  Weight loss: No  Weight gain: No  Fatigue: Yes  Night sweats: Yes  Chills: No  Increased stress: No  Excessive hunger: No  Excessive thirst: No  Feeling hot or cold when others believe the temperature is normal: No  Loss of height: No  Post-operative complications: No  Surgical site pain: No  Hallucinations: No  Change in or Loss of Energy: No  Hyperactivity: No  Confusion: No  Heart burn or indigestion: No  Nausea: No  Vomiting: No  Abdominal pain: Yes  Bloating: No  Constipation: No  Diarrhea: Yes  Blood in stool: No  Black stools: No  Rectal or Anal pain: No  Fecal incontinence: No  Yellowing of skin or eyes: No  Vomit with blood: No  Trouble holding urine or incontinence: Yes  Pain or burning: No  Trouble starting or stopping: Yes  Increased frequency of urination: No  Blood in urine: No  Decreased frequency of urination: No  Frequent nighttime urination: Yes  Flank pain: No  Back pain: Yes  Muscle aches: Yes  Neck pain: No  Swollen joints: No  Joint pain: Yes  Bone pain: No  Muscle cramps: No  Muscle weakness:  No  Joint stiffness: No  Bone fracture: No        Attestation with edits by Mir Farr MD at 9/8/2021  1:02 PM:  Physician Attestation   I, Mir Farr MD, saw this patient with the resident and agree with the resident/fellow's findings and plan of care as documented in the note.      I personally reviewed vital signs, medications, labs, and imaging.    Key findings: 76 year old gentleman with a history of mild to moderate CAD, dilated ascending aorta, who is presenting for follow up today. He denies any angina or symptoms. We will increase his losartan today as his BP is elevated. We will continue to monitor his CAD and ascending aortic aneurysm. He will repeat a CTA in a year. Annual follow up.    Mir Farr MD  Date of Service (when I saw the patient): 09/08/21
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
## 2021-09-08 NOTE — PATIENT INSTRUCTIONS
Patient Instructions:  It was a pleasure to see you in the cardiology clinic today.      If you have any questions, call  Jennifer Henson RN, at [phone].  Press Option #1 for the [facility], and then press Option #4  We are encouraging the use of [employer] to communicate with your HealthCare Provider    Note the new medications: none  Stop the following medications: none    The results from today include: none  Please follow up with Dr. Cazares in one year with CTA of chest, abdomen and pelvis      If you have an urgent need after hours (8:00 am to 4:30 pm) please call [phone] and ask for the cardiology fellow on call.     WRITTEN DISCHARGE INFORMATION:     Follow-up Information     Yo Jay MD On 2/23/2018.    Specialty:  Family Medicine  Why:  out patient services:  9:15 AM follow up from the hospital  Contact information:  [address] [place] [address]  [phone]             Rk Welsh MD In 1 week.    Specialties:  Internal Medicine, Oncology, Hematology and Oncology  Why:  myelodysplasia  Contact information:  [address]  [address]  [place] [address]  [phone]               Things that YOU are responsible for to Manage Your Care At Home:  1. Getting your prescriptions filled.  2. Taking you medications as directed. DO NOT MISS ANY DOSES!  3. Going to your follow-up doctor appointments. This is important because it allows the doctor to monitor your progress and to determine if any changes need to be made to your treatment plan.                                                          Help at Home  After discharge for assistance [facility] On Call Nurse Care Line 24/7 assistance  [phone]   Thank you for choosing [facility] for your care.  Please answer any calls you may receive from [facility] we want to continue to support you as you manage your healthcare needs.  Sincerely, Your [facility] Healthcare Manager is,  Anna ROSSI [facility] [phone]]

## 2021-09-08 NOTE — PROGRESS NOTES
Cardiology Clinic Note         Date of Service (when I saw the patient): 09/08/21    ASSESSMENT AND PLAN:     1. CAD per CTA/Chest discomfort.  Typical and atypical symptoms.  The patient had an electrically positive stress study but the perfusion images were normal.  His coronary CTA reflects mild to moderate CAD with borderline positive disease in the mid LAD.  Options include medical therapy and coronary angiography with revascularization.    He is currently on ASA 81 mg every day.    His resting HR is 55-65.   C/w Toprol XL 12.5 mg every day.   Imdur 30 mg every day.    If he has increasing chest discomfort (frequency, severity) in the future, I would recommend we proceed with coronary angiography.     2. Palpitations.  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 His daughter is ICU nurse and notes he has been recording a tachyarrhythmia (Kardia).  Trial of Toprol XL 12.5 mg every day.     3. Thoracic aortic aneurysm.   No documented familial history of TAAD.  CT (5/5/20) showed 4.9 cm aneurysm, unchanged from Nov 2019.  Repeat CTA scan one year later on 8/4/2020 showed stable TAA at 4.2 cm. Will need repeat CT in 1 year.    Increase losartan (if he takes 50mg go to 100 mg PO daily)      4. HTN.  SP 140s today. Increase losartan to 100 mg every day.  C/w Toprol XL 12.5 mg every day, imdur 30 mg PO daily .     5. Hypercholesterolemia.  Lipitor 40 mg every day.    6. Referred to endocrinology for thyroid mass        Baljit Saenz MD    Discussed with Dr Farr     History of Present Illness   Artis Galvin is a 75 year old male being seen today for evaluation of chest discomfort and thoracic aortic aneurysm.   The patient's risk factor profile is: (-) HTN, (-) DM, (-) hypercholesterolemia, (-) tobacco use, (+) fam Hx premature CAD.  There is a family history of TAAD (paternal).  The patient has no history of CHF,  valvular heart disease.  The patient has no Hx of PAD or cerebrovascular disease.   Until recently the patient had not undergone prior cardiovascular evaluation and had never had an ECHO, stress study, cardiac catheterization, or EP study.   The patient had a coronary calcium score (Nov 2019) of 21 placing him at the 25th percentile.  This was prompted by a discussion of whether to initiate a statin in this asymptomatic male with elevated cholesterol.     The patient had been in his good state of health until 3/4/2020 when he presented to [facility] with disequilibrium, lightheadedness, diaphoresis, and disorientation.  He reported 4 episodes of left eye left upper visual field loss.  ECG normal.  Troponin negative.  MRI with and without contrast and MRA of the carotids were unremarkable.  He was hydrated and given Ativan and instructed to follow up with ophthalmology.     The patient returned to [facility] on 3/19/2020 with 9 days of URI symptoms, culminating in generalized malaise, lightheadedness, nausea.  He also noted chest discomfort while in the ambulance.  He was given SL NTG without effect.         Since that time the patient has described minor chest pain in the precordial area that waxed and waned.  The intensity was 1-2/10 and it did not interfere with his daily function.  He attributed it to working out.  The chest discomfort does not radiate.  He had noticed dyspnea, diaphoresis, and lightheadedness.     The patient denies exertional chest discomfort, GORMAN, PND, orthopnea, pedal edema, and syncope.  He has noted intermittent palpitations and has noticed  while resting in a chair.  The patient had a Biotelemetry event monitor ordered by the ER which showed NSR with a single symptomatic episode of AVNRT as well as symptomatic PACs and PVCs.     He had coronary CTA (5/6/2020) showing single vessel CAD with borderline stenosis in mid LAD with FFR 0.79, diminishing to 0.75 in distal LAD.       On this visit he reports mild chest discomfort occasionally at rest that  does not radiate, does not bother him, is not exacerbated by exertion and has not prompted him to take nitroglycerin. He denied any exertional symptoms with biking that he does 3 x per week and increasing his HR from 60 to 120 bpm.       Past Medical History    I have reviewed this patient's medical history and updated it with pertinent information if needed.   Past Medical History:   Diagnosis Date    Abdominal pain, unspecified site     Abnormal thyroid ultrasound     BBB (bundle branch block)     right    Chronic lymphocytic thyroiditis     Diverticulosis of colon (without mention of hemorrhage)     Localized superficial swelling, mass, or lump     Malignant neoplasm of prostate (H)     Nontoxic multinodular goiter     Other and unspecified hyperlipidemia     Other nonspecific abnormal serum enzyme levels     Pain in joint, shoulder region     Persistent hoarseness     Sarcoidosis     Scleritis, unspecified     Unspecified hypothyroidism     Unspecified vitamin D deficiency        Past Surgical History   I have reviewed this patient's surgical history and updated it with pertinent information if needed.  Past Surgical History:   Procedure Laterality Date    HC SHLDR ARTHROSCOP,PART ACROMIOPLAS Right     Description: Shoulder Arthroscopy, Space Decompression And Acromioplasty;  Recorded: 02/18/2014;    PA LAP,PROSTATECTOMY,RADICAL,W/NERVE SPARE,INCL ROBOTIC Bilateral 2/27/2018    Procedure: ROBOTIC ASSISTED RADICAL RETROPUBIC PROSTATECTOMY, BILATERAL PELVIC LYMPH NODE DISSECTION LYSIS OF ADHESIONS;  Surgeon: Wale Rodriguez MD;  Location: [facility] [place];  Service: Urology    PA RADIAL KERATOTOMY      Description: Cornea Radial Keratotomy;  Recorded: 02/18/2014;       Prior to Admission Medications   Cannot display prior to admission medications because the patient has not been admitted in this contact.     Allergies   Allergies   Allergen Reactions    Ciprofloxacin Unknown    Levaquin [Levofloxacin] Unknown     Pollen Extracts [Pollen Extract] Other (See Comments)       Social History   I have reviewed this patient's social history and updated it with pertinent information if needed. Artis Galvin  reports that Sherrell Galvin has never smoked. Sherrell Galvin has never used smokeless tobacco. Sherrell Galvin reports current alcohol use. Sherrell Galvin reports that Sherrell Galvin does not use drugs.    Family History   I have reviewed this patient's family history and updated it with pertinent information if needed.   Family History   Problem Relation Age of Onset    No Known Problems Mother     Coronary Artery Disease Father     Aortic aneurysm Father     Prostate Cancer Maternal Grandfather     Prostate Cancer Maternal Uncle        Review of Systems   The 10 point Review of Systems is negative other than noted in the HPI or here.     Physical Exam                      Vital Signs with Ranges  BP: ()/()   Arterial Line BP: ()/()   0 lbs 0 oz    GEN: NAD, pleasant  HEENT: no icterus  CV: RRR, normal s1/s2, no murmurs/rubs/s3/s4, no heave.   CHEST: CTAB  ABD: soft, NT/ND, NABS  : no flank/suprapubic tenderness  NEURO: AA&Ox3, fluent/appropriate, motor grossly nonfocal  PSYCH: cooperative, affect appropriate    Data       CTA chest and abdomen 7/15/21   IMPRESSION:   1. Ascending thoracic aorta measures 42 mm in diameter.       2. 29.8 x 19.9 x 32.7 mm heterogenously enhancing right thyroid mass.   Further evaluation with ultrasound could be considered       3. Enhancing left inguinal lymph node measures 12 mm in short axis. No   iliac chain lymphadenopathy. Lymph node may be reactive but malignancy   cannot be excluded.       4. Prostatectomy postoperative changes. Sclerotic foci and pelvic   bones bilateral femoral heads may be bone islands, however, sclerotic   metastases cannot be excluded.       5. Sub-6 mm pulmonary nodules. Per Fleischner Society recommendations,   if the patient is at low risk for  lung cancer, no further follow-up is   needed. If the patient is at high risk for lung cancer, optional 12   month follow-up chest CT could be considered     CTA chest with contrast 8/4/20   IMPRESSION:   1.  Stable 4.3 x 4.6 cm mid ascending aortic aneurysm.     LABS  10/24/2019  Chol 192    HDL 34       ECG:  3/19/2020  Sinus bradycardia with a rate of 59. Nonspecific T wave inversion in lead III, V1, unchanged from prior. Normal intervals. No ischemic ST or T wave morphology. When compared to previous EKG on March 4, 2020 there is no significant change. Impression: Sinus bradycardia, otherwise normal EKG.     EKG:  3/7/2018  NSR  RBBB     HOLTER: 3/30/2020  Normal Holter.  Intermittent right bundle-branch block aberrancy noted on 1 occasion.     CARDIAC MONITOR: Apr 2020  IMPRESSION:  1.  A multi-day  monitor was done from 04/09/2020 through 04/22/2020.  2.  Sinus rhythm is predominant; the average heart rate of 64 beats per minute, ranges between 50 to 145 beats per minute.  3.  No bradycardia or pauses seen.  On occasion, there is some KS   prolongation.  4.  Episode of supraventricular tachycardia noted 04/11 at 12:06.  This was associated with a heart racing sensation.  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  5.  The patient had symptoms of skipped beats associated with PACs or singular PVCs.  6.  A number of episodes of chest pain were not associated with   arrhythmias or ectopic beats. PVCs were rare and no complex ventricular ectopy is seen;  and the PVC burden was less  than 1%.  There is a  Short shruti of idioventricular type consecutive PVCs at rates less  than 100 beats per minute that was asymptomatic.  9.  Atrial ectopy is infrequent with less than 1% atrial ectopy.    DISCUSSION:    1.  Episode of supraventricular tachycardia at about 145 beats per minute.  This appears to be an AVNRT pattern and is triggered by a PAC and is associated with sensation of  heart racing.  2.  Multiple symptoms of chest pain are not associated with arrhythmias or ectopy  but rhythm monitor is not a sensitive way to assess for myocardial   ischemia and further testing may be indicated to evaluate multiple episodes of chest pain.     ECHO: None     CARDIAC MRI: None    EXERCISE NUCLEAR STUDY: 3/27/2020  1.  There is no prior study for comparison.  2.  The patient's exercise capacity is above average.  No exercise induced   angina.  3.  Exercise stress ECG is negative for inducible myocardial ischemia.  4.  Exercise nuclear study is negative for inducible myocardial ischemia   or infarction.  5.  Normal left ventricular size, wall motion and function.  The calculated left ventricular ejection fraction is 70%.  6. Exercised: 85% APHR (147) achieved.  Exercised 9 minutes.    CARDIAC CA SCORE:  11/19/19   The total Agatston calcium score is 21. A calcium score in this range places the individual in the less than 25% percentile when compared to an  age and gender matched control group and implies a mild to moderate risk of cardiac events in the next ten years.  Moderate enlargement of the ascending thoracic aorta suggested. Measures 4.6 cm. Incompletely visualized on this examination. Consider further evaluation with CT scan of the thoracic aorta or MRI.     CT abdomen and pelvis: 7/26/18   1. Ascending aortic 4.6 cm aneurysm.  2. Please refer to cardiologist's dictation for the cardiac CT report.     CORONARY CTA: (5/5/2020)                                                                   IMPRESSION:  Borderline hemodynamically significant mid LAD stenosis. CT-FFR is 0.79, which falls to 0.75 in the distal LAD.      FINDINGS:  1.  LAD: CT-FFR after the moderate-severe mLAD stenosis is 0.79, which falls to 0.75 in the distal LAD.   2.  LCX: CT-FFR in the small distal circumflex is 0.83.   3.  RCA: CT-FFR after the mild distal stenosis is 0.85.        IMPRESSION:  1.  Moderate-severe mid-LAD  stenosis. Images sent to [facility] for CT-FFR analysis, report to follow separately.   2.  Total Agatston score 27.6 placing the patient in the 16th  percentile when compared to age and gender matched control group.  3.  Mildly dilated proximal ascending aorta measuring 4.4 cm.  4.  Please review Radiology report for incidental noncardiac findings that will follow separately.     FINDINGS:     CORONARY CALCIUM SCORE     Total Agatston calcium score: 27.6   Left main: 0  Left anterior descendin.3  Left circumflex: 0  Right coronary artery: 2.3   This places the patient in the 16th  percentile when compared to age and gender matched control group.     CORONARY ANGIOGRAPHY     DOMINANCE:   Right dominant system.   Normal coronary origins and course.     LEFT MAIN:   The LM is a large caliber but short vessel.   The left main arises normally from the left coronary cusp and is widely patent without detectable atherosclerosis or stenosis.      LEFT ANTERIOR DESCENDING:   The LAD is a large caliber vessel with a type III morphology. It gives rise to a small caliber D1, moderate caliber D2, and a small caliber D3.      Proximal LAD: Minimal (<25%) stenosis composed of calcified plaque.  Mid LAD: Moderate-severe (50-69%) stenosis composed of noncalcified plaque.  The remainder of the left anterior descending and its major diagonal branches are patent with minimal luminal irregularities.     LEFT CIRCUMFLEX:   The LCx is a moderate caliber vessel that gives rise to a moderate caliber OM1.   The caliber of the LCx tapers significantly after the origin of the OM1.  The left circumflex and its major branches are widely patent without detectable atherosclerosis or stenosis.     RIGHT CORONARY ARTERY:   The RCA is a large caliber dominant vessel.   Distal RCA: Mild (25-49%) stenosis composed of noncalcified plaque.  The remainder of the right coronary and the posterior descending  artery are patent with minimal luminal  irregularities.     ADDITIONAL FINDINGS:   Normal pulmonary venous anatomy with all four pulmonary veins draining into the left atrium.    There is no left ventricular mass or thrombus.   Normal pericardial thickness. There is no pericardial effusion.  Please review Radiology report for incidental noncardiac findings that will follow separately.      THORACIC AORTA EXAMINATION  1.  The proximal ascending aorta is mildly dilated (43.6 x 42.4 mm, indexed value 1.89 cm/m2). The aortic root, ascending aorta, arch, and descending aorta are normal in diameter. There is no dissection seen.  2.  The aortic arch is left sided. There is normal branching of the arch vessels. There is no coarctation seen.  3.  The main and proximal branch pulmonary arteries are normal in size.   4.  The systemic venous connections are normal.   5.  The cardiac chambers demonstrate normal atrioventricular and ventriculoarterial concordance.  6.  The pericardium is unremarkable.  There is no pericardial effusion.   7.  There is no intracardiac thrombus.     Bi-orthogonal luminal aortic dimensions are:   Aortic root measures 37.4 mm x 35.8 mm at the level of sinuses of Valsalva.   The sinotubular junction measures 38.0 mm x 36.1 mm.    Proximal ascending aorta measures 43.6 mm x 42.4 mm, at the level of the main pulmonary artery.   Distal ascending aorta measures 43.4 mm x 35.6 mm, proximal to the takeoff of the brachiocephalic artery.   Mid aortic arch measures 34.0 mm x 33.1 mm, proximal to the takeoff of the left subclavian artery.   Proximal descending thoracic aortic measures 31.9 mm x 29.9 mm, distal to the takeoff of the left subclavian artery.   Mid descending thoracic aorta measures 27.1 mm x 26.0 mm, at the level of the pulmonary veins.   Distal thoracic aorta measures 23.6 mm x 22.5 mm, at the level of the diaphragm.     RADIOLOGY READ:  Impression:  1. Sequelae of previous granulomatous disease. No acute airspace disease.  2. Ascending  aortic aneurysm measuring 4.9 cm. This is unchanged since 11/19/2019.      CARDIAC CATH: None       Answers for HPI/ROS submitted by the patient on 9/5/2021  General Symptoms: Yes  Skin Symptoms: No  HENT Symptoms: No  EYE SYMPTOMS: No  HEART SYMPTOMS: No  LUNG SYMPTOMS: No  INTESTINAL SYMPTOMS: Yes  URINARY SYMPTOMS: Yes  GYNECOLOGIC SYMPTOMS: No  REPRODUCTIVE SYMPTOMS: No  BREAST SYMPTOMS: No  SKELETAL SYMPTOMS: Yes  BLOOD SYMPTOMS: No  NERVOUS SYSTEM SYMPTOMS: No  MENTAL HEALTH SYMPTOMS: No  Fever: No  Loss of appetite: No  Weight loss: No  Weight gain: No  Fatigue: Yes  Night sweats: Yes  Chills: No  Increased stress: No  Excessive hunger: No  Excessive thirst: No  Feeling hot or cold when others believe the temperature is normal: No  Loss of height: No  Post-operative complications: No  Surgical site pain: No  Hallucinations: No  Change in or Loss of Energy: No  Hyperactivity: No  Confusion: No  Heart burn or indigestion: No  Nausea: No  Vomiting: No  Abdominal pain: Yes  Bloating: No  Constipation: No  Diarrhea: Yes  Blood in stool: No  Black stools: No  Rectal or Anal pain: No  Fecal incontinence: No  Yellowing of skin or eyes: No  Vomit with blood: No  Trouble holding urine or incontinence: Yes  Pain or burning: No  Trouble starting or stopping: Yes  Increased frequency of urination: No  Blood in urine: No  Decreased frequency of urination: No  Frequent nighttime urination: Yes  Flank pain: No  Back pain: Yes  Muscle aches: Yes  Neck pain: No  Swollen joints: No  Joint pain: Yes  Bone pain: No  Muscle cramps: No  Muscle weakness: No  Joint stiffness: No  Bone fracture: No

## 2021-09-08 NOTE — LETTER
September 9, 2021      Artis Galvin  [address] [place] [address]        Dear  Kamar,    We are writing to inform you of your test results.    More exercise and less carbs and fat Big Binu. Everything else here looks good.       Resulted Orders   Comprehensive metabolic panel (BMP + Alb, Alk Phos, ALT, AST, Total. Bili, TP)   Result Value Ref Range    Sodium 142 136 - 145 mmol/L    Potassium 3.8 3.5 - 5.0 mmol/L    Chloride 108 (H) 98 - 107 mmol/L    Carbon Dioxide (CO2) 25 22 - 31 mmol/L    Anion Gap 9 5 - 18 mmol/L    Urea Nitrogen 18 8 - 28 mg/dL    Creatinine 0.88 0.60 - 1.30 mg/dL      Comment:      Age                     Creatinine (mg/dL)    0-22 Days               0.30-1.00    22 Days to 13 Months    0.10-0.60     13 Months to 6 Years    Female 0.10-0.50    Male 0.10-0.60    6 Years to 11 Years     Female 0.20-0.60    Male 0.20-0.70    11 Years to 16 Years    Female 0.40-0.70    Male 0.30-0.90    16 Years and Older      Female 0.60-1.10    Male 0.70-1.30         Calcium 9.3 8.5 - 10.5 mg/dL    Glucose 104 70 - 125 mg/dL    Alkaline Phosphatase 92 45 - 120 U/L    AST 21 0 - 40 U/L    ALT 31 0 - 45 U/L    Protein Total 6.7 6.0 - 8.0 g/dL    Albumin 3.5 3.5 - 5.0 g/dL    Bilirubin Total 0.4 0.0 - 1.0 mg/dL    GFR Estimate 84 >60 mL/min/1.73m2      Comment:      GFR not calculated when sex unspecified or nonbinary.  As of July 11, 2021, eGFR is calculated by the CKD-EPI creatinine equation, without race adjustment. eGFR can be influenced by muscle mass, exercise, and diet. The reported eGFR is an estimation only and is only applicable if the renal function is stable.    Narrative    The sex of this patient cannot be reliably determined based on discrepancies in demographics (legal sex, sex assigned at birth, gender identity).  Both male and female reference ranges are provided where applicable.  Careful evaluation of the patient s results as compared to the gender specific reference  intervals is required in this setting.    Lipid panel reflex to direct LDL Fasting   Result Value Ref Range    Cholesterol 159 <=199 mg/dL    Triglycerides 362 (H) <=149 mg/dL    Direct Measure HDL 35 (L) >=40 mg/dL      Comment:      HDL Cholesterol Reference Range:     0-2 years:   No reference ranges established for patients under 2 years old  at [facility] for lipid analytes.    2-8 years:  Greater than 45 mg/dL     18 years and older:   Female: Greater than or equal to 50 mg/dL   Male:   Greater than or equal to 40 mg/dL    LDL Cholesterol Calculated 52 <=129 mg/dL    Narrative    The sex of this patient cannot be reliably determined based on discrepancies in demographics (legal sex, sex assigned at birth, gender identity).  Both male and female reference ranges are provided where applicable.  Careful evaluation of the patient s results as compared to the gender specific reference intervals is required in this setting.        If you have any questions or concerns, please call the clinic at the number listed above.       Sincerely,      Angel Hernandez MD

## 2021-09-09 LAB
ALBUMIN SERPL-MCNC: 3.5 G/DL (ref 3.5–5)
ALP SERPL-CCNC: 92 U/L (ref 45–120)
ALT SERPL W P-5'-P-CCNC: 31 U/L (ref 0–45)
ANION GAP SERPL CALCULATED.3IONS-SCNC: 9 MMOL/L (ref 5–18)
AST SERPL W P-5'-P-CCNC: 21 U/L (ref 0–40)
BILIRUB SERPL-MCNC: 0.4 MG/DL (ref 0–1)
BUN SERPL-MCNC: 18 MG/DL (ref 8–28)
CALCIUM SERPL-MCNC: 9.3 MG/DL (ref 8.5–10.5)
CHLORIDE BLD-SCNC: 108 MMOL/L (ref 98–107)
CHOLEST SERPL-MCNC: 159 MG/DL
CO2 SERPL-SCNC: 25 MMOL/L (ref 22–31)
CREAT SERPL-MCNC: 0.88 MG/DL (ref 0.6–1.3)
GFR SERPL CREATININE-BSD FRML MDRD: 84 ML/MIN/1.73M2
GLUCOSE BLD-MCNC: 104 MG/DL (ref 70–125)
HDLC SERPL-MCNC: 35 MG/DL
LDLC SERPL CALC-MCNC: 52 MG/DL
POTASSIUM BLD-SCNC: 3.8 MMOL/L (ref 3.5–5)
PROT SERPL-MCNC: 6.7 G/DL (ref 6–8)
SODIUM SERPL-SCNC: 142 MMOL/L (ref 136–145)
TRIGL SERPL-MCNC: 362 MG/DL

## 2021-09-14 ENCOUNTER — TELEPHONE (OUTPATIENT)
Dept: CARDIOLOGY | Facility: CLINIC | Age: 76
End: 2021-09-14

## 2021-09-14 DIAGNOSIS — K57.32 DIVERTICULITIS OF COLON: ICD-10-CM

## 2021-09-14 NOTE — TELEPHONE ENCOUNTER
At last office visit losartan was increased to 50 mg by mouth daily, he states that he has been 150 systolic, diastolic at 80. He is going to wait another week and send me a BP log before increasing losartan to 100 daily if needed.

## 2021-09-17 ENCOUNTER — ANCILLARY PROCEDURE (OUTPATIENT)
Dept: BONE DENSITY | Facility: CLINIC | Age: 76
End: 2021-09-17
Attending: INTERNAL MEDICINE
Payer: COMMERCIAL

## 2021-09-17 DIAGNOSIS — Z79.811 LONG TERM (CURRENT) USE OF AROMATASE INHIBITORS: ICD-10-CM

## 2021-09-17 DIAGNOSIS — C61 MALIGNANT NEOPLASM OF PROSTATE (H): ICD-10-CM

## 2021-09-17 PROCEDURE — 77080 DXA BONE DENSITY AXIAL: CPT

## 2021-09-20 ENCOUNTER — TELEPHONE (OUTPATIENT)
Dept: INTERNAL MEDICINE | Facility: CLINIC | Age: 76
End: 2021-09-20

## 2021-09-20 DIAGNOSIS — E78.00 HYPERCHOLESTEROLEMIA: ICD-10-CM

## 2021-09-20 RX ORDER — ATORVASTATIN CALCIUM 40 MG/1
40 TABLET, FILM COATED ORAL DAILY
Qty: 90 TABLET | Refills: 3 | Status: ON HOLD | OUTPATIENT
Start: 2021-09-20 | End: 2023-09-01

## 2021-09-20 NOTE — TELEPHONE ENCOUNTER
Routing refill request to provider for review/approval because:    Pt requests 90-Day Supply RX for atorvastatin (LIPITOR) 40 MG tablet: Take 1 tablet (40 mg) by mouth daily.    Please, send new refill to Mail Delivery pharmacy on file: [facility] [address] [facility], [address]    Thank you  Brynn Taylor, PharmD  [facility]

## 2021-09-22 DIAGNOSIS — K57.32 DIVERTICULITIS OF COLON: ICD-10-CM

## 2021-09-22 NOTE — TELEPHONE ENCOUNTER
----- Message from Angel Hernandez MD sent at 9/21/2021  8:52 PM CDT -----  Team - please call patient with results.    Binu, unfortunately, you have osteoporosis.  I am going to ask that you have the endocrinologist address this with you as well, and get you on something for fracture prevention.

## 2021-09-22 NOTE — TELEPHONE ENCOUNTER
Contacted Patient and relayed message below. Patient states understanding. Patient states his Rx for Augmentin was sent in but he only received 10 tablets and the last time it was for 14 tablets so he will need 4 more tablets or what Dr. Hernandez thinks he should have.    Rx pended with amount not filled in.

## 2021-09-24 ENCOUNTER — MYC MEDICAL ADVICE (OUTPATIENT)
Dept: CARDIOLOGY | Facility: CLINIC | Age: 76
End: 2021-09-24

## 2021-09-24 DIAGNOSIS — I10 ESSENTIAL HYPERTENSION, BENIGN: ICD-10-CM

## 2021-09-24 DIAGNOSIS — I71.21 ASCENDING AORTIC ANEURYSM (H): Primary | ICD-10-CM

## 2021-09-24 RX ORDER — LOSARTAN POTASSIUM 100 MG/1
100 TABLET ORAL DAILY
Qty: 90 TABLET | Refills: 3 | Status: SHIPPED | OUTPATIENT
Start: 2021-09-24 | End: 2021-10-19

## 2021-09-24 NOTE — TELEPHONE ENCOUNTER
Date: 9/24/2021    Time of Call: 12:53 PM     Diagnosis:  hypertension     [ VORB ] Ordering provider: Dr. Farr   Order: increase losartan to 100 mg daily     Order received by: Jennifer Henson RN     Follow-up/additional notes: patient understands and agrees to the plan. He will send in another weeks worth of BPs on the increased dose

## 2021-09-30 NOTE — PROGRESS NOTES
Endocrinology Note         Artis is a 76 year old adult presents today for thyroid nodule and osteoporosis    HPI  Artis is a 76 year old adult with metastatic prostate cancer s/p prostatectomy and radiation, hypothyroidism secondary to Hashimoto's thyroiditis, hypertension, AAA, presents today for thyroid nodule and osteoporosis    He was noted to have right thyroid mass from CT C/A/P in July 2021 that showed 29.8 x 19.9 x 32.7 mm heterogenously enhancing right thyroid mass. He denied any difficulty swallowing or breathing. He recalled having ultrasound thyroid in 2014 at [facility] after having voice changed and was told that he had Hashimoto's thyroiditis. Reviewed his TFT, he has mild subclinical hypothyroidism with TSH of 6.4 on 6/4/2021. He has positive TPO Ab of 355. He was started on levothyroxine 50 mcg daily with normalization of TFT. He is wondering whether he needs to stay on thyroid replacement or not. He could not tell the difference in his symptoms before and after starting on thyroid hormone. He is currently having hot flash due to being on Lupron for prostate cancer.     Regarding osteoporosis: he did have DXA scan 9/2021 that showed the lowest T-score of -2.7 at the right hip. He has not had DXA previously.    Bone Health:  Family history of osteoporosis or fracture: his sister has osteoporosis  BMI: 27.3  Steroid use: none  Chronic disease: metastatic prostate cancer s/p prostatectomy and radiation  Calcium intake:none  Vitamin D intake: none  Alcohol: 6 packs of beer per 6 weeks  Smoking: none  Currently on Lupron treatment (GnRH analog)    Past Medical History  Past Medical History:   Diagnosis Date     Abdominal pain, unspecified site      Abnormal thyroid ultrasound      BBB (bundle branch block)     right     Chronic lymphocytic thyroiditis      Diverticulosis of colon (without mention of hemorrhage)      Localized superficial swelling, mass, or lump      Malignant neoplasm  of prostate (H)      Nontoxic multinodular goiter      Other and unspecified hyperlipidemia      Other nonspecific abnormal serum enzyme levels      Pain in joint, shoulder region      Persistent hoarseness      Sarcoidosis      Scleritis, unspecified      Unspecified hypothyroidism      Unspecified vitamin D deficiency        Allergies  Allergies   Allergen Reactions     Ciprofloxacin Unknown     Levaquin [Levofloxacin] Unknown     Medications  Current Outpatient Medications   Medication Sig Dispense Refill     amoxicillin-clavulanate (AUGMENTIN) 875-125 MG tablet Take 1 tablet by mouth 2 times daily for 10 days 20 tablet 1     amoxicillin-clavulanate (AUGMENTIN) 875-125 MG tablet Take 1 tablet by mouth 2 times daily 4 tablet 0     aspirin 81 MG EC tablet Take 81 mg by mouth daily       atorvastatin (LIPITOR) 40 MG tablet Take 1 tablet (40 mg) by mouth daily 90 tablet 3     cholecalciferol 50 MCG (2000 UT) CAPS Take 1 capsule by mouth daily       isosorbide mononitrate (IMDUR) 30 MG 24 hr tablet Take 1 tablet (30 mg) by mouth daily 90 tablet 3     ketoconazole (NIZORAL) 2 % external cream Apply topically as needed       leuprolide (ELIGARD) 7.5 MG Inject 7.5 mg Subcutaneous every 30 days       levothyroxine (SYNTHROID/LEVOTHROID) 50 MCG tablet 50 mcg daily       losartan (COZAAR) 100 MG tablet Take 1 tablet (100 mg) by mouth daily 90 tablet 3     metoprolol succinate ER (TOPROL-XL) 25 MG 24 hr tablet Take 0.5 tablets (12.5 mg) by mouth daily 45 tablet 3     nitroGLYcerin (NITROSTAT) 0.4 MG sublingual tablet For chest pain place 1 tablet under the tongue every 5 minutes for 3 doses. If symptoms persist 5 minutes after 1st dose call 911. 25 tablet 3     ondansetron (ZOFRAN-ODT) 4 MG ODT tab Take 4 mg by mouth as needed       pilocarpine (PILOCAR) 2 % ophthalmic solution Apply 1 drop to eye 2 times daily       Family History  family history includes Aortic aneurysm in Binu Galvin's father; Coronary Artery Disease in  "Binu Galvin's father; No Known Problems in Binu Galvin's mother; Prostate Cancer in Binu Galvin's maternal grandfather and maternal uncle.   Sister has osteoporosis    Social History  Social History     Tobacco Use     Smoking status: Never Smoker     Smokeless tobacco: Never Used   Substance Use Topics     Alcohol use: Not Currently     Comment: Alcoholic Drinks/day: rarely     Drug use: No       ROS  Constitutional: no weight change, low energy  Eyes: no vision change, diplopia or red eyes   Neck: no difficulty swallowing, no choking, no neck pain, no neck swelling  Cardiovascular: no chest pain, palpitations  Respiratory: no dyspnea, cough, shortness of breath or wheezing   GI: no nausea, vomiting, diarrhea or constipation, no abdominal pain   : no change in urine, no dysuria or hematuria  Musculoskeletal: no joint or muscle pain or swelling   Integumentary: no concerning lesions or moles   Neuro: no loss of strength or sensation, no numbness or tingling, no tremor, no dizziness, no headache   Endo: no polyuria or polydipsia, +hot flash  Heme/Lymph: no concerning bumps, no bleeding problems   Allergy: no environmental allergies   Psych: no depression or anxiety, no sleep problems.    Physical Exam  BP (!) 151/87   Pulse 95   Ht 1.79 m (5' 10.47\")   Wt 87.7 kg (193 lb 4.8 oz)   BMI 27.37 kg/m    Body mass index is 27.37 kg/m .  Constitutional: no distress, comfortable, pleasant   Eyes: anicteric, normal extra-ocular movements, no lid lag or retraction  Neck: no thyromegaly, no discrete nodule  Cardiovascular: regular rate and rhythm, normal S1 and S2, no murmurs  Respiratory: clear to auscultation, no wheezes or crackles, normal breath sounds   Gastrointestinal:  nontender, no hepatosplenomegaly, no masses   Musculoskeletal: no edema   Skin: no concerning lesions, no jaundice   Neurological: cranial nerves intact, 2+ reflexes at patella, normal gait, no tremor on outstretched hands " bilaterally  Psychological: appropriate mood   Lymphatic: no cervical  lymphadenopathy.      RESULTS  I have personally reviewed labs and images. I also reviewed labs with patient and discussed the result and plan of care.            CT chest abdomen pelvis 7/15/2021  IMPRESSION:  1. Ascending thoracic aorta measures 42 mm in diameter.  2. 29.8 x 19.9 x 32.7 mm heterogenously enhancing right thyroid mass. Further evaluation with ultrasound could be considered  3. Enhancing left inguinal lymph node measures 12 mm in short axis. No iliac chain lymphadenopathy. Lymph node may be reactive but malignancy cannot be excluded.  4. Prostatectomy postoperative changes. Sclerotic foci and pelvic bones bilateral femoral heads may be bone islands, however, sclerotic metastases cannot be excluded.  5. Sub-6 mm pulmonary nodules. Per Fleischner Society recommendations, if the patient is at low risk for lung cancer, no further follow-up is needed. If the patient is at high risk for lung cancer, optional 12 month follow-up chest CT could be considered.    DXA 9/17/2021  Impression  The most negative and valid T-score of -2.7 at the level of the right femoral neck corresponds with osteoporosis according to WHO criteria for postmenopausal females and men age 50 and over. The risk of osteoporotic fracture increases approximately 2-fold for each 1.0 SD decrease in T-score.     Results   Lumbar spine   T-score -1.5 , BMD 1.043 g/cm2.      Left Femur neck  T-score -2.5 , BMD 0.740 g/cm2.  Left Total femur  T-score -1.7 , BMD 0.858 g/cm2.     Right Femur neck  T-score -2.7, BMD  0.721 g/cm2.  Right Total femur  T-score -2.2 , BMD  0.786 g/cm2.      ASSESSMENT:    Artis is a 76 year old adult with metastatic prostate cancer s/p prostatectomy, hypothyroidism secondary to Hashimoto's thyroiditis, hypertension, AAA, presents today for thyroid nodule and osteoporosis    1) right thyroid nodule: noted from CT chest on 7/15/2021 that showed  29.8 x 19.9 x 32.7 mm heterogenously enhancing right thyroid mass. Has US thyroid in 2014 but could not assess the report or image. Has Hashimoto's thyroiditis. No compressive neck symptoms.   - will obtain ultrasound thyroid to better view thyroid gland    2) Hashimoto's thyroiditis with subclinical hypothyroidism: no symptoms. Highest TSH was 6.4 in June 2021. After starting on levothyroxine, TSH normalized. Patient is wondering whether he needs to be on medication or not. Discussed indication of treatment and chance of restarting treatment after stopping it.   - he would like to try off medication and repeat lab in 2-3 months    3) osteoporosis: found from DXA 9/17/2021 that showed the lowest T-score of -2.7 at the right hip. No previous fracture but has family hx of osteoporosis. He is also on androgen deprivation therapy with GnRH analog so he is at risk for further bone loss. Discussed pros and cons of treatmen.  - will start Fosamax 70 mg weekly  - repeat DXA in 2023    PLAN:   - schedule ultrasound thyroid  - stop levothyroxine and repeat TFT in 2 months  - check calcium vitamin D level today  - start Fosamax 70 mg weekly  - return to clinic in 1 year    External notes/medical records independently reviewed, labs and imaging independently reviewed, medical management and tests to be discussed/communicated to patient.    Time: I spent 86 minutes spent on the date of the encounter preparing to see patient (including chart review and preparation), obtaining and or reviewing additional medical history, performing a physical exam and evaluation, documenting clinical information in the electronic health record, independently interpreting results, communicating results to the patient and coordinating care.    López Shin MD  Division of Diabetes and Endocrinology  Department of Medicine  [phone]

## 2021-10-01 ENCOUNTER — LAB (OUTPATIENT)
Dept: LAB | Facility: CLINIC | Age: 76
End: 2021-10-01
Attending: INTERNAL MEDICINE
Payer: COMMERCIAL

## 2021-10-01 ENCOUNTER — PRE VISIT (OUTPATIENT)
Dept: ENDOCRINOLOGY | Facility: CLINIC | Age: 76
End: 2021-10-01

## 2021-10-01 ENCOUNTER — OFFICE VISIT (OUTPATIENT)
Dept: ENDOCRINOLOGY | Facility: CLINIC | Age: 76
End: 2021-10-01
Payer: COMMERCIAL

## 2021-10-01 ENCOUNTER — ANCILLARY PROCEDURE (OUTPATIENT)
Dept: ULTRASOUND IMAGING | Facility: CLINIC | Age: 76
End: 2021-10-01
Attending: INTERNAL MEDICINE
Payer: COMMERCIAL

## 2021-10-01 ENCOUNTER — MYC MEDICAL ADVICE (OUTPATIENT)
Dept: CARDIOLOGY | Facility: CLINIC | Age: 76
End: 2021-10-01

## 2021-10-01 VITALS
WEIGHT: 193.3 LBS | BODY MASS INDEX: 27.67 KG/M2 | HEIGHT: 70 IN | SYSTOLIC BLOOD PRESSURE: 151 MMHG | DIASTOLIC BLOOD PRESSURE: 87 MMHG | HEART RATE: 95 BPM

## 2021-10-01 DIAGNOSIS — E07.9 THYROID MASS: ICD-10-CM

## 2021-10-01 DIAGNOSIS — E07.9 THYROID MASS: Primary | ICD-10-CM

## 2021-10-01 DIAGNOSIS — M81.0 OSTEOPOROSIS WITHOUT CURRENT PATHOLOGICAL FRACTURE, UNSPECIFIED OSTEOPOROSIS TYPE: ICD-10-CM

## 2021-10-01 DIAGNOSIS — E06.3 HASHIMOTO'S THYROIDITIS: ICD-10-CM

## 2021-10-01 LAB
ALBUMIN SERPL-MCNC: 3.6 G/DL (ref 3.4–5)
CALCIUM SERPL-MCNC: 9.1 MG/DL (ref 8.5–10.1)
DEPRECATED CALCIDIOL+CALCIFEROL SERPL-MC: 35 UG/L (ref 20–75)

## 2021-10-01 PROCEDURE — 82040 ASSAY OF SERUM ALBUMIN: CPT | Performed by: PATHOLOGY

## 2021-10-01 PROCEDURE — 36415 COLL VENOUS BLD VENIPUNCTURE: CPT | Performed by: PATHOLOGY

## 2021-10-01 PROCEDURE — 82310 ASSAY OF CALCIUM: CPT | Performed by: PATHOLOGY

## 2021-10-01 PROCEDURE — 76536 US EXAM OF HEAD AND NECK: CPT | Mod: GC | Performed by: RADIOLOGY

## 2021-10-01 PROCEDURE — 82306 VITAMIN D 25 HYDROXY: CPT | Performed by: INTERNAL MEDICINE

## 2021-10-01 PROCEDURE — 99205 OFFICE O/P NEW HI 60 MIN: CPT | Performed by: INTERNAL MEDICINE

## 2021-10-01 RX ORDER — ALENDRONATE SODIUM 70 MG/1
70 TABLET ORAL
Qty: 12 TABLET | Refills: 3 | Status: SHIPPED | OUTPATIENT
Start: 2021-10-01 | End: 2022-07-19

## 2021-10-01 ASSESSMENT — MIFFLIN-ST. JEOR: SCORE: 1625.54

## 2021-10-01 NOTE — PATIENT INSTRUCTIONS
- lab today for vitamin D and calcium  - start Fosamax 70 mg weekly   - schedule ultrasound thyroid   - stop levothyroxine and repeat thyroid lab in 2-3 months    Return in 1 year to follow up osteoporosis    If you have any questions, please do not hesitate to call clinic line at [phone] and ask for Endocrinology clinic.  If you need to fax, please fax to clinic fax number at [phone]    After clinic hours or weekends, please contact [phone] and ask for Endocrinologist-on call      Sincerely,    López Shin MD  Endocrinology

## 2021-10-01 NOTE — LETTER
10/1/2021       RE: Artis Galvin  [address]  [place] [address]     Dear Colleague,    Thank you for referring your patient, Artis Galvin, to the [facility] [place] [facility]. Please see a copy of my visit note below.         Endocrinology Note         Artis is a 76 year old adult presents today for thyroid nodule and osteoporosis    HPI  Artis is a 76 year old adult with metastatic prostate cancer s/p prostatectomy and radiation, hypothyroidism secondary to Hashimoto's thyroiditis, hypertension, AAA, presents today for thyroid nodule and osteoporosis    He was noted to have right thyroid mass from CT C/A/P in July 2021 that showed 29.8 x 19.9 x 32.7 mm heterogenously enhancing right thyroid mass. He denied any difficulty swallowing or breathing. He recalled having ultrasound thyroid in 2014 at [facility] after having voice changed and was told that he had Hashimoto's thyroiditis. Reviewed his TFT, he has mild subclinical hypothyroidism with TSH of 6.4 on 6/4/2021. He has positive TPO Ab of 355. He was started on levothyroxine 50 mcg daily with normalization of TFT. He is wondering whether he needs to stay on thyroid replacement or not. He could not tell the difference in his symptoms before and after starting on thyroid hormone. He is currently having hot flash due to being on Lupron for prostate cancer.     Regarding osteoporosis: he did have DXA scan 9/2021 that showed the lowest T-score of -2.7 at the right hip. He has not had DXA previously.    Bone Health:  Family history of osteoporosis or fracture: his sister has osteoporosis  BMI: 27.3  Steroid use: none  Chronic disease: metastatic prostate cancer s/p prostatectomy and radiation  Calcium intake:none  Vitamin D intake: none  Alcohol: 6 packs of beer per 6 weeks  Smoking: none  Currently on Lupron treatment (GnRH analog)    Past Medical  History  Past Medical History:   Diagnosis Date     Abdominal pain, unspecified site      Abnormal thyroid ultrasound      BBB (bundle branch block)     right     Chronic lymphocytic thyroiditis      Diverticulosis of colon (without mention of hemorrhage)      Localized superficial swelling, mass, or lump      Malignant neoplasm of prostate (H)      Nontoxic multinodular goiter      Other and unspecified hyperlipidemia      Other nonspecific abnormal serum enzyme levels      Pain in joint, shoulder region      Persistent hoarseness      Sarcoidosis      Scleritis, unspecified      Unspecified hypothyroidism      Unspecified vitamin D deficiency        Allergies  Allergies   Allergen Reactions     Ciprofloxacin Unknown     Levaquin [Levofloxacin] Unknown     Medications  Current Outpatient Medications   Medication Sig Dispense Refill     amoxicillin-clavulanate (AUGMENTIN) 875-125 MG tablet Take 1 tablet by mouth 2 times daily for 10 days 20 tablet 1     amoxicillin-clavulanate (AUGMENTIN) 875-125 MG tablet Take 1 tablet by mouth 2 times daily 4 tablet 0     aspirin 81 MG EC tablet Take 81 mg by mouth daily       atorvastatin (LIPITOR) 40 MG tablet Take 1 tablet (40 mg) by mouth daily 90 tablet 3     cholecalciferol 50 MCG (2000 UT) CAPS Take 1 capsule by mouth daily       isosorbide mononitrate (IMDUR) 30 MG 24 hr tablet Take 1 tablet (30 mg) by mouth daily 90 tablet 3     ketoconazole (NIZORAL) 2 % external cream Apply topically as needed       leuprolide (ELIGARD) 7.5 MG Inject 7.5 mg Subcutaneous every 30 days       levothyroxine (SYNTHROID/LEVOTHROID) 50 MCG tablet 50 mcg daily       losartan (COZAAR) 100 MG tablet Take 1 tablet (100 mg) by mouth daily 90 tablet 3     metoprolol succinate ER (TOPROL-XL) 25 MG 24 hr tablet Take 0.5 tablets (12.5 mg) by mouth daily 45 tablet 3     nitroGLYcerin (NITROSTAT) 0.4 MG sublingual tablet For chest pain place 1 tablet under the tongue every 5 minutes for 3 doses. If  "symptoms persist 5 minutes after 1st dose call 911. 25 tablet 3     ondansetron (ZOFRAN-ODT) 4 MG ODT tab Take 4 mg by mouth as needed       pilocarpine (PILOCAR) 2 % ophthalmic solution Apply 1 drop to eye 2 times daily       Family History  family history includes Aortic aneurysm in Binu Galvin's father; Coronary Artery Disease in Binu Galvin's father; No Known Problems in Binu Galvin's mother; Prostate Cancer in Binu Galvin's maternal grandfather and maternal uncle.   Sister has osteoporosis    Social History  Social History     Tobacco Use     Smoking status: Never Smoker     Smokeless tobacco: Never Used   Substance Use Topics     Alcohol use: Not Currently     Comment: Alcoholic Drinks/day: rarely     Drug use: No       ROS  Constitutional: no weight change, low energy  Eyes: no vision change, diplopia or red eyes   Neck: no difficulty swallowing, no choking, no neck pain, no neck swelling  Cardiovascular: no chest pain, palpitations  Respiratory: no dyspnea, cough, shortness of breath or wheezing   GI: no nausea, vomiting, diarrhea or constipation, no abdominal pain   : no change in urine, no dysuria or hematuria  Musculoskeletal: no joint or muscle pain or swelling   Integumentary: no concerning lesions or moles   Neuro: no loss of strength or sensation, no numbness or tingling, no tremor, no dizziness, no headache   Endo: no polyuria or polydipsia, +hot flash  Heme/Lymph: no concerning bumps, no bleeding problems   Allergy: no environmental allergies   Psych: no depression or anxiety, no sleep problems.    Physical Exam  BP (!) 151/87   Pulse 95   Ht 1.79 m (5' 10.47\")   Wt 87.7 kg (193 lb 4.8 oz)   BMI 27.37 kg/m    Body mass index is 27.37 kg/m .  Constitutional: no distress, comfortable, pleasant   Eyes: anicteric, normal extra-ocular movements, no lid lag or retraction  Neck: no thyromegaly, no discrete nodule  Cardiovascular: regular rate and rhythm, normal S1 and S2, no " murmurs  Respiratory: clear to auscultation, no wheezes or crackles, normal breath sounds   Gastrointestinal:  nontender, no hepatosplenomegaly, no masses   Musculoskeletal: no edema   Skin: no concerning lesions, no jaundice   Neurological: cranial nerves intact, 2+ reflexes at patella, normal gait, no tremor on outstretched hands bilaterally  Psychological: appropriate mood   Lymphatic: no cervical  lymphadenopathy.      RESULTS  I have personally reviewed labs and images. I also reviewed labs with patient and discussed the result and plan of care.            CT chest abdomen pelvis 7/15/2021  IMPRESSION:  1. Ascending thoracic aorta measures 42 mm in diameter.  2. 29.8 x 19.9 x 32.7 mm heterogenously enhancing right thyroid mass. Further evaluation with ultrasound could be considered  3. Enhancing left inguinal lymph node measures 12 mm in short axis. No iliac chain lymphadenopathy. Lymph node may be reactive but malignancy cannot be excluded.  4. Prostatectomy postoperative changes. Sclerotic foci and pelvic bones bilateral femoral heads may be bone islands, however, sclerotic metastases cannot be excluded.  5. Sub-6 mm pulmonary nodules. Per Fleischner Society recommendations, if the patient is at low risk for lung cancer, no further follow-up is needed. If the patient is at high risk for lung cancer, optional 12 month follow-up chest CT could be considered.    DXA 9/17/2021  Impression  The most negative and valid T-score of -2.7 at the level of the right femoral neck corresponds with osteoporosis according to WHO criteria for postmenopausal females and men age 50 and over. The risk of osteoporotic fracture increases approximately 2-fold for each 1.0 SD decrease in T-score.     Results   Lumbar spine   T-score -1.5 , BMD 1.043 g/cm2.      Left Femur neck  T-score -2.5 , BMD 0.740 g/cm2.  Left Total femur  T-score -1.7 , BMD 0.858 g/cm2.     Right Femur neck  T-score -2.7, BMD  0.721 g/cm2.  Right Total  femur  T-score -2.2 , BMD  0.786 g/cm2.      ASSESSMENT:    Artis is a 76 year old adult with metastatic prostate cancer s/p prostatectomy, hypothyroidism secondary to Hashimoto's thyroiditis, hypertension, AAA, presents today for thyroid nodule and osteoporosis    1) right thyroid nodule: noted from CT chest on 7/15/2021 that showed 29.8 x 19.9 x 32.7 mm heterogenously enhancing right thyroid mass. Has US thyroid in 2014 but could not assess the report or image. Has Hashimoto's thyroiditis. No compressive neck symptoms.   - will obtain ultrasound thyroid to better view thyroid gland    2) Hashimoto's thyroiditis with subclinical hypothyroidism: no symptoms. Highest TSH was 6.4 in June 2021. After starting on levothyroxine, TSH normalized. Patient is wondering whether he needs to be on medication or not. Discussed indication of treatment and chance of restarting treatment after stopping it.   - he would like to try off medication and repeat lab in 2-3 months    3) osteoporosis: found from DXA 9/17/2021 that showed the lowest T-score of -2.7 at the right hip. No previous fracture but has family hx of osteoporosis. He is also on androgen deprivation therapy with GnRH analog so he is at risk for further bone loss. Discussed pros and cons of treatmen.  - will start Fosamax 70 mg weekly  - repeat DXA in 2023    PLAN:   - schedule ultrasound thyroid  - stop levothyroxine and repeat TFT in 2 months  - check calcium vitamin D level today  - start Fosamax 70 mg weekly  - return to clinic in 1 year    External notes/medical records independently reviewed, labs and imaging independently reviewed, medical management and tests to be discussed/communicated to patient.    Time: I spent 86 minutes spent on the date of the encounter preparing to see patient (including chart review and preparation), obtaining and or reviewing additional medical history, performing a physical exam and evaluation, documenting clinical information in  the electronic health record, independently interpreting results, communicating results to the patient and coordinating care.    López Shin MD  Division of Diabetes and Endocrinology  Department of Medicine  [phone]

## 2021-10-04 ENCOUNTER — OFFICE VISIT (OUTPATIENT)
Dept: PODIATRY | Facility: CLINIC | Age: 76
End: 2021-10-04
Attending: INTERNAL MEDICINE
Payer: COMMERCIAL

## 2021-10-04 VITALS — SYSTOLIC BLOOD PRESSURE: 114 MMHG | TEMPERATURE: 97.5 F | DIASTOLIC BLOOD PRESSURE: 62 MMHG

## 2021-10-04 DIAGNOSIS — B35.1 ONYCHOMYCOSIS: Primary | ICD-10-CM

## 2021-10-04 DIAGNOSIS — E04.1 THYROID NODULE: Primary | ICD-10-CM

## 2021-10-04 PROCEDURE — 99213 OFFICE O/P EST LOW 20 MIN: CPT | Performed by: PODIATRIST

## 2021-10-04 RX ORDER — TERBINAFINE HYDROCHLORIDE 250 MG/1
250 TABLET ORAL DAILY
Qty: 90 TABLET | Refills: 0 | Status: SHIPPED | OUTPATIENT
Start: 2021-10-04 | End: 2022-01-02

## 2021-10-04 NOTE — PATIENT INSTRUCTIONS
our doctor has recommended you start taking Terbinafine. Terbinafine is an antifungal medication used to treat certain types of fungal infections. You will take this medication everyday for 90 days.     Prior to starting Terbinafine you will need to have your labs drawn. You can stop into the lab located on the first floor to have these drawn.     We will repeat the labs again after you have been taking the medication for 6 weeks. You can walk-in to the lab located on the first floor on or around  Nov.15th 2021      Terbinafine tablets  Brand Names: Lamisil, Terbinex   What is this medicine?  TERBINAFINE (TER bin a feen) is an antifungal medicine. It is used to treat certain kinds of fungal or yeast infections.  How should I use this medicine?  Take this medicine by mouth with a full glass of water. Follow the directions on the prescription label. You can take this medicine with food or on an empty stomach. Take your medicine at regular intervals. Do not take your medicine more often than directed. Do not skip doses or stop your medicine early even if you feel better. Do not stop taking except on your doctor's advice. Talk to your pediatrician regarding the use of this medicine in children. Special care may be needed.  What side effects may I notice from receiving this medicine?  Side effects that you should report to your doctor or health care professional as soon as possible:    allergic reactions like skin rash or hives, swelling of the face, lips, or tongue    changes in vision    dark urine    fever or infection    general ill feeling or flu-like symptoms    light-colored stools    loss of appetite, nausea    redness, blistering, peeling or loosening of the skin, including inside the mouth    right upper belly pain    unusually weak or tired    yellowing of the eyes or skin  Side effects that usually do not require medical attention (report to your doctor or health care professional if they continue or are  bothersome):    changes in taste    diarrhea    hair loss    muscle or joint pain    stomach gas    stomach upset  What may interact with this medicine?  Do not take this medicine with any of the following medications:    thioridazine  This medicine may also interact with the following medications:    beta-blockers    caffeine    cimetidine    cyclosporine    medicines for depression, anxiety, or psychotic disturbances    medicines for fungal infections like fluconazole and ketoconazole    medicines for irregular heartbeat like amiodarone, flecainide and propafenone    rifampin    warfarin  What if I miss a dose?  If you miss a dose, take it as soon as you can. If it is almost time for your next dose, take only that dose. Do not take double or extra doses.  Where should I keep my medicine?  Keep out of the reach of children.  Store at room temperature below 25 degrees C (77 degrees F). Protect from light. Throw away any unused medicine after the expiration date.  What should I tell my health care provider before I take this medicine?  They need to know if you have any of these conditions:    drink alcoholic beverages    kidney disease    liver disease    an unusual or allergic reaction to terbinafine, other medicines, foods, dyes, or preservatives    pregnant or trying to get pregnant    breast-feeding  What should I watch for while using this medicine?  Visit your doctor or health care provider regularly. Tell your doctor right away if you have nausea or vomiting, loss of appetite, stomach pain on your right upper side, yellow skin, dark urine, light stools, or are over tired. Some fungal infections need many weeks or months of treatment to cure. If you are taking this medicine for a long time, you will need to have important blood work done.

## 2021-10-04 NOTE — LETTER
10/4/2021         RE: Artis Galvin  [address]  [place] [address]        Dear Colleague,    Thank you for referring your patient, Artis Galvin, to the [facility]. Please see a copy of my visit note below.        FOOT AND ANKLE SURGERY/PODIATRY PROGRESS NOTE        ASSESSMENT: Onychomycosis       TREATMENT:  -There is dystrophic changes to the nails 1-4 on the left foot. I discussed topical and oral anti-fungal medication today including possible elevated LFT's with oral medication. Recent LFT's are wnl.     -I will start her on terbinafine 250 mg qday x90 days. Repeat LFT's 6 weeks after beginning medication.    -Patient's questions invited and answered. He was encouraged to call my office with any further questions or concerns.     Mynor Pierce, OJE  [facility] Podiatry/Foot & Ankle Surgery  [phone]      HPI: Artis Galvin was seen today for nail discoloration concerns. He has seen Dr. Edwards who prescribed penlac after removing his toenail. He has not seen improvement and would like to discuss other treatment options.     Past Medical History:   Diagnosis Date     Abdominal pain, unspecified site      Abnormal thyroid ultrasound      BBB (bundle branch block)     right     Chronic lymphocytic thyroiditis      Diverticulosis of colon (without mention of hemorrhage)      Localized superficial swelling, mass, or lump      Malignant neoplasm of prostate (H)      Nontoxic multinodular goiter      Other and unspecified hyperlipidemia      Other nonspecific abnormal serum enzyme levels      Pain in joint, shoulder region      Persistent hoarseness      Sarcoidosis      Scleritis, unspecified      Unspecified hypothyroidism      Unspecified vitamin D deficiency        Past Surgical History:   Procedure Laterality Date     HC SHLDR ARTHROSCOP,PART ACROMIOPLAS Right     Description: Shoulder Arthroscopy, Space Decompression And Acromioplasty;  Recorded:  02/18/2014;     NE LAP,PROSTATECTOMY,RADICAL,W/NERVE SPARE,INCL ROBOTIC Bilateral 2/27/2018    Procedure: ROBOTIC ASSISTED RADICAL RETROPUBIC PROSTATECTOMY, BILATERAL PELVIC LYMPH NODE DISSECTION LYSIS OF ADHESIONS;  Surgeon: Wale Rodriguez MD;  [place] [facility] [place]: [place]     NE RADIAL KERATOTOMY      Description: Cornea Radial Keratotomy;  Recorded: 02/18/2014;       Allergies   Allergen Reactions     Ciprofloxacin Unknown     Levaquin [Levofloxacin] Unknown         Current Outpatient Medications:      alendronate (FOSAMAX) 70 MG tablet, Take 1 tablet (70 mg) by mouth every 7 days Take 60 minutes before am meal with 8 oz. water. Remain upright for 30 minutes., Disp: 12 tablet, Rfl: 3     amoxicillin-clavulanate (AUGMENTIN) 875-125 MG tablet, Take 1 tablet by mouth 2 times daily, Disp: 4 tablet, Rfl: 0     aspirin 81 MG EC tablet, Take 81 mg by mouth daily, Disp: , Rfl:      atorvastatin (LIPITOR) 40 MG tablet, Take 1 tablet (40 mg) by mouth daily, Disp: 90 tablet, Rfl: 3     cholecalciferol 50 MCG (2000 UT) CAPS, Take 1 capsule by mouth daily, Disp: , Rfl:      isosorbide mononitrate (IMDUR) 30 MG 24 hr tablet, Take 1 tablet (30 mg) by mouth daily, Disp: 90 tablet, Rfl: 3     ketoconazole (NIZORAL) 2 % external cream, Apply topically as needed, Disp: , Rfl:      leuprolide (ELIGARD) 7.5 MG, Inject 7.5 mg Subcutaneous every 30 days, Disp: , Rfl:      losartan (COZAAR) 100 MG tablet, Take 1 tablet (100 mg) by mouth daily, Disp: 90 tablet, Rfl: 3     metoprolol succinate ER (TOPROL-XL) 25 MG 24 hr tablet, Take 0.5 tablets (12.5 mg) by mouth daily, Disp: 45 tablet, Rfl: 3     nitroGLYcerin (NITROSTAT) 0.4 MG sublingual tablet, For chest pain place 1 tablet under the tongue every 5 minutes for 3 doses. If symptoms persist 5 minutes after 1st dose call 911., Disp: 25 tablet, Rfl: 3     ondansetron (ZOFRAN-ODT) 4 MG ODT tab, Take 4 mg by mouth as needed, Disp: , Rfl:      pilocarpine (PILOCAR) 2 %  ophthalmic solution, Apply 1 drop to eye 2 times daily, Disp: , Rfl:      terbinafine (LAMISIL) 250 MG tablet, Take 1 tablet (250 mg) by mouth daily, Disp: 90 tablet, Rfl: 0    Family History   Problem Relation Age of Onset     No Known Problems Mother      Coronary Artery Disease Father      Aortic aneurysm Father      Prostate Cancer Maternal Grandfather      Prostate Cancer Maternal Uncle        Social History     Socioeconomic History     Marital status: Patient Declined     Spouse name: Not on file     Number of children: Not on file     Years of education: Not on file     Highest education level: Not on file   Occupational History     Not on file   Tobacco Use     Smoking status: Never Smoker     Smokeless tobacco: Never Used   Substance and Sexual Activity     Alcohol use: Not Currently     Comment: Alcoholic Drinks/day: rarely     Drug use: No     Sexual activity: Not on file   Other Topics Concern     Not on file   Social History Narrative    Single. . 4 grown daughters.    Currently unemployed.     Social Determinants of Health     Financial Resource Strain:      Difficulty of Paying Living Expenses:    Food Insecurity:      Worried About Running Out of Food in the Last Year:      Ran Out of Food in the Last Year:    Transportation Needs:      Lack of Transportation (Medical):      Lack of Transportation (Non-Medical):    Physical Activity:      Days of Exercise per Week:      Minutes of Exercise per Session:    Stress:      Feeling of Stress :    Social Connections:      Frequency of Communication with Friends and Family:      Frequency of Social Gatherings with Friends and Family:      Attends Shinto Services:      Active Member of Clubs or Organizations:      Attends Club or Organization Meetings:      Marital Status:    Intimate Partner Violence:      Fear of Current or Ex-Partner:      Emotionally Abused:      Physically Abused:      Sexually Abused:        10 point Review of Systems is  negative except for nail fungus which is noted in HPI.     /62   Temp 97.5  F (36.4  C)     BMI= There is no height or weight on file to calculate BMI.    OBJECTIVE:  General appearance: Patient is alert and fully cooperative with history & exam.  No sign of distress is noted during the visit.    Vascular: Dorsalis pedis and posterior tibial pulses are palpable. There is pedal hair growth bilateral.  CFT < 3 sec from anterior tibial surface to distal digits bilateral. There is no appreciable edema noted.  Dermatologic: Dystrophic nails 1-4 left foot.   Neurologic: All epicritic and proprioceptive sensations are grossly intact bilateral.  Musculoskeletal: Contracted digits bilateral.     Imaging:     US Thyroid    Result Date: 10/1/2021  US THYROID 10/1/2021 12:19 PM COMPARISON: None HISTORY: Thyroid mass FINDINGS: Thyroid parenchyma: heterogenous The right lobe of the thyroid measures: 2.5 x 2.1 6.5 cm The left lobe of the thyroid measures: 1.9 x 1.8 x 3.6 cm The thyroid isthmus measures: 2 mm Nodule 1: Lobe: Right Location: Superior Size: 1.1 x 1.6 x 0.7 cm Composition: Solid or almost completely solid (2 points) Echogenicity: Hyperechoic or isoechoic (1 point) Shape: Wider than tall (0 points) Margin: Smooth (0 points) Echogenic Foci: None or large comet tail artifact (0 points) Stability: Not previously imaged TIRADS: TR3 (3 points) Nodule 2: Lobe: Right Location: Mid Size: 2.2 x 2.2 x 2.5 cm Composition: Solid or almost completely solid (2 points) Echogenicity: Hyperechoic or isoechoic (1 point) Shape: Wider than tall (0 points) Margin: Smooth (0 points) Echogenic Foci: None or large comet tail artifact (0 points) Stability: Not previously imaged TIRADS: TR3 (3 points) Nodule 3: Lobe: Right Location: Inferior Size: 10.3 x 1.4 x 0.6 cm Composition: Solid or almost completely solid (2 points) Echogenicity: Hyperechoic or isoechoic (1 point) Shape: Wider than tall (0 points) Margin: Smooth (0 points)  Echogenic Foci: None or large comet tail artifact (0 points) Stability: Not previously imaged TIRADS: TR3 (3 points)     Impression: There are TR 3 nodules in the right lobe of the thyroid as described above the largest of which is nodule 2 for which FNA is recommended, and the second largest is nodule 1 for which follow-up ultrasound at 1, 3, and 5 years is recommended. ACR TI-RADS recommendations TR2 (2 points) & TR1 (0 points) -No FNA or follow-up TR3 (3 points) - FNA if ? 2.5cm, follow-up if 1.5 -2.4 cm in 1, 3 and 5 years TR4 (4-6 points) - FNA if ? 1.5cm, follow-up if 1 -1.4 cm in 1, 2, 3 and 5 years TR5 (?7 points) - FNA if ? 1cm, follow-up if 0.5 -0.9 cm every year for 5 years I have personally reviewed the examination and initial interpretation and I agree with the findings. JANEI TREADWELL MD   SYSTEM ID:  [id number]    DEXA HIP/PELVIS/SPINE - Future    Result Date: 9/17/2021  [address] [address] Phone: [phone]   Fax: [phone] Patient name:   Artis Galvin Patient demographics:  76 year old white male  Adult History:  Cancer - Prostate Current treatments:  Cholesterol Lowering Drugs, GnrH Analogs (Eligard/Lupron), Thyroid Medications Past treatment: vitamin D Scan:    Mocapay Impression The most negative and valid T-score of -2.7 at the level of the right femoral neck corresponds with osteoporosis according to WHO criteria for postmenopausal females and men age 50 and over. The risk of osteoporotic fracture increases approximately 2-fold for each 1.0 SD decrease in T-score. Results Lumbar spine T-score -1.5 , BMD 1.043 g/cm2. Left Femur neck T-score -2.5 , BMD 0.740 g/cm2. Left Total femur T-score -1.7 , BMD 0.858 g/cm2. Right Femur neck T-score -2.7, BMD  0.721 g/cm2. Right Total femur T-score -2.2 , BMD  0.786 g/cm2. _ Interval change No prior study available for comparison Ref 3 Percent changes not  mentioned or within remaining regions are insignificant Please note that the differential diagnosis of BMD increase in the spine includes improvement due to pharmacotherapy vs inter-current progression of spine degeneration or fracture Fracture risk The risk of osteoporotic fracture increases approximately 2-fold for each 1.0 SD decrease in T-score. Low bone density is not the only risk factor for fracture; consider factors such as patient's age, fall risk, injury risk, previous osteoporotic fracture, family history of osteoporosis, etc.  Repeat Recommend repeat DXA in 2 years. For patients eligible for Medicare, routine testing is allowed once every 2 years. Testing frequency can be increased for patients on corticosteroids. Clinical correlation recommended Technical quality Satisfactory. Principal result : Salma Trimble MD, [facility] Division of Endocrinology and Diabetes  Department of Medicine References: Ref. 1. WHO categories:        T-score > -1.0  = normal             .                              T-score -1.0 to -2.5 = low bone density T-score < -2.5 = osteoporosis        .  Ref. 2. 2015 ISCD official position statements:  www.iscd.org.  Ref. 3.  Today's examination is compared to the technically similar prior study of the total hip and femur if available. Only changes deemed likely to be significant based on historical data are reported. According to the ISCD position statements, total hip rather than femoral neck regions are to be compared because larger areas give better precision. LSC = least significant changes at the [facility] (historical data)  AP spine =  0.032 g/cm2  (6/26/2007)  Left hip = 0.029 g/cm2  (6/15/2007)  Right hip = 0.018 g/cm2  (6/15/2007)  Left mid radius = 0.043 g/cm2  (6/26/2007) Please note that the differential diagnosis of increase in bone density at the lumbar spine includes improvement due to pharmacotherapy vs inter-current progression of spine  degeneration or fracture. Ref. 4 Fracture risk is calculated in patients aged 40 to 90 years old with low bone density not on osteoporosis treatment. A 10 year fracture risk of 3% and higher for hip fracture and 20% and higher for major osteoporotic fracture is considered higher than acceptable risk and might be an indication for medical treatment.  Ref. 5.  By definition, osteoporosis may be diagnosed in the presence or with the history of a low trauma or fragility fracture.  Fragility and low trauma fracture is defined as a fracture resulting from the force of a fall from a standing height or less or a bone that breaks under conditions that would not cause a normal bone to break.   Ref. 6. NOF Physician's Guideline Website address:  www.nof.org.            Again, thank you for allowing me to participate in the care of your patient.        Sincerely,        Mynor Pierce DPM

## 2021-10-04 NOTE — PROGRESS NOTES
FOOT AND ANKLE SURGERY/PODIATRY PROGRESS NOTE        ASSESSMENT: Onychomycosis       TREATMENT:  -There is dystrophic changes to the nails 1-4 on the left foot. I discussed topical and oral anti-fungal medication today including possible elevated LFT's with oral medication. Recent LFT's are wnl.     -I will start her on terbinafine 250 mg qday x90 days. Repeat LFT's 6 weeks after beginning medication.    -Patient's questions invited and answered. He was encouraged to call my office with any further questions or concerns.     Mynor Pierce DPM  [facility]/Foot & Ankle Surgery  [phone]      HPI: Artis Galvin was seen today for nail discoloration concerns. He has seen Dr. Edwards who prescribed penlac after removing his toenail. He has not seen improvement and would like to discuss other treatment options.     Past Medical History:   Diagnosis Date     Abdominal pain, unspecified site      Abnormal thyroid ultrasound      BBB (bundle branch block)     right     Chronic lymphocytic thyroiditis      Diverticulosis of colon (without mention of hemorrhage)      Localized superficial swelling, mass, or lump      Malignant neoplasm of prostate (H)      Nontoxic multinodular goiter      Other and unspecified hyperlipidemia      Other nonspecific abnormal serum enzyme levels      Pain in joint, shoulder region      Persistent hoarseness      Sarcoidosis      Scleritis, unspecified      Unspecified hypothyroidism      Unspecified vitamin D deficiency        Past Surgical History:   Procedure Laterality Date     HC SHLDR ARTHROSCOP,PART ACROMIOPLAS Right     Description: Shoulder Arthroscopy, Space Decompression And Acromioplasty;  Recorded: 02/18/2014;     DE LAP,PROSTATECTOMY,RADICAL,W/NERVE SPARE,INCL ROBOTIC Bilateral 2/27/2018    Procedure: ROBOTIC ASSISTED RADICAL RETROPUBIC PROSTATECTOMY, BILATERAL PELVIC LYMPH NODE DISSECTION LYSIS OF ADHESIONS;  Surgeon: Wale Rodriguez MD;  Location: [facility];  Service: Urology     OK RADIAL KERATOTOMY      Description: Cornea Radial Keratotomy;  Recorded: 02/18/2014;       Allergies   Allergen Reactions     Ciprofloxacin Unknown     Levaquin [Levofloxacin] Unknown         Current Outpatient Medications:      alendronate (FOSAMAX) 70 MG tablet, Take 1 tablet (70 mg) by mouth every 7 days Take 60 minutes before am meal with 8 oz. water. Remain upright for 30 minutes., Disp: 12 tablet, Rfl: 3     amoxicillin-clavulanate (AUGMENTIN) 875-125 MG tablet, Take 1 tablet by mouth 2 times daily, Disp: 4 tablet, Rfl: 0     aspirin 81 MG EC tablet, Take 81 mg by mouth daily, Disp: , Rfl:      atorvastatin (LIPITOR) 40 MG tablet, Take 1 tablet (40 mg) by mouth daily, Disp: 90 tablet, Rfl: 3     cholecalciferol 50 MCG (2000 UT) CAPS, Take 1 capsule by mouth daily, Disp: , Rfl:      isosorbide mononitrate (IMDUR) 30 MG 24 hr tablet, Take 1 tablet (30 mg) by mouth daily, Disp: 90 tablet, Rfl: 3     ketoconazole (NIZORAL) 2 % external cream, Apply topically as needed, Disp: , Rfl:      leuprolide (ELIGARD) 7.5 MG, Inject 7.5 mg Subcutaneous every 30 days, Disp: , Rfl:      losartan (COZAAR) 100 MG tablet, Take 1 tablet (100 mg) by mouth daily, Disp: 90 tablet, Rfl: 3     metoprolol succinate ER (TOPROL-XL) 25 MG 24 hr tablet, Take 0.5 tablets (12.5 mg) by mouth daily, Disp: 45 tablet, Rfl: 3     nitroGLYcerin (NITROSTAT) 0.4 MG sublingual tablet, For chest pain place 1 tablet under the tongue every 5 minutes for 3 doses. If symptoms persist 5 minutes after 1st dose call 911., Disp: 25 tablet, Rfl: 3     ondansetron (ZOFRAN-ODT) 4 MG ODT tab, Take 4 mg by mouth as needed, Disp: , Rfl:      pilocarpine (PILOCAR) 2 % ophthalmic solution, Apply 1 drop to eye 2 times daily, Disp: , Rfl:      terbinafine (LAMISIL) 250 MG tablet, Take 1 tablet (250 mg) by mouth daily, Disp: 90 tablet, Rfl: 0    Family History   Problem Relation Age of Onset     No Known Problems Mother       Coronary Artery Disease Father      Aortic aneurysm Father      Prostate Cancer Maternal Grandfather      Prostate Cancer Maternal Uncle        Social History     Socioeconomic History     Marital status: Patient Declined     Spouse name: Not on file     Number of children: Not on file     Years of education: Not on file     Highest education level: Not on file   Occupational History     Not on file   Tobacco Use     Smoking status: Never Smoker     Smokeless tobacco: Never Used   Substance and Sexual Activity     Alcohol use: Not Currently     Comment: Alcoholic Drinks/day: rarely     Drug use: No     Sexual activity: Not on file   Other Topics Concern     Not on file   Social History Narrative    Single. . 4 grown daughters.    Currently unemployed.     Social Determinants of Health     Financial Resource Strain:      Difficulty of Paying Living Expenses:    Food Insecurity:      Worried About Running Out of Food in the Last Year:      Ran Out of Food in the Last Year:    Transportation Needs:      Lack of Transportation (Medical):      Lack of Transportation (Non-Medical):    Physical Activity:      Days of Exercise per Week:      Minutes of Exercise per Session:    Stress:      Feeling of Stress :    Social Connections:      Frequency of Communication with Friends and Family:      Frequency of Social Gatherings with Friends and Family:      Attends Sikh Services:      Active Member of Clubs or Organizations:      Attends Club or Organization Meetings:      Marital Status:    Intimate Partner Violence:      Fear of Current or Ex-Partner:      Emotionally Abused:      Physically Abused:      Sexually Abused:        10 point Review of Systems is negative except for nail fungus which is noted in HPI.     /62   Temp 97.5  F (36.4  C)     BMI= There is no height or weight on file to calculate BMI.    OBJECTIVE:  General appearance: Patient is alert and fully cooperative with history & exam.  No  sign of distress is noted during the visit.    Vascular: Dorsalis pedis and posterior tibial pulses are palpable. There is pedal hair growth bilateral.  CFT < 3 sec from anterior tibial surface to distal digits bilateral. There is no appreciable edema noted.  Dermatologic: Dystrophic nails 1-4 left foot.   Neurologic: All epicritic and proprioceptive sensations are grossly intact bilateral.  Musculoskeletal: Contracted digits bilateral.     Imaging:     US Thyroid    Result Date: 10/1/2021  US THYROID 10/1/2021 12:19 PM COMPARISON: None HISTORY: Thyroid mass FINDINGS: Thyroid parenchyma: heterogenous The right lobe of the thyroid measures: 2.5 x 2.1 6.5 cm The left lobe of the thyroid measures: 1.9 x 1.8 x 3.6 cm The thyroid isthmus measures: 2 mm Nodule 1: Lobe: Right Location: Superior Size: 1.1 x 1.6 x 0.7 cm Composition: Solid or almost completely solid (2 points) Echogenicity: Hyperechoic or isoechoic (1 point) Shape: Wider than tall (0 points) Margin: Smooth (0 points) Echogenic Foci: None or large comet tail artifact (0 points) Stability: Not previously imaged TIRADS: TR3 (3 points) Nodule 2: Lobe: Right Location: Mid Size: 2.2 x 2.2 x 2.5 cm Composition: Solid or almost completely solid (2 points) Echogenicity: Hyperechoic or isoechoic (1 point) Shape: Wider than tall (0 points) Margin: Smooth (0 points) Echogenic Foci: None or large comet tail artifact (0 points) Stability: Not previously imaged TIRADS: TR3 (3 points) Nodule 3: Lobe: Right Location: Inferior Size: 10.3 x 1.4 x 0.6 cm Composition: Solid or almost completely solid (2 points) Echogenicity: Hyperechoic or isoechoic (1 point) Shape: Wider than tall (0 points) Margin: Smooth (0 points) Echogenic Foci: None or large comet tail artifact (0 points) Stability: Not previously imaged TIRADS: TR3 (3 points)     Impression: There are TR 3 nodules in the right lobe of the thyroid as described above the largest of which is nodule 2 for which FNA is  recommended, and the second largest is nodule 1 for which follow-up ultrasound at 1, 3, and 5 years is recommended. ACR TI-RADS recommendations TR2 (2 points) & TR1 (0 points) -No FNA or follow-up TR3 (3 points) - FNA if ? 2.5cm, follow-up if 1.5 -2.4 cm in 1, 3 and 5 years TR4 (4-6 points) - FNA if ? 1.5cm, follow-up if 1 -1.4 cm in 1, 2, 3 and 5 years TR5 (?7 points) - FNA if ? 1cm, follow-up if 0.5 -0.9 cm every year for 5 years I have personally reviewed the examination and initial interpretation and I agree with the findings. JANIE TREADWELL MD   SYSTEM ID:  [id number]    DEXA HIP/PELVIS/SPINE - Future    Result Date: 9/17/2021  [address] [address] Phone: [phone]   Fax: [phone] Patient name:   Atris Galvin Patient demographics:  76 year old white male  Adult History:  Cancer - Prostate Current treatments:  Cholesterol Lowering Drugs, GnrH Analogs (Eligard/Lupron), Thyroid Medications Past treatment: vitamin D Scan:    Handshakeigy Impression The most negative and valid T-score of -2.7 at the level of the right femoral neck corresponds with osteoporosis according to WHO criteria for postmenopausal females and men age 50 and over. The risk of osteoporotic fracture increases approximately 2-fold for each 1.0 SD decrease in T-score. Results Lumbar spine T-score -1.5 , BMD 1.043 g/cm2. Left Femur neck T-score -2.5 , BMD 0.740 g/cm2. Left Total femur T-score -1.7 , BMD 0.858 g/cm2. Right Femur neck T-score -2.7, BMD  0.721 g/cm2. Right Total femur T-score -2.2 , BMD  0.786 g/cm2. _ Interval change No prior study available for comparison Ref 3 Percent changes not mentioned or within remaining regions are insignificant Please note that the differential diagnosis of BMD increase in the spine includes improvement due to pharmacotherapy vs inter-current progression of spine degeneration or fracture Fracture risk The risk of  osteoporotic fracture increases approximately 2-fold for each 1.0 SD decrease in T-score. Low bone density is not the only risk factor for fracture; consider factors such as patient's age, fall risk, injury risk, previous osteoporotic fracture, family history of osteoporosis, etc.  Repeat Recommend repeat DXA in 2 years. For patients eligible for Medicare, routine testing is allowed once every 2 years. Testing frequency can be increased for patients on corticosteroids. Clinical correlation recommended Technical quality Satisfactory. Principal result : Salma Trimble MD, [facility] Division of Endocrinology and Diabetes  Department of Medicine References: Ref. 1. WHO categories:        T-score > -1.0  = normal             .                              T-score -1.0 to -2.5 = low bone density T-score < -2.5 = osteoporosis        .  Ref. 2. 2015 ISCD official position statements:  www.iscd.org.  Ref. 3.  Today's examination is compared to the technically similar prior study of the total hip and femur if available. Only changes deemed likely to be significant based on historical data are reported. According to the ISCD position statements, total hip rather than femoral neck regions are to be compared because larger areas give better precision. LSC = least significant changes at the [facility] (historical data)  AP spine =  0.032 g/cm2  (6/26/2007)  Left hip = 0.029 g/cm2  (6/15/2007)  Right hip = 0.018 g/cm2  (6/15/2007)  Left mid radius = 0.043 g/cm2  (6/26/2007) Please note that the differential diagnosis of increase in bone density at the lumbar spine includes improvement due to pharmacotherapy vs inter-current progression of spine degeneration or fracture. Ref. 4 Fracture risk is calculated in patients aged 40 to 90 years old with low bone density not on osteoporosis treatment. A 10 year fracture risk of 3% and higher for hip fracture and 20% and higher for major osteoporotic fracture is considered  higher than acceptable risk and might be an indication for medical treatment.  Ref. 5.  By definition, osteoporosis may be diagnosed in the presence or with the history of a low trauma or fragility fracture.  Fragility and low trauma fracture is defined as a fracture resulting from the force of a fall from a standing height or less or a bone that breaks under conditions that would not cause a normal bone to break.   Ref. 6. NOF Physician's Guideline Website address:  www.nof.org.

## 2021-10-05 ENCOUNTER — MYC MEDICAL ADVICE (OUTPATIENT)
Dept: CARDIOLOGY | Facility: CLINIC | Age: 76
End: 2021-10-05

## 2021-10-05 NOTE — TELEPHONE ENCOUNTER
"S-(situation): Called Binu with Dr. Farr's recommendations. Binu is hesitant to start any new medications because he has been feeling, \"loopy\" since yesterday. His BP at the podiatry office was 114/62. He states he still isn't feeling back to normal but today his BP is back up to 135 systolic.    B-(background): Artis Galvin is a 75 year old male being seen today for evaluation of chest discomfort and thoracic aortic aneurysm.   The patient's risk factor profile is: (-) HTN, (-) DM, (-) hypercholesterolemia, (-) tobacco use, (+) fam Hx premature CAD.  There is a family history of TAAD (paternal).  The patient has no history of CHF,  valvular heart disease.  The patient has no Hx of PAD or cerebrovascular disease.  Until recently the patient had not undergone prior cardiovascular evaluation and had never had an ECHO, stress study, cardiac catheterization, or EP study.   The patient had a coronary calcium score (Nov 2019) of 21 placing him at the 25th percentile.  This was prompted by a discussion of whether to initiate a statin in this asymptomatic male with elevated cholesterol.    A-(assessment): labile BP    R-(recommendations): Dr. Farr's recommendations are;   I would switch him to Coreg. If he does not want to switch then we can go to Toprol XL 50.      Will call on Friday to see what his BP have been running    "

## 2021-10-08 NOTE — TELEPHONE ENCOUNTER
S-(situation): patient states that since he has started taking the losartan at 100 mg daily he has been feeling sort of woozy. He originally thought his balance problems had to do with his neuropathy in his feet. That had caused him some difficulty with balance. But his dizziness is more pronounced with the increase in losartan dose. His blood pressures are well controlled now on this dose. Systolics range from 115-130 an his diastolics are 60-70.    B-(background): 76 year old gentleman with a history of mild to moderate CAD, dilated ascending aorta, who is presenting for follow up today. He denies any angina or symptoms. We will increase his losartan today as his BP is elevated. We will continue to monitor his CAD and ascending aortic aneurysm. He will repeat a CTA in a year. Annual follow up.    A-(assessment): dizzy with increased dose of losartan    R-(recommendations): Encouraged patient to hydrate as much as possible. For the next week he will continue the losartan at 100 mg daily unless he feels the dizziness may interfere with his activities. Then he will decrease the dose back to 50 mg daily and monitor BP's. I will call him on October 18 and discuss dosing of Losartan     - c/w aspirin  - c/w statin  - TTE still pending. Expedite.

## 2021-10-09 ENCOUNTER — MYC MEDICAL ADVICE (OUTPATIENT)
Dept: INTERNAL MEDICINE | Facility: CLINIC | Age: 76
End: 2021-10-09

## 2021-10-09 DIAGNOSIS — I20.89 STABLE ANGINA PECTORIS (H): ICD-10-CM

## 2021-10-11 NOTE — TELEPHONE ENCOUNTER
Please review Pramana message and advise. Current Rx does indicate that patient should be taking 1 tablet daily.    Rx pended in this encounter for approval, thanks.

## 2021-10-11 NOTE — TELEPHONE ENCOUNTER
This medicine was prescribed by his cardiologist.  Did the cardiologist recommend that he cut back the dose?  Or did patient just do it on his own?

## 2021-10-12 RX ORDER — ISOSORBIDE MONONITRATE 30 MG/1
30 TABLET, EXTENDED RELEASE ORAL DAILY
Qty: 90 TABLET | Refills: 3 | Status: SHIPPED | OUTPATIENT
Start: 2021-10-12 | End: 2022-03-08

## 2021-10-13 DIAGNOSIS — Z11.59 ENCOUNTER FOR SCREENING FOR OTHER VIRAL DISEASES: ICD-10-CM

## 2021-10-18 ENCOUNTER — MYC MEDICAL ADVICE (OUTPATIENT)
Dept: CARDIOLOGY | Facility: CLINIC | Age: 76
End: 2021-10-18

## 2021-10-18 DIAGNOSIS — I10 ESSENTIAL HYPERTENSION, BENIGN: ICD-10-CM

## 2021-10-18 DIAGNOSIS — I71.21 ASCENDING AORTIC ANEURYSM (H): ICD-10-CM

## 2021-10-19 RX ORDER — LOSARTAN POTASSIUM 50 MG/1
75 TABLET ORAL DAILY
COMMUNITY
Start: 2021-10-19 | End: 2022-03-24

## 2021-10-19 NOTE — TELEPHONE ENCOUNTER
"Patient state he wasn't \"very good\" last week about taking his medications. He skipped two days. He is willing to try a dose of Losartan at 75 mg daily and see if this is a better dose for keeping him from being so fatigued. He has 25 mg & 50 mg tablets at home, so he will try that combination for one week and send BPs next week.  "

## 2021-10-20 ENCOUNTER — MYC MEDICAL ADVICE (OUTPATIENT)
Dept: INTERNAL MEDICINE | Facility: CLINIC | Age: 76
End: 2021-10-20
Payer: COMMERCIAL

## 2021-10-20 DIAGNOSIS — J01.90 ACUTE SINUSITIS WITH SYMPTOMS > 10 DAYS: Primary | ICD-10-CM

## 2021-10-22 DIAGNOSIS — B35.1 ONYCHOMYCOSIS: Primary | ICD-10-CM

## 2021-10-22 RX ORDER — CICLOPIROX 80 MG/ML
SOLUTION TOPICAL
Qty: 30 ML | Refills: 3 | Status: SHIPPED | OUTPATIENT
Start: 2021-10-22 | End: 2022-05-24

## 2021-10-31 ENCOUNTER — TRANSFERRED RECORDS (OUTPATIENT)
Dept: HEALTH INFORMATION MANAGEMENT | Facility: CLINIC | Age: 76
End: 2021-10-31
Payer: COMMERCIAL

## 2021-11-01 ENCOUNTER — CARE COORDINATION (OUTPATIENT)
Dept: GASTROENTEROLOGY | Facility: CLINIC | Age: 76
End: 2021-11-01

## 2021-11-01 DIAGNOSIS — K86.2 PANCREATIC CYST: Primary | ICD-10-CM

## 2021-11-08 ENCOUNTER — TRANSFERRED RECORDS (OUTPATIENT)
Dept: HEALTH INFORMATION MANAGEMENT | Facility: CLINIC | Age: 76
End: 2021-11-08
Payer: COMMERCIAL

## 2021-11-10 NOTE — TELEPHONE ENCOUNTER
I called and spoke with Binu. He was in the  on 11/8/21 and did test positive for covid. They didn't give patient any abx or medications besides telling him to take sudafed and use otc nasal spray. Binu reports that he also touch base with his [place] provider and they were able to coordinate monoclonal antibody infusion for him today in [place] (results/reports) are viewable via [employer].     He informs me during our call that he was a bit disappointed with [employer] responses. He's still feeling poorly with a lot of sinus pressure and nasal congestion. He has no fevers or no cough. He was hoping that the [facility] room was going to give him at least and abx to treat his sinusitis sxs's. Please review updates and advise, thank you.

## 2021-11-10 NOTE — TELEPHONE ENCOUNTER
Can you please call patient and find out if he needs help seeing if he qualifies for monoclonal antibodies.  If he does, please ask Henri or Otilia to help him out.  Thank you.  Please also see how he is feeling.

## 2021-11-16 ENCOUNTER — VIRTUAL VISIT (OUTPATIENT)
Dept: INTERNAL MEDICINE | Facility: CLINIC | Age: 76
End: 2021-11-16
Payer: COMMERCIAL

## 2021-11-16 DIAGNOSIS — C61 MALIGNANT NEOPLASM OF PROSTATE (H): ICD-10-CM

## 2021-11-16 DIAGNOSIS — U07.1 INFECTION DUE TO 2019 NOVEL CORONAVIRUS: Primary | ICD-10-CM

## 2021-11-16 DIAGNOSIS — G44.52 NEW DAILY PERSISTENT HEADACHE: ICD-10-CM

## 2021-11-16 DIAGNOSIS — J01.90 ACUTE SINUSITIS WITH SYMPTOMS > 10 DAYS: ICD-10-CM

## 2021-11-16 PROCEDURE — 99214 OFFICE O/P EST MOD 30 MIN: CPT | Mod: 95 | Performed by: INTERNAL MEDICINE

## 2021-11-16 NOTE — PROGRESS NOTES
Binu is a 76 year old who is being evaluated via a billable video visit.      How would you like to obtain your AVS? MyChart  If the video visit is dropped, the invitation should be resent by: Send to e-mail at: [email or web address]  Will anyone else be joining your video visit? No      Video Start Time: 1627    Concern for COVID-19  About how many days ago did these symptoms start? Sometimes after 10/28/21  Is this your first visit for this illness? Yes  In the 14 days before your symptoms started, have you had close contact with someone with COVID-19 (Coronavirus)? I do not know.  Do you have a fever or chills? No  Are you having new or worsening difficulty breathing? No  Do you have new or worsening cough? No  Have you had any new or unexplained body aches? No    Have you experienced any of the following NEW symptoms?    Headache: YES    Sore throat: No    Loss of taste or smell: No    Chest pain: No    Diarrhea: No    Rash: No  What treatments have you tried? Augmentin & Tylenol as needed   Who do you live with? Self   Are you, or a household member, a healthcare worker or a ? No  Do you live in a nursing home, group home, or shelter? No  Do you have a way to get food/medications if quarantined? Yes, I have a friend or family member who can help me.            Office Visit - Follow Up   Artis Galvin   76 year old adult    Date of Visit: 11/16/2021    Chief Complaint   Patient presents with     Follow Up     well Video - Current sxs's of diminished hearing & headaches - no fevers         Assessment and Plan   1. Infection due to 2019 novel coronavirus  I think he continues to improve.  Although he has felt pretty poorly I did discuss with him this is a mild case as he was not hospitalized nor did he die.  I tried to reassure him as much as I could.    2. New daily persistent headache  This is probably going to be self resolved.  We will readdress next month.  He still having it after another  week or 2 he is to let us know.    3. Acute sinusitis with symptoms > 10 days  Continue with antibiotics.    4. Malignant neoplasm of prostate (H)  Please to see had a recent PSA which was undetectable.        Return in about 4 weeks (around 12/14/2021) for Next scheduled visit.     History of Present Illness   This 76 year old Binu joins me on a video visit.  He has been recently diagnosed with Covid.  He started with some vertigo.  Then developed head congestion and cold symptoms.  Had at least one ER or emergency room visit.  They did not test him at that point.  I then recommended he get tested again and he was positive.  He has had ongoing congestion and headache.  No longer having cough.  Not having fever at this time.  This was a breakthrough case.  He is still concerned because he continues to have the headache although he is getting better.  He did get treated with monoclonal antibodies through his male provider.  He has an appointment with me next month.  I did put him on Augmentin for possible secondary acute sinusitis with bacteria.  He remains on this.  Does not know if he is getting better but thinks he might be.    Review of Systems: A comprehensive review of systems was negative except as noted.     Medications, Allergies and Problem List   Reviewed, reconciled and updated  Post Discharge Medication Reconciliation Status:      Physical Exam   General Appearance:   Pleasant gentleman who looks well on video.  Good spirits today.    There were no vitals taken for this visit.         Additional Information   Current Outpatient Medications   Medication Sig Dispense Refill     alendronate (FOSAMAX) 70 MG tablet Take 1 tablet (70 mg) by mouth every 7 days Take 60 minutes before am meal with 8 oz. water. Remain upright for 30 minutes. 12 tablet 3     amoxicillin-clavulanate (AUGMENTIN) 875-125 MG tablet Take 1 tablet by mouth 2 times daily for 7 days 14 tablet 0     aspirin 81 MG EC tablet Take 81 mg by  mouth daily       atorvastatin (LIPITOR) 40 MG tablet Take 1 tablet (40 mg) by mouth daily 90 tablet 3     cholecalciferol 50 MCG (2000 UT) CAPS Take 1 capsule by mouth daily       ciclopirox (PENLAC) 8 % external solution Apply to adjacent skin and affected nails daily.  Remove with alcohol every 7 days, then repeat. 30 mL 3     isosorbide mononitrate (IMDUR) 30 MG 24 hr tablet Take 1 tablet (30 mg) by mouth daily 90 tablet 3     ketoconazole (NIZORAL) 2 % external cream Apply topically as needed       leuprolide (ELIGARD) 7.5 MG Inject 7.5 mg Subcutaneous every 30 days       losartan (COZAAR) 50 MG tablet Take 1.5 tablets (75 mg) by mouth daily       metoprolol succinate ER (TOPROL-XL) 25 MG 24 hr tablet Take 0.5 tablets (12.5 mg) by mouth daily 45 tablet 3     nitroGLYcerin (NITROSTAT) 0.4 MG sublingual tablet For chest pain place 1 tablet under the tongue every 5 minutes for 3 doses. If symptoms persist 5 minutes after 1st dose call 911. 25 tablet 3     ondansetron (ZOFRAN-ODT) 4 MG ODT tab Take 4 mg by mouth as needed       pilocarpine (PILOCAR) 2 % ophthalmic solution Apply 1 drop to eye 2 times daily       terbinafine (LAMISIL) 250 MG tablet Take 1 tablet (250 mg) by mouth daily 90 tablet 0     amoxicillin-clavulanate (AUGMENTIN) 875-125 MG tablet Take 1 tablet by mouth 2 times daily (Patient not taking: Reported on 11/16/2021) 4 tablet 0     Allergies   Allergen Reactions     Ciprofloxacin Unknown     Levaquin [Levofloxacin] Unknown     Social History     Tobacco Use     Smoking status: Never Smoker     Smokeless tobacco: Never Used   Substance Use Topics     Alcohol use: Not Currently     Comment: Alcoholic Drinks/day: rarely     Drug use: No       Review and/or order of clinical lab tests:  Review and/or order of radiology tests:  Review and/or order of medicine tests:  Discussion of test results with performing physician:  Decision to obtain old records and/or obtain history from someone other than the  patient:  Review and summarization of old records and/or obtaining history from someone other than the patient and.or discussion of case with another health care provider:  Independent visualization of image, tracing or specimen itself:    Time:      COLBY BROCK MD  Video-Visit Details    Type of service:  Video Visit    Video End Time:1644    Originating Location (pt. Location): Home    Distant Location (provider location):  [facility]     Platform used for Video Visit: [employer]

## 2021-11-26 ENCOUNTER — LAB (OUTPATIENT)
Dept: LAB | Facility: CLINIC | Age: 76
End: 2021-11-26

## 2021-11-26 DIAGNOSIS — Z11.59 ENCOUNTER FOR SCREENING FOR OTHER VIRAL DISEASES: ICD-10-CM

## 2021-11-26 LAB — SARS-COV-2 RNA RESP QL NAA+PROBE: NEGATIVE

## 2021-11-26 PROCEDURE — U0003 INFECTIOUS AGENT DETECTION BY NUCLEIC ACID (DNA OR RNA); SEVERE ACUTE RESPIRATORY SYNDROME CORONAVIRUS 2 (SARS-COV-2) (CORONAVIRUS DISEASE [COVID-19]), AMPLIFIED PROBE TECHNIQUE, MAKING USE OF HIGH THROUGHPUT TECHNOLOGIES AS DESCRIBED BY CMS-2020-01-R: HCPCS | Performed by: INTERNAL MEDICINE

## 2021-11-30 ENCOUNTER — ANCILLARY PROCEDURE (OUTPATIENT)
Dept: ULTRASOUND IMAGING | Facility: CLINIC | Age: 76
End: 2021-11-30
Attending: INTERNAL MEDICINE
Payer: COMMERCIAL

## 2021-11-30 DIAGNOSIS — E04.1 THYROID NODULE: ICD-10-CM

## 2021-11-30 PROCEDURE — 88173 CYTOPATH EVAL FNA REPORT: CPT | Mod: 26 | Performed by: PATHOLOGY

## 2021-11-30 PROCEDURE — 88173 CYTOPATH EVAL FNA REPORT: CPT | Mod: TC | Performed by: INTERNAL MEDICINE

## 2021-11-30 PROCEDURE — 10005 FNA BX W/US GDN 1ST LES: CPT | Performed by: STUDENT IN AN ORGANIZED HEALTH CARE EDUCATION/TRAINING PROGRAM

## 2021-11-30 PROCEDURE — 88172 CYTP DX EVAL FNA 1ST EA SITE: CPT | Mod: 26 | Performed by: PATHOLOGY

## 2021-11-30 RX ORDER — LIDOCAINE HYDROCHLORIDE 10 MG/ML
5 INJECTION, SOLUTION INFILTRATION; PERINEURAL ONCE
Status: COMPLETED | OUTPATIENT
Start: 2021-11-30 | End: 2021-11-30

## 2021-11-30 RX ADMIN — LIDOCAINE HYDROCHLORIDE 3 ML: 10 INJECTION, SOLUTION INFILTRATION; PERINEURAL at 11:12

## 2021-12-02 LAB
PATH REPORT.COMMENTS IMP SPEC: NORMAL
PATH REPORT.FINAL DX SPEC: NORMAL
PATH REPORT.GROSS SPEC: NORMAL
PATH REPORT.MICROSCOPIC SPEC OTHER STN: NORMAL
PATH REPORT.RELEVANT HX SPEC: NORMAL

## 2021-12-04 DIAGNOSIS — I10 HTN (HYPERTENSION): Primary | ICD-10-CM

## 2021-12-07 ENCOUNTER — HOSPITAL ENCOUNTER (OUTPATIENT)
Dept: CT IMAGING | Facility: HOSPITAL | Age: 76
Discharge: HOME | End: 2021-12-07
Attending: INTERNAL MEDICINE | Admitting: INTERNAL MEDICINE
Payer: COMMERCIAL

## 2021-12-07 DIAGNOSIS — J01.90 ACUTE SINUSITIS WITH SYMPTOMS > 10 DAYS: ICD-10-CM

## 2021-12-07 DIAGNOSIS — G44.52 NEW DAILY PERSISTENT HEADACHE: ICD-10-CM

## 2021-12-07 PROCEDURE — 70486 CT MAXILLOFACIAL W/O DYE: CPT

## 2021-12-07 RX ORDER — LOSARTAN POTASSIUM 100 MG/1
100 TABLET ORAL DAILY
Qty: 90 TABLET | Refills: 3 | Status: SHIPPED | OUTPATIENT
Start: 2021-12-07 | End: 2021-12-08

## 2021-12-07 NOTE — TELEPHONE ENCOUNTER
"Outpatient Medication Detail     Disp Refills Start End RHONDA   losartan (COZAAR) 100 MG tablet 90 tablet 1 6/30/2021  --   Sig - Route: Take 1 tablet (100 mg total) by mouth daily. - Oral   Sent to pharmacy as: losartan 100 mg tablet (Cozaar)   E-Prescribing Status: Receipt confirmed by pharmacy (6/30/2021  3:04 PM CDT)       Last office visit provider:  11/16/21     Requested Prescriptions   Pending Prescriptions Disp Refills     losartan (COZAAR) 100 MG tablet [Pharmacy Med Name: Losartan Potassium 100 MG Oral Tablet] 90 tablet 3     Sig: TAKE 1 TABLET BY MOUTH  DAILY       Angiotensin-II Receptors Passed - 12/4/2021  9:42 PM        Passed - Last blood pressure under 140/90 in past 12 months     BP Readings from Last 3 Encounters:   10/04/21 114/62   10/01/21 (!) 151/87   09/08/21 118/62                 Passed - Recent (12 mo) or future (30 days) visit within the authorizing provider's specialty     Patient has had an office visit with the authorizing provider or a provider within the authorizing providers department within the previous 12 mos or has a future within next 30 days. See \"Patient Info\" tab in inbasket, or \"Choose Columns\" in Meds & Orders section of the refill encounter.              Passed - Medication is active on med list        Passed - Patient is age 18 or older        Passed - Normal serum creatinine on file in past 12 months     Recent Labs   Lab Test 09/08/21  1653 05/26/20  1059 05/05/20  1227   CR 0.88   < >  --    CREAT  --   --  0.9    < > = values in this interval not displayed.       Ok to refill medication if creatinine is low          Passed - Normal serum potassium on file in past 12 months     Recent Labs   Lab Test 09/08/21  1653   POTASSIUM 3.8                         Daniel Dykes RN 12/07/21 7:47 AM  "

## 2021-12-08 ENCOUNTER — OFFICE VISIT (OUTPATIENT)
Dept: INTERNAL MEDICINE | Facility: CLINIC | Age: 76
End: 2021-12-08
Payer: COMMERCIAL

## 2021-12-08 VITALS
WEIGHT: 198 LBS | HEIGHT: 70 IN | BODY MASS INDEX: 28.35 KG/M2 | OXYGEN SATURATION: 96 % | DIASTOLIC BLOOD PRESSURE: 64 MMHG | SYSTOLIC BLOOD PRESSURE: 122 MMHG | HEART RATE: 70 BPM | TEMPERATURE: 97.8 F

## 2021-12-08 DIAGNOSIS — R07.1 CHEST PAIN ON BREATHING: ICD-10-CM

## 2021-12-08 DIAGNOSIS — Z11.59 NEED FOR HEPATITIS C SCREENING TEST: ICD-10-CM

## 2021-12-08 DIAGNOSIS — E06.3 HASHIMOTO'S THYROIDITIS: ICD-10-CM

## 2021-12-08 DIAGNOSIS — I71.21 ASCENDING AORTIC ANEURYSM (H): ICD-10-CM

## 2021-12-08 DIAGNOSIS — R51.9 NONINTRACTABLE EPISODIC HEADACHE, UNSPECIFIED HEADACHE TYPE: ICD-10-CM

## 2021-12-08 DIAGNOSIS — R60.9 EDEMA, UNSPECIFIED TYPE: Primary | ICD-10-CM

## 2021-12-08 DIAGNOSIS — U07.1 INFECTION DUE TO 2019 NOVEL CORONAVIRUS: ICD-10-CM

## 2021-12-08 DIAGNOSIS — K86.2 PANCREATIC CYST: ICD-10-CM

## 2021-12-08 DIAGNOSIS — M81.0 OSTEOPOROSIS WITHOUT CURRENT PATHOLOGICAL FRACTURE, UNSPECIFIED OSTEOPOROSIS TYPE: ICD-10-CM

## 2021-12-08 DIAGNOSIS — E04.1 THYROID NODULE: ICD-10-CM

## 2021-12-08 DIAGNOSIS — B35.1 ONYCHOMYCOSIS: ICD-10-CM

## 2021-12-08 DIAGNOSIS — G62.9 PERIPHERAL POLYNEUROPATHY: ICD-10-CM

## 2021-12-08 DIAGNOSIS — E06.3 HYPOTHYROIDISM DUE TO HASHIMOTO'S THYROIDITIS: ICD-10-CM

## 2021-12-08 DIAGNOSIS — C61 MALIGNANT NEOPLASM OF PROSTATE (H): ICD-10-CM

## 2021-12-08 PROBLEM — F43.29 ADJUSTMENT DISORDER WITH MIXED EMOTIONAL FEATURES: Status: RESOLVED | Noted: 2020-06-16 | Resolved: 2021-12-08

## 2021-12-08 LAB
ALBUMIN SERPL-MCNC: 3.5 G/DL (ref 3.5–5)
ALP SERPL-CCNC: 114 U/L (ref 45–120)
ALT SERPL W P-5'-P-CCNC: 17 U/L (ref 0–45)
ANION GAP SERPL CALCULATED.3IONS-SCNC: 8 MMOL/L (ref 5–18)
AST SERPL W P-5'-P-CCNC: 15 U/L (ref 0–40)
ATRIAL RATE - MUSE: 63 BPM
BILIRUB SERPL-MCNC: 0.5 MG/DL (ref 0–1)
BUN SERPL-MCNC: 19 MG/DL (ref 8–28)
CALCIUM SERPL-MCNC: 9.8 MG/DL (ref 8.5–10.5)
CHLORIDE BLD-SCNC: 105 MMOL/L (ref 98–107)
CO2 SERPL-SCNC: 28 MMOL/L (ref 22–31)
CREAT SERPL-MCNC: 0.94 MG/DL (ref 0.6–1.3)
D DIMER PPP FEU-MCNC: 0.42 UG/ML FEU (ref 0–0.5)
DIASTOLIC BLOOD PRESSURE - MUSE: NORMAL MMHG
GFR SERPL CREATININE-BSD FRML MDRD: 79 ML/MIN/1.73M2
GLUCOSE BLD-MCNC: 96 MG/DL (ref 70–125)
INTERPRETATION ECG - MUSE: NORMAL
P AXIS - MUSE: 34 DEGREES
POTASSIUM BLD-SCNC: 4.7 MMOL/L (ref 3.5–5)
PR INTERVAL - MUSE: 162 MS
PROT SERPL-MCNC: 6.9 G/DL (ref 6–8)
QRS DURATION - MUSE: 82 MS
QT - MUSE: 440 MS
QTC - MUSE: 450 MS
R AXIS - MUSE: 11 DEGREES
SODIUM SERPL-SCNC: 141 MMOL/L (ref 136–145)
SYSTOLIC BLOOD PRESSURE - MUSE: NORMAL MMHG
T AXIS - MUSE: 17 DEGREES
T4 FREE SERPL-MCNC: 0.83 NG/DL (ref 0.7–1.8)
TSH SERPL DL<=0.005 MIU/L-ACNC: 4.5 UIU/ML (ref 0.3–5)
VENTRICULAR RATE- MUSE: 63 BPM

## 2021-12-08 PROCEDURE — 36415 COLL VENOUS BLD VENIPUNCTURE: CPT | Performed by: INTERNAL MEDICINE

## 2021-12-08 PROCEDURE — 80053 COMPREHEN METABOLIC PANEL: CPT | Performed by: INTERNAL MEDICINE

## 2021-12-08 PROCEDURE — 84439 ASSAY OF FREE THYROXINE: CPT | Performed by: INTERNAL MEDICINE

## 2021-12-08 PROCEDURE — 85379 FIBRIN DEGRADATION QUANT: CPT | Performed by: INTERNAL MEDICINE

## 2021-12-08 PROCEDURE — 93005 ELECTROCARDIOGRAM TRACING: CPT | Performed by: INTERNAL MEDICINE

## 2021-12-08 PROCEDURE — 93010 ELECTROCARDIOGRAM REPORT: CPT | Performed by: INTERNAL MEDICINE

## 2021-12-08 PROCEDURE — 84443 ASSAY THYROID STIM HORMONE: CPT | Performed by: INTERNAL MEDICINE

## 2021-12-08 PROCEDURE — 99215 OFFICE O/P EST HI 40 MIN: CPT | Performed by: INTERNAL MEDICINE

## 2021-12-08 ASSESSMENT — MIFFLIN-ST. JEOR: SCORE: 1639.37

## 2021-12-08 NOTE — PROGRESS NOTES
Office Visit - Follow Up   Artis Galvin   76 year old adult    Date of Visit: 12/8/2021    Chief Complaint   Patient presents with     Follow Up     CT review, weight gain/edema (Bilateral leg) & fatigue      Referral     for back pain - Chiro      Labs Only     needs labs per Endo ([facility])         Assessment and Plan   1. Edema, unspecified type  Unclear etiology.  Edema is not really profound today.  Check D-dimer.  He has been recently diagnosed with Covid.  Check other labs today.  Await results and go from there.  - EKG 12-lead, tracing only    2. Chest pain on breathing  He has had a lot of scans.  I am not too concerned about PE but if D-dimer is elevated we will have to do a CT angiogram given his symptoms.  He has had this in the past and he is been helped by chiropractic.  I have urged him to hold off until we find out more.  - D dimer, quantitative; Future    3. Infection due to 2019 novel coronavirus  He seems to be recovering okay.  Reassurance today.    4. Malignant neoplasm of prostate (H)  Recent PSA was 0 or undetectable.    5. Onychomycosis  I have urged him against terbinafine actually.    6. Peripheral polyneuropathy  Its not painful.  He can go back to see the neurologist but not sure if there is anything we can do for him at this point.    7. Need for hepatitis C screening test      8. Nonintractable episodic headache, unspecified headache type  This is resolved.    9. Ascending aortic aneurysm (H)  He will continue to get his surveillance scans yearly.    10. Pancreatic cyst  He is due for a pancreatic cyst check.  I have urged him to schedule that.    11. Hypothyroidism due to Hashimoto's thyroiditis  Continue to follow with the nurse endocrinologist.    12. Thyroid nodule  As above.    13. Osteoporosis without current pathological fracture, unspecified osteoporosis type  I counseled him on Fosamax today and how it is taken and potential side effects that can occur including  ONJ        Return in about 3 months (around 3/8/2022) for with me, in person, Follow up.     History of Present Illness   This 76 year old Binu comes in today for follow-up.  He is an extremely complicated 76-year-old gent with multiple medical problems.  Extensive visit ensued.  He is had a very difficult year.  Over 50 minutes was spent with patient and reviewing his chart.  He has been diagnosed with prostate cancer which is metastatic.  He is status post radiation this year.  He is now on Eligard.  It is been very difficult for him.  He is still getting a lot of hot flashes.  He is off of it for now.  He took it for 4 months.  He also had COVID-19.  This was quite difficult for him.  A lot of upper respiratory infection symptoms and vertigo and headaches.  Headaches are better now.  Vertigo is gone.  Now has been having lower extremity edema and chest and back pain when he breathes in.  No shortness of breath.    He was recently diagnosed with osteoporosis and told to take Fosamax but is not taking it.    He was found to have a thyroid nodule which was biopsied and was benign.    He is due for follow-up thyroid testing today.    He is due for a follow-up pancreatic cyst check with an MRI.    He continues to have his peripheral neuropathy and wonders if there is anything that can be done for it.  Nonpainful.  He has seen no ran neurology for this in the past.    He tells me his mood has been good.    He sees a podiatrist who put him on terbinafine for onychomycosis.  He is not taking it.  He wants to know my opinion.    Review of Systems: A comprehensive review of systems was negative except as noted.     Medications, Allergies and Problem List   Reviewed, reconciled and updated  Post Discharge Medication Reconciliation Status:      Physical Exam   General Appearance:   Pleasant gentleman.  No edema today.  Heart regular.  Lungs are clear.  Pain in the right anterior chest with deep inspiration.  Further exam  "deferred    /64 (BP Location: Left arm, Patient Position: Sitting, Cuff Size: Adult Small)   Pulse 70   Temp 97.8  F (36.6  C)   Ht 1.778 m (5' 10\")   Wt 89.8 kg (198 lb)   SpO2 96%   BMI 28.41 kg/m           Additional Information   Current Outpatient Medications   Medication Sig Dispense Refill     alendronate (FOSAMAX) 70 MG tablet Take 1 tablet (70 mg) by mouth every 7 days Take 60 minutes before am meal with 8 oz. water. Remain upright for 30 minutes. 12 tablet 3     aspirin 81 MG EC tablet Take 81 mg by mouth daily       atorvastatin (LIPITOR) 40 MG tablet Take 1 tablet (40 mg) by mouth daily 90 tablet 3     cholecalciferol 50 MCG (2000 UT) CAPS Take 1 capsule by mouth daily       ciclopirox (PENLAC) 8 % external solution Apply to adjacent skin and affected nails daily.  Remove with alcohol every 7 days, then repeat. 30 mL 3     isosorbide mononitrate (IMDUR) 30 MG 24 hr tablet Take 1 tablet (30 mg) by mouth daily 90 tablet 3     ketoconazole (NIZORAL) 2 % external cream Apply topically as needed       losartan (COZAAR) 50 MG tablet Take 50 mg by mouth daily        metoprolol succinate ER (TOPROL-XL) 25 MG 24 hr tablet Take 0.5 tablets (12.5 mg) by mouth daily 45 tablet 3     nitroGLYcerin (NITROSTAT) 0.4 MG sublingual tablet For chest pain place 1 tablet under the tongue every 5 minutes for 3 doses. If symptoms persist 5 minutes after 1st dose call 911. 25 tablet 3     pilocarpine (PILOCAR) 2 % ophthalmic solution Apply 1 drop to eye 2 times daily       terbinafine (LAMISIL) 250 MG tablet Take 1 tablet (250 mg) by mouth daily (Patient not taking: Reported on 12/8/2021) 90 tablet 0     Allergies   Allergen Reactions     Ciprofloxacin Unknown     Levaquin [Levofloxacin] Unknown     Social History     Tobacco Use     Smoking status: Never Smoker     Smokeless tobacco: Never Used   Substance Use Topics     Alcohol use: Not Currently     Comment: Alcoholic Drinks/day: rarely     Drug use: No "       Review and/or order of clinical lab tests:  Review and/or order of radiology tests:  Review and/or order of medicine tests:  Discussion of test results with performing physician:  Decision to obtain old records and/or obtain history from someone other than the patient:  Review and summarization of old records and/or obtaining history from someone other than the patient and.or discussion of case with another health care provider:  Independent visualization of image, tracing or specimen itself:    Time:      COLBY BROCK MD

## 2021-12-08 NOTE — LETTER
December 9, 2021      Artis Galvin  [address]  [place] [address]        Dear  Kamar,    We are writing to inform you of your test results.    Kidney and liver tests are normal.       Resulted Orders   D dimer, quantitative   Result Value Ref Range    D-Dimer Quantitative 0.42 0.00 - 0.50 ug/mL FEU    Narrative    This D-dimer assay is intended for use in conjunction with a clinical pretest probability assessment model to exclude pulmonary embolism (PE) and deep venous thrombosis (DVT) in outpatients suspected of PE or DVT. The cut-off value is 0.50 ug/mL FEU.   Comprehensive metabolic panel (BMP + Alb, Alk Phos, ALT, AST, Total. Bili, TP)   Result Value Ref Range    Sodium 141 136 - 145 mmol/L    Potassium 4.7 3.5 - 5.0 mmol/L    Chloride 105 98 - 107 mmol/L    Carbon Dioxide (CO2) 28 22 - 31 mmol/L    Anion Gap 8 5 - 18 mmol/L    Urea Nitrogen 19 8 - 28 mg/dL    Creatinine 0.94 0.60 - 1.30 mg/dL      Comment:      Age                     Creatinine (mg/dL)    0-22 Days               0.30-1.00    22 Days to 13 Months    0.10-0.60     13 Months to 6 Years    Female 0.10-0.50    Male 0.10-0.60    6 Years to 11 Years     Female 0.20-0.60    Male 0.20-0.70    11 Years to 16 Years    Female 0.40-0.70    Male 0.30-0.90    16 Years and Older      Female 0.60-1.10    Male 0.70-1.30         Calcium 9.8 8.5 - 10.5 mg/dL    Glucose 96 70 - 125 mg/dL    Alkaline Phosphatase 114 45 - 120 U/L    AST 15 0 - 40 U/L    ALT 17 0 - 45 U/L    Protein Total 6.9 6.0 - 8.0 g/dL    Albumin 3.5 3.5 - 5.0 g/dL    Bilirubin Total 0.5 0.0 - 1.0 mg/dL    GFR Estimate 79 >60 mL/min/1.73m2      Comment:      GFR not calculated when sex unspecified or nonbinary.  As of July 11, 2021, eGFR is calculated by the CKD-EPI creatinine equation, without race adjustment. eGFR can be influenced by muscle mass, exercise, and diet. The reported eGFR is an estimation only and is only applicable if the renal function is stable.     Narrative    The sex of this patient cannot be reliably determined based on discrepancies in demographics (legal sex, sex assigned at birth, gender identity).  Both male and female reference ranges are provided where applicable.  Careful evaluation of the patient s results as compared to the gender specific reference intervals is required in this setting.        If you have any questions or concerns, please call the clinic at the number listed above.       Sincerely,      Angel Hernandez MD

## 2021-12-08 NOTE — LETTER
December 8, 2021      Artis Galvin  [address]  [place] [address]        Dear  Kamar,    We are writing to inform you of your test results.    Blood clotting test is negative/normal.  I do not think the chest discomfort you are having is due to blood clots.  If you want to proceed with chiropractic then that would be fine with me.  Let me know if you need a referral for your insurance.       Resulted Orders   D dimer, quantitative   Result Value Ref Range    D-Dimer Quantitative 0.42 0.00 - 0.50 ug/mL FEU    Narrative    This D-dimer assay is intended for use in conjunction with a clinical pretest probability assessment model to exclude pulmonary embolism (PE) and deep venous thrombosis (DVT) in outpatients suspected of PE or DVT. The cut-off value is 0.50 ug/mL FEU.       If you have any questions or concerns, please call the clinic at the number listed above.       Sincerely,      Angel Hernandez MD

## 2021-12-30 ENCOUNTER — HOSPITAL ENCOUNTER (EMERGENCY)
Facility: CLINIC | Age: 76
Discharge: LEFT BEFORE DISCHARGE | End: 2021-12-30
Attending: EMERGENCY MEDICINE
Payer: COMMERCIAL

## 2021-12-30 VITALS
OXYGEN SATURATION: 97 % | SYSTOLIC BLOOD PRESSURE: 135 MMHG | TEMPERATURE: 97.6 F | RESPIRATION RATE: 16 BRPM | WEIGHT: 198 LBS | BODY MASS INDEX: 28.35 KG/M2 | HEIGHT: 70 IN | HEART RATE: 69 BPM | DIASTOLIC BLOOD PRESSURE: 83 MMHG

## 2021-12-30 ASSESSMENT — MIFFLIN-ST. JEOR: SCORE: 1639.37

## 2021-12-30 NOTE — ED TRIAGE NOTES
"Triage Assessment & Note:    /83   Pulse 69   Temp 97.6  F (36.4  C) (Oral)   Resp 16   Ht 1.778 m (5' 10\")   Wt 89.8 kg (198 lb)   SpO2 97%   BMI 28.41 kg/m        Patient presents with: Pt comes to triage with reports of hypertension and dizziness. No reports of fever, CP, or travel.     Home Treatments/Remedies: Home medications    Febrile / Afebrile: afebrile    Duration of C/o: < 12 hrs    Anabel Thomason RN  December 30, 2021        "

## 2022-01-02 ENCOUNTER — ANCILLARY PROCEDURE (OUTPATIENT)
Dept: MRI IMAGING | Facility: CLINIC | Age: 77
End: 2022-01-02
Attending: INTERNAL MEDICINE
Payer: COMMERCIAL

## 2022-01-02 DIAGNOSIS — K86.2 PANCREATIC CYST: ICD-10-CM

## 2022-01-02 PROCEDURE — A9585 GADOBUTROL INJECTION: HCPCS | Performed by: RADIOLOGY

## 2022-01-02 PROCEDURE — 74183 MRI ABD W/O CNTR FLWD CNTR: CPT | Performed by: RADIOLOGY

## 2022-01-02 RX ORDER — GADOBUTROL 604.72 MG/ML
7.5 INJECTION INTRAVENOUS ONCE
Status: COMPLETED | OUTPATIENT
Start: 2022-01-02 | End: 2022-01-02

## 2022-01-02 RX ADMIN — GADOBUTROL 7.5 ML: 604.72 INJECTION INTRAVENOUS at 13:56

## 2022-01-02 NOTE — LETTER
Patient:  Artis Galvin  :   1945  MRN:     [id number]         Artis Galvin  [address] [place] [address]        January 3, 2022    Dear  Kamar,    We are writing to inform you of your test results. In short, your MRI demonstrates a small (7mm) simple cyst of the pancreas without any concerning features. This may represent a premalignant cystic lesion such as an intraductal papillary mucinous neoplasm and therefore deserves ongoing surveillance by MRI in one year. I would be happy to discuss this further in clinic.     I have included the formal documtentation of the results below. It continues to be a pleasure participating in your care.  Please feel free to contact our clinic with any further questions.      Sincerely,    Jamie Austin MD PhD FACG JESENIA LARA  Director of Endoscopy  Associate Professor of Medicine, Surgery and Pediatrics  Interventional and Therapeutic Endoscopy    [facility]  Division of Gastroenterology and Hepatology  [facility] [address] [address]    New Consultations  [phone]  Procedure Scheduling [phone]  Clinical Nurse Coordinator [phone]  Clinical Fax   [phone]  Administrative   [phone]  Administrative Fax  [phone]        Resulted Orders   MR Pancreas wo & w Contrast    Narrative    Exam: MR PANCREAS WO & W CONTRAST, 1/3/2022 8:33 AM    Indication: Pancreatic cyst/pseudocyst; Pancreatic cyst    Comparison: CT 7/15/2021    Technique: Images were acquired with and without intravenous contrast  through the abdomen. The following MR images were acquired: TrueFISP,  multiplanar T2 weighted, axial T1 in/out of phase, axial fat-saturated  T1, diffusion-weighted. Multiplanar T1-weighted images with fat  saturation were obtained before contrast administration and at  multiple time points following the administration of intravenous  contrast. Contrast dose: 7.5mL  Gadavist    FINDINGS:    Liver: Unremarkable. No suspicious masses. Mild diffuse hepatic  steatosis.     Gallbladder/biliary tree: No gallstones. No biliary dilatation.    Spleen: Unremarkable.    Pancreas: There is a 7 mm lobulated cystic structure in the head of  the pancreas. Normal T1 signal and enhancement. No ductal dilation.    Adrenal glands: Unremarkable.    Kidneys: There are several subcentimeter T2 hyperintensities which are  too small to fully characterize, but likely cysts. No hydronephrosis.    Bowel: Unremarkable. No obstruction.    Lymph nodes: No bulky lymphadenopathy.    Blood vessels: Unremarkable.    Lung bases: Unremarkable.    Bones and soft tissues: Unremarkable. No suspicious lesions.    Mesentery and abdominal wall: Unremarkable.    Ascites: None.      Impression    IMPRESSION:    1.  Mild diffuse hepatic steatosis.  2.  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 No aggressive or suspicious  features.    [facility] recommendations for asymptomatic pancreatic  cysts, modified from international consensus guidelines*   Size of largest cyst:   Less than 1 cm: Follow-up imaging in 6 months to 1 year, then lengthen  interval to 2-3 years if no change.  1-2 cm: Follow-up imaging at 6 months, then yearly for 2 years, then  lengthen interval if no change.   The optimal initial study or first follow-up exam is MRI performed  with intravenous gadolinium contrast to allow full cyst  characterization. Once characterized, future follow-up MRIs can be  performed without contrast. If MRI is contraindicated, CT with  contrast is recommended.   GI consultation for possible endoscopic ultrasound is recommended for  cysts with the following features: Size > 2 cm, >20% growth on  followup, wall thickening or enhancement, mural nodule, duct > 5 mm,  or abrupt change in duct caliber with distal atrophy. GI or surgical  consultation is  also recommended for symptomatic cysts.   *Reference: International Consensus Guidelines for Management of IPMN  and MCN of the pancreas. Pancreatology: 12:(2012); 183-197.  1.    I have personally reviewed the examination and initial interpretation  and I agree with the findings.    SARAH AREVALO MD         SYSTEM ID:  [id number]

## 2022-01-02 NOTE — DISCHARGE INSTRUCTIONS
MRI Contrast Discharge Instructions    The IV contrast you received today will pass out of your body in your  urine. This will happen in the next 24 hours. You will not feel this process.  Your urine will not change color.    Drink at least 4 extra glasses of water or juice today (unless your doctor  has restricted your fluids). This reduces the stress on your kidneys.  You may take your regular medicines.    If you are on dialysis: It is best to have dialysis today.    If you have a reaction: Most reactions happen right away. If you have  any new symptoms after leaving the hospital (such as hives or swelling),  call your hospital at the correct number below. Or call your family doctor.  If you have breathing distress or wheezing, call 911.    Special instructions: ***    I have read and understand the above information.    Signature:______________________________________ Date:___________    Staff:__________________________________________ Date:___________     Time:__________    [facility] Departments:    ___[place]: [phone]  ___[facility]: [phone]  ___[place]: [phone] ___[facility]: [phone]  ___[facility]: [phone]  ___[facility]: [phone]  ___[phone]  ___[facility]: [phone]  ___[phone]

## 2022-03-08 ENCOUNTER — MEDICAL CORRESPONDENCE (OUTPATIENT)
Dept: HEALTH INFORMATION MANAGEMENT | Facility: CLINIC | Age: 77
End: 2022-03-08

## 2022-03-08 ENCOUNTER — OFFICE VISIT (OUTPATIENT)
Dept: INTERNAL MEDICINE | Facility: CLINIC | Age: 77
End: 2022-03-08
Payer: COMMERCIAL

## 2022-03-08 VITALS
OXYGEN SATURATION: 97 % | SYSTOLIC BLOOD PRESSURE: 118 MMHG | DIASTOLIC BLOOD PRESSURE: 64 MMHG | BODY MASS INDEX: 28.2 KG/M2 | HEIGHT: 70 IN | WEIGHT: 197 LBS | HEART RATE: 74 BPM

## 2022-03-08 DIAGNOSIS — F43.23 ADJUSTMENT DISORDER WITH MIXED ANXIETY AND DEPRESSED MOOD: ICD-10-CM

## 2022-03-08 DIAGNOSIS — I47.10 SVT (SUPRAVENTRICULAR TACHYCARDIA) (H): ICD-10-CM

## 2022-03-08 DIAGNOSIS — M81.0 OSTEOPOROSIS WITHOUT CURRENT PATHOLOGICAL FRACTURE, UNSPECIFIED OSTEOPOROSIS TYPE: ICD-10-CM

## 2022-03-08 DIAGNOSIS — I71.21 ASCENDING AORTIC ANEURYSM (H): ICD-10-CM

## 2022-03-08 DIAGNOSIS — H91.90 HEARING LOSS, UNSPECIFIED HEARING LOSS TYPE, UNSPECIFIED LATERALITY: ICD-10-CM

## 2022-03-08 DIAGNOSIS — E06.3 HYPOTHYROIDISM DUE TO HASHIMOTO'S THYROIDITIS: ICD-10-CM

## 2022-03-08 DIAGNOSIS — I20.89 STABLE ANGINA PECTORIS (H): ICD-10-CM

## 2022-03-08 DIAGNOSIS — B35.1 ONYCHOMYCOSIS: ICD-10-CM

## 2022-03-08 DIAGNOSIS — Z11.59 NEED FOR HEPATITIS C SCREENING TEST: ICD-10-CM

## 2022-03-08 DIAGNOSIS — G62.9 PERIPHERAL POLYNEUROPATHY: ICD-10-CM

## 2022-03-08 DIAGNOSIS — C77.5 PROSTATE CANCER METASTATIC TO INTRAPELVIC LYMPH NODE (H): ICD-10-CM

## 2022-03-08 DIAGNOSIS — C61 PROSTATE CANCER METASTATIC TO INTRAPELVIC LYMPH NODE (H): ICD-10-CM

## 2022-03-08 DIAGNOSIS — C61 MALIGNANT NEOPLASM OF PROSTATE (H): ICD-10-CM

## 2022-03-08 DIAGNOSIS — R53.83 OTHER FATIGUE: Primary | ICD-10-CM

## 2022-03-08 PROBLEM — I25.9 ASYMPTOMATIC CORONARY HEART DISEASE: Status: RESOLVED | Noted: 2019-12-27 | Resolved: 2022-03-08

## 2022-03-08 PROCEDURE — 99215 OFFICE O/P EST HI 40 MIN: CPT | Performed by: INTERNAL MEDICINE

## 2022-03-08 RX ORDER — ISOSORBIDE DINITRATE 30 MG/1
0.5 TABLET ORAL EVERY 24 HOURS
COMMUNITY
Start: 2020-10-08 | End: 2022-03-08

## 2022-03-08 RX ORDER — LEVOTHYROXINE SODIUM 50 UG/1
75 TABLET ORAL DAILY
COMMUNITY
Start: 2022-02-08 | End: 2023-01-25

## 2022-03-08 RX ORDER — KETOCONAZOLE 20 MG/G
CREAM TOPICAL
Qty: 60 G | Refills: 3 | Status: SHIPPED | OUTPATIENT
Start: 2022-03-08 | End: 2022-03-10

## 2022-03-08 RX ORDER — ISOSORBIDE MONONITRATE 30 MG/1
15 TABLET, EXTENDED RELEASE ORAL DAILY
COMMUNITY
End: 2022-08-18

## 2022-03-08 NOTE — PROGRESS NOTES
Office Visit - Follow Up   Artis Galvin   76 year old adult    Date of Visit: 3/8/2022    Chief Complaint   Patient presents with     Follow Up     Osteoporosis (pt reports he never started Fosamax); will discuss with MD         Assessment and Plan   1. Other fatigue  Multifactorial.  I told him that I am hoping that the levothyroxine will give him some more pep.    2. Ascending aortic aneurysm (H)  I have urged regular walking for exercise.  No heavy weightlifting.    3. 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  Recent PSA undetectable.    4. Stable angina pectoris (H)  Continue regimen per cardiology and follow-up per them.    5. Need for hepatitis C screening test    - Hepatitis C Screen Reflex to HCV RNA Quant and Genotype; Future    6. SVT (supraventricular tachycardia) (H)  Not an issue at this time.    7. Peripheral polyneuropathy  Not having pain.  I did discuss with him that neurology is not going to have anything to add if there is been no pain.  He has seen them in the past.  Offered another visit with them and he declines.    8. Adjustment disorder with mixed anxiety and depressed mood  Mood seems to be fairly good at this time.    9. Malignant neoplasm of prostate (H)  As above, follow-up with urology and [facility] as planned.    10. Hypothyroidism due to Hashimoto's thyroiditis  Levothyroxine per [facility] endocrinology.  He also has seen endocrine he at [facility] I believe as well.    11. Osteoporosis without current pathological fracture, unspecified osteoporosis type  I have urged him to start the alendronate once again.    12. Onychomycosis  I discussed with him the risks involved with terbinafine.  Contact podiatry if he wishes to pursue this medication.  - ketoconazole (NIZORAL) 2 % external cream; Apply topically as needed  Dispense: 60 g; Refill: 3    13. Hearing loss, unspecified hearing loss type, unspecified laterality  I have  recommended audiology.        Return in about 3 months (around 6/8/2022) for Routine preventive, with me, in person.     History of Present Illness   This 76 year old Binu comes in today for follow-up of his multiple medical problems.  Over 1 hour was spent with patient as well as reviewing his chart going over his extensive past medical history.  A lot of things to discuss today.  He notes his hearing is poor.  He still struggles with taking terbinafine.  He wants to get rid of his onychomycosis and a podiatrist gave him terbinafine but I did warn him about potential liver toxicity.  He is always been very resistant to taking medications especially ones that can save his life but he does not have any qualms about taking medication that can potentially cause liver failure.  He also did not start taking the osteoporosis medication I gave him.  He again questions what kind of exercise he should do and whether or not he can push himself at the gym.  He likes to lift weights.  He got put back on thyroid medication under the direction of [facility].  He has presumed thyroiditis.  He was initially put on levothyroxine then taken off and now is back on it again.  Most recent TSH was 6.  He has had a lot of fatigue since his COVID.  He is also just been feeling fatigued since last year when he was treated for his reoccurring metastatic prostate cancer.  His mood was quite poor last year but that seems to be better.  He is in better spirits.  He has had no chest pains at this time.  His neuropathy is continue to be problematic.  He has trigger fingers but those do not bother him.  He wonders if there is anything that he can take to reverse his neuropathy pain.  He tells me he feels like he has iron socks on.  He does not have follow-up with cardiology yet.  He was told that his cardiologist was ill.  He will need follow-up of his aneurysm later this year.    Review of Systems: A comprehensive review of systems was negative  "except as noted.     Medications, Allergies and Problem List   Reviewed, reconciled and updated  Post Discharge Medication Reconciliation Status:      Physical Exam   General Appearance:   Pleasant gentleman.  He is in good spirits today.  Trigger finger of the third fingers bilaterally.  No edema today.    /64 (BP Location: Left arm, Patient Position: Sitting, Cuff Size: Adult Small)   Pulse 74   Ht 1.778 m (5' 10\")   Wt 89.4 kg (197 lb)   SpO2 97%   BMI 28.27 kg/m           Additional Information   Current Outpatient Medications   Medication Sig Dispense Refill     amoxicillin-clavulanate (AUGMENTIN) 875-125 MG tablet Take 1 tablet by mouth daily       aspirin 81 MG EC tablet Take 81 mg by mouth daily       atorvastatin (LIPITOR) 40 MG tablet Take 1 tablet (40 mg) by mouth daily 90 tablet 3     cholecalciferol 50 MCG (2000 UT) CAPS Take 1 capsule by mouth daily        isosorbide mononitrate (IMDUR) 30 MG 24 hr tablet Take 15 mg by mouth daily       ketoconazole (NIZORAL) 2 % external cream Apply topically as needed 60 g 3     levothyroxine (SYNTHROID/LEVOTHROID) 50 MCG tablet Take 50 mcg by mouth daily       losartan (COZAAR) 50 MG tablet Take 75 mg by mouth daily        metoprolol succinate ER (TOPROL-XL) 25 MG 24 hr tablet Take 0.5 tablets (12.5 mg) by mouth daily 45 tablet 3     nitroGLYcerin (NITROSTAT) 0.4 MG sublingual tablet For chest pain place 1 tablet under the tongue every 5 minutes for 3 doses. If symptoms persist 5 minutes after 1st dose call 911. 25 tablet 3     pilocarpine (PILOCAR) 2 % ophthalmic solution Apply 1 drop to eye 2 times daily       Probiotic Product (MISC INTESTINAL MEGHA REGULAT) CAPS Take 1 capsule by mouth daily       alendronate (FOSAMAX) 70 MG tablet Take 1 tablet (70 mg) by mouth every 7 days Take 60 minutes before am meal with 8 oz. water. Remain upright for 30 minutes. (Patient not taking: Reported on 3/8/2022) 12 tablet 3     ciclopirox (PENLAC) 8 % external " solution Apply to adjacent skin and affected nails daily.  Remove with alcohol every 7 days, then repeat. (Patient not taking: Reported on 3/8/2022) 30 mL 3     Allergies   Allergen Reactions     Ciprofloxacin Unknown     Levaquin [Levofloxacin] Unknown     Social History     Tobacco Use     Smoking status: Never Smoker     Smokeless tobacco: Never Used   Substance Use Topics     Alcohol use: Not Currently     Comment: Alcoholic Drinks/day: rarely     Drug use: No       Review and/or order of clinical lab tests:  Review and/or order of radiology tests:  Review and/or order of medicine tests:  Discussion of test results with performing physician:  Decision to obtain old records and/or obtain history from someone other than the patient:  Review and summarization of old records and/or obtaining history from someone other than the patient and.or discussion of case with another health care provider:  Independent visualization of image, tracing or specimen itself:    Time:      COLBY BROCK MD

## 2022-03-10 DIAGNOSIS — B35.1 ONYCHOMYCOSIS: ICD-10-CM

## 2022-03-10 RX ORDER — KETOCONAZOLE 20 MG/G
CREAM TOPICAL
Qty: 60 G | Refills: 3 | Status: SHIPPED | OUTPATIENT
Start: 2022-03-10 | End: 2024-01-11

## 2022-03-11 ENCOUNTER — TELEPHONE (OUTPATIENT)
Dept: INTERNAL MEDICINE | Facility: CLINIC | Age: 77
End: 2022-03-11
Payer: COMMERCIAL

## 2022-03-11 NOTE — TELEPHONE ENCOUNTER
Pharmacy needs to know the frequency patient will be applying ketoconazole.  Please send an updated prescription.  How long will 60 grams last?

## 2022-03-24 DIAGNOSIS — I10 ESSENTIAL HYPERTENSION, BENIGN: ICD-10-CM

## 2022-03-24 DIAGNOSIS — I71.21 ASCENDING AORTIC ANEURYSM (H): ICD-10-CM

## 2022-03-24 RX ORDER — LOSARTAN POTASSIUM 50 MG/1
75 TABLET ORAL DAILY
Qty: 135 TABLET | Refills: 3 | Status: SHIPPED | OUTPATIENT
Start: 2022-03-24 | End: 2022-06-06

## 2022-04-17 ENCOUNTER — HEALTH MAINTENANCE LETTER (OUTPATIENT)
Age: 77
End: 2022-04-17

## 2022-04-27 ENCOUNTER — OFFICE VISIT (OUTPATIENT)
Dept: AUDIOLOGY | Facility: CLINIC | Age: 77
End: 2022-04-27
Payer: COMMERCIAL

## 2022-04-27 DIAGNOSIS — H91.90 HEARING LOSS, UNSPECIFIED HEARING LOSS TYPE, UNSPECIFIED LATERALITY: ICD-10-CM

## 2022-04-27 DIAGNOSIS — H90.3 SENSORY HEARING LOSS, BILATERAL: Primary | ICD-10-CM

## 2022-04-27 PROCEDURE — 92550 TYMPANOMETRY & REFLEX THRESH: CPT | Performed by: AUDIOLOGIST

## 2022-04-27 PROCEDURE — 92557 COMPREHENSIVE HEARING TEST: CPT | Performed by: AUDIOLOGIST

## 2022-04-27 NOTE — PROGRESS NOTES
AUDIOLOGY REPORT    SUBJECTIVE:  Artis Galvin is a 76 year old adult who was seen in the Audiology Clinic at the [facility] for audiologic evaluation, referred by Angel Hernandez M.D. The patient reports some concerns with hearing and bilateral tinnitus. He also reports brief episodes of dizziness which he describes as feeling off balance that occurs ~1x/week. The patient denies pain or ear surgeries.    OBJECTIVE:  Abuse Screening:  Do you feel unsafe at home or work/school? No  Do you feel threatened by someone? No  Does anyone try to keep you from having contact with others, or doing things outside of your home? No  Physical signs of abuse present? No     Fall Risk Screen:  1. Have you fallen two or more times in the past year? No  2. Have you fallen and had an injury in the past year? No    Otoscopic exam indicates ears are clear of cerumen bilaterally     Pure Tone Thresholds assessed using conventional audiometry with good  reliability from 250-8000 Hz bilaterally using insert earphones and circumaural headphones     RIGHT:  Normal sloping to moderate-severe sensorineural hearing loss    LEFT:    Normal sloping to moderate-severe sensorineural hearing loss    Tympanogram:    RIGHT: normal eardrum mobility    LEFT:   normal eardrum mobility    Reflexes (reported by stimulus ear):  RIGHT: Ipsilateral is present at normal levels  RIGHT: Contralateral is present at elevated levels  LEFT:   Ipsilateral is present at normal levels  LEFT:   Contralateral is present at elevated levels      Speech Reception Threshold:    RIGHT: 15 dB HL    LEFT:   15 dB HL  Word Recognition Score:     RIGHT: 92% at 60 dB HL using NU-6 recorded word list.    LEFT:   88% at 60 dB HL using NU-6 recorded word list.      ASSESSMENT:   Today s results were discussed with the patient in detail.     PLAN:    Patient was counseled regarding hearing loss and impact on communication. Patient is a  good candidate for amplification at this time. It is recommended that the patient return for a hearing aid consultation per desire.  Please call this clinic with questions regarding these results or recommendations.        Bekah Ibarra, [facility]  Licensed Audiologist  [place] License #[id number]

## 2022-05-09 ENCOUNTER — OFFICE VISIT (OUTPATIENT)
Dept: AUDIOLOGY | Facility: CLINIC | Age: 77
End: 2022-05-09
Payer: COMMERCIAL

## 2022-05-09 DIAGNOSIS — H90.3 SENSORINEURAL HEARING LOSS, BILATERAL: Primary | ICD-10-CM

## 2022-05-09 PROCEDURE — 92591 PR HEARING AID EXAM BINAURAL: CPT | Performed by: AUDIOLOGIST

## 2022-05-09 NOTE — PROGRESS NOTES
AUDIOLOGY REPORT    SUBJECTIVE: Artis Galvin is a 77 year old adult was seen in the Audiology Clinic at  [facility] on 5/09/22 to discuss concerns with hearing and functional communication difficulties. Artis has been seen previously on 4/27/2022, and results revealed a bilateral sensorineural hearing loss. Artis notes difficulty with communication in a variety of listening situations. He is a new user of hearing aids.    An insurance check indicated that he has a benefit through [employer]. It was reviewed that we are not [employer] providers and so he would be self-pay for hearing aids at this clinic. He expressed understanding.    Patient signed ABN for hearing aid consultation today.    OBJECTIVE:    Patient is a hearing aid candidate. Patient would like to move forward with a hearing aid evaluation today. Therefore, the patient was presented with different options for amplification to help aid in communication. Discussed styles, levels of technology and monaural vs. binaural fitting. He reports that he knows he is having difficulty understanding speech. He reports difficulty hearing in complex environments such as restaurants. He reports that he sometimes has difficulty hearing the TV and reports that he can hear well on his cell phone but struggles with other phones. He has an Android phone and connectivity is of interest. He denies dexterity concerns. He would be interested in rechargeable devices.     He had multiple questions regarding differences between the manufacturers and know which device to start with. The 45 day trial period was reviewed as it was discussed that until he is wearing a device in his daily life, we cannot know how much benefit he will see or if he likes the device.  The manufacturers were narrowed down to [employer] and [employer] for their sound quality and connectivity. He would like a comprehensive phone urvashi  "for the devices.  He had questions regarding why those two were recommended. He expressed that he likes treble sounds. It was reviewed that these two brands will give him the appropriate amount of volume at all frequencies but tend to be less \"sharp\" with speech sounds compared to other brands. Pros and cons of the other manufacturers were discussed as well. The 45 days was reviewed again. The patient expressed understanding.    The hearing aids quoted  Binaural: Phonak Audeo P50 or P70R  COLOR: P7  BATTERY SIZE: rechargeable  EARMOLD/TIPS: medium open  CANAL/ LENGTH: 2    Due to time constraints the patient did not make a decision today. He would like to explore his insurance option as well prior to making a decision.  He will contact the clinic should he desire to proceed.    ASSESSMENT:     Reviewed purchase information and warranty information with patient. The 45 day trial period was explained to patient. The patient was given a copy of the [place] Department of Health consumer brochure on purchasing hearing instruments. Patient risk factors have been provided to the patient in writing prior to the sale of the hearing aid per FDA regulation. The risk factors are also available in the User Instructional Booklet to be presented on the day of the hearing aid fitting. Hearing aid evaluation completed.    PLAN: Artis is scheduled to return in a few weeks for a hearing aid fitting and programming. Purchase agreement will be completed on that date. He will contact the clinic with his decision regarding hearing aids. He expressed understanding that appointments may need to be rescheduled depending on when he makes his decision regarding obtaining hearing aids through this clinic. Please contact this clinic with any questions or concerns.      Sreekanth Polk  Audiologist  [place] License  #[id number]            "

## 2022-05-24 ENCOUNTER — LAB (OUTPATIENT)
Dept: LAB | Facility: CLINIC | Age: 77
End: 2022-05-24

## 2022-05-24 ENCOUNTER — OFFICE VISIT (OUTPATIENT)
Dept: PODIATRY | Facility: CLINIC | Age: 77
End: 2022-05-24
Payer: COMMERCIAL

## 2022-05-24 VITALS
BODY MASS INDEX: 28.01 KG/M2 | DIASTOLIC BLOOD PRESSURE: 76 MMHG | SYSTOLIC BLOOD PRESSURE: 122 MMHG | TEMPERATURE: 97.5 F | WEIGHT: 195.2 LBS

## 2022-05-24 DIAGNOSIS — B35.1 ONYCHOMYCOSIS: Primary | ICD-10-CM

## 2022-05-24 DIAGNOSIS — B35.1 ONYCHOMYCOSIS: ICD-10-CM

## 2022-05-24 PROBLEM — F43.23 ADJUSTMENT DISORDER WITH MIXED ANXIETY AND DEPRESSED MOOD: Status: RESOLVED | Noted: 2020-06-16 | Resolved: 2022-05-24

## 2022-05-24 LAB
ALBUMIN SERPL-MCNC: 3.4 G/DL (ref 3.5–5)
ALP SERPL-CCNC: 119 U/L (ref 45–120)
ALT SERPL W P-5'-P-CCNC: 16 U/L (ref 0–45)
AST SERPL W P-5'-P-CCNC: 15 U/L (ref 0–40)
BILIRUB DIRECT SERPL-MCNC: 0.2 MG/DL
BILIRUB SERPL-MCNC: 0.6 MG/DL (ref 0–1)
PROT SERPL-MCNC: 6.8 G/DL (ref 6–8)

## 2022-05-24 PROCEDURE — 80076 HEPATIC FUNCTION PANEL: CPT

## 2022-05-24 PROCEDURE — 36415 COLL VENOUS BLD VENIPUNCTURE: CPT

## 2022-05-24 PROCEDURE — 99213 OFFICE O/P EST LOW 20 MIN: CPT | Performed by: PODIATRIST

## 2022-05-24 RX ORDER — TERBINAFINE HYDROCHLORIDE 250 MG/1
250 TABLET ORAL DAILY
Qty: 90 TABLET | Refills: 0 | Status: SHIPPED | OUTPATIENT
Start: 2022-05-24 | End: 2022-08-22

## 2022-05-24 RX ORDER — LOSARTAN POTASSIUM 100 MG/1
100 TABLET ORAL
COMMUNITY
Start: 2020-06-01 | End: 2022-06-06

## 2022-05-24 NOTE — PATIENT INSTRUCTIONS
our doctor has recommended you start taking Terbinafine. Terbinafine is an antifungal medication used to treat certain types of fungal infections. You will take this medication everyday for 90 days.     Prior to starting Terbinafine you will need to have your labs drawn. You can stop into the lab located on the first floor to have these drawn.     We will repeat the labs again after you have been taking the medication for 6 weeks. You can walk-in to the lab located on the first floor on or around  7/5/2022      Terbinafine tablets  Brand Names: Lamisil, Terbinex   What is this medicine?  TERBINAFINE (TER bin a feen) is an antifungal medicine. It is used to treat certain kinds of fungal or yeast infections.  How should I use this medicine?  Take this medicine by mouth with a full glass of water. Follow the directions on the prescription label. You can take this medicine with food or on an empty stomach. Take your medicine at regular intervals. Do not take your medicine more often than directed. Do not skip doses or stop your medicine early even if you feel better. Do not stop taking except on your doctor's advice. Talk to your pediatrician regarding the use of this medicine in children. Special care may be needed.  What side effects may I notice from receiving this medicine?  Side effects that you should report to your doctor or health care professional as soon as possible:  allergic reactions like skin rash or hives, swelling of the face, lips, or tongue  changes in vision  dark urine  fever or infection  general ill feeling or flu-like symptoms  light-colored stools  loss of appetite, nausea  redness, blistering, peeling or loosening of the skin, including inside the mouth  right upper belly pain  unusually weak or tired  yellowing of the eyes or skin  Side effects that usually do not require medical attention (report to your doctor or health care professional if they continue or are bothersome):  changes in  taste  diarrhea  hair loss  muscle or joint pain  stomach gas  stomach upset  What may interact with this medicine?  Do not take this medicine with any of the following medications:  thioridazine  This medicine may also interact with the following medications:  beta-blockers  caffeine  cimetidine  cyclosporine  medicines for depression, anxiety, or psychotic disturbances  medicines for fungal infections like fluconazole and ketoconazole  medicines for irregular heartbeat like amiodarone, flecainide and propafenone  rifampin  warfarin  What if I miss a dose?  If you miss a dose, take it as soon as you can. If it is almost time for your next dose, take only that dose. Do not take double or extra doses.  Where should I keep my medicine?  Keep out of the reach of children.  Store at room temperature below 25 degrees C (77 degrees F). Protect from light. Throw away any unused medicine after the expiration date.  What should I tell my health care provider before I take this medicine?  They need to know if you have any of these conditions:  drink alcoholic beverages  kidney disease  liver disease  an unusual or allergic reaction to terbinafine, other medicines, foods, dyes, or preservatives  pregnant or trying to get pregnant  breast-feeding  What should I watch for while using this medicine?  Visit your doctor or health care provider regularly. Tell your doctor right away if you have nausea or vomiting, loss of appetite, stomach pain on your right upper side, yellow skin, dark urine, light stools, or are over tired. Some fungal infections need many weeks or months of treatment to cure. If you are taking this medicine for a long time, you will need to have important blood work done.

## 2022-05-24 NOTE — PROGRESS NOTES
FOOT AND ANKLE SURGERY/PODIATRY PROGRESS NOTE        ASSESSMENT: Onychomycosis       TREATMENT:  -I discussed topical and oral anti-fungal medication today including possible elevated LFT's with oral medication. Referred for hepatic panel.     -If ok, I will start him on terbinafine 250 mg qday x90 days. Repeat LFT's 6 weeks after beginning medication.    -Patient's questions invited and answered. He was encouraged to call my office with any further questions or concerns.     Mynor Pierce DPM  [facility] Podiatry/Foot & Ankle Surgery      HPI: Artis Galvin was seen again today to discuss treatment options for fungal nails. He did not start the previous prescription due to concerns related to drug interactions.     Past Medical History:   Diagnosis Date     Abdominal pain, unspecified site      Abnormal thyroid ultrasound      BBB (bundle branch block)     right     Chronic lymphocytic thyroiditis      Diverticulosis of colon (without mention of hemorrhage)      Localized superficial swelling, mass, or lump      Malignant neoplasm of prostate (H)      Nontoxic multinodular goiter      Other and unspecified hyperlipidemia      Other nonspecific abnormal serum enzyme levels      Pain in joint, shoulder region      Persistent hoarseness      Sarcoidosis      Scleritis, unspecified      Unspecified hypothyroidism      Unspecified vitamin D deficiency        Past Surgical History:   Procedure Laterality Date     HC SHLDR ARTHROSCOP,PART ACROMIOPLAS Right     Description: Shoulder Arthroscopy, Space Decompression And Acromioplasty;  Recorded: 02/18/2014;     WY LAP,PROSTATECTOMY,RADICAL,W/NERVE SPARE,INCL ROBOTIC Bilateral 2/27/2018    Procedure: ROBOTIC ASSISTED RADICAL RETROPUBIC PROSTATECTOMY, BILATERAL PELVIC LYMPH NODE DISSECTION LYSIS OF ADHESIONS;  Surgeon: Wale Rodriguez MD;  Location: [facility];  Service: Urology     WY RADIAL KERATOTOMY      Description: Cornea Radial Keratotomy;   Recorded: 02/18/2014;       Allergies   Allergen Reactions     Ciprofloxacin Unknown     Levaquin [Levofloxacin] Unknown         Current Outpatient Medications:      alendronate (FOSAMAX) 70 MG tablet, Take 1 tablet (70 mg) by mouth every 7 days Take 60 minutes before am meal with 8 oz. water. Remain upright for 30 minutes., Disp: 12 tablet, Rfl: 3     aspirin 81 MG EC tablet, Take 81 mg by mouth daily, Disp: , Rfl:      atorvastatin (LIPITOR) 40 MG tablet, Take 1 tablet (40 mg) by mouth daily, Disp: 90 tablet, Rfl: 3     cholecalciferol 50 MCG (2000 UT) CAPS, Take 1 capsule by mouth daily , Disp: , Rfl:      isosorbide mononitrate (IMDUR) 30 MG 24 hr tablet, Take 15 mg by mouth daily, Disp: , Rfl:      ketoconazole (NIZORAL) 2 % external cream, Apply topically as needed, Disp: 60 g, Rfl: 3     levothyroxine (SYNTHROID/LEVOTHROID) 50 MCG tablet, Take 50 mcg by mouth daily, Disp: , Rfl:      losartan (COZAAR) 100 MG tablet, Take 100 mg by mouth, Disp: , Rfl:      losartan (COZAAR) 50 MG tablet, Take 1.5 tablets (75 mg) by mouth daily, Disp: 135 tablet, Rfl: 3     metoprolol succinate ER (TOPROL-XL) 25 MG 24 hr tablet, Take 0.5 tablets (12.5 mg) by mouth daily, Disp: 45 tablet, Rfl: 3     nitroGLYcerin (NITROSTAT) 0.4 MG sublingual tablet, For chest pain place 1 tablet under the tongue every 5 minutes for 3 doses. If symptoms persist 5 minutes after 1st dose call 911., Disp: 25 tablet, Rfl: 3     pilocarpine (PILOCAR) 2 % ophthalmic solution, Apply 1 drop to eye 2 times daily, Disp: , Rfl:      Probiotic Product (MISC INTESTINAL MEGHA REGULAT) CAPS, Take 1 capsule by mouth daily, Disp: , Rfl:     Family History   Problem Relation Age of Onset     No Known Problems Mother      Coronary Artery Disease Father      Aortic aneurysm Father      Prostate Cancer Maternal Grandfather      Prostate Cancer Maternal Uncle        Social History     Socioeconomic History     Marital status: Patient Declined     Spouse name: Not on  file     Number of children: Not on file     Years of education: Not on file     Highest education level: Not on file   Occupational History     Not on file   Tobacco Use     Smoking status: Never Smoker     Smokeless tobacco: Never Used   Substance and Sexual Activity     Alcohol use: Not Currently     Comment: Alcoholic Drinks/day: rarely     Drug use: No     Sexual activity: Not on file   Other Topics Concern     Not on file   Social History Narrative    Single. . 4 grown daughters.    Currently unemployed.     Social Determinants of Health     Financial Resource Strain: Not on file   Food Insecurity: Not on file   Transportation Needs: Not on file   Physical Activity: Not on file   Stress: Not on file   Social Connections: Not on file   Intimate Partner Violence: Not on file   Housing Stability: Not on file       10 point Review of Systems is negative except for fungal nails  which is noted in HPI.     /76   Temp 97.5  F (36.4  C)   Wt 88.5 kg (195 lb 3.2 oz)   BMI 28.01 kg/m      BMI= Body mass index is 28.01 kg/m .    OBJECTIVE:  General appearance: Patient is alert and fully cooperative with history & exam.  No sign of distress is noted during the visit.    Vascular: Dorsalis pedis and posterior tibial pulses are palpable. There is pedal hair growth bilateral.  CFT < 3 sec from anterior tibial surface to distal digits bilateral. There is no appreciable edema noted.  Dermatologic: Dystrophic nails 1,2,3 left foot.   Neurologic: All epicritic and proprioceptive sensations are grossly intact bilateral.  Musculoskeletal: Contracted digits bilateral.

## 2022-05-24 NOTE — LETTER
5/24/2022         RE: Artis Galvin  [address]  [place] [address]        Dear Colleague,    Thank you for referring your patient, Artis Galvin, to the [facility]. Please see a copy of my visit note below.        FOOT AND ANKLE SURGERY/PODIATRY PROGRESS NOTE        ASSESSMENT: Onychomycosis       TREATMENT:  -I discussed topical and oral anti-fungal medication today including possible elevated LFT's with oral medication. Referred for hepatic panel.     -If ok, I will start him on terbinafine 250 mg qday x90 days. Repeat LFT's 6 weeks after beginning medication.    -Patient's questions invited and answered. He was encouraged to call my office with any further questions or concerns.     Mynor Pierce DPM  [facility]/Foot & Ankle Surgery      HPI: Artis Galvin was seen again today to discuss treatment options for fungal nails. He did not start the previous prescription due to concerns related to drug interactions.     Past Medical History:   Diagnosis Date     Abdominal pain, unspecified site      Abnormal thyroid ultrasound      BBB (bundle branch block)     right     Chronic lymphocytic thyroiditis      Diverticulosis of colon (without mention of hemorrhage)      Localized superficial swelling, mass, or lump      Malignant neoplasm of prostate (H)      Nontoxic multinodular goiter      Other and unspecified hyperlipidemia      Other nonspecific abnormal serum enzyme levels      Pain in joint, shoulder region      Persistent hoarseness      Sarcoidosis      Scleritis, unspecified      Unspecified hypothyroidism      Unspecified vitamin D deficiency        Past Surgical History:   Procedure Laterality Date     HC SHLDR ARTHROSCOP,PART ACROMIOPLAS Right     Description: Shoulder Arthroscopy, Space Decompression And Acromioplasty;  Recorded: 02/18/2014;     DC LAP,PROSTATECTOMY,RADICAL,W/NERVE SPARE,INCL ROBOTIC Bilateral 2/27/2018     Procedure: ROBOTIC ASSISTED RADICAL RETROPUBIC PROSTATECTOMY, BILATERAL PELVIC LYMPH NODE DISSECTION LYSIS OF ADHESIONS;  Surgeon: Wale Rodriguez MD;  Location: [facility];  Service: Urology     SC RADIAL KERATOTOMY      Description: Cornea Radial Keratotomy;  Recorded: 02/18/2014;       Allergies   Allergen Reactions     Ciprofloxacin Unknown     Levaquin [Levofloxacin] Unknown         Current Outpatient Medications:      alendronate (FOSAMAX) 70 MG tablet, Take 1 tablet (70 mg) by mouth every 7 days Take 60 minutes before am meal with 8 oz. water. Remain upright for 30 minutes., Disp: 12 tablet, Rfl: 3     aspirin 81 MG EC tablet, Take 81 mg by mouth daily, Disp: , Rfl:      atorvastatin (LIPITOR) 40 MG tablet, Take 1 tablet (40 mg) by mouth daily, Disp: 90 tablet, Rfl: 3     cholecalciferol 50 MCG (2000 UT) CAPS, Take 1 capsule by mouth daily , Disp: , Rfl:      isosorbide mononitrate (IMDUR) 30 MG 24 hr tablet, Take 15 mg by mouth daily, Disp: , Rfl:      ketoconazole (NIZORAL) 2 % external cream, Apply topically as needed, Disp: 60 g, Rfl: 3     levothyroxine (SYNTHROID/LEVOTHROID) 50 MCG tablet, Take 50 mcg by mouth daily, Disp: , Rfl:      losartan (COZAAR) 100 MG tablet, Take 100 mg by mouth, Disp: , Rfl:      losartan (COZAAR) 50 MG tablet, Take 1.5 tablets (75 mg) by mouth daily, Disp: 135 tablet, Rfl: 3     metoprolol succinate ER (TOPROL-XL) 25 MG 24 hr tablet, Take 0.5 tablets (12.5 mg) by mouth daily, Disp: 45 tablet, Rfl: 3     nitroGLYcerin (NITROSTAT) 0.4 MG sublingual tablet, For chest pain place 1 tablet under the tongue every 5 minutes for 3 doses. If symptoms persist 5 minutes after 1st dose call 911., Disp: 25 tablet, Rfl: 3     pilocarpine (PILOCAR) 2 % ophthalmic solution, Apply 1 drop to eye 2 times daily, Disp: , Rfl:      Probiotic Product (MISC INTESTINAL MEGHA REGULAT) CAPS, Take 1 capsule by mouth daily, Disp: , Rfl:     Family History   Problem Relation Age of Onset     No  Known Problems Mother      Coronary Artery Disease Father      Aortic aneurysm Father      Prostate Cancer Maternal Grandfather      Prostate Cancer Maternal Uncle        Social History     Socioeconomic History     Marital status: Patient Declined     Spouse name: Not on file     Number of children: Not on file     Years of education: Not on file     Highest education level: Not on file   Occupational History     Not on file   Tobacco Use     Smoking status: Never Smoker     Smokeless tobacco: Never Used   Substance and Sexual Activity     Alcohol use: Not Currently     Comment: Alcoholic Drinks/day: rarely     Drug use: No     Sexual activity: Not on file   Other Topics Concern     Not on file   Social History Narrative    Single. . 4 grown daughters.    Currently unemployed.     Social Determinants of Health     Financial Resource Strain: Not on file   Food Insecurity: Not on file   Transportation Needs: Not on file   Physical Activity: Not on file   Stress: Not on file   Social Connections: Not on file   Intimate Partner Violence: Not on file   Housing Stability: Not on file       10 point Review of Systems is negative except for fungal nails  which is noted in HPI.     /76   Temp 97.5  F (36.4  C)   Wt 88.5 kg (195 lb 3.2 oz)   BMI 28.01 kg/m      BMI= Body mass index is 28.01 kg/m .    OBJECTIVE:  General appearance: Patient is alert and fully cooperative with history & exam.  No sign of distress is noted during the visit.    Vascular: Dorsalis pedis and posterior tibial pulses are palpable. There is pedal hair growth bilateral.  CFT < 3 sec from anterior tibial surface to distal digits bilateral. There is no appreciable edema noted.  Dermatologic: Dystrophic nails 1,2,3 left foot.   Neurologic: All epicritic and proprioceptive sensations are grossly intact bilateral.  Musculoskeletal: Contracted digits bilateral.         Again, thank you for allowing me to participate in the care of your  patient.        Sincerely,        Mynor Pierce DPM

## 2022-06-06 ENCOUNTER — OFFICE VISIT (OUTPATIENT)
Dept: INTERNAL MEDICINE | Facility: CLINIC | Age: 77
End: 2022-06-06
Payer: COMMERCIAL

## 2022-06-06 VITALS
BODY MASS INDEX: 27.97 KG/M2 | WEIGHT: 194.9 LBS | HEART RATE: 55 BPM | OXYGEN SATURATION: 97 % | TEMPERATURE: 97.9 F | SYSTOLIC BLOOD PRESSURE: 122 MMHG | DIASTOLIC BLOOD PRESSURE: 58 MMHG

## 2022-06-06 DIAGNOSIS — I20.89 STABLE ANGINA PECTORIS (H): ICD-10-CM

## 2022-06-06 DIAGNOSIS — Z11.59 NEED FOR HEPATITIS C SCREENING TEST: ICD-10-CM

## 2022-06-06 DIAGNOSIS — R07.9 CHEST PAIN, UNSPECIFIED TYPE: Primary | ICD-10-CM

## 2022-06-06 DIAGNOSIS — C61 MALIGNANT NEOPLASM OF PROSTATE (H): ICD-10-CM

## 2022-06-06 DIAGNOSIS — G62.9 PERIPHERAL POLYNEUROPATHY: ICD-10-CM

## 2022-06-06 DIAGNOSIS — C61 PROSTATE CANCER METASTATIC TO INTRAPELVIC LYMPH NODE (H): ICD-10-CM

## 2022-06-06 DIAGNOSIS — R68.89 POOR MOTIVATION: ICD-10-CM

## 2022-06-06 DIAGNOSIS — C77.5 PROSTATE CANCER METASTATIC TO INTRAPELVIC LYMPH NODE (H): ICD-10-CM

## 2022-06-06 DIAGNOSIS — F43.23 ADJUSTMENT DISORDER WITH MIXED ANXIETY AND DEPRESSED MOOD: ICD-10-CM

## 2022-06-06 DIAGNOSIS — R53.83 OTHER FATIGUE: ICD-10-CM

## 2022-06-06 LAB
ANION GAP SERPL CALCULATED.3IONS-SCNC: 10 MMOL/L (ref 5–18)
BUN SERPL-MCNC: 17 MG/DL (ref 8–28)
CALCIUM SERPL-MCNC: 9.2 MG/DL (ref 8.5–10.5)
CHLORIDE BLD-SCNC: 107 MMOL/L (ref 98–107)
CO2 SERPL-SCNC: 25 MMOL/L (ref 22–31)
CREAT SERPL-MCNC: 1.08 MG/DL (ref 0.6–1.3)
ERYTHROCYTE [DISTWIDTH] IN BLOOD BY AUTOMATED COUNT: 12.5 % (ref 10–15)
GFR SERPL CREATININE-BSD FRML MDRD: 71 ML/MIN/1.73M2
GLUCOSE BLD-MCNC: 88 MG/DL (ref 70–125)
HCT VFR BLD AUTO: 44.3 % (ref 35–53)
HGB BLD-MCNC: 14.7 G/DL (ref 11.7–17.7)
MCH RBC QN AUTO: 30.4 PG (ref 26.5–33)
MCHC RBC AUTO-ENTMCNC: 33.2 G/DL (ref 31.5–36.5)
MCV RBC AUTO: 92 FL (ref 78–100)
PLATELET # BLD AUTO: 223 10E3/UL (ref 150–450)
POTASSIUM BLD-SCNC: 4.7 MMOL/L (ref 3.5–5)
RBC # BLD AUTO: 4.83 10E6/UL (ref 3.8–5.9)
SODIUM SERPL-SCNC: 142 MMOL/L (ref 136–145)
WBC # BLD AUTO: 5 10E3/UL (ref 4–11)

## 2022-06-06 PROCEDURE — 93005 ELECTROCARDIOGRAM TRACING: CPT | Performed by: INTERNAL MEDICINE

## 2022-06-06 PROCEDURE — 93010 ELECTROCARDIOGRAM REPORT: CPT | Performed by: INTERNAL MEDICINE

## 2022-06-06 PROCEDURE — 85027 COMPLETE CBC AUTOMATED: CPT | Performed by: INTERNAL MEDICINE

## 2022-06-06 PROCEDURE — 36415 COLL VENOUS BLD VENIPUNCTURE: CPT | Performed by: INTERNAL MEDICINE

## 2022-06-06 PROCEDURE — G0472 HEP C SCREEN HIGH RISK/OTHER: HCPCS | Performed by: INTERNAL MEDICINE

## 2022-06-06 PROCEDURE — 80048 BASIC METABOLIC PNL TOTAL CA: CPT | Performed by: INTERNAL MEDICINE

## 2022-06-06 PROCEDURE — 99215 OFFICE O/P EST HI 40 MIN: CPT | Performed by: INTERNAL MEDICINE

## 2022-06-06 RX ORDER — LOSARTAN POTASSIUM 100 MG/1
50 TABLET ORAL DAILY
COMMUNITY
End: 2022-08-18

## 2022-06-06 RX ORDER — LOSARTAN POTASSIUM 50 MG/1
25 TABLET ORAL DAILY
COMMUNITY
End: 2022-06-30

## 2022-06-06 NOTE — LETTER
June 9, 2022      Binu Galvin  [address]  [place] [address]        Dear  Kamar,    We are writing to inform you of your test results.    Hep C is negative       Resulted Orders   Hepatitis C Screen Reflex to HCV RNA Quant and Genotype   Result Value Ref Range    Hepatitis C Antibody Nonreactive Nonreactive    Narrative    Assay performance characteristics have not been established for newborns, infants, and children.   CBC with platelets   Result Value Ref Range    WBC Count 5.0 4.0 - 11.0 10e3/uL    RBC Count 4.83 3.80 - 5.90 10e6/uL      Comment:      Sex Specific Reference Ranges:    Female  3.80-5.20  10e6/uL  Male    4.40-5.90  10e6/uL        Hemoglobin 14.7 11.7 - 17.7 g/dL      Comment:      Sex Specific Reference Ranges:    Female  11.7-15.7  g/dL  Male    13.3-17.7   g/dL      Hematocrit 44.3 35.0 - 53.0 %      Comment:      Sex Specific Reference Ranges:     Female  35.0-47.0 %   Male      40.0-53.0 %      MCV 92 78 - 100 fL    MCH 30.4 26.5 - 33.0 pg    MCHC 33.2 31.5 - 36.5 g/dL    RDW 12.5 10.0 - 15.0 %    Platelet Count 223 150 - 450 10e3/uL    Narrative    The sex of this patient cannot be reliably determined based on discrepancies in demographics (legal sex, sex assigned at birth, gender identity).  Both male and female reference ranges are provided where applicable.  Careful evaluation of the patient s results as compared to the gender specific reference intervals is required in this setting.    Basic metabolic panel  (Ca, Cl, CO2, Creat, Gluc, K, Na, BUN)   Result Value Ref Range    Sodium 142 136 - 145 mmol/L    Potassium 4.7 3.5 - 5.0 mmol/L    Chloride 107 98 - 107 mmol/L    Carbon Dioxide (CO2) 25 22 - 31 mmol/L    Anion Gap 10 5 - 18 mmol/L    Urea Nitrogen 17 8 - 28 mg/dL    Creatinine 1.08 0.60 - 1.30 mg/dL      Comment:      Age                     Creatinine (mg/dL)    0-22 Days               0.30-1.00    22 Days to 13 Months    0.10-0.60     13 Months to 6 Years     Female 0.10-0.50    Male 0.10-0.60    6 Years to 11 Years     Female 0.20-0.60    Male 0.20-0.70    11 Years to 16 Years    Female 0.40-0.70    Male 0.30-0.90    16 Years and Older      Female 0.60-1.10    Male 0.70-1.30         Calcium 9.2 8.5 - 10.5 mg/dL    Glucose 88 70 - 125 mg/dL    GFR Estimate 71 >60 mL/min/1.73m2      Comment:      GFR not calculated when sex unspecified or nonbinary.  Effective December 21, 2021 eGFRcr in adults is calculated using the 2021 CKD-EPI creatinine equation which includes age and gender (Tay et al., NEJM, DOI: 10.1056/JMHUsc4975718)    Narrative    The sex of this patient cannot be reliably determined based on discrepancies in demographics (legal sex, sex assigned at birth, gender identity).  Both male and female reference ranges are provided where applicable.  Careful evaluation of the patient s results as compared to the gender specific reference intervals is required in this setting.        If you have any questions or concerns, please call the clinic at the number listed above.       Sincerely,      Angel Hernandez MD

## 2022-06-06 NOTE — PROGRESS NOTES
Office Visit - Follow Up   Artis Galvin   77 year old adult    Date of Visit: 6/6/2022    Chief Complaint   Patient presents with     RECHECK     3 mo - pt reports fatigue is still about the same      Chest Pain     Intermittent chest mild chest pains - Cardiology recommends ekg and hemoglobin        Assessment and Plan   1. Chest pain, unspecified type  EKG today normal.  His symptoms do not sound cardiac in nature.  He will be following up with his cardiologist soon.  I did urge him to try a nitroglycerin if he develops symptoms again in the future.    2. Stable angina pectoris (H)  As above.  He really does not have exertional angina symptoms at all.    3. Poor motivation  I do think a lot of this is mood related.  We will touch base in another couple weeks.  See below.    4. Other fatigue  He wonders about B12.  He also really thinks that the metoprolol is causing some of his symptoms.  We will start a B12 supplement and also hold the metoprolol and will touch base over the phone in a few weeks.  - CBC with platelets; Future  - Basic metabolic panel  (Ca, Cl, CO2, Creat, Gluc, K, Na, BUN); Future    5. Adjustment disorder with mixed anxiety and depressed mood  As above.  Low threshold to add a antidepressant at next visit if not feeling better.    6. Malignant neoplasm of prostate (H)  Recent PSA undetectable.    7. 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    8. Peripheral polyneuropathy  He will follow-up with neurology.  - Adult Neurology  Referral; Future        Return in about 2 weeks (around 6/20/2022) for Follow up, using a phone visit, with me.     History of Present Illness   This 77 year old Binu comes in today for follow-up.  Extensive visit.  Over 30 minutes spent with patient and an additional 20 was spent in chart review and completion of the visit.  Complicated him with multiple medical problems including hypertension, coronary disease, angina, metastatic  prostate cancer.  He has not been feeling well for several weeks to months now.  A lot of fatigue.  He has been having increasing chest pain.  Very low-grade and not associated with exertion.  He has poor motivation to do things that he enjoys.  He has been on antidepressants without much improvement.  He tells me that maybe he would be willing to do that at this time.  He however believes that a lot of his medications are causing his fatigue.  He especially thinks the metoprolol is causing his fatigue.  When asked to fill out a PHQ-9 today, he fills out everything as zeros despite him stating that he daily has symptoms of fatigue and anhedonia.  He wonders if he can take B12 for neuropathy.  He would like to see a neurologist again for his neuropathy.  This is been a longstanding problem for him.  Does not worse but it bothers him more.  There is no pain.  He just does not know why he has it.    Review of Systems: A comprehensive review of systems was negative except as noted.     Medications, Allergies and Problem List   Reviewed, reconciled and updated  Post Discharge Medication Reconciliation Status:   Social History     Social History Narrative    Single. . 4 grown daughters.    Currently unemployed.        Physical Exam   General Appearance:   Pleasant gentleman in no distress.  He looks well.  PHQ-9 is noted although he he refuses to complete it honestly.    /58 (BP Location: Left arm, Patient Position: Sitting, Cuff Size: Adult Large)   Pulse 55   Temp 97.9  F (36.6  C) (Tympanic)   Wt 88.4 kg (194 lb 14.4 oz)   SpO2 97%   BMI 27.97 kg/m           Additional Information   Current Outpatient Medications   Medication Sig Dispense Refill     alendronate (FOSAMAX) 70 MG tablet Take 1 tablet (70 mg) by mouth every 7 days Take 60 minutes before am meal with 8 oz. water. Remain upright for 30 minutes. 12 tablet 3     aspirin 81 MG EC tablet Take 81 mg by mouth daily       atorvastatin (LIPITOR)  40 MG tablet Take 1 tablet (40 mg) by mouth daily 90 tablet 3     isosorbide mononitrate (IMDUR) 30 MG 24 hr tablet Take 15 mg by mouth daily       ketoconazole (NIZORAL) 2 % external cream Apply topically as needed 60 g 3     levothyroxine (SYNTHROID/LEVOTHROID) 50 MCG tablet Take 50 mcg by mouth daily       losartan (COZAAR) 100 MG tablet Take 50 mg by mouth daily Take half tablet (50 mg) daily addition 25 mg for total for 75 mg daily       losartan (COZAAR) 50 MG tablet Take 25 mg by mouth daily Take half tablet (25 mg) daily addition 50 mg for total for 75 mg daily       metoprolol succinate ER (TOPROL-XL) 25 MG 24 hr tablet Take 0.5 tablets (12.5 mg) by mouth daily 45 tablet 3     pilocarpine (PILOCAR) 2 % ophthalmic solution Apply 1 drop to eye 2 times daily       Probiotic Product (MISC INTESTINAL MEGHA REGULAT) CAPS Take 1 capsule by mouth daily       nitroGLYcerin (NITROSTAT) 0.4 MG sublingual tablet For chest pain place 1 tablet under the tongue every 5 minutes for 3 doses. If symptoms persist 5 minutes after 1st dose call 911. (Patient not taking: Reported on 6/6/2022) 25 tablet 3     terbinafine (LAMISIL) 250 MG tablet Take 1 tablet (250 mg) by mouth daily (Patient not taking: Reported on 6/6/2022) 90 tablet 0     Allergies   Allergen Reactions     Ciprofloxacin Unknown     Levaquin [Levofloxacin] Unknown     Social History     Tobacco Use     Smoking status: Never Smoker     Smokeless tobacco: Never Used   Substance Use Topics     Alcohol use: Not Currently     Comment: Alcoholic Drinks/day: rarely     Drug use: No       Review and/or order of clinical lab tests:  Review and/or order of radiology tests:  Review and/or order of medicine tests:  Discussion of test results with performing physician:  Decision to obtain old records and/or obtain history from someone other than the patient:  Review and summarization of old records and/or obtaining history from someone other than the patient and.or discussion  of case with another health care provider:  Independent visualization of image, tracing or specimen itself:    Time:      COLBY BROCK MD  Answers for HPI/ROS submitted by the patient on 6/1/2022  What is the reason for your visit today? : fatigue  On average, how many sweetened beverages do you drink each day (Examples: soda, juice, sweet tea, etc.  Do NOT count diet or artificially sweetened beverages)?: 1  How many minutes a day do you exercise enough to make your heart beat faster?: 30 to 60  How many days a week do you exercise enough to make your heart beat faster?: 3 or less  How many days per week do you miss taking your medication?: 0

## 2022-06-06 NOTE — LETTER
June 7, 2022      Binu Galvin  [address]  [place] [address]        Dear  Kamar,    We are writing to inform you of your test results.    Blood counts and other tests are all normal Cristian Rivera   CBC with platelets   Result Value Ref Range    WBC Count 5.0 4.0 - 11.0 10e3/uL    RBC Count 4.83 3.80 - 5.90 10e6/uL      Comment:      Sex Specific Reference Ranges:    Female  3.80-5.20  10e6/uL  Male    4.40-5.90  10e6/uL        Hemoglobin 14.7 11.7 - 17.7 g/dL      Comment:      Sex Specific Reference Ranges:    Female  11.7-15.7  g/dL  Male    13.3-17.7   g/dL      Hematocrit 44.3 35.0 - 53.0 %      Comment:      Sex Specific Reference Ranges:     Female  35.0-47.0 %   Male      40.0-53.0 %      MCV 92 78 - 100 fL    MCH 30.4 26.5 - 33.0 pg    MCHC 33.2 31.5 - 36.5 g/dL    RDW 12.5 10.0 - 15.0 %    Platelet Count 223 150 - 450 10e3/uL    Narrative    The sex of this patient cannot be reliably determined based on discrepancies in demographics (legal sex, sex assigned at birth, gender identity).  Both male and female reference ranges are provided where applicable.  Careful evaluation of the patient s results as compared to the gender specific reference intervals is required in this setting.    Basic metabolic panel  (Ca, Cl, CO2, Creat, Gluc, K, Na, BUN)   Result Value Ref Range    Sodium 142 136 - 145 mmol/L    Potassium 4.7 3.5 - 5.0 mmol/L    Chloride 107 98 - 107 mmol/L    Carbon Dioxide (CO2) 25 22 - 31 mmol/L    Anion Gap 10 5 - 18 mmol/L    Urea Nitrogen 17 8 - 28 mg/dL    Creatinine 1.08 0.60 - 1.30 mg/dL      Comment:      Age                     Creatinine (mg/dL)    0-22 Days               0.30-1.00    22 Days to 13 Months    0.10-0.60     13 Months to 6 Years    Female 0.10-0.50    Male 0.10-0.60    6 Years to 11 Years     Female 0.20-0.60    Male 0.20-0.70    11 Years to 16 Years    Female 0.40-0.70    Male 0.30-0.90    16 Years and Older      Female 0.60-1.10    Male  0.70-1.30         Calcium 9.2 8.5 - 10.5 mg/dL    Glucose 88 70 - 125 mg/dL    GFR Estimate 71 >60 mL/min/1.73m2      Comment:      GFR not calculated when sex unspecified or nonbinary.  Effective December 21, 2021 eGFRcr in adults is calculated using the 2021 CKD-EPI creatinine equation which includes age and gender (Tay et al., NEJ, DOI: 10.1056/NXYLox5966332)    Narrative    The sex of this patient cannot be reliably determined based on discrepancies in demographics (legal sex, sex assigned at birth, gender identity).  Both male and female reference ranges are provided where applicable.  Careful evaluation of the patient s results as compared to the gender specific reference intervals is required in this setting.        If you have any questions or concerns, please call the clinic at the number listed above.       Sincerely,      Angel Hernandez MD

## 2022-06-07 LAB
ATRIAL RATE - MUSE: 55 BPM
DIASTOLIC BLOOD PRESSURE - MUSE: NORMAL MMHG
INTERPRETATION ECG - MUSE: NORMAL
P AXIS - MUSE: 25 DEGREES
PR INTERVAL - MUSE: 182 MS
QRS DURATION - MUSE: 98 MS
QT - MUSE: 430 MS
QTC - MUSE: 411 MS
R AXIS - MUSE: -3 DEGREES
SYSTOLIC BLOOD PRESSURE - MUSE: NORMAL MMHG
T AXIS - MUSE: 5 DEGREES
VENTRICULAR RATE- MUSE: 55 BPM

## 2022-06-08 LAB — HCV AB SERPL QL IA: NONREACTIVE

## 2022-06-08 ASSESSMENT — PATIENT HEALTH QUESTIONNAIRE - PHQ9: SUM OF ALL RESPONSES TO PHQ QUESTIONS 1-9: 0

## 2022-06-20 ENCOUNTER — MYC MEDICAL ADVICE (OUTPATIENT)
Dept: INTERNAL MEDICINE | Facility: CLINIC | Age: 77
End: 2022-06-20

## 2022-06-20 DIAGNOSIS — I20.89 STABLE ANGINA PECTORIS (H): ICD-10-CM

## 2022-06-20 RX ORDER — METOPROLOL SUCCINATE 25 MG/1
TABLET, EXTENDED RELEASE ORAL
Qty: 50 TABLET | Refills: 2 | Status: SHIPPED | OUTPATIENT
Start: 2022-06-20 | End: 2022-07-21

## 2022-06-20 NOTE — TELEPHONE ENCOUNTER
"Last Written Prescription Date:  6/3/2021  Last Fill Quantity: 90,  # refills: 3   Last office visit provider:  6/6/2022     Requested Prescriptions   Pending Prescriptions Disp Refills     metoprolol succinate ER (TOPROL XL) 25 MG 24 hr tablet [Pharmacy Med Name: Metoprolol Succinate ER 25 MG Oral Tablet Extended Release 24 Hour] 50 tablet 2     Sig: TAKE ONE-HALF TABLET BY  MOUTH DAILY       Beta-Blockers Protocol Passed - 6/20/2022 12:21 PM        Passed - Blood pressure under 140/90 in past 12 months     BP Readings from Last 3 Encounters:   06/06/22 122/58   05/24/22 122/76   03/08/22 118/64                 Passed - Patient is age 6 or older        Passed - Recent (12 mo) or future (30 days) visit within the authorizing provider's specialty     Patient has had an office visit with the authorizing provider or a provider within the authorizing providers department within the previous 12 mos or has a future within next 30 days. See \"Patient Info\" tab in inbasket, or \"Choose Columns\" in Meds & Orders section of the refill encounter.              Passed - Medication is active on med list             Edith Bullock RN 06/20/22 12:22 PM  "

## 2022-06-21 ENCOUNTER — TELEPHONE (OUTPATIENT)
Dept: CARDIOLOGY | Facility: CLINIC | Age: 77
End: 2022-06-21
Payer: COMMERCIAL

## 2022-06-22 DIAGNOSIS — I20.89 STABLE ANGINA PECTORIS (H): ICD-10-CM

## 2022-06-22 NOTE — TELEPHONE ENCOUNTER
Please call pt.  I am out of office this week.  I suggest you be seen in clinic or urgent care this week.  Hope you feel better soon.

## 2022-06-23 RX ORDER — METOPROLOL SUCCINATE 25 MG/1
TABLET, EXTENDED RELEASE ORAL
Qty: 50 TABLET | Refills: 2 | OUTPATIENT
Start: 2022-06-23

## 2022-06-28 ENCOUNTER — VIRTUAL VISIT (OUTPATIENT)
Dept: INTERNAL MEDICINE | Facility: CLINIC | Age: 77
End: 2022-06-28
Payer: COMMERCIAL

## 2022-06-28 DIAGNOSIS — G62.9 PERIPHERAL POLYNEUROPATHY: ICD-10-CM

## 2022-06-28 DIAGNOSIS — R53.83 OTHER FATIGUE: ICD-10-CM

## 2022-06-28 DIAGNOSIS — E06.3 HYPOTHYROIDISM DUE TO HASHIMOTO'S THYROIDITIS: ICD-10-CM

## 2022-06-28 DIAGNOSIS — F43.23 ADJUSTMENT DISORDER WITH MIXED ANXIETY AND DEPRESSED MOOD: ICD-10-CM

## 2022-06-28 DIAGNOSIS — R68.89 POOR MOTIVATION: Primary | ICD-10-CM

## 2022-06-28 DIAGNOSIS — Z79.899 HIGH RISK MEDICATION USE: ICD-10-CM

## 2022-06-28 PROCEDURE — 99214 OFFICE O/P EST MOD 30 MIN: CPT | Mod: 95 | Performed by: INTERNAL MEDICINE

## 2022-06-28 RX ORDER — DULOXETIN HYDROCHLORIDE 20 MG/1
20 CAPSULE, DELAYED RELEASE ORAL DAILY
Qty: 30 CAPSULE | Refills: 1 | Status: SHIPPED | OUTPATIENT
Start: 2022-06-28 | End: 2022-07-21

## 2022-06-28 NOTE — PROGRESS NOTES
Binu is a 77 year old who is being evaluated via a billable video visit.      How would you like to obtain your AVS? MyChart  If the video visit is dropped, the invitation should be resent by: Send to e-mail at: [email or web address]  Will anyone else be joining your video visit? No          1. Poor motivation  I think a lot of this is mood related.  Begin trial of Cymbalta 20 mg daily.  This may help with his neuropathy discomfort as well.  We will touch base again in another 3 weeks.  - DULoxetine (CYMBALTA) 20 MG capsule; Take 1 capsule (20 mg) by mouth daily  Dispense: 30 capsule; Refill: 1    2. Other fatigue  As above.  - DULoxetine (CYMBALTA) 20 MG capsule; Take 1 capsule (20 mg) by mouth daily  Dispense: 30 capsule; Refill: 1    3. Adjustment disorder with mixed anxiety and depressed mood  As above.    4. Peripheral polyneuropathy  I have recommended he try [facility] neurologic clinic to see if he can get in there sooner.    5. Hypothyroidism due to Hashimoto's thyroiditis  Recent TSH looked very good.    6. High risk medication use  He was recently placed on terbinafine and I want to make sure that that is not causing liver problems/hepatitis causing his extreme fatigue  - Hepatic panel; Future    Subjective   Binu is a 77 year old, presenting for the following health issues:  Fatigue (Pt reports that he has started B12 supplement and is holding the metoprolol; BP ranges from 114-129 over 63-70)      HPI     Binu joins me on a video visit.  He called last week and was feeling very poorly.  This was despite the medication changes that we discussed at our last visit which included adding B12 and discontinuing metoprolol low-dose.  He is feeling very fatigued and down.  Had poor motivation to do anything.  Did not even feel like he could get to his Father's Day celebration.  He would be willing to try another mood medication today.  He is discouraged that he cannot get into see a neurologist for many months and  wonders if he can go outside of the [facility] system.      Review of Systems         Objective           Vitals:  No vitals were obtained today due to virtual visit.    Physical Exam   Deferred due to video visit.  He is actually seems to be in good spirits and jokes and laughs today                Video-Visit Details    Video Start Time: 1652    Type of service:  Video Visit    Video End Time:1716    Originating Location (pt. Location): Home    Distant Location (provider location):  [facility]     Platform used for Video Visit: [employer]  ..    Answers for HPI/ROS submitted by the patient on 6/28/2022  Do you check your blood pressure regularly outside of the clinic?: Yes  Are your blood pressures ever more than 140 on the top number (systolic) OR more than 90 on the bottom number (diastolic)? (For example, greater than 140/90): No  Are you following a low salt diet?: No  Do you take an aspirin every day?: Yes

## 2022-06-28 NOTE — Clinical Note
Please call patient to schedule lab appointment this week and follow-up with me in 3 weeks in person for 40 minutes.  Thank you

## 2022-06-29 ENCOUNTER — LAB (OUTPATIENT)
Dept: LAB | Facility: CLINIC | Age: 77
End: 2022-06-29
Payer: COMMERCIAL

## 2022-06-29 DIAGNOSIS — Z79.899 HIGH RISK MEDICATION USE: ICD-10-CM

## 2022-06-29 LAB
ALBUMIN SERPL BCG-MCNC: 3.9 G/DL (ref 3.5–5.2)
ALP SERPL-CCNC: 115 U/L (ref 35–129)
ALT SERPL W P-5'-P-CCNC: 17 U/L (ref 10–50)
AST SERPL W P-5'-P-CCNC: 19 U/L (ref 10–50)
BILIRUB DIRECT SERPL-MCNC: <0.2 MG/DL (ref 0–0.3)
BILIRUB SERPL-MCNC: 0.3 MG/DL
PROT SERPL-MCNC: 6.6 G/DL (ref 6.4–8.3)

## 2022-06-29 PROCEDURE — 80076 HEPATIC FUNCTION PANEL: CPT

## 2022-06-29 PROCEDURE — 36415 COLL VENOUS BLD VENIPUNCTURE: CPT

## 2022-06-30 DIAGNOSIS — I10 ESSENTIAL HYPERTENSION, BENIGN: Primary | ICD-10-CM

## 2022-07-01 RX ORDER — LOSARTAN POTASSIUM 50 MG/1
75 TABLET ORAL DAILY
Qty: 135 TABLET | Refills: 1 | Status: SHIPPED | OUTPATIENT
Start: 2022-07-01 | End: 2022-08-22

## 2022-07-16 DIAGNOSIS — M81.0 OSTEOPOROSIS WITHOUT CURRENT PATHOLOGICAL FRACTURE, UNSPECIFIED OSTEOPOROSIS TYPE: ICD-10-CM

## 2022-07-19 RX ORDER — ALENDRONATE SODIUM 70 MG/1
70 TABLET ORAL
Qty: 12 TABLET | Refills: 0 | Status: SHIPPED | OUTPATIENT
Start: 2022-07-19 | End: 2022-10-06

## 2022-07-21 ENCOUNTER — OFFICE VISIT (OUTPATIENT)
Dept: INTERNAL MEDICINE | Facility: CLINIC | Age: 77
End: 2022-07-21
Payer: COMMERCIAL

## 2022-07-21 VITALS
HEIGHT: 70 IN | WEIGHT: 193 LBS | DIASTOLIC BLOOD PRESSURE: 60 MMHG | TEMPERATURE: 97.9 F | HEART RATE: 74 BPM | BODY MASS INDEX: 27.63 KG/M2 | SYSTOLIC BLOOD PRESSURE: 116 MMHG | OXYGEN SATURATION: 96 %

## 2022-07-21 DIAGNOSIS — U07.1 INFECTION DUE TO 2019 NOVEL CORONAVIRUS: ICD-10-CM

## 2022-07-21 DIAGNOSIS — I71.21 ASCENDING AORTIC ANEURYSM (H): ICD-10-CM

## 2022-07-21 DIAGNOSIS — K86.2 PANCREATIC CYST: ICD-10-CM

## 2022-07-21 DIAGNOSIS — M81.0 OSTEOPOROSIS WITHOUT CURRENT PATHOLOGICAL FRACTURE, UNSPECIFIED OSTEOPOROSIS TYPE: ICD-10-CM

## 2022-07-21 DIAGNOSIS — C77.5 PROSTATE CANCER METASTATIC TO INTRAPELVIC LYMPH NODE (H): ICD-10-CM

## 2022-07-21 DIAGNOSIS — R53.83 OTHER FATIGUE: Primary | ICD-10-CM

## 2022-07-21 DIAGNOSIS — E06.3 AUTOIMMUNE THYROIDITIS: ICD-10-CM

## 2022-07-21 DIAGNOSIS — C61 PROSTATE CANCER METASTATIC TO INTRAPELVIC LYMPH NODE (H): ICD-10-CM

## 2022-07-21 DIAGNOSIS — G62.9 PERIPHERAL POLYNEUROPATHY: ICD-10-CM

## 2022-07-21 DIAGNOSIS — C61 MALIGNANT NEOPLASM OF PROSTATE (H): ICD-10-CM

## 2022-07-21 DIAGNOSIS — R68.89 POOR MOTIVATION: ICD-10-CM

## 2022-07-21 PROCEDURE — 99215 OFFICE O/P EST HI 40 MIN: CPT | Performed by: INTERNAL MEDICINE

## 2022-07-21 RX ORDER — BUPROPION HYDROCHLORIDE 150 MG/1
150 TABLET ORAL EVERY MORNING
Qty: 30 TABLET | Refills: 1 | Status: SHIPPED | OUTPATIENT
Start: 2022-07-21 | End: 2022-08-23

## 2022-07-21 ASSESSMENT — ENCOUNTER SYMPTOMS: FATIGUE: 1

## 2022-07-21 NOTE — PROGRESS NOTES
"  Assessment & Plan     1. Other fatigue  I think his fatigue is multifactorial.  I do think he has some low-grade depression causing some of his symptoms.  Resume Wellbutrin which she did well with in the past.  See me back in a few weeks.  - buPROPion (WELLBUTRIN XL) 150 MG 24 hr tablet; Take 1 tablet (150 mg) by mouth every morning  Dispense: 30 tablet; Refill: 1    2. Poor motivation  As above  - buPROPion (WELLBUTRIN XL) 150 MG 24 hr tablet; Take 1 tablet (150 mg) by mouth every morning  Dispense: 30 tablet; Refill: 1    3. Peripheral polyneuropathy  He will be meeting a neurologist soon.    4. Malignant neoplasm of prostate (H)  Most recent PSA was undetectable.    5. Infection due to 2019 novel coronavirus  We discussed potentially going to see the post-COVID clinic but in hindsight a lot of his symptoms predated COVID so I do not know how much that has to play in and he agrees.    6. 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    7. Ascending aortic aneurysm (H)  He wonders how he would feel off of all of his blood pressure medications and statin.  I am very hesitant to do that at this time.  I asked him to discuss that further with cardiology.    8. Osteoporosis without current pathological fracture, unspecified osteoporosis type  Continue alendronate.    9. Pancreatic cyst  He had a scan in January of this year.  That was compared to 1 done about a year previous and it was stable.  I think we can probably repeat that again in 1 year.    10. Autoimmune thyroiditis  Being followed at [place] for this.  Most recent TSH in the normal range      50 minutes spent on the date of the encounter doing chart review, history and exam, documentation and further activities per the note       BMI:   Estimated body mass index is 27.69 kg/m  as calculated from the following:    Height as of this encounter: 1.778 m (5' 10\").    Weight as of " this encounter: 87.5 kg (193 lb).           Return in about 6 weeks (around 9/1/2022).    COLBY BROCK MD  [facility]    Subjective   Binu is a 77 year old, presenting for the following health issues:  Fatigue (Discontinue Cymbalta x 1 week now; pt also reports he's scheduled to see [facility] next week)      Fatigue  Associated symptoms include fatigue.   History of Present Illness       Hypertension: Binu Galvin presents for follow up of hypertension.  Binu Galvin does not check blood pressure  regularly outside of the clinic. Outside blood pressures have been over 140/90. Binu Galvin does not follow a low salt diet.     Hypothyroidism:     Since last visit, patient describes the following symptoms::  Dry skin, Fatigue and Loose stools        Binu comes in today for follow-up of multiple issues.  Complicated gentleman with complicated medical history and he sees multiple providers which also makes things difficult.  Spreads his care through multiple care systems including [facility].  He is just not felt well the last few years.  He does admit to probably some chronic dysthymia but he has been feeling very fatigued and poorly motivated over the last few years.  I have felt that there is some low-grade depression causing some of this but he is convinced is probably now multifactorial.  He did have COVID.  He felt poorly before COVID though.  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  He also have prostate cancer and seemed to do worse after that.  He was put on Wellbutrin and doing well with that but then he stopped it on his own about a year and a half ago and he now would be willing to go back on that..  He does have neuropathy and is following with a new neurologist starting next week I believe.  He has a history of a pancreatic cystic lesion which has been followed  "and will need a repeat scan probably in January of this next year.  He never did hear from the gastroenterologist that ordered the follow-up scan.  His most recent was in January 2022.    [facility] put him on thyroid replacement.  Most recent TSH in the last couple months looked good.      Review of Systems   Constitutional: Positive for fatigue.            Objective    Pulse 74   Ht 1.778 m (5' 10\")   Wt 87.5 kg (193 lb)   SpO2 96%   BMI 27.69 kg/m    Body mass index is 27.69 kg/m .  Physical Exam   Very pleasant gentleman who looks well.  Weight is stable.  PHQ-9 noted.                    .  ..  "

## 2022-08-12 ENCOUNTER — TRANSFERRED RECORDS (OUTPATIENT)
Dept: HEALTH INFORMATION MANAGEMENT | Facility: CLINIC | Age: 77
End: 2022-08-12

## 2022-08-18 ENCOUNTER — ANCILLARY PROCEDURE (OUTPATIENT)
Dept: CT IMAGING | Facility: CLINIC | Age: 77
End: 2022-08-18
Attending: INTERNAL MEDICINE
Payer: COMMERCIAL

## 2022-08-18 ENCOUNTER — OFFICE VISIT (OUTPATIENT)
Dept: CARDIOLOGY | Facility: CLINIC | Age: 77
End: 2022-08-18
Attending: INTERNAL MEDICINE
Payer: COMMERCIAL

## 2022-08-18 VITALS
WEIGHT: 189.6 LBS | BODY MASS INDEX: 27.14 KG/M2 | OXYGEN SATURATION: 95 % | SYSTOLIC BLOOD PRESSURE: 146 MMHG | HEIGHT: 70 IN | HEART RATE: 63 BPM | DIASTOLIC BLOOD PRESSURE: 84 MMHG

## 2022-08-18 DIAGNOSIS — I71.21 ASCENDING AORTIC ANEURYSM (H): ICD-10-CM

## 2022-08-18 DIAGNOSIS — I47.10 SVT (SUPRAVENTRICULAR TACHYCARDIA) (H): ICD-10-CM

## 2022-08-18 LAB
CREAT BLD-MCNC: 1.3 MG/DL (ref 0.5–1.3)
GFR SERPL CREATININE-BSD FRML MDRD: 57 ML/MIN/1.73M2

## 2022-08-18 PROCEDURE — 74174 CTA ABD&PLVS W/CONTRAST: CPT | Performed by: RADIOLOGY

## 2022-08-18 PROCEDURE — G0463 HOSPITAL OUTPT CLINIC VISIT: HCPCS

## 2022-08-18 PROCEDURE — 82565 ASSAY OF CREATININE: CPT | Performed by: PATHOLOGY

## 2022-08-18 PROCEDURE — 71275 CT ANGIOGRAPHY CHEST: CPT | Performed by: RADIOLOGY

## 2022-08-18 PROCEDURE — 99213 OFFICE O/P EST LOW 20 MIN: CPT | Performed by: INTERNAL MEDICINE

## 2022-08-18 RX ORDER — LOSARTAN POTASSIUM 100 MG/1
100 TABLET ORAL DAILY
Qty: 90 TABLET | Refills: 3 | Status: SHIPPED | OUTPATIENT
Start: 2022-08-18 | End: 2022-08-22

## 2022-08-18 RX ORDER — IOPAMIDOL 755 MG/ML
100 INJECTION, SOLUTION INTRAVASCULAR ONCE
Status: COMPLETED | OUTPATIENT
Start: 2022-08-18 | End: 2022-08-18

## 2022-08-18 RX ORDER — IOPAMIDOL 755 MG/ML
120 INJECTION, SOLUTION INTRAVASCULAR ONCE
Status: DISCONTINUED | OUTPATIENT
Start: 2022-08-18 | End: 2022-08-18 | Stop reason: CLARIF

## 2022-08-18 RX ADMIN — IOPAMIDOL 100 ML: 755 INJECTION, SOLUTION INTRAVASCULAR at 16:37

## 2022-08-18 ASSESSMENT — PAIN SCALES - GENERAL: PAINLEVEL: NO PAIN (0)

## 2022-08-18 NOTE — LETTER
8/18/2022      RE: Artis Galvin  [address]  [place] [address]       Dear Colleague,    Thank you for the opportunity to participate in the care of your patient, Artis Galvin, at the [facility] [place] at [facility]. Please see a copy of my visit note below.    CARDIOLOGY CLINIC FOLLOW UP    HPI: Artis Galvin is a 77 year old adult, being seen today for recheck of CAD.    He has history of hypertension, hyperlipidemia, and significant family history of aortic aneurysms, including personal history. He also has a history of known CAD diagnosed on coronary CT with a significant CT FFR in his LAD territory. He was previously seen for all of the above before, but at that time requested to continue medical management.     We spent some time today discussing the exercise goals given his aortic aneurysm, and the desire to have lower SBPs and heart rates while exercising, including avoidance of significant rapid elevations in BP as would be expected with strength training.     He is due for a follow up CT to assess his aneurysm. But is also complaining of worsening symptoms despite medical management of his CAD. We again discussed proceeding with a coronary angiogram, and he is more receptive today, but would like to read more about the procedure before committing to it.    Of note, he has known SVT / AVNRT.    He otherwise denies any orthopnea, PND, lightheadedness, or syncope.    PAST MEDICAL HISTORY:  Past Medical History:   Diagnosis Date     Abdominal pain, unspecified site      Abnormal thyroid ultrasound      BBB (bundle branch block)     right     Chronic lymphocytic thyroiditis      Diverticulosis of colon (without mention of hemorrhage)      Localized superficial swelling, mass, or lump      Malignant neoplasm of prostate (H)      Nontoxic multinodular goiter      Other and unspecified hyperlipidemia      Other  nonspecific abnormal serum enzyme levels      Pain in joint, shoulder region      Persistent hoarseness      Sarcoidosis      Scleritis, unspecified      Unspecified hypothyroidism      Unspecified vitamin D deficiency        CURRENT MEDICATIONS:  Current Outpatient Medications   Medication Sig Dispense Refill     alendronate (FOSAMAX) 70 MG tablet Take 1 tablet (70 mg) by mouth every 7 days *before morning meal with 8 oz water and remain upright for 30 minutes. 12 tablet 0     aspirin 81 MG EC tablet Take 81 mg by mouth daily       atorvastatin (LIPITOR) 40 MG tablet Take 1 tablet (40 mg) by mouth daily 90 tablet 3     buPROPion (WELLBUTRIN XL) 150 MG 24 hr tablet Take 1 tablet (150 mg) by mouth every morning 30 tablet 1     calcium citrate-vitamin D (CITRACAL) 315-200 MG-UNIT TABS per tablet Take 1 tablet by mouth 2 times daily       Cyanocobalamin 5000 MCG SUBL Place 1 tablet under the tongue daily       ketoconazole (NIZORAL) 2 % external cream Apply topically as needed 60 g 3     levothyroxine (SYNTHROID/LEVOTHROID) 50 MCG tablet Take 50 mcg by mouth daily       losartan (COZAAR) 100 MG tablet Take 1 tablet (100 mg) by mouth daily Take half tablet (50 mg) daily addition 25 mg for total for 75 mg daily 90 tablet 3     losartan (COZAAR) 50 MG tablet Take 1.5 tablets (75 mg) by mouth daily Take half tablet (25 mg) daily addition 50 mg for total for 75 mg daily 135 tablet 1     pilocarpine (PILOCAR) 2 % ophthalmic solution Apply 1 drop to eye 2 times daily       Probiotic Product (MISC INTESTINAL MEGHA REGULAT) CAPS Take 1 capsule by mouth daily       terbinafine (LAMISIL) 250 MG tablet Take 1 tablet (250 mg) by mouth daily 90 tablet 0     nitroGLYcerin (NITROSTAT) 0.4 MG sublingual tablet For chest pain place 1 tablet under the tongue every 5 minutes for 3 doses. If symptoms persist 5 minutes after 1st dose call 911. (Patient not taking: Reported on 8/18/2022) 25 tablet 3       PAST SURGICAL HISTORY:  Past  Surgical History:   Procedure Laterality Date     HC SHLDR ARTHROSCOP,PART ACROMIOPLAS Right     Description: Shoulder Arthroscopy, Space Decompression And Acromioplasty;  Recorded: 02/18/2014;     [place] LAP,PROSTATECTOMY,RADICAL,W/NERVE SPARE,INCL ROBOTIC Bilateral 2/27/2018    Procedure: ROBOTIC ASSISTED RADICAL RETROPUBIC PROSTATECTOMY, BILATERAL PELVIC LYMPH NODE DISSECTION LYSIS OF ADHESIONS;  Surgeon: Wale Rodriguez MD;  Location: [facility];  Service: Urology     GA RADIAL KERATOTOMY      Description: Cornea Radial Keratotomy;  Recorded: 02/18/2014;       ALLERGIES  Ciprofloxacin and Levaquin [levofloxacin]    FAMILY HX:  Family History   Problem Relation Age of Onset     No Known Problems Mother      Coronary Artery Disease Father      Aortic aneurysm Father      Prostate Cancer Maternal Grandfather      Prostate Cancer Maternal Uncle        SOCIAL HX:  Social History     Socioeconomic History     Marital status: Patient Declined     Spouse name: None     Number of children: None     Years of education: None     Highest education level: None   Tobacco Use     Smoking status: Never Smoker     Smokeless tobacco: Never Used   Substance and Sexual Activity     Alcohol use: Not Currently     Comment: Alcoholic Drinks/day: rarely     Drug use: No   Social History Narrative    Single. . 4 grown daughters.    Currently unemployed.       ROS:  Constitutional: No fever, chills, or sweats. No weight gain/loss.   ENT: No visual disturbance, ear ache, epistaxis, sore throat.   Allergies/Immunologic: Negative.   Respiratory: No cough, hemoptysis.   Cardiovascular: As per HPI.   GI: No nausea, vomiting, hematemesis, melena, or hematochezia.   : No urinary frequency, dysuria, or hematuria.   Integument: Negative.   Psychiatric: Negative.   Neuro: Negative.   Endocrinology: Negative.   Musculoskeletal: No myalgia.    VITAL SIGNS:  BP (!) 146/84 (BP Location: Left arm, Patient Position: Chair, Cuff Size:  "Adult Regular)   Pulse 63   Ht 1.782 m (5' 10.16\")   Wt 86 kg (189 lb 9.6 oz)   SpO2 95%   BMI 27.08 kg/m    Body mass index is 27.08 kg/m .  Wt Readings from Last 2 Encounters:   22 86 kg (189 lb 9.6 oz)   22 87.5 kg (193 lb)       PHYSICAL EXAM  Walter Testa is a 77 year old adult in no acute distress.  HEENT: Unremarkable.  Neck: JVP normal.  Carotids +4/4 bilaterally without bruits.  Lungs: CTA.  Cor: RRR. Normal S1 and S2.  No murmur, rub, or gallop.  PMI in Lf 5th ICS.  Abd: Soft, nontender, nondistended.  NABS.  No pulsatile mass.  Extremities: No C/C/E.  Pulses +4/4 symmetric in upper and lower extremities.  Neuro: Grossly intact.    LABS    Lab Results   Component Value Date    WBC 5.0 2022     Lab Results   Component Value Date    RBC 4.83 2022     Lab Results   Component Value Date    HGB 14.7 2022     Lab Results   Component Value Date    HCT 44.3 2022     No components found for: MCT  Lab Results   Component Value Date    MCV 92 2022     Lab Results   Component Value Date    MCH 30.4 2022     Lab Results   Component Value Date    MCHC 33.2 2022     Lab Results   Component Value Date    RDW 12.5 2022     Lab Results   Component Value Date     2022      Recent Labs   Lab Test 22  1635 22  1530 21  1525   NA  --  142 141   POTASSIUM  --  4.7 4.7   CHLORIDE  --  107 105   CO2  --  25 28   ANIONGAP  --  10 8   GLC  --  88 96   BUN  --  17 19   CR 1.3 1.08 0.94   DEANDRA  --  9.2 9.8     Recent Labs   Lab Test 21  1653 20  1059   CHOL 159 125   HDL 35* 35*   LDL 52 60   TRIG 362* 149        EK22  Sinus bradycardia    ECHO: 2020    Moderate ascending aortic dilatation    Left ventricle ejection fraction is normal. The calculated left ventricular ejection fraction is 72%.    Normal right ventricular size and systolic function.    Aortic valve sclerosis with mild aortic insufficiency    No evidence " of patent foramen ovale based on color flow or bubble study.    When compared to the previous study dated 2/19/2018, no interval change.    STRESS TEST:  2020  IMPRESSION:  Borderline hemodynamically significant mid LAD stenosis. CT-FFR is  0.79, which falls to 0.75 in the distal LAD.      FINDINGS:  1.  LAD: CT-FFR after the moderate-severe mLAD stenosis is 0.79, which  falls to 0.75 in the distal LAD.   2.  LCX: CT-FFR in the small distal circumflex is 0.83.   3.  RCA: CT-FFR after the mild distal stenosis is 0.85.     CARDIAC CATH:  --    ASSESSMENT AND PLAN:    1. Single vessel severe CAD based on CT / CT FFR  -- recommend coronary angiogram given accelerating symptoms  -- continue aspirin, statin    2. Hypertension, well controlled based on log of BP that patient has with him  -- stop Imdur, increase losartan to 100 mg (patient prefers to reduce number of medications taken)    3. Hyperlipidemia, well controlled  -- continue statin    4. Mild to moderate ascending aortic dilation  -- repeat CT ( would like this done prior to his angiogram, as though could dictate surgery versus PCI at the time of his angiogram)    RTC annually    Mir Farr MD

## 2022-08-18 NOTE — NURSING NOTE
Chief Complaint   Patient presents with     Follow Up     reason for visit: manage aortic aneurysm     Vitals were taken and medications reconciled.    Taz Hendricks, MENDY  2:32 PM

## 2022-08-18 NOTE — PATIENT INSTRUCTIONS
Patient Instructions:  It was a pleasure to see you in the cardiology clinic today.      If you have any questions, call  Jennifer Henson RN, at [phone].   [facility].  To schedule an appointment or to leave a message for your Care Team Press #1  If you are a physician calling for another physician Press #2  For Billing Press #3  For Medical Records Press #4  We are encouraging the use of Angiodroid to communicate with your HealthCare Provider    Note the new medications: increase losartan to 100 mg daily  Stop the following medications: imdur (isosorbide)    The results from today include: pending CTA of abdomen, please get a fasting lipid panel   Please follow up with Dr. Farr in one year.    If you have an urgent need after hours (8:00 am to 4:30 pm) please call [phone] and ask for the cardiology fellow on call.

## 2022-08-19 DIAGNOSIS — I71.21 ASCENDING AORTIC ANEURYSM (H): Primary | ICD-10-CM

## 2022-08-19 NOTE — PROGRESS NOTES
CARDIOLOGY CLINIC FOLLOW UP    HPI: Artis Galvin is a 77 year old adult, being seen today for recheck of CAD.    He has history of hypertension, hyperlipidemia, and significant family history of aortic aneurysms, including personal history. He also has a history of known CAD diagnosed on coronary CT with a significant CT FFR in his LAD territory. He was previously seen for all of the above before, but at that time requested to continue medical management.     We spent some time today discussing the exercise goals given his aortic aneurysm, and the desire to have lower SBPs and heart rates while exercising, including avoidance of significant rapid elevations in BP as would be expected with strength training.     He is due for a follow up CT to assess his aneurysm. But is also complaining of worsening symptoms despite medical management of his CAD. We again discussed proceeding with a coronary angiogram, and he is more receptive today, but would like to read more about the procedure before committing to it.    Of note, he has known SVT / AVNRT.    He otherwise denies any orthopnea, PND, lightheadedness, or syncope.    PAST MEDICAL HISTORY:  Past Medical History:   Diagnosis Date     Abdominal pain, unspecified site      Abnormal thyroid ultrasound      BBB (bundle branch block)     right     Chronic lymphocytic thyroiditis      Diverticulosis of colon (without mention of hemorrhage)      Localized superficial swelling, mass, or lump      Malignant neoplasm of prostate (H)      Nontoxic multinodular goiter      Other and unspecified hyperlipidemia      Other nonspecific abnormal serum enzyme levels      Pain in joint, shoulder region      Persistent hoarseness      Sarcoidosis      Scleritis, unspecified      Unspecified hypothyroidism      Unspecified vitamin D deficiency        CURRENT MEDICATIONS:  Current Outpatient Medications   Medication Sig Dispense Refill     alendronate (FOSAMAX) 70 MG tablet Take 1 tablet  (70 mg) by mouth every 7 days *before morning meal with 8 oz water and remain upright for 30 minutes. 12 tablet 0     aspirin 81 MG EC tablet Take 81 mg by mouth daily       atorvastatin (LIPITOR) 40 MG tablet Take 1 tablet (40 mg) by mouth daily 90 tablet 3     buPROPion (WELLBUTRIN XL) 150 MG 24 hr tablet Take 1 tablet (150 mg) by mouth every morning 30 tablet 1     calcium citrate-vitamin D (CITRACAL) 315-200 MG-UNIT TABS per tablet Take 1 tablet by mouth 2 times daily       Cyanocobalamin 5000 MCG SUBL Place 1 tablet under the tongue daily       ketoconazole (NIZORAL) 2 % external cream Apply topically as needed 60 g 3     levothyroxine (SYNTHROID/LEVOTHROID) 50 MCG tablet Take 50 mcg by mouth daily       losartan (COZAAR) 100 MG tablet Take 1 tablet (100 mg) by mouth daily Take half tablet (50 mg) daily addition 25 mg for total for 75 mg daily 90 tablet 3     losartan (COZAAR) 50 MG tablet Take 1.5 tablets (75 mg) by mouth daily Take half tablet (25 mg) daily addition 50 mg for total for 75 mg daily 135 tablet 1     pilocarpine (PILOCAR) 2 % ophthalmic solution Apply 1 drop to eye 2 times daily       Probiotic Product (MISC INTESTINAL MEGHA REGULAT) CAPS Take 1 capsule by mouth daily       terbinafine (LAMISIL) 250 MG tablet Take 1 tablet (250 mg) by mouth daily 90 tablet 0     nitroGLYcerin (NITROSTAT) 0.4 MG sublingual tablet For chest pain place 1 tablet under the tongue every 5 minutes for 3 doses. If symptoms persist 5 minutes after 1st dose call 911. (Patient not taking: Reported on 8/18/2022) 25 tablet 3       PAST SURGICAL HISTORY:  Past Surgical History:   Procedure Laterality Date      SHLDR ARTHROSCOP,PART ACROMIOPLAS Right     Description: Shoulder Arthroscopy, Space Decompression And Acromioplasty;  Recorded: 02/18/2014;     [place] LAP,PROSTATECTOMY,RADICAL,W/NERVE SPARE,INCL ROBOTIC Bilateral 2/27/2018    Procedure: ROBOTIC ASSISTED RADICAL RETROPUBIC PROSTATECTOMY, BILATERAL PELVIC LYMPH NODE  "DISSECTION LYSIS OF ADHESIONS;  Surgeon: Wale Rodriguez MD;  Location: [facility];  Service: Urology     AR RADIAL KERATOTOMY      Description: Cornea Radial Keratotomy;  Recorded: 02/18/2014;       ALLERGIES  Ciprofloxacin and Levaquin [levofloxacin]    FAMILY HX:  Family History   Problem Relation Age of Onset     No Known Problems Mother      Coronary Artery Disease Father      Aortic aneurysm Father      Prostate Cancer Maternal Grandfather      Prostate Cancer Maternal Uncle        SOCIAL HX:  Social History     Socioeconomic History     Marital status: Patient Declined     Spouse name: None     Number of children: None     Years of education: None     Highest education level: None   Tobacco Use     Smoking status: Never Smoker     Smokeless tobacco: Never Used   Substance and Sexual Activity     Alcohol use: Not Currently     Comment: Alcoholic Drinks/day: rarely     Drug use: No   Social History Narrative    Single. . 4 grown daughters.    Currently unemployed.       ROS:  Constitutional: No fever, chills, or sweats. No weight gain/loss.   ENT: No visual disturbance, ear ache, epistaxis, sore throat.   Allergies/Immunologic: Negative.   Respiratory: No cough, hemoptysis.   Cardiovascular: As per HPI.   GI: No nausea, vomiting, hematemesis, melena, or hematochezia.   : No urinary frequency, dysuria, or hematuria.   Integument: Negative.   Psychiatric: Negative.   Neuro: Negative.   Endocrinology: Negative.   Musculoskeletal: No myalgia.    VITAL SIGNS:  BP (!) 146/84 (BP Location: Left arm, Patient Position: Chair, Cuff Size: Adult Regular)   Pulse 63   Ht 1.782 m (5' 10.16\")   Wt 86 kg (189 lb 9.6 oz)   SpO2 95%   BMI 27.08 kg/m    Body mass index is 27.08 kg/m .  Wt Readings from Last 2 Encounters:   08/18/22 86 kg (189 lb 9.6 oz)   07/21/22 87.5 kg (193 lb)       PHYSICAL EXAM  Artis Galvin is a 77 year old adult in no acute distress.  HEENT: Unremarkable.  Neck: JVP normal.  " Carotids +4/4 bilaterally without bruits.  Lungs: CTA.  Cor: RRR. Normal S1 and S2.  No murmur, rub, or gallop.  PMI in Lf 5th ICS.  Abd: Soft, nontender, nondistended.  NABS.  No pulsatile mass.  Extremities: No C/C/E.  Pulses +4/4 symmetric in upper and lower extremities.  Neuro: Grossly intact.    LABS    Lab Results   Component Value Date    WBC 5.0 2022     Lab Results   Component Value Date    RBC 4.83 2022     Lab Results   Component Value Date    HGB 14.7 2022     Lab Results   Component Value Date    HCT 44.3 2022     No components found for: MCT  Lab Results   Component Value Date    MCV 92 2022     Lab Results   Component Value Date    MCH 30.4 2022     Lab Results   Component Value Date    MCHC 33.2 2022     Lab Results   Component Value Date    RDW 12.5 2022     Lab Results   Component Value Date     2022      Recent Labs   Lab Test 22  1635 22  1530 21  1525   NA  --  142 141   POTASSIUM  --  4.7 4.7   CHLORIDE  --  107 105   CO2  --  25 28   ANIONGAP  --  10 8   GLC  --  88 96   BUN  --  17 19   CR 1.3 1.08 0.94   DEANDRA  --  9.2 9.8     Recent Labs   Lab Test 21  1653 20  1059   CHOL 159 125   HDL 35* 35*   LDL 52 60   TRIG 362* 149        EK22  Sinus bradycardia    ECHO:     Moderate ascending aortic dilatation    Left ventricle ejection fraction is normal. The calculated left ventricular ejection fraction is 72%.    Normal right ventricular size and systolic function.    Aortic valve sclerosis with mild aortic insufficiency    No evidence of patent foramen ovale based on color flow or bubble study.    When compared to the previous study dated 2018, no interval change.    STRESS TEST:    IMPRESSION:  Borderline hemodynamically significant mid LAD stenosis. CT-FFR is  0.79, which falls to 0.75 in the distal LAD.      FINDINGS:  1.  LAD: CT-FFR after the moderate-severe mLAD stenosis is 0.79,  which  falls to 0.75 in the distal LAD.   2.  LCX: CT-FFR in the small distal circumflex is 0.83.   3.  RCA: CT-FFR after the mild distal stenosis is 0.85.     CARDIAC CATH:  --    ASSESSMENT AND PLAN:    1. Single vessel severe CAD based on CT / CT FFR  -- recommend coronary angiogram given accelerating symptoms  -- continue aspirin, statin    2. Hypertension, well controlled based on log of BP that patient has with him  -- stop Imdur, increase losartan to 100 mg (patient prefers to reduce number of medications taken)    3. Hyperlipidemia, well controlled  -- continue statin    4. Mild to moderate ascending aortic dilation  -- repeat CT ( would like this done prior to his angiogram, as though could dictate surgery versus PCI at the time of his angiogram)    RTC annually    Mir Farr MD

## 2022-08-20 ENCOUNTER — TELEPHONE (OUTPATIENT)
Dept: CARDIOLOGY | Facility: CLINIC | Age: 77
End: 2022-08-20

## 2022-08-20 DIAGNOSIS — I47.10 SVT (SUPRAVENTRICULAR TACHYCARDIA) (H): ICD-10-CM

## 2022-08-20 DIAGNOSIS — I71.21 ASCENDING AORTIC ANEURYSM (H): ICD-10-CM

## 2022-08-22 RX ORDER — LOSARTAN POTASSIUM 100 MG/1
100 TABLET ORAL DAILY
Qty: 90 TABLET | Refills: 3 | Status: SHIPPED | OUTPATIENT
Start: 2022-08-22 | End: 2023-05-08

## 2022-08-23 ENCOUNTER — OFFICE VISIT (OUTPATIENT)
Dept: INTERNAL MEDICINE | Facility: CLINIC | Age: 77
End: 2022-08-23
Payer: COMMERCIAL

## 2022-08-23 VITALS
RESPIRATION RATE: 16 BRPM | OXYGEN SATURATION: 97 % | WEIGHT: 190.9 LBS | HEIGHT: 71 IN | BODY MASS INDEX: 26.73 KG/M2 | HEART RATE: 62 BPM | SYSTOLIC BLOOD PRESSURE: 125 MMHG | TEMPERATURE: 97.9 F | DIASTOLIC BLOOD PRESSURE: 75 MMHG

## 2022-08-23 DIAGNOSIS — R53.83 OTHER FATIGUE: ICD-10-CM

## 2022-08-23 DIAGNOSIS — I71.21 ASCENDING AORTIC ANEURYSM (H): ICD-10-CM

## 2022-08-23 DIAGNOSIS — R68.89 POOR MOTIVATION: Primary | ICD-10-CM

## 2022-08-23 DIAGNOSIS — I47.10 SVT (SUPRAVENTRICULAR TACHYCARDIA) (H): ICD-10-CM

## 2022-08-23 DIAGNOSIS — C61 MALIGNANT NEOPLASM OF PROSTATE (H): ICD-10-CM

## 2022-08-23 PROCEDURE — 99214 OFFICE O/P EST MOD 30 MIN: CPT | Performed by: INTERNAL MEDICINE

## 2022-08-23 RX ORDER — BUPROPION HYDROCHLORIDE 150 MG/1
300 TABLET ORAL EVERY MORNING
Qty: 60 TABLET | Refills: 3 | Status: SHIPPED | OUTPATIENT
Start: 2022-08-23 | End: 2022-09-08

## 2022-08-23 NOTE — PROGRESS NOTES
1. Poor motivation  I think he is getting better and I think that he also agrees with this.  He is agreeable to going up on the Wellbutrin to 300 mg and we will touch base in another couple weeks.  - buPROPion (WELLBUTRIN XL) 150 MG 24 hr tablet; Take 2 tablets (300 mg) by mouth every morning for 30 days  Dispense: 60 tablet; Refill: 3    2. Other fatigue  As above  - buPROPion (WELLBUTRIN XL) 150 MG 24 hr tablet; Take 2 tablets (300 mg) by mouth every morning for 30 days  Dispense: 60 tablet; Refill: 3    3. Malignant neoplasm of prostate (H)  PSAs have been undetectable    4. Ascending aortic aneurysm (H)  Getting larger.  He has a 6-month follow-up.  Impressed upon him the need to keep his blood pressure down    5. SVT (supraventricular tachycardia) (H)  He recently had an episode of tachycardia which sounds like his SVT.  Continue to follow with cardiology.  He was essentially asymptomatic short-lived.  A few minutes    Subjective   Binu is a 77 year old accompanied by Binu Galvin's alone, presenting for the following health issues:  Follow Up and Recheck Medication (Pt reports concerns pertaining to a medication will converse with provider about it)      History of Present Illness       Heart Failure:  Binu Galvin presents for follow up of heart failure. Binu Galvin is not experiencing shortness of breath at night, with rest or with activity Binu Galvin is experiencing lower extremity edema which is same as usual. Binu Galvin denies orthopenea and is not coughing at night. Patient is not checking weight daily. Binu Galvin has recently had a None. Binu Galvin has side effects from medications including dizziness and fatigue. Binu Galvin has had no other medical visits for heart failure since the last visit.    Hypertension: Binu Galvin presents for follow up of hypertension.  Binu Galvin does check blood pressure  regularly outside of the clinic. Outpatient blood pressures have not been over 140/90.  "Binu Galvin does not follow a low salt diet.         Binu is here for follow-up of his chronic depression symptoms.  He refused to do PHQ-9.  We started him on Wellbutrin and he thinks that maybe it might be helping but he tells me that he thinks that it is causing him adverse side effects but when I asked him what those are he is very vague.  He tells me he treated with 1 room for another\".  He did recently saw his cardiologist who plans to maybe do a angiogram.  Also his aneurysm was larger.  They increased his losartan.  He is doing his exercise.  He will be coming off terbinafine soon.  He wants to be able to do things again with his family [place] trips etc. but right now he has poor motivation to do any of that.  Pt is here for 1 month follow up      Review of Systems         Objective    There were no vitals taken for this visit.  There is no height or weight on file to calculate BMI.  Physical Exam   Very pleasant gentleman in no distress.  Vital signs noted.  Further exam deferred.  Refuses PHQ-9                    .  ..  "

## 2022-08-23 NOTE — PATIENT INSTRUCTIONS
If you are willing, I would love for you to try at least two weeks of a higher dose of the wellbutrin.  Taking 2 tablets in the am--300 mg.  If you still don't feel any different at that time, you may stop it altogether.

## 2022-08-31 ENCOUNTER — TRANSFERRED RECORDS (OUTPATIENT)
Dept: HEALTH INFORMATION MANAGEMENT | Facility: CLINIC | Age: 77
End: 2022-08-31

## 2022-09-06 ENCOUNTER — TRANSFERRED RECORDS (OUTPATIENT)
Dept: HEALTH INFORMATION MANAGEMENT | Facility: CLINIC | Age: 77
End: 2022-09-06

## 2022-09-06 LAB
ALT SERPL-CCNC: 14 U/L (ref 9–46)
AST SERPL-CCNC: 12 U/L (ref 10–35)
CREATININE (EXTERNAL): 1.04 MG/DL (ref 0.7–1.28)
GFR ESTIMATED (EXTERNAL): 74 ML/MIN/1.73M2
GLUCOSE (EXTERNAL): 87 MG/DL (ref 65–99)
HBA1C MFR BLD: 5.4 %
HIV 1&2 EXT: NORMAL
POTASSIUM (EXTERNAL): 4.2 MMOL/L (ref 3.5–5.3)
TSH SERPL-ACNC: 4.35 MIU/L (ref 0.4–4.5)

## 2022-09-08 ENCOUNTER — OFFICE VISIT (OUTPATIENT)
Dept: INTERNAL MEDICINE | Facility: CLINIC | Age: 77
End: 2022-09-08
Payer: COMMERCIAL

## 2022-09-08 ENCOUNTER — TRANSFERRED RECORDS (OUTPATIENT)
Dept: HEALTH INFORMATION MANAGEMENT | Facility: CLINIC | Age: 77
End: 2022-09-08

## 2022-09-08 VITALS
BODY MASS INDEX: 26.88 KG/M2 | SYSTOLIC BLOOD PRESSURE: 118 MMHG | HEART RATE: 58 BPM | WEIGHT: 192 LBS | HEIGHT: 71 IN | DIASTOLIC BLOOD PRESSURE: 60 MMHG | OXYGEN SATURATION: 98 % | TEMPERATURE: 97.9 F

## 2022-09-08 DIAGNOSIS — U09.9 POST-COVID SYNDROME: Primary | ICD-10-CM

## 2022-09-08 DIAGNOSIS — I71.21 ASCENDING AORTIC ANEURYSM (H): ICD-10-CM

## 2022-09-08 DIAGNOSIS — C61 PROSTATE CANCER METASTATIC TO INTRAPELVIC LYMPH NODE (H): ICD-10-CM

## 2022-09-08 DIAGNOSIS — R68.89 POOR MOTIVATION: ICD-10-CM

## 2022-09-08 DIAGNOSIS — C77.5 PROSTATE CANCER METASTATIC TO INTRAPELVIC LYMPH NODE (H): ICD-10-CM

## 2022-09-08 DIAGNOSIS — K86.2 PANCREATIC CYST: ICD-10-CM

## 2022-09-08 DIAGNOSIS — L98.9 SKIN LESION: ICD-10-CM

## 2022-09-08 PROCEDURE — 99214 OFFICE O/P EST MOD 30 MIN: CPT | Performed by: INTERNAL MEDICINE

## 2022-09-08 NOTE — PROGRESS NOTES
1. Post-COVID syndrome  He seems to be doing better today.  Increase activity as tolerated.  We did try to get him into a post-COVID clinic but that did not occur.  He seems to be better will follow closely    2. Poor motivation  He states some of his emotional issues have been present his entire adult life.  He does not wish to take any additional medications for mood.  He seems a little better today.    3. Pancreatic cyst  Due in January for further imaging.  We will repeat schedule next visit.    4. 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    5. Ascending aortic aneurysm (H)  He will have a 6-month follow-up CT angio per cardiology.    6. Skin lesion  He has a scalp lesion.  Refer to dermatology.  - Adult Dermatology Referral; Future    Subjective   Binu is a 77 year old, presenting for the following health issues:  Follow Up (Medication (Wellbutrin) follow up - stopped taking medication after 8/30 (fatigue & poor sleep) )      History of Present Illness       Hypertension: Binu Galvin presents for follow up of hypertension.  Binu Galvin does check blood pressure  regularly outside of the clinic. Outpatient blood pressures have not been over 140/90. Binu Galvin does not follow a low salt diet.           Binu comes in today for follow-up of his fatigue and poor motivation.  He states a lot of his dysthymia type symptoms have been present his entire life.  We have tried a couple different and she depressants at this point he does not tolerate them.  Not interested in taking another 1.  He is getting some of his taste back so he is hopeful that things are going to continue to improve.  He believes this may be post-COVID.  He has a pancreatic cyst which needs follow-up.  He will be following up with [facility] regarding his prostate cancer fairly soon.  He will have a 6-month follow-up for his aneurysm.  He has some lesions on his skin and would like  "to see a dermatologist.    Review of Systems         Objective    Ht 1.791 m (5' 10.5\")   Wt 87.1 kg (192 lb)   BMI 27.16 kg/m    Body mass index is 27.16 kg/m .  Physical Exam   Looks well and in good spirits                    "

## 2022-10-02 DIAGNOSIS — M81.0 OSTEOPOROSIS WITHOUT CURRENT PATHOLOGICAL FRACTURE, UNSPECIFIED OSTEOPOROSIS TYPE: ICD-10-CM

## 2022-10-06 ENCOUNTER — OFFICE VISIT (OUTPATIENT)
Dept: CARDIOLOGY | Facility: CLINIC | Age: 77
End: 2022-10-06
Attending: INTERNAL MEDICINE
Payer: COMMERCIAL

## 2022-10-06 VITALS
DIASTOLIC BLOOD PRESSURE: 79 MMHG | HEART RATE: 65 BPM | WEIGHT: 193.2 LBS | OXYGEN SATURATION: 95 % | BODY MASS INDEX: 27.33 KG/M2 | SYSTOLIC BLOOD PRESSURE: 129 MMHG

## 2022-10-06 DIAGNOSIS — I71.21 ANEURYSM OF ASCENDING AORTA WITHOUT RUPTURE (H): Primary | ICD-10-CM

## 2022-10-06 DIAGNOSIS — I20.89 STABLE ANGINA PECTORIS (H): ICD-10-CM

## 2022-10-06 PROCEDURE — 93005 ELECTROCARDIOGRAM TRACING: CPT

## 2022-10-06 PROCEDURE — G0463 HOSPITAL OUTPT CLINIC VISIT: HCPCS | Mod: 25

## 2022-10-06 PROCEDURE — 99214 OFFICE O/P EST MOD 30 MIN: CPT | Performed by: INTERNAL MEDICINE

## 2022-10-06 RX ORDER — POTASSIUM CHLORIDE 1500 MG/1
20 TABLET, EXTENDED RELEASE ORAL
Status: CANCELLED | OUTPATIENT
Start: 2022-10-06

## 2022-10-06 RX ORDER — ALENDRONATE SODIUM 70 MG/1
70 TABLET ORAL
Qty: 12 TABLET | Refills: 0 | Status: SHIPPED | OUTPATIENT
Start: 2022-10-06 | End: 2022-12-23

## 2022-10-06 RX ORDER — ASPIRIN 325 MG
325 TABLET ORAL ONCE
Status: CANCELLED | OUTPATIENT
Start: 2022-10-06 | End: 2022-10-06

## 2022-10-06 RX ORDER — ASPIRIN 81 MG/1
243 TABLET, CHEWABLE ORAL ONCE
Status: CANCELLED | OUTPATIENT
Start: 2022-10-06

## 2022-10-06 RX ORDER — POTASSIUM CHLORIDE 1500 MG/1
40 TABLET, EXTENDED RELEASE ORAL
Status: CANCELLED | OUTPATIENT
Start: 2022-10-06

## 2022-10-06 RX ORDER — SODIUM CHLORIDE 9 MG/ML
INJECTION, SOLUTION INTRAVENOUS CONTINUOUS
Status: CANCELLED | OUTPATIENT
Start: 2022-10-06

## 2022-10-06 ASSESSMENT — PAIN SCALES - GENERAL: PAINLEVEL: NO PAIN (0)

## 2022-10-06 NOTE — TELEPHONE ENCOUNTER
Gem Dawn,    This patient is due for a follow up appointment. Would it be appropriate for me to add him to an in person MIL[facility] slot so that he can be seen sooner than first available? It does not look like this patient has completed many virtual appointments in the past. Please advise when able.    Thank you,  Robinson

## 2022-10-06 NOTE — NURSING NOTE
Chief Complaint   Patient presents with     Follow Up     October 6, 2022 - reason for visit: symptoms     Vitals were taken, medications reconciled, and EKG was performed.    Vazquez Bailey, MENDY  8:07 AM

## 2022-10-06 NOTE — TELEPHONE ENCOUNTER
Last Clinic Visit: 10/1/2021  [facility] [place]  Recommended 1 year follow up, no upcoming appointments  90 day refill per protocol, routed to clinic scheduling for follow up

## 2022-10-06 NOTE — LETTER
10/6/2022      RE: Artis Galvin  [address]  [place] [address]       Dear Colleague,    Thank you for the opportunity to participate in the care of your patient, Artis Galvin, at the [facility] [place] at [facility]. Please see a copy of my visit note below.    CARDIOLOGY CLINIC FOLLOW UP    HPI: Artis Galvin is a 77 year old adult, being seen today for recheck of CAD.    He has history of hypertension, hyperlipidemia, and significant family history of aortic aneurysms, including personal history. He also has a history of known CAD diagnosed on coronary CT with a significant CT FFR in his LAD territory. He was previously seen for all of the above before, but at that time requested to continue medical management.     We again reviewed exercise goals given his aortic aneurysm, and the desire to have lower SBPs and heart rates while exercising, including avoidance of significant rapid elevations in BP as would be expected with strength training.     He is again complaining of worsening symptoms despite medical management of his CAD. He describes at least CCS III symptoms with exertional dyspnea and fatigue even with minimal exertion. We again discussed proceeding with a coronary angiogram, and he is more receptive today, and would like to proceed. However, he is adamant about a strictly diagnostic procedure initially.    Of note, he has known SVT / AVNRT.    He otherwise denies any orthopnea, PND, lightheadedness, or syncope.    PAST MEDICAL HISTORY:  Past Medical History:   Diagnosis Date     Abdominal pain, unspecified site      Abnormal thyroid ultrasound      BBB (bundle branch block)     right     Chronic lymphocytic thyroiditis      Diverticulosis of colon (without mention of hemorrhage)      Localized superficial swelling, mass, or lump      Malignant neoplasm of prostate (H)      Nontoxic multinodular goiter       Other and unspecified hyperlipidemia      Other nonspecific abnormal serum enzyme levels      Pain in joint, shoulder region      Persistent hoarseness      Sarcoidosis      Scleritis, unspecified      Unspecified hypothyroidism      Unspecified vitamin D deficiency        CURRENT MEDICATIONS:  Current Outpatient Medications   Medication Sig Dispense Refill     alendronate (FOSAMAX) 70 MG tablet Take 1 tablet (70 mg) by mouth every 7 days *before morning meal with 8 oz water and remain upright for 30 minutes. 12 tablet 0     aspirin 81 MG EC tablet Take 81 mg by mouth daily       atorvastatin (LIPITOR) 40 MG tablet Take 1 tablet (40 mg) by mouth daily 90 tablet 3     calcium citrate-vitamin D (CITRACAL) 315-200 MG-UNIT TABS per tablet Take 1 tablet by mouth 2 times daily       Cyanocobalamin 5000 MCG SUBL Place 1 tablet under the tongue daily       ketoconazole (NIZORAL) 2 % external cream Apply topically as needed 60 g 3     levothyroxine (SYNTHROID/LEVOTHROID) 50 MCG tablet Take 50 mcg by mouth daily       losartan (COZAAR) 100 MG tablet Take 1 tablet (100 mg) by mouth daily 90 tablet 3     nitroGLYcerin (NITROSTAT) 0.4 MG sublingual tablet For chest pain place 1 tablet under the tongue every 5 minutes for 3 doses. If symptoms persist 5 minutes after 1st dose call 911. 25 tablet 3     pilocarpine (PILOCAR) 2 % ophthalmic solution Apply 1 drop to eye 2 times daily       Probiotic Product (MISC INTESTINAL MEGHA REGULAT) CAPS Take 1 capsule by mouth daily         PAST SURGICAL HISTORY:  Past Surgical History:   Procedure Laterality Date     HC SHLDR ARTHROSCOP,PART ACROMIOPLAS Right     Description: Shoulder Arthroscopy, Space Decompression And Acromioplasty;  Recorded: 02/18/2014;     [place] LAP,PROSTATECTOMY,RADICAL,W/NERVE SPARE,INCL ROBOTIC Bilateral 2/27/2018    Procedure: ROBOTIC ASSISTED RADICAL RETROPUBIC PROSTATECTOMY, BILATERAL PELVIC LYMPH NODE DISSECTION LYSIS OF ADHESIONS;  Surgeon: Wale Rodriguez MD;   Location: [facility] OR;  Service: Urology     [place] RADIAL KERATOTOMY      Description: Cornea Radial Keratotomy;  Recorded: 02/18/2014;       ALLERGIES  Ciprofloxacin and Levaquin [levofloxacin]    FAMILY HX:  Family History   Problem Relation Age of Onset     No Known Problems Mother      Coronary Artery Disease Father      Aortic aneurysm Father      Prostate Cancer Maternal Grandfather      Prostate Cancer Maternal Uncle        SOCIAL HX:  Social History     Socioeconomic History     Marital status: Patient Declined     Spouse name: None     Number of children: None     Years of education: None     Highest education level: None   Tobacco Use     Smoking status: Never Smoker     Smokeless tobacco: Never Used   Substance and Sexual Activity     Alcohol use: Not Currently     Comment: Alcoholic Drinks/day: rarely     Drug use: No   Social History Narrative    Single. . 4 grown daughters.    Currently unemployed.       ROS:  Constitutional: No fever, chills, or sweats. No weight gain/loss.   ENT: No visual disturbance, ear ache, epistaxis, sore throat.   Allergies/Immunologic: Negative.   Respiratory: No cough, hemoptysis.   Cardiovascular: As per HPI.   GI: No nausea, vomiting, hematemesis, melena, or hematochezia.   : No urinary frequency, dysuria, or hematuria.   Integument: Negative.   Psychiatric: Negative.   Neuro: Negative.   Endocrinology: Negative.   Musculoskeletal: No myalgia.    VITAL SIGNS:  /79 (BP Location: Right arm, Patient Position: Chair, Cuff Size: Adult Large)   Pulse 65   Wt 87.6 kg (193 lb 3.2 oz)   SpO2 95%   BMI 27.33 kg/m    Body mass index is 27.33 kg/m .  Wt Readings from Last 2 Encounters:   10/06/22 87.6 kg (193 lb 3.2 oz)   09/08/22 87.1 kg (192 lb)       PHYSICAL EXAM  Artis Galvin is a 77 year old adult in no acute distress.  HEENT: Unremarkable.  Neck: JVP normal.  Carotids +4/4 bilaterally without bruits.  Lungs: CTA.  Cor: RRR. Normal S1 and S2.  No  murmur, rub, or gallop.  PMI in Lf 5th ICS.  Abd: Soft, nontender, nondistended.  NABS.  No pulsatile mass.  Extremities: No C/C/E.  Pulses +4/4 symmetric in upper and lower extremities.  Neuro: Grossly intact.    LABS    Lab Results   Component Value Date    WBC 5.0 2022     Lab Results   Component Value Date    RBC 4.83 2022     Lab Results   Component Value Date    HGB 14.7 2022     Lab Results   Component Value Date    HCT 44.3 2022     No components found for: MCT  Lab Results   Component Value Date    MCV 92 2022     Lab Results   Component Value Date    MCH 30.4 2022     Lab Results   Component Value Date    MCHC 33.2 2022     Lab Results   Component Value Date    RDW 12.5 2022     Lab Results   Component Value Date     2022      Recent Labs   Lab Test 22  1635 22  1530 21  1525   NA  --  142 141   POTASSIUM  --  4.7 4.7   CHLORIDE  --  107 105   CO2  --  25 28   ANIONGAP  --  10 8   GLC  --  88 96   BUN  --  17 19   CR 1.3 1.08 0.94   DEANDRA  --  9.2 9.8     Recent Labs   Lab Test 21  1653 20  1059   CHOL 159 125   HDL 35* 35*   LDL 52 60   TRIG 362* 149        EK22  Sinus bradycardia    ECHO:     Moderate ascending aortic dilatation    Left ventricle ejection fraction is normal. The calculated left ventricular ejection fraction is 72%.    Normal right ventricular size and systolic function.    Aortic valve sclerosis with mild aortic insufficiency    No evidence of patent foramen ovale based on color flow or bubble study.    When compared to the previous study dated 2018, no interval change.    STRESS TEST:    IMPRESSION:  Borderline hemodynamically significant mid LAD stenosis. CT-FFR is  0.79, which falls to 0.75 in the distal LAD.      FINDINGS:  1.  LAD: CT-FFR after the moderate-severe mLAD stenosis is 0.79, which  falls to 0.75 in the distal LAD.   2.  LCX: CT-FFR in the small distal circumflex  is 0.83.   3.  RCA: CT-FFR after the mild distal stenosis is 0.85.     CARDIAC CATH:  --    ASSESSMENT AND PLAN:    1. Single vessel severe CAD based on CT / CT FFR  -- coronary angiogram  -- continue aspirin, statin    2. Hypertension, well controlled  -- continue ARB    3. Hyperlipidemia, well controlled  -- continue statin    4. Mild to moderate ascending aortic dilation  -- repeat CT in January    [facility] annually    Mir Farr MD

## 2022-10-06 NOTE — PROGRESS NOTES
CARDIOLOGY CLINIC FOLLOW UP    HPI: Artis Galvin is a 77 year old adult, being seen today for recheck of CAD.    He has history of hypertension, hyperlipidemia, and significant family history of aortic aneurysms, including personal history. He also has a history of known CAD diagnosed on coronary CT with a significant CT FFR in his LAD territory. He was previously seen for all of the above before, but at that time requested to continue medical management.     We again reviewed exercise goals given his aortic aneurysm, and the desire to have lower SBPs and heart rates while exercising, including avoidance of significant rapid elevations in BP as would be expected with strength training.     He is again complaining of worsening symptoms despite medical management of his CAD. He describes at least CCS III symptoms with exertional dyspnea and fatigue even with minimal exertion. We again discussed proceeding with a coronary angiogram, and he is more receptive today, and would like to proceed. However, he is adamant about a strictly diagnostic procedure initially.    Of note, he has known SVT / AVNRT.    He otherwise denies any orthopnea, PND, lightheadedness, or syncope.    PAST MEDICAL HISTORY:  Past Medical History:   Diagnosis Date     Abdominal pain, unspecified site      Abnormal thyroid ultrasound      BBB (bundle branch block)     right     Chronic lymphocytic thyroiditis      Diverticulosis of colon (without mention of hemorrhage)      Localized superficial swelling, mass, or lump      Malignant neoplasm of prostate (H)      Nontoxic multinodular goiter      Other and unspecified hyperlipidemia      Other nonspecific abnormal serum enzyme levels      Pain in joint, shoulder region      Persistent hoarseness      Sarcoidosis      Scleritis, unspecified      Unspecified hypothyroidism      Unspecified vitamin D deficiency        CURRENT MEDICATIONS:  Current Outpatient Medications   Medication Sig Dispense  Refill     alendronate (FOSAMAX) 70 MG tablet Take 1 tablet (70 mg) by mouth every 7 days *before morning meal with 8 oz water and remain upright for 30 minutes. 12 tablet 0     aspirin 81 MG EC tablet Take 81 mg by mouth daily       atorvastatin (LIPITOR) 40 MG tablet Take 1 tablet (40 mg) by mouth daily 90 tablet 3     calcium citrate-vitamin D (CITRACAL) 315-200 MG-UNIT TABS per tablet Take 1 tablet by mouth 2 times daily       Cyanocobalamin 5000 MCG SUBL Place 1 tablet under the tongue daily       ketoconazole (NIZORAL) 2 % external cream Apply topically as needed 60 g 3     levothyroxine (SYNTHROID/LEVOTHROID) 50 MCG tablet Take 50 mcg by mouth daily       losartan (COZAAR) 100 MG tablet Take 1 tablet (100 mg) by mouth daily 90 tablet 3     nitroGLYcerin (NITROSTAT) 0.4 MG sublingual tablet For chest pain place 1 tablet under the tongue every 5 minutes for 3 doses. If symptoms persist 5 minutes after 1st dose call 911. 25 tablet 3     pilocarpine (PILOCAR) 2 % ophthalmic solution Apply 1 drop to eye 2 times daily       Probiotic Product (MISC INTESTINAL MEGHA REGULAT) CAPS Take 1 capsule by mouth daily         PAST SURGICAL HISTORY:  Past Surgical History:   Procedure Laterality Date     HC SHLDR ARTHROSCOP,PART ACROMIOPLAS Right     Description: Shoulder Arthroscopy, Space Decompression And Acromioplasty;  Recorded: 02/18/2014;     [place] LAP,PROSTATECTOMY,RADICAL,W/NERVE SPARE,INCL ROBOTIC Bilateral 2/27/2018    Procedure: ROBOTIC ASSISTED RADICAL RETROPUBIC PROSTATECTOMY, BILATERAL PELVIC LYMPH NODE DISSECTION LYSIS OF ADHESIONS;  Surgeon: Wale Rodriguez MD;  Location: [facility];  Service: Urology     [place] RADIAL KERATOTOMY      Description: Cornea Radial Keratotomy;  Recorded: 02/18/2014;       ALLERGIES  Ciprofloxacin and Levaquin [levofloxacin]    FAMILY HX:  Family History   Problem Relation Age of Onset     No Known Problems Mother      Coronary Artery Disease Father      Aortic aneurysm Father       Prostate Cancer Maternal Grandfather      Prostate Cancer Maternal Uncle        SOCIAL HX:  Social History     Socioeconomic History     Marital status: Patient Declined     Spouse name: None     Number of children: None     Years of education: None     Highest education level: None   Tobacco Use     Smoking status: Never Smoker     Smokeless tobacco: Never Used   Substance and Sexual Activity     Alcohol use: Not Currently     Comment: Alcoholic Drinks/day: rarely     Drug use: No   Social History Narrative    Single. . 4 grown daughters.    Currently unemployed.       ROS:  Constitutional: No fever, chills, or sweats. No weight gain/loss.   ENT: No visual disturbance, ear ache, epistaxis, sore throat.   Allergies/Immunologic: Negative.   Respiratory: No cough, hemoptysis.   Cardiovascular: As per HPI.   GI: No nausea, vomiting, hematemesis, melena, or hematochezia.   : No urinary frequency, dysuria, or hematuria.   Integument: Negative.   Psychiatric: Negative.   Neuro: Negative.   Endocrinology: Negative.   Musculoskeletal: No myalgia.    VITAL SIGNS:  /79 (BP Location: Right arm, Patient Position: Chair, Cuff Size: Adult Large)   Pulse 65   Wt 87.6 kg (193 lb 3.2 oz)   SpO2 95%   BMI 27.33 kg/m    Body mass index is 27.33 kg/m .  Wt Readings from Last 2 Encounters:   10/06/22 87.6 kg (193 lb 3.2 oz)   09/08/22 87.1 kg (192 lb)       PHYSICAL EXAM  Artis Galvin is a 77 year old adult in no acute distress.  HEENT: Unremarkable.  Neck: JVP normal.  Carotids +4/4 bilaterally without bruits.  Lungs: CTA.  Cor: RRR. Normal S1 and S2.  No murmur, rub, or gallop.  PMI in Lf 5th ICS.  Abd: Soft, nontender, nondistended.  NABS.  No pulsatile mass.  Extremities: No C/C/E.  Pulses +4/4 symmetric in upper and lower extremities.  Neuro: Grossly intact.    LABS    Lab Results   Component Value Date    WBC 5.0 06/06/2022     Lab Results   Component Value Date    RBC 4.83 06/06/2022     Lab Results    Component Value Date    HGB 14.7 2022     Lab Results   Component Value Date    HCT 44.3 2022     No components found for: MCT  Lab Results   Component Value Date    MCV 92 2022     Lab Results   Component Value Date    MCH 30.4 2022     Lab Results   Component Value Date    MCHC 33.2 2022     Lab Results   Component Value Date    RDW 12.5 2022     Lab Results   Component Value Date     2022      Recent Labs   Lab Test 22  1635 22  1530 21  1525   NA  --  142 141   POTASSIUM  --  4.7 4.7   CHLORIDE  --  107 105   CO2  --  25 28   ANIONGAP  --  10 8   GLC  --  88 96   BUN  --  17 19   CR 1.3 1.08 0.94   DEANDRA  --  9.2 9.8     Recent Labs   Lab Test 21  1653 20  1059   CHOL 159 125   HDL 35* 35*   LDL 52 60   TRIG 362* 149        EK22  Sinus bradycardia    ECHO:     Moderate ascending aortic dilatation    Left ventricle ejection fraction is normal. The calculated left ventricular ejection fraction is 72%.    Normal right ventricular size and systolic function.    Aortic valve sclerosis with mild aortic insufficiency    No evidence of patent foramen ovale based on color flow or bubble study.    When compared to the previous study dated 2018, no interval change.    STRESS TEST:    IMPRESSION:  Borderline hemodynamically significant mid LAD stenosis. CT-FFR is  0.79, which falls to 0.75 in the distal LAD.      FINDINGS:  1.  LAD: CT-FFR after the moderate-severe mLAD stenosis is 0.79, which  falls to 0.75 in the distal LAD.   2.  LCX: CT-FFR in the small distal circumflex is 0.83.   3.  RCA: CT-FFR after the mild distal stenosis is 0.85.     CARDIAC CATH:  --    ASSESSMENT AND PLAN:    1. Single vessel severe CAD based on CT / CT FFR  -- coronary angiogram  -- continue aspirin, statin    2. Hypertension, well controlled  -- continue ARB    3. Hyperlipidemia, well controlled  -- continue statin    4. Mild to moderate  ascending aortic dilation  -- repeat CT in January    [facility] annually    Mir Farr MD

## 2022-10-06 NOTE — PATIENT INSTRUCTIONS
You have been scheduled for a coronary angiogram on October 20th  Check in to the [facility] Waiting room at 12:00 noon  [facility], [place]  [address] [place] [address]     You will need to take an at home COVID test  1-2 days prior to the visit and take a picture of the result. Bring the picture with you on the day of the procedure to show the staff.  Due to COVID only one visitor is allowed per patient.        Please do not eat or drink anything after midnight. You should take all your morning medications as prescribed with sips of water.       When you arrive you will be escorted back to the pre procedure area called 2A. Here they will insert an IV, draw labs, and obtain a short medical history. Please bring an updated list of your current medications.    A physician will come and talk with you about the procedure and obtain consent.    A nurse from the Cardiac Catheterization Lab will then escort you to the procedure area. You will be receiving sedation during the procedure so you will need someone to drive you to and from the hospital.    After the procedure you will recover for a short period (2 - 6 hours) on 6B. You will be discharged with instructions for post procedure care.  However, depending on what the angiogram shows you may have to have stents placed and this might require an overnight stay. We ask that you bring a small bag of necessities for your comfort if you would need to stay overnight. DO NOT BRING ANY VALUABLES!     Jennifer Henson RN, BSN, CVN  Interventional Cardiology Coordinator  [phone]

## 2022-10-07 LAB
ATRIAL RATE - MUSE: 63 BPM
DIASTOLIC BLOOD PRESSURE - MUSE: NORMAL MMHG
INTERPRETATION ECG - MUSE: NORMAL
P AXIS - MUSE: 40 DEGREES
PR INTERVAL - MUSE: 184 MS
QRS DURATION - MUSE: 86 MS
QT - MUSE: 418 MS
QTC - MUSE: 427 MS
R AXIS - MUSE: 31 DEGREES
SYSTOLIC BLOOD PRESSURE - MUSE: NORMAL MMHG
T AXIS - MUSE: 18 DEGREES
VENTRICULAR RATE- MUSE: 63 BPM

## 2022-10-11 ENCOUNTER — MYC MEDICAL ADVICE (OUTPATIENT)
Dept: CARDIOLOGY | Facility: CLINIC | Age: 77
End: 2022-10-11

## 2022-10-11 ENCOUNTER — TRANSFERRED RECORDS (OUTPATIENT)
Dept: HEALTH INFORMATION MANAGEMENT | Facility: CLINIC | Age: 77
End: 2022-10-11

## 2022-10-12 NOTE — TELEPHONE ENCOUNTER
Patient called with concerns about angiogram, answered all questions. He will bring his daughter with him to the angiogram for support.

## 2022-10-19 ENCOUNTER — TELEPHONE (OUTPATIENT)
Dept: CARDIOLOGY | Facility: CLINIC | Age: 77
End: 2022-10-19

## 2022-10-19 NOTE — TELEPHONE ENCOUNTER
Call complete for pre procedure reminder and updated visitor policy.  Aware of need for covid test

## 2022-10-20 ENCOUNTER — HOSPITAL ENCOUNTER (OUTPATIENT)
Facility: CLINIC | Age: 77
Discharge: HOME | End: 2022-10-20
Attending: INTERNAL MEDICINE | Admitting: INTERNAL MEDICINE
Payer: COMMERCIAL

## 2022-10-20 ENCOUNTER — APPOINTMENT (OUTPATIENT)
Dept: LAB | Facility: CLINIC | Age: 77
End: 2022-10-20
Attending: INTERNAL MEDICINE
Payer: COMMERCIAL

## 2022-10-20 ENCOUNTER — APPOINTMENT (OUTPATIENT)
Dept: MEDSURG UNIT | Facility: CLINIC | Age: 77
End: 2022-10-20
Attending: INTERNAL MEDICINE
Payer: COMMERCIAL

## 2022-10-20 VITALS
HEART RATE: 50 BPM | SYSTOLIC BLOOD PRESSURE: 131 MMHG | HEIGHT: 70 IN | OXYGEN SATURATION: 95 % | BODY MASS INDEX: 27.33 KG/M2 | DIASTOLIC BLOOD PRESSURE: 79 MMHG | TEMPERATURE: 96.8 F | RESPIRATION RATE: 6 BRPM | WEIGHT: 190.9 LBS

## 2022-10-20 DIAGNOSIS — I71.21 ANEURYSM OF ASCENDING AORTA WITHOUT RUPTURE (H): ICD-10-CM

## 2022-10-20 DIAGNOSIS — Z98.890 S/P CORONARY ANGIOGRAM: Primary | ICD-10-CM

## 2022-10-20 DIAGNOSIS — I20.89 STABLE ANGINA PECTORIS (H): ICD-10-CM

## 2022-10-20 DIAGNOSIS — I71.21 ASCENDING AORTIC ANEURYSM (H): ICD-10-CM

## 2022-10-20 LAB
ACT BLD: 241 SECONDS (ref 74–150)
ANION GAP SERPL CALCULATED.3IONS-SCNC: 9 MMOL/L (ref 7–15)
APTT PPP: 27 SECONDS (ref 22–38)
BUN SERPL-MCNC: 18.7 MG/DL (ref 8–23)
CALCIUM SERPL-MCNC: 9.2 MG/DL (ref 8.8–10.2)
CHLORIDE SERPL-SCNC: 106 MMOL/L (ref 98–107)
CHOLEST SERPL-MCNC: 132 MG/DL
CREAT SERPL-MCNC: 1.01 MG/DL (ref 0.51–1.17)
DEPRECATED HCO3 PLAS-SCNC: 26 MMOL/L (ref 22–29)
ERYTHROCYTE [DISTWIDTH] IN BLOOD BY AUTOMATED COUNT: 12.7 % (ref 10–15)
GFR SERPL CREATININE-BSD FRML MDRD: 77 ML/MIN/1.73M2
GLUCOSE SERPL-MCNC: 98 MG/DL (ref 70–99)
HCT VFR BLD AUTO: 44.4 % (ref 35–53)
HDLC SERPL-MCNC: 41 MG/DL
HGB BLD-MCNC: 15.1 G/DL (ref 11.7–17.7)
INR PPP: 0.98 (ref 0.85–1.15)
LDLC SERPL CALC-MCNC: 64 MG/DL
MCH RBC QN AUTO: 31.1 PG (ref 26.5–33)
MCHC RBC AUTO-ENTMCNC: 34 G/DL (ref 31.5–36.5)
MCV RBC AUTO: 92 FL (ref 78–100)
NONHDLC SERPL-MCNC: 91 MG/DL
PLATELET # BLD AUTO: 244 10E3/UL (ref 150–450)
POTASSIUM SERPL-SCNC: 4.2 MMOL/L (ref 3.4–5.3)
RBC # BLD AUTO: 4.85 10E6/UL (ref 3.8–5.9)
SODIUM SERPL-SCNC: 141 MMOL/L (ref 136–145)
TRIGL SERPL-MCNC: 136 MG/DL
WBC # BLD AUTO: 5.7 10E3/UL (ref 4–11)

## 2022-10-20 PROCEDURE — 99152 MOD SED SAME PHYS/QHP 5/>YRS: CPT | Mod: GC | Performed by: INTERNAL MEDICINE

## 2022-10-20 PROCEDURE — 93571 IV DOP VEL&/PRESS C FLO 1ST: CPT | Mod: 26 | Performed by: INTERNAL MEDICINE

## 2022-10-20 PROCEDURE — 999N000054 HC STATISTIC EKG NON-CHARGEABLE

## 2022-10-20 PROCEDURE — 999N000142 HC STATISTIC PROCEDURE PREP ONLY

## 2022-10-20 PROCEDURE — 250N000013 HC RX MED GY IP 250 OP 250 PS 637: Performed by: INTERNAL MEDICINE

## 2022-10-20 PROCEDURE — 85027 COMPLETE CBC AUTOMATED: CPT | Performed by: INTERNAL MEDICINE

## 2022-10-20 PROCEDURE — 272N000001 HC OR GENERAL SUPPLY STERILE: Performed by: INTERNAL MEDICINE

## 2022-10-20 PROCEDURE — C1769 GUIDE WIRE: HCPCS | Performed by: INTERNAL MEDICINE

## 2022-10-20 PROCEDURE — 85347 COAGULATION TIME ACTIVATED: CPT

## 2022-10-20 PROCEDURE — 80061 LIPID PANEL: CPT | Performed by: INTERNAL MEDICINE

## 2022-10-20 PROCEDURE — 85610 PROTHROMBIN TIME: CPT | Performed by: INTERNAL MEDICINE

## 2022-10-20 PROCEDURE — 258N000003 HC RX IP 258 OP 636: Performed by: INTERNAL MEDICINE

## 2022-10-20 PROCEDURE — 99152 MOD SED SAME PHYS/QHP 5/>YRS: CPT | Performed by: INTERNAL MEDICINE

## 2022-10-20 PROCEDURE — 93454 CORONARY ARTERY ANGIO S&I: CPT | Mod: 26 | Performed by: INTERNAL MEDICINE

## 2022-10-20 PROCEDURE — C1894 INTRO/SHEATH, NON-LASER: HCPCS | Performed by: INTERNAL MEDICINE

## 2022-10-20 PROCEDURE — C1887 CATHETER, GUIDING: HCPCS | Performed by: INTERNAL MEDICINE

## 2022-10-20 PROCEDURE — 250N000009 HC RX 250: Performed by: INTERNAL MEDICINE

## 2022-10-20 PROCEDURE — 85730 THROMBOPLASTIN TIME PARTIAL: CPT | Performed by: INTERNAL MEDICINE

## 2022-10-20 PROCEDURE — 93010 ELECTROCARDIOGRAM REPORT: CPT | Performed by: INTERNAL MEDICINE

## 2022-10-20 PROCEDURE — 80048 BASIC METABOLIC PNL TOTAL CA: CPT | Performed by: INTERNAL MEDICINE

## 2022-10-20 PROCEDURE — 250N000011 HC RX IP 250 OP 636: Performed by: INTERNAL MEDICINE

## 2022-10-20 PROCEDURE — 999N000134 HC STATISTIC PP CARE STAGE 3

## 2022-10-20 PROCEDURE — 93571 IV DOP VEL&/PRESS C FLO 1ST: CPT | Performed by: INTERNAL MEDICINE

## 2022-10-20 PROCEDURE — 93454 CORONARY ARTERY ANGIO S&I: CPT | Performed by: INTERNAL MEDICINE

## 2022-10-20 PROCEDURE — 93572 IV DOP VEL&/PRESS C FLO EA: CPT | Performed by: INTERNAL MEDICINE

## 2022-10-20 PROCEDURE — 36415 COLL VENOUS BLD VENIPUNCTURE: CPT | Performed by: INTERNAL MEDICINE

## 2022-10-20 RX ORDER — POTASSIUM CHLORIDE 750 MG/1
20 TABLET, EXTENDED RELEASE ORAL
Status: DISCONTINUED | OUTPATIENT
Start: 2022-10-20 | End: 2022-10-20 | Stop reason: HOSPADM

## 2022-10-20 RX ORDER — NALOXONE HYDROCHLORIDE 0.4 MG/ML
0.2 INJECTION, SOLUTION INTRAMUSCULAR; INTRAVENOUS; SUBCUTANEOUS
Status: DISCONTINUED | OUTPATIENT
Start: 2022-10-20 | End: 2022-10-20 | Stop reason: HOSPADM

## 2022-10-20 RX ORDER — IOPAMIDOL 755 MG/ML
INJECTION, SOLUTION INTRAVASCULAR
Status: DISCONTINUED | OUTPATIENT
Start: 2022-10-20 | End: 2022-10-20 | Stop reason: HOSPADM

## 2022-10-20 RX ORDER — NALOXONE HYDROCHLORIDE 0.4 MG/ML
0.4 INJECTION, SOLUTION INTRAMUSCULAR; INTRAVENOUS; SUBCUTANEOUS
Status: DISCONTINUED | OUTPATIENT
Start: 2022-10-20 | End: 2022-10-20 | Stop reason: HOSPADM

## 2022-10-20 RX ORDER — POTASSIUM CHLORIDE 750 MG/1
40 TABLET, EXTENDED RELEASE ORAL
Status: DISCONTINUED | OUTPATIENT
Start: 2022-10-20 | End: 2022-10-20 | Stop reason: HOSPADM

## 2022-10-20 RX ORDER — FENTANYL CITRATE 50 UG/ML
INJECTION, SOLUTION INTRAMUSCULAR; INTRAVENOUS
Status: DISCONTINUED | OUTPATIENT
Start: 2022-10-20 | End: 2022-10-20 | Stop reason: HOSPADM

## 2022-10-20 RX ORDER — HEPARIN SODIUM 1000 [USP'U]/ML
INJECTION, SOLUTION INTRAVENOUS; SUBCUTANEOUS
Status: DISCONTINUED | OUTPATIENT
Start: 2022-10-20 | End: 2022-10-20 | Stop reason: HOSPADM

## 2022-10-20 RX ORDER — ATROPINE SULFATE 0.1 MG/ML
0.5 INJECTION INTRAVENOUS
Status: DISCONTINUED | OUTPATIENT
Start: 2022-10-20 | End: 2022-10-20 | Stop reason: HOSPADM

## 2022-10-20 RX ORDER — FENTANYL CITRATE 50 UG/ML
25 INJECTION, SOLUTION INTRAMUSCULAR; INTRAVENOUS
Status: DISCONTINUED | OUTPATIENT
Start: 2022-10-20 | End: 2022-10-20 | Stop reason: HOSPADM

## 2022-10-20 RX ORDER — FLUMAZENIL 0.1 MG/ML
0.2 INJECTION, SOLUTION INTRAVENOUS
Status: DISCONTINUED | OUTPATIENT
Start: 2022-10-20 | End: 2022-10-20 | Stop reason: HOSPADM

## 2022-10-20 RX ORDER — OXYCODONE HYDROCHLORIDE 5 MG/1
5 TABLET ORAL EVERY 4 HOURS PRN
Status: DISCONTINUED | OUTPATIENT
Start: 2022-10-20 | End: 2022-10-20 | Stop reason: HOSPADM

## 2022-10-20 RX ORDER — NICARDIPINE HYDROCHLORIDE 2.5 MG/ML
INJECTION INTRAVENOUS
Status: DISCONTINUED | OUTPATIENT
Start: 2022-10-20 | End: 2022-10-20 | Stop reason: HOSPADM

## 2022-10-20 RX ORDER — ASPIRIN 325 MG
325 TABLET ORAL ONCE
Status: COMPLETED | OUTPATIENT
Start: 2022-10-20 | End: 2022-10-20

## 2022-10-20 RX ORDER — OXYCODONE HYDROCHLORIDE 10 MG/1
10 TABLET ORAL EVERY 4 HOURS PRN
Status: DISCONTINUED | OUTPATIENT
Start: 2022-10-20 | End: 2022-10-20 | Stop reason: HOSPADM

## 2022-10-20 RX ORDER — LIDOCAINE 40 MG/G
CREAM TOPICAL
Status: DISCONTINUED | OUTPATIENT
Start: 2022-10-20 | End: 2022-10-20 | Stop reason: HOSPADM

## 2022-10-20 RX ORDER — SODIUM CHLORIDE 9 MG/ML
INJECTION, SOLUTION INTRAVENOUS CONTINUOUS
Status: DISCONTINUED | OUTPATIENT
Start: 2022-10-20 | End: 2022-10-20 | Stop reason: HOSPADM

## 2022-10-20 RX ORDER — NITROGLYCERIN 5 MG/ML
VIAL (ML) INTRAVENOUS
Status: DISCONTINUED | OUTPATIENT
Start: 2022-10-20 | End: 2022-10-20 | Stop reason: HOSPADM

## 2022-10-20 RX ORDER — ASPIRIN 81 MG/1
243 TABLET, CHEWABLE ORAL ONCE
Status: COMPLETED | OUTPATIENT
Start: 2022-10-20 | End: 2022-10-20

## 2022-10-20 RX ADMIN — SODIUM CHLORIDE: 9 INJECTION, SOLUTION INTRAVENOUS at 13:35

## 2022-10-20 RX ADMIN — ASPIRIN 81 MG CHEWABLE TABLET 243 MG: 81 TABLET CHEWABLE at 13:08

## 2022-10-20 ASSESSMENT — ACTIVITIES OF DAILY LIVING (ADL)
ADLS_ACUITY_SCORE: 35

## 2022-10-20 NOTE — PROGRESS NOTES
D/I/A: Pt roomed on 3C in bay [address].  Arrived via litter and accompanied by RN On/Off: Off monitor.  VSSA.  Rhythm upon arrival SR on monitor.  Denies pain or sob.  Reviewed activity restrictions and when to notify RN, ie-changes to breathing or increased chest pressure or chest pain.  CCL access:  RRA.  P: Continue to monitor status.  Discharge to home once meeting criteria.

## 2022-10-20 NOTE — DISCHARGE INSTRUCTIONS
Going Home after a Coronary Angiogram  ____________________________________________    After you go home:  Have an adult stay with you for 24 hours.  Drink plenty of fluids.  You may eat your normal diet, unless your doctor tells you otherwise.  For 24 hours:  Relax and take it easy.  Do NOT smoke.  Do NOT make any important or legal decisions.  Do NOT drive or operate machines at home or at work.  Do NOT drink alcohol.  Remove the Band-Aid after 24 hours. If there is minor oozing, apply another Band-aid and remove it after 12 hours.  For 2 days, do NOT have sex or do any heavy exercise.  Do NOT take a bath, or use a hot tub or pool for at least 3 days. You may shower.    Care of wrist or arm site  It is normal to have soreness at the puncture site and mild tingling in your hand for up to 3 days.  For 2 days, do not use your hand or arm to support your weight (such as rising from a chair) or bend your wrist (such as lifting a garage door).  For 2 days, do not lift more than 5 pounds or exercise your arm (tennis, golf or bowling).  If you start bleeding from the site in your arm:  Sit down and press firmly on the site with your fingers for 10 minutes. Call your doctor as soon as you can.  If the bleeding stops, sit still and keep your wrist straight for 2 hours.    Medicines  If you have started taking Plavix or Effient, do not stop taking it until you talk to your heart doctor (cardiologist).  If you are on metformin (Glucophage), do not restart it until you have blood tests (within 2 to 3 days after discharge). When your doctor tells you it is safe, you may restart the metformin.  If you have stopped any other medicines, check with your nurse or provider about when to restart them.    Call 911 right away if you have bleeding that is heavy or does not stop.    Call your doctor if:  You have a large or growing hard lump around the site.  The site is red, swollen, hot or tender.  Blood or fluid is draining from the  site.  You have chills or a fever greater than 101 F (38 C).  Your leg or arm feels numb or cool.  You have hives, a rash or unusual itching.    [facility] [place]:  [phone] (7 days a week)

## 2022-10-20 NOTE — PRE-PROCEDURE
GENERAL PRE-PROCEDURE:   Procedure:  Cors, possible PCI    Written consent obtained?: Yes    Risks and benefits: Risks, benefits and alternatives were discussed    Consent given by:  Patient  Patient states understanding of procedure being performed: Yes    Patient's understanding of procedure matches consent: Yes    Procedure consent matches procedure scheduled: Yes    Expected level of sedation:  Moderate  Appropriately NPO:  Yes  History & Physical reviewed:  History and physical reviewed and no updates needed  Statement of review:  I have reviewed the lab findings, diagnostic data, medications, and the plan for sedation

## 2022-10-21 ENCOUNTER — MYC MEDICAL ADVICE (OUTPATIENT)
Dept: CARDIOLOGY | Facility: CLINIC | Age: 77
End: 2022-10-21

## 2022-10-21 DIAGNOSIS — K57.30 DIVERTICULAR DISEASE OF COLON: ICD-10-CM

## 2022-10-21 DIAGNOSIS — I45.10 RIGHT BUNDLE BRANCH BLOCK: ICD-10-CM

## 2022-10-21 DIAGNOSIS — D86.9 SARCOIDOSIS: Primary | ICD-10-CM

## 2022-10-21 DIAGNOSIS — I47.10 SVT (SUPRAVENTRICULAR TACHYCARDIA) (H): ICD-10-CM

## 2022-10-21 LAB
ATRIAL RATE - MUSE: 57 BPM
DIASTOLIC BLOOD PRESSURE - MUSE: NORMAL MMHG
INTERPRETATION ECG - MUSE: NORMAL
P AXIS - MUSE: 13 DEGREES
PR INTERVAL - MUSE: 196 MS
QRS DURATION - MUSE: 88 MS
QT - MUSE: 436 MS
QTC - MUSE: 424 MS
R AXIS - MUSE: -2 DEGREES
SYSTOLIC BLOOD PRESSURE - MUSE: NORMAL MMHG
T AXIS - MUSE: -8 DEGREES
VENTRICULAR RATE- MUSE: 57 BPM

## 2022-10-21 NOTE — TELEPHONE ENCOUNTER
S-(situation): patient had angiogram yesterday.  Moderate non obstructive CAD with hemodynamically non significant lesions in the mid LAD (dPR 0.93) and D1 (0.95). Otherwise mild non obstructive CAD elsewhere.    Right radial arterial site is flat and dry, Reviewed activity restrictions.  No new meds at discharge      B-(background): 77 y.o male with history of hypertension, hyperlipidemia, and significant family history of aortic aneurysms, including personal history. He also has a history of known CAD diagnosed on coronary CT with a significant CT FFR in his LAD territory. Previously he has been treated medically for his know CAD. Now he presents for CORS    A-(assessment): stable post cath    R-(recommendations): per Dr. Farr, referrals to pulmonary and GI. Patient informed of referrals.

## 2022-11-01 ENCOUNTER — OFFICE VISIT (OUTPATIENT)
Dept: NEUROLOGY | Facility: CLINIC | Age: 77
End: 2022-11-01
Attending: INTERNAL MEDICINE
Payer: COMMERCIAL

## 2022-11-01 VITALS
WEIGHT: 192.9 LBS | SYSTOLIC BLOOD PRESSURE: 107 MMHG | HEART RATE: 70 BPM | BODY MASS INDEX: 27.68 KG/M2 | DIASTOLIC BLOOD PRESSURE: 60 MMHG

## 2022-11-01 DIAGNOSIS — G62.9 PERIPHERAL POLYNEUROPATHY: ICD-10-CM

## 2022-11-01 PROCEDURE — 99204 OFFICE O/P NEW MOD 45 MIN: CPT | Performed by: PSYCHIATRY & NEUROLOGY

## 2022-11-01 NOTE — PROGRESS NOTES
INITIAL NEUROLOGY CONSULTATION    DATE OF VISIT: 11/1/2022  MRN: [id number]  PATIENT NAME: Artis Galvin  YOB: 1945    REFERRING PROVIDER: Angel Hernandez    Chief Complaint   Patient presents with     Neurologic Problem     Peripheral polyneuropathy  L LE numbness/tingling       SUBJECTIVE:                                                      HPI:   Artis Galvin is a 77 year old adult whom I have been asked by Dr. Hernandez to see in consultation for peripheral neuropathy.  He had an EMG completed through [facility] in 2018 which was normal.  This study was done for numbness in the feet.    Artis has history of chronic problems with fatigue, felt to be multi factorial and to some degree related to depression.  He also has history of metastatic prostate cancer, previous COVID infection, ascending aortic aneurysm, osteoporosis, autoimmune thyroiditis followed at [place].    He says that his symptoms have progressed over the past few years. He says from the knees down he feels disconnected. Toes are numb, he has tingling, he feels like his socks are balled up. He does not really have pain but it is uncomfortable especially in the evening/night.  I asked if he is here for second opinion since he reports a recent work-up through an [facility].  Binu says they did another EMG and a bunch of blood work.  Unfortunately we do not have these results/notes yet.  He says it feels most uncomfortable at night. He wants to know what can be done.  He does have planned follow-up with the neurologist at [facility] later this month.    Past Medical History:   Diagnosis Date     Abdominal pain, unspecified site      Abnormal thyroid ultrasound      BBB (bundle branch block)     right     Chronic lymphocytic thyroiditis      Diverticulosis of colon (without mention of hemorrhage)      Localized superficial swelling, mass, or lump      Malignant neoplasm of prostate (H)      Nontoxic multinodular goiter      Other and unspecified  hyperlipidemia      Other nonspecific abnormal serum enzyme levels      Pain in joint, shoulder region      Persistent hoarseness      Sarcoidosis      Scleritis, unspecified      Unspecified hypothyroidism      Unspecified vitamin D deficiency      Past Surgical History:   Procedure Laterality Date     CV CORONARY ANGIOGRAM N/A 10/20/2022    Procedure: Coronary Angiogram;  Surgeon: Mir Farr MD;  Location:  HEART CARDIAC CATH LAB     CV FRACTIONAL FLOW RATIO WIRE N/A 10/20/2022    Procedure: Fractional Flow Ratio Wire;  Surgeon: Mir Farr MD;  Location:  HEART CARDIAC CATH LAB     HC SHLDR ARTHROSCOP,PART ACROMIOPLAS Right     Description: Shoulder Arthroscopy, Space Decompression And Acromioplasty;  Recorded: 02/18/2014;     MT LAP,PROSTATECTOMY,RADICAL,W/NERVE SPARE,INCL ROBOTIC Bilateral 2/27/2018    Procedure: ROBOTIC ASSISTED RADICAL RETROPUBIC PROSTATECTOMY, BILATERAL PELVIC LYMPH NODE DISSECTION LYSIS OF ADHESIONS;  Surgeon: Wale Rodriguez MD;  Location: [facility];  Service: Urology     MT RADIAL KERATOTOMY      Description: Cornea Radial Keratotomy;  Recorded: 02/18/2014;       alendronate (FOSAMAX) 70 MG tablet, Take 1 tablet (70 mg) by mouth every 7 days before morning meal with 8 oz water and remain upright for 30 minutes. For additional refills, please schedule a follow-up appointment at [phone]  atorvastatin (LIPITOR) 40 MG tablet, Take 1 tablet (40 mg) by mouth daily  calcium citrate-vitamin D (CITRACAL) 315-200 MG-UNIT TABS per tablet, Take 1 tablet by mouth 2 times daily  Cyanocobalamin 5000 MCG SUBL, Place 1 tablet under the tongue daily  ketoconazole (NIZORAL) 2 % external cream, Apply topically as needed  levothyroxine (SYNTHROID/LEVOTHROID) 50 MCG tablet, Take 50 mcg by mouth daily  losartan (COZAAR) 100 MG tablet, Take 1 tablet (100 mg) by mouth daily  nitroGLYcerin (NITROSTAT) 0.4 MG sublingual tablet, For chest pain place 1  tablet under the tongue every 5 minutes for 3 doses. If symptoms persist 5 minutes after 1st dose call 911.  pilocarpine (PILOCAR) 2 % ophthalmic solution, Apply 1 drop to eye 2 times daily  Probiotic Product (MISC INTESTINAL MEGHA REGULAT) CAPS, Take 1 capsule by mouth daily  aspirin 81 MG EC tablet, Take 81 mg by mouth daily    No current facility-administered medications on file prior to visit.    Allergies   Allergen Reactions     Ciprofloxacin Unknown     Levaquin [Levofloxacin] Unknown        Problem (# of Occurrences) Relation (Name,Age of Onset)    Prostate Cancer (2) Maternal Grandfather, Maternal Uncle    Aortic aneurysm (1) Father    Coronary Artery Disease (1) Father    No Known Problems (1) Mother        Social History     Tobacco Use     Smoking status: Never     Smokeless tobacco: Never   Substance Use Topics     Alcohol use: Not Currently     Comment: Alcoholic Drinks/day: rarely     Drug use: No       REVIEW OF SYSTEMS:                                                      10-point review of systems is negative except as mentioned above in HPI.     EXAM:                                                      Physical Exam:   Vitals: /60   Pulse 70   Wt 87.5 kg (192 lb 14.4 oz)   BMI 27.68 kg/m    BMI= Body mass index is 27.68 kg/m .  GENERAL: NAD.  HEENT: NC/AT.   CV: RRR. S1, S2.   NECK: No bruits.  PULM: Non-labored breathing.   Neurologic:  MENTAL STATUS: Alert, attentive. Speech is fluent. Normal comprehension. Normal concentration. Adequate fund of knowledge.   CRANIAL NERVES: Discs flat. Visual fields intact to confrontation. Pupils equally, round and reactive to light. Facial sensation and movement normal. EOM full. Hearing intact to conversation. Trapezius strength intact. Palate moves symmetrically. Tongue midline.  MOTOR: 5/5 in proximal and distal muscle groups of upper and lower extremities. Tone and bulk normal.   DTRs: Intact and symmetric in biceps, BR, patellae.  Babinski  down-going bilaterally.   SENSATION: Normal light touch and pinprick in the upper extremities.  Decreased pinprick below mid calf bilaterally. Intact proprioception at great toes. Vibration: Decreased at both ankles.   COORDINATION: Normal finger nose finger. Finger tapping normal. Knee heel shin normal.  STATION AND GAIT: Romberg negative. Good postural reflexes. Casual gait and tandem normal.    ASSESSMENT and PLAN:                                                      Assessment:     ICD-10-CM    1. Peripheral polyneuropathy  G62.9 Adult Neurology  Referral           Mr. Galvin is a 77-year-old man here for consultation regarding possible neuropathy affecting the lower extremities.  He had a work-up completed recently through the [facility] neurology clinic.  Unfortunately I cannot review these results with him because we do not have them at this time.  He does have planned follow-up with the neurologist at [facility] so I recommended he keep this.  If he would prefer to follow-up here we will have to get the records and decide about the next steps going forward based on his test results.  We did talk about symptomatic management for neuropathy and I have given him some information about gabapentin as an option.    Plan:  -- Because you have already had a work-up completed, I recommend you follow-up with the [facility] neurologist for next steps.  -- For symptomatic management they may want to start you on a medication such as gabapentin.  See attached.  -- If you decide you want to follow-up here, let us know.    Total Time: 45 minutes were spent with the patient and in chart review/documentation (as described above in Assessment and Plan) /coordinating the care on date of service.    Eleni Lambert MD  Neurology    CC: Angel Hernandez MD    Dragon software used in the dictation of this note.

## 2022-11-01 NOTE — LETTER
11/1/2022         RE: Artis Galvin  [address]  [place] [address]        Dear Colleague,    Thank you for referring your patient, Artis Galvin, to the [facility] [place]. Please see a copy of my visit note below.    INITIAL NEUROLOGY CONSULTATION    DATE OF VISIT: 11/1/2022  MRN: [id number]  PATIENT NAME: Artis Galvin  YOB: 1945    REFERRING PROVIDER: Angel Hernandez    Chief Complaint   Patient presents with     Neurologic Problem     Peripheral polyneuropathy  L LE numbness/tingling       SUBJECTIVE:                                                      HPI:   Artis Galvin is a 77 year old adult whom I have been asked by Dr. Hernandez to see in consultation for peripheral neuropathy.  He had an EMG completed through [facility] in 2018 which was normal.  This study was done for numbness in the feet.    Artis has history of chronic problems with fatigue, felt to be multi factorial and to some degree related to depression.  He also has history of metastatic prostate cancer, previous COVID infection, ascending aortic aneurysm, osteoporosis, autoimmune thyroiditis followed at [place].    He says that his symptoms have progressed over the past few years. He says from the knees down he feels disconnected. Toes are numb, he has tingling, he feels like his socks are balled up. He does not really have pain but it is uncomfortable especially in the evening/night.  I asked if he is here for second opinion since he reports a recent work-up through an [facility].  Binu says they did another EMG and a bunch of blood work.  Unfortunately we do not have these results/notes yet.  He says it feels most uncomfortable at night. He wants to know what can be done.  He does have planned follow-up with the neurologist at [facility] later this month.    Past Medical History:   Diagnosis Date     Abdominal pain, unspecified site      Abnormal thyroid ultrasound      BBB (bundle branch  block)     right     Chronic lymphocytic thyroiditis      Diverticulosis of colon (without mention of hemorrhage)      Localized superficial swelling, mass, or lump      Malignant neoplasm of prostate (H)      Nontoxic multinodular goiter      Other and unspecified hyperlipidemia      Other nonspecific abnormal serum enzyme levels      Pain in joint, shoulder region      Persistent hoarseness      Sarcoidosis      Scleritis, unspecified      Unspecified hypothyroidism      Unspecified vitamin D deficiency      Past Surgical History:   Procedure Laterality Date     CV CORONARY ANGIOGRAM N/A 10/20/2022    Procedure: Coronary Angiogram;  Surgeon: Mir Farr MD;  Location:  HEART CARDIAC CATH LAB     CV FRACTIONAL FLOW RATIO WIRE N/A 10/20/2022    Procedure: Fractional Flow Ratio Wire;  Surgeon: Mir Farr MD;  Location: [facility] CARDIAC CATH LAB     HC SHLDR ARTHROSCOP,PART ACROMIOPLAS Right     Description: Shoulder Arthroscopy, Space Decompression And Acromioplasty;  Recorded: 02/18/2014;     NE LAP,PROSTATECTOMY,RADICAL,W/NERVE SPARE,INCL ROBOTIC Bilateral 2/27/2018    Procedure: ROBOTIC ASSISTED RADICAL RETROPUBIC PROSTATECTOMY, BILATERAL PELVIC LYMPH NODE DISSECTION LYSIS OF ADHESIONS;  Surgeon: Wale Rodriguez MD;  Location: [facility];  Service: Urology     NE RADIAL KERATOTOMY      Description: Cornea Radial Keratotomy;  Recorded: 02/18/2014;       alendronate (FOSAMAX) 70 MG tablet, Take 1 tablet (70 mg) by mouth every 7 days before morning meal with 8 oz water and remain upright for 30 minutes. For additional refills, please schedule a follow-up appointment at [phone]  atorvastatin (LIPITOR) 40 MG tablet, Take 1 tablet (40 mg) by mouth daily  calcium citrate-vitamin D (CITRACAL) 315-200 MG-UNIT TABS per tablet, Take 1 tablet by mouth 2 times daily  Cyanocobalamin 5000 MCG SUBL, Place 1 tablet under the tongue daily  ketoconazole (NIZORAL) 2 % external  cream, Apply topically as needed  levothyroxine (SYNTHROID/LEVOTHROID) 50 MCG tablet, Take 50 mcg by mouth daily  losartan (COZAAR) 100 MG tablet, Take 1 tablet (100 mg) by mouth daily  nitroGLYcerin (NITROSTAT) 0.4 MG sublingual tablet, For chest pain place 1 tablet under the tongue every 5 minutes for 3 doses. If symptoms persist 5 minutes after 1st dose call 911.  pilocarpine (PILOCAR) 2 % ophthalmic solution, Apply 1 drop to eye 2 times daily  Probiotic Product (MISC INTESTINAL MEGHA REGULAT) CAPS, Take 1 capsule by mouth daily  aspirin 81 MG EC tablet, Take 81 mg by mouth daily    No current facility-administered medications on file prior to visit.    Allergies   Allergen Reactions     Ciprofloxacin Unknown     Levaquin [Levofloxacin] Unknown        Problem (# of Occurrences) Relation (Name,Age of Onset)    Prostate Cancer (2) Maternal Grandfather, Maternal Uncle    Aortic aneurysm (1) Father    Coronary Artery Disease (1) Father    No Known Problems (1) Mother        Social History     Tobacco Use     Smoking status: Never     Smokeless tobacco: Never   Substance Use Topics     Alcohol use: Not Currently     Comment: Alcoholic Drinks/day: rarely     Drug use: No       REVIEW OF SYSTEMS:                                                      10-point review of systems is negative except as mentioned above in HPI.     EXAM:                                                      Physical Exam:   Vitals: /60   Pulse 70   Wt 87.5 kg (192 lb 14.4 oz)   BMI 27.68 kg/m    BMI= Body mass index is 27.68 kg/m .  GENERAL: NAD.  HEENT: NC/AT.   CV: RRR. S1, S2.   NECK: No bruits.  PULM: Non-labored breathing.   Neurologic:  MENTAL STATUS: Alert, attentive. Speech is fluent. Normal comprehension. Normal concentration. Adequate fund of knowledge.   CRANIAL NERVES: Discs flat. Visual fields intact to confrontation. Pupils equally, round and reactive to light. Facial sensation and movement normal. EOM full. Hearing intact  to conversation. Trapezius strength intact. Palate moves symmetrically. Tongue midline.  MOTOR: 5/5 in proximal and distal muscle groups of upper and lower extremities. Tone and bulk normal.   DTRs: Intact and symmetric in biceps, BR, patellae.  Babinski down-going bilaterally.   SENSATION: Normal light touch and pinprick in the upper extremities.  Decreased pinprick below mid calf bilaterally. Intact proprioception at great toes. Vibration: Decreased at both ankles.   COORDINATION: Normal finger nose finger. Finger tapping normal. Knee heel shin normal.  STATION AND GAIT: Romberg negative. Good postural reflexes. Casual gait and tandem normal.    ASSESSMENT and PLAN:                                                      Assessment:     ICD-10-CM    1. Peripheral polyneuropathy  G62.9 Adult Neurology  Referral           Mr. Galvin is a 77-year-old man here for consultation regarding possible neuropathy affecting the lower extremities.  He had a work-up completed recently through the [facility] neurology clinic.  Unfortunately I cannot review these results with him because we do not have them at this time.  He does have planned follow-up with the neurologist at [facility] so I recommended he keep this.  If he would prefer to follow-up here we will have to get the records and decide about the next steps going forward based on his test results.  We did talk about symptomatic management for neuropathy and I have given him some information about gabapentin as an option.    Plan:  -- Because you have already had a work-up completed, I recommend you follow-up with the [facility] neurologist for next steps.  -- For symptomatic management they may want to start you on a medication such as gabapentin.  See attached.  -- If you decide you want to follow-up here, let us know.    Total Time: 45 minutes were spent with the patient and in chart review/documentation (as described above in Assessment and Plan) /coordinating the care on date  of service.    Eleni Lambert MD  Neurology    CC: MD Joy García software used in the dictation of this note.        Again, thank you for allowing me to participate in the care of your patient.        Sincerely,        Eleni Lambert MD

## 2022-11-01 NOTE — PATIENT INSTRUCTIONS
Plan:  -- Because you have already had a work-up completed, I recommend you follow-up with the [facility] neurologist for next steps.  -- For symptomatic management they may want to start you on a medication such as gabapentin.  See attached.  -- If you decide you want to follow-up here, let us know.

## 2022-11-01 NOTE — NURSING NOTE
"Artis Galvin is a 77 year old adult who presents for:  Chief Complaint   Patient presents with     Neurologic Problem     Peripheral polyneuropathy  L LE numbness/tingling        Initial Vitals:  /60   Pulse 70   Wt 87.5 kg (192 lb 14.4 oz)   BMI 27.68 kg/m   Estimated body mass index is 27.68 kg/m  as calculated from the following:    Height as of 10/20/22: 1.778 m (5' 10\").    Weight as of this encounter: 87.5 kg (192 lb 14.4 oz).. Body surface area is 2.08 meters squared. BP completed using cuff size: wrist cuff    Johnathon Rodriguez  "

## 2022-11-03 ENCOUNTER — OFFICE VISIT (OUTPATIENT)
Dept: INTERNAL MEDICINE | Facility: CLINIC | Age: 77
End: 2022-11-03
Payer: COMMERCIAL

## 2022-11-03 ENCOUNTER — TRANSFERRED RECORDS (OUTPATIENT)
Dept: INTERNAL MEDICINE | Facility: CLINIC | Age: 77
End: 2022-11-03

## 2022-11-03 VITALS
BODY MASS INDEX: 27.02 KG/M2 | HEART RATE: 68 BPM | TEMPERATURE: 98 F | DIASTOLIC BLOOD PRESSURE: 62 MMHG | WEIGHT: 193 LBS | SYSTOLIC BLOOD PRESSURE: 118 MMHG | HEIGHT: 71 IN | OXYGEN SATURATION: 95 %

## 2022-11-03 DIAGNOSIS — C61 PROSTATE CANCER METASTATIC TO INTRAPELVIC LYMPH NODE (H): ICD-10-CM

## 2022-11-03 DIAGNOSIS — R53.81 PHYSICAL DECONDITIONING: ICD-10-CM

## 2022-11-03 DIAGNOSIS — C61 MALIGNANT NEOPLASM OF PROSTATE (H): ICD-10-CM

## 2022-11-03 DIAGNOSIS — N63.22 MASS OF UPPER INNER QUADRANT OF LEFT BREAST: ICD-10-CM

## 2022-11-03 DIAGNOSIS — C77.5 PROSTATE CANCER METASTATIC TO INTRAPELVIC LYMPH NODE (H): ICD-10-CM

## 2022-11-03 DIAGNOSIS — R68.89 POOR MOTIVATION: ICD-10-CM

## 2022-11-03 DIAGNOSIS — I71.21 ANEURYSM OF ASCENDING AORTA WITHOUT RUPTURE (H): ICD-10-CM

## 2022-11-03 DIAGNOSIS — R53.83 OTHER FATIGUE: Primary | ICD-10-CM

## 2022-11-03 DIAGNOSIS — G62.9 PERIPHERAL POLYNEUROPATHY: ICD-10-CM

## 2022-11-03 DIAGNOSIS — I25.810 CORONARY ARTERY DISEASE INVOLVING AUTOLOGOUS ARTERY CORONARY BYPASS GRAFT WITHOUT ANGINA PECTORIS: ICD-10-CM

## 2022-11-03 DIAGNOSIS — R07.9 CHEST PAIN, UNSPECIFIED TYPE: ICD-10-CM

## 2022-11-03 PROCEDURE — 99215 OFFICE O/P EST HI 40 MIN: CPT | Performed by: INTERNAL MEDICINE

## 2022-11-03 NOTE — PROGRESS NOTES
1. Other fatigue  Longstanding.  Possibly multifactorial.  I have urged him to try to increase his exercise tolerance.  He is resistant to taking mood medications.  We will try him off of the statin for 3 weeks to see if that makes any of his symptoms better.  He also will try Tylenol.  He does note that Aleve helps his symptoms but I have urged him to steer clear of that.    2. Physical deconditioning  As above.  Offered graded exercise program or cardiac rehab and he declines    3. Peripheral polyneuropathy  He is undergoing neurologic work-up.  Emilia.  Await results of that.    4. Aneurysm of ascending aorta without rupture  He will be having follow-up imaging in February I believe.    5. 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    6. Chest pain, unspecified type  Noncardiac in nature.  He will be seeing pulmonary and GI apparently    7. Coronary artery disease involving autologous artery coronary bypass graft without angina pectoris  Continue regimen although working to hold his statin for 3 weeks to see if that helps all of his fatigue.    8. Poor motivation  Legs above, he is resistant to mood medications.    9. Mass of upper inner quadrant of left breast  Will do imaging  - MA Diagnostic Digital Bilateral; Future  - US Breast Left Limited 1-3 Quadrants; Future    10. Malignant neoplasm of prostate (H)  As above    Subjective   Binu is a 77 year old, presenting for the following health issues:  Follow Up (6-8 wks follow up - S/P angiogram)      History of Present Illness       Hypertension: Binu Galvin presents for follow up of hypertension.  Binu Galvin does check blood pressure  regularly outside of the clinic. Outpatient blood pressures have not been over 140/90. Binu Galvin follows a low salt diet.           Binu comes in today for follow-up.  Extensive visit ensued.  Over 50 minutes was spent with patient and with completion of his visit.  He has had  "multiple medical problems including coronary disease and prostate cancer.  He has a low-grade dysthymia.  Does not tolerate mood medications.  He recently complained to his cardiologist about some dyspnea with exertion and decreased exercise tolerance.  He was put through a angiogram which showed nonocclusive disease.  He continues to feel fatigued.  Some of this is longstanding.  Some of its been Minet made worse after COVID.  He was told to follow-up with pulmonary and GI regarding some associated chest discomfort.  He also had does have known aneurysm which will have a 6-month follow-up later this winter.    He notes a lump in his breasts been there for a while he thinks in but it may be getting bigger.  He thinks it could be a lipoma.    He is going to [place] right before things giving.  He has the motivation to do that at this time at least.  He has had considerable lack of motivation to do much of anything including seeing family.  He believes he is getting better from that standpoint.    Review of Systems         Objective    Ht 1.791 m (5' 10.5\")   Wt 87.5 kg (193 lb)   BMI 27.30 kg/m    Body mass index is 27.3 kg/m .  Physical Exam           He looks good today.  Weight is stable.  Good spirits today actually.            "

## 2022-11-08 ENCOUNTER — OFFICE VISIT (OUTPATIENT)
Dept: GASTROENTEROLOGY | Facility: CLINIC | Age: 77
End: 2022-11-08
Payer: COMMERCIAL

## 2022-11-08 VITALS
DIASTOLIC BLOOD PRESSURE: 85 MMHG | TEMPERATURE: 97 F | HEIGHT: 71 IN | BODY MASS INDEX: 27.24 KG/M2 | SYSTOLIC BLOOD PRESSURE: 134 MMHG | OXYGEN SATURATION: 99 % | HEART RATE: 63 BPM | WEIGHT: 194.6 LBS

## 2022-11-08 DIAGNOSIS — R12 HEARTBURN: Primary | ICD-10-CM

## 2022-11-08 DIAGNOSIS — R07.89 ATYPICAL CHEST PAIN: ICD-10-CM

## 2022-11-08 DIAGNOSIS — K57.30 DIVERTICULAR DISEASE OF COLON: ICD-10-CM

## 2022-11-08 PROCEDURE — 99204 OFFICE O/P NEW MOD 45 MIN: CPT | Performed by: PHYSICIAN ASSISTANT

## 2022-11-08 RX ORDER — FAMOTIDINE 40 MG/1
40 TABLET, FILM COATED ORAL 2 TIMES DAILY PRN
Qty: 30 TABLET | Refills: 0 | Status: SHIPPED | OUTPATIENT
Start: 2022-11-08 | End: 2023-05-17

## 2022-11-08 ASSESSMENT — PAIN SCALES - GENERAL: PAINLEVEL: NO PAIN (0)

## 2022-11-08 NOTE — PROGRESS NOTES
GI CLINIC VISIT    CC/REFERRING MD:  Mir Higuera*  REASON FOR CONSULTATION:   Mir Higuera* for   Chief Complaint   Patient presents with     New Patient     New consult for diverticular disease of colon, no current symptoms.       ASSESSMENT/PLAN: Patient here for evaluation regarding atypical chest pain as well as history of diverticular disease.  We spent some time discussing his chest pain.  It is not strongly suggestive of gastroesophageal reflux disease/GI source, though he does have occasional heartburn.  Options for treatment would include empiric PPI versus H2 RA.  He is going to start with Pepcid and see if this improves his symptoms.  I do not see an indication for EGD at this point, though may reconsider depending on how his symptoms progress.  He would like to revisit potentially doing an EGD after seeing pulmonology, which is reasonable.  In regard to his diverticular disease, he has actually been free of diverticulitis for about 6 months now.  Definitive surgical treatment would come with risks and so I think identifying additional infection early and treating conservatively would be his preferred option moving forward, which is reasonable.  Patient can follow-up with me as needed.      RTC PRN    Thank you for this consultation.  It was a pleasure to participate in the care of this patient; please contact us with any further questions.      38 minutes spent on the date of the encounter doing chart review, patient visit and documentation    This note was created with voice recognition software, and while reviewed for accuracy, typos may remain.     Prince Goyal PA-C  Division of Gastroenterology, Hepatology and Nutrition  [facility]  Artis Galvin is a 77 year old adult who is being evaluated as a new patient to the GI clinic today for atypical chest pain, as well as previous history of recurrent diverticulitis.  Patient  states that he has had some ongoing issues with dull chest pain that radiates up towards his neck and jaw.  He does follow with cardiology and has known CAD that has been managed medically.  He did recently undergo coronary angiography that revealed nonocclusive disease.  It was recommended that he undergo consultation with both GI and pulmonology to rule out nonischemic causes of his chest pain.    Patient states that over the last few days, he has also had some periods of palpitations and elevated heart rate.  He is going to get an event monitor placed later this week for this.  We spent some time discussing his specific symptoms.  He does endorse heartburn occasionally, but not consistently.  He denies dysphagia, odynophagia, nausea, vomiting, and regurgitation.  We did also discuss his history of recurrent, uncomplicated diverticulitis.  He did see colorectal surgery to the [facility] last year, they had briefly discussed definitive treatment with hemicolectomy.  He had been placed on an extended course of antibiotics by his primary care doctor and has actually been free of episodes of diverticulitis for 6 months now.  He would prefer to continue to treat conservatively, if able.    ROS:    10 point ROS neg other than the symptoms noted above in the HPI.    PREVIOUS ENDOSCOPY:  Colonoscopy in October 2020 with polypectomy, recommend repeat colonoscopy in 2025 if desired    PERTINENT RELEVANT IMAGING OR LABS:  Reviewed    ALLERGIES:     Allergies   Allergen Reactions     Ciprofloxacin Unknown     Levaquin [Levofloxacin] Unknown       PERTINENT MEDICATIONS:    Current Outpatient Medications:      alendronate (FOSAMAX) 70 MG tablet, Take 1 tablet (70 mg) by mouth every 7 days before morning meal with 8 oz water and remain upright for 30 minutes. For additional refills, please schedule a follow-up appointment at [phone], Disp: 12 tablet, Rfl: 0     aspirin 81 MG EC tablet, Take 81 mg by mouth daily, Disp: ,  Rfl:      atorvastatin (LIPITOR) 40 MG tablet, Take 1 tablet (40 mg) by mouth daily, Disp: 90 tablet, Rfl: 3     calcium citrate-vitamin D (CITRACAL) 315-200 MG-UNIT TABS per tablet, Take 1 tablet by mouth 2 times daily, Disp: , Rfl:      Cyanocobalamin 5000 MCG SUBL, Place 1 tablet under the tongue daily, Disp: , Rfl:      famotidine (PEPCID) 40 MG tablet, Take 1 tablet (40 mg) by mouth 2 times daily as needed for heartburn, Disp: 30 tablet, Rfl: 0     ketoconazole (NIZORAL) 2 % external cream, Apply topically as needed, Disp: 60 g, Rfl: 3     levothyroxine (SYNTHROID/LEVOTHROID) 50 MCG tablet, Take 50 mcg by mouth daily, Disp: , Rfl:      losartan (COZAAR) 100 MG tablet, Take 1 tablet (100 mg) by mouth daily, Disp: 90 tablet, Rfl: 3     pilocarpine (PILOCAR) 2 % ophthalmic solution, Apply 1 drop to eye 2 times daily, Disp: , Rfl:      Probiotic Product (MISC INTESTINAL MEGHA REGULAT) CAPS, Take 1 capsule by mouth daily, Disp: , Rfl:      nitroGLYcerin (NITROSTAT) 0.4 MG sublingual tablet, For chest pain place 1 tablet under the tongue every 5 minutes for 3 doses. If symptoms persist 5 minutes after 1st dose call 911. (Patient not taking: Reported on 11/8/2022), Disp: 25 tablet, Rfl: 3    PROBLEM LIST  Patient Active Problem List    Diagnosis Date Noted     Status post coronary angiogram 10/20/2022     Priority: Medium     SVT (supraventricular tachycardia) (H) 03/08/2022     Priority: Medium     Peripheral neuropathy 12/08/2021     Priority: Medium     Onychomycosis 10/04/2021     Priority: Medium     Diverticular disease of colon 09/08/2021     Priority: Medium     Formatting of this note might be different from the original.  Created by Conversion    Replacement Utility updated for latest IMO load       Autoimmune thyroiditis 05/03/2021     Priority: Medium     Formatting of this note might be different from the original.  Formatting of this note might be different from the original.  Created by Conversion        Sarcoidosis 04/07/2020     Priority: Medium     Created by Conversion       Vitamin D deficiency 04/07/2020     Priority: Medium     Created by Conversion    Replacement Utility updated for latest IMO load       Ascending aortic aneurysm 03/31/2020     Priority: Medium     Stable angina pectoris (H) 12/27/2019     Priority: Medium     Personal history of malignant neoplasm of prostate 06/14/2018     Priority: Medium     Erectile dysfunction following radical prostatectomy 06/07/2018     Priority: Medium     Right bundle branch block 02/08/2018     Priority: Medium     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  Created by Conversion  Formatting of this note might be different from the original.  Formatting of this note might be different from the original.  Created by Conversion         PERTINENT PAST MEDICAL HISTORY:  Past Medical History:   Diagnosis Date     Abdominal pain, unspecified site      Abnormal thyroid ultrasound      BBB (bundle branch block)     right     Chronic lymphocytic thyroiditis      Diverticulosis of colon (without mention of hemorrhage)      Localized superficial swelling, mass, or lump      Malignant neoplasm of prostate (H)      Nontoxic multinodular goiter      Other and unspecified hyperlipidemia      Other nonspecific abnormal serum enzyme levels      Pain in joint, shoulder region      Persistent hoarseness      Sarcoidosis      Scleritis, unspecified      Unspecified hypothyroidism      Unspecified vitamin D deficiency        PREVIOUS SURGERIES:  Past Surgical History:   Procedure Laterality Date     CV CORONARY ANGIOGRAM N/A 10/20/2022    Procedure: Coronary Angiogram;  Surgeon: Mir Farr MD;  Location:  [facility]     CV FRACTIONAL FLOW RATIO WIRE N/A 10/20/2022    Procedure: Fractional Flow Ratio Wire;  Surgeon: Mir Farr  MD Karen;  Location:  [facility]     HC SHLDR ARTHROSCOP,PART ACROMIOPLAS Right     Description: Shoulder Arthroscopy, Space Decompression And Acromioplasty;  Recorded: 02/18/2014;     OK LAP,PROSTATECTOMY,RADICAL,W/NERVE SPARE,INCL ROBOTIC Bilateral 2/27/2018    Procedure: ROBOTIC ASSISTED RADICAL RETROPUBIC PROSTATECTOMY, BILATERAL PELVIC LYMPH NODE DISSECTION LYSIS OF ADHESIONS;  Surgeon: Wale Rodriguez MD;  Location: [facility];  Service: Urology     OK RADIAL KERATOTOMY      Description: Cornea Radial Keratotomy;  Recorded: 02/18/2014;       SOCIAL HISTORY:  Social History     Socioeconomic History     Marital status: Patient Declined     Spouse name: Not on file     Number of children: Not on file     Years of education: Not on file     Highest education level: Not on file   Occupational History     Not on file   Tobacco Use     Smoking status: Never     Smokeless tobacco: Never   Vaping Use     Vaping Use: Never used   Substance and Sexual Activity     Alcohol use: Not Currently     Comment: Alcoholic Drinks/day: rarely     Drug use: No     Sexual activity: Not on file   Other Topics Concern     Not on file   Social History Narrative    Single. . 4 grown daughters.    Currently unemployed.     Social Determinants of Health     Financial Resource Strain: Not on file   Food Insecurity: Not on file   Transportation Needs: Not on file   Physical Activity: Not on file   Stress: Not on file   Social Connections: Not on file   Intimate Partner Violence: Not on file   Housing Stability: Not on file       FAMILY HISTORY:  Family History   Problem Relation Age of Onset     No Known Problems Mother      Coronary Artery Disease Father      Aortic aneurysm Father      Prostate Cancer Maternal Grandfather      Prostate Cancer Maternal Uncle        Past/family/social history reviewed and no changes    PHYSICAL EXAMINATION:  Constitutional: aaox3, cooperative, pleasant, not  "dyspneic/diaphoretic, no acute distress  Vitals reviewed: /85 (BP Location: Left arm, Patient Position: Sitting, Cuff Size: Adult Regular)   Pulse 63   Temp 97  F (36.1  C) (Oral)   Ht 1.791 m (5' 10.5\")   Wt 88.3 kg (194 lb 9.6 oz)   SpO2 99%   BMI 27.53 kg/m    Wt:   Wt Readings from Last 2 Encounters:   11/08/22 88.3 kg (194 lb 9.6 oz)   11/03/22 87.5 kg (193 lb)      Eyes: Sclera anicteric/injected  Ears/nose/mouth/throat: Normal oropharynx without ulcers or exudate, mucus membranes moist, hearing intact  Neck: supple, thyroid normal size  CV: No edema  Respiratory: Unlabored breathing  Lymph: No axillary, submandibular, supraclavicular or inguinal lymphadenopathy  Abd: Nondistended, +bs, no hepatosplenomegaly, nontender, no peritoneal signs  Skin: warm, perfused, no jaundice  Psych: Normal affect  MSK: Normal gait                    "

## 2022-11-08 NOTE — LETTER
11/8/2022         RE: Artis Galvin  [address]  [place] [address]        Dear Colleague,    Thank you for referring your patient, Artis Galvin, to the [facility]. Please see a copy of my visit note below.    GI CLINIC VISIT    CC/REFERRING MD:  Mir Higuera*  REASON FOR CONSULTATION:   Mir Higuera* for   Chief Complaint   Patient presents with     New Patient     New consult for diverticular disease of colon, no current symptoms.       ASSESSMENT/PLAN: Patient here for evaluation regarding atypical chest pain as well as history of diverticular disease.  We spent some time discussing his chest pain.  It is not strongly suggestive of gastroesophageal reflux disease/GI source, though he does have occasional heartburn.  Options for treatment would include empiric PPI versus H2 RA.  He is going to start with Pepcid and see if this improves his symptoms.  I do not see an indication for EGD at this point, though may reconsider depending on how his symptoms progress.  He would like to revisit potentially doing an EGD after seeing pulmonology, which is reasonable.  In regard to his diverticular disease, he has actually been free of diverticulitis for about 6 months now.  Definitive surgical treatment would come with risks and so I think identifying additional infection early and treating conservatively would be his preferred option moving forward, which is reasonable.  Patient can follow-up with me as needed.      RTC PRN    Thank you for this consultation.  It was a pleasure to participate in the care of this patient; please contact us with any further questions.      38 minutes spent on the date of the encounter doing chart review, patient visit and documentation    This note was created with voice recognition software, and while reviewed for accuracy, typos may remain.     Prince Goyal PA-C  Division of Gastroenterology, Hepatology and  [facility]  Artis Galvin is a 77 year old adult who is being evaluated as a new patient to the GI clinic today for atypical chest pain, as well as previous history of recurrent diverticulitis.  Patient states that he has had some ongoing issues with dull chest pain that radiates up towards his neck and jaw.  He does follow with cardiology and has known CAD that has been managed medically.  He did recently undergo coronary angiography that revealed nonocclusive disease.  It was recommended that he undergo consultation with both GI and pulmonology to rule out nonischemic causes of his chest pain.    Patient states that over the last few days, he has also had some periods of palpitations and elevated heart rate.  He is going to get an event monitor placed later this week for this.  We spent some time discussing his specific symptoms.  He does endorse heartburn occasionally, but not consistently.  He denies dysphagia, odynophagia, nausea, vomiting, and regurgitation.  We did also discuss his history of recurrent, uncomplicated diverticulitis.  He did see colorectal surgery to the [facility] last year, they had briefly discussed definitive treatment with hemicolectomy.  He had been placed on an extended course of antibiotics by his primary care doctor and has actually been free of episodes of diverticulitis for 6 months now.  He would prefer to continue to treat conservatively, if able.    ROS:    10 point ROS neg other than the symptoms noted above in the HPI.    PREVIOUS ENDOSCOPY:  Colonoscopy in October 2020 with polypectomy, recommend repeat colonoscopy in 2025 if desired    PERTINENT RELEVANT IMAGING OR LABS:  Reviewed    ALLERGIES:     Allergies   Allergen Reactions     Ciprofloxacin Unknown     Levaquin [Levofloxacin] Unknown       PERTINENT MEDICATIONS:    Current Outpatient Medications:      alendronate (FOSAMAX) 70 MG tablet, Take 1 tablet  (70 mg) by mouth every 7 days before morning meal with 8 oz water and remain upright for 30 minutes. For additional refills, please schedule a follow-up appointment at [phone], Disp: 12 tablet, Rfl: 0     aspirin 81 MG EC tablet, Take 81 mg by mouth daily, Disp: , Rfl:      atorvastatin (LIPITOR) 40 MG tablet, Take 1 tablet (40 mg) by mouth daily, Disp: 90 tablet, Rfl: 3     calcium citrate-vitamin D (CITRACAL) 315-200 MG-UNIT TABS per tablet, Take 1 tablet by mouth 2 times daily, Disp: , Rfl:      Cyanocobalamin 5000 MCG SUBL, Place 1 tablet under the tongue daily, Disp: , Rfl:      famotidine (PEPCID) 40 MG tablet, Take 1 tablet (40 mg) by mouth 2 times daily as needed for heartburn, Disp: 30 tablet, Rfl: 0     ketoconazole (NIZORAL) 2 % external cream, Apply topically as needed, Disp: 60 g, Rfl: 3     levothyroxine (SYNTHROID/LEVOTHROID) 50 MCG tablet, Take 50 mcg by mouth daily, Disp: , Rfl:      losartan (COZAAR) 100 MG tablet, Take 1 tablet (100 mg) by mouth daily, Disp: 90 tablet, Rfl: 3     pilocarpine (PILOCAR) 2 % ophthalmic solution, Apply 1 drop to eye 2 times daily, Disp: , Rfl:      Probiotic Product (MISC INTESTINAL MEGHA REGULAT) CAPS, Take 1 capsule by mouth daily, Disp: , Rfl:      nitroGLYcerin (NITROSTAT) 0.4 MG sublingual tablet, For chest pain place 1 tablet under the tongue every 5 minutes for 3 doses. If symptoms persist 5 minutes after 1st dose call 911. (Patient not taking: Reported on 11/8/2022), Disp: 25 tablet, Rfl: 3    PROBLEM LIST  Patient Active Problem List    Diagnosis Date Noted     Status post coronary angiogram 10/20/2022     Priority: Medium     SVT (supraventricular tachycardia) (H) 03/08/2022     Priority: Medium     Peripheral neuropathy 12/08/2021     Priority: Medium     Onychomycosis 10/04/2021     Priority: Medium     Diverticular disease of colon 09/08/2021     Priority: Medium     Formatting of this note might be different from the original.  Created by  Conversion    Replacement Utility updated for latest IMO load       Autoimmune thyroiditis 05/03/2021     Priority: Medium     Formatting of this note might be different from the original.  Formatting of this note might be different from the original.  Created by Conversion       Sarcoidosis 04/07/2020     Priority: Medium     Created by Conversion       Vitamin D deficiency 04/07/2020     Priority: Medium     Created by Conversion    Replacement Utility updated for latest IMO load       Ascending aortic aneurysm 03/31/2020     Priority: Medium     Stable angina pectoris (H) 12/27/2019     Priority: Medium     Personal history of malignant neoplasm of prostate 06/14/2018     Priority: Medium     Erectile dysfunction following radical prostatectomy 06/07/2018     Priority: Medium     Right bundle branch block 02/08/2018     Priority: Medium     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  Created by Conversion  Formatting of this note might be different from the original.  Formatting of this note might be different from the original.  Created by Conversion         PERTINENT PAST MEDICAL HISTORY:  Past Medical History:   Diagnosis Date     Abdominal pain, unspecified site      Abnormal thyroid ultrasound      BBB (bundle branch block)     right     Chronic lymphocytic thyroiditis      Diverticulosis of colon (without mention of hemorrhage)      Localized superficial swelling, mass, or lump      Malignant neoplasm of prostate (H)      Nontoxic multinodular goiter      Other and unspecified hyperlipidemia      Other nonspecific abnormal serum enzyme levels      Pain in joint, shoulder region      Persistent hoarseness      Sarcoidosis      Scleritis, unspecified      Unspecified hypothyroidism      Unspecified vitamin D deficiency        PREVIOUS SURGERIES:  Past Surgical History:   Procedure Laterality Date      CV CORONARY ANGIOGRAM N/A 10/20/2022    Procedure: Coronary Angiogram;  Surgeon: Mir Farr MD;  Location:  [facility]ART CARDI[facility] CATH LAB     CV FRACTIONAL FLOW RATIO WIRE N/A 10/20/2022    Procedure: Fractional Flow Ratio Wire;  Surgeon: Mir Farr MD;  [facility]: [facility]     HC SHLDR ARTHROSCOP,PART ACROMIOPLAS Right     Description: Shoulder Arthroscopy, Space Decompression And Acromioplasty;  Recorded: 02/18/2014;     VA LAP,PROSTATECTOMY,RADICAL,W/NERVE SPARE,INCL ROBOTIC Bilateral 2/27/2018    Procedure: ROBOTIC ASSISTED RADICAL RETROPUBIC PROSTATECTOMY, BILATERAL PELVIC LYMPH NODE DISSECTION LYSIS OF ADHESIONS;  Surgeon: Wale Rodriguez MD;  Location: [facility];  Service: Urology     VA RADIAL KERATOTOMY      Description: Cornea Radial Keratotomy;  Recorded: 02/18/2014;       SOCIAL HISTORY:  Social History     Socioeconomic History     Marital status: Patient Declined     Spouse name: Not on file     Number of children: Not on file     Years of education: Not on file     Highest education level: Not on file   Occupational History     Not on file   Tobacco Use     Smoking status: Never     Smokeless tobacco: Never   Vaping Use     Vaping Use: Never used   Substance and Sexual Activity     Alcohol use: Not Currently     Comment: Alcoholic Drinks/day: rarely     Drug use: No     Sexual activity: Not on file   Other Topics Concern     Not on file   Social History Narrative    Single. . 4 grown daughters.    Currently unemployed.     Social Determinants of Health     Financial Resource Strain: Not on file   Food Insecurity: Not on file   Transportation Needs: Not on file   Physical Activity: Not on file   Stress: Not on file   Social Connections: Not on file   Intimate Partner Violence: Not on file   Housing Stability: Not on file       FAMILY HISTORY:  Family History   Problem Relation Age of Onset     No Known Problems Mother       "Coronary Artery Disease Father      Aortic aneurysm Father      Prostate Cancer Maternal Grandfather      Prostate Cancer Maternal Uncle        Past/family/social history reviewed and no changes    PHYSICAL EXAMINATION:  Constitutional: aaox3, cooperative, pleasant, not dyspneic/diaphoretic, no acute distress  Vitals reviewed: /85 (BP Location: Left arm, Patient Position: Sitting, Cuff Size: Adult Regular)   Pulse 63   Temp 97  F (36.1  C) (Oral)   Ht 1.791 m (5' 10.5\")   Wt 88.3 kg (194 lb 9.6 oz)   SpO2 99%   BMI 27.53 kg/m    Wt:   Wt Readings from Last 2 Encounters:   11/08/22 88.3 kg (194 lb 9.6 oz)   11/03/22 87.5 kg (193 lb)      Eyes: Sclera anicteric/injected  Ears/nose/mouth/throat: Normal oropharynx without ulcers or exudate, mucus membranes moist, hearing intact  Neck: supple, thyroid normal size  CV: No edema  Respiratory: Unlabored breathing  Lymph: No axillary, submandibular, supraclavicular or inguinal lymphadenopathy  Abd: Nondistended, +bs, no hepatosplenomegaly, nontender, no peritoneal signs  Skin: warm, perfused, no jaundice  Psych: Normal affect  MSK: Normal gait                        Again, thank you for allowing me to participate in the care of your patient.        Sincerely,        Prince Goyal PA-C    "

## 2022-11-08 NOTE — NURSING NOTE
"Chief Complaint   Patient presents with     New Patient     New consult for diverticular disease of colon, no current symptoms.     Binu Galvin requests these members of Binu Galvin's care team be copied on today's visit information: PCP    PCP: Angel Hernandez    Referring Provider:  Mir Farr MD  [address]  [place] [address]    Vitals:    11/08/22 1404   BP: 134/85   BP Location: Left arm   Patient Position: Sitting   Cuff Size: Adult Regular   Pulse: 63   Temp: 97  F (36.1  C)   TempSrc: Oral   SpO2: 99%   Weight: 88.3 kg (194 lb 9.6 oz)   Height: 1.791 m (5' 10.5\")       Body mass index is 27.53 kg/m .      Medications were reconciled.      Tonja Wallace CMA    "

## 2022-11-16 ENCOUNTER — ANCILLARY PROCEDURE (OUTPATIENT)
Dept: MAMMOGRAPHY | Facility: CLINIC | Age: 77
End: 2022-11-16
Attending: INTERNAL MEDICINE
Payer: COMMERCIAL

## 2022-11-16 DIAGNOSIS — N63.22 MASS OF UPPER INNER QUADRANT OF LEFT BREAST: ICD-10-CM

## 2022-11-16 PROCEDURE — 77066 DX MAMMO INCL CAD BI: CPT

## 2022-11-16 PROCEDURE — 76642 ULTRASOUND BREAST LIMITED: CPT | Mod: LT

## 2022-11-18 ENCOUNTER — TRANSFERRED RECORDS (OUTPATIENT)
Dept: HEALTH INFORMATION MANAGEMENT | Facility: CLINIC | Age: 77
End: 2022-11-18

## 2022-11-29 ENCOUNTER — NURSE TRIAGE (OUTPATIENT)
Dept: NURSING | Facility: CLINIC | Age: 77
End: 2022-11-29

## 2022-11-29 NOTE — TELEPHONE ENCOUNTER
Spoke with patient and relayed PCP direction below.  Patient stated his blood pressure is now back to his baseline.  Patient's cough and headache are improving.  Pt is afebrile. Writer encouraged patient to stay hydrated and that if he does feel concerned or experience symptoms in regards to  his blood pressures and he be seen in Urgent Care.  Patient verbalized understanding.

## 2022-11-29 NOTE — TELEPHONE ENCOUNTER
I am not sure what the question is here.  There is no question here.   Is this just an FYI?  Hopefully he got treated for the flu with Tamiflu.

## 2022-11-29 NOTE — TELEPHONE ENCOUNTER
Dr. Hernandez, apologies for the confusion - we are seeking a second level triage per our protocol (ex ED - UC- or office appointment) -  He is calling in as he is conserned about his symptoms: primarily his dizziness, head pressure, and BP readings.

## 2022-11-29 NOTE — TELEPHONE ENCOUNTER
Nurse Triage SBAR    Situation:     Background:   Patient calling,It is okay to leave a detailed message at this number.     Assessment:   -Symptoms started on Monday after a trip to [place]  -Went to urgency room two days ago and had aDx of flu and dehydration   -Head pressure 8-9/10, so strong his hearing is being affected  -This AM he had a SBP of 105 which is low for him   -One hour ago BP [id number]   -No BP is back to 140/71  (HR 70s)  -Dizzy  -Coughing for the past 6 days - dry  -Mental fog - he is alert and oriented to person  -Chest pain  - only with coughing     Recommendation:   Go To ED/UCC Now (Or To Office With PCP Approval)  -Call 911 if you develop continuous chest pressure or SOB  -Call FNA back with any changes in symptoms  Care team: please call patient back and advise    GONZALO HOWARD RN on 11/29/2022 at 1:35 PM       Reason for Disposition    SEVERE headache or neck pain    Additional Information    Negative: SEVERE difficulty breathing (e.g., struggling for each breath, speaks in single words)    Negative: Shock suspected (e.g., cold/pale/clammy skin, too weak to stand, low BP, rapid pulse)    Negative: Fainted, and still feels dizzy afterwards    Negative: Difficult to awaken or acting confused (e.g., disoriented, slurred speech)    Negative: New neurologic deficit that is present now: * Weakness of the face, arm, or leg on one side of the body * Numbness of the face, arm, or leg on one side of the body * Loss of speech or garbled speech    Negative: Overdose (accidental or intentional) of medications    Negative: Sounds like a life-threatening emergency to the triager    Negative: Heart beating < 50 beats per minute OR > 140 beats per minute    Negative: Rectal bleeding, bloody stool, or tarry-black stool    Negative: Vomiting is main symptom    Negative: Diarrhea is main symptom    Negative: Headache is main symptom    Negative: Heat exhaustion suspected (i.e., dehydration from heat  exposure)    Negative: Patient states that they are having an anxiety or panic attack    Negative: Dizziness from low blood sugar (i.e., < 60 mg/dl or 3.5 mmol/l)    Negative: SEVERE dizziness (e.g., unable to stand, requires support to walk, feels like passing out now)    Protocols used: DIZZINESS-A-OH

## 2022-11-29 NOTE — TELEPHONE ENCOUNTER
If he has influenza, a headache would be common and actually expected.  I am not sure why he keeps checking his blood pressure, especially if he is sicke, unless he is having symptoms of hyper or hypotension.  If he is not well though, and feeling that poorly, I would urge him to be seen in an urgent care.

## 2022-11-30 ENCOUNTER — MYC MEDICAL ADVICE (OUTPATIENT)
Dept: INTERNAL MEDICINE | Facility: CLINIC | Age: 77
End: 2022-11-30

## 2022-11-30 NOTE — TELEPHONE ENCOUNTER
Chart reviewed and patient was triaged yesterday for these symptoms and recommended to go to urgent care. My Chart message sent with recommendation of hydration and evaluation at  from yesterday.

## 2022-12-01 ENCOUNTER — MYC MEDICAL ADVICE (OUTPATIENT)
Dept: CARDIOLOGY | Facility: CLINIC | Age: 77
End: 2022-12-01

## 2022-12-01 DIAGNOSIS — I71.21 ANEURYSM OF ASCENDING AORTA WITHOUT RUPTURE (H): Primary | ICD-10-CM

## 2022-12-20 DIAGNOSIS — M81.0 OSTEOPOROSIS WITHOUT CURRENT PATHOLOGICAL FRACTURE, UNSPECIFIED OSTEOPOROSIS TYPE: ICD-10-CM

## 2022-12-23 RX ORDER — ALENDRONATE SODIUM 70 MG/1
TABLET ORAL
Qty: 8 TABLET | Refills: 0 | Status: SHIPPED | OUTPATIENT
Start: 2022-12-23 | End: 2023-01-24

## 2022-12-23 NOTE — TELEPHONE ENCOUNTER
LCV:10/1/2021  [facility] [place]  NCV: 1-20-23  last dexa: 9-17-21  RF to cover til RTC

## 2023-01-01 NOTE — LETTER
Letter by Angel Hernandez MD at      Author: Angel Hernandez MD Service: -- Author Type: --    Filed:  Encounter Date: 8/5/2020 Status: (Other)         Artis Galvin  [address]  [place] [address]             August 5, 2020         Dear Mr. Galvin,    Below are the results from your recent visit:    Resulted Orders   CTA Chest    Narrative    EXAM: CTA CHEST  LOCATION: [facility]  DATE/TIME: 8/4/2020 12:20 PM    INDICATION: Thoracic aortic aneurysm (TAA), known, follow up. 6 month CTA chest follow up.   COMPARISON: 11/19/2019.  TECHNIQUE: CT chest pulmonary angiogram during arterial phase injection of IV contrast. Multiplanar reformats and MIP reconstructions were performed. Dose reduction techniques were used.   CONTRAST: Iohexol (Omni) 100 mL.    FINDINGS:  ANGIOGRAM CHEST: Fusiform aneurysm of the mid ascending thoracic aorta measures 4.3 x 4.6 cm, unchanged. The aortic root measures 3 cm in diameter. The descending thoracic aorta measures 2.7 cm. No evidence of pulmonary embolus. No aortic dissection.    LUNGS AND PLEURA: Granulomas in the right upper and lower lobes. Lungs are otherwise clear.    MEDIASTINUM/AXILLAE: No lymphadenopathy. Mild coronary artery calcification.    UPPER ABDOMEN: Normal.    MUSCULOSKELETAL: Normal.      Impression    1.  Stable 4.3 x 4.6 cm mid ascending aortic aneurysm.       Aneurysm is stable.     Please call with questions or contact us using Electronic [employer] Services (E[employer]).    Sincerely,        Electronically signed by Angel Hernandez MD        ED

## 2023-01-19 ENCOUNTER — TELEPHONE (OUTPATIENT)
Dept: ENDOCRINOLOGY | Facility: CLINIC | Age: 78
End: 2023-01-19
Payer: COMMERCIAL

## 2023-01-20 ENCOUNTER — OFFICE VISIT (OUTPATIENT)
Dept: ENDOCRINOLOGY | Facility: CLINIC | Age: 78
End: 2023-01-20
Payer: COMMERCIAL

## 2023-01-20 VITALS
DIASTOLIC BLOOD PRESSURE: 71 MMHG | WEIGHT: 192 LBS | SYSTOLIC BLOOD PRESSURE: 119 MMHG | BODY MASS INDEX: 27.16 KG/M2 | HEART RATE: 69 BPM

## 2023-01-20 DIAGNOSIS — E06.3 HASHIMOTO'S THYROIDITIS: ICD-10-CM

## 2023-01-20 DIAGNOSIS — M81.0 OSTEOPOROSIS WITHOUT CURRENT PATHOLOGICAL FRACTURE, UNSPECIFIED OSTEOPOROSIS TYPE: Primary | ICD-10-CM

## 2023-01-20 PROCEDURE — 99214 OFFICE O/P EST MOD 30 MIN: CPT | Mod: GC | Performed by: INTERNAL MEDICINE

## 2023-01-20 ASSESSMENT — PAIN SCALES - GENERAL: PAINLEVEL: NO PAIN (0)

## 2023-01-20 NOTE — PROGRESS NOTES
"ENDOCRINOLOGY CLINIC CONSULTATION    Reason for visit: thyroid nodules, hypothyroidism and osteoporosis  Last visit: 10/2021    Subjective:   Artis is a 77 year old adult with metastatic prostate cancer s/p prostatectomy, hypothyroidism secondary to Hashimoto's thyroiditis, hypertension, AAA who is seen for follow-up of thyroid nodules, hypothyroidism and osteoporosis.       He was noted to have right thyroid mass from CT C/A/P in July 2021 that showed 29.8 x 19.9 x 32.7 mm heterogenously enhancing right thyroid mass. He recalled having ultrasound thyroid in 2014 at [facility] after having voice changed and was told that he had Hashimoto's thyroiditis. TSH of 6.4 on 6/4/2021. He has positive TPO Ab of 355. He was started on levothyroxine 50 mcg daily with normalization of TFT. He stopped levothyroxine after last visit with but TSH increased to 6.2 so it was restarted 2/2022. TSH was 4.5 at [facility] in 12/2022 and his oncologist recommended dose increase to 75 mcg daily.     Regarding osteoporosis: he did have DXA scan 9/2021 that showed the lowest T-score of -2.7 at the right hip. He has not had DXA previously. He was started on Fosamax 70 mg weekly but has struggled with compliance.     Interval History:      \"Fatigue city\" for the past year. Has also been having tachycardia to 130 (up from about 70) and felt chest pressure. Lasts minutes then self resolves. Had cardiac catheterization \"a couple months ago\" and did not get stents.  Worsening neuropathy. Has been adjusting anti-depressants.     Influenza in November 2022 after visit in [facility]. Still feeling unsteady, dizziness - this is improving but still present.     Osteoporosis  Taking fosamax ~1/month, sometimes less. Combination of forgetfulness and doesn't want to take medications. No hx of pill esophagitis other SE.   Fractures - none  Weight change - stable, 188-190 lbs  Jaw - does have intermittent jaw pain and saw dentist which was normal " eval, no recent or planned procedure  Bone pain - no, chronic joint pain. Has chronic back pain since 21 year old, not changing significantly     Bone Health:  Family history of osteoporosis or fracture: his sister has osteoporosis  BMI: 27.3  Steroid use: none  Chronic disease: metastatic prostate cancer s/p prostatectomy and radiation  Calcium intake:none from diet  Vitamin D intake: none  Alcohol: 6 pack of beer per 6 weeks  Smoking: none  Previously on Lupron treatment (GnRH analog) - stopped ~1/2022    - has NOT been taking calcium/vit D supplement       Thyroid    - restarted levothyroxine 50 mcg daily in 2/2022  - started taking levothyroxine 75 mcg daily in 12/5/2022 per recommendation of Oncologist at [facility]  - taking in AM with water on empty stomach    Underwent thyroid US 10/2021 with three TR 3 nodules with largest being 2.5 cm   Underwent FNA 11/2021 of mid-right thyroid nodule with pathology consistent with benign nodule     ROS:  Full review of systems taken with the help of the intake sheet. Otherwise a complete 14 point review of systems was taken and is negative unless stated in the history above.    Past Medical/Surgical History:  Past Medical History:   Diagnosis Date     Abdominal pain, unspecified site      Abnormal thyroid ultrasound      BBB (bundle branch block)     right     Chronic lymphocytic thyroiditis      Diverticulosis of colon (without mention of hemorrhage)      Localized superficial swelling, mass, or lump      Malignant neoplasm of prostate (H)      Nontoxic multinodular goiter      Other and unspecified hyperlipidemia      Other nonspecific abnormal serum enzyme levels      Pain in joint, shoulder region      Persistent hoarseness      Sarcoidosis      Scleritis, unspecified      Unspecified hypothyroidism      Unspecified vitamin D deficiency      Past Surgical History:   Procedure Laterality Date     CV CORONARY ANGIOGRAM N/A 10/20/2022    Procedure: Coronary Angiogram;   Surgeon: Mir Farr MD;  Location:  [facility]     CV FRACTIONAL FLOW RATIO WIRE N/A 10/20/2022    Procedure: Fractional Flow Ratio Wire;  Surgeon: Mir Farr MD;  Location:  [facility]     HC SHLDR ARTHROSCOP,PART ACROMIOPLAS Right     Description: Shoulder Arthroscopy, Space Decompression And Acromioplasty;  Recorded: 02/18/2014;     ND LAP,PROSTATECTOMY,RADICAL,W/NERVE SPARE,INCL ROBOTIC Bilateral 2/27/2018    Procedure: ROBOTIC ASSISTED RADICAL RETROPUBIC PROSTATECTOMY, BILATERAL PELVIC LYMPH NODE DISSECTION LYSIS OF ADHESIONS;  Surgeon: Wale Rodriguez MD;  [place] [facility] [place]  Service: Urology     ND RADIAL KERATOTOMY      Description: Cornea Radial Keratotomy;  Recorded: 02/18/2014;       Allergies:  Allergies   Allergen Reactions     Ciprofloxacin Unknown     Levaquin [Levofloxacin] Unknown       Current Medications   Current Outpatient Medications   Medication Sig Dispense Refill     aspirin 81 MG EC tablet Take 81 mg by mouth daily       levothyroxine (SYNTHROID/LEVOTHROID) 50 MCG tablet Take 75 mcg by mouth daily Taking 75 mcg       losartan (COZAAR) 100 MG tablet Take 1 tablet (100 mg) by mouth daily 90 tablet 3     pilocarpine (PILOCAR) 2 % ophthalmic solution Apply 1 drop to eye 2 times daily       Probiotic Product (MISC INTESTINAL MEGHA REGULAT) CAPS Take 1 capsule by mouth daily       alendronate (FOSAMAX) 70 MG tablet TAKE 1 TABLET BY MOUTH EVERY 7  DAYS BEFORE MORNING MEAL WITH 8  OZ WATER AND REMAIN UPRIGHT FOR  30 MINUTES. Please keep 1-20-23 clinic appt for refills. (Patient not taking: Reported on 1/20/2023) 8 tablet 0     atorvastatin (LIPITOR) 40 MG tablet Take 1 tablet (40 mg) by mouth daily 90 tablet 3     calcium citrate-vitamin D (CITRACAL) 315-200 MG-UNIT TABS per tablet Take 1 tablet by mouth 2 times daily       Cyanocobalamin 5000 MCG SUBL Place 1 tablet under the tongue daily       famotidine (PEPCID) 40  MG tablet Take 1 tablet (40 mg) by mouth 2 times daily as needed for heartburn 30 tablet 0     ketoconazole (NIZORAL) 2 % external cream Apply topically as needed (Patient not taking: Reported on 1/20/2023) 60 g 3     nitroGLYcerin (NITROSTAT) 0.4 MG sublingual tablet For chest pain place 1 tablet under the tongue every 5 minutes for 3 doses. If symptoms persist 5 minutes after 1st dose call 911. (Patient not taking: Reported on 11/8/2022) 25 tablet 3     Family History:  Family History   Problem Relation Age of Onset     No Known Problems Mother      Coronary Artery Disease Father      Aortic aneurysm Father      Prostate Cancer Maternal Grandfather      Prostate Cancer Maternal Uncle        Social History:  Social History     Tobacco Use     Smoking status: Never     Smokeless tobacco: Never   Substance Use Topics     Alcohol use: Not Currently     Comment: Alcoholic Drinks/day: rarely       Physical Exam   Vitals: /71 (BP Location: Left arm, Patient Position: Sitting, Cuff Size: Adult Regular)   Pulse 69   Wt 87.1 kg (192 lb)   BMI 27.16 kg/m    BMI= Body mass index is 27.16 kg/m .   General: tired appearing, appears younger than stated age, no acute distress, pleasant and conversant  Mental Status/neuro: alert and oriented  Face: symmetrical, normal facial color  Eyes: anicteric, PERRL, no proptosis or lid lag  Neck: supple, no LAD  Thyroid: normal size and texture, possible nodule in right upper/mid thyroid palpable but otherwise wnlno bruits  Heart: regular rhythm  Legs: no swelling or edema    Labs : I reviewed data from epic and extract and summarize the pertinent data here.     DXA 9/2021  Impression  The most negative and valid T-score of -2.7 at the level of the right femoral neck corresponds with osteoporosis according to WHO criteria for postmenopausal females and men age 50 and over. The risk of osteoporotic fracture increases approximately 2-fold for each 1.0 SD decrease in  T-score.     Results   Lumbar spine   T-score -1.5 , BMD 1.043 g/cm2.      Left Femur neck  T-score -2.5 , BMD 0.740 g/cm2.  Left Total femur  T-score -1.7 , BMD 0.858 g/cm2.     Right Femur neck  T-score -2.7, BMD  0.721 g/cm2.  Right Total femur  T-score -2.2 , BMD  0.786 g/cm2.      Assessment and Plan  Artis is a 76 year old adult with metastatic prostate cancer s/p prostatectomy, hypothyroidism secondary to Hashimoto's thyroiditis, hypertension, AAA who is seen for follow-up of thyroid nodules, hypothyroidism and osteoporosis.      1) Thyroid nodule(s)  Noted from CT chest on 7/15/2021 that showed 29.8 x 19.9 x 32.7 mm heterogenously enhancing right thyroid mass. Underwent thyroid US 10/2021 with three TR 3 nodules with largest being 2.5 cm. Underwent FNA 11/2021 of mid-right thyroid nodule with pathology consistent with benign nodule.   - consider repeat Thyroid US in 1-2 years     2) Hashimoto's thyroiditis with subclinical hypothyroidism  no symptoms. Highest TSH was 6.4 in June 2021. After starting on levothyroxine, TSH normalized, pt stopped d/t preference and TSH increased to 6.2 so levothyroxine was restarted at 50 mcg. 12/2022  TSH was 4.5 and his oncologist recommended increasing dose to 75 mcg daily which he has been doing since 12/5/2022. At this time, difficult to say and more so unlikely that his fatigue is primarily due to hypothyroidism as has been ongoing for past year and did not improve with increased levothyroxine.      3) osteoporosis without pathologic fracture  DXA 9/17/2021 that showed the lowest T-score of -2.7 at the right hip.  Ca wnl. Vit D 35 2021. No previous fracture but has family hx of osteoporosis. He is also on androgen deprivation therapy with GnRH analog so he is at risk for further bone loss. Started Fosamax 70 mg weekly 10/2021 however, compliance has been an issue. Discussed risk and benefits of IV bisphophonates and patient opts to proceed.      PLAN:   - continue  Levothyroxine at 75 mg and repeat TFTs in next 2 weeks (after PCP visit)  - schedule Reclast IV infusion, can get at [facility]  - recommend Calcium 1200 mg daily and Vit D 800-1000 international unit(s) daily   - try to restart moderate weight bearing exercise  - DXA prior to next visit     RTC in 1 year    This patient was seen and discussed with the attending, Dr. Shin.    William Fields MD  Internal Medicine Resident    External notes/medical records independently reviewed, labs and imaging independently reviewed, medical management and tests to be discussed/communicated to patient.    Time: I spent 35 minutes spent on the date of the encounter preparing to see patient (including chart review and preparation), obtaining and or reviewing additional medical history, performing a physical exam and evaluation, documenting clinical information in the electronic health record, independently interpreting results, communicating results to the patient and coordinating care.    López Shin MD, MS  Division of Diabetes and Endocrinology  Department of Medicine

## 2023-01-20 NOTE — PATIENT INSTRUCTIONS
Schedule Reclast infusion at your convenience  Please reach out to the following centers to schedule your appointment:     Imaging (DEXA, CT, MRI, XRAY)    [facility] ([facility], [facility] [place] [place]) [phone]   [facility] ([facility] [place]) [phone]   [facility] ([place], [place]) [phone]   [facility] ([facility]) [phone]       [facility]    General [phone]   [facility] [phone]   [place] [phone]   [facility]  [phone]   [facility] [phone]   [place] [phone]   [facility]) [phone]   [facility] [place] [facility]   [place] [phone]   [place] [phone]   [place] [phone]   [place] [phone]       Infusion    [facility] [phone]   [place] [phone]   [place] [phone]   [place] [phone]   [place] [phone]   [place] [phone]   [facility] [phone]       CALCIUM Recommendation 1200 mg/day in divided dose of no more than 500 mg/dose. This includes what is in your food and also what is in any supplements you are taking.      Dietary sources of calcium: These also contain vitamin D  Milk / buttermilk              8 oz            300 mg  Dry milk powder       5 Tbsp             300 mg  Yogurt                          1 cup           400 mg  Ice cream                    1/2 cup          100 mg  Hard cheese                     1.5 oz          300 mg  Cottage cheese                1/2 cup        65 mg  Spinach, cooked          1 cup              240 mg  Tofu, soft regular           4 oz          120 to 390 mg  Sardines                       3 oz               370 mg  Mackerel canned         3 oz                250 mg  Canned salmon with bones 3 oz        170-210 mg  Calcium fortified cereals are available  SILK soy and almond milk products are calcium fortified  Orange juice  Fortified with Calcium       8 oz            300 mg  Low fat dairy sources are recommended      Recommended exercise  goals:    30 minutes of moderate exercise on most days of the week .  Weight bearing exercise (ie, walking, jogging, hiking, dancing)    Strength training 2 or more times/week in addition to other weight -being exercise.  Strength training -- uses weight or resistance beyond that seen in everyday activities -(pilates, weight training with free weights, weight machines or resistance bands)    VDO exercise that you can follow     [email or web address]     https://[employer]/     https://www.bonehealthandosteoporosis.org/patients/treatment/exercisesafe-movement/osteoporosis-exercise-for-strong-bones/     https://www.bones.nih.gov/health-info/bone/bone-health/exercise/exercise-your-bone-health#b

## 2023-01-20 NOTE — NURSING NOTE
"Chief Complaint   Patient presents with     Thyroid Problem     Vital signs:      BP: 119/71 Pulse: 69             Weight: 87.1 kg (192 lb)  Estimated body mass index is 27.16 kg/m  as calculated from the following:    Height as of 11/8/22: 1.791 m (5' 10.5\").    Weight as of this encounter: 87.1 kg (192 lb).          "

## 2023-01-20 NOTE — LETTER
"1/20/2023       RE: Artis Galvin  [address]  [place] [address]     Dear Colleague,    Thank you for referring your patient, Artis Galvin, to the [facility] ENDOCRINOLOGY CLINIC [place] at [facility]. Please see a copy of my visit note below.    ENDOCRINOLOGY CLINIC CONSULTATION    Reason for visit: thyroid nodules, hypothyroidism and osteoporosis  Last visit: 10/2021    Subjective:   Artis is a 77 year old adult with metastatic prostate cancer s/p prostatectomy, hypothyroidism secondary to Hashimoto's thyroiditis, hypertension, AAA who is seen for follow-up of thyroid nodules, hypothyroidism and osteoporosis.       He was noted to have right thyroid mass from CT C/A/P in July 2021 that showed 29.8 x 19.9 x 32.7 mm heterogenously enhancing right thyroid mass. He recalled having ultrasound thyroid in 2014 at [facility] after having voice changed and was told that he had Hashimoto's thyroiditis. TSH of 6.4 on 6/4/2021. He has positive TPO Ab of 355. He was started on levothyroxine 50 mcg daily with normalization of TFT. He stopped levothyroxine after last visit with but TSH increased to 6.2 so it was restarted 2/2022. TSH was 4.5 at [facility] in 12/2022 and his oncologist recommended dose increase to 75 mcg daily.     Regarding osteoporosis: he did have DXA scan 9/2021 that showed the lowest T-score of -2.7 at the right hip. He has not had DXA previously. He was started on Fosamax 70 mg weekly but has struggled with compliance.     Interval History:      \"Fatigue city\" for the past year. Has also been having tachycardia to 130 (up from about 70) and felt chest pressure. Lasts minutes then self resolves. Had cardiac catheterization \"a couple months ago\" and did not get stents.  Worsening neuropathy. Has been adjusting anti-depressants.     Influenza in November 2022 after visit in [facility]. Still feeling unsteady, dizziness - " this is improving but still present.     Osteoporosis  Taking fosamax ~1/month, sometimes less. Combination of forgetfulness and doesn't want to take medications. No hx of pill esophagitis other SE.   Fractures - none  Weight change - stable, 188-190 lbs  Jaw - does have intermittent jaw pain and saw dentist which was normal eval, no recent or planned procedure  Bone pain - no, chronic joint pain. Has chronic back pain since 21 year old, not changing significantly     Bone Health:  Family history of osteoporosis or fracture: his sister has osteoporosis  BMI: 27.3  Steroid use: none  Chronic disease: metastatic prostate cancer s/p prostatectomy and radiation  Calcium intake:none from diet  Vitamin D intake: none  Alcohol: 6 pack of beer per 6 weeks  Smoking: none  Previously on Lupron treatment (GnRH analog) - stopped ~1/2022    - has NOT been taking calcium/vit D supplement       Thyroid    - restarted levothyroxine 50 mcg daily in 2/2022  - started taking levothyroxine 75 mcg daily in 12/5/2022 per recommendation of Oncologist at [facility]  - taking in AM with water on empty stomach    Underwent thyroid US 10/2021 with three TR 3 nodules with largest being 2.5 cm   Underwent FNA 11/2021 of mid-right thyroid nodule with pathology consistent with benign nodule     ROS:  Full review of systems taken with the help of the intake sheet. Otherwise a complete 14 point review of systems was taken and is negative unless stated in the history above.    Past Medical/Surgical History:  Past Medical History:   Diagnosis Date     Abdominal pain, unspecified site      Abnormal thyroid ultrasound      BBB (bundle branch block)     right     Chronic lymphocytic thyroiditis      Diverticulosis of colon (without mention of hemorrhage)      Localized superficial swelling, mass, or lump      Malignant neoplasm of prostate (H)      Nontoxic multinodular goiter      Other and unspecified hyperlipidemia      Other nonspecific abnormal  serum enzyme levels      Pain in joint, shoulder region      Persistent hoarseness      Sarcoidosis      Scleritis, unspecified      Unspecified hypothyroidism      Unspecified vitamin D deficiency      Past Surgical History:   Procedure Laterality Date     CV CORONARY ANGIOGRAM N/A 10/20/2022    Procedure: Coronary Angiogram;  Surgeon: Mir Farr MD;  Location:  HEART CARDIAC CATH LAB     CV FRACTIONAL FLOW RATIO WIRE N/A 10/20/2022    Procedure: Fractional Flow Ratio Wire;  Surgeon: Mir Farr MD;  Location:  HEART CARDIAC CATH LAB     HC SHLDR ARTHROSCOP,PART ACROMIOPLAS Right     Description: Shoulder Arthroscopy, Space Decompression And Acromioplasty;  Recorded: 02/18/2014;     KY LAP,PROSTATECTOMY,RADICAL,W/NERVE SPARE,INCL ROBOTIC Bilateral 2/27/2018    Procedure: ROBOTIC ASSISTED RADICAL RETROPUBIC PROSTATECTOMY, BILATERAL PELVIC LYMPH NODE DISSECTION LYSIS OF ADHESIONS;  Surgeon: Wale Rodriguez MD;  Location: [facility];  Service: Urology     KY RADIAL KERATOTOMY      Description: Cornea Radial Keratotomy;  Recorded: 02/18/2014;       Allergies:  Allergies   Allergen Reactions     Ciprofloxacin Unknown     Levaquin [Levofloxacin] Unknown       Current Medications   Current Outpatient Medications   Medication Sig Dispense Refill     aspirin 81 MG EC tablet Take 81 mg by mouth daily       levothyroxine (SYNTHROID/LEVOTHROID) 50 MCG tablet Take 75 mcg by mouth daily Taking 75 mcg       losartan (COZAAR) 100 MG tablet Take 1 tablet (100 mg) by mouth daily 90 tablet 3     pilocarpine (PILOCAR) 2 % ophthalmic solution Apply 1 drop to eye 2 times daily       Probiotic Product (MISC INTESTINAL MEGHA REGULAT) CAPS Take 1 capsule by mouth daily       alendronate (FOSAMAX) 70 MG tablet TAKE 1 TABLET BY MOUTH EVERY 7  DAYS BEFORE MORNING MEAL WITH 8  OZ WATER AND REMAIN UPRIGHT FOR  30 MINUTES. Please keep 1-20-23 clinic appt for refills. (Patient not taking:  Reported on 1/20/2023) 8 tablet 0     atorvastatin (LIPITOR) 40 MG tablet Take 1 tablet (40 mg) by mouth daily 90 tablet 3     calcium citrate-vitamin D (CITRACAL) 315-200 MG-UNIT TABS per tablet Take 1 tablet by mouth 2 times daily       Cyanocobalamin 5000 MCG SUBL Place 1 tablet under the tongue daily       famotidine (PEPCID) 40 MG tablet Take 1 tablet (40 mg) by mouth 2 times daily as needed for heartburn 30 tablet 0     ketoconazole (NIZORAL) 2 % external cream Apply topically as needed (Patient not taking: Reported on 1/20/2023) 60 g 3     nitroGLYcerin (NITROSTAT) 0.4 MG sublingual tablet For chest pain place 1 tablet under the tongue every 5 minutes for 3 doses. If symptoms persist 5 minutes after 1st dose call 911. (Patient not taking: Reported on 11/8/2022) 25 tablet 3     Family History:  Family History   Problem Relation Age of Onset     No Known Problems Mother      Coronary Artery Disease Father      Aortic aneurysm Father      Prostate Cancer Maternal Grandfather      Prostate Cancer Maternal Uncle        Social History:  Social History     Tobacco Use     Smoking status: Never     Smokeless tobacco: Never   Substance Use Topics     Alcohol use: Not Currently     Comment: Alcoholic Drinks/day: rarely       Physical Exam   Vitals: /71 (BP Location: Left arm, Patient Position: Sitting, Cuff Size: Adult Regular)   Pulse 69   Wt 87.1 kg (192 lb)   BMI 27.16 kg/m    BMI= Body mass index is 27.16 kg/m .   General: tired appearing, appears younger than stated age, no acute distress, pleasant and conversant  Mental Status/neuro: alert and oriented  Face: symmetrical, normal facial color  Eyes: anicteric, PERRL, no proptosis or lid lag  Neck: supple, no LAD  Thyroid: normal size and texture, possible nodule in right upper/mid thyroid palpable but otherwise wnlno bruits  Heart: regular rhythm  Legs: no swelling or edema    Labs : I reviewed data from epic and extract and summarize the pertinent data  here.     DXA 9/2021  Impression  The most negative and valid T-score of -2.7 at the level of the right femoral neck corresponds with osteoporosis according to WHO criteria for postmenopausal females and men age 50 and over. The risk of osteoporotic fracture increases approximately 2-fold for each 1.0 SD decrease in T-score.     Results   Lumbar spine   T-score -1.5 , BMD 1.043 g/cm2.      Left Femur neck  T-score -2.5 , BMD 0.740 g/cm2.  Left Total femur  T-score -1.7 , BMD 0.858 g/cm2.     Right Femur neck  T-score -2.7, BMD  0.721 g/cm2.  Right Total femur  T-score -2.2 , BMD  0.786 g/cm2.      Assessment and Plan  Artis is a 76 year old adult with metastatic prostate cancer s/p prostatectomy, hypothyroidism secondary to Hashimoto's thyroiditis, hypertension, AAA who is seen for follow-up of thyroid nodules, hypothyroidism and osteoporosis.      1) Thyroid nodule(s)  Noted from CT chest on 7/15/2021 that showed 29.8 x 19.9 x 32.7 mm heterogenously enhancing right thyroid mass. Underwent thyroid US 10/2021 with three TR 3 nodules with largest being 2.5 cm. Underwent FNA 11/2021 of mid-right thyroid nodule with pathology consistent with benign nodule.   - consider repeat Thyroid US in 1-2 years     2) Hashimoto's thyroiditis with subclinical hypothyroidism  no symptoms. Highest TSH was 6.4 in June 2021. After starting on levothyroxine, TSH normalized, pt stopped d/t preference and TSH increased to 6.2 so levothyroxine was restarted at 50 mcg. 12/2022  TSH was 4.5 and his oncologist recommended increasing dose to 75 mcg daily which he has been doing since 12/5/2022. At this time, difficult to say and more so unlikely that his fatigue is primarily due to hypothyroidism as has been ongoing for past year and did not improve with increased levothyroxine.      3) osteoporosis without pathologic fracture  DXA 9/17/2021 that showed the lowest T-score of -2.7 at the right hip.  Ca wnl. Vit D 35 2021. No previous fracture  but has family hx of osteoporosis. He is also on androgen deprivation therapy with GnRH analog so he is at risk for further bone loss. Started Fosamax 70 mg weekly 10/2021 however, compliance has been an issue. Discussed risk and benefits of IV bisphophonates and patient opts to proceed.      PLAN:   - continue Levothyroxine at 75 mg and repeat TFTs in next 2 weeks (after PCP visit)  - schedule Reclast IV infusion, can get at [facility]  - recommend Calcium 1200 mg daily and Vit D 800-1000 international unit(s) daily   - try to restart moderate weight bearing exercise  - DXA prior to next visit     RTC in 1 year    This patient was seen and discussed with the attending, Dr. Shin.    William Fields MD  Internal Medicine Resident    External notes/medical records independently reviewed, labs and imaging independently reviewed, medical management and tests to be discussed/communicated to patient.    Time: I spent 35 minutes spent on the date of the encounter preparing to see patient (including chart review and preparation), obtaining and or reviewing additional medical history, performing a physical exam and evaluation, documenting clinical information in the electronic health record, independently interpreting results, communicating results to the patient and coordinating care.    López Shin MD, MS  Division of Diabetes and Endocrinology  Department of Medicine

## 2023-01-22 DIAGNOSIS — M81.0 OSTEOPOROSIS WITHOUT CURRENT PATHOLOGICAL FRACTURE, UNSPECIFIED OSTEOPOROSIS TYPE: ICD-10-CM

## 2023-01-22 RX ORDER — ALBUTEROL SULFATE 90 UG/1
1-2 AEROSOL, METERED RESPIRATORY (INHALATION)
Status: CANCELLED
Start: 2023-01-23

## 2023-01-22 RX ORDER — ALBUTEROL SULFATE 0.83 MG/ML
2.5 SOLUTION RESPIRATORY (INHALATION)
Status: CANCELLED | OUTPATIENT
Start: 2023-01-23

## 2023-01-22 RX ORDER — DIPHENHYDRAMINE HYDROCHLORIDE 50 MG/ML
50 INJECTION INTRAMUSCULAR; INTRAVENOUS
Status: CANCELLED
Start: 2023-01-23

## 2023-01-22 RX ORDER — METHYLPREDNISOLONE SODIUM SUCCINATE 125 MG/2ML
125 INJECTION, POWDER, LYOPHILIZED, FOR SOLUTION INTRAMUSCULAR; INTRAVENOUS
Status: CANCELLED
Start: 2023-01-23

## 2023-01-22 RX ORDER — ACETAMINOPHEN 325 MG/1
650 TABLET ORAL ONCE
Status: CANCELLED | OUTPATIENT
Start: 2023-01-23

## 2023-01-22 RX ORDER — MEPERIDINE HYDROCHLORIDE 25 MG/ML
25 INJECTION INTRAMUSCULAR; INTRAVENOUS; SUBCUTANEOUS EVERY 30 MIN PRN
Status: CANCELLED | OUTPATIENT
Start: 2023-01-23

## 2023-01-22 RX ORDER — ZOLEDRONIC ACID 5 MG/100ML
5 INJECTION, SOLUTION INTRAVENOUS ONCE
Status: CANCELLED
Start: 2023-01-23

## 2023-01-22 RX ORDER — EPINEPHRINE 1 MG/ML
0.3 INJECTION, SOLUTION, CONCENTRATE INTRAVENOUS EVERY 5 MIN PRN
Status: CANCELLED | OUTPATIENT
Start: 2023-01-23

## 2023-01-22 RX ORDER — HEPARIN SODIUM,PORCINE 10 UNIT/ML
5 VIAL (ML) INTRAVENOUS
Status: CANCELLED | OUTPATIENT
Start: 2023-01-23

## 2023-01-22 RX ORDER — HEPARIN SODIUM (PORCINE) LOCK FLUSH IV SOLN 100 UNIT/ML 100 UNIT/ML
5 SOLUTION INTRAVENOUS
Status: CANCELLED | OUTPATIENT
Start: 2023-01-23

## 2023-01-24 ENCOUNTER — OFFICE VISIT (OUTPATIENT)
Dept: INTERNAL MEDICINE | Facility: CLINIC | Age: 78
End: 2023-01-24
Payer: COMMERCIAL

## 2023-01-24 VITALS
TEMPERATURE: 97.9 F | DIASTOLIC BLOOD PRESSURE: 72 MMHG | BODY MASS INDEX: 26.78 KG/M2 | HEART RATE: 69 BPM | HEIGHT: 71 IN | OXYGEN SATURATION: 98 % | SYSTOLIC BLOOD PRESSURE: 118 MMHG | RESPIRATION RATE: 12 BRPM | WEIGHT: 191.3 LBS

## 2023-01-24 DIAGNOSIS — R53.83 OTHER FATIGUE: Primary | ICD-10-CM

## 2023-01-24 DIAGNOSIS — H93.12 TINNITUS, LEFT: ICD-10-CM

## 2023-01-24 DIAGNOSIS — F34.1 DYSTHYMIA: ICD-10-CM

## 2023-01-24 DIAGNOSIS — C61 PROSTATE CANCER METASTATIC TO INTRAPELVIC LYMPH NODE (H): ICD-10-CM

## 2023-01-24 DIAGNOSIS — C77.5 PROSTATE CANCER METASTATIC TO INTRAPELVIC LYMPH NODE (H): ICD-10-CM

## 2023-01-24 DIAGNOSIS — F43.23 ADJUSTMENT DISORDER WITH MIXED ANXIETY AND DEPRESSED MOOD: ICD-10-CM

## 2023-01-24 DIAGNOSIS — I71.21 ANEURYSM OF ASCENDING AORTA WITHOUT RUPTURE (H): ICD-10-CM

## 2023-01-24 DIAGNOSIS — E06.3 HASHIMOTO'S THYROIDITIS: ICD-10-CM

## 2023-01-24 DIAGNOSIS — R51.9 PRESSURE IN HEAD: ICD-10-CM

## 2023-01-24 DIAGNOSIS — M81.0 OSTEOPOROSIS WITHOUT CURRENT PATHOLOGICAL FRACTURE, UNSPECIFIED OSTEOPOROSIS TYPE: ICD-10-CM

## 2023-01-24 DIAGNOSIS — I47.10 SVT (SUPRAVENTRICULAR TACHYCARDIA) (H): ICD-10-CM

## 2023-01-24 DIAGNOSIS — G62.9 PERIPHERAL POLYNEUROPATHY: ICD-10-CM

## 2023-01-24 DIAGNOSIS — I25.810 CORONARY ARTERY DISEASE INVOLVING AUTOLOGOUS ARTERY CORONARY BYPASS GRAFT WITHOUT ANGINA PECTORIS: ICD-10-CM

## 2023-01-24 DIAGNOSIS — E06.3 AUTOIMMUNE THYROIDITIS: ICD-10-CM

## 2023-01-24 PROBLEM — I20.89 STABLE ANGINA PECTORIS (H): Status: RESOLVED | Noted: 2019-12-27 | Resolved: 2023-01-24

## 2023-01-24 LAB
ALBUMIN SERPL BCG-MCNC: 4.2 G/DL (ref 3.5–5.2)
ALP SERPL-CCNC: 118 U/L (ref 35–129)
ALT SERPL W P-5'-P-CCNC: 16 U/L (ref 10–50)
ANION GAP SERPL CALCULATED.3IONS-SCNC: 12 MMOL/L (ref 7–15)
AST SERPL W P-5'-P-CCNC: 18 U/L (ref 10–50)
BILIRUB SERPL-MCNC: 0.3 MG/DL
BUN SERPL-MCNC: 12.7 MG/DL (ref 8–23)
CALCIUM SERPL-MCNC: 9.5 MG/DL (ref 8.8–10.2)
CHLORIDE SERPL-SCNC: 105 MMOL/L (ref 98–107)
CHOLEST SERPL-MCNC: 234 MG/DL
CREAT SERPL-MCNC: 0.94 MG/DL (ref 0.51–1.17)
DEPRECATED CALCIDIOL+CALCIFEROL SERPL-MC: 21 UG/L (ref 20–75)
DEPRECATED HCO3 PLAS-SCNC: 24 MMOL/L (ref 22–29)
ERYTHROCYTE [SEDIMENTATION RATE] IN BLOOD BY WESTERGREN METHOD: 9 MM/HR (ref 0–20)
GFR SERPL CREATININE-BSD FRML MDRD: 84 ML/MIN/1.73M2
GLUCOSE SERPL-MCNC: 87 MG/DL (ref 70–99)
HDLC SERPL-MCNC: 35 MG/DL
LDLC SERPL CALC-MCNC: 134 MG/DL
NONHDLC SERPL-MCNC: 199 MG/DL
POTASSIUM SERPL-SCNC: 4.1 MMOL/L (ref 3.4–5.3)
PROT SERPL-MCNC: 7.2 G/DL (ref 6.4–8.3)
SODIUM SERPL-SCNC: 141 MMOL/L (ref 136–145)
T4 FREE SERPL-MCNC: 1.33 NG/DL (ref 0.9–1.7)
TRIGL SERPL-MCNC: 323 MG/DL
TSH SERPL DL<=0.005 MIU/L-ACNC: 2.31 UIU/ML (ref 0.3–4.2)

## 2023-01-24 PROCEDURE — 85652 RBC SED RATE AUTOMATED: CPT | Performed by: INTERNAL MEDICINE

## 2023-01-24 PROCEDURE — 80061 LIPID PANEL: CPT | Performed by: INTERNAL MEDICINE

## 2023-01-24 PROCEDURE — 99215 OFFICE O/P EST HI 40 MIN: CPT | Performed by: INTERNAL MEDICINE

## 2023-01-24 PROCEDURE — 84439 ASSAY OF FREE THYROXINE: CPT | Performed by: INTERNAL MEDICINE

## 2023-01-24 PROCEDURE — 36415 COLL VENOUS BLD VENIPUNCTURE: CPT | Performed by: INTERNAL MEDICINE

## 2023-01-24 PROCEDURE — 82306 VITAMIN D 25 HYDROXY: CPT | Performed by: INTERNAL MEDICINE

## 2023-01-24 PROCEDURE — 84443 ASSAY THYROID STIM HORMONE: CPT | Performed by: INTERNAL MEDICINE

## 2023-01-24 PROCEDURE — 80053 COMPREHEN METABOLIC PANEL: CPT | Performed by: INTERNAL MEDICINE

## 2023-01-24 RX ORDER — VENLAFAXINE HYDROCHLORIDE 37.5 MG/1
37.5 CAPSULE, EXTENDED RELEASE ORAL DAILY
Qty: 30 CAPSULE | Refills: 0 | Status: SHIPPED | OUTPATIENT
Start: 2023-01-24 | End: 2023-02-14

## 2023-01-24 NOTE — PROGRESS NOTES
Assessment & Plan     1. Other fatigue  I still think a lot of his symptoms are made worse at least by his chronic dysthymia and depression.  Trial of venlafaxine 37.5 mg daily.  See me back in a few weeks to see how he is doing.  - venlafaxine (EFFEXOR XR) 37.5 MG 24 hr capsule; Take 1 capsule (37.5 mg) by mouth daily  Dispense: 30 capsule; Refill: 0    2. Peripheral polyneuropathy  His neuropathy may be improved with the SNRI as well.    3. Aneurysm of ascending aorta without rupture  Continue close follow-up with his cardiologist to his reimaging him I believe this spring    4. Prostate cancer metastatic to intrapelvic lymph node (H)  Most recent PSA is undetectable.    5. Coronary artery disease involving autologous artery coronary bypass graft without angina pectoris  Lipids today.  Has been off of his statin and does not feel any better.  I have asked him to resume it.  - Lipid panel reflex to direct LDL Fasting; Future  - Comprehensive metabolic panel (BMP + Alb, Alk Phos, ALT, AST, Total. Bili, TP); Future  - Lipid panel reflex to direct LDL Fasting  - Comprehensive metabolic panel (BMP + Alb, Alk Phos, ALT, AST, Total. Bili, TP)    6. Osteoporosis without current pathological fracture, unspecified osteoporosis type  He will be getting Reclast soon.  - Vitamin D Deficiency (D3 Only)    7. Autoimmune thyroiditis  Continue to follow with [facility].    8. Adjustment disorder with mixed anxiety and depressed mood  As above, trial of venlafaxine    9. Dysthymia  As above.    - venlafaxine (EFFEXOR XR) 37.5 MG 24 hr capsule; Take 1 capsule (37.5 mg) by mouth daily  Dispense: 30 capsule; Refill: 0    10. Tinnitus, left  Given the head giles and ear symptoms we will do MRI.  - MR Brain w/o Contrast; Future    11. Pressure in head  Low threshold to refer back to his neurologist as well.  - ESR: Erythrocyte sedimentation rate; Future  - MR Brain w/o Contrast; Future  - ESR: Erythrocyte sedimentation rate    12.  "Hashimoto's thyroiditis    - T4 free  - TSH    13. SVT (supraventricular tachycardia) (H)  No issues for some time.  Medications per cardiology.      Over 50 minutes was spent today in reviewing chart and outside records, lengthy visit, and completion of documentation     BMI:   Estimated body mass index is 27.06 kg/m  as calculated from the following:    Height as of this encounter: 1.791 m (5' 10.5\").    Weight as of this encounter: 86.8 kg (191 lb 4.8 oz).           Return in about 4 weeks (around 2/21/2023).    COLBY BROCK MD  [facility]    Subjective   Binu is a 77 year old, presenting for the following health issues:  RECHECK      History of Present Illness       Reason for visit:  Follow upMike PATRICIA Galvin consumes 1 sweetened beverage(s) daily. Binu Galvin exercises with enough effort to increase Binu Galvin's heart rate 3 or less days per week.   Binu Galvin is taking medications regularly.         I comes in today for follow-up of his multiple medical problems.  He is just not feeling well.  A lot of things to discuss.  He just has not felt well for some time.  He is been somewhat chronically dysthymic and anxious for years.  We have tried medications and has not tolerated them.  He comes in today and is just not feeling well.  He feels fatigued all the time.  He has multiple somatic concerns.  He has polyneuropathy of unclear etiology.  Neurologic evaluation has been unrevealing.  He was had some ulnar neuropathy but that was about all that was found.  He does have metastatic prostate cancer.  He has coronary disease which is asymptomatic.  He has osteoporosis which he was placed on Fosamax but could not remember to take it so they are going to switch him to Reclast.  His biggest concern also today is pressure in his head and ringing in his ears.  His girlfriend/significant other believes he needs some imaging of his head.  He is followed at [facility] for Hashimoto's " "thyroiditis.  He is on levothyroxine per them.  He has a lot of depressive symptoms.    Review of Systems         Objective    /72 (BP Location: Left arm, Patient Position: Sitting, Cuff Size: Adult Small)   Pulse 69   Temp 97.9  F (36.6  C) (Tympanic)   Resp 12   Ht 1.791 m (5' 10.5\")   Wt 86.8 kg (191 lb 4.8 oz)   SpO2 98%   BMI 27.06 kg/m    Body mass index is 27.06 kg/m .  Physical Exam   Pleasant dysthymic person.  Jokes at times.  However he tells me his PHQ-9 is 36.  That is higher than the scale goes.                    "

## 2023-01-25 ENCOUNTER — MYC MEDICAL ADVICE (OUTPATIENT)
Dept: INTERNAL MEDICINE | Facility: CLINIC | Age: 78
End: 2023-01-25
Payer: COMMERCIAL

## 2023-01-25 DIAGNOSIS — E06.3 HASHIMOTO'S THYROIDITIS: Primary | ICD-10-CM

## 2023-01-25 RX ORDER — LEVOTHYROXINE SODIUM 75 UG/1
75 TABLET ORAL DAILY
Qty: 90 TABLET | Refills: 3 | Status: SHIPPED | OUTPATIENT
Start: 2023-01-25 | End: 2023-11-29

## 2023-01-25 RX ORDER — ALENDRONATE SODIUM 70 MG/1
TABLET ORAL
Qty: 8 TABLET | Refills: 5 | OUTPATIENT
Start: 2023-01-25

## 2023-01-26 ENCOUNTER — TELEPHONE (OUTPATIENT)
Dept: CARDIOLOGY | Facility: CLINIC | Age: 78
End: 2023-01-26
Payer: COMMERCIAL

## 2023-01-31 ENCOUNTER — HOSPITAL ENCOUNTER (OUTPATIENT)
Dept: MRI IMAGING | Facility: HOSPITAL | Age: 78
Discharge: HOME | End: 2023-01-31
Attending: INTERNAL MEDICINE | Admitting: INTERNAL MEDICINE
Payer: COMMERCIAL

## 2023-01-31 DIAGNOSIS — R51.9 PRESSURE IN HEAD: ICD-10-CM

## 2023-01-31 DIAGNOSIS — H93.12 TINNITUS, LEFT: ICD-10-CM

## 2023-01-31 PROCEDURE — 70551 MRI BRAIN STEM W/O DYE: CPT

## 2023-02-06 ENCOUNTER — HOSPITAL ENCOUNTER (OUTPATIENT)
Dept: CARDIOLOGY | Facility: HOSPITAL | Age: 78
Discharge: HOME | End: 2023-02-06
Attending: INTERNAL MEDICINE | Admitting: INTERNAL MEDICINE
Payer: COMMERCIAL

## 2023-02-06 DIAGNOSIS — I45.10 RIGHT BUNDLE BRANCH BLOCK: ICD-10-CM

## 2023-02-06 DIAGNOSIS — I47.10 SVT (SUPRAVENTRICULAR TACHYCARDIA) (H): ICD-10-CM

## 2023-02-06 PROCEDURE — 93270 REMOTE 30 DAY ECG REV/REPORT: CPT

## 2023-02-13 DIAGNOSIS — R53.83 OTHER FATIGUE: ICD-10-CM

## 2023-02-13 DIAGNOSIS — F34.1 DYSTHYMIA: ICD-10-CM

## 2023-02-14 RX ORDER — VENLAFAXINE HYDROCHLORIDE 37.5 MG/1
CAPSULE, EXTENDED RELEASE ORAL
Qty: 30 CAPSULE | Refills: 11 | Status: SHIPPED | OUTPATIENT
Start: 2023-02-14 | End: 2023-05-17

## 2023-02-14 NOTE — TELEPHONE ENCOUNTER
"Routing refill request to provider for review/approval because:  phq 9    Last Written Prescription Date:  1/24/23  Last Fill Quantity: 30,  # refills: 0   Last office visit provider:  1/24/23     Requested Prescriptions   Pending Prescriptions Disp Refills     venlafaxine (EFFEXOR XR) 37.5 MG 24 hr capsule [Pharmacy Med Name: Venlafaxine HCl ER 37.5 MG Oral Capsule Extended Release 24 Hour] 30 capsule 11     Sig: TAKE 1 CAPSULE BY MOUTH DAILY       Serotonin-Norepinephrine Reuptake Inhibitors  Failed - 2/13/2023  5:25 AM        Failed - PHQ-9 score of less than 5 in past 6 months     Please review last PHQ-9 score.           Passed - Blood pressure under 140/90 in past 12 months     BP Readings from Last 3 Encounters:   01/24/23 118/72   01/20/23 119/71   11/08/22 134/85                 Passed - Medication is active on med list        Passed - Patient is age 18 or older        Passed - Normal serum creatinine on file in past 12 months     Recent Labs   Lab Test 01/24/23  1537 05/26/20  1059 05/05/20  1227   CR 0.94   < >  --    CREAT  --   --  0.9    < > = values in this interval not displayed.       Ok to refill medication if creatinine is low          Passed - Recent (6 mo) or future (30 days) visit within the authorizing provider's specialty     Patient had office visit in the last 6 months or has a visit in the next 30 days with authorizing provider or within the authorizing provider's specialty.  See \"Patient Info\" tab in inbasket, or \"Choose Columns\" in Meds & Orders section of the refill encounter.                 Carol Estrada RN 02/14/23 10:07 AM  "

## 2023-02-17 ENCOUNTER — HOSPITAL ENCOUNTER (OUTPATIENT)
Dept: CT IMAGING | Facility: HOSPITAL | Age: 78
Discharge: HOME | End: 2023-02-17
Attending: INTERNAL MEDICINE | Admitting: INTERNAL MEDICINE
Payer: COMMERCIAL

## 2023-02-17 DIAGNOSIS — I71.21 ASCENDING AORTIC ANEURYSM (H): ICD-10-CM

## 2023-02-17 PROCEDURE — 250N000011 HC RX IP 250 OP 636: Performed by: INTERNAL MEDICINE

## 2023-02-17 PROCEDURE — 74177 CT ABD & PELVIS W/CONTRAST: CPT

## 2023-02-17 RX ORDER — IOPAMIDOL 755 MG/ML
100 INJECTION, SOLUTION INTRAVASCULAR ONCE
Status: COMPLETED | OUTPATIENT
Start: 2023-02-17 | End: 2023-02-17

## 2023-02-17 RX ADMIN — IOPAMIDOL 100 ML: 755 INJECTION, SOLUTION INTRAVENOUS at 13:55

## 2023-02-20 DIAGNOSIS — I71.21 ANEURYSM OF ASCENDING AORTA WITHOUT RUPTURE (H): Primary | ICD-10-CM

## 2023-02-21 ENCOUNTER — TRANSFERRED RECORDS (OUTPATIENT)
Dept: HEALTH INFORMATION MANAGEMENT | Facility: CLINIC | Age: 78
End: 2023-02-21

## 2023-03-07 ENCOUNTER — MYC MEDICAL ADVICE (OUTPATIENT)
Dept: INTERNAL MEDICINE | Facility: CLINIC | Age: 78
End: 2023-03-07

## 2023-03-10 PROCEDURE — 93272 ECG/REVIEW INTERPRET ONLY: CPT | Performed by: INTERNAL MEDICINE

## 2023-03-13 NOTE — TELEPHONE ENCOUNTER
Patient was contacted and advised that Dr. Hernandez is still credentialed with [facility].  The letter sent from [facility] was due to an error on the insurance carrier's side.    Roma Smith M.A., LPN  Clinic Manager  [facility]

## 2023-03-14 ENCOUNTER — TELEPHONE (OUTPATIENT)
Dept: CARDIOLOGY | Facility: CLINIC | Age: 78
End: 2023-03-14
Payer: COMMERCIAL

## 2023-03-14 NOTE — TELEPHONE ENCOUNTER
Discussed monitor results with patient and he is going to keep track of his symptoms and let me know if they become more intense and or frequent, at that point he may want to move forward with an ablation.

## 2023-03-27 ENCOUNTER — TELEPHONE (OUTPATIENT)
Dept: INTERNAL MEDICINE | Facility: CLINIC | Age: 78
End: 2023-03-27
Payer: COMMERCIAL

## 2023-03-27 NOTE — TELEPHONE ENCOUNTER
"  FYI - Status Update    Who is Calling: patient    Update: PT scheduled follow up with David for 4/13 however chart states that \"PT to always needs 40 min appointments with David\"   Follow up appointment scheduled for 20 minutes-clinic to add extra time if necessary    Does caller want a call/response back: No        "

## 2023-04-20 ENCOUNTER — TELEPHONE (OUTPATIENT)
Dept: INTERNAL MEDICINE | Facility: CLINIC | Age: 78
End: 2023-04-20
Payer: COMMERCIAL

## 2023-04-20 NOTE — TELEPHONE ENCOUNTER
Spoke with Binu and assisted him with scheduling a lab appointment. I informed him that he can bring the outside facility lab orders with him to appointment or have them fax the orders to the lab. He will bring the lab orders in with him tomorrow.

## 2023-04-20 NOTE — TELEPHONE ENCOUNTER
General Call    Contacts       Type Contact Phone/Fax    04/20/2023 11:28 AM CDT Phone (Incoming) Binu Galvin (Self) [phone] (M)          Order/Referral Request    Who is requesting: Patient and would like a phone call to confirm whether he can have the test done at [place], or if he has to go through the neurological clinic    Orders being requested: Ferritin Serum Test    Reason service is needed/diagnosis: The patient has an appointment at the [facility] next Thursday, April 27 and they are requesting this test and results.    Patient has a call and message into the clinic, however the patient says it would be easier and more convenient for him to have the test done here at [place] if possible    When are orders needed by: ASAP    Has this been discussed with Provider: No    Does patient have a preference on a Group/Provider/Facility? n/a    Does patient have an appointment scheduled?: Yes: 4/27/23 is his appointment at [facility]    Where to send orders: Place orders within Epic     prefer to receive a phone call?:   Patient would prefer a phone call     Okay to leave a detailed message?: Yes at Home number on file [phone]

## 2023-04-21 ENCOUNTER — APPOINTMENT (OUTPATIENT)
Dept: LAB | Facility: CLINIC | Age: 78
End: 2023-04-21
Payer: COMMERCIAL

## 2023-04-21 DIAGNOSIS — R20.0 NUMBNESS AND TINGLING SENSATION OF SKIN: ICD-10-CM

## 2023-04-21 DIAGNOSIS — R20.2 NUMBNESS AND TINGLING SENSATION OF SKIN: ICD-10-CM

## 2023-04-21 DIAGNOSIS — R25.3 FASCICULATIONS: ICD-10-CM

## 2023-04-21 DIAGNOSIS — G56.23 ULNAR NEUROPATHY OF BOTH UPPER EXTREMITIES: Primary | ICD-10-CM

## 2023-04-21 DIAGNOSIS — Z00.00 HEALTH CARE MAINTENANCE: ICD-10-CM

## 2023-04-21 DIAGNOSIS — G62.9 PERIPHERAL NEUROPATHY: ICD-10-CM

## 2023-04-21 DIAGNOSIS — I73.00 RAYNAUD'S SYNDROME: ICD-10-CM

## 2023-04-21 LAB — FERRITIN SERPL-MCNC: 168 NG/ML (ref 11–409)

## 2023-04-21 PROCEDURE — 36415 COLL VENOUS BLD VENIPUNCTURE: CPT | Performed by: PSYCHIATRY & NEUROLOGY

## 2023-04-21 PROCEDURE — 82728 ASSAY OF FERRITIN: CPT | Performed by: PSYCHIATRY & NEUROLOGY

## 2023-04-24 ENCOUNTER — TELEPHONE (OUTPATIENT)
Dept: PULMONOLOGY | Facility: CLINIC | Age: 78
End: 2023-04-24
Payer: COMMERCIAL

## 2023-04-24 DIAGNOSIS — D86.9 SARCOIDOSIS: Primary | ICD-10-CM

## 2023-04-24 NOTE — TELEPHONE ENCOUNTER
[facility] Call Center    Phone Message    May a detailed message be left on voicemail: yes     Reason for Call: Appointment Intake    Referring Provider Name: Mir Farr MD in  [facility]  Diagnosis and/or Symptoms: Sarcoidosis    Action Taken: Message routed to:  [facility] Pulm.     Travel Screening: Not Applicable

## 2023-05-01 ENCOUNTER — TELEPHONE (OUTPATIENT)
Dept: ENDOCRINOLOGY | Facility: CLINIC | Age: 78
End: 2023-05-01

## 2023-05-01 NOTE — TELEPHONE ENCOUNTER
Spoke with patient and rescheduled 1/19/24 appointment with Dr. Shin to 2/2/24 as provider was unavailable.

## 2023-05-03 NOTE — CONFIDENTIAL NOTE
ILD New Patient Referral: Pre visit communication    Patient: Artis Galvin  Reason for Referral: Sarcoidosis  Referring Physician:   Mir Farr MD   [address]   [place] [address]   Phone: [phone]       Chest CT scan: YES. Date-2/2023. Location-[facility].  Biopsy: YES. Date-50 years ago. Location-Not for sure, somewhere in [facility].  PFT's:No  Additional testing: YES: Cardiac catheterization in 10/2022 in which this referral originates from. Per note from cardiologist after procedure:    Mir Farr MD   10/21/2022  6:52 AM CDT       I explained to the patient and family that his symptoms may not be from CAD seeing as the lesions in his LAD and D1 are not hemodynamically significant. I asked them to investigate other options such as pulmonary (he has a history of pulmonary sarcoid) and GI. .     Current symptoms: Mild shortness of breath  Current related prescriptions: No    Supplemental oxygen? No    In review of apparent records and conversation with patient; recommend first visit with ILD provider be conducted :  In person visit  Testing needed: CT scan only    Additional notes:

## 2023-05-04 DIAGNOSIS — I71.21 ASCENDING AORTIC ANEURYSM (H): ICD-10-CM

## 2023-05-04 DIAGNOSIS — I47.10 SVT (SUPRAVENTRICULAR TACHYCARDIA) (H): ICD-10-CM

## 2023-05-08 RX ORDER — LOSARTAN POTASSIUM 100 MG/1
100 TABLET ORAL DAILY
Qty: 90 TABLET | Refills: 1 | Status: ON HOLD | OUTPATIENT
Start: 2023-05-08 | End: 2023-09-01

## 2023-05-17 ENCOUNTER — OFFICE VISIT (OUTPATIENT)
Dept: INTERNAL MEDICINE | Facility: CLINIC | Age: 78
End: 2023-05-17
Payer: COMMERCIAL

## 2023-05-17 VITALS
WEIGHT: 190.1 LBS | SYSTOLIC BLOOD PRESSURE: 122 MMHG | BODY MASS INDEX: 26.61 KG/M2 | HEIGHT: 71 IN | OXYGEN SATURATION: 96 % | TEMPERATURE: 98.3 F | HEART RATE: 74 BPM | RESPIRATION RATE: 25 BRPM | DIASTOLIC BLOOD PRESSURE: 71 MMHG

## 2023-05-17 DIAGNOSIS — F34.1 DYSTHYMIA: Primary | ICD-10-CM

## 2023-05-17 DIAGNOSIS — R53.83 OTHER FATIGUE: ICD-10-CM

## 2023-05-17 DIAGNOSIS — E78.00 HYPERCHOLESTEROLEMIA: ICD-10-CM

## 2023-05-17 DIAGNOSIS — M81.0 OSTEOPOROSIS WITHOUT CURRENT PATHOLOGICAL FRACTURE, UNSPECIFIED OSTEOPOROSIS TYPE: ICD-10-CM

## 2023-05-17 DIAGNOSIS — Q45.3 PANCREATIC ABNORMALITY: ICD-10-CM

## 2023-05-17 DIAGNOSIS — I25.810 CORONARY ARTERY DISEASE INVOLVING AUTOLOGOUS ARTERY CORONARY BYPASS GRAFT WITHOUT ANGINA PECTORIS: ICD-10-CM

## 2023-05-17 DIAGNOSIS — I71.21 ANEURYSM OF ASCENDING AORTA WITHOUT RUPTURE (H): ICD-10-CM

## 2023-05-17 DIAGNOSIS — E06.3 HASHIMOTO'S THYROIDITIS: ICD-10-CM

## 2023-05-17 DIAGNOSIS — Z85.46 PERSONAL HISTORY OF MALIGNANT NEOPLASM OF PROSTATE: ICD-10-CM

## 2023-05-17 LAB
ALBUMIN UR-MCNC: NEGATIVE MG/DL
APPEARANCE UR: CLEAR
BILIRUB UR QL STRIP: NEGATIVE
COLOR UR AUTO: YELLOW
ERYTHROCYTE [DISTWIDTH] IN BLOOD BY AUTOMATED COUNT: 12.3 % (ref 10–15)
GLUCOSE UR STRIP-MCNC: NEGATIVE MG/DL
HCT VFR BLD AUTO: 45.5 % (ref 35–53)
HGB BLD-MCNC: 15.3 G/DL (ref 11.7–17.7)
HGB UR QL STRIP: NEGATIVE
KETONES UR STRIP-MCNC: NEGATIVE MG/DL
LEUKOCYTE ESTERASE UR QL STRIP: NEGATIVE
MCH RBC QN AUTO: 30.5 PG (ref 26.5–33)
MCHC RBC AUTO-ENTMCNC: 33.6 G/DL (ref 31.5–36.5)
MCV RBC AUTO: 91 FL (ref 78–100)
NITRATE UR QL: NEGATIVE
PH UR STRIP: 5.5 [PH] (ref 5–8)
PLATELET # BLD AUTO: 241 10E3/UL (ref 150–450)
RBC # BLD AUTO: 5.01 10E6/UL (ref 3.8–5.9)
SP GR UR STRIP: 1.01 (ref 1–1.03)
UROBILINOGEN UR STRIP-ACNC: 0.2 E.U./DL
WBC # BLD AUTO: 9.9 10E3/UL (ref 4–11)

## 2023-05-17 PROCEDURE — 85027 COMPLETE CBC AUTOMATED: CPT | Performed by: INTERNAL MEDICINE

## 2023-05-17 PROCEDURE — 84443 ASSAY THYROID STIM HORMONE: CPT | Performed by: INTERNAL MEDICINE

## 2023-05-17 PROCEDURE — 36415 COLL VENOUS BLD VENIPUNCTURE: CPT | Performed by: INTERNAL MEDICINE

## 2023-05-17 PROCEDURE — 81003 URINALYSIS AUTO W/O SCOPE: CPT | Performed by: INTERNAL MEDICINE

## 2023-05-17 PROCEDURE — 80053 COMPREHEN METABOLIC PANEL: CPT | Performed by: INTERNAL MEDICINE

## 2023-05-17 PROCEDURE — 80061 LIPID PANEL: CPT | Performed by: INTERNAL MEDICINE

## 2023-05-17 PROCEDURE — 99215 OFFICE O/P EST HI 40 MIN: CPT | Performed by: INTERNAL MEDICINE

## 2023-05-17 ASSESSMENT — PAIN SCALES - GENERAL: PAINLEVEL: NO PAIN (0)

## 2023-05-17 NOTE — PROGRESS NOTES
1. Dysthymia  I would like to get a psychiatric opinion here.  I question if some of the magnetic treatments or another newer pharmacologic therapy might be helpful here.  He refuses to meet with a counselor.  - Adult Mental Health  Referral; Future    2. Other fatigue  Likely multifactorial and possibly due to post-COVID versus #1.  We will check labs below though.  - Comprehensive metabolic panel (BMP + Alb, Alk Phos, ALT, AST, Total. Bili, TP); Future  - TSH with free T4 reflex; Future  - CBC with platelets; Future  - UA Macroscopic with reflex to Microscopic and Culture; Future  - Comprehensive metabolic panel (BMP + Alb, Alk Phos, ALT, AST, Total. Bili, TP)  - TSH with free T4 reflex  - CBC with platelets  - UA Macroscopic with reflex to Microscopic and Culture    3. Pancreatic abnormality  He needs follow-up on these pancreatic cysts.  We will get that.  - MR Abdomen MRCP w/o & w Contrast; Future    4. Aneurysm of ascending aorta without rupture (H)  Continue close follow-up with his cardiologist and his CT angiograms    5. Osteoporosis without current pathological fracture, unspecified osteoporosis type  Now on Fosamax and tolerating it.    6. Hashimoto's thyroiditis  Check TSH given his ongoing fatigue  - TSH with free T4 reflex; Future  - TSH with free T4 reflex    7. Coronary artery disease involving autologous artery coronary bypass graft without angina pectoris  Lipids today.  Has not been taking his medications as prescribed.  Now on 20 mg of Lipitor.  He is risk resistant to taking medication, namely statin, as prescribed  - Lipid panel reflex to direct LDL Fasting; Future  - Lipid panel reflex to direct LDL Fasting    8. Hypercholesterolemia  As above    9. Personal history of malignant neoplasm of prostate  Continue close follow-up with his urology provider    Subjective   Binu is a 78 year old, presenting for the following health issues:  Depression (Side effects from the anti  "depressant-pt wishes to discontinue-side effects =dizzy, pain, tired, \"zoned out\")        5/17/2023     3:06 PM   Additional Questions   Roomed by Ginny     History of Present Illness       Mental Health Follow-up:  Patient presents to follow-up on Depression.Patient's depression since last visit has been:  No change  The patient is not having other symptoms associated with depression.      Any significant life events: No  Patient is not feeling anxious or having panic attacks.  Patient has no concerns about alcohol or drug use.Binu Galvin consumes 1 sweetened beverage(s) daily.Binu Galvin exercises with enough effort to increase Binu Galvin's heart rate 9 or less minutes per day.  Binu Galvin exercises with enough effort to increase Binu Galvin's heart rate 3 or less days per week.   Binu Galvin is taking medications regularly.         Binu comes in today for follow-up.  Extensive visit.  Over 30 minutes was spent with patient and additional 15 was spent in chart review and visit completion.  Binu is a pleasant gentleman with multiple medical problems including prostate cancer history as well as a sending aortic aneurysm, coronary disease, Hashimoto's and osteoporosis.  He has been chronically dysthymic.  Back in January he was not doing well.  He has been very fatigued since having COVID.  A lot of his symptoms however predated that I think in I think it had something to do with his dysthymia.  I put him on venlafaxine to see how he would do.  He did not start taking it until about 2 weeks ago.  He comes in today and he tells me he gets terrible medication.  Its not helping him.  It is slowing him down.  It is making him hoarse.  He feels horrible on it.  He wants to come off of it and wonders how to do so.  He can only do \"1 thing a day\".  He never did get the pancreatic cyst scan.  This is being followed by gastroenterologist here in our system but it looks like that got failed to follow-up.  He follows " "with the [facility] regarding his prostate cancer.  He sees multiple providers and multiple systems which also complicates things.  He is now back on Fosamax for his osteoporosis.  There was talk of him switching to Reclast because of poor compliance on the Fosamax but he did then switch to the alendronate and has been taking it reliably.      Review of Systems         Objective    /71 (BP Location: Left arm, Patient Position: Sitting, Cuff Size: Adult Large)   Pulse 74   Temp 98.3  F (36.8  C) (Tympanic)   Resp 25   Ht 1.8 m (5' 10.87\")   Wt 86.2 kg (190 lb 1.6 oz)   SpO2 96%   BMI 26.61 kg/m    Body mass index is 26.61 kg/m .  Physical Exam       Pleasant dysthymic gentleman in no distress.  Occasionally laughs and smiles but fairly flat affect most of the time.                "

## 2023-05-18 ENCOUNTER — HOSPITAL ENCOUNTER (OUTPATIENT)
Dept: CT IMAGING | Facility: HOSPITAL | Age: 78
Discharge: HOME | End: 2023-05-18
Attending: INTERNAL MEDICINE | Admitting: INTERNAL MEDICINE
Payer: COMMERCIAL

## 2023-05-18 ENCOUNTER — OFFICE VISIT (OUTPATIENT)
Dept: INTERNAL MEDICINE | Facility: CLINIC | Age: 78
End: 2023-05-18
Payer: COMMERCIAL

## 2023-05-18 ENCOUNTER — NURSE TRIAGE (OUTPATIENT)
Dept: NURSING | Facility: CLINIC | Age: 78
End: 2023-05-18

## 2023-05-18 VITALS
HEIGHT: 71 IN | OXYGEN SATURATION: 98 % | TEMPERATURE: 99.1 F | SYSTOLIC BLOOD PRESSURE: 106 MMHG | HEART RATE: 79 BPM | DIASTOLIC BLOOD PRESSURE: 60 MMHG | BODY MASS INDEX: 26.78 KG/M2 | WEIGHT: 191.3 LBS | RESPIRATION RATE: 16 BRPM

## 2023-05-18 DIAGNOSIS — K57.32 DIVERTICULITIS OF COLON: Primary | ICD-10-CM

## 2023-05-18 DIAGNOSIS — R11.2 NAUSEA AND VOMITING, UNSPECIFIED VOMITING TYPE: ICD-10-CM

## 2023-05-18 DIAGNOSIS — R10.31 ABDOMINAL PAIN, RIGHT LOWER QUADRANT: Primary | ICD-10-CM

## 2023-05-18 DIAGNOSIS — R50.9 LOW GRADE FEVER: ICD-10-CM

## 2023-05-18 DIAGNOSIS — R10.31 ABDOMINAL PAIN, RIGHT LOWER QUADRANT: ICD-10-CM

## 2023-05-18 LAB
ALBUMIN SERPL BCG-MCNC: 4.1 G/DL (ref 3.5–5.2)
ALBUMIN SERPL BCG-MCNC: 4.2 G/DL (ref 3.5–5.2)
ALBUMIN UR-MCNC: 30 MG/DL
ALP SERPL-CCNC: 138 U/L (ref 35–129)
ALP SERPL-CCNC: 143 U/L (ref 35–129)
ALT SERPL W P-5'-P-CCNC: 11 U/L (ref 10–50)
ALT SERPL W P-5'-P-CCNC: 15 U/L (ref 10–50)
ANION GAP SERPL CALCULATED.3IONS-SCNC: 13 MMOL/L (ref 7–15)
ANION GAP SERPL CALCULATED.3IONS-SCNC: 9 MMOL/L (ref 7–15)
APPEARANCE UR: CLEAR
AST SERPL W P-5'-P-CCNC: 15 U/L (ref 10–50)
AST SERPL W P-5'-P-CCNC: 16 U/L (ref 10–50)
BACTERIA #/AREA URNS HPF: ABNORMAL /HPF
BILIRUB SERPL-MCNC: 0.5 MG/DL
BILIRUB SERPL-MCNC: 1 MG/DL
BILIRUB UR QL STRIP: ABNORMAL
BUN SERPL-MCNC: 11.9 MG/DL (ref 8–23)
BUN SERPL-MCNC: 12.5 MG/DL (ref 8–23)
CALCIUM SERPL-MCNC: 9.1 MG/DL (ref 8.8–10.2)
CALCIUM SERPL-MCNC: 9.2 MG/DL (ref 8.8–10.2)
CHLORIDE SERPL-SCNC: 101 MMOL/L (ref 98–107)
CHLORIDE SERPL-SCNC: 103 MMOL/L (ref 98–107)
CHOLEST SERPL-MCNC: 127 MG/DL
COLOR UR AUTO: YELLOW
CREAT BLD-MCNC: 1.4 MG/DL (ref 0.6–1.3)
CREAT SERPL-MCNC: 1.03 MG/DL (ref 0.51–1.17)
CREAT SERPL-MCNC: 1.23 MG/DL (ref 0.51–1.17)
DEPRECATED HCO3 PLAS-SCNC: 24 MMOL/L (ref 22–29)
DEPRECATED HCO3 PLAS-SCNC: 27 MMOL/L (ref 22–29)
ERYTHROCYTE [DISTWIDTH] IN BLOOD BY AUTOMATED COUNT: 12.4 % (ref 10–15)
GFR SERPL CREATININE-BSD FRML MDRD: 52 ML/MIN/1.73M2
GFR SERPL CREATININE-BSD FRML MDRD: 60 ML/MIN/1.73M2
GFR SERPL CREATININE-BSD FRML MDRD: 75 ML/MIN/1.73M2
GLUCOSE SERPL-MCNC: 118 MG/DL (ref 70–99)
GLUCOSE SERPL-MCNC: 91 MG/DL (ref 70–99)
GLUCOSE UR STRIP-MCNC: NEGATIVE MG/DL
HCT VFR BLD AUTO: 47.1 % (ref 35–53)
HDLC SERPL-MCNC: 37 MG/DL
HGB BLD-MCNC: 15.7 G/DL (ref 11.7–17.7)
HGB UR QL STRIP: ABNORMAL
KETONES UR STRIP-MCNC: NEGATIVE MG/DL
LDLC SERPL CALC-MCNC: 60 MG/DL
LEUKOCYTE ESTERASE UR QL STRIP: NEGATIVE
MCH RBC QN AUTO: 30.8 PG (ref 26.5–33)
MCHC RBC AUTO-ENTMCNC: 33.3 G/DL (ref 31.5–36.5)
MCV RBC AUTO: 93 FL (ref 78–100)
MUCOUS THREADS #/AREA URNS LPF: PRESENT /LPF
NITRATE UR QL: NEGATIVE
NONHDLC SERPL-MCNC: 90 MG/DL
PH UR STRIP: 5.5 [PH] (ref 5–8)
PLATELET # BLD AUTO: 229 10E3/UL (ref 150–450)
POTASSIUM SERPL-SCNC: 4 MMOL/L (ref 3.4–5.3)
POTASSIUM SERPL-SCNC: 5.4 MMOL/L (ref 3.4–5.3)
PROT SERPL-MCNC: 6.8 G/DL (ref 6.4–8.3)
PROT SERPL-MCNC: 7.3 G/DL (ref 6.4–8.3)
RBC # BLD AUTO: 5.09 10E6/UL (ref 3.8–5.9)
RBC #/AREA URNS AUTO: ABNORMAL /HPF
SODIUM SERPL-SCNC: 138 MMOL/L (ref 136–145)
SODIUM SERPL-SCNC: 139 MMOL/L (ref 136–145)
SP GR UR STRIP: 1.02 (ref 1–1.03)
SQUAMOUS #/AREA URNS AUTO: ABNORMAL /LPF
TRIGL SERPL-MCNC: 151 MG/DL
TSH SERPL DL<=0.005 MIU/L-ACNC: 2.16 UIU/ML (ref 0.3–4.2)
UROBILINOGEN UR STRIP-ACNC: 0.2 E.U./DL
WBC # BLD AUTO: 6.3 10E3/UL (ref 4–11)
WBC #/AREA URNS AUTO: ABNORMAL /HPF

## 2023-05-18 PROCEDURE — 99214 OFFICE O/P EST MOD 30 MIN: CPT | Performed by: INTERNAL MEDICINE

## 2023-05-18 PROCEDURE — 81001 URINALYSIS AUTO W/SCOPE: CPT | Performed by: INTERNAL MEDICINE

## 2023-05-18 PROCEDURE — 80053 COMPREHEN METABOLIC PANEL: CPT | Performed by: INTERNAL MEDICINE

## 2023-05-18 PROCEDURE — 36415 COLL VENOUS BLD VENIPUNCTURE: CPT | Performed by: INTERNAL MEDICINE

## 2023-05-18 PROCEDURE — 250N000011 HC RX IP 250 OP 636: Performed by: INTERNAL MEDICINE

## 2023-05-18 PROCEDURE — 82565 ASSAY OF CREATININE: CPT

## 2023-05-18 PROCEDURE — 74177 CT ABD & PELVIS W/CONTRAST: CPT

## 2023-05-18 PROCEDURE — 85027 COMPLETE CBC AUTOMATED: CPT | Performed by: INTERNAL MEDICINE

## 2023-05-18 RX ORDER — IOPAMIDOL 755 MG/ML
90 INJECTION, SOLUTION INTRAVASCULAR ONCE
Status: DISCONTINUED | OUTPATIENT
Start: 2023-05-18 | End: 2023-05-18

## 2023-05-18 RX ORDER — ALENDRONATE SODIUM 70 MG/1
70 TABLET ORAL
COMMUNITY
Start: 2023-02-05 | End: 2023-11-30

## 2023-05-18 RX ORDER — IOPAMIDOL 755 MG/ML
67 INJECTION, SOLUTION INTRAVASCULAR ONCE
Status: COMPLETED | OUTPATIENT
Start: 2023-05-18 | End: 2023-05-18

## 2023-05-18 RX ADMIN — IOPAMIDOL 67 ML: 755 INJECTION, SOLUTION INTRAVENOUS at 12:53

## 2023-05-18 ASSESSMENT — PAIN SCALES - GENERAL: PAINLEVEL: MODERATE PAIN (5)

## 2023-05-18 NOTE — PROGRESS NOTES
"  1. Abdominal pain, right lower quadrant  We discussed the differential.  With the elevated alk phos yesterday he could have gallbladder disease however his tenderness is much lower than that.  I am concerned about acute appendicitis.  He does have a history of diverticulitis but that is always been left-sided.  We will have him undergo CT scan for further evaluation.  I discussed he may require ER admission if he has acute appendicitis  - CBC with platelets; Future  - Comprehensive metabolic panel (BMP + Alb, Alk Phos, ALT, AST, Total. Bili, TP); Future  - CT Abdomen Pelvis w/o Contrast; Future  - UA Macroscopic with reflex to Microscopic and Culture; Future    2. Low grade fever  As above    3. Nausea and vomiting, unspecified vomiting type  As above.    Jimbo Schmid is a 78 year old, presenting for the following health issues:  Abdominal Pain (Right side pain, dull ache, painful to the touch (#5 on pain scale))        5/18/2023     9:48 AM   Additional Questions   Roomed by Ayesha HUNG       Binu comes in today as an urgent add-on for evaluation of right-sided abdominal pain.  He briefly mentions some right side pain yesterday but did not think much of it and we really did not address it as it was not an issue yesterday.  However overnight it has worsened and he woke up this morning vomiting with a \"fever\" of 98.3.  He tells me his temperature is normally 97.7.  Temp as though increased since that time to 99.1.  He is passing gas but he has not had any BM.  He feels like he has to have a BM.  He had emesis this morning or dry heaves.  Labs were done yesterday and showed just a minimally elevated alk phos.  No other abnormalities noted.      Review of Systems         Objective    /60 (BP Location: Left arm, Patient Position: Sitting, Cuff Size: Adult Regular)   Pulse 79   Temp 99.1  F (37.3  C) (Tympanic)   Resp 16   Ht 1.8 m (5' 10.87\")   Wt 86.8 kg (191 lb 4.8 oz)   SpO2 98%   BMI 26.78 " kg/m    Body mass index is 26.78 kg/m .  Physical Exam       Pleasant gentleman in no distress.  Not jaundiced.  He has pretty significant tenderness in the right lower quadrant.  No guarding or rebound.  No tenderness over the liver or gallbladder.

## 2023-05-18 NOTE — TELEPHONE ENCOUNTER
Spoke with patient and relayed information below from Dr Hernandez. Patient verbalized understanding and states he is passing a small amount of gas. States he does also have a visit scheduled today with Dr Whitley but wonders if Dr Hernandez would like to send in any antibiotics. States he usually gets a prescription for amoxicillin when he has a flare of diverticulitis. Writer informed patient that a message will be sent to Dr Hernandez but he can also discuss this with Dr Whitley at his appointment as well.

## 2023-05-18 NOTE — TELEPHONE ENCOUNTER
Please reassure him that 98.3 is a normal temperature.  Is he passing gas?  If he is passing gas he does not have an obstruction.  If he is not passing gas he needs to go to the emergency room.  Especially if he is vomiting up fecal material.

## 2023-05-18 NOTE — TELEPHONE ENCOUNTER
Seen by MD yesterday. During the visit he asked about side pain. Concerned because temp was 98.3, he's always 97.7. Went up at home. He dismissed side pain. Woke up last night with dry heaves, thought he had to have a bowel movement and nothing coming out. History of diverticulitis. Has pain on right side, usually on the left side descending colon. Wonders if it's obstructed or diverticulitis? Wants to know if he can get some amoxicillin. Nothing coming up with vomiting. Trying to get to Dr Hernandez to find out next steps. Triage results in being seen in office today. I connected him with scheduling to get an appointment with anyone today. Please call him at:  [phone].  Mallika Rai RN  [facility]      Reason for Disposition    Vomiting and abdomen looks much more swollen than usual    Additional Information    Negative: Passed out (i.e., fainted, collapsed and was not responding)    Negative: Shock suspected (e.g., cold/pale/clammy skin, too weak to stand, low BP, rapid pulse)    Negative: Sounds like a life-threatening emergency to the triager    Negative: Chest pain    Negative: Pain is mainly in upper abdomen (if needed ask: 'is it mainly above the belly button?')    Negative: SEVERE abdominal pain (e.g., excruciating)     Pain at to touch or walking it's dull, 4.    Negative: Vomiting red blood or black (coffee ground) material    Negative: Bloody, black, or tarry bowel movements  (Exception: Chronic-unchanged black-grey bowel movements and is taking iron pills or Pepto-Bismol.)    Negative: Unable to urinate (or only a few drops) and bladder feels very full    Negative: Pain in scrotum persists > 1 hour    Negative: Constant abdominal pain lasting > 2 hours    Negative: Vomiting bile (green color)    Negative: Patient sounds very sick or weak to the triager    Protocols used: ABDOMINAL PAIN - MALE-A-OH

## 2023-05-18 NOTE — TELEPHONE ENCOUNTER
No I am not giving him an antibiotic-- we do not know what is going on with him.  He does not have a fever.  He is coming in to see me at 940 today.

## 2023-05-31 ENCOUNTER — VIRTUAL VISIT (OUTPATIENT)
Dept: INTERNAL MEDICINE | Facility: CLINIC | Age: 78
End: 2023-05-31
Payer: COMMERCIAL

## 2023-05-31 DIAGNOSIS — K57.32 DIVERTICULITIS OF COLON: Primary | ICD-10-CM

## 2023-05-31 DIAGNOSIS — F34.1 DYSTHYMIA: ICD-10-CM

## 2023-05-31 DIAGNOSIS — G89.29 OTHER CHRONIC PAIN: ICD-10-CM

## 2023-05-31 DIAGNOSIS — N20.0 NEPHROLITHIASIS: ICD-10-CM

## 2023-05-31 DIAGNOSIS — M19.91 PRIMARY OSTEOARTHRITIS, UNSPECIFIED SITE: ICD-10-CM

## 2023-05-31 PROCEDURE — 99214 OFFICE O/P EST MOD 30 MIN: CPT | Mod: VID | Performed by: INTERNAL MEDICINE

## 2023-05-31 RX ORDER — CELECOXIB 100 MG/1
100 CAPSULE ORAL DAILY PRN
Qty: 14 CAPSULE | Refills: 0 | Status: SHIPPED | OUTPATIENT
Start: 2023-05-31 | End: 2023-07-19

## 2023-05-31 NOTE — PROGRESS NOTES
Binu is a 78 year old who is being evaluated via a billable video visit.      How would you like to obtain your AVS? MyChart  If the video visit is dropped, the invitation should be resent by: Send to e-mail at: [email or web address]  Will anyone else be joining your video visit? No        1. Diverticulitis of colon  Resolved after treatment.  Feeling better    2. Other chronic pain  Long discussion with patient.  I think his chronic depressive symptoms also contribute to his chronic pain and we discussed the mind-body complexities.  He tells me if his pain was better controlled he would be in better spirits.  I did recommend he follow-up with orthopedics regarding his DJD of multiple sites.  May be joint replacements or injections of steroid or other pain relievers would be of benefit for him    3. Primary osteoarthritis, unspecified site  In the meantime, I am going to try a low-dose of Celebrex.  We discussed the risks involved with this including gastrointestinal issues, salt water retention with heart failure and coronary disease.  He will remain on his aspirin.  I did tell him that this is likely safer than naproxen which is used in the past.  - celecoxib (CELEBREX) 100 MG capsule; Take 1 capsule (100 mg) by mouth daily as needed for pain  Dispense: 14 capsule; Refill: 0    4. Dysthymia  He refuses medications at this point.  I have recommended psychiatric evaluation.  It is unclear if he will make that appointment though.    5. Nephrolithiasis  Reassurance today.  Tiny 1 mm stone.  He should stay hydrated to prevent further stone formation.    Subjective   Binu is a 78 year old, presenting for the following health issues:  Diverticulitis  (Pt reports sxs's are better after course of abx's /) and Pain (Ongoing overall joint pains and feeling malaise )        5/31/2023     3:37 PM   Additional Questions   Roomed by Elinor     Pain    History of Present Illness       Reason for visit:  Follow up    Binu PATRICIA Galvin  "eats 2-3 servings of fruits and vegetables daily.   Binu Galvin is taking medications regularly.       Binu comes in today for follow-up via video.  He had diverticulitis here last week.  He was treated with Augmentin.  He is feeling remarkably better.  However he has his chronic concerns of chronic pain and dysthymia.  Longstanding dysthymia.  We have tried multiple antidepressants and he is intolerant of the mall.  He tells me he is always been depressed.  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  But has him down now as his chronic aches and pains.  He hurts \"everywhere except for his abdomen.  Joints ache.  Backaches.  He wishes he felt better.  He has not seen an orthopedist.  He has known DJD of his hips and knees.  He tells me he saw a rheumatologist many years ago for similar symptoms.    Reviewed his CT scan which showed no change in his pancreatic cyst.  He also had a tiny kidney stone.  He has never had kidney stones to his knowledge.  He had a granuloma on his left lung but has a history of sarcoid        Review of Systems         Objective           Vitals:  No vitals were obtained today due to virtual visit.    Physical Exam           He looks good today on video.  He is in much better spirits than last visit        Video-Visit Details    Type of service:  Video Visit   Video Start Time: 1704  Video End Time:1734    Originating Location (pt. Location): Home  Distant Location (provider location):  On-site  Platform used for Video Visit: Valerie    "

## 2023-06-01 ENCOUNTER — HEALTH MAINTENANCE LETTER (OUTPATIENT)
Age: 78
End: 2023-06-01

## 2023-07-19 ENCOUNTER — OFFICE VISIT (OUTPATIENT)
Dept: INTERNAL MEDICINE | Facility: CLINIC | Age: 78
End: 2023-07-19
Payer: COMMERCIAL

## 2023-07-19 VITALS
HEIGHT: 70 IN | SYSTOLIC BLOOD PRESSURE: 110 MMHG | OXYGEN SATURATION: 97 % | HEART RATE: 67 BPM | RESPIRATION RATE: 14 BRPM | DIASTOLIC BLOOD PRESSURE: 58 MMHG | TEMPERATURE: 98 F | BODY MASS INDEX: 27.63 KG/M2 | WEIGHT: 193 LBS

## 2023-07-19 DIAGNOSIS — Q45.3 PANCREATIC ABNORMALITY: ICD-10-CM

## 2023-07-19 DIAGNOSIS — M19.91 PRIMARY OSTEOARTHRITIS, UNSPECIFIED SITE: ICD-10-CM

## 2023-07-19 DIAGNOSIS — C77.5 PROSTATE CANCER METASTATIC TO INTRAPELVIC LYMPH NODE (H): ICD-10-CM

## 2023-07-19 DIAGNOSIS — G62.9 PERIPHERAL POLYNEUROPATHY: ICD-10-CM

## 2023-07-19 DIAGNOSIS — G89.29 OTHER CHRONIC PAIN: Primary | ICD-10-CM

## 2023-07-19 DIAGNOSIS — C61 PROSTATE CANCER METASTATIC TO INTRAPELVIC LYMPH NODE (H): ICD-10-CM

## 2023-07-19 DIAGNOSIS — F34.1 DYSTHYMIA: ICD-10-CM

## 2023-07-19 PROCEDURE — 99214 OFFICE O/P EST MOD 30 MIN: CPT | Performed by: INTERNAL MEDICINE

## 2023-07-19 RX ORDER — CELECOXIB 200 MG/1
200 CAPSULE ORAL DAILY
Qty: 30 CAPSULE | Refills: 0 | Status: SHIPPED | OUTPATIENT
Start: 2023-07-19 | End: 2023-08-08

## 2023-07-19 ASSESSMENT — ENCOUNTER SYMPTOMS
DIZZINESS: 1
ARTHRALGIAS: 1
WEAKNESS: 1
MYALGIAS: 1
JOINT SWELLING: 0
DYSURIA: 0
FREQUENCY: 0
HEADACHES: 1
CONSTIPATION: 1
HEARTBURN: 0
HEMATOCHEZIA: 0
BREAST MASS: 1
CHILLS: 1
PALPITATIONS: 1
PARESTHESIAS: 1
NERVOUS/ANXIOUS: 0
ABDOMINAL PAIN: 1
EYE PAIN: 1
COUGH: 1
FEVER: 0
NAUSEA: 0
DIARRHEA: 1
SORE THROAT: 1
SHORTNESS OF BREATH: 1
HEMATURIA: 0

## 2023-07-19 ASSESSMENT — PATIENT HEALTH QUESTIONNAIRE - PHQ9
10. IF YOU CHECKED OFF ANY PROBLEMS, HOW DIFFICULT HAVE THESE PROBLEMS MADE IT FOR YOU TO DO YOUR WORK, TAKE CARE OF THINGS AT HOME, OR GET ALONG WITH OTHER PEOPLE: NOT DIFFICULT AT ALL
SUM OF ALL RESPONSES TO PHQ QUESTIONS 1-9: 0
SUM OF ALL RESPONSES TO PHQ QUESTIONS 1-9: 0

## 2023-07-19 ASSESSMENT — ACTIVITIES OF DAILY LIVING (ADL): CURRENT_FUNCTION: NO ASSISTANCE NEEDED

## 2023-07-19 NOTE — PROGRESS NOTES
"  1. Other chronic pain  Trial of resuming Celebrex.  Also trial of resist starting the gabapentin.  See below.  We will touch base in a couple weeks to see how he is doing over the phone.  - celecoxib (CELEBREX) 200 MG capsule; Take 1 capsule (200 mg) by mouth daily  Dispense: 30 capsule; Refill: 0    2. Dysthymia  I want to see how he does with these medications before we try a different antidepressant.  I did recommend psychiatric evaluation and he refuses.    3. Primary osteoarthritis, unspecified site  As above.  He tells me he is also going to be seeing a orthopedist soon.  I am pleased to hear that.  - celecoxib (CELEBREX) 200 MG capsule; Take 1 capsule (200 mg) by mouth daily  Dispense: 30 capsule; Refill: 0    4. Pancreatic abnormality  Recent CT scan was unchanged.    5. Peripheral polyneuropathy  He has been given gabapentin I have urged him to proceed with trying that.  I think that might be helpful for mood as well.    6. 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    Subjective   Binu is a 78 year old, presenting for the following health issues:  osteoarthritis (Pt reports that he tried Celebrex and wasn't sure if there was any improvement )        7/19/2023     3:07 PM   Additional Questions   Roomed by Elinor     Healthy Habits:     In general, how would you rate your overall health?  Fair    Frequency of exercise:  2-3 days/week    Duration of exercise:  15-30 minutes    Do you usually eat at least 4 servings of fruit and vegetables a day, include whole grains    & fiber and avoid regularly eating high fat or \"junk\" foods?  No    Taking medications regularly:  Not Applicable    Medication side effects:  None    Ability to successfully perform activities of daily living:  No assistance needed    Home Safety:  No safety concerns identified    Hearing Impairment:  Difficulty following a conversation in a noisy restaurant or crowded room " and difficulty understanding speech on the telephone    In the past 6 months, have you been bothered by leaking of urine?  No    In general, how would you rate your overall mental or emotional health?  Fair    Additional concerns today:  Lisandra Schmid was originally scheduled for an annual wellness but he did not want to do the annual wellness and just wanted to do a follow-up.  He continues to complain of chronic pain and depressive symptoms.  He just feels fatigued all the time.  Has been dysthymic all of his life but COVID and prostate cancer and the last few years of just not been easy for him.  We have tried antidepressants which she does not tolerate.  I put him on some fairly low-dose Celebrex last visit for his chronic aches and pains and he tolerated without difficulty.  He does not know if it made much difference.  His neuropathy is now bothering him and he wonders what can be done about that.  His diverticulitis improved.  He was able to have that scan for diverticulitis which showed that he did not have any change of his pancreatic cyst.      Review of Systems   Constitutional: Positive for chills. Negative for fever.   HENT: Positive for ear pain, hearing loss and sore throat. Negative for congestion.    Eyes: Positive for pain and visual disturbance.   Respiratory: Positive for cough and shortness of breath.    Cardiovascular: Positive for chest pain and palpitations.   Gastrointestinal: Positive for abdominal pain, constipation and diarrhea. Negative for nausea.   Genitourinary: Negative for dysuria, frequency, genital sores, hematuria and urgency.   Musculoskeletal: Positive for arthralgias and myalgias. Negative for joint swelling.   Skin: Negative for rash.   Neurological: Positive for dizziness, weakness and headaches.   Psychiatric/Behavioral: The patient is not nervous/anxious.             Objective    /58 (BP Location: Left arm, Patient Position: Sitting, Cuff Size: Adult Regular)    "Pulse 67   Temp 98  F (36.7  C) (Tympanic)   Resp 14   Ht 1.778 m (5' 10\")   Wt 87.5 kg (193 lb)   SpO2 97%   BMI 27.69 kg/m    Body mass index is 27.69 kg/m .  Physical Exam       Pleasant gentleman.  He actually looks pretty good today.  Seems to be in better spirits than he has in recent visits.                Answers for HPI/ROS submitted by the patient on 7/19/2023  If you checked off any problems, how difficult have these problems made it for you to do your work, take care of things at home, or get along with other people?: Not difficult at all  PHQ9 TOTAL SCORE: 0  Blood in stool: No  heartburn: No  peripheral edema: Yes  mood changes: No  Skin sensation changes: Yes  pelvic pain: Yes  vaginal bleeding: No  vaginal discharge: No  tenderness: No  breast mass: Yes  breast discharge: No  impotence: Yes      "

## 2023-07-19 NOTE — PATIENT INSTRUCTIONS
Patient Education   Personalized Prevention Plan  You are due for the preventive services outlined below.  Your care team is available to assist you in scheduling these services.  If you have already completed any of these items, please share that information with your care team to update in your medical record.  Health Maintenance Due   Topic Date Due     Zoster (Shingles) Vaccine (2 of 2) 03/09/2015     Diptheria Tetanus Pertussis (DTAP/TDAP/TD) Vaccine (2 - Tdap) 06/09/2019     Annual Wellness Visit  10/24/2020     COVID-19 Vaccine (3 - Booster for Francisco series) 12/23/2021     Your Health Risk Assessment indicates you feel you are not in good health    A healthy lifestyle helps keep the body fit and the mind alert. It helps protect you from disease, helps you fight disease, and helps prevent chronic disease (disease that doesn't go away) from getting worse. This is important as you get older and begin to notice twinges in muscles and joints and a decline in the strength and stamina you once took for granted. A healthy lifestyle includes good healthcare, good nutrition, weight control, recreation, and regular exercise. Avoid harmful substances and do what you can to keep safe. Another part of a healthy lifestyle is stay mentally active and socially involved.    Good healthcare     Have a wellness visit every year.     If you have new symptoms, let us know right away. Don't wait until the next checkup.     Take medicines exactly as prescribed and keep your medicines in a safe place. Tell us if your medicine causes problems.   Healthy diet and weight control     Eat 3 or 4 small, nutritious, low-fat, high-fiber meals a day. Include a variety of fruits, vegetables, and whole-grain foods.     Make sure you get enough calcium in your diet. Calcium, vitamin D, and exercise help prevent osteoporosis (bone thinning).     If you live alone, try eating with others when you can. That way you get a good meal and have  company while you eat it.     Try to keep a healthy weight. If you eat more calories than your body uses for energy, it will be stored as fat and you will gain weight.     Recreation   Recreation is not limited to sports and team events. It includes any activity that provides relaxation, interest, enjoyment, and exercise. Recreation provides an outlet for physical, mental, and social energy. It can give a sense of worth and achievement. It can help you stay healthy.    Mental Exercise and Social Involvement  Mental and emotional health is as important as physical health. Keep in touch with friends and family. Stay as active as possible. Continue to learn and challenge yourself.   Things you can do to stay mentally active are:    Learn something new, like a foreign language or musical instrument.     Play SCRABBLE or do crossword puzzles. If you cannot find people to play these games with you at home, you can play them with others on your computer through the Internet.     Join a games club--anything from card games to chess or checkers or lawn bowling.     Start a new hobby.     Go back to school.     Volunteer.     Read.   Keep up with world events.    Understanding USDA MyPlate  The USDA has guidelines to help you make healthy food choices. These are called MyPlate. MyPlate shows the food groups that make up healthy meals using the image of a place setting. Before you eat, think about the healthiest choices for what to put on your plate or in your cup or bowl. To learn more about building a healthy plate, visit www.myplate.gov.     The food groups    Fruits. Any fruit or 100% fruit juice counts as part of the Fruit Group. Fruits may be fresh, canned, frozen, or dried, and may be whole, cut-up, or pureed. Make 1/2 of your plate fruits and vegetables.    Vegetables. Any vegetable or 100% vegetable juice counts as a member of the Vegetable Group. Vegetables may be fresh, frozen, canned, or dried. They can be served  raw or cooked and may be whole, cut-up, or mashed. Make 1/2 of your plate fruits and vegetables.    Grains. All foods made from grains are part of the Grains Group. These include wheat, rice, oats, cornmeal, and barley. Grains are often used to make foods such as bread, pasta, oatmeal, cereal, tortillas, and grits. Grains should be no more than 1/4 of your plate. At least half of your grains should be whole grains.    Protein. This group includes meat, poultry, seafood, beans and peas, eggs, processed soy products (such as tofu), nuts (including nut butters), and seeds. Make protein choices no more than 1/4 of your plate. Meat and poultry choices should be lean or low fat.    Dairy. The Dairy Group includes all fluid milk products and foods made from milk that contain calcium, such as yogurt and cheese. (Foods that have little calcium, such as cream, butter, and cream cheese, are not part of this group.) Most dairy choices should be low-fat or fat-free.  Things to limit  Eating healthy also means limiting these things in your diet:    Oils. Oils aren't a food group, but they do contain essential nutrients. These are fats that are liquid at room temperature. Including small amounts of oils in your diet is fine and can even be good for you. But it's important to watch how much oil you include in your diet. Oils include avocado, canola, corn, olive, soybean, vegetable, and sunflower. Foods that are mainly oil include mayonnaise, certain salad dressings, and soft margarines. You likely already get your daily oil allowance from the foods you eat.    Salt (sodium).  Many processed foods have a lot of sodium. To keep sodium intake down, eat fresh vegetables, meats, poultry, and seafood when possible. Buy low-sodium, reduced-sodium, or no-salt-added food products at the store. And don't add salt to your meals at home. Instead, season them with herbs and spices such as dill, oregano, cumin, and paprika. Or try adding flavor  with vinegar, onion, garlic, or lemon or lime zest and juice.    Saturated fat . Saturated fats are most often found in animal products such as beef, pork, and chicken. They are often solid at room temperature, such as butter. To reduce your saturated fat intake, choose leaner cuts of meat and poultry. And try healthier cooking methods such as grilling, broiling, roasting, or baking. For a simple lower-fat swap, use plain nonfat yogurt instead of mayonnaise when making potato salad or macaroni salad.    Added sugars.  These are sugars added to foods. They are in foods such as ice cream, candy, soda, fruit drinks, sports drinks, energy drinks, cookies, pastries, jams, and syrups. Cut down on added sugars by sharing sweet treats with a family member or friend. You can also choose fruit for dessert, and drink water or other unsweetened beverages.    Alcohol. Alcohol contains calories but few nutrients. If you don't drink alcohol, don't start drinking for any reason. But if you do choose to drink alcohol, do so only in moderation. This means no more than 2 drinks in a day for men and no more than 1 drink per day for women, on days when you have alcohol.  [employer] last reviewed this educational content on 1/1/2023 2000-2023 [employer]. All rights reserved. This information is not intended as a substitute for professional medical care. Always follow your healthcare professional's instructions.          Signs of Hearing Loss  Hearing loss is a problem shared by many people. In fact, it's one of the most common health problems, particularly as people age. Most people aged 65 and older have some hearing loss. By age 80, almost everyone does. Hearing loss often occurs slowly over the years. So, you may not realize your hearing has gotten worse.   When sudden hearing loss occurs, it's important to contact your healthcare provider right away. Your provider will do a medical exam and a hearing exam as soon as  possible. This is to help find the cause and type of your sudden hearing loss. Based on your diagnosis, your healthcare provider will discuss possible treatments.      Hearing much better with one ear can be a sign of hearing loss.     Have your hearing checked  Call your healthcare provider if you:     Have to strain to hear normal conversation    Have to watch other people s faces very carefully to follow what they re saying    Need to ask people to repeat what they ve said    Often misunderstand what people are saying    Turn the volume of the television or radio up so high that others complain    Feel that people are mumbling when they re talking to you    Find that the effort to hear leaves you feeling tired and irritated    Notice, when using the phone, that you hear better with one ear than the other  [employer] last reviewed this educational content on 6/1/2022 2000-2023 [employer]. All rights reserved. This information is not intended as a substitute for professional medical care. Always follow your healthcare professional's instructions.        Your Health Risk Assessment indicates you feel you are not in good emotional health.    Recreation   Recreation is not limited to sports and team events. It includes any activity that provides relaxation, interest, enjoyment, and exercise. Recreation provides an outlet for physical, mental, and social energy. It can give a sense of worth and achievement. It can help you stay healthy.    Mental Exercise and Social Involvement  Mental and emotional health is as important as physical health. Keep in touch with friends and family. Stay as active as possible. Continue to learn and challenge yourself.   Things you can do to stay mentally active are:    Learn something new, like a foreign language or musical instrument.     Play SCRABBLE or do crossword puzzles. If you cannot find people to play these games with you at home, you can play them with others on  your computer through the Internet.     Join a games club--anything from card games to chess or checkers or lawn bowling.     Start a new hobby.     Go back to school.     Volunteer.     Read.   Keep up with world events.

## 2023-08-01 ENCOUNTER — VIRTUAL VISIT (OUTPATIENT)
Dept: INTERNAL MEDICINE | Facility: CLINIC | Age: 78
End: 2023-08-01
Payer: COMMERCIAL

## 2023-08-01 DIAGNOSIS — H53.9 VISUAL DISTURBANCE: ICD-10-CM

## 2023-08-01 DIAGNOSIS — G62.9 PERIPHERAL POLYNEUROPATHY: ICD-10-CM

## 2023-08-01 DIAGNOSIS — G89.29 OTHER CHRONIC PAIN: Primary | ICD-10-CM

## 2023-08-01 PROCEDURE — 99443 PR PHYSICIAN TELEPHONE EVALUATION 21-30 MIN: CPT | Mod: 93 | Performed by: INTERNAL MEDICINE

## 2023-08-01 NOTE — PROGRESS NOTES
Binu is a 78 year old who is being evaluated via a billable telephone visit.      What phone number would you like to be contacted at? [phone]  How would you like to obtain your AVS? Getachew    Distant Location (provider location):  On-site    1. Other chronic pain  Better with the Celebrex.  Continue same.  He can use it as needed.  I urged him to continue with using aspirin 81 mg along with the celecoxib.    2. Peripheral polyneuropathy  I have urged him to utilize the gabapentin as previously prescribed by neurology.  I offered physical therapy and he declines.  Let me know if he changes his mind.    3. Visual disturbance  If he has recurrence he needs to see his eye doctor and I discussed this with him.    Subjective   Binu is a 78 year old, presenting for the following health issues:  office visit and Recheck Medication (Pt reports that he is having this visit to follow up for medication recheck.)      8/1/2023     4:20 PM   Additional Questions   Roomed by noel   Accompanied by alone         8/1/2023     4:20 PM   Patient Reported Additional Medications   Patient reports taking the following new medications none       History of Present Illness       Reason for visit:  Medication follow up    Binu Galvin eats 2-3 servings of fruits and vegetables daily.Binu Galvin consumes 0 sweetened beverage(s) daily.Binu Galvin exercises with enough effort to increase Binu Galvin's heart rate 9 or less minutes per day.  Binu Galvin exercises with enough effort to increase Binu Galvin's heart rate 3 or less days per week.   Binu Galvin is taking medications regularly.       Pt reports that he is having this visit to follow up for medication recheck.    Binu joins me on a telephone visit.  He has been on the Celebrex now for about 8 days and reports it is helping him.  About 20 to 30% effective in decreasing his pain.  He can get out of the car.  Tried hitting a racquetball around at the gym and felt unsteady  likely due to his neuropathy.  Did not fall.  He is not willing to do therapy at this point.  He also notes that a while back he had visual disturbance where his eyes got out of focus both of them.  He may be had some ringing in of his ears at the time.  Maybe also a slight headache.  He has had no further issues.    Review of Systems         Objective           Vitals:  No vitals were obtained today due to virtual visit.    Physical Exam     Phone call duration: 21 minutes

## 2023-08-03 ASSESSMENT — ENCOUNTER SYMPTOMS
DISTURBANCES IN COORDINATION: 1
PALPITATIONS: 1
HOARSE VOICE: 1
ARTHRALGIAS: 1
FATIGUE: 1
DIZZINESS: 1
BACK PAIN: 1
MYALGIAS: 1
TINGLING: 1
DOUBLE VISION: 1
INCREASED ENERGY: 1

## 2023-08-07 DIAGNOSIS — I71.21 ASCENDING AORTIC ANEURYSM (H): ICD-10-CM

## 2023-08-07 DIAGNOSIS — I47.10 SVT (SUPRAVENTRICULAR TACHYCARDIA) (H): ICD-10-CM

## 2023-08-08 DIAGNOSIS — M19.91 PRIMARY OSTEOARTHRITIS, UNSPECIFIED SITE: ICD-10-CM

## 2023-08-08 DIAGNOSIS — G89.29 OTHER CHRONIC PAIN: ICD-10-CM

## 2023-08-08 RX ORDER — CELECOXIB 200 MG/1
200 CAPSULE ORAL DAILY
Qty: 30 CAPSULE | Refills: 11 | Status: SHIPPED | OUTPATIENT
Start: 2023-08-08 | End: 2024-01-11

## 2023-08-08 NOTE — TELEPHONE ENCOUNTER
"Routing refill request to provider for review/approval because:  Labs out of range:  CR: 1.4 on 5/18/23  Age not between 6 - 64 years    Last Written Prescription Date:  7/19/23  Last Fill Quantity: 30,  # refills: 0   Last office visit provider:  8/1/23, Virtual visit with PCP     Requested Prescriptions   Pending Prescriptions Disp Refills    celecoxib (CELEBREX) 200 MG capsule [Pharmacy Med Name: Celecoxib 200 MG Oral Capsule] 30 capsule 11     Sig: TAKE 1 CAPSULE BY MOUTH DAILY       NSAID Medications Failed - 8/8/2023  7:36 AM        Failed - Patient is age 6-64 years        Failed - Normal serum creatinine on file in past 12 months     Recent Labs   Lab Test 05/18/23  1252 05/26/20  1059 05/05/20  1227   CR 1.4*   < >  --    CREAT  --   --  0.9    < > = values in this interval not displayed.       Ok to refill medication if creatinine is low          Passed - Blood pressure under 140/90 in past 12 months     BP Readings from Last 3 Encounters:   07/19/23 110/58   05/18/23 106/60   05/17/23 122/71                 Passed - Normal ALT on file in past 12 months     Recent Labs   Lab Test 05/18/23  1025   ALT 11             Passed - Normal AST on file in past 12 months     Recent Labs   Lab Test 05/18/23  1025   AST 15             Passed - Recent (12 mo) or future (30 days) visit within the authorizing provider's specialty     Patient has had an office visit with the authorizing provider or a provider within the authorizing providers department within the previous 12 mos or has a future within next 30 days. See \"Patient Info\" tab in inbasket, or \"Choose Columns\" in Meds & Orders section of the refill encounter.              Passed - Normal CBC on file in past 12 months     Recent Labs   Lab Test 05/18/23  1025   WBC 6.3   RBC 5.09   HGB 15.7   HCT 47.1                    Passed - Medication is active on med list             Joanna Silva RN 08/08/23 11:10 AM  "

## 2023-08-09 ENCOUNTER — TELEPHONE (OUTPATIENT)
Dept: INTERNAL MEDICINE | Facility: CLINIC | Age: 78
End: 2023-08-09
Payer: COMMERCIAL

## 2023-08-09 ENCOUNTER — TRANSFERRED RECORDS (OUTPATIENT)
Dept: HEALTH INFORMATION MANAGEMENT | Facility: CLINIC | Age: 78
End: 2023-08-09
Payer: COMMERCIAL

## 2023-08-09 RX ORDER — LOSARTAN POTASSIUM 100 MG/1
100 TABLET ORAL DAILY
Qty: 90 TABLET | Refills: 1 | OUTPATIENT
Start: 2023-08-09

## 2023-08-09 NOTE — TELEPHONE ENCOUNTER
General Call    Reason for Call:  pt will be calling for appt    What are your questions or concerns:  pt saw [facility]-Dr Hampton today and he will be calling or an appt on 8/10.  She would like to rule out Giant Cell Arthritis and would like labs done ESR, CRP, PLATLETS.    Date of last appointment with provider: na    Just a heads up-no call back needed

## 2023-08-09 NOTE — TELEPHONE ENCOUNTER
losartan (COZAAR) 100 MG tablet refill request refused, too soon, ordered #90, 1 refill on 5/8/23  Abnormal labs, patient has appointment 8/24/23 to see prescribing MD  Pharmacy called

## 2023-08-10 ENCOUNTER — MYC MEDICAL ADVICE (OUTPATIENT)
Dept: INTERNAL MEDICINE | Facility: CLINIC | Age: 78
End: 2023-08-10

## 2023-08-10 ENCOUNTER — LAB (OUTPATIENT)
Dept: LAB | Facility: CLINIC | Age: 78
End: 2023-08-10
Payer: COMMERCIAL

## 2023-08-10 ENCOUNTER — NURSE TRIAGE (OUTPATIENT)
Dept: INTERNAL MEDICINE | Facility: CLINIC | Age: 78
End: 2023-08-10

## 2023-08-10 ENCOUNTER — OFFICE VISIT (OUTPATIENT)
Dept: PULMONOLOGY | Facility: CLINIC | Age: 78
End: 2023-08-10
Attending: INTERNAL MEDICINE
Payer: COMMERCIAL

## 2023-08-10 VITALS — OXYGEN SATURATION: 96 % | DIASTOLIC BLOOD PRESSURE: 76 MMHG | SYSTOLIC BLOOD PRESSURE: 116 MMHG | HEART RATE: 71 BPM

## 2023-08-10 DIAGNOSIS — R06.02 SHORTNESS OF BREATH: ICD-10-CM

## 2023-08-10 DIAGNOSIS — D86.9 SARCOIDOSIS: Primary | ICD-10-CM

## 2023-08-10 DIAGNOSIS — K57.32 DIVERTICULITIS OF COLON: Primary | ICD-10-CM

## 2023-08-10 DIAGNOSIS — D86.9 SARCOIDOSIS: ICD-10-CM

## 2023-08-10 DIAGNOSIS — I47.10 SVT (SUPRAVENTRICULAR TACHYCARDIA) (H): ICD-10-CM

## 2023-08-10 DIAGNOSIS — I71.21 ASCENDING AORTIC ANEURYSM (H): ICD-10-CM

## 2023-08-10 LAB
6 MIN WALK (FT): 1510 FT
6 MIN WALK (M): 460 M
ALBUMIN SERPL BCG-MCNC: 4.3 G/DL (ref 3.5–5.2)
ALP SERPL-CCNC: 134 U/L (ref 35–129)
ALT SERPL W P-5'-P-CCNC: 12 U/L (ref 0–70)
ANION GAP SERPL CALCULATED.3IONS-SCNC: 10 MMOL/L (ref 7–15)
AST SERPL W P-5'-P-CCNC: 16 U/L (ref 0–45)
BASOPHILS # BLD AUTO: 0 10E3/UL (ref 0–0.2)
BASOPHILS NFR BLD AUTO: 0 %
BILIRUB SERPL-MCNC: 0.5 MG/DL
BUN SERPL-MCNC: 16.3 MG/DL (ref 8–23)
CALCIUM SERPL-MCNC: 9.5 MG/DL (ref 8.8–10.2)
CHLORIDE SERPL-SCNC: 103 MMOL/L (ref 98–107)
CHOLEST SERPL-MCNC: 125 MG/DL
CREAT SERPL-MCNC: 1.19 MG/DL (ref 0.51–1.17)
DEPRECATED HCO3 PLAS-SCNC: 26 MMOL/L (ref 22–29)
EOSINOPHIL # BLD AUTO: 0.1 10E3/UL (ref 0–0.7)
EOSINOPHIL NFR BLD AUTO: 1 %
ERYTHROCYTE [DISTWIDTH] IN BLOOD BY AUTOMATED COUNT: 12.8 % (ref 10–15)
GFR SERPL CREATININE-BSD FRML MDRD: 63 ML/MIN/1.73M2
GLUCOSE SERPL-MCNC: 98 MG/DL (ref 70–99)
HCT VFR BLD AUTO: 46.2 % (ref 35–53)
HDLC SERPL-MCNC: 37 MG/DL
HGB BLD-MCNC: 15.8 G/DL (ref 11.7–17.7)
IMM GRANULOCYTES # BLD: 0 10E3/UL
IMM GRANULOCYTES NFR BLD: 0 %
LDLC SERPL CALC-MCNC: 58 MG/DL
LYMPHOCYTES # BLD AUTO: 0.8 10E3/UL (ref 0.8–5.3)
LYMPHOCYTES NFR BLD AUTO: 8 %
MCH RBC QN AUTO: 31.2 PG (ref 26.5–33)
MCHC RBC AUTO-ENTMCNC: 34.2 G/DL (ref 31.5–36.5)
MCV RBC AUTO: 91 FL (ref 78–100)
MONOCYTES # BLD AUTO: 1 10E3/UL (ref 0–1.3)
MONOCYTES NFR BLD AUTO: 10 %
NEUTROPHILS # BLD AUTO: 7.9 10E3/UL (ref 1.6–8.3)
NEUTROPHILS NFR BLD AUTO: 81 %
NONHDLC SERPL-MCNC: 88 MG/DL
NRBC # BLD AUTO: 0 10E3/UL
NRBC BLD AUTO-RTO: 0 /100
NT-PROBNP SERPL-MCNC: 97 PG/ML (ref 0–1800)
PLATELET # BLD AUTO: 207 10E3/UL (ref 150–450)
POTASSIUM SERPL-SCNC: 4.3 MMOL/L (ref 3.4–5.3)
PROT SERPL-MCNC: 7.3 G/DL (ref 6.4–8.3)
PTH-INTACT SERPL-MCNC: 59 PG/ML (ref 15–65)
RBC # BLD AUTO: 5.06 10E6/UL (ref 3.8–5.9)
SODIUM SERPL-SCNC: 139 MMOL/L (ref 136–145)
TRIGL SERPL-MCNC: 150 MG/DL
WBC # BLD AUTO: 9.8 10E3/UL (ref 4–11)

## 2023-08-10 PROCEDURE — 99000 SPECIMEN HANDLING OFFICE-LAB: CPT | Performed by: PATHOLOGY

## 2023-08-10 PROCEDURE — 99215 OFFICE O/P EST HI 40 MIN: CPT | Mod: 25 | Performed by: INTERNAL MEDICINE

## 2023-08-10 PROCEDURE — 94375 RESPIRATORY FLOW VOLUME LOOP: CPT | Performed by: INTERNAL MEDICINE

## 2023-08-10 PROCEDURE — 36415 COLL VENOUS BLD VENIPUNCTURE: CPT | Performed by: PATHOLOGY

## 2023-08-10 PROCEDURE — 83970 ASSAY OF PARATHORMONE: CPT | Performed by: PATHOLOGY

## 2023-08-10 PROCEDURE — 80053 COMPREHEN METABOLIC PANEL: CPT | Performed by: PATHOLOGY

## 2023-08-10 PROCEDURE — 85025 COMPLETE CBC W/AUTO DIFF WBC: CPT | Performed by: PATHOLOGY

## 2023-08-10 PROCEDURE — 86698 HISTOPLASMA ANTIBODY: CPT | Mod: 90 | Performed by: PATHOLOGY

## 2023-08-10 PROCEDURE — 86612 BLASTOMYCES ANTIBODY: CPT | Mod: 90 | Performed by: PATHOLOGY

## 2023-08-10 PROCEDURE — 82164 ANGIOTENSIN I ENZYME TEST: CPT | Mod: 90 | Performed by: PATHOLOGY

## 2023-08-10 PROCEDURE — 86481 TB AG RESPONSE T-CELL SUSP: CPT | Performed by: INTERNAL MEDICINE

## 2023-08-10 PROCEDURE — 86635 COCCIDIOIDES ANTIBODY: CPT | Mod: 90 | Performed by: PATHOLOGY

## 2023-08-10 PROCEDURE — 83880 ASSAY OF NATRIURETIC PEPTIDE: CPT | Performed by: PATHOLOGY

## 2023-08-10 PROCEDURE — 94729 DIFFUSING CAPACITY: CPT | Performed by: INTERNAL MEDICINE

## 2023-08-10 PROCEDURE — 83520 IMMUNOASSAY QUANT NOS NONAB: CPT | Performed by: INTERNAL MEDICINE

## 2023-08-10 PROCEDURE — 87385 HISTOPLASMA CAPSUL AG IA: CPT | Performed by: INTERNAL MEDICINE

## 2023-08-10 PROCEDURE — 86606 ASPERGILLUS ANTIBODY: CPT | Mod: 90 | Performed by: PATHOLOGY

## 2023-08-10 PROCEDURE — 94618 PULMONARY STRESS TESTING: CPT | Performed by: INTERNAL MEDICINE

## 2023-08-10 PROCEDURE — 80061 LIPID PANEL: CPT | Performed by: PATHOLOGY

## 2023-08-10 PROCEDURE — 94726 PLETHYSMOGRAPHY LUNG VOLUMES: CPT | Performed by: INTERNAL MEDICINE

## 2023-08-10 PROCEDURE — G0463 HOSPITAL OUTPT CLINIC VISIT: HCPCS | Performed by: INTERNAL MEDICINE

## 2023-08-10 NOTE — TELEPHONE ENCOUNTER
Medication Question or Refill    Contacts         Type Contact Phone/Fax    08/10/2023 08:52 AM CDT Phone (Incoming) Binu Galvin (Self) [phone] (M)            What medication are you calling about (include dose and sig)?: pt has a flare up of diverticulitis and would like a script,  He has had it several times and knows that it is that.  Would like a script please.    amoxicillin-clavulanate (AUGMENTIN) 875-125 MG tablet 20 tablet 0 5/18/2023 5/28/2023 --   Sig - Route: Take 1 tablet by mouth 2 times daily for 10 days - Oral       Preferred Pharmacy:  [facility] # [id number] [place] [address] [address] [facility]  [address] [facility] [place] [address]  Phone: [phone] Fax: [phone]    Controlled Substance Agreement on file:   CSA -- Patient Level:    CSA: None found at the patient level.       Who prescribed the medication?: Dr Hernandez    Do you need a refill? Yes    When did you use the medication last? May for same reason    Patient offered an appointment? No    Do you have any questions or concerns?  No      Could we send this information to you in [facility] or would you prefer to receive a phone call?:   Patient would prefer a phone call   Okay to leave a detailed message?: Yes at Cell number on file:    Telephone Information:   Mobile [phone]

## 2023-08-10 NOTE — TELEPHONE ENCOUNTER
If symptoms with abdominal pain are same as diverticulitis episode in may, would recommend starting on clear liquid diet and antibiotic : Augmentin: sent to [facility].  Stay on clear liquid diet until pain is minimal (do not go by appetite level but pain level to advance diet), then can increase diet to low fiber soft foods and stay on that diet for 2 weeks.     Follow up with Dr Hernandez next week .    For Popeye cell arteritis he will need to have temporal artery biopsy and start on prednisone.  This will require office visit with provider. I can see him in the office tomorrow. Please obtain records from eye doctor.

## 2023-08-10 NOTE — NURSING NOTE
Chief Complaint   Patient presents with    Interstitial Lung Disease (ILD)     sarcoids     Vitals were taken and medications were reconciled.     YELENA Gaxiola

## 2023-08-10 NOTE — TELEPHONE ENCOUNTER
"Pt was last treated with Augmentin for Diverticulitis by Dr. Hernandez (see 5.31.23 office visit note)   Pt also sent Sentient message today (8/10 Sentient encounter):     August 10, 2023  Binu Galvin   to [facility] Support Pool (supporting Angel Hernandez MD)   [place]      8/10/23 10:19 AM  Dear Binu Zelaya.  Called this AM because diverticulitis seems to be back.  We should start antibiotics.  Elevated temp also.  Sore to touch in lower left gut.  Amoxicillin ASAP?????  Had a zulma cancellation appt. at my eye  on wed.  I have had some temp loss of vision and major blurriness also temporary.  Accompanied with head pressure,  tinnitus,  pressure,  and jaw pain (both sides, but more on the left.)  She is concerned about eliminating 'giant cell arteritis.'   Said she would contact you.  Dr. Montoya  [facility].  Minor heart 'pain' 3-4 (out of ten) seems to last longer and is more frequent throughout the day.     Binu      Nurse Triage:   Nurse Triage SBAR    Is this a 2nd Level Triage? NO    Situation: Pt believes that he is having am acute flare of diverticulitis. Pt also reports giant cell arteritis concern from eye provider. Pt reports that eye provider was going to sent information to Dr. Hernandez however this has not yet been received.     Background: Pt was last treated for acute diverticulitis in May 2023.     Assessment:   1. LOCATION:       Lower left side  2. RADIATION:       No   3. ONSET:       Few days ago, pain starts after when patient goes to the bathroom, pt thought that it would clear, sometimes it does  4. SUDDEN:       Pain has strengthened in past 24 hrs  5. PATTERN       Pain dissipates when just sitting, the pain returns with mobility.   6. SEVERITY:       Up and walking around 4-5/10, with palpation pain can be 7-9/10   7. RECURRENT SYMPTOM:       This is similar to pain in the past \"exactly the same\"   8. CAUSE:       Diverticulitis   9. RELIEVING/AGGRAVATING FACTORS: "       Resting/not moving  10. OTHER SYMPTOMS:         Pt has constant back pain (this is chronic for him), pt has elevated temperature 1-2 degrees, pt is typically 96-97, last night tempt was 99 this is elevated for patient. Pt has had increase in diaphoresis, denies chills.     Protocol Recommended Disposition:   See in Office Today    Recommendation: No in-person midway appointments today. Please recommend disposition for patient.      Routed to provider    Does the patient meet one of the following criteria for ADS visit consideration? 16+ years old, with an [facility] PCP     TIP  Providers, please consider if this condition is appropriate for management at one of our Acute and Diagnostic Services sites.     If patient is a good candidate, please use dotphrase <dot>triageresponse and select Refer to ADS to document.  Reason for Disposition   Age > 60 years    Additional Information   Negative: Passed out (i.e., fainted, collapsed and was not responding)   Negative: Shock suspected (e.g., cold/pale/clammy skin, too weak to stand, low BP, rapid pulse)   Negative: Sounds like a life-threatening emergency to the triager   Negative: Chest pain   Negative: Pain is mainly in upper abdomen (if needed ask: 'is it mainly above the belly button?')   Negative: SEVERE abdominal pain (e.g., excruciating)   Negative: Vomiting red blood or black (coffee ground) material   Negative: Bloody, black, or tarry bowel movements  (Exception: Chronic-unchanged black-grey bowel movements and is taking iron pills or Pepto-Bismol.)   Negative: Unable to urinate (or only a few drops) and bladder feels very full   Negative: Pain in scrotum persists > 1 hour   Negative: Constant abdominal pain lasting > 2 hours   Negative: Vomiting bile (green color)   Negative: Patient sounds very sick or weak to the triager   Negative: Vomiting and abdomen looks much more swollen than usual   Negative: White of the eyes have turned yellow (i.e., jaundice)    Negative: Blood in urine (red, pink, or tea-colored)   Negative: Fever > 103 F (39.4 C)   Negative: Fever > 101 F (38.3 C) and over 60 years of age   Negative: Fever > 100.0 F (37.8 C) and has diabetes mellitus or a weak immune system (e.g., HIV positive, cancer chemotherapy, organ transplant, splenectomy, chronic steroids)   Negative: Fever > 100.0 F (37.8 C) and bedridden (e.g., nursing home patient, stroke, chronic illness, recovering from surgery)   Negative: MODERATE pain (e.g., interferes with normal activities) that comes and goes (cramps) lasts > 24 hours  (Exception: pain with Vomiting or Diarrhea - see that Protocol)    Protocols used: Abdominal Pain - Male-A-[place]

## 2023-08-10 NOTE — PROGRESS NOTES
"[facility]  Pulmonary Medicine  Visit Clinic Note  August 10, 2023    Dear patient. Thank you for visiting with me. I want you to feel respected, understood, and empowered. \"Respect\" is valuing you as much as I would a close family member. \"Empowerment\" happens when you are fully informed, and can make the best possible decision for you.  Please ask me questions!  Challenge anything that is not clear.       ASSESSMENT & PLAN     Patient is a 78 years old male who has presented for sarcoidosis work up and management of fatigue    #Biopsy proven sarcoidosis from 50 years ago report not available  -Pfts are normal.   -CT chest does not show any significant lung parenchyma involvement .   - Sarcoid panel is ordered.   -  I doubt he has an active sarcoid.  -I recommended him to get a sleep study done.   - IF he continues to have fatigue then I can give a trial of modafinil. Discussed with patient and will decide on it next time.    -Will discuss his case in [facility]    RTC in 5 months       55 minutes excluding the time spent on cigarette cessation was  spent on the date of the encounter doing chart review, history and exam, documentation and further activities as noted above.    These conclusions are made at the best of one's knowledge and belief based on the provided evidence such as patient's history and allergy test results and they can change over time or can be incomplete because of missing information's.    I explained the lab values, imagings and findings to the patient.  Patient expressed understanding I did not recognize any barriers to the understanding of the patient.    The above note was dictated using voice recognition software and may include typographical errors. Please contact the author for any clarifications.    Rogers Mae MD   RN Coordinators: Bairon/Horacio/Nolvia: [phone]  ILD RN Coordinators: [phone]  Clinic Number: [phone]  Pager: [phone]       Today's visit " note:     Chief Complaint: Artis Galvin is a 78 year old year old adult who is being seen for Interstitial Lung Disease (ILD) (sarcoids)      HPI:   Patient is a 78 years old male who has been referred to pulmonary clinic for further assessment of sarcoidosis.   -  Patient has had worsening dyspnea for past few years. He has had through cardiac assesment including cardiac cath in 2023 without any significant abnormality. There is a concern for possible depression as per his PCP.   -Patient feels that he has low energy.      @Sarcoid Diagnosed when at age of 89. He had excisional biopsy of the cervical lymph node which as per patietn had shown nonnecrotizing granuloma.     Social History:   - Never smoked.  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     #Past medica history:   - rimary Malignant Neoplasm Of Prostate (HCC)   3/23/2021   -6/2021 - 8/11/2021 Radiation Therapy   Radiation Therapy Treatment Details (6/20/2021 - 8/11/2021)            Medications:     Current Outpatient Medications   Medication    alendronate (FOSAMAX) 70 MG tablet    amoxicillin-clavulanate (AUGMENTIN) 875-125 MG tablet    aspirin 81 MG EC tablet    atorvastatin (LIPITOR) 40 MG tablet    calcium citrate-vitamin D (CITRACAL) 315-200 MG-UNIT TABS per tablet    celecoxib (CELEBREX) 200 MG capsule    Cyanocobalamin 5000 MCG SUBL    ketoconazole (NIZORAL) 2 % external cream    levothyroxine (SYNTHROID/LEVOTHROID) 75 MCG tablet    losartan (COZAAR) 100 MG tablet    nitroGLYcerin (NITROSTAT) 0.4 MG sublingual tablet    pilocarpine (PILOCAR) 2 % ophthalmic solution    Probiotic Product (MISC INTESTINAL MEGHA REGULAT) CAPS     No current facility-administered medications for this visit.            Review of Systems:       A complete 10 point review of systems was otherwise negative except as noted in the HPI.        PHYSICAL EXAM:  There were no vitals taken for this visit.     General: Well developed, well nourished, No apparent distress  Eyes:  Anicteric  Nose: Nasal mucosa with no edema or hyperemia.  No polyps  Ears: Hearing grossly normal  Mouth: Oral mucosa is moist, without any lesions. No oropharyngeal exudate.  Respiratory: Good air movement. No crackles. No rhonchi. No wheezes  Cardiac: RRR, normal S1, S2. No murmurs. No JVD  Abdomen: Soft, NT/ND  Musculoskeletal: Extremities normal. No clubbing. No cyanosis. No edema.  Skin: No rash on limited exam  Neuro: Normal mentation. Normal speech.  Psych:Normal affect           Data:   All laboratory and imaging data reviewed.      PFT:         PFT Interpretation:  I personally reviewed and interpreted the PFTs.  -Supranomral pfts are noted.     CXR: I personally reviewed and interpreted the chest x-ray    Chest CT: I personally reviewed and interpreted the CT scan.    - Small calcified nodules      No results found for this or any previous visit (from the past 168 hour(s)).

## 2023-08-10 NOTE — TELEPHONE ENCOUNTER
"Provider Recommendation Follow Up:   Reached patient/caregiver. Informed of provider's recommendations. Patient verbalized understanding and agrees with the plan.     Assisted patient with scheduling appointment with Dr. Bonner tomorrow.     Pt reports that he was seen yesterday:   [facility], Dr. Montoya, at [facility].   Called [facility] [phone])    Spoke with staff and they will have Dr. Montoya sign off on visit note then fax to [facility] \"attn: Dr. Bonner appt 8/11\"    Called patient:    Let him know that Eye clinic was able to be contacted and that they are going to send records.             "

## 2023-08-10 NOTE — LETTER
"    8/10/2023         RE: Artis Galvin  [address]  [place] [address]        Dear Colleague,    Thank you for referring your patient, Artis Galvin, to the [facility] FOR LUNG SCIENCE AND [facility]. Please see a copy of my visit note below.    [facility]  Pulmonary Medicine  Visit Clinic Note  August 10, 2023    Dear patient. Thank you for visiting with me. I want you to feel respected, understood, and empowered. \"Respect\" is valuing you as much as I would a close family member. \"Empowerment\" happens when you are fully informed, and can make the best possible decision for you.  Please ask me questions!  Challenge anything that is not clear.       ASSESSMENT & PLAN     Patient is a 78 years old male who has presented for sarcoidosis work up and management of fatigue    #Biopsy proven sarcoidosis from 50 years ago report not available  -Pfts are normal.   -CT chest does not show any significant lung parenchyma involvement .   - Sarcoid panel is ordered.   -  I doubt he has an active sarcoid.  -I recommended him to get a sleep study done.   - IF he continues to have fatigue then I can give a trial of modafinil. Discussed with patient and will decide on it next time.    -Will discuss his case in [facility]    RTC in 5 months       55 minutes excluding the time spent on cigarette cessation was  spent on the date of the encounter doing chart review, history and exam, documentation and further activities as noted above.    These conclusions are made at the best of one's knowledge and belief based on the provided evidence such as patient's history and allergy test results and they can change over time or can be incomplete because of missing information's.    I explained the lab values, imagings and findings to the patient.  Patient expressed understanding I did not recognize any barriers to the understanding of the patient.    The above note was dictated " using voice recognition software and may include typographical errors. Please contact the author for any clarifications.    Rogers Mae MD   RN Coordinators: Bairon/Horacio/Nolvia: [phone]  ILD RN Coordinators: [phone]  Clinic Number: [phone]  Pager: [phone]       Today's visit note:     Chief Complaint: Artis Galvin is a 78 year old year old adult who is being seen for Interstitial Lung Disease (ILD) (sarcoids)      HPI:   Patient is a 78 years old male who has been referred to pulmonary clinic for further assessment of sarcoidosis.   -  Patient has had worsening dyspnea for past few years. He has had through cardiac assesment including cardiac cath in 2023 without any significant abnormality. There is a concern for possible depression as per his PCP.   -Patient feels that he has low energy.      @Sarcoid Diagnosed when at age of 89. He had excisional biopsy of the cervical lymph node which as per patietn had shown nonnecrotizing granuloma.     Social History:   - Never smoked.  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     #Past medica history:   - rimary Malignant Neoplasm Of Prostate (HCC)   3/23/2021   -6/2021 - 8/11/2021 Radiation Therapy   Radiation Therapy Treatment Details (6/20/2021 - 8/11/2021)            Medications:     Current Outpatient Medications   Medication    alendronate (FOSAMAX) 70 MG tablet    amoxicillin-clavulanate (AUGMENTIN) 875-125 MG tablet    aspirin 81 MG EC tablet    atorvastatin (LIPITOR) 40 MG tablet    calcium citrate-vitamin D (CITRACAL) 315-200 MG-UNIT TABS per tablet    celecoxib (CELEBREX) 200 MG capsule    Cyanocobalamin 5000 MCG SUBL    ketoconazole (NIZORAL) 2 % external cream    levothyroxine (SYNTHROID/LEVOTHROID) 75 MCG tablet    losartan (COZAAR) 100 MG tablet    nitroGLYcerin (NITROSTAT) 0.4 MG sublingual tablet    pilocarpine (PILOCAR) 2 % ophthalmic solution    Probiotic Product (MISC INTESTINAL MEGHA REGULAT) CAPS     No current  facility-administered medications for this visit.            Review of Systems:       A complete 10 point review of systems was otherwise negative except as noted in the HPI.        PHYSICAL EXAM:  There were no vitals taken for this visit.     General: Well developed, well nourished, No apparent distress  Eyes: Anicteric  Nose: Nasal mucosa with no edema or hyperemia.  No polyps  Ears: Hearing grossly normal  Mouth: Oral mucosa is moist, without any lesions. No oropharyngeal exudate.  Respiratory: Good air movement. No crackles. No rhonchi. No wheezes  Cardiac: RRR, normal S1, S2. No murmurs. No JVD  Abdomen: Soft, NT/ND  Musculoskeletal: Extremities normal. No clubbing. No cyanosis. No edema.  Skin: No rash on limited exam  Neuro: Normal mentation. Normal speech.  Psych:Normal affect           Data:   All laboratory and imaging data reviewed.      PFT:         PFT Interpretation:  I personally reviewed and interpreted the PFTs.  -Supranomral pfts are noted.     CXR: I personally reviewed and interpreted the chest x-ray    Chest CT: I personally reviewed and interpreted the CT scan.    - Small calcified nodules      No results found for this or any previous visit (from the past 168 hour(s)).        Rogers Mae MD

## 2023-08-11 ENCOUNTER — TELEPHONE (OUTPATIENT)
Dept: INTERNAL MEDICINE | Facility: CLINIC | Age: 78
End: 2023-08-11

## 2023-08-11 ENCOUNTER — OFFICE VISIT (OUTPATIENT)
Dept: INTERNAL MEDICINE | Facility: CLINIC | Age: 78
End: 2023-08-11
Payer: COMMERCIAL

## 2023-08-11 VITALS
HEART RATE: 64 BPM | BODY MASS INDEX: 27.33 KG/M2 | SYSTOLIC BLOOD PRESSURE: 112 MMHG | DIASTOLIC BLOOD PRESSURE: 60 MMHG | HEIGHT: 70 IN | WEIGHT: 190.9 LBS | RESPIRATION RATE: 14 BRPM | TEMPERATURE: 97.8 F | OXYGEN SATURATION: 96 %

## 2023-08-11 DIAGNOSIS — H54.7 VISION LOSS: ICD-10-CM

## 2023-08-11 DIAGNOSIS — K57.32 DIVERTICULITIS OF COLON: ICD-10-CM

## 2023-08-11 DIAGNOSIS — R10.32 ABDOMINAL PAIN, LEFT LOWER QUADRANT: Primary | ICD-10-CM

## 2023-08-11 DIAGNOSIS — H53.8 BLURRED VISION: ICD-10-CM

## 2023-08-11 LAB
CRP SERPL-MCNC: 25.5 MG/L
ERYTHROCYTE [SEDIMENTATION RATE] IN BLOOD BY WESTERGREN METHOD: 13 MM/HR (ref 0–30)

## 2023-08-11 PROCEDURE — 86140 C-REACTIVE PROTEIN: CPT | Performed by: INTERNAL MEDICINE

## 2023-08-11 PROCEDURE — 36415 COLL VENOUS BLD VENIPUNCTURE: CPT | Performed by: INTERNAL MEDICINE

## 2023-08-11 PROCEDURE — 99214 OFFICE O/P EST MOD 30 MIN: CPT | Performed by: INTERNAL MEDICINE

## 2023-08-11 PROCEDURE — 85652 RBC SED RATE AUTOMATED: CPT | Performed by: INTERNAL MEDICINE

## 2023-08-11 ASSESSMENT — ENCOUNTER SYMPTOMS
EYE PAIN: 1
HEADACHES: 1

## 2023-08-11 ASSESSMENT — PAIN SCALES - GENERAL: PAINLEVEL: MODERATE PAIN (5)

## 2023-08-11 NOTE — TELEPHONE ENCOUNTER
Ramón Dean,    I saw Binu on Friday with several concerns just wanted to give you a heads up.    He developed recurrent left lower abdominal pain which is very similar to his previous episodes of diverticulitis and was started him on antibiotics.  Liquid diet was also recommended.  His fever has resolved after he started on antibiotic.    He also was seen recently by ophthalmology because he had episodes of blurry vision in both eyes and developed TMJ pain in his left jaw.  It turns out that he is also had intermittent monocular vision loss for years (he tells me up to 10 years on and off), he failed to mention it to you before.  Usually vision loss lasts up to 5 minutes and then resolves.  Ophthalmology was very concerned with giant cell arteritis and recommended emergency room visit for IV steroid infusion but he refused.  Ophthalmology note is in your inbox.    On exam today his temporal arteries nontender, his jaw pain is definitely from TMJ because it is very readily reproducible.  Generally since he has had this monocular vision loss for years if it was giant cell arteritis I would suspect that he would already lose his vision.  Also the whole situation is complicated with acute diverticulitis.    I did  his inflammatory markers labs today, they might be elevated due to diverticulitis and will need to be interpreted in context but we can always do serial testing to see if they are improving as his diverticulitis improves.    I asked him to go to the ER if he develops any vision changes over the weekend.    I did not started him on high-dose steroids today due to acute diverticulitis (he just started antibiotics yesterday).  If his diverticular pain is markedly better on Monday, certainly prednisone can be started then.    I did put an order for surgery for temporal artery biopsy.    Please touch base with him on Monday, I did ask him to schedule a follow-up with you next week.    Maria Isabel

## 2023-08-11 NOTE — PATIENT INSTRUCTIONS
Will check labs today for inflammatory markers: ESR and CRP for baseline. It might be elevated due to diverticulitis, but we can monitor over time to see if they are improving.    2. If develop recurrent vision loss over the weekend, come to ER for IV steroid injection for possible Giant Cell arteritis.    3. Since vision symptoms are nor new and left jaw pain is reproducible by palpation (most likely TMJ), will hold off on steroids for now.    3. Schedule appointment with surgery for temporal artery biopsy.    4. Follow up with Dr Hernandez next week.     5. Start liquid diet, then can transition to low fiber soft diet.

## 2023-08-11 NOTE — PROGRESS NOTES
Assessment & Plan     Abdominal pain, left lower quadrant most likely consistent with recurrent diverticulitis  Recurrent diverticulitis most likely since he has had many episodes in the past.  Currently fever has resolved with antibiotic, discussed to cut back diet to liquids only and only advance to soft low fiber diet once pain is minuscule.  He should follow-up with his PCP next week.    Episode of intermittent ocular vision loss for years, most recent 1 episode of blurred vision and left jaw pain.  He was able to see ophthalmologist yesterday who was concerned about giant cell arteritis and recommended emergency room visit for IV steroids but patient refused.    Currently he reports that monocular vision loss has been happening to him intermittently for the last 10 years so its not a new symptom.  Episode of blurry vision in both eyes happened recently and that is why he got concerned and saw an ophthalmologist.  Left jaw pain is very reproducible on exam of his palpation of TMJ joints I believe it is more musculoskeletal.  He does not have tenderness to palpation of his temporal artery on exam.    Discussed that we will start with checking ESR and CRP.  It would be tricky to interpret those results in the situation of acute diverticulitis but at least we can get a baseline and monitor it over time.    Discussed if his symptoms of vision loss happen over the weekend he will come probably to the emergency room.    Generally it would be a good idea to get a temporal artery biopsy regardless.  Referral to surgery was placed.  If that is negative, I am not sure if she has ever had carotid artery ultrasound done.  He does not have any bruits and on exam.    I asked him to see his PCP next week.    -- Adult General Surg Referral; Future  - ESR: Erythrocyte sedimentation rate  - CRP, inflammation     BMI:   Estimated body mass index is 27.39 kg/m  as calculated from the following:    Height as of this encounter:  "1.778 m (5' 10\").    Weight as of this encounter: 86.6 kg (190 lb 14.4 oz).     Sheela Bonner MD  [facility]    Subjective   Binu is a 78 year old, presenting for the following health issues:  Eye Problem (Rule out Giant Cell Arthritis.), Jaw Pain, Ear Problem (Tinitus and crackling in ear ), and Headache (Intermittent headaches. )        8/11/2023     2:51 PM   Additional Questions   Roomed by Kanika BECKMAN     Binu is a 78-year-old male with history of sarcoidosis, prostate cancer recurrent episodes of diverticulitis and neuropathy.  He is currently here for acute visit to follow-up on recurrent symptoms of diverticulitis and visual changes.    Binu has had diverticulitis many times.  Most recently he had an episode in May and CT scan showed ascending colon diverticulitis.  A week ago he started having left lower quadrant abdominal pain, very similar to his previous episodes of diverticulitis, he called our office yesterday, antibiotics were called in and ask him to transition to liquid only diet.  He started antibiotic and has not had any recurrent fever but has not followed diet recommendations.  Discussed to go down to liquid diet only until pain is minimal and then he can advance his diet to low fiber soft diet.    In addition to this he also have been experiencing some vision changes.  He reports that he has had intermittent loss of vision in his left eye for years (last 10 years) usually it happens couple of times a year, several months in between and lasts 5 minutes after it resolves.  I do not think he share this information with his current PCP recently.  He had 1 of those episodes about 10 days ago.  It did not alert him since he has had similar episodes in the past.  However 3 days later he developed blurry vision in both eyes which also lasted several minutes but had has never happened before.  She also started having pain in his left jaw.  The pain is only when he opens " "it wide and is reproducible with palpation.  He denies any claudication or pain with chewing.    Was able to see ophthalmologist yesterday who recommended going to the emergency room due to suspicion of giant cell arteritis for IV steroids.  Patient refused and elected to see his PCP instead.      Eye Problem     Ear Problem  Associated symptoms include headaches.   Headache        Review of Systems   HENT:  Positive for ear pain.    Eyes:  Positive for pain.   Neurological:  Positive for headaches.            Objective    /60 (BP Location: Left arm, Patient Position: Sitting, Cuff Size: Adult Regular)   Pulse 64   Temp 97.8  F (36.6  C) (Tympanic)   Resp 14   Ht 1.778 m (5' 10\")   Wt 86.6 kg (190 lb 14.4 oz)   LMP  (LMP Unknown)   SpO2 96%   BMI 27.39 kg/m    Body mass index is 27.39 kg/m .  Physical Exam   General: well appearing male, alert and oriented x3  EYES: Eyelids, conjunctiva, and sclera were normal. Pupils were normal.   HEAD, EARS, NOSE, MOUTH, AND THROAT: no cervical LAD, no thyromegaly or nodules appreciated. TMs are visualized and normal, oropharynx is clear.  He does have tenderness to palpation to his left TMJ joint, no tenderness to palpation of his temple, no tenderness in the rest of the jaw.  RESPIRATORY: respirations non labored, CTA bl, no wheezes, rales, no forced expiratory wheezing.  CARDIOVASCULAR: Heart rate and rhythm were normal. No murmurs, rubs,gallops. There was no peripheral edema. No carotid bruits.  GASTROINTESTINAL: Positive bowel sounds, abdomen is soft,  non distended, tenderness to palpation of the left lower quadrant.     MUSCULOSKELETAL: Muscle mass was normal for age. No joint synovitis or deformity.  LYMPHATIC: There were no enlarged nodes palpable.  SKIN/HAIR/NAILS: Skin color was normal.  No rashes.  NEUROLOGIC: The patient was alert and oriented.  Speech was normal.  There is no facial asymmetry.   PSYCHIATRIC:  Mood and affect were normal.            "

## 2023-08-12 LAB
ACE SERPL-CCNC: 34 U/L
QUANTIFERON MITOGEN: 6.9 IU/ML

## 2023-08-14 ENCOUNTER — PREP FOR PROCEDURE (OUTPATIENT)
Dept: SURGERY | Facility: CLINIC | Age: 78
End: 2023-08-14
Payer: COMMERCIAL

## 2023-08-14 DIAGNOSIS — H53.8 BLURRED VISION: Primary | ICD-10-CM

## 2023-08-14 LAB
ASPERGILLUS AB TITR SER CF: NORMAL {TITER}
B DERMAT AB SER-ACNC: 0.2 IV
COCCIDIOIDES AB TITR SER CF: NORMAL {TITER}
DLCOUNC-%PRED-PRE: 107 %
DLCOUNC-PRE: 26.74 ML/MIN/MMHG
DLCOUNC-PRED: 24.8 ML/MIN/MMHG
ERV-%PRED-PRE: 72 %
ERV-PRE: 0.9 L
ERV-PRED: 1.23 L
EXPTIME-PRE: 7.46 SEC
FEF2575-%PRED-PRE: 187 %
FEF2575-PRE: 3.81 L/SEC
FEF2575-PRED: 2.03 L/SEC
FEFMAX-%PRED-PRE: 117 %
FEFMAX-PRE: 8.91 L/SEC
FEFMAX-PRED: 7.6 L/SEC
FEV1-%PRED-PRE: 151 %
FEV1-PRE: 4.18 L
FEV1FEV6-PRE: 79 %
FEV1FEV6-PRED: 77 %
FEV1FVC-PRE: 79 %
FEV1FVC-PRED: 76 %
FEV1SVC-PRE: 77 %
FEV1SVC-PRED: 66 %
FIFMAX-PRE: 8.42 L/SEC
FRCPLETH-%PRED-PRE: 99 %
FRCPLETH-PRE: 3.98 L
FRCPLETH-PRED: 3.99 L
FVC-%PRED-PRE: 144 %
FVC-PRE: 5.31 L
FVC-PRED: 3.68 L
GAMMA INTERFERON BACKGROUND BLD IA-ACNC: 0.01 IU/ML
H CAPSUL MYC AB TITR SER CF: NORMAL {TITER}
H CAPSUL YST AB TITR SER CF: NORMAL {TITER}
IC-%PRED-PRE: 153 %
IC-PRE: 4.57 L
IC-PRED: 2.98 L
M TB IFN-G BLD-IMP: NEGATIVE
M TB IFN-G CD4+ BCKGRND COR BLD-ACNC: 6.89 IU/ML
MITOGEN IGNF BCKGRD COR BLD-ACNC: 0.01 IU/ML
MITOGEN IGNF BCKGRD COR BLD-ACNC: 0.01 IU/ML
QUANTIFERON NIL TUBE: 0.01 IU/ML
QUANTIFERON TB1 TUBE: 0.02 IU/ML
QUANTIFERON TB2 TUBE: 0.02
RVPLETH-%PRED-PRE: 113 %
RVPLETH-PRE: 3.09 L
RVPLETH-PRED: 2.71 L
SCANNED LAB RESULT: NORMAL
TLCPLETH-%PRED-PRE: 120 %
TLCPLETH-PRE: 8.55 L
TLCPLETH-PRED: 7.09 L
VA-%PRED-PRE: 116 %
VA-PRE: 7.41 L
VC-%PRED-PRE: 130 %
VC-PRE: 5.46 L
VC-PRED: 4.19 L

## 2023-08-16 ENCOUNTER — TELEPHONE (OUTPATIENT)
Dept: SURGERY | Facility: CLINIC | Age: 78
End: 2023-08-16
Payer: COMMERCIAL

## 2023-08-16 ENCOUNTER — HOSPITAL ENCOUNTER (OUTPATIENT)
Facility: AMBULATORY SURGERY CENTER | Age: 78
End: 2023-08-16
Attending: SURGERY
Payer: COMMERCIAL

## 2023-08-16 ENCOUNTER — MYC MEDICAL ADVICE (OUTPATIENT)
Dept: INTERNAL MEDICINE | Facility: CLINIC | Age: 78
End: 2023-08-16
Payer: COMMERCIAL

## 2023-08-16 DIAGNOSIS — H53.8 BLURRED VISION: Primary | ICD-10-CM

## 2023-08-16 LAB — SCANNED LAB RESULT: NORMAL

## 2023-08-16 NOTE — TELEPHONE ENCOUNTER
Spoke with patient today regarding surgery scheduling     Went over details/instructions.    Surgery Letter sent via [employer]  (Please see LETTERS TAB in chart to retrieve a copy of this letter)

## 2023-08-16 NOTE — LETTER
Pre-op Physical: Dr. Perales will do your pre op physical the day of surgery    Surgery Date: 8/22/2023     Location: [facility] [address] [place] [address]    Approximate Arrival Time: 10:00 am  (Unless instructed differently by the pre-op call nurse)       Pre-Surgical Tasks:     Schedule a pre-op physical with your primary care doctor if not internal to [facility].  If internal, we have scheduled this.   The pre-op physical must be 10-30 days before surgery and since it is required by anesthesia, your surgery will be cancelled if it's not done.      Review all medications with your primary care or prescribing physician; they will advise you which meds to stop and when, and when you can resume taking.  Certain medications like blood thinners and weight loss medications need to be stopped in advance of surgery to proceed safely.      Blood thinners including but not exclusive to drugs like Xarelto, Eliquis, Warfarin and Aspirin, should be stopped five days before surgery, if your prescribing provider agrees. Follow your provider's advice on stopping blood thinners because they know you best.  If you are unsure if your medication is a blood thinner, ask your prescribing provider.    Weight loss medications: There are multiple medications being used for weight management and diabetes today, and the list is growing.  Phentermine, Ozempic, Wegovy, Trulicity, and other similar medications need to be stopped one week before surgery to avoid being cancelled.  Victoza and Saxenda can be continued longer but must be stopped one full day before surgery.  Please ask your prescribing provider for advice.    Diabetic medications: in addition to the medications talked about above that are used for either weight loss or diabetes, some people are on insulin that may require adjustment.  Please discuss managing diabetic medications with your prescribing doctor as these medications  may require modification prior to surgery.     Please shower the evening before and morning of surgery with Hibiclens soap.  This can be found at your local pharmacy.     Fasting instructions will be provided by the pre-op nurse who will call you 1-3 days before surgery.  Typically, we advise normal food up to 8 hours before you arrive for surgery. Clear liquids only from then until 2 hours before you arrive surgery, then nothing at all by mouth.  The nurse will review your specific instructions with you at the call.      Smoking impacts your body's ability to heal properly so we advise patients to quit if possible before surgery.  Plastic Surgery patients are required to be nicotine free for at least 8 weeks before surgery.      You will need an adult to drive you home and stay with you 24 hours after surgery. Public transportation or Medical Van Services are not permitted.    Visitor restrictions are subject to change, please verify with the pre-op nurse when they call how many people are permitted to accompany you.    We always encourage you to notify your insurance any time you have medical tests or procedures scheduled including surgery. The number is usually right on the back of your insurance card. To obtain pricing for surgery, please call  [employer] [facility] at [phone] or email [email or web address].        Call our office if you have any questions! Thank you!

## 2023-08-18 ENCOUNTER — TELEPHONE (OUTPATIENT)
Dept: SURGERY | Facility: CLINIC | Age: 78
End: 2023-08-18
Payer: COMMERCIAL

## 2023-08-18 NOTE — TELEPHONE ENCOUNTER
Pt called in and stated the time of arrival for his procedure on 8/22 was moved earlier and he would like it to be later in the morning due to his ride.  Sent email to [facility] requesting a later arrival time if possible. Will have pre op nurse call with new time if possible.

## 2023-08-21 DIAGNOSIS — I47.10 SVT (SUPRAVENTRICULAR TACHYCARDIA) (H): ICD-10-CM

## 2023-08-21 DIAGNOSIS — I71.21 ASCENDING AORTIC ANEURYSM (H): ICD-10-CM

## 2023-08-23 RX ORDER — LOSARTAN POTASSIUM 100 MG/1
100 TABLET ORAL DAILY
Qty: 90 TABLET | Refills: 1 | OUTPATIENT
Start: 2023-08-23

## 2023-08-24 ENCOUNTER — HOSPITAL ENCOUNTER (OUTPATIENT)
Dept: ULTRASOUND IMAGING | Facility: HOSPITAL | Age: 78
Discharge: HOME | End: 2023-08-24
Attending: INTERNAL MEDICINE | Admitting: INTERNAL MEDICINE
Payer: COMMERCIAL

## 2023-08-24 ENCOUNTER — OFFICE VISIT (OUTPATIENT)
Dept: CARDIOLOGY | Facility: CLINIC | Age: 78
End: 2023-08-24
Attending: INTERNAL MEDICINE
Payer: COMMERCIAL

## 2023-08-24 VITALS
WEIGHT: 192.5 LBS | DIASTOLIC BLOOD PRESSURE: 76 MMHG | BODY MASS INDEX: 27.56 KG/M2 | OXYGEN SATURATION: 98 % | SYSTOLIC BLOOD PRESSURE: 129 MMHG | HEART RATE: 63 BPM | HEIGHT: 70 IN

## 2023-08-24 DIAGNOSIS — I65.29 OCCLUSION AND STENOSIS OF UNSPECIFIED CAROTID ARTERY: ICD-10-CM

## 2023-08-24 DIAGNOSIS — I71.21 ASCENDING AORTIC ANEURYSM, UNSPECIFIED WHETHER RUPTURED (H): Primary | ICD-10-CM

## 2023-08-24 DIAGNOSIS — I65.23 BILATERAL CAROTID ARTERY STENOSIS: ICD-10-CM

## 2023-08-24 DIAGNOSIS — I77.9 CAROTID ARTERIAL DISEASE (H): ICD-10-CM

## 2023-08-24 DIAGNOSIS — I71.21 ASCENDING AORTIC ANEURYSM, UNSPECIFIED WHETHER RUPTURED (H): ICD-10-CM

## 2023-08-24 PROCEDURE — G0463 HOSPITAL OUTPT CLINIC VISIT: HCPCS | Mod: 25 | Performed by: INTERNAL MEDICINE

## 2023-08-24 PROCEDURE — 93880 EXTRACRANIAL BILAT STUDY: CPT

## 2023-08-24 PROCEDURE — 99214 OFFICE O/P EST MOD 30 MIN: CPT | Performed by: INTERNAL MEDICINE

## 2023-08-24 PROCEDURE — 93005 ELECTROCARDIOGRAM TRACING: CPT

## 2023-08-24 ASSESSMENT — PAIN SCALES - GENERAL: PAINLEVEL: MILD PAIN (2)

## 2023-08-24 NOTE — LETTER
8/24/2023      RE: Artis Galvin  [address]  [place] [address]       Dear Colleague,    Thank you for the opportunity to participate in the care of your patient, Artis Galvin, at the [facility] [place] at [facility]. Please see a copy of my visit note below.    CARDIOLOGY CLINIC FOLLOW UP    HPI: Artis Galvin is a 78 year old adult, being seen today for recheck of CAD.    He has history of mild non obstructive CAD, hypertension, hyperlipidemia, and significant family history of aortic aneurysms, including personal history.    We again reviewed exercise goals given his aortic aneurysm, and the desire to have lower SBPs and heart rates while exercising, including avoidance of significant rapid elevations in BP as would be expected with strength training.     Of note, he has known SVT / AVNRT.    Today he has a number of systemic complaints including fatigue, diaphoresis, lower extremity neuropathy, and that he is currently being worked up for temporal arteritis, though he had recently cancelled his temporal artery biopsy appointment. His ESR is normal, though CRP is elevated.    PAST MEDICAL HISTORY:  Past Medical History:   Diagnosis Date     Abdominal pain, unspecified site      Abnormal thyroid ultrasound      BBB (bundle branch block)     right     Chronic lymphocytic thyroiditis      Diverticulosis of colon (without mention of hemorrhage)      Hypertension      Localized superficial swelling, mass, or lump      Malignant neoplasm of prostate (H)      Nontoxic multinodular goiter      Other and unspecified hyperlipidemia      Other nonspecific abnormal serum enzyme levels      Pain in joint, shoulder region      Persistent hoarseness      Sarcoidosis      Scleritis, unspecified      Unspecified hypothyroidism      Unspecified vitamin D deficiency        CURRENT MEDICATIONS:  Current Outpatient Medications   Medication  Sig Dispense Refill     alendronate (FOSAMAX) 70 MG tablet Take 70 mg by mouth every 7 days       aspirin 81 MG EC tablet Take 81 mg by mouth daily       atorvastatin (LIPITOR) 40 MG tablet Take 1 tablet (40 mg) by mouth daily (Patient taking differently: Take 20 mg by mouth daily) 90 tablet 3     calcium citrate-vitamin D (CITRACAL) 315-200 MG-UNIT TABS per tablet Take 1 tablet by mouth 2 times daily       celecoxib (CELEBREX) 200 MG capsule TAKE 1 CAPSULE BY MOUTH DAILY 30 capsule 11     Cyanocobalamin 5000 MCG SUBL Place 1 tablet under the tongue daily       ketoconazole (NIZORAL) 2 % external cream Apply topically as needed 60 g 3     levothyroxine (SYNTHROID/LEVOTHROID) 75 MCG tablet Take 1 tablet (75 mcg) by mouth daily 90 tablet 3     losartan (COZAAR) 100 MG tablet Take 1 tablet (100 mg) by mouth daily (Patient taking differently: Take 50 mg by mouth daily) 90 tablet 1     pilocarpine (PILOCAR) 2 % ophthalmic solution Apply 1 drop to eye 2 times daily       Probiotic Product (MISC INTESTINAL MEGHA REGULAT) CAPS Take 1 capsule by mouth daily       nitroGLYcerin (NITROSTAT) 0.4 MG sublingual tablet For chest pain place 1 tablet under the tongue every 5 minutes for 3 doses. If symptoms persist 5 minutes after 1st dose call 911. (Patient not taking: Reported on 8/24/2023) 25 tablet 3       PAST SURGICAL HISTORY:  Past Surgical History:   Procedure Laterality Date     CV CORONARY ANGIOGRAM N/A 10/20/2022    Procedure: Coronary Angiogram;  Surgeon: Mir Farr MD;  Location:  HEART CARDIAC CATH LAB     CV FRACTIONAL FLOW RATIO WIRE N/A 10/20/2022    Procedure: Fractional Flow Ratio Wire;  Surgeon: Mir Farr MD;  Location:  HEART CARDIAC CATH LAB     HC SHLDR ARTHROSCOP,PART ACROMIOPLAS Right     Description: Shoulder Arthroscopy, Space Decompression And Acromioplasty;  Recorded: 02/18/2014;     [place] LAP,PROSTATECTOMY,RADICAL,W/NERVE SPARE,INCL ROBOTIC Bilateral 2/27/2018  "   Procedure: ROBOTIC ASSISTED RADICAL RETROPUBIC PROSTATECTOMY, BILATERAL PELVIC LYMPH NODE DISSECTION LYSIS OF ADHESIONS;  Surgeon: Wale Rodriguez MD;  Location: [facility] [place];  Service: Urology     IL RADIAL KERATOTOMY      Description: Cornea Radial Keratotomy;  Recorded: 02/18/2014;       ALLERGIES  Ciprofloxacin and Levaquin [levofloxacin]    FAMILY HX:  Family History   Problem Relation Age of Onset     No Known Problems Mother      Coronary Artery Disease Father      Aortic aneurysm Father      Prostate Cancer Maternal Grandfather      Prostate Cancer Maternal Uncle        SOCIAL HX:  Social History     Socioeconomic History     Marital status: Patient Declined     Spouse name: None     Number of children: None     Years of education: None     Highest education level: None   Tobacco Use     Smoking status: Never Smoker     Smokeless tobacco: Never Used   Substance and Sexual Activity     Alcohol use: Not Currently     Comment: Alcoholic Drinks/day: rarely     Drug use: No   Social History Narrative    Single. . 4 grown daughters.    Currently unemployed.       ROS:  Constitutional: No fever, chills, or sweats. No weight gain/loss.   ENT: No visual disturbance, ear ache, epistaxis, sore throat.   Allergies/Immunologic: Negative.   Respiratory: No cough, hemoptysis.   Cardiovascular: As per HPI.   GI: No nausea, vomiting, hematemesis, melena, or hematochezia.   : No urinary frequency, dysuria, or hematuria.   Integument: Negative.   Psychiatric: Negative.   Neuro: Negative.   Endocrinology: Negative.   Musculoskeletal: No myalgia.    VITAL SIGNS:  /76 (BP Location: Left arm, Patient Position: Chair, Cuff Size: Adult Regular)   Pulse 63   Ht 1.786 m (5' 10.32\")   Wt 87.3 kg (192 lb 8 oz)   LMP  (LMP Unknown)   SpO2 98%   BMI 27.37 kg/m    Body mass index is 27.37 kg/m .  Wt Readings from Last 2 Encounters:   08/24/23 87.3 kg (192 lb 8 oz)   08/11/23 86.6 kg (190 lb 14.4 oz) "       PHYSICAL EXAM  Artis Galvin is a 78 year old adult in no acute distress.  HEENT: Unremarkable.  Neck: JVP normal.  Carotids +4/4 bilaterally, +bruit.  Lungs: CTA.  Cor: RRR. Normal S1 and S2.  No murmur, rub, or gallop.  PMI in Lf 5th ICS.  Abd: Soft, nontender, nondistended.  NABS.  No pulsatile mass.  Extremities: No C/C/E.  Pulses +4/4 symmetric in upper and lower extremities.  Neuro: Grossly intact.    LABS    Lab Results   Component Value Date    WBC 5.0 2022     Lab Results   Component Value Date    RBC 4.83 2022     Lab Results   Component Value Date    HGB 14.7 2022     Lab Results   Component Value Date    HCT 44.3 2022     No components found for: MCT  Lab Results   Component Value Date    MCV 92 2022     Lab Results   Component Value Date    MCH 30.4 2022     Lab Results   Component Value Date    MCHC 33.2 2022     Lab Results   Component Value Date    RDW 12.5 2022     Lab Results   Component Value Date     2022      Recent Labs   Lab Test 22  1635 22  1530 21  1525   NA  --  142 141   POTASSIUM  --  4.7 4.7   CHLORIDE  --  107 105   CO2  --  25 28   ANIONGAP  --  10 8   GLC  --  88 96   BUN  --  17 19   CR 1.3 1.08 0.94   DEANDRA  --  9.2 9.8     Recent Labs   Lab Test 21  1653 20  1059   CHOL 159 125   HDL 35* 35*   LDL 52 60   TRIG 362* 149        EK22  Sinus bradycardia    ECHO:     Moderate ascending aortic dilatation    Left ventricle ejection fraction is normal. The calculated left ventricular ejection fraction is 72%.    Normal right ventricular size and systolic function.    Aortic valve sclerosis with mild aortic insufficiency    No evidence of patent foramen ovale based on color flow or bubble study.    When compared to the previous study dated 2018, no interval change.    STRESS TEST:    IMPRESSION:  Borderline hemodynamically significant mid LAD stenosis. CT-FFR is  0.79,  which falls to 0.75 in the distal LAD.      FINDINGS:  1.  LAD: CT-FFR after the moderate-severe mLAD stenosis is 0.79, which  falls to 0.75 in the distal LAD.   2.  LCX: CT-FFR in the small distal circumflex is 0.83.   3.  RCA: CT-FFR after the mild distal stenosis is 0.85.     CARDIAC CATH:  --    ASSESSMENT AND PLAN:    1. Mild non obstructive CAD  -- continue aspirin, statin    2. Hypertension, well controlled  -- continue ARB    3. Hyperlipidemia, well controlled  -- continue statin    4. Mild to moderate ascending aortic dilation  -- repeat echo now and annually    5. Carotid stenosis, mild  -- repeat carotid US    6. Fatigue, DENAE  -- refer for sleep study    RTC annually    Mir Farr MD

## 2023-08-24 NOTE — PATIENT INSTRUCTIONS
Patient Instructions:  It was a pleasure to see you in the cardiology clinic today.      If you have any questions, call  Jennifer Henson RN, at [phone].   [facility].  To schedule an appointment or to leave a message for your Care Team Press #1  If you are a physician calling for another physician Press #2  For Billing Press #3  For Medical Records Press #4  We are encouraging the use of [employer] to communicate with your HealthCare Provider    Note the new medications: none  Stop the following medications: none    The results from today include: carotid ultrasound, ECHO, EP referral, Sleep study  Please follow up with Dr. Farr in one year with fasting labs      If you have an urgent need after hours (8:00 am to 4:30 pm) please call [phone] and ask for the cardiology fellow on call.

## 2023-08-24 NOTE — NURSING NOTE
Chief Complaint   Patient presents with    Follow Up     annual visit for ascending aortic aneurysm     Vitals were taken and medications reconciled.    Taz Hendricks, MENDY  9:04 AM    9:00 AM

## 2023-08-24 NOTE — PROGRESS NOTES
CARDIOLOGY CLINIC FOLLOW UP    HPI: Artis Galvin is a 78 year old adult, being seen today for recheck of CAD.    He has history of mild non obstructive CAD, hypertension, hyperlipidemia, and significant family history of aortic aneurysms, including personal history.    We again reviewed exercise goals given his aortic aneurysm, and the desire to have lower SBPs and heart rates while exercising, including avoidance of significant rapid elevations in BP as would be expected with strength training.     Of note, he has known SVT / AVNRT.    Today he has a number of systemic complaints including fatigue, diaphoresis, lower extremity neuropathy, and that he is currently being worked up for temporal arteritis, though he had recently cancelled his temporal artery biopsy appointment. His ESR is normal, though CRP is elevated.    PAST MEDICAL HISTORY:  Past Medical History:   Diagnosis Date     Abdominal pain, unspecified site      Abnormal thyroid ultrasound      BBB (bundle branch block)     right     Chronic lymphocytic thyroiditis      Diverticulosis of colon (without mention of hemorrhage)      Hypertension      Localized superficial swelling, mass, or lump      Malignant neoplasm of prostate (H)      Nontoxic multinodular goiter      Other and unspecified hyperlipidemia      Other nonspecific abnormal serum enzyme levels      Pain in joint, shoulder region      Persistent hoarseness      Sarcoidosis      Scleritis, unspecified      Unspecified hypothyroidism      Unspecified vitamin D deficiency        CURRENT MEDICATIONS:  Current Outpatient Medications   Medication Sig Dispense Refill     alendronate (FOSAMAX) 70 MG tablet Take 70 mg by mouth every 7 days       aspirin 81 MG EC tablet Take 81 mg by mouth daily       atorvastatin (LIPITOR) 40 MG tablet Take 1 tablet (40 mg) by mouth daily (Patient taking differently: Take 20 mg by mouth daily) 90 tablet 3     calcium citrate-vitamin D (CITRACAL) 315-200 MG-UNIT TABS  per tablet Take 1 tablet by mouth 2 times daily       celecoxib (CELEBREX) 200 MG capsule TAKE 1 CAPSULE BY MOUTH DAILY 30 capsule 11     Cyanocobalamin 5000 MCG SUBL Place 1 tablet under the tongue daily       ketoconazole (NIZORAL) 2 % external cream Apply topically as needed 60 g 3     levothyroxine (SYNTHROID/LEVOTHROID) 75 MCG tablet Take 1 tablet (75 mcg) by mouth daily 90 tablet 3     losartan (COZAAR) 100 MG tablet Take 1 tablet (100 mg) by mouth daily (Patient taking differently: Take 50 mg by mouth daily) 90 tablet 1     pilocarpine (PILOCAR) 2 % ophthalmic solution Apply 1 drop to eye 2 times daily       Probiotic Product (MISC INTESTINAL MEGHA REGULAT) CAPS Take 1 capsule by mouth daily       nitroGLYcerin (NITROSTAT) 0.4 MG sublingual tablet For chest pain place 1 tablet under the tongue every 5 minutes for 3 doses. If symptoms persist 5 minutes after 1st dose call 911. (Patient not taking: Reported on 8/24/2023) 25 tablet 3       PAST SURGICAL HISTORY:  Past Surgical History:   Procedure Laterality Date     CV CORONARY ANGIOGRAM N/A 10/20/2022    Procedure: Coronary Angiogram;  Surgeon: Mir Farr MD;  Location:  HEART CARDIAC CATH LAB     CV FRACTIONAL FLOW RATIO WIRE N/A 10/20/2022    Procedure: Fractional Flow Ratio Wire;  Surgeon: Mir Farr MD;  Location:  HEART CARDIAC CATH LAB     HC SHLDR ARTHROSCOP,PART ACROMIOPLAS Right     Description: Shoulder Arthroscopy, Space Decompression And Acromioplasty;  Recorded: 02/18/2014;     AR LAP,PROSTATECTOMY,RADICAL,W/NERVE SPARE,INCL ROBOTIC Bilateral 2/27/2018    Procedure: ROBOTIC ASSISTED RADICAL RETROPUBIC PROSTATECTOMY, BILATERAL PELVIC LYMPH NODE DISSECTION LYSIS OF ADHESIONS;  Surgeon: Wale Rodriguez MD;  [place] [facility] [place]  Service: Urology     AR RADIAL KERATOTOMY      Description: Cornea Radial Keratotomy;  Recorded: 02/18/2014;       ALLERGIES  Ciprofloxacin and Levaquin  "[levofloxacin]    FAMILY HX:  Family History   Problem Relation Age of Onset     No Known Problems Mother      Coronary Artery Disease Father      Aortic aneurysm Father      Prostate Cancer Maternal Grandfather      Prostate Cancer Maternal Uncle        SOCIAL HX:  Social History     Socioeconomic History     Marital status: Patient Declined     Spouse name: None     Number of children: None     Years of education: None     Highest education level: None   Tobacco Use     Smoking status: Never Smoker     Smokeless tobacco: Never Used   Substance and Sexual Activity     Alcohol use: Not Currently     Comment: Alcoholic Drinks/day: rarely     Drug use: No   Social History Narrative    Single. . 4 grown daughters.    Currently unemployed.       ROS:  Constitutional: No fever, chills, or sweats. No weight gain/loss.   ENT: No visual disturbance, ear ache, epistaxis, sore throat.   Allergies/Immunologic: Negative.   Respiratory: No cough, hemoptysis.   Cardiovascular: As per HPI.   GI: No nausea, vomiting, hematemesis, melena, or hematochezia.   : No urinary frequency, dysuria, or hematuria.   Integument: Negative.   Psychiatric: Negative.   Neuro: Negative.   Endocrinology: Negative.   Musculoskeletal: No myalgia.    VITAL SIGNS:  /76 (BP Location: Left arm, Patient Position: Chair, Cuff Size: Adult Regular)   Pulse 63   Ht 1.786 m (5' 10.32\")   Wt 87.3 kg (192 lb 8 oz)   LMP  (LMP Unknown)   SpO2 98%   BMI 27.37 kg/m    Body mass index is 27.37 kg/m .  Wt Readings from Last 2 Encounters:   08/24/23 87.3 kg (192 lb 8 oz)   08/11/23 86.6 kg (190 lb 14.4 oz)       PHYSICAL EXAM  Artis Galvin is a 78 year old adult in no acute distress.  HEENT: Unremarkable.  Neck: JVP normal.  Carotids +4/4 bilaterally, +bruit.  Lungs: CTA.  Cor: RRR. Normal S1 and S2.  No murmur, rub, or gallop.  PMI in Lf 5th ICS.  Abd: Soft, nontender, nondistended.  NABS.  No pulsatile mass.  Extremities: No C/C/E.  Pulses " +4/4 symmetric in upper and lower extremities.  Neuro: Grossly intact.    LABS    Lab Results   Component Value Date    WBC 5.0 2022     Lab Results   Component Value Date    RBC 4.83 2022     Lab Results   Component Value Date    HGB 14.7 2022     Lab Results   Component Value Date    HCT 44.3 2022     No components found for: MCT  Lab Results   Component Value Date    MCV 92 2022     Lab Results   Component Value Date    MCH 30.4 2022     Lab Results   Component Value Date    MCHC 33.2 2022     Lab Results   Component Value Date    RDW 12.5 2022     Lab Results   Component Value Date     2022      Recent Labs   Lab Test 22  1635 22  1530 21  1525   NA  --  142 141   POTASSIUM  --  4.7 4.7   CHLORIDE  --  107 105   CO2  --  25 28   ANIONGAP  --  10 8   GLC  --  88 96   BUN  --  17 19   CR 1.3 1.08 0.94   DEANDRA  --  9.2 9.8     Recent Labs   Lab Test 21  1653 20  1059   CHOL 159 125   HDL 35* 35*   LDL 52 60   TRIG 362* 149        EK22  Sinus bradycardia    ECHO:     Moderate ascending aortic dilatation    Left ventricle ejection fraction is normal. The calculated left ventricular ejection fraction is 72%.    Normal right ventricular size and systolic function.    Aortic valve sclerosis with mild aortic insufficiency    No evidence of patent foramen ovale based on color flow or bubble study.    When compared to the previous study dated 2018, no interval change.    STRESS TEST:    IMPRESSION:  Borderline hemodynamically significant mid LAD stenosis. CT-FFR is  0.79, which falls to 0.75 in the distal LAD.      FINDINGS:  1.  LAD: CT-FFR after the moderate-severe mLAD stenosis is 0.79, which  falls to 0.75 in the distal LAD.   2.  LCX: CT-FFR in the small distal circumflex is 0.83.   3.  RCA: CT-FFR after the mild distal stenosis is 0.85.     CARDIAC CATH:  --    ASSESSMENT AND PLAN:    1. Mild non obstructive  CAD  -- continue aspirin, statin    2. Hypertension, well controlled  -- continue ARB    3. Hyperlipidemia, well controlled  -- continue statin    4. Mild to moderate ascending aortic dilation  -- repeat echo now and annually    5. Carotid stenosis, mild  -- repeat carotid US    6. Fatigue, DENAE  -- refer for sleep study    RTC annually    Mir Farr MD

## 2023-08-25 ENCOUNTER — TELEPHONE (OUTPATIENT)
Dept: SURGERY | Facility: CLINIC | Age: 78
End: 2023-08-25
Payer: COMMERCIAL

## 2023-08-25 ENCOUNTER — PREP FOR PROCEDURE (OUTPATIENT)
Dept: SURGERY | Facility: CLINIC | Age: 78
End: 2023-08-25
Payer: COMMERCIAL

## 2023-08-25 DIAGNOSIS — H53.8 BLURRED VISION: Primary | ICD-10-CM

## 2023-08-25 NOTE — TELEPHONE ENCOUNTER
Returned call to patient to reschedule his surgery with Dr. Perales. Called him and the line was answered but no one talked on the other end of the line. Will try again later.

## 2023-08-28 ENCOUNTER — PREP FOR PROCEDURE (OUTPATIENT)
Dept: SURGERY | Facility: CLINIC | Age: 78
End: 2023-08-28
Payer: COMMERCIAL

## 2023-08-28 DIAGNOSIS — H53.8 BLURRED VISION: Primary | ICD-10-CM

## 2023-08-30 ENCOUNTER — LAB (OUTPATIENT)
Dept: LAB | Facility: CLINIC | Age: 78
End: 2023-08-30
Payer: COMMERCIAL

## 2023-08-30 DIAGNOSIS — I65.29 OCCLUSION AND STENOSIS OF UNSPECIFIED CAROTID ARTERY: ICD-10-CM

## 2023-08-30 DIAGNOSIS — I65.23 BILATERAL CAROTID ARTERY STENOSIS: ICD-10-CM

## 2023-08-30 DIAGNOSIS — H53.8 BLURRED VISION: ICD-10-CM

## 2023-08-30 DIAGNOSIS — I71.21 ASCENDING AORTIC ANEURYSM, UNSPECIFIED WHETHER RUPTURED (H): ICD-10-CM

## 2023-08-30 LAB
ANION GAP SERPL CALCULATED.3IONS-SCNC: 10 MMOL/L (ref 7–15)
BUN SERPL-MCNC: 11.9 MG/DL (ref 8–23)
CALCIUM SERPL-MCNC: 9.1 MG/DL (ref 8.8–10.2)
CHLORIDE SERPL-SCNC: 105 MMOL/L (ref 98–107)
CHOLEST SERPL-MCNC: 119 MG/DL
CREAT SERPL-MCNC: 1.06 MG/DL (ref 0.51–1.17)
CRP SERPL-MCNC: <3 MG/L
DEPRECATED HCO3 PLAS-SCNC: 26 MMOL/L (ref 22–29)
ERYTHROCYTE [DISTWIDTH] IN BLOOD BY AUTOMATED COUNT: 12.4 % (ref 10–15)
GFR SERPL CREATININE-BSD FRML MDRD: 72 ML/MIN/1.73M2
GLUCOSE SERPL-MCNC: 90 MG/DL (ref 70–99)
HCT VFR BLD AUTO: 47.3 % (ref 35–53)
HDLC SERPL-MCNC: 35 MG/DL
HGB BLD-MCNC: 15.7 G/DL (ref 11.7–17.7)
LDLC SERPL CALC-MCNC: 56 MG/DL
MCH RBC QN AUTO: 30.7 PG (ref 26.5–33)
MCHC RBC AUTO-ENTMCNC: 33.2 G/DL (ref 31.5–36.5)
MCV RBC AUTO: 92 FL (ref 78–100)
NONHDLC SERPL-MCNC: 84 MG/DL
PLATELET # BLD AUTO: 233 10E3/UL (ref 150–450)
POTASSIUM SERPL-SCNC: 4.5 MMOL/L (ref 3.4–5.3)
RBC # BLD AUTO: 5.12 10E6/UL (ref 3.8–5.9)
SODIUM SERPL-SCNC: 141 MMOL/L (ref 136–145)
TRIGL SERPL-MCNC: 141 MG/DL
WBC # BLD AUTO: 5.5 10E3/UL (ref 4–11)

## 2023-08-30 PROCEDURE — 36415 COLL VENOUS BLD VENIPUNCTURE: CPT

## 2023-08-30 PROCEDURE — 85027 COMPLETE CBC AUTOMATED: CPT

## 2023-08-30 PROCEDURE — 80061 LIPID PANEL: CPT

## 2023-08-30 PROCEDURE — 86140 C-REACTIVE PROTEIN: CPT

## 2023-08-30 PROCEDURE — 80048 BASIC METABOLIC PNL TOTAL CA: CPT

## 2023-08-31 ENCOUNTER — MYC MEDICAL ADVICE (OUTPATIENT)
Dept: CARDIOLOGY | Facility: CLINIC | Age: 78
End: 2023-08-31

## 2023-08-31 ENCOUNTER — NURSE TRIAGE (OUTPATIENT)
Dept: CARDIOLOGY | Facility: CLINIC | Age: 78
End: 2023-08-31
Payer: COMMERCIAL

## 2023-08-31 ENCOUNTER — HOSPITAL ENCOUNTER (OUTPATIENT)
Facility: CLINIC | Age: 78
Setting detail: OBSERVATION
Discharge: HOME | End: 2023-09-01
Attending: EMERGENCY MEDICINE | Admitting: PHYSICIAN ASSISTANT
Payer: COMMERCIAL

## 2023-08-31 ENCOUNTER — APPOINTMENT (OUTPATIENT)
Dept: CT IMAGING | Facility: CLINIC | Age: 78
End: 2023-08-31
Attending: EMERGENCY MEDICINE
Payer: COMMERCIAL

## 2023-08-31 DIAGNOSIS — I71.21 ASCENDING AORTIC ANEURYSM (H): ICD-10-CM

## 2023-08-31 DIAGNOSIS — R07.9 CHEST PAIN, UNSPECIFIED TYPE: ICD-10-CM

## 2023-08-31 DIAGNOSIS — I47.10 SVT (SUPRAVENTRICULAR TACHYCARDIA) (H): ICD-10-CM

## 2023-08-31 DIAGNOSIS — I10 ESSENTIAL HYPERTENSION, BENIGN: ICD-10-CM

## 2023-08-31 DIAGNOSIS — I25.10 CORONARY ARTERY DISEASE INVOLVING NATIVE HEART WITHOUT ANGINA PECTORIS, UNSPECIFIED VESSEL OR LESION TYPE: ICD-10-CM

## 2023-08-31 DIAGNOSIS — R07.9 CHEST PAIN, UNSPECIFIED TYPE: Primary | ICD-10-CM

## 2023-08-31 DIAGNOSIS — E78.5 HYPERLIPIDEMIA, UNSPECIFIED HYPERLIPIDEMIA TYPE: ICD-10-CM

## 2023-08-31 DIAGNOSIS — D86.0 PULMONARY SARCOIDOSIS (H): ICD-10-CM

## 2023-08-31 DIAGNOSIS — E03.9 HYPOTHYROIDISM, UNSPECIFIED TYPE: ICD-10-CM

## 2023-08-31 DIAGNOSIS — R42 LIGHTHEADEDNESS: Primary | ICD-10-CM

## 2023-08-31 DIAGNOSIS — I71.21 ANEURYSM OF ASCENDING AORTA WITHOUT RUPTURE (H): ICD-10-CM

## 2023-08-31 DIAGNOSIS — E78.00 HYPERCHOLESTEROLEMIA: ICD-10-CM

## 2023-08-31 LAB
ALBUMIN SERPL BCG-MCNC: 3.8 G/DL (ref 3.5–5.2)
ALBUMIN UR-MCNC: NEGATIVE MG/DL
ALP SERPL-CCNC: 120 U/L (ref 35–129)
ALT SERPL W P-5'-P-CCNC: 9 U/L (ref 0–70)
ANION GAP SERPL CALCULATED.3IONS-SCNC: 11 MMOL/L (ref 7–15)
APPEARANCE UR: CLEAR
AST SERPL W P-5'-P-CCNC: 19 U/L (ref 0–45)
ATRIAL RATE - MUSE: 54 BPM
BASOPHILS # BLD AUTO: 0 10E3/UL (ref 0–0.2)
BASOPHILS NFR BLD AUTO: 1 %
BILIRUB DIRECT SERPL-MCNC: <0.2 MG/DL (ref 0–0.3)
BILIRUB SERPL-MCNC: 0.6 MG/DL
BILIRUB UR QL STRIP: NEGATIVE
BUN SERPL-MCNC: 12.3 MG/DL (ref 8–23)
CALCIUM SERPL-MCNC: 9.1 MG/DL (ref 8.8–10.2)
CHLORIDE SERPL-SCNC: 105 MMOL/L (ref 98–107)
COLOR UR AUTO: NORMAL
CREAT SERPL-MCNC: 1.12 MG/DL (ref 0.51–1.17)
DEPRECATED HCO3 PLAS-SCNC: 24 MMOL/L (ref 22–29)
DIASTOLIC BLOOD PRESSURE - MUSE: NORMAL MMHG
EOSINOPHIL # BLD AUTO: 0.1 10E3/UL (ref 0–0.7)
EOSINOPHIL NFR BLD AUTO: 1 %
ERYTHROCYTE [DISTWIDTH] IN BLOOD BY AUTOMATED COUNT: 12.4 % (ref 10–15)
GFR SERPL CREATININE-BSD FRML MDRD: 68 ML/MIN/1.73M2
GLUCOSE SERPL-MCNC: 88 MG/DL (ref 70–99)
GLUCOSE UR STRIP-MCNC: NEGATIVE MG/DL
HCT VFR BLD AUTO: 47 % (ref 35–53)
HGB BLD-MCNC: 15.6 G/DL (ref 11.7–17.7)
HGB UR QL STRIP: NEGATIVE
HOLD SPECIMEN: NORMAL
HOLD SPECIMEN: NORMAL
IMM GRANULOCYTES # BLD: 0 10E3/UL
IMM GRANULOCYTES NFR BLD: 0 %
INTERPRETATION ECG - MUSE: NORMAL
KETONES UR STRIP-MCNC: NEGATIVE MG/DL
LEUKOCYTE ESTERASE UR QL STRIP: NEGATIVE
LYMPHOCYTES # BLD AUTO: 0.7 10E3/UL (ref 0.8–5.3)
LYMPHOCYTES NFR BLD AUTO: 12 %
MCH RBC QN AUTO: 30.6 PG (ref 26.5–33)
MCHC RBC AUTO-ENTMCNC: 33.2 G/DL (ref 31.5–36.5)
MCV RBC AUTO: 92 FL (ref 78–100)
MONOCYTES # BLD AUTO: 0.5 10E3/UL (ref 0–1.3)
MONOCYTES NFR BLD AUTO: 9 %
NEUTROPHILS # BLD AUTO: 4.1 10E3/UL (ref 1.6–8.3)
NEUTROPHILS NFR BLD AUTO: 77 %
NITRATE UR QL: NEGATIVE
NRBC # BLD AUTO: 0 10E3/UL
NRBC BLD AUTO-RTO: 0 /100
NT-PROBNP SERPL-MCNC: 146 PG/ML (ref 0–1800)
P AXIS - MUSE: 52 DEGREES
PH UR STRIP: 7 [PH] (ref 5–7)
PLATELET # BLD AUTO: 241 10E3/UL (ref 150–450)
POTASSIUM SERPL-SCNC: 4.7 MMOL/L (ref 3.4–5.3)
PR INTERVAL - MUSE: 200 MS
PROT SERPL-MCNC: 6.6 G/DL (ref 6.4–8.3)
QRS DURATION - MUSE: 88 MS
QT - MUSE: 438 MS
QTC - MUSE: 415 MS
R AXIS - MUSE: 67 DEGREES
RBC # BLD AUTO: 5.09 10E6/UL (ref 3.8–5.9)
RBC URINE: 1 /HPF
SODIUM SERPL-SCNC: 140 MMOL/L (ref 136–145)
SP GR UR STRIP: 1.02 (ref 1–1.03)
SYSTOLIC BLOOD PRESSURE - MUSE: NORMAL MMHG
T AXIS - MUSE: 69 DEGREES
TROPONIN T SERPL HS-MCNC: 16 NG/L
TROPONIN T SERPL HS-MCNC: 17 NG/L
TROPONIN T SERPL HS-MCNC: 18 NG/L
TSH SERPL DL<=0.005 MIU/L-ACNC: 2.08 UIU/ML (ref 0.3–4.2)
UROBILINOGEN UR STRIP-MCNC: NORMAL MG/DL
VENTRICULAR RATE- MUSE: 54 BPM
WBC # BLD AUTO: 5.3 10E3/UL (ref 4–11)
WBC URINE: 1 /HPF

## 2023-08-31 PROCEDURE — 71275 CT ANGIOGRAPHY CHEST: CPT | Mod: 26 | Performed by: RADIOLOGY

## 2023-08-31 PROCEDURE — 84484 ASSAY OF TROPONIN QUANT: CPT | Performed by: EMERGENCY MEDICINE

## 2023-08-31 PROCEDURE — 82248 BILIRUBIN DIRECT: CPT | Performed by: PHYSICIAN ASSISTANT

## 2023-08-31 PROCEDURE — 99285 EMERGENCY DEPT VISIT HI MDM: CPT | Mod: 25 | Performed by: EMERGENCY MEDICINE

## 2023-08-31 PROCEDURE — 99222 1ST HOSP IP/OBS MODERATE 55: CPT | Performed by: PHYSICIAN ASSISTANT

## 2023-08-31 PROCEDURE — 74174 CTA ABD&PLVS W/CONTRAST: CPT | Mod: 26 | Performed by: RADIOLOGY

## 2023-08-31 PROCEDURE — 74174 CTA ABD&PLVS W/CONTRAST: CPT

## 2023-08-31 PROCEDURE — 36415 COLL VENOUS BLD VENIPUNCTURE: CPT | Performed by: PHYSICIAN ASSISTANT

## 2023-08-31 PROCEDURE — 84443 ASSAY THYROID STIM HORMONE: CPT | Performed by: PHYSICIAN ASSISTANT

## 2023-08-31 PROCEDURE — 250N000013 HC RX MED GY IP 250 OP 250 PS 637: Performed by: EMERGENCY MEDICINE

## 2023-08-31 PROCEDURE — 83880 ASSAY OF NATRIURETIC PEPTIDE: CPT | Performed by: PHYSICIAN ASSISTANT

## 2023-08-31 PROCEDURE — G0378 HOSPITAL OBSERVATION PER HR: HCPCS

## 2023-08-31 PROCEDURE — 93005 ELECTROCARDIOGRAM TRACING: CPT | Performed by: EMERGENCY MEDICINE

## 2023-08-31 PROCEDURE — 36415 COLL VENOUS BLD VENIPUNCTURE: CPT | Performed by: EMERGENCY MEDICINE

## 2023-08-31 PROCEDURE — 85025 COMPLETE CBC W/AUTO DIFF WBC: CPT | Performed by: EMERGENCY MEDICINE

## 2023-08-31 PROCEDURE — 93010 ELECTROCARDIOGRAM REPORT: CPT | Performed by: EMERGENCY MEDICINE

## 2023-08-31 PROCEDURE — 82310 ASSAY OF CALCIUM: CPT | Performed by: EMERGENCY MEDICINE

## 2023-08-31 PROCEDURE — 81001 URINALYSIS AUTO W/SCOPE: CPT | Performed by: PHYSICIAN ASSISTANT

## 2023-08-31 PROCEDURE — 250N000011 HC RX IP 250 OP 636: Performed by: EMERGENCY MEDICINE

## 2023-08-31 PROCEDURE — 84484 ASSAY OF TROPONIN QUANT: CPT | Performed by: PHYSICIAN ASSISTANT

## 2023-08-31 RX ORDER — ASPIRIN 81 MG/1
81 TABLET, CHEWABLE ORAL DAILY
Status: DISCONTINUED | OUTPATIENT
Start: 2023-09-01 | End: 2023-08-31

## 2023-08-31 RX ORDER — ATORVASTATIN CALCIUM 20 MG/1
20 TABLET, FILM COATED ORAL DAILY
Status: DISCONTINUED | OUTPATIENT
Start: 2023-08-31 | End: 2023-08-31

## 2023-08-31 RX ORDER — IOPAMIDOL 755 MG/ML
90 INJECTION, SOLUTION INTRAVASCULAR ONCE
Status: COMPLETED | OUTPATIENT
Start: 2023-08-31 | End: 2023-08-31

## 2023-08-31 RX ORDER — ATORVASTATIN CALCIUM 20 MG/1
20 TABLET, FILM COATED ORAL EVERY EVENING
Status: DISCONTINUED | OUTPATIENT
Start: 2023-08-31 | End: 2023-09-01 | Stop reason: HOSPADM

## 2023-08-31 RX ORDER — LOSARTAN POTASSIUM 25 MG/1
50 TABLET ORAL DAILY
Status: DISCONTINUED | OUTPATIENT
Start: 2023-09-01 | End: 2023-08-31

## 2023-08-31 RX ORDER — ASPIRIN 81 MG/1
81 TABLET, CHEWABLE ORAL EVERY EVENING
Status: DISCONTINUED | OUTPATIENT
Start: 2023-08-31 | End: 2023-09-01 | Stop reason: HOSPADM

## 2023-08-31 RX ORDER — MAGNESIUM HYDROXIDE/ALUMINUM HYDROXICE/SIMETHICONE 120; 1200; 1200 MG/30ML; MG/30ML; MG/30ML
30 SUSPENSION ORAL EVERY 4 HOURS PRN
Status: DISCONTINUED | OUTPATIENT
Start: 2023-08-31 | End: 2023-09-01 | Stop reason: HOSPADM

## 2023-08-31 RX ORDER — CELECOXIB 200 MG/1
200 CAPSULE ORAL DAILY PRN
Status: DISCONTINUED | OUTPATIENT
Start: 2023-08-31 | End: 2023-09-01 | Stop reason: HOSPADM

## 2023-08-31 RX ORDER — PILOCARPINE HYDROCHLORIDE 20 MG/ML
1 SOLUTION/ DROPS OPHTHALMIC 2 TIMES DAILY
Status: DISCONTINUED | OUTPATIENT
Start: 2023-08-31 | End: 2023-09-01 | Stop reason: DRUGHIGH

## 2023-08-31 RX ORDER — NITROGLYCERIN 0.4 MG/1
0.4 TABLET SUBLINGUAL EVERY 5 MIN PRN
Status: DISCONTINUED | OUTPATIENT
Start: 2023-08-31 | End: 2023-09-01 | Stop reason: HOSPADM

## 2023-08-31 RX ORDER — ACETAMINOPHEN 325 MG/1
650 TABLET ORAL EVERY 6 HOURS PRN
Status: DISCONTINUED | OUTPATIENT
Start: 2023-08-31 | End: 2023-09-01 | Stop reason: HOSPADM

## 2023-08-31 RX ORDER — LOSARTAN POTASSIUM 25 MG/1
50 TABLET ORAL EVERY EVENING
Status: DISCONTINUED | OUTPATIENT
Start: 2023-08-31 | End: 2023-09-01 | Stop reason: HOSPADM

## 2023-08-31 RX ADMIN — IOPAMIDOL 90 ML: 755 INJECTION, SOLUTION INTRAVENOUS at 13:13

## 2023-08-31 RX ADMIN — ASPIRIN 81 MG CHEWABLE TABLET 81 MG: 81 TABLET CHEWABLE at 20:14

## 2023-08-31 RX ADMIN — ATORVASTATIN CALCIUM 20 MG: 20 TABLET, FILM COATED ORAL at 20:14

## 2023-08-31 RX ADMIN — LOSARTAN POTASSIUM 50 MG: 25 TABLET, FILM COATED ORAL at 20:14

## 2023-08-31 ASSESSMENT — ACTIVITIES OF DAILY LIVING (ADL)
ADLS_ACUITY_SCORE: 35
ADLS_ACUITY_SCORE: 31
ADLS_ACUITY_SCORE: 31
ADLS_ACUITY_SCORE: 35

## 2023-08-31 NOTE — ED PROVIDER NOTES
[facility] ([facility])    8/31/23       ED PROVIDER NOTE  ED 09      History     Chief Complaint   Patient presents with    Chest Pain     HPI  Artis Galvin is a 78 year old adult with a past medical history significant for ascending aortic aneurysm, right bundle branch block, sarcoidosis, mild non-obstructive CAD, SVT, prostate cancer, HTN and HLD who presents to the emergency department for evaluation of chest pain. Patient reports moderate chest pain that began around 24 hours ago. Patient states that he's had episodes of chest pain before but not at this level of pain or duration. He states that the pain is present around 95% of the time. He has moments where the pain subsides but only for a few seconds. He describes the pain as dull left chest pain. The chest pain intensifies with exertion. The pain does not radiate to other parts of the body. Patient states that his daughter is a nurse and recommended to monitor his blood pressure. He notes that his blood pressure was fluctuating last night. Patient has felt nauseous and lightheaded since the pain began. He's had around 3-4 episodes of intense sweats. He isn't aware of something that could have triggered this pain. Patient states that he usually goes to the gym frequently but has limited his activity recently. No lower extremity edema noted. No other symptoms noted.     Past Medical History  Past Medical History:   Diagnosis Date    Abdominal pain, unspecified site     Abnormal thyroid ultrasound     BBB (bundle branch block)     right    Chronic lymphocytic thyroiditis     Diverticulosis of colon (without mention of hemorrhage)     Hypertension     Localized superficial swelling, mass, or lump     Malignant neoplasm of prostate (H)     Nontoxic multinodular goiter     Other and unspecified hyperlipidemia     Other nonspecific abnormal serum enzyme levels     Pain in joint, shoulder region     Persistent hoarseness      Sarcoidosis     Scleritis, unspecified     Unspecified hypothyroidism     Unspecified vitamin D deficiency      Past Surgical History:   Procedure Laterality Date    CV CORONARY ANGIOGRAM N/A 10/20/2022    Procedure: Coronary Angiogram;  Surgeon: Mir Farr MD;  Location:  [facility]    CV FRACTIONAL FLOW RATIO WIRE N/A 10/20/2022    Procedure: Fractional Flow Ratio Wire;  Surgeon: Mir Farr MD;  Location:  [facility]    HC SHLDR ARTHROSCOP,PART ACROMIOPLAS Right     Description: Shoulder Arthroscopy, Space Decompression And Acromioplasty;  Recorded: 02/18/2014;    CA LAP,PROSTATECTOMY,RADICAL,W/NERVE SPARE,INCL ROBOTIC Bilateral 2/27/2018    Procedure: ROBOTIC ASSISTED RADICAL RETROPUBIC PROSTATECTOMY, BILATERAL PELVIC LYMPH NODE DISSECTION LYSIS OF ADHESIONS;  Surgeon: Wale Rodriguez MD;  Location: [facility];  Service: Urology    CA RADIAL KERATOTOMY      Description: Cornea Radial Keratotomy;  Recorded: 02/18/2014;     alendronate (FOSAMAX) 70 MG tablet  aspirin 81 MG EC tablet  atorvastatin (LIPITOR) 40 MG tablet  calcium citrate-vitamin D (CITRACAL) 315-200 MG-UNIT TABS per tablet  celecoxib (CELEBREX) 200 MG capsule  Cyanocobalamin 5000 MCG SUBL  ketoconazole (NIZORAL) 2 % external cream  levothyroxine (SYNTHROID/LEVOTHROID) 75 MCG tablet  losartan (COZAAR) 100 MG tablet  nitroGLYcerin (NITROSTAT) 0.4 MG sublingual tablet  pilocarpine (PILOCAR) 2 % ophthalmic solution  Probiotic Product (MISC INTESTINAL MEGHA REGULAT) CAPS      Allergies   Allergen Reactions    Ciprofloxacin Unknown    Levaquin [Levofloxacin] Unknown     Family History  Family History   Problem Relation Age of Onset    No Known Problems Mother     Coronary Artery Disease Father     Aortic aneurysm Father     Prostate Cancer Maternal Grandfather     Prostate Cancer Maternal Uncle      Social History   Social History     Tobacco Use    Smoking status: Never     Smokeless tobacco: Never   Vaping Use    Vaping Use: Never used   Substance Use Topics    Alcohol use: Not Currently     Comment: Alcoholic Drinks/day: rarely    Drug use: No      Past medical history, past surgical history, medications, allergies, family history, and social history were reviewed with the patient. No additional pertinent items.      A complete review of systems was performed with pertinent positives and negatives noted in the HPI, and all other systems negative.    Physical Exam     BP: (!) 158/94  Pulse: 52  Temp: 97.4  F (36.3  C)  Resp: 15  SpO2: 98 %    Physical Exam  Constitutional:       General: Binu Galvin is not in acute distress.     Appearance: Binu Galvin is well-developed. Binu Galvin is not diaphoretic.   HENT:      Head: Normocephalic and atraumatic.      Mouth/Throat:      Pharynx: No oropharyngeal exudate.   Eyes:      General: No scleral icterus.        Right eye: No discharge.         Left eye: No discharge.      Pupils: Pupils are equal, round, and reactive to light.   Cardiovascular:      Rate and Rhythm: Normal rate and regular rhythm.      Heart sounds: Normal heart sounds. No murmur heard.     No friction rub. No gallop.   Pulmonary:      Effort: Pulmonary effort is normal. No respiratory distress.      Breath sounds: Normal breath sounds. No wheezing.   Chest:      Chest wall: No tenderness.   Abdominal:      General: Bowel sounds are normal. There is no distension.      Palpations: Abdomen is soft.      Tenderness: There is no abdominal tenderness.   Musculoskeletal:         General: No tenderness or deformity. Normal range of motion.      Cervical back: Normal range of motion and neck supple.   Skin:     General: Skin is warm and dry.      Coloration: Skin is not pale.      Findings: No erythema or rash.   Neurological:      Mental Status: Binu Galvin is alert and oriented to person, place, and time.      Cranial Nerves: No cranial nerve deficit.           ED Course,  Procedures, & Data        Procedures            EKG Interpretation:      Interpreted by Haider Ellis DO  Time reviewed: 1149  Symptoms at time of EKG: chest pain   Rhythm: sinus bradycardia  Rate: 54  Axis: Normal  Ectopy: none  Conduction: normal  ST Segments/ T Waves: No ST-T wave changes and No acute ischemic changes  Q Waves: none  Comparison to prior: Unchanged from 10/20/2022    Clinical Impression: no acute changes                   Results for orders placed or performed during the hospital encounter of 08/31/23   [place] Draw     Status: None (In process)    Narrative    The following orders were created for panel order [place] Draw.  Procedure                               Abnormality         Status                     ---------                               -----------         ------                     Extra Blue Top Tube[[id number]]                              In process                 Extra Red Top Tube[[id number]]                               In process                   Please view results for these tests on the individual orders.   EKG 12-lead, tracing only     Status: None (Preliminary result)   Result Value Ref Range    Systolic Blood Pressure  mmHg    Diastolic Blood Pressure  mmHg    Ventricular Rate 54 BPM    Atrial Rate 54 BPM    AR Interval 200 ms    QRS Duration 88 ms     ms    QTc 415 ms    P Axis 52 degrees    R AXIS 67 degrees    T Axis 69 degrees    Interpretation ECG       Sinus bradycardia  Septal infarct , age undetermined  Abnormal ECG     CBC with Platelets & Differential     Status: None (In process)    Narrative    The following orders were created for panel order CBC with Platelets & Differential.  Procedure                               Abnormality         Status                     ---------                               -----------         ------                     CBC with platelets and d...[[id number]]                      In process                   Please view  results for these tests on the individual orders.     Medications - No data to display  Labs Ordered and Resulted from Time of ED Arrival to Time of ED Departure - No data to display  CTA Chest Abdomen Pelvis w Contrast    (Results Pending)              Assessment & Plan      This 78-year-old male who presents with 1 day of chest pain.  This started with no known provocation and has been waxing and waning in nature but is never fully resolved.  He notes a left-sided chest pain with some lightheadedness.  He has also had episodes of diaphoresis with this.  Patient has known ascending aortic aneurysm.  ECG shows no acute abnormalities.  Lab work including troponin show no acute abnormalities.  Due to history of ascending aortic aneurysm CTA was obtained which shows no dissection or other acute cause of patient's pain.  I discussed all results with patient and family.  Patient has had angio 1 year ago which showed mild disease but no stents were placed.  In setting of patient's symptoms as well as this I believe patient would benefit from Observation for further work-up of chest pain.    I have reviewed the nursing notes. I have reviewed the findings, diagnosis, plan and need for follow up with the patient.    New Prescriptions    No medications on file       Final diagnoses:   None     I, Thuy Martinez, am serving as a trained medical scribe to document services personally performed by Haider Ellis DO based on the provider's statements to me on August 31, 2023.  This document has been checked and approved by the attending provider.    Haider MUÑOZ DO, was physically present and have reviewed and verified the accuracy of this note documented by Thuy Martinez medical scribe.      Haider Ellis DO  [facility] EMERGENCY DEPARTMENT  8/31/2023     Haider Ellis DO  08/31/23 [id number]

## 2023-08-31 NOTE — H&P
[facility] ED Observation Admission Note    Chief Complaint   Patient presents with    Chest Pain       Assessment/Plan:    Artis Galvin is a 78 year old adult with a past medical history significant for ascending aortic aneurysm, right bundle branch block, sarcoidosis, mild non-obstructive CAD, SVT, prostate cancer, HTN and HLD who presents to the emergency department for evaluation of chest pain. Patient reports moderate chest pain that began around 24 hours ago.     #Chest pain  #Hypertension  #Hyperlipidemia   #History of SVT   #CAD-mild non-obstructive disease  Acute onset of mid sternal chest pain that started around noon yesterday. Chest pain described as exertional and non radiating. The pain has been persistent for the most part since the onset. Patient reports hx of episodic chest pain symptoms, but says this episode is different from previous in intensity and duration. He reports associated lightheadedness, nausea and diaphoresis. Denies SOB, cough, or fever. Cortaid US on 8/24 Mild plaque consistent with less than 50% stenosis in the right and left internal carotid artery. He also underwent cardiac catheterization on 10/2022 which showed moderate non obstructive CAD with hemodynamically non significant lesions in the mid LAD and D1. Otherwise mild non obstructive CAD elsewhere. Last resting Echo done on 6/2020 reveals moderate ascending aortic dilatation but otherwise normal. In the ED, T 97.4 HR 52 (patient is very athletic), /94 RR 15 O2 98% RA. Lab: BMP and CBC unremarkable. Glucose 88. Troponin 18, EKG shows sinus bradycardia, otherwise no acute ischemic changes. Lipid panel done 8/30 shows Cholesterol at 119, Trig 141, HDL 35, and LDL 56.  CT chest/abdomen/pelvis shows stable aneurysmal dilatation of the ascending aorta, with a maximal diameter measured on true axial images of approximately 4.8 cm. No evidence of dissection or intramural hematoma or other acute aortic injury. Patient admitted to  ed observation for serial troponin, tele, and cardiology consult to evaluate further interventions.   Risk Factors include CAD, HTN, HLD and dilation of the ascending aorta.   - Serial troponins q2h x 2 more  - Continuous telemetry  - Stress Test TBD  - Nitro PRN  - ASA 81mg daily  - Cardiology Consult   - Add BNP, LFTs, TSH  - Add UA/UC   - Clear liquid diet after midnight     #HTN  #HLD  -Continue with Losartan 50 mg daily  -Continue with Atorvastatin 20 mg daily    #Hypothyroidism  -Continue with Levothyroxine 75 mcg daily    #Pulmonary Sarcoidosis   Per chart review, patient was seen by pulmonary medicine on 8/10/23 for discussion for sarcoidosis work up and management of fatigue. It looks biopsy done was consistent with sarcoidosis, but no significant lung parenchyma involvement. It was recommended that he undergo a sleep study which the patient has not completed yet. Also there was a discussion regarding trail of Modafinil if he continues to experience fatigue symptoms.           HPI:    Artis Galvin is a 78 year old adult with a past medical history significant for ascending aortic aneurysm, right bundle branch block, sarcoidosis, mild non-obstructive CAD, SVT, prostate cancer, HTN and HLD who presents to the emergency department for evaluation of chest pain. Patient reports moderate chest pain that began around 24 hours ago. Patient states that he's had episodes of chest pain before but not at this level of pain or duration. He states that the pain is present around 95% of the time. He has moments where the pain subsides but only for a few seconds. He describes the pain as dull left chest pain. The chest pain intensifies with exertion. The pain does not radiate to other parts of the body. Patient states that his daughter is a nurse and recommended to monitor his blood pressure. He notes that his blood pressure was fluctuating last night. Patient has felt nauseous and lightheaded since the pain began. He's  had around 3-4 episodes of intense sweats. He isn't aware of something that could have triggered this pain. Patient states that he usually goes to the gym frequently but has limited his activity recently. No lower extremity edema noted. No other symptoms noted.        In the ED, T 97.4 HR 52 (patient is very athletic), /94 RR 15 O2 98% RA. Lab: BMP and CBC unremarkable. Glucose 88. Troponin 18, EKG shows sinus bradycardia, otherwise no acute ischemic changes. Lipid panel done 8/30 shows Cholesterol at 119, Trig 141, HDL 35, and LDL 56.  CT chest/abdomen/pelvis shows stable aneurysmal dilatation of the ascending aorta, with a maximal diameter measured on true axial images of approximately 4.8 cm. No evidence of dissection or intramural hematoma or other acute aortic injury. Patient admitted to ed observation for serial troponin, tele, and cardiology consult to evaluate further interventions.   Risk Factors include CAD, HTN, HLD and dilation of the ascending aorta.     On admission to the observation unit the patient was stable.      History:    Past Medical History:   Diagnosis Date    Abdominal pain, unspecified site     Abnormal thyroid ultrasound     BBB (bundle branch block)     right    Chronic lymphocytic thyroiditis     Diverticulosis of colon (without mention of hemorrhage)     Hypertension     Localized superficial swelling, mass, or lump     Malignant neoplasm of prostate (H)     Nontoxic multinodular goiter     Other and unspecified hyperlipidemia     Other nonspecific abnormal serum enzyme levels     Pain in joint, shoulder region     Persistent hoarseness     Sarcoidosis     Scleritis, unspecified     Unspecified hypothyroidism     Unspecified vitamin D deficiency        Past Surgical History:   Procedure Laterality Date    CV CORONARY ANGIOGRAM N/A 10/20/2022    Procedure: Coronary Angiogram;  Surgeon: Mir Farr MD;  Location:  HE[facility]ARDI[facility] CATH LAB    CV FRACTIONAL FLOW  RATIO WIRE N/A 10/20/2022    Procedure: Fractional Flow Ratio Wire;  Surgeon: Mir Farr MD;  Location:  HE[facility]ARD[facility] CATH LAB    HC SHLDR ARTHROSCOP,PART ACROMIOPLAS Right     Description: Shoulder Arthroscopy, Space Decompression And Acromioplasty;  Recorded: 02/18/2014;    NH LAP,PROSTATECTOMY,RADICAL,W/NERVE SPARE,INCL ROBOTIC Bilateral 2/27/2018    Procedure: ROBOTIC ASSISTED RADICAL RETROPUBIC PROSTATECTOMY, BILATERAL PELVIC LYMPH NODE DISSECTION LYSIS OF ADHESIONS;  Surgeon: Wale Rodriguez MD;  Location: [facility];  Service: Urology    NH RADIAL KERATOTOMY      Description: Cornea Radial Keratotomy;  Recorded: 02/18/2014;       Family History   Problem Relation Age of Onset    No Known Problems Mother     Coronary Artery Disease Father     Aortic aneurysm Father     Prostate Cancer Maternal Grandfather     Prostate Cancer Maternal Uncle        Social History     Socioeconomic History    Marital status: Patient Declined     Spouse name: Not on file    Number of children: Not on file    Years of education: Not on file    Highest education level: Not on file   Occupational History    Not on file   Tobacco Use    Smoking status: Never    Smokeless tobacco: Never   Vaping Use    Vaping Use: Never used   Substance and Sexual Activity    Alcohol use: Not Currently     Comment: Alcoholic Drinks/day: rarely    Drug use: No    Sexual activity: Not on file   Other Topics Concern    Not on file   Social History Narrative    Single. . 4 grown daughters.    Currently unemployed.     Social Determinants of Health     Financial Resource Strain: Not on file   Food Insecurity: Not on file   Transportation Needs: Not on file   Physical Activity: Not on file   Stress: Not on file   Social Connections: Not on file   Intimate Partner Violence: Not on file   Housing Stability: Not on file       No current facility-administered medications on file prior to encounter.  alendronate (FOSAMAX)  70 MG tablet, Take 70 mg by mouth every 7 days  aspirin 81 MG EC tablet, Take 81 mg by mouth daily  atorvastatin (LIPITOR) 40 MG tablet, Take 1 tablet (40 mg) by mouth daily (Patient taking differently: Take 20 mg by mouth daily)  calcium citrate-vitamin D (CITRACAL) 315-200 MG-UNIT TABS per tablet, Take 1 tablet by mouth 2 times daily  celecoxib (CELEBREX) 200 MG capsule, TAKE 1 CAPSULE BY MOUTH DAILY  Cyanocobalamin 5000 MCG SUBL, Place 1 tablet under the tongue daily  ketoconazole (NIZORAL) 2 % external cream, Apply topically as needed  levothyroxine (SYNTHROID/LEVOTHROID) 75 MCG tablet, Take 1 tablet (75 mcg) by mouth daily  losartan (COZAAR) 100 MG tablet, Take 1 tablet (100 mg) by mouth daily (Patient taking differently: Take 50 mg by mouth daily)  nitroGLYcerin (NITROSTAT) 0.4 MG sublingual tablet, For chest pain place 1 tablet under the tongue every 5 minutes for 3 doses. If symptoms persist 5 minutes after 1st dose call 911. (Patient not taking: Reported on 8/24/2023)  pilocarpine (PILOCAR) 2 % ophthalmic solution, Apply 1 drop to eye 2 times daily  Probiotic Product (MISC INTESTINAL MEGHA REGULAT) CAPS, Take 1 capsule by mouth daily        Data:    Results for orders placed or performed during the hospital encounter of 08/31/23   CTA Chest Abdomen Pelvis w Contrast     Status: None    Narrative    Exam: Computed tomographic angiography of the chest, abdomen and  pelvis without and with contrast including 3D reformations dated  8/31/2023 1:34 PM    Clinical information: Chest pain with known aneurysm of the ascending  aorta    Technique: Axial images through the chest and abdomen obtained before  the administration of intravenous contrast media and following the  injection of contrast media  in the arterial phase with ECG gating.  Source images reviewed as well as 3D and multi-planar reconstructions  at a 3D workstation.    Contrast: 90 cc Isovue-[id number]    DLP: 5381 mGy*cm    Comparison:  2/17/2023.    Findings:      On noncontrast images, there is no evidence of intramural hematoma  within the thoracic aorta. On postcontrast images, there is no  evidence for dissection. There is thickening of the walls of the  thoracic aorta and proximal great vessels, which appear stable.    4 cm heterogeneous right thyroid lobe nodule, previously biopsied on  11/30/2021.    No pericardial effusion. The opacified coronary vessels appear patent.    No acute pulmonary opacities. Posterior dependent atelectasis. The  trachea and central airways are patent. Few calcified pulmonary  nodules in the lung.    In the abdomen, on arterial phase images, the liver, pancreas,  gallbladder, adrenal glands, and kidneys are within normal limits. The  spleen is mildly enlarged measuring 15 cm in long axis.    The major visceral arteries are patent. There is some thickening of  the walls of the proximal SMA, stable. The abdominal aorta and iliac  arteries are patent with normal caliber.    There are diverticula of the descending and sigmoid colon without  evidence of diverticulitis. No ascites. No enlarged lymph nodes in the  abdomen, pelvis, or retroperitoneum.    No suspicious or aggressive appearing bone lesion. Spondylosis of the  spine degenerative changes of the hips bilaterally.      Impression    Impression:    1. Stable aneurysmal dilatation of the ascending aorta, with a maximal  diameter measured on true axial images of approximately 4.8 cm. No  evidence of dissection or intramural hematoma or other acute aortic  injury.    2. No abnormality to explain etiology of chest pain.        [facility]         SYSTEM ID:  [id number]   Basic metabolic panel     Status: Normal   Result Value Ref Range    Sodium 140 136 - 145 mmol/L    Potassium 4.7 3.4 - 5.3 mmol/L    Chloride 105 98 - 107 mmol/L    Carbon Dioxide (CO2) 24 22 - 29 mmol/L    Anion Gap 11 7 - 15 mmol/L    Urea Nitrogen 12.3 8.0 - 23.0 mg/dL    Creatinine 1.12  "0.51 - 1.17 mg/dL    Calcium 9.1 8.8 - 10.2 mg/dL    Glucose 88 70 - 99 mg/dL    GFR Estimate 68 >60 mL/min/1.73m2    Narrative    The generation of reference intervals for this test is currently based on binary male or female sex. If the electronic health record information indicates another gender identity or if Legal Sex is recorded as \"Unknown\", both male and female reference intervals are provided where applicable, and should be considered according to the individual's appropriate clinical context.   Troponin T, High Sensitivity     Status: Normal   Result Value Ref Range    Troponin T, High Sensitivity 18 <=22 ng/L   Gap Draw     Status: None    Narrative    The following orders were created for panel order Gap Draw.  Procedure                               Abnormality         Status                     ---------                               -----------         ------                     Extra Blue Top Tube[[id number]]                              Final result               Extra Red Top Tube[[id number]]                               Final result                 Please view results for these tests on the individual orders.   CBC with platelets and differential     Status: Abnormal   Result Value Ref Range    WBC Count 5.3 4.0 - 11.0 10e3/uL    RBC Count 5.09 3.80 - 5.90 10e6/uL    Hemoglobin 15.6 11.7 - 17.7 g/dL    Hematocrit 47.0 35.0 - 53.0 %    MCV 92 78 - 100 fL    MCH 30.6 26.5 - 33.0 pg    MCHC 33.2 31.5 - 36.5 g/dL    RDW 12.4 10.0 - 15.0 %    Platelet Count 241 150 - 450 10e3/uL    % Neutrophils 77 %    % Lymphocytes 12 %    % Monocytes 9 %    % Eosinophils 1 %    % Basophils 1 %    % Immature Granulocytes 0 %    NRBCs per 100 WBC 0 <1 /100    Absolute Neutrophils 4.1 1.6 - 8.3 10e3/uL    Absolute Lymphocytes 0.7 (L) 0.8 - 5.3 10e3/uL    Absolute Monocytes 0.5 0.0 - 1.3 10e3/uL    Absolute Eosinophils 0.1 0.0 - 0.7 10e3/uL    Absolute Basophils 0.0 0.0 - 0.2 10e3/uL    Absolute Immature " "Granulocytes 0.0 <=0.4 10e3/uL    Absolute NRBCs 0.0 10e3/uL    Narrative    The generation of reference intervals for this test is currently based on binary male or female sex. If the electronic health record information indicates another gender identity or if Legal Sex is recorded as \"Unknown\", both male and female reference intervals are provided where applicable, and should be considered according to the individual's appropriate clinical context.   Extra Blue Top Tube     Status: None   Result Value Ref Range    Hold Specimen JIC    Extra Red Top Tube     Status: None   Result Value Ref Range    Hold Specimen JIC    EKG 12-lead, tracing only     Status: None   Result Value Ref Range    Systolic Blood Pressure  mmHg    Diastolic Blood Pressure  mmHg    Ventricular Rate 54 BPM    Atrial Rate 54 BPM    LA Interval 200 ms    QRS Duration 88 ms     ms    QTc 415 ms    P Axis 52 degrees    R AXIS 67 degrees    T Axis 69 degrees    Interpretation ECG       Sinus bradycardia  Septal infarct , age undetermined  Abnormal ECG  Unconfirmed report - interpretation of this ECG is computer generated - see medical record for final interpretation  Confirmed by - EMERGENCY ROOM, PHYSICIAN ([id number]),  VINOD JURADO ([id number]) on 8/31/2023 1:21:11 PM     CBC with Platelets & Differential     Status: Abnormal    Narrative    The following orders were created for panel order CBC with Platelets & Differential.  Procedure                               Abnormality         Status                     ---------                               -----------         ------                     CBC with platelets and d...[[id number]]  Abnormal            Final result                 Please view results for these tests on the individual orders.             EKG Interpretation:      Interpreted by Haider Ellis DO  Time reviewed: 1149  Symptoms at time of EKG: chest pain   Rhythm: sinus bradycardia  Rate: 54  Axis: Normal  Ectopy: " none  Conduction: normal  ST Segments/ T Waves: No ST-T wave changes and No acute ischemic changes  Q Waves: none  Comparison to prior: Unchanged from 10/20/2022     Clinical Impression: no acute changes       ROS:  REVIEW OF SYSTEMS:   General: fatigue   EYES: Negative for vision changes or eye problems   ENT: No problems with ears, nose or throat. No difficulty swallowing.   RESP: No coughing, wheezing or shortness of breath   CV: Chest pains and diaphoresis   GI: Nausea. No vomiting, heartburn, abdominal pain, diarrhea, constipation or change in bowel habits   : No urinary frequency or dysuria, bladder or kidney problems   MUSCULOSKELETAL: No significant muscle or joint pains   NEUROLOGIC: No headaches, numbness, tingling, weakness, problems with balance or coordination   PSYCHIATRIC: No problems with anxiety, depression or mental health   HEME/IMMUNE/ALLERGY: No history of bleeding or clotting problems or anemia. No allergies or immune system problems   ENDOCRINE: No history of thyroid disease, diabetes or other endocrine disorders   SKIN: No rashes,worrisome lesions or skin problems     PCP: Angel Hernandez MD   CARDIAC RISK: CAD, HTN, HLD    10 point ROS negative other than the symptoms noted above.      Exam:    Vitals:  B/P: 131/71, T: 97.4, P: 53, R: 18    Physical Exam   Constitutional: Pt is oriented to person, place, and time.Pt appears well-developed and well-nourished.   HENT:   Head: Normocephalic and atraumatic.   Eyes: Conjunctivae are normal. Pupils are equal, round, and reactive to light.   Neck: Normal range of motion. Neck supple.   Cardiovascular: Normal rate, regular rhythm, normal heart sounds and intact distal pulses.    Pulmonary/Chest: Effort normal and breath sounds normal. No respiratory distress. Pt has no wheezes. Pt has no rales  Abdominal: Soft. Bowel sounds are normal. Pt exhibits no distension and no mass. No tenderness. Pt has no rebound and no guarding.   Musculoskeletal:  Normal range of motion. Pt exhibits no edema.   Neurological: Pt is alert and oriented to person, place, and time. Normal reflexes.   Skin: Skin is warm and dry. No rash noted.   Psychiatric: Pt has a normal mood and affect. Behavior is normal. Judgment and thought content normal.      Consults: Cardiology   FEN: NPO at MN  DVT prophylaxis: Early ambulation  Code Status: Full  Disposition: Stable vital signs. Patient return to baseline.  All labs/images reviewed     Signed:  Jigna Taylor PA-C  August 31, 2023 at 3:50 PM       This patient was discussed with the Care Team in the OBS Unit.  The patient's chart was reviewed and the patient was also seen and evaluated by me.  The plan of care was discussed and reviewed with the Care Team.  The above documentation reflects the initial evaluation, medical decision making and plan under my supervision.    Jas Caballero MD, FACEP  [facility] Staff Emergency Physician

## 2023-08-31 NOTE — TELEPHONE ENCOUNTER
I notified Jennifer KEATING RN for Dr Farr regarding this message and I will forward this encounter to the Interventional pool for further review.    Ramo KIRK

## 2023-08-31 NOTE — TELEPHONE ENCOUNTER
"Received a call from the call center to discuss chest pain.  Spoke to Binu, who says he has had constant chest pain for 24 hours. He describes it as above the left nipple, is \"pulsating\" and \"feels like his heart is going to pop out of his chest at times\". He rates the pain #5. He says he sent a [employer] reading in this morning, but does not know what the reading was. He says it feels a little worse when he gets up to walk, it is \"a little more intense\". He says he has had jaw pain, dizziness, unsteadiness on his feet, fatigue, which is not new, and told Dr. Farr about it at his last visit. He denies radiating pain or shortness of breath. He says he has had 3 episodes of random sweating in the last 24 hours that lasts a few minutes, then resolves. He says he did not try taking any nitroglycerin and that the bottle he currently has is .   VS on the call: left arm 116/68 HR 66; right arm 128/71 HR 60.  Advised a message will be sent to [facility] for follow up.    1. LOCATION: \"Where does it hurt?\" Above left nipple  2. RADIATION: \"Does the pain go anywhere else?\" (e.g., into neck, jaw, arms, back) no  3. ONSET: \"When did the chest pain begin?\" (Minutes, hours or days) last 24 hours  4. PATTERN \"Does the pain come and go, or has it been constant since it started?\" \"Does it get worse with exertion?\" Constant, worse when walking  5. DURATION: \"How long does it last\" (e.g., seconds, minutes, hours)  6. SEVERITY: \"How bad is the pain?\" (e.g., Scale 1-10; mild, moderate, or severe) Moderate #5  - MILD (1-3): doesn't interfere with normal activities  - MODERATE (4-7): interferes with normal activities or awakens from sleep  - SEVERE (8-10): excruciating pain, unable to do any normal activities  7. CARDIAC RISK FACTORS: \"Do you have any history of heart problems or risk factors for heart disease?\" (e.g., angina, prior heart attack; diabetes, high blood pressure, high cholesterol, smoker, or strong " "family history of heart disease) CAD, HTN, Ascending aortic aneurysm, SVT, HLD  8. PULMONARY RISK FACTORS: \"Do you have any history of lung disease?\" (e.g., blood clots in lung, asthma, emphysema, birth control pills)  9. CAUSE: \"What do you think is causing the chest pain?\"  10. OTHER SYMPTOMS: \"Do you have any other symptoms?\" (e.g., dizziness, nausea, vomiting, sweating, fever, difficulty breathing, cough) intermittent sweating  11. PREGNANCY: \"Is there any chance you are pregnant?\" \"When was your last menstrual period?\"  Additional Information   Negative: SEVERE chest pain   Negative: Pain also in shoulder(s) or arm(s) or jaw   Negative: Difficulty breathing   Negative: Heart beating irregularly or very rapidly    Protocols used: Chest Pain-A-[place]    "

## 2023-08-31 NOTE — PROGRESS NOTES
Observation Goals:  1.Serial troponins and stress test complete. -progressing  2.Seen and cleared by consultant if applicable -not met  3.Adequate pain control on oral analgesia -progressing  4.Vital signs normal or at patient baseline -progressing   5.Safe disposition plan has been identified-not met   6.Nurse to notify provider when observation goals have been met and patient is ready for discharge. -not met

## 2023-08-31 NOTE — ED TRIAGE NOTES
Pt ambulatory to triage with c/o chest pain. HX know aortic aneurysm. Pt states he has increasing chest pain radiating into his jaw. Pt referred to ED for further workup. BP on /94, /82.     Triage Assessment       Row Name 08/31/23 1130       Triage Assessment (Adult)    Airway WDL WDL       Respiratory WDL    Respiratory WDL WDL       Skin Circulation/Temperature WDL    Skin Circulation/Temperature WDL WDL       Cardiac WDL    Cardiac WDL X;chest pain       Chest Pain Assessment    Chest Pain Location midsternal    Chest Pain Radiation jaw    Character aching    Precipitating Factors at rest       Peripheral/Neurovascular WDL    Peripheral Neurovascular WDL WDL       Cognitive/Neuro/Behavioral WDL    Cognitive/Neuro/Behavioral WDL WDL

## 2023-08-31 NOTE — TELEPHONE ENCOUNTER
"S-(situation): Spoke with Binu and he states he has had 24 hours of constant chest pain lover his left nipple. He states he feels like \"crap\". He has not taken any nitroglycerin as the bottle has  and his daughter told him to hold off since his BP was low. He described the pain as pulsating & pounding at times with waves and peaks.   Not short of breath, dizziness, slightly nauseated presently.    B-(background): Artis Galvin is a 78 year old adult, being seen today for recheck of CAD.     He has history of mild non obstructive CAD, hypertension, hyperlipidemia, and significant family history of aortic aneurysms, including personal history.     We again reviewed exercise goals given his aortic aneurysm, and the desire to have lower SBPs and heart rates while exercising, including avoidance of significant rapid elevations in BP as would be expected with strength training.      Of note, he has known SVT / AVNRT.     Today he has a number of systemic complaints including fatigue, diaphoresis, lower extremity neuropathy, and that he is currently being worked up for temporal arteritis, though he had recently cancelled his temporal artery biopsy appointment. His ESR is normal, though CRP is elevated.    A-(assessment): chest pain    R-(recommendations): go to the ER. He understands the plan and has agreed to go.    "

## 2023-09-01 ENCOUNTER — APPOINTMENT (OUTPATIENT)
Dept: CARDIOLOGY | Facility: CLINIC | Age: 78
End: 2023-09-01
Attending: PHYSICIAN ASSISTANT
Payer: COMMERCIAL

## 2023-09-01 VITALS
TEMPERATURE: 97.4 F | HEART RATE: 57 BPM | DIASTOLIC BLOOD PRESSURE: 87 MMHG | RESPIRATION RATE: 16 BRPM | SYSTOLIC BLOOD PRESSURE: 136 MMHG | OXYGEN SATURATION: 97 %

## 2023-09-01 PROBLEM — I71.21 ASCENDING AORTIC ANEURYSM (H): Status: RESOLVED | Noted: 2020-03-31 | Resolved: 2023-09-01

## 2023-09-01 PROBLEM — R07.9 CHEST PAIN, UNSPECIFIED TYPE: Status: RESOLVED | Noted: 2023-08-31 | Resolved: 2023-09-01

## 2023-09-01 LAB — LVEF ECHO: NORMAL

## 2023-09-01 PROCEDURE — 93306 TTE W/DOPPLER COMPLETE: CPT | Mod: 26 | Performed by: INTERNAL MEDICINE

## 2023-09-01 PROCEDURE — 99238 HOSP IP/OBS DSCHRG MGMT 30/<: CPT | Mod: FS | Performed by: EMERGENCY MEDICINE

## 2023-09-01 PROCEDURE — 93306 TTE W/DOPPLER COMPLETE: CPT

## 2023-09-01 PROCEDURE — G0378 HOSPITAL OBSERVATION PER HR: HCPCS

## 2023-09-01 PROCEDURE — 250N000013 HC RX MED GY IP 250 OP 250 PS 637: Performed by: PHYSICIAN ASSISTANT

## 2023-09-01 RX ORDER — PILOCARPINE HYDROCHLORIDE 20 MG/ML
1 SOLUTION/ DROPS OPHTHALMIC 2 TIMES DAILY PRN
Status: DISCONTINUED | OUTPATIENT
Start: 2023-09-01 | End: 2023-09-01 | Stop reason: HOSPADM

## 2023-09-01 RX ORDER — ATORVASTATIN CALCIUM 40 MG/1
20 TABLET, FILM COATED ORAL DAILY
COMMUNITY
Start: 2023-09-01 | End: 2023-11-28

## 2023-09-01 RX ORDER — LOSARTAN POTASSIUM 100 MG/1
50 TABLET ORAL DAILY
COMMUNITY
Start: 2023-09-01 | End: 2023-09-12

## 2023-09-01 RX ADMIN — LEVOTHYROXINE SODIUM 75 MCG: 0.03 TABLET ORAL at 08:48

## 2023-09-01 ASSESSMENT — ACTIVITIES OF DAILY LIVING (ADL)
ADLS_ACUITY_SCORE: 31

## 2023-09-01 NOTE — PLAN OF CARE
Goal Outcome Evaluation:BP (!) 145/78 (BP Location: Right arm)   Pulse 56   Temp 98  F (36.7  C) (Oral)   Resp 16   SpO2 94%         -Serial troponins and stress test complete: Not met   - Seen and cleared by consultant if applicable :Not met   - Adequate pain control on oral analgesia :Not met   - Vital signs normal or at patient baseline :Not met   - Safe disposition plan has been identified :Not met     - Nurse to notify provider when observation goals have been met and patient is ready for discharge.

## 2023-09-01 NOTE — DISCHARGE SUMMARY
Discharge Summary    Artis Galvin MRN# [id number]   YOB: 1945 Age: 78 year old     Date of Admission:  8/31/2023  Date of Discharge:  9/1/2023  3:09 PM  Admitting Physician:  Jigna Taylor PA-C  Discharge Physician:  Jigna Taylor PA-C  Discharging Service:  Internal Medicine     Primary Provider: Angel Hernandez          Discharge Diagnosis:       Chest pain                Discharge Disposition:     Discharged to home           Condition on Discharge:     Discharge condition: Stable   Code status on discharge: Full Code           Procedures:   Cardiology procedures perfromed:   ECHO             Discharge Medications:     Current Discharge Medication List        CONTINUE these medications which have CHANGED    Details   atorvastatin (LIPITOR) 40 MG tablet Take 0.5 tablets (20 mg) by mouth daily    Associated Diagnoses: Hypercholesterolemia      losartan (COZAAR) 100 MG tablet Take 0.5 tablets (50 mg) by mouth daily    Associated Diagnoses: SVT (supraventricular tachycardia) (H)           CONTINUE these medications which have NOT CHANGED    Details   alendronate (FOSAMAX) 70 MG tablet Take 70 mg by mouth every 7 days      aspirin 81 MG EC tablet Take 81 mg by mouth daily      calcium citrate-vitamin D (CITRACAL) 315-200 MG-UNIT TABS per tablet Take 1 tablet by mouth 2 times daily      celecoxib (CELEBREX) 200 MG capsule TAKE 1 CAPSULE BY MOUTH DAILY  Qty: 30 capsule, Refills: 11    Comments: Requesting 1 year supply  Associated Diagnoses: Other chronic pain; Primary osteoarthritis, unspecified site      Cyanocobalamin 5000 MCG SUBL Place 1 tablet under the tongue daily      ketoconazole (NIZORAL) 2 % external cream Apply topically as needed  Qty: 60 g, Refills: 3    Associated Diagnoses: Onychomycosis      levothyroxine (SYNTHROID/LEVOTHROID) 75 MCG tablet Take 1 tablet (75 mcg) by mouth daily  Qty: 90 tablet, Refills: 3    Associated Diagnoses: Hashimoto's thyroiditis      nitroGLYcerin  (NITROSTAT) 0.4 MG sublingual tablet For chest pain place 1 tablet under the tongue every 5 minutes for 3 doses. If symptoms persist 5 minutes after 1st dose call 911.  Qty: 25 tablet, Refills: 3    Associated Diagnoses: Stable angina pectoris (H)      pilocarpine (PILOCAR) 2 % ophthalmic solution Apply 1 drop to eye 2 times daily as needed      Probiotic Product (MISC INTESTINAL MEGHA REGULAT) CAPS Take 1 capsule by mouth daily                   Consultations:     Consultation during this admission received from cardiology             Brief History of Illness:   Artis Galvin is a 78 year old adult with a past medical history significant for ascending aortic aneurysm, right bundle branch block, sarcoidosis, mild non-obstructive CAD, SVT, prostate cancer, HTN and HLD who presents to the emergency department for evaluation of chest pain. Patient reports moderate chest pain that began around 24 hours ago.           Hospital Course:     #Chest pain  #Hypertension  #Hyperlipidemia   #History of SVT   #CAD-mild non-obstructive disease  Acute onset of mid sternal chest pain that started around noon yesterday. Chest pain described as exertional and non radiating. The pain has been persistent for the most part since the onset. Patient reports hx of episodic chest pain symptoms, but says this episode is different from previous in intensity and duration. He reports associated lightheadedness, nausea and diaphoresis. Denies SOB, cough, or fever. Cortaid US on 8/24 Mild plaque consistent with less than 50% stenosis in the right and left internal carotid artery. He also underwent cardiac catheterization on 10/2022 which showed moderate non obstructive CAD with hemodynamically non significant lesions in the mid LAD and D1. Otherwise mild non obstructive CAD elsewhere. Last resting Echo done on 6/2020 reveals moderate ascending aortic dilatation but otherwise normal. In the ED, T 97.4 HR 52 (patient is very athletic), /94  RR 15 O2 98% RA. Lab: BMP and CBC unremarkable. Glucose 88. Troponin 18, EKG shows sinus bradycardia, otherwise no acute ischemic changes. Lipid panel done 8/30 shows Cholesterol at 119, Trig 141, HDL 35, and LDL 56.  CT chest/abdomen/pelvis shows stable aneurysmal dilatation of the ascending aorta, with a maximal diameter measured on true axial images of approximately 4.8 cm. No evidence of dissection or intramural hematoma or other acute aortic injury. Patient admitted to ed observation for serial troponin, tele, and cardiology consult to evaluate further interventions.        HOSPITAL COURSE: After being admitted, the patient was monitored on telemetry and had no significant events. Serial troponin remains negative. EKG without ischemic changes. Reported resolution of chest pain. The following morning,  was sent for a resting echocardiogram, which was normal. Cardiology was consulted for further input. They recommended outpatient cardiology follow up to complete cardiac stress test. Patient and family are agreeable with this plan.  After the stress test, the patient was ambulating without difficulty and tolerating a diet and was discharge in stable condition.     #HTN  #HLD  -Continue with Losartan 50 mg daily  -Continue with Atorvastatin 20 mg daily     #Hypothyroidism  -Continue with Levothyroxine 75 mcg daily     #Pulmonary Sarcoidosis   Per chart review, patient was seen by pulmonary medicine on 8/10/23 for discussion for sarcoidosis work up and management of fatigue. It looks biopsy done was consistent with sarcoidosis, but no significant lung parenchyma involvement. It was recommended that he undergo a sleep study which the patient has not completed yet. Also there was a discussion regarding trail of Modafinil if he continues to experience fatigue symptoms.                Final Day of Progress before Discharge:       Physical Exam:  Blood pressure 136/87, pulse 57, temperature 97.4  F (36.3  C), temperature  source Oral, resp. rate 16, SpO2 97 %.    EXAM:  Physical Exam   Constitutional: Pt is oriented to person, place, and time.Pt appears well-developed and well-nourished.   HENT:   Head: Normocephalic and atraumatic.   Eyes: Conjunctivae are normal. Pupils are equal, round, and reactive to light.   Neck: Normal range of motion. Neck supple.   Cardiovascular: Normal rate, regular rhythm, normal heart sounds and intact distal pulses.    Pulmonary/Chest: Effort normal and breath sounds normal. No respiratory distress. Pt has no wheezes. Pt has no rales  Abdominal: Soft. Bowel sounds are normal. Pt exhibits no distension and no mass. No tenderness. Pt has no rebound and no guarding.   Musculoskeletal: Normal range of motion. Pt exhibits no edema.   Neurological: Pt is alert and oriented to person, place, and time. Normal reflexes.   Skin: Skin is warm and dry. No rash noted.   Psychiatric: Pt has a normal mood and affect. Behavior is normal. Judgment and thought content normal.            Data:  All laboratory data reviewed             Significant Results:     None  Results for orders placed or performed during the hospital encounter of 08/31/23   CTA Chest Abdomen Pelvis w Contrast     Status: None    Narrative    Exam: Computed tomographic angiography of the chest, abdomen and  pelvis without and with contrast including 3D reformations dated  8/31/2023 1:34 PM    Clinical information: Chest pain with known aneurysm of the ascending  aorta    Technique: Axial images through the chest and abdomen obtained before  the administration of intravenous contrast media and following the  injection of contrast media  in the arterial phase with ECG gating.  Source images reviewed as well as 3D and multi-planar reconstructions  at a 3D workstation.    Contrast: 90 cc Isovue-370    DLP: 5381 mGy*cm    Comparison: 2/17/2023.    Findings:      On noncontrast images, there is no evidence of intramural hematoma  within the thoracic  aorta. On postcontrast images, there is no  evidence for dissection. There is thickening of the walls of the  thoracic aorta and proximal great vessels, which appear stable.    4 cm heterogeneous right thyroid lobe nodule, previously biopsied on  11/30/2021.    No pericardial effusion. The opacified coronary vessels appear patent.    No acute pulmonary opacities. Posterior dependent atelectasis. The  trachea and central airways are patent. Few calcified pulmonary  nodules in the lung.    In the abdomen, on arterial phase images, the liver, pancreas,  gallbladder, adrenal glands, and kidneys are within normal limits. The  spleen is mildly enlarged measuring 15 cm in long axis.    The major visceral arteries are patent. There is some thickening of  the walls of the proximal SMA, stable. The abdominal aorta and iliac  arteries are patent with normal caliber.    There are diverticula of the descending and sigmoid colon without  evidence of diverticulitis. No ascites. No enlarged lymph nodes in the  abdomen, pelvis, or retroperitoneum.    No suspicious or aggressive appearing bone lesion. Spondylosis of the  spine degenerative changes of the hips bilaterally.      Impression    Impression:    1. Stable aneurysmal dilatation of the ascending aorta, with a maximal  diameter measured on true axial images of approximately 4.8 cm. No  evidence of dissection or intramural hematoma or other acute aortic  injury.    2. No abnormality to explain etiology of chest pain.        [employer]         SYSTEM ID:  [id number]   Basic metabolic panel     Status: Normal   Result Value Ref Range    Sodium 140 136 - 145 mmol/L    Potassium 4.7 3.4 - 5.3 mmol/L    Chloride 105 98 - 107 mmol/L    Carbon Dioxide (CO2) 24 22 - 29 mmol/L    Anion Gap 11 7 - 15 mmol/L    Urea Nitrogen 12.3 8.0 - 23.0 mg/dL    Creatinine 1.12 0.51 - 1.17 mg/dL    Calcium 9.1 8.8 - 10.2 mg/dL    Glucose 88 70 - 99 mg/dL    GFR Estimate 68 >60 mL/min/1.73m2     "Narrative    The generation of reference intervals for this test is currently based on binary male or female sex. If the electronic health record information indicates another gender identity or if Legal Sex is recorded as \"Unknown\", both male and female reference intervals are provided where applicable, and should be considered according to the individual's appropriate clinical context.   Troponin T, High Sensitivity     Status: Normal   Result Value Ref Range    Troponin T, High Sensitivity 18 <=22 ng/L   [place] Draw     Status: None    Narrative    The following orders were created for panel order [place] Draw.  Procedure                               Abnormality         Status                     ---------                               -----------         ------                     Extra Blue Top Tube[[id number]]                              Final result               Extra Red Top Tube[[id number]]                               Final result                 Please view results for these tests on the individual orders.   CBC with platelets and differential     Status: Abnormal   Result Value Ref Range    WBC Count 5.3 4.0 - 11.0 10e3/uL    RBC Count 5.09 3.80 - 5.90 10e6/uL    Hemoglobin 15.6 11.7 - 17.7 g/dL    Hematocrit 47.0 35.0 - 53.0 %    MCV 92 78 - 100 fL    MCH 30.6 26.5 - 33.0 pg    MCHC 33.2 31.5 - 36.5 g/dL    RDW 12.4 10.0 - 15.0 %    Platelet Count 241 150 - 450 10e3/uL    % Neutrophils 77 %    % Lymphocytes 12 %    % Monocytes 9 %    % Eosinophils 1 %    % Basophils 1 %    % Immature Granulocytes 0 %    NRBCs per 100 WBC 0 <1 /100    Absolute Neutrophils 4.1 1.6 - 8.3 10e3/uL    Absolute Lymphocytes 0.7 (L) 0.8 - 5.3 10e3/uL    Absolute Monocytes 0.5 0.0 - 1.3 10e3/uL    Absolute Eosinophils 0.1 0.0 - 0.7 10e3/uL    Absolute Basophils 0.0 0.0 - 0.2 10e3/uL    Absolute Immature Granulocytes 0.0 <=0.4 10e3/uL    Absolute NRBCs 0.0 10e3/uL    Narrative    The generation of reference intervals for this " "test is currently based on binary male or female sex. If the electronic health record information indicates another gender identity or if Legal Sex is recorded as \"Unknown\", both male and female reference intervals are provided where applicable, and should be considered according to the individual's appropriate clinical context.   Extra Blue Top Tube     Status: None   Result Value Ref Range    Hold Specimen JIC    Extra Red Top Tube     Status: None   Result Value Ref Range    Hold Specimen JIC    TSH with free T4 reflex     Status: Normal   Result Value Ref Range    TSH 2.08 0.30 - 4.20 uIU/mL   Nt probnp inpatient     Status: Normal   Result Value Ref Range    N terminal Pro BNP Inpatient 146 0 - 1,800 pg/mL   Hepatic panel     Status: Normal   Result Value Ref Range    Protein Total 6.6 6.4 - 8.3 g/dL    Albumin 3.8 3.5 - 5.2 g/dL    Bilirubin Total 0.6 <=1.2 mg/dL    Alkaline Phosphatase 120 35 - 129 U/L    AST 19 0 - 45 U/L    ALT 9 0 - 70 U/L    Bilirubin Direct <0.20 0.00 - 0.30 mg/dL    Narrative    The generation of reference intervals for this test is currently based on binary male or female sex. If the electronic health record information indicates another gender identity or if Legal Sex is recorded as \"Unknown\", both male and female reference intervals are provided where applicable, and should be considered according to the individual's appropriate clinical context.   UA with Microscopic reflex to Culture     Status: Normal    Specimen: Urine, Midstream   Result Value Ref Range    Color Urine Straw Colorless, Straw, Light Yellow, Yellow    Appearance Urine Clear Clear    Glucose Urine Negative Negative mg/dL    Bilirubin Urine Negative Negative    Ketones Urine Negative Negative mg/dL    Specific Gravity Urine 1.016 1.003 - 1.035    Blood Urine Negative Negative    pH Urine 7.0 5.0 - 7.0    Protein Albumin Urine Negative Negative mg/dL    Urobilinogen Urine Normal Normal, 2.0 mg/dL    Nitrite Urine " Negative Negative    Leukocyte Esterase Urine Negative Negative    RBC Urine 1 <=2 /HPF    WBC Urine 1 <=5 /HPF    Narrative    Urine Culture not indicated   Troponin T, High Sensitivity - now then in 2 hours x 2     Status: Normal   Result Value Ref Range    Troponin T, High Sensitivity 16 <=22 ng/L   Troponin T, High Sensitivity - now then in 2 hours x 2     Status: Normal   Result Value Ref Range    Troponin T, High Sensitivity 17 <=22 ng/L   EKG 12-lead, tracing only     Status: None   Result Value Ref Range    Systolic Blood Pressure  mmHg    Diastolic Blood Pressure  mmHg    Ventricular Rate 54 BPM    Atrial Rate 54 BPM    GA Interval 200 ms    QRS Duration 88 ms     ms    QTc 415 ms    P Axis 52 degrees    R AXIS 67 degrees    T Axis 69 degrees    Interpretation ECG       Sinus bradycardia  Septal infarct , age undetermined  Abnormal ECG  Unconfirmed report - interpretation of this ECG is computer generated - see medical record for final interpretation  Confirmed by - EMERGENCY ROOM, PHYSICIAN ([id number]),  VINOD JURADO ([id number]) on 2023 1:21:11 PM     Echocardiogram Complete     Status: None   Result Value Ref Range    LVEF  60-65%     Narrative    [id number]  [id number]  [id number]  [id number]^MARION^SHEN^OSCAR     [facility],[place]  Echocardiography Laboratory  [address] [place] [id number]     Name: LELA JIMENEZ  MRN: [id number]  : 1945  Study Date: 2023 07:19 AM  Age: 78 yrs  Gender: Male  Patient Location: [facility] [[email or web address]]  Reason For Study: Chest Pain, Chest Pressure, Chest Tightness  Ordering Physician: SHEN SCHULTZ  Performed By: Rocio Caputo     BSA: 2.0 m2  Height: 70 in  Weight: 191 lb  HR: 59  BP: 137/80 mmHg  ______________________________________________________________________________  Procedure  Complete Portable Echo Adult.  ______________________________________________________________________________  Interpretation  Summary  Global and regional left ventricular function is normal with an EF of 60-65%.  Right ventricular function, chamber size, wall motion, and thickness are  normal.  Moderate dilatation of the aorta is present with the ascending aorta dilated  to 4.6 cm.  No pericardial effusion is present.     There is no prior study for direct comparison.  ______________________________________________________________________________  Left Ventricle  Global and regional left ventricular function is normal with an EF of 60-65%.  Left ventricular size is normal. Left ventricular wall thickness is normal.  Thickening of the anterobasal septum is present. Left ventricular diastolic  function is normal.     Right Ventricle  Right ventricular function, chamber size, wall motion, and thickness are  normal.     Atria  Both atria appear normal.     Mitral Valve  Mild mitral annular calcification is present. Trace mitral insufficiency is  present.     Aortic Valve  The aortic valve is tricuspid. Mild aortic valve calcification is present.  Mild aortic insufficiency is present.     Tricuspid Valve  The tricuspid valve is normal. Trace tricuspid insufficiency is present. The  peak velocity of the tricuspid regurgitant jet is not obtainable.     Pulmonic Valve  The pulmonic valve is normal. Mild pulmonic insufficiency is present.     Vessels  The inferior vena cava was normal in size with preserved respiratory  variability. Moderate dilatation of the aorta is present. Ascending aorta 4.6  cm.     Pericardium  No pericardial effusion is present.     Compared to Previous Study  There is no prior study for direct comparison.     Attestation  I have personally viewed the imaging and agree with the interpretation and  report as documented by the fellow, Daniel John, and/or edited by me.  ______________________________________________________________________________  MMode/2D Measurements & Calculations  IVSd: 1.0 cm  LVIDd: 4.4 cm  LVIDs: 2.7  cm  LVPWd: 0.90 cm  FS: 37.8 %  LV mass(C)d: 140.6 grams  LV mass(C)dI: 68.7 grams/m2  Ao root diam: 3.6 cm  asc Aorta Diam: 4.7 cm  LVOT diam: 2.0 cm  LVOT area: 3.3 cm2  Ao root diam Index (cm/m2): 1.8  asc Aorta Diam Index (cm/m2): 2.3  LA Volume (BP): 59.3 ml     LA Volume Index (BP): 28.9 ml/m2  RWT: 0.41  TAPSE: 2.4 cm     Doppler Measurements & Calculations  MV E max rudolph: 59.9 cm/sec  MV A max rudolph: 61.2 cm/sec  MV E/A: 0.98  MV dec time: 0.24 sec  PA acc time: 0.11 sec  E/E' av.6  Lateral E/e': 6.8  Medial E/e': 8.3  RV S Rudolph: 12.4 cm/sec     ______________________________________________________________________________  Report approved by: Juarez GALLARDO 2023 11:22 AM         CBC with Platelets & Differential     Status: Abnormal    Narrative    The following orders were created for panel order CBC with Platelets & Differential.  Procedure                               Abnormality         Status                     ---------                               -----------         ------                     CBC with platelets and d...[[id number]]  Abnormal            Final result                 Please view results for these tests on the individual orders.      Recent Results (from the past 48 hour(s))   CTA Chest Abdomen Pelvis w Contrast    Narrative    Exam: Computed tomographic angiography of the chest, abdomen and  pelvis without and with contrast including 3D reformations dated  2023 1:34 PM    Clinical information: Chest pain with known aneurysm of the ascending  aorta    Technique: Axial images through the chest and abdomen obtained before  the administration of intravenous contrast media and following the  injection of contrast media  in the arterial phase with ECG gating.  Source images reviewed as well as 3D and multi-planar reconstructions  at a 3D workstation.    Contrast: 90 cc Isovue-370    DLP: 5381 mGy*cm    Comparison: 2023.    Findings:      On noncontrast images, there is no  evidence of intramural hematoma  within the thoracic aorta. On postcontrast images, there is no  evidence for dissection. There is thickening of the walls of the  thoracic aorta and proximal great vessels, which appear stable.    4 cm heterogeneous right thyroid lobe nodule, previously biopsied on  2021.    No pericardial effusion. The opacified coronary vessels appear patent.    No acute pulmonary opacities. Posterior dependent atelectasis. The  trachea and central airways are patent. Few calcified pulmonary  nodules in the lung.    In the abdomen, on arterial phase images, the liver, pancreas,  gallbladder, adrenal glands, and kidneys are within normal limits. The  spleen is mildly enlarged measuring 15 cm in long axis.    The major visceral arteries are patent. There is some thickening of  the walls of the proximal SMA, stable. The abdominal aorta and iliac  arteries are patent with normal caliber.    There are diverticula of the descending and sigmoid colon without  evidence of diverticulitis. No ascites. No enlarged lymph nodes in the  abdomen, pelvis, or retroperitoneum.    No suspicious or aggressive appearing bone lesion. Spondylosis of the  spine degenerative changes of the hips bilaterally.      Impression    Impression:    1. Stable aneurysmal dilatation of the ascending aorta, with a maximal  diameter measured on true axial images of approximately 4.8 cm. No  evidence of dissection or intramural hematoma or other acute aortic  injury.    2. No abnormality to explain etiology of chest pain.        AZUCENA EMERSON         SYSTEM ID:  [id number]   Echocardiogram Complete   Result Value    LVEF  60-65%    Narrative    [id number]  [id number]  [id number]  [id number]^MARION^SHEN^OSCAR     [facility]  Echocardiography Laboratory  [address] [place] [id number]     Name: LELA JIMENEZ  MRN: [id number]  : 1945  Study Date: 2023 07:19 AM  Age: 78  yrs  Gender: Male  Patient Location: [facility]  Reason For Study: Chest Pain, Chest Pressure, Chest Tightness  Ordering Physician: SHEN SCHULTZ  Performed By: Rocio Caputo     BSA: 2.0 m2  Height: 70 in  Weight: 191 lb  HR: 59  BP: 137/80 mmHg  ______________________________________________________________________________  Procedure  Complete Portable Echo Adult.  ______________________________________________________________________________  Interpretation Summary  Global and regional left ventricular function is normal with an EF of 60-65%.  Right ventricular function, chamber size, wall motion, and thickness are  normal.  Moderate dilatation of the aorta is present with the ascending aorta dilated  to 4.6 cm.  No pericardial effusion is present.     There is no prior study for direct comparison.  ______________________________________________________________________________  Left Ventricle  Global and regional left ventricular function is normal with an EF of 60-65%.  Left ventricular size is normal. Left ventricular wall thickness is normal.  Thickening of the anterobasal septum is present. Left ventricular diastolic  function is normal.     Right Ventricle  Right ventricular function, chamber size, wall motion, and thickness are  normal.     Atria  Both atria appear normal.     Mitral Valve  Mild mitral annular calcification is present. Trace mitral insufficiency is  present.     Aortic Valve  The aortic valve is tricuspid. Mild aortic valve calcification is present.  Mild aortic insufficiency is present.     Tricuspid Valve  The tricuspid valve is normal. Trace tricuspid insufficiency is present. The  peak velocity of the tricuspid regurgitant jet is not obtainable.     Pulmonic Valve  The pulmonic valve is normal. Mild pulmonic insufficiency is present.     Vessels  The inferior vena cava was normal in size with preserved respiratory  variability. Moderate dilatation of the aorta is present.  Ascending aorta 4.6  cm.     Pericardium  No pericardial effusion is present.     Compared to Previous Study  There is no prior study for direct comparison.     Attestation  I have personally viewed the imaging and agree with the interpretation and  report as documented by the fellow, Daniel John, and/or edited by me.  ______________________________________________________________________________  MMode/2D Measurements & Calculations  IVSd: 1.0 cm  LVIDd: 4.4 cm  LVIDs: 2.7 cm  LVPWd: 0.90 cm  FS: 37.8 %  LV mass(C)d: 140.6 grams  LV mass(C)dI: 68.7 grams/m2  Ao root diam: 3.6 cm  asc Aorta Diam: 4.7 cm  LVOT diam: 2.0 cm  LVOT area: 3.3 cm2  Ao root diam Index (cm/m2): 1.8  asc Aorta Diam Index (cm/m2): 2.3  LA Volume (BP): 59.3 ml     LA Volume Index (BP): 28.9 ml/m2  RWT: 0.41  TAPSE: 2.4 cm     Doppler Measurements & Calculations  MV E max rudolph: 59.9 cm/sec  MV A max rudolph: 61.2 cm/sec  MV E/A: 0.98  MV dec time: 0.24 sec  PA acc time: 0.11 sec  E/E' av.6  Lateral E/e': 6.8  Medial E/e': 8.3  RV S Rudolph: 12.4 cm/sec     ______________________________________________________________________________  Report approved by: Juarez GALLARDO 2023 11:22 AM                      Pending Results:     Unresulted Labs Ordered in the Past 30 Days of this Admission       No orders found for last 31 day(s).                    Discharge Instructions and Follow-Up:     Discharge Procedure Orders   Reason for your hospital stay   Order Comments: Chest pain     Activity   Order Comments: Your activity upon discharge: activity as tolerated     Order Specific Question Answer Comments   Is discharge order? Yes      Follow Up and recommended labs and tests   Order Comments: Follow up with your cardiologist     When to contact your care team   Order Comments: Please go to your nearest emergency room If you were to have a change in the type of chest pain i.e more severe, lasting longer or radiating to your shoulder, arm, neck, jaw or  back, shortness of breath or increased pain with breathing, coughing up blood, feel dizzy or lightheaded, or notice swelling in one leg.     Discharge Instructions   Order Comments: You were admitted to the observation unit for evaluation of your chest pain.  Your EKG was normal. Troponins (a lab test to check if there was damage to the heart tissue) were checked and these were negative. Chest x-ray was normal.  You underwent an echocardiogram test and this was normal. You were evaluated by cardiology who recommended following up with your cardiologist. No further investigative work up indicated while in the hospital. I recommend continuing your home medications and it will be very important to follow up with your primary care doctor early next week or sooner if your symptoms return.     Full Code     Order Specific Question Answer Comments   Code status determined by: Discussion with patient/ legal decision maker      Diet   Order Comments: Follow this diet upon discharge: Regular     Order Specific Question Answer Comments   Is discharge order? Yes           Attestation:  Jigna Taylor PA-C.

## 2023-09-01 NOTE — PLAN OF CARE
Goal Outcome Evaluation:/77 (BP Location: Right arm)   Pulse 56   Temp 97.8  F (36.6  C) (Oral)   Resp 16   SpO2 97%     Serial troponins and stress test complete: In progress  - Seen and cleared by consultant if applicable :Not met   - Adequate pain control on oral analgesia :In progress  - Vital signs normal or at patient baseline :Met   - Safe disposition plan has been identified :Not met     - Nurse to notify provider when observation goals have been met and patient is ready for discharge.

## 2023-09-01 NOTE — CONSULTS
Cardiology Inpatient Consultation  September 1, 2023    ASSESSMENT/RECOMMENDATIONS      #Atypical chest pain  #Hypertension  #Hyperlipidemia   #History of SVT/AVNRT  #CAD-mild non-obstructive disease  #Ascending aortic aneursym without features of dissection  #Pulmonary sarcoidosis  #Suspected temporal arteritis    -Patient evaluated at bedside today with Dr. Dooley and outpatient cardiologist, Dr. Farr.  Patient reported resolution of his chest pain at the time of interview.  We discussed the possibility of an angiogram versus medical management at this point, given that patient's EKG did not have significant changes and his troponin came back negative it was somewhat unlikely that his prior lesions had progressed, however this was still a possibility.  Echo from today does not show any new changes compared to prior. Upon shared decision-making with the patient it was finalized that for now patient will monitor his symptoms at home, in case of worsening chest pain or worsening shortness of breath he will be reconsidered for an angiogram.    -Since patient has been having worsening exercise tolerance over the last year, which is thought to be secondary to suspected cardiac versus lung issues although no clear cause has been identified yet, recommend outpatient cardiopulmonary stress testing.    -Continue aspirin and high intensity statin.  Follow-up with Yordan in 4 weeks.    Plan of care discussed with Dr. Dooley, who agrees with above plan.    Thank you for consulting the cardiovascular services at the [facility]. Please do not hesitate to call us with any questions.     Araseli Infante MD  Cardiology Fellow  Pager: [phone]      HISTORY OF PRESENT ILLNESS  Artis Galvin is a 78 year old adult with past medical history of hypertension, hyperlipidemia, AVNRT, nonobstructive CAD, ascending aortic aneurysm, remote pulmonary sarcoidosis, presenting to the ED with chest  pain.  Patient states that his chest pain began 3 days ago, he is like a dull ache, in the center of his chest, does not radiate anywhere, 5 out of 10 in intensity.  Says he has been having intermittent chest pain for the last 6 months, however this chest pain is more mild compared to now and usually lasts for only a couple of minutes.  This current episode has resulted in more intense pain which has lasted persistently for a few hours at a time.  Says the chest pain had resolved to 1 out of 10 at the time of our interview.  Denies associated shortness of breath, PND, orthopnea, leg swelling.  Does say that he has been getting more tired over the last 1 year, can still exercise 30 minutes on a stationary bike but this is less than what he used to do before.  Underwent a cath in October of last year which showed 60% mid LAD stenosis and 50% stenosis of the first diagonal.  Patient at that time said that he was reluctant to consider stenting of these vessels in case these lesions progress and this discussion would be revisited should he have persistent chest pain.    Review of Systems:    Complete review of systems was performed and negative except per HPI.      CARDIAC HISTORY:  EKG: Sinus bradycardia to 54, q waves V2    Transthoracic echocardiogram: 2020:  - Moderate ascending aortic dilatation    Left ventricle ejection fraction is normal. The calculated left ventricular ejection fraction is 72%.    Normal right ventricular size and systolic function.    Aortic valve sclerosis with mild aortic insufficiency    No evidence of patent foramen ovale based on color flow or bubble study.    When compared to the previous study dated 2/19/2018, no interval change.    Additional imaging:   Carotid US:  1.  Mild plaque formation, velocities consistent with less than 50% stenosis in the right internal carotid artery.  2.  Mild plaque formation, velocities consistent with less than 50% stenosis in the left internal carotid  artery.    Catheterization(s): 10/22:  Left Main   The vessel is moderate in size and is angiographically normal.      Left Anterior Descending   The vessel is moderate in size.   Mid LAD-1 lesion is 60% stenosed. Pressure wire/iFR used.   Mid LAD-2 lesion is 50% stenosed. Measured dPr. Pre adenosine administration dPr: 0.93.      First Diagonal Branch   The vessel is moderate in size.   1st Diag lesion is 50% stenosed. Measured dPr. Pre adenosine administration dPr: 0.95. iFR 0.95      Second Diagonal Branch   The vessel is small and is angiographically normal.      Ramus Intermedius   The vessel is small.   Ramus lesion is 30% stenosed.      Left Circumflex      First Obtuse Marginal Branch   The vessel is large.      Right Coronary Artery   The vessel is moderate in size.   Prox RCA lesion is 30% stenosed.   Dist RCA lesion is 35% stenosed.      Right Posterior Descending Artery   The vessel is small and is angiographically normal.      Right Posterior Atrioventricular Artery   The vessel is small and is angiographically normal.      First Right Posterolateral Branch   The vessel is small and is angiographically normal.        CCTA: 2020:  1.  Moderate-severe mid-LAD stenosis. Images sent to [facility] for  CT-FFR analysis, report to follow separately.   2.  Total Agatston score 27.6 placing the patient in the 16th  percentile when compared to age and gender matched control group.  3.  Mildly dilated proximal ascending aorta measuring 4.4 cm.    Event monitor. 3/23:  Cardiac event monitoring from 2/6/2023 to 3/7/2023 (monitored duration 27d 20h 26m).  Baseline rhythm was sinus rhythm 65bpm with first degree AV block.    Reported heart rate range 50 to 148bpm, average 65bpm.  24 symptom triggers (heart racing, skipped beat, dizzy, lightheaded, dyspnea, chest pain, other) correlated to sinus rhythm with and without PVCs, PACs, nonsustained atrial tachycardia, one episode of sustained supraventricular tachycardia  135-148bpm (onset 2/8/2023 08:27 - ongoing at 14:50).  No atrial fibrillation.  Intermittent first degree AV block.  There were no pauses noted.  The sustained SVT episode is incompletely characterized on this monitoring modality.  Supraventricular and ventricular ectopic beat frequency are not reported on this monitoring modality.      PMH:  Past Medical History:   Diagnosis Date    Abdominal pain, unspecified site     Abnormal thyroid ultrasound     BBB (bundle branch block)     right    Chronic lymphocytic thyroiditis     Diverticulosis of colon (without mention of hemorrhage)     Hypertension     Localized superficial swelling, mass, or lump     Malignant neoplasm of prostate (H)     Nontoxic multinodular goiter     Other and unspecified hyperlipidemia     Other nonspecific abnormal serum enzyme levels     Pain in joint, shoulder region     Persistent hoarseness     Sarcoidosis     Scleritis, unspecified     Unspecified hypothyroidism     Unspecified vitamin D deficiency      Active Problems:  Patient Active Problem List    Diagnosis Date Noted    Chest pain, unspecified type 08/31/2023     Priority: Medium    Blurred vision 08/14/2023     Priority: Medium    Diverticulitis of colon 05/31/2023     Priority: Medium    Dysthymia 05/17/2023     Priority: Medium    Osteoporosis without current pathological fracture, unspecified osteoporosis type 01/22/2023     Priority: Medium    Status post coronary angiogram 10/20/2022     Priority: Medium    SVT (supraventricular tachycardia) (H) 03/08/2022     Priority: Medium    Peripheral neuropathy 12/08/2021     Priority: Medium    Onychomycosis 10/04/2021     Priority: Medium    Diverticular disease of colon 09/08/2021     Priority: Medium     Formatting of this note might be different from the original.  Created by Conversion    Replacement Utility updated for latest IMO load      Autoimmune thyroiditis 05/03/2021     Priority: Medium     Formatting of this note might be  different from the original.  Formatting of this note might be different from the original.  Created by Conversion      Sarcoidosis 04/07/2020     Priority: Medium     Created by Conversion      Vitamin D deficiency 04/07/2020     Priority: Medium     Created by Conversion    Replacement Utility updated for latest IMO load      Ascending aortic aneurysm (H) 03/31/2020     Priority: Medium    Personal history of malignant neoplasm of prostate 06/14/2018     Priority: Medium    Erectile dysfunction following radical prostatectomy 06/07/2018     Priority: Medium    Right bundle branch block 02/08/2018     Priority: Medium     Social History:  Social History     Tobacco Use    Smoking status: Never    Smokeless tobacco: Never   Vaping Use    Vaping Use: Never used   Substance Use Topics    Alcohol use: Not Currently     Comment: Alcoholic Drinks/day: rarely    Drug use: No     Family History:  Family History   Problem Relation Age of Onset    No Known Problems Mother     Coronary Artery Disease Father     Aortic aneurysm Father     Prostate Cancer Maternal Grandfather     Prostate Cancer Maternal Uncle        Medications:   aspirin  81 mg Oral QPM    atorvastatin  20 mg Oral QPM    levothyroxine  75 mcg Oral Daily    losartan  50 mg Oral QPM    pilocarpine  1 drop Ophthalmic BID           PHYSICAL EXAM:  Temp:  [97.4  F (36.3  C)-98  F (36.7  C)] 97.4  F (36.3  C)  Pulse:  [51-60] 60  Resp:  [15-18] 16  BP: (130-161)/(71-94) 137/80  Cuff Mean (mmHg):  [] 104  SpO2:  [94 %-99 %] 97 %    Intake/Output Summary (Last 24 hours) at 9/1/2023 0804  Last data filed at 8/31/2023 2230  Gross per 24 hour   Intake 700 ml   Output --   Net 700 ml     GEN: pleasant, no acute distress  HEENT: No discharge  EYES: no icterus  CV: RRR, normal s1/s2, no murmurs/rubs/s3/s4, no heave. JVP at clavicle.   CHEST: clear to ausculation bilaterally, no rales or wheezing  ABD: soft, non-tender, normal active bowel sounds  : no  flank/suprapubic tenderness  EXTR: pulses 2+. No clubbing, cyanosis or edema.   NEURO: alert oriented, speech fluent/appropriate, motor grossly nonfocal  PSYCH: cooperative, affect appropriate, pleasant      DIAGNOSTICS  All labs and imaging were reviewed, of note:    CMP  Recent Labs   Lab 08/31/23  1709 08/31/23  1152 08/30/23  1122   NA  --  140 141   POTASSIUM  --  4.7 4.5   CHLORIDE  --  105 105   CO2  --  24 26   ANIONGAP  --  11 10   GLC  --  88 90   BUN  --  12.3 11.9   CR  --  1.12 1.06   GFRESTIMATED  --  68 72   DEANDRA  --  9.1 9.1   PROTTOTAL 6.6  --   --    ALBUMIN 3.8  --   --    BILITOTAL 0.6  --   --    ALKPHOS 120  --   --    AST 19  --   --    ALT 9  --   --      CBC  Recent Labs   Lab 08/31/23  1152 08/30/23  1122   WBC 5.3 5.5   RBC 5.09 5.12   HGB 15.6 15.7   HCT 47.0 47.3   MCV 92 92   MCH 30.6 30.7   MCHC 33.2 33.2   RDW 12.4 12.4    233     INRNo lab results found in last 7 days.  Arterial Blood GasNo lab results found in last 7 days.

## 2023-09-01 NOTE — PLAN OF CARE
Goal Outcome Evaluation:  -Serial troponins and stress test complete: Not Met    - Seen and cleared by consultant if applicable :Not met, Cards to see Pt    - Adequate pain control on oral analgesia :In progress, reported of 2-  3/10 cp, non radiating. Denies need for pain med    - Vital signs normal or at patient baseline :Met , /80 (BP Location: Right arm)   Pulse 60   Temp 97.4  F (36.3  C) (Oral)   Resp 16   SpO2 97% RA    - Safe disposition plan has been identified :Not met

## 2023-09-05 ENCOUNTER — PATIENT OUTREACH (OUTPATIENT)
Dept: CARE COORDINATION | Facility: CLINIC | Age: 78
End: 2023-09-05
Payer: COMMERCIAL

## 2023-09-05 NOTE — TELEPHONE ENCOUNTER
I was able to connect with Binu this afternoon about rescheduling his biopsy and he is requesting to hold off until he meets with his primary care doctor, Dr. Hernandez, tomorrow afternoon. He says some things have changed and wants to talk with him first. Binu is going to call me on Thursday morning with an update.

## 2023-09-05 NOTE — PROGRESS NOTES
Clinic Care Coordination Contact  [facility]: Post-Discharge Note  SITUATION                                                      Admission:    Admission Date: 08/31/23   Reason for Admission: Chest Pain  Discharge:   Discharge Date: 09/01/23  Discharge Diagnosis: Chest Pain    BACKGROUND                                                      Per hospital discharge summary and inpatient provider notes:  78 year old adult with a past medical history significant for ascending aortic aneurysm, right bundle branch block, sarcoidosis, mild non-obstructive CAD, SVT, prostate cancer, HTN and HLD who presents to the emergency department for evaluation of chest pain. Patient reports moderate chest pain that began around 24 hours ago.    #Chest pain  #Hypertension  #Hyperlipidemia   #History of SVT   #CAD-mild non-obstructive disease  Acute onset of mid sternal chest pain that started around noon yesterday. Chest pain described as exertional and non radiating. The pain has been persistent for the most part since the onset. Patient reports hx of episodic chest pain symptoms, but says this episode is different from previous in intensity and duration. He reports associated lightheadedness, nausea and diaphoresis. Denies SOB, cough, or fever. Cortaid US on 8/24 Mild plaque consistent with less than 50% stenosis in the right and left internal carotid artery. He also underwent cardiac catheterization on 10/2022 which showed moderate non obstructive CAD with hemodynamically non significant lesions in the mid LAD and D1. Otherwise mild non obstructive CAD elsewhere. Last resting Echo done on 6/2020 reveals moderate ascending aortic dilatation but otherwise normal. In the ED, T 97.4 HR 52 (patient is very athletic), /94 RR 15 O2 98% RA. Lab: BMP and CBC unremarkable. Glucose 88. Troponin 18, EKG shows sinus bradycardia, otherwise no acute ischemic changes. Lipid panel done 8/30 shows Cholesterol at 119, Trig 141, HDL 35,  and LDL 56.  CT chest/abdomen/pelvis shows stable aneurysmal dilatation of the ascending aorta, with a maximal diameter measured on true axial images of approximately 4.8 cm. No evidence of dissection or intramural hematoma or other acute aortic injury. Patient admitted to ed observation for serial troponin, tele, and cardiology consult to evaluate further interventions.         HOSPITAL COURSE: After being admitted, the patient was monitored on telemetry and had no significant events. Serial troponin remains negative. EKG without ischemic changes. Reported resolution of chest pain. The following morning,  was sent for a resting echocardiogram, which was normal. Cardiology was consulted for further input. They recommended outpatient cardiology follow up to complete cardiac stress test. Patient and family are agreeable with this plan.  After the stress test, the patient was ambulating without difficulty and tolerating a diet and was discharge in stable condition.      #HTN  #HLD  -Continue with Losartan 50 mg daily  -Continue with Atorvastatin 20 mg daily     #Hypothyroidism  -Continue with Levothyroxine 75 mcg daily     #Pulmonary Sarcoidosis   Per chart review, patient was seen by pulmonary medicine on 8/10/23 for discussion for sarcoidosis work up and management of fatigue. It looks biopsy done was consistent with sarcoidosis, but no significant lung parenchyma involvement. It was recommended that he undergo a sleep study which the patient has not completed yet. Also there was a discussion regarding trail of Modafinil if he continues to experience fatigue symptoms.   ASSESSMENT           Discharge Assessment  How are you doing now that you are home?: I am doing ok. No chest pain.  How are your symptoms? (Red Flag symptoms escalate to triage hotline per guidelines): Improved  Do you feel your condition is stable enough to be safe at home until your provider visit?: Yes  Does the patient have their discharge  instructions? : Yes  Does the patient have questions regarding their discharge instructions? : No  Were you started on any new medications or were there changes to any of your previous medications? : Yes  Does the patient have all of their medications?: Yes  Do you have questions regarding any of your medications? : No  Do you have all of your needed medical supplies or equipment (DME)?  (i.e. oxygen tank, CPAP, cane, etc.): Yes  Discharge follow-up appointment scheduled within 14 calendar days? : Yes  Discharge Follow Up Appointment Date: 09/06/23  Discharge Follow Up Appointment Scheduled with?: Primary Care Provider         Post-op (Clinicians Only)  Did the patient have surgery or a procedure: No  Fever: No  Chills: No  Eating & Drinking: eating and drinking without complaints/concerns  PO Intake: regular diet  Bowel Function: normal  Date of last BM: 09/05/23  Urinary Status: voiding without complaint/concerns    Care Management       Care Mgmt General Assessment      CCC RN spoke with patient today to follow up after recent hospitalization. Patient stated he was doing well at home. He denied any chest pain. He stated he was taking his medications as directed. Patient has a follow up with his PCP tomorrow and plans to attend this appointment. He said he would discuss having a stress test done as this was recommended by the Cardiologist while he was hospitalized. Patient requested appointment for ECHO be cancelled as he reported he had one done while in the hospital. Patient aware of scheduled follow up with Cardiologist. No other needs or concerns. Patient declined enrollment in Care Coordination at this time. He is aware he can be referred back to Care Coordination by his PCP if needs or concerns should arise.                    PLAN                                                      Outpatient Plan:  Home with self care.     Future Appointments   Date Time Provider Department Center   9/6/2023  4:00 PM  Angel Hernandez MD [facility]   10/20/2023  1:00 PM Сергей Kearney MD [facility]   1/2/2024  2:30 PM Goltz, Bennett Ezra, PA-C [facility]   1/16/2024 12:30 PM Rogers Mae MD [facility]   2/2/2024 11:50 AM López Shin MD [facility]   8/29/2024  2:20 PM Angel Hernandez MD [facility]         For any urgent concerns, please contact our 24 hour nurse triage line: [phone] ([phone])         David J Myhre, RN

## 2023-09-06 ENCOUNTER — OFFICE VISIT (OUTPATIENT)
Dept: INTERNAL MEDICINE | Facility: CLINIC | Age: 78
End: 2023-09-06
Payer: COMMERCIAL

## 2023-09-06 VITALS
BODY MASS INDEX: 27.24 KG/M2 | HEART RATE: 73 BPM | SYSTOLIC BLOOD PRESSURE: 118 MMHG | TEMPERATURE: 97.8 F | HEIGHT: 70 IN | OXYGEN SATURATION: 97 % | WEIGHT: 190.3 LBS | DIASTOLIC BLOOD PRESSURE: 68 MMHG | RESPIRATION RATE: 11 BRPM

## 2023-09-06 DIAGNOSIS — R53.83 OTHER FATIGUE: ICD-10-CM

## 2023-09-06 DIAGNOSIS — F34.1 DYSTHYMIA: ICD-10-CM

## 2023-09-06 DIAGNOSIS — K57.32 DIVERTICULITIS OF COLON: ICD-10-CM

## 2023-09-06 DIAGNOSIS — H53.8 BLURRED VISION: ICD-10-CM

## 2023-09-06 DIAGNOSIS — I47.10 SVT (SUPRAVENTRICULAR TACHYCARDIA) (H): ICD-10-CM

## 2023-09-06 DIAGNOSIS — G62.9 PERIPHERAL POLYNEUROPATHY: ICD-10-CM

## 2023-09-06 DIAGNOSIS — H53.9 VISUAL DISTURBANCE: ICD-10-CM

## 2023-09-06 DIAGNOSIS — Z09 HOSPITAL DISCHARGE FOLLOW-UP: Primary | ICD-10-CM

## 2023-09-06 DIAGNOSIS — R68.84 JAW PAIN: ICD-10-CM

## 2023-09-06 DIAGNOSIS — I25.810 CORONARY ARTERY DISEASE INVOLVING AUTOLOGOUS ARTERY CORONARY BYPASS GRAFT WITHOUT ANGINA PECTORIS: ICD-10-CM

## 2023-09-06 DIAGNOSIS — R07.9 CHEST PAIN, UNSPECIFIED TYPE: ICD-10-CM

## 2023-09-06 LAB — ERYTHROCYTE [SEDIMENTATION RATE] IN BLOOD BY WESTERGREN METHOD: 10 MM/HR (ref 0–30)

## 2023-09-06 PROCEDURE — 36415 COLL VENOUS BLD VENIPUNCTURE: CPT | Performed by: INTERNAL MEDICINE

## 2023-09-06 PROCEDURE — 86140 C-REACTIVE PROTEIN: CPT | Performed by: INTERNAL MEDICINE

## 2023-09-06 PROCEDURE — 99496 TRANSJ CARE MGMT HIGH F2F 7D: CPT | Performed by: INTERNAL MEDICINE

## 2023-09-06 PROCEDURE — 85652 RBC SED RATE AUTOMATED: CPT | Performed by: INTERNAL MEDICINE

## 2023-09-06 ASSESSMENT — PAIN SCALES - GENERAL: PAINLEVEL: NO PAIN (1)

## 2023-09-06 NOTE — PROGRESS NOTES
1. Hospital discharge follow-up  He is doing well at this point.  I have recommended that he contact his cardiologist to get set up for his stress test per their recommendation.    2. Chest pain, unspecified type  Resolved.  Likely noncardiac.    3. Coronary artery disease involving autologous artery coronary bypass graft without angina pectoris  Follow-up with cardiology as planned.    4. Visual disturbance  Recheck a sed rate and CRP.  If he truly had temporal arteritis/giant cell arteritis he would be blind mind now I would think.  I do not think he needs to undergo go temporal artery biopsy if his repeat inflammatory markers are again normal.  - ESR: Erythrocyte sedimentation rate; Future  - CRP, inflammation; Future  - ESR: Erythrocyte sedimentation rate  - CRP, inflammation    5. Blurred vision  He should follow-up with his eye doctor regarding his various visual disturbances.  - ESR: Erythrocyte sedimentation rate; Future  - CRP, inflammation; Future  - ESR: Erythrocyte sedimentation rate  - CRP, inflammation    6. Diverticulitis of colon  Resolved.    7. Dysthymia  He refuses medications.  I did try to get him to take at least his Celebrex and gabapentin for his chronic pain issues.  He is resistant to that.    8. Peripheral polyneuropathy  I have urged him to try taking the gabapentin as I did have in the past.  He declines at this point.  He will consider though.    9. Jaw pain  There was some concern about jaw pain which seems very much like TMJ.  Not much of an issue at this time though.  I do not think this is jaw claudication.    10. Other fatigue  Fatigue is longstanding.  There was concern possibly due to sarcoid but my suspicion is due to his chronic dysthymia.  He refuses to see a psychiatrist or psychologist.    Jimbo Schmid is a 78 year old, presenting for the following health issues:  Hospital F/U (Hospital/Nursing Home/IP Rehab Facility: [facility]  Center/Date of Admission: 08/31/23/Date of Discharge: 09/01/23/Reason(s) for Admission: Chest pain)        9/6/2023     3:51 PM   Additional Questions   Roomed by Ayesha       History of Present Illness       Hypertension: Binu Galvin presents for follow up of hypertension.  Binu Galvin does check blood pressure  regularly outside of the clinic. Outside blood pressures have been over 140/90. Binu Galvin does not follow a low salt diet. Binu Galvin consumes 0 sweetened beverage(s) daily.   Binu Galvin is taking medications regularly.           9/5/2023     2:51 PM   Post Discharge Outreach   Admission Date 8/31/2023   Reason for Admission Chest Pain   Discharge Date 9/1/2023   Discharge Diagnosis Chest Pain   How are you doing now that you are home? I am doing ok. No chest pain.   How are your symptoms? (Red Flag symptoms escalate to triage hotline per guidelines) Improved   Do you feel your condition is stable enough to be safe at home until your provider visit? Yes   Does the patient have their discharge instructions?  Yes   Does the patient have questions regarding their discharge instructions?  No   Were you started on any new medications or were there changes to any of your previous medications?  Yes   Does the patient have all of their medications? Yes   Do you have questions regarding any of your medications?  No   Do you have all of your needed medical supplies or equipment (DME)?  (i.e. oxygen tank, CPAP, cane, etc.) Yes   Discharge follow-up appointment scheduled within 14 calendar days?  Yes   Discharge Follow Up Appointment Date 9/6/2023   Discharge Follow Up Appointment Scheduled with? Primary Care Provider     Hospital Follow-up Visit:    Hospital/Nursing Home/IP Rehab Facility: [facility]  Date of Admission: 08/31/23  Date of Discharge: 09/01/23  Reason(s) for Admission: Chest pain    Was your hospitalization related to COVID-19? No   Problems taking  medications regularly:  None  Medication changes since discharge: None  Problems adhering to non-medication therapy:  None    Summary of hospitalization:  [facility] discharge summary reviewed  Diagnostic Tests/Treatments reviewed.  Follow up needed: stress testing  Other Healthcare Providers Involved in Patient s Care:         None  Update since discharge: improved.       Binu comes in today for hospital follow-up.  He was admitted to the [facility] with 24 hours of chest pain.  Ruled out for MI and sent home.  He has a very complicated past medical history.  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  He is complaining of years of fatigue which is likely made worse by his chronic dysthymia for which she refuses to have treatment or evaluation.  He was supposed to get set up for a stress test on discharge but that has not occurred yet.    Biggest issue though is some recurrent visual disturbances.  At times he will feel like a curtain is being pulled over his eye and he has had several episodes of that over the years for which she has seen neurology and is on aspirin etc.  He also recently had an episode where he felt like there was Vaseline over both of his eyes.  He saw the eye doctor and they were concerned about temporal arteritis.  He was sent to see my partner in my absence and coinciding at that same time he was having diverticulitis.  CRP was elevated as a result.  He was treated with antibiotics and bowel rest and he is better now.  Vision disturbance has resolved.  The question is does he need to have a temporal artery biopsy.  He has never been on steroids for this and has not lost his vision and his vision is now normal.  A CRP done just recently was undetectable or less than 3.  Plan of care communicated with patient           MED REC REQUIRED  Post Medication Reconciliation Status:  Discharge medications reconciled, continue medications without change  "      Review of Systems         Objective    /68 (BP Location: Left arm, Patient Position: Sitting, Cuff Size: Adult Regular)   Pulse 73   Temp 97.8  F (36.6  C) (Tympanic)   Resp 11   Ht 1.786 m (5' 10.32\")   Wt 86.3 kg (190 lb 4.8 oz)   SpO2 97%   BMI 27.06 kg/m    Body mass index is 27.06 kg/m .  Physical Exam       Looks well.  Usual affect.                  "

## 2023-09-07 LAB — CRP SERPL-MCNC: <3 MG/L

## 2023-09-08 NOTE — TELEPHONE ENCOUNTER
losartan (COZAAR) 100 MG tablet   Last Written Prescription Date:  ?  Last Fill Quantity: ?,   # refills: ?  Last Office Visit :  8/24/2023  Future Office visit:  10/20/2023    Routing refill request to provider for review/approval because:  Medication is reported/historical    Nay Irvin RN  Central Triage Red Flags/Med Refills

## 2023-09-11 ENCOUNTER — TELEPHONE (OUTPATIENT)
Dept: INTERNAL MEDICINE | Facility: CLINIC | Age: 78
End: 2023-09-11
Payer: COMMERCIAL

## 2023-09-11 NOTE — TELEPHONE ENCOUNTER
----- Message from Angel Hernandez MD sent at 9/11/2023  2:51 PM CDT -----  It looks like patient was scheduled for a 20-minute visit for his next follow-up.  I always ask for 40-minute visits for this gentleman.  Please reschedule to a 40-minute visit block.  Thank you so much.

## 2023-09-11 NOTE — TELEPHONE ENCOUNTER
Called, pt preferred around the same time as original appt, so just changed appt from 20min to 40min, and earlier.

## 2023-09-12 RX ORDER — LOSARTAN POTASSIUM 100 MG/1
50 TABLET ORAL DAILY
Status: CANCELLED | OUTPATIENT
Start: 2023-09-12

## 2023-09-12 RX ORDER — LOSARTAN POTASSIUM 50 MG/1
50 TABLET ORAL DAILY
Qty: 90 TABLET | Refills: 3 | Status: SHIPPED | OUTPATIENT
Start: 2023-09-12 | End: 2023-10-11

## 2023-09-13 NOTE — TELEPHONE ENCOUNTER
Binu returned my call to let me know that, after meeting with his PCP, he's going to hold off on rescheduling the temporal artery biopsy. I will remove the surgery order from my In Basket.

## 2023-10-05 ENCOUNTER — OFFICE VISIT (OUTPATIENT)
Dept: INTERNAL MEDICINE | Facility: CLINIC | Age: 78
End: 2023-10-05
Payer: COMMERCIAL

## 2023-10-05 VITALS
OXYGEN SATURATION: 96 % | RESPIRATION RATE: 14 BRPM | DIASTOLIC BLOOD PRESSURE: 60 MMHG | BODY MASS INDEX: 27.06 KG/M2 | SYSTOLIC BLOOD PRESSURE: 114 MMHG | HEIGHT: 70 IN | WEIGHT: 189 LBS | HEART RATE: 65 BPM | TEMPERATURE: 97.8 F

## 2023-10-05 DIAGNOSIS — F43.23 ADJUSTMENT DISORDER WITH MIXED ANXIETY AND DEPRESSED MOOD: ICD-10-CM

## 2023-10-05 DIAGNOSIS — H54.7 VISION LOSS: ICD-10-CM

## 2023-10-05 DIAGNOSIS — F34.1 DYSTHYMIA: ICD-10-CM

## 2023-10-05 DIAGNOSIS — Z23 NEED FOR SHINGLES VACCINE: ICD-10-CM

## 2023-10-05 DIAGNOSIS — Z23 NEED FOR TDAP VACCINATION: ICD-10-CM

## 2023-10-05 DIAGNOSIS — G62.9 PERIPHERAL POLYNEUROPATHY: Primary | ICD-10-CM

## 2023-10-05 PROCEDURE — G0008 ADMIN INFLUENZA VIRUS VAC: HCPCS | Mod: 59 | Performed by: INTERNAL MEDICINE

## 2023-10-05 PROCEDURE — 90662 IIV NO PRSV INCREASED AG IM: CPT | Performed by: INTERNAL MEDICINE

## 2023-10-05 PROCEDURE — 90480 ADMN SARSCOV2 VAC 1/ONLY CMP: CPT | Performed by: INTERNAL MEDICINE

## 2023-10-05 PROCEDURE — 99214 OFFICE O/P EST MOD 30 MIN: CPT | Mod: 25 | Performed by: INTERNAL MEDICINE

## 2023-10-05 PROCEDURE — 91320 SARSCV2 VAC 30MCG TRS-SUC IM: CPT | Performed by: INTERNAL MEDICINE

## 2023-10-05 RX ORDER — SERTRALINE HYDROCHLORIDE 25 MG/1
25 TABLET, FILM COATED ORAL DAILY
Qty: 30 TABLET | Refills: 0 | Status: SHIPPED | OUTPATIENT
Start: 2023-10-05 | End: 2023-10-25

## 2023-10-05 NOTE — PROGRESS NOTES
1. Peripheral polyneuropathy  I have urged him to try the gabapentin 100-300 at bedtime.    2. Adjustment disorder with mixed anxiety and depressed mood  Trial of low-dose sertraline with food in the mornings.  - sertraline (ZOLOFT) 25 MG tablet; Take 1 tablet (25 mg) by mouth daily  Dispense: 30 tablet; Refill: 0    3. Dysthymia  As above.  - sertraline (ZOLOFT) 25 MG tablet; Take 1 tablet (25 mg) by mouth daily  Dispense: 30 tablet; Refill: 0    4. Vision loss  No further issues.  No concern for temporal arteritis giant cell arteritis.    5. Need for shingles vaccine    - zoster vaccine recombinant adjuvanted (SHINGRIX) injection; Inject 0.5 mLs into the muscle once for 1 dose Pharmacist administered  Dispense: 0.5 mL; Refill: 0    6. Need for Tdap vaccination    - Tdap, tetanus-diptheria-acell pertussis, (BOOSTRIX) 5-2.5-18.5 LF-MCG/0.5 ARRON injection; Inject 0.5 mLs into the muscle once for 1 dose  Dispense: 0.5 mL; Refill: 0     Subjective   Binu is a 78 year old, presenting for the following health issues:  Follow Up (1 month recheck - fatigue,muscle pains, etc)      10/5/2023     3:10 PM   Additional Questions   Roomed by Elinor       History of Present Illness       Reason for visit:  Fatigue,painMike PATRICIA Galvin consumes 1 sweetened beverage(s) daily.Binu Galvin exercises with enough effort to increase Binu Galvin's heart rate 9 or less minutes per day.    Binu Galvin is taking medications regularly.           Binu comes in today for follow-up.  Pleasant gentleman with a history of chronic dysthymia.  He has not been tolerant of medications but tells me has not been doing very well this week.  He has been down and not sleeping as well.  He is not suicidal he tells me.  He refuses to do a PHQ-9.  He has had no further episodes of the vision loss.  He did not take the Celebrex for his chronic pain or the gabapentin for his neuropathy.  He talked to someone at the gym today that told him that he likes  "gabapentin so patient is more willing to try it.      Review of Systems         Objective    /60 (BP Location: Left arm, Patient Position: Sitting, Cuff Size: Adult Regular)   Pulse 65   Temp 97.8  F (36.6  C) (Tympanic)   Resp 14   Ht 1.778 m (5' 10\")   Wt 85.7 kg (189 lb)   SpO2 96%   BMI 27.12 kg/m    Body mass index is 27.12 kg/m .  Physical Exam   Pleasant gentleman who actually looks good today.                      "

## 2023-10-09 ENCOUNTER — HOSPITAL ENCOUNTER (OUTPATIENT)
Dept: CARDIOLOGY | Facility: CLINIC | Age: 78
Discharge: HOME | End: 2023-10-09
Attending: INTERNAL MEDICINE | Admitting: INTERNAL MEDICINE
Payer: COMMERCIAL

## 2023-10-09 VITALS — WEIGHT: 189.38 LBS | BODY MASS INDEX: 27.11 KG/M2 | HEIGHT: 70 IN

## 2023-10-09 DIAGNOSIS — R07.9 CHEST PAIN, UNSPECIFIED TYPE: ICD-10-CM

## 2023-10-09 PROCEDURE — 94621 CARDIOPULM EXERCISE TESTING: CPT

## 2023-10-09 PROCEDURE — 94621 CARDIOPULM EXERCISE TESTING: CPT | Mod: 26 | Performed by: INTERNAL MEDICINE

## 2023-10-10 LAB
CARDIOPULMONARY ANAEROBIC THRESHOLD VO2: 19.8 ML/KG/MIN
CARDIOPULMONARY BLOOD PRESSURE REST: NORMAL MMHG
CARDIOPULMONARY BREATHING RESERVE REST: 91.6
CARDIOPULMONARY BREATHING RESERVE V02MAX: 54
CARDIOPULMONARY CO2 OUTPUT REST: 293 ML/MIN
CARDIOPULMONARY CO2 OUTPUT VO2MAX: 2088 ML/MIN
CARDIOPULMONARY FEV 1.0 (L) ACTUAL: 4.21
CARDIOPULMONARY FEV 1.0 (L) PRECENT: 141 %
CARDIOPULMONARY FEV 1.0 (L) PREDICTED: 2.99
CARDIOPULMONARY FEV 1.0 FVC (%) ACTUAL: 80
CARDIOPULMONARY FEV 1.0 FVC (%) PERCENT: 111 %
CARDIOPULMONARY FEV 1.0 FVC (%) PREDICTED: 72
CARDIOPULMONARY FUNCTIONAL CAPACITY MAX ML/KG/MIN: 21.4 ML/KG/MIN
CARDIOPULMONARY FUNCTIONAL CAPACITY PERCENT: 83 %
CARDIOPULMONARY FUNCTIONAL CAPACITY PREDICTED: 25.9 ML/KG/MIN
CARDIOPULMONARY FVC (L) ACTUAL: 5.26
CARDIOPULMONARY FVC (L) PERCENT: 127 %
CARDIOPULMONARY FVC (L) PREDICTED: 4.15
CARDIOPULMONARY HEART RATE REST: 65 BPM
CARDIOPULMONARY MET'S REST: 1.3
CARDIOPULMONARY MINUTE VENTILATION REST: 12.4 L/MIN
CARDIOPULMONARY MINUTE VENTILATION VO2MAX: 67.3 L/MIN
CARDIOPULMONARY MYOCARDIAC O2 DEMAND MAX: NORMAL
CARDIOPULMONARY OXYGEN CONSUMPTION REST: 4.4 ML/KG/MIN
CARDIOPULMONARY OXYGEN CONSUMPTION VO2MAX: 21.4 ML/KG/MIN
CARDIOPULMONARY OXYGEN PULSE REST: 5.8 ML/BEAT
CARDIOPULMONARY OXYGEN PULSE VO2MAX: 15.1 ML/BEAT
CARDIOPULMONARY OXYGEN SATURATION- OXIMETRY REST: 100 %
CARDIOPULMONARY OXYGEN SATURATION- OXIMETRY VO2MAX: 100 %
CARDIOPULMONARY PET C02 REST: 33
CARDIOPULMONARY PET C02 VO2MAX: 34
CARDIOPULMONARY PET02 REST: 105
CARDIOPULMONARY PET02 V02 MAX: 114
CARDIOPULMONARY RER: 1.05
CARDIOPULMONARY RESPIRALORY EXCHANGE RATIO VO2MAX: 1.05
CARDIOPULMONARY RESPIRALORY EXCHANGE RATIO: 0.77
CARDIOPULMONARY RESPIRATORY RATE REST: 17 BR/MIN
CARDIOPULMONARY RESPIRATORY RATE VO2MAX: 24 BR/MIN
CARDIOPULMONARY STRESS BASE 1 BP MMHG: NORMAL MMHG
CARDIOPULMONARY STRESS BASE 1 BPA: 106 BPM
CARDIOPULMONARY STRESS BASE 1 SPO2: 100 % SPO2
CARDIOPULMONARY STRESS BASE 1 TIME SEC: 0 SEC
CARDIOPULMONARY STRESS BASE 1 TIME: 1 MINS
CARDIOPULMONARY STRESS BASE 2 BP MMHG: NORMAL MMHG
CARDIOPULMONARY STRESS BASE 2 BPA: 86 BPM
CARDIOPULMONARY STRESS BASE 2 SPO2: 100 % SPO2
CARDIOPULMONARY STRESS BASE 2 TIME SEC: 0 SEC
CARDIOPULMONARY STRESS BASE 2 TIME: 3 MINS
CARDIOPULMONARY STRESS BASE 3 BP MMHG: NORMAL MMHG
CARDIOPULMONARY STRESS BASE 3 BPA: 77 BPM
CARDIOPULMONARY STRESS BASE 3 SPO2: 100 % SPO2
CARDIOPULMONARY STRESS BASE 3 TIME SEC: 0 SEC
CARDIOPULMONARY STRESS BASE 3 TIME: 5 MINS
CARDIOPULMONARY STRESS PHASE 1 BP MMHG: NORMAL MMHG
CARDIOPULMONARY STRESS PHASE 1 BPM: 91 BPM
CARDIOPULMONARY STRESS PHASE 1 SPO2: 100 % SPO2
CARDIOPULMONARY STRESS PHASE 1 TIME SEC: 0 SEC
CARDIOPULMONARY STRESS PHASE 1 TIME: 3 MINS
CARDIOPULMONARY STRESS PHASE 2 BP MMHG: NORMAL MMHG
CARDIOPULMONARY STRESS PHASE 2 BPM: 112 BPM
CARDIOPULMONARY STRESS PHASE 2 SPO2: 100 % SPO2
CARDIOPULMONARY STRESS PHASE 2 TIME SEC: 0 SEC
CARDIOPULMONARY STRESS PHASE 2 TIME: 6 MINS
CARDIOPULMONARY STRESS PHASE 3 BP MMHG: NORMAL MMHG
CARDIOPULMONARY STRESS PHASE 3 BPM: 122 BPM
CARDIOPULMONARY STRESS PHASE 3 SPO2: 100 % SPO2
CARDIOPULMONARY STRESS PHASE 3 TIME SEC: 37 SEC
CARDIOPULMONARY STRESS PHASE 3 TIME: 7 MINS
CARDIOPULMONARY SVC (L) ACTUAL: 5.4
CARDIOPULMONARY SVC (L) PERCENT: 130 %
CARDIOPULMONARY SVC (L) PREDICTED: 4.15
CARDIOPULMONARY TIDAL VOLUME REST: 722 ML
CARDIOPULMONARY TIDAL VOLUME VO2MAX: 2863 ML
CARDIOPULMONARY VE/VCO2 SLOPE: 29.9
CARDIOPULMONARY VENTILATORY EQUIVALENT 02 REST: 33
CARDIOPULMONARY VENTILATORY EQUIVALENT 02 V02: 34
CARDIOPULMONARY VENTILATORY EQUIVALENT C02 REST: 43
CARDIOPULMONARY VENTILATORY EQUIVALENT C02 SLOPE VO2MAX: 29.9
CARDIOPULMONARY VENTILATORY EQUIVALENT C02 VO2MAX: 32
CV STRESS MAX HR HE: 122
PREDICTED VO2MAX: 25.9
RATED PERCEIVED EXERTION: 16
STRESS ANGINA INDEX: 0
STRESS ECHO BASELINE BP: NORMAL MMHG
STRESS ECHO BASELINE HR: 61 BPM
STRESS ECHO CALCULATED PERCENT HR: 85 %
STRESS ECHO LAST STRESS BP: NORMAL MMHG
STRESS ECHO POST ESTIMATED WORKLOAD: 6.1 METS
STRESS ECHO POST EXERCISE DUR MIN: 7 MIN
STRESS ECHO POST EXERCISE DUR SEC: 37 SEC
STRESS ECHO TARGET HR: 143

## 2023-10-11 DIAGNOSIS — I47.10 SVT (SUPRAVENTRICULAR TACHYCARDIA) (H): ICD-10-CM

## 2023-10-11 RX ORDER — LOSARTAN POTASSIUM 50 MG/1
50 TABLET ORAL EVERY EVENING
COMMUNITY
Start: 2023-10-11

## 2023-10-25 DIAGNOSIS — F34.1 DYSTHYMIA: ICD-10-CM

## 2023-10-25 DIAGNOSIS — F43.23 ADJUSTMENT DISORDER WITH MIXED ANXIETY AND DEPRESSED MOOD: ICD-10-CM

## 2023-10-25 RX ORDER — SERTRALINE HYDROCHLORIDE 25 MG/1
25 TABLET, FILM COATED ORAL DAILY
Qty: 90 TABLET | Refills: 0 | Status: SHIPPED | OUTPATIENT
Start: 2023-10-25 | End: 2023-11-30

## 2023-10-26 ENCOUNTER — TRANSFERRED RECORDS (OUTPATIENT)
Dept: HEALTH INFORMATION MANAGEMENT | Facility: CLINIC | Age: 78
End: 2023-10-26
Payer: COMMERCIAL

## 2023-10-27 ENCOUNTER — HOSPITAL ENCOUNTER (OUTPATIENT)
Facility: CLINIC | Age: 78
End: 2023-10-27
Attending: ORTHOPAEDIC SURGERY | Admitting: ORTHOPAEDIC SURGERY
Payer: COMMERCIAL

## 2023-10-31 ENCOUNTER — MYC MEDICAL ADVICE (OUTPATIENT)
Dept: INTERNAL MEDICINE | Facility: CLINIC | Age: 78
End: 2023-10-31
Payer: COMMERCIAL

## 2023-10-31 DIAGNOSIS — K57.32 DIVERTICULITIS OF COLON: Primary | ICD-10-CM

## 2023-11-01 ENCOUNTER — TELEPHONE (OUTPATIENT)
Dept: PULMONOLOGY | Facility: CLINIC | Age: 78
End: 2023-11-01
Payer: COMMERCIAL

## 2023-11-01 NOTE — LETTER
Letter by Angel Hernandez MD at      Author: Angel Hernandez MD Service: -- Author Type: --    Filed:  Encounter Date: 4/22/2021 Status: (Other)         Artis GOMEZ Kamar  [address] [place] [address]             April 22, 2021         Dear Mr. Galvin,    Below are the results from your recent visit:    Resulted Orders   Basic Metabolic Panel   Result Value Ref Range    Sodium 142 136 - 145 mmol/L    Potassium 4.4 3.5 - 5.0 mmol/L    Chloride 109 (H) 98 - 107 mmol/L    CO2 24 22 - 31 mmol/L    Anion Gap, Calculation 9 5 - 18 mmol/L    Glucose 113 70 - 125 mg/dL    Calcium 8.9 8.5 - 10.5 mg/dL    BUN 14 8 - 28 mg/dL    Creatinine 1.02 0.70 - 1.30 mg/dL    GFR MDRD Af Amer >60 >60 mL/min/1.73m2    GFR MDRD Non Af Amer >60 >60 mL/min/1.73m2    Narrative    Fasting Glucose reference range is 70-99 mg/dL per  American Diabetes Association (ADA) guidelines.       Kidney function looks stable.     Please call with questions or contact us using [employer].    Sincerely,        Electronically signed by Angel Hernandez MD        Olanzapine Pregnancy And Lactation Text: This medication is pregnancy category C.   There are no adequate and well controlled trials with olanzapine in pregnant females.  Olanzapine should be used during pregnancy only if the potential benefit justifies the potential risk to the fetus.   In a study in lactating healthy women, olanzapine was excreted in breast milk.  It is recommended that women taking olanzapine should not breast feed.

## 2023-11-01 NOTE — TELEPHONE ENCOUNTER
Left Voicemail (1st Attempt) for the patient to call back and schedule the following:    Appointment type: JESSIKA  Provider: NAYA  Return date: 01/16/24  Specialty phone number: [phone]  Additional appointment(s) needed: N/A   Additonal Notes: N/A

## 2023-11-09 ENCOUNTER — TELEPHONE (OUTPATIENT)
Dept: PULMONOLOGY | Facility: CLINIC | Age: 78
End: 2023-11-09
Payer: COMMERCIAL

## 2023-11-09 NOTE — TELEPHONE ENCOUNTER
Patient Contacted for the patient to call back and schedule the following:    Appointment type: JESSIKA  Provider: NAYA  Return date: 03-11-24  Specialty phone number: [phone]  Additional appointment(s) needed: NA  Additonal Notes: NA

## 2023-11-24 DIAGNOSIS — C61 PRIMARY MALIGNANT NEOPLASM OF PROSTATE (H): ICD-10-CM

## 2023-11-24 DIAGNOSIS — E03.9 HYPOTHYROIDISM: Primary | ICD-10-CM

## 2023-11-26 DIAGNOSIS — E06.3 HASHIMOTO'S THYROIDITIS: ICD-10-CM

## 2023-11-27 NOTE — PROGRESS NOTES
Care Management Follow Up    Length of Stay (days): 0    Expected Discharge Date:  TBD     Concerns to be Addressed:     ISSA Document  Patient plan of care discussed at interdisciplinary rounds: No    Anticipated Discharge Disposition:  TBD     Anticipated Discharge Services:    Anticipated Discharge DME:      Patient/family educated on Medicare website which has current facility and service quality ratings:  N/A  Education Provided on the Discharge Plan:  N/A  Patient/Family in Agreement with the Plan:  N/A    Referrals Placed by CM/SW:  Not at this time  Private pay costs discussed: insurance costs out of pocket expenses, co-pays and deductibles    Additional Information:    1725 Due to Binu's Observation status, SW met with him at bedside to introduce self, explain SW role, review the Medicare Outpatient Observation Notice (MOON) and why pt was receiving it. SW provided unsigned MOON document, explained it, then addressed questions and concerns.     1727 Binu signed ISSA document acknowledging its receipt. SW offered to make copy of signed document for pt records. Pt declined offer.      1731 SW faxed ISSA to [facility] ([phone]) and placed ISSA in Binu's chart.          SW will continue to follow as needed.    NORRIS Raza, LGSW  ED/OBS   [facility]  Phone: [phone]  Pager: [phone]  Fax: [phone]     On-call pager, [phone], 4:00 pm to midnight       Yes

## 2023-11-28 ENCOUNTER — OFFICE VISIT (OUTPATIENT)
Dept: INTERNAL MEDICINE | Facility: CLINIC | Age: 78
End: 2023-11-28
Payer: COMMERCIAL

## 2023-11-28 ENCOUNTER — LAB (OUTPATIENT)
Dept: LAB | Facility: CLINIC | Age: 78
End: 2023-11-28
Payer: COMMERCIAL

## 2023-11-28 VITALS
SYSTOLIC BLOOD PRESSURE: 116 MMHG | DIASTOLIC BLOOD PRESSURE: 64 MMHG | OXYGEN SATURATION: 98 % | TEMPERATURE: 98.1 F | BODY MASS INDEX: 27.44 KG/M2 | WEIGHT: 191.7 LBS | HEART RATE: 65 BPM | RESPIRATION RATE: 15 BRPM | HEIGHT: 70 IN

## 2023-11-28 DIAGNOSIS — E78.00 HYPERCHOLESTEROLEMIA: ICD-10-CM

## 2023-11-28 DIAGNOSIS — G62.9 PERIPHERAL POLYNEUROPATHY: ICD-10-CM

## 2023-11-28 DIAGNOSIS — Z29.11 NEED FOR VACCINATION AGAINST RESPIRATORY SYNCYTIAL VIRUS: ICD-10-CM

## 2023-11-28 DIAGNOSIS — M16.11 PRIMARY OSTEOARTHRITIS OF RIGHT HIP: ICD-10-CM

## 2023-11-28 DIAGNOSIS — Z01.818 PREOPERATIVE EXAMINATION: Primary | ICD-10-CM

## 2023-11-28 DIAGNOSIS — E03.9 HYPOTHYROIDISM: ICD-10-CM

## 2023-11-28 DIAGNOSIS — Z23 NEED FOR SHINGLES VACCINE: ICD-10-CM

## 2023-11-28 DIAGNOSIS — C61 PRIMARY MALIGNANT NEOPLASM OF PROSTATE (H): ICD-10-CM

## 2023-11-28 DIAGNOSIS — I25.810 CORONARY ARTERY DISEASE INVOLVING AUTOLOGOUS ARTERY CORONARY BYPASS GRAFT WITHOUT ANGINA PECTORIS: ICD-10-CM

## 2023-11-28 DIAGNOSIS — F43.23 ADJUSTMENT DISORDER WITH MIXED ANXIETY AND DEPRESSED MOOD: ICD-10-CM

## 2023-11-28 DIAGNOSIS — Z23 NEED FOR TDAP VACCINATION: ICD-10-CM

## 2023-11-28 LAB
ATRIAL RATE - MUSE: 58 BPM
DIASTOLIC BLOOD PRESSURE - MUSE: NORMAL MMHG
ERYTHROCYTE [DISTWIDTH] IN BLOOD BY AUTOMATED COUNT: 12.7 % (ref 10–15)
HCT VFR BLD AUTO: 48.1 % (ref 35–53)
HGB BLD-MCNC: 16 G/DL (ref 11.7–17.7)
INTERPRETATION ECG - MUSE: NORMAL
MCH RBC QN AUTO: 30.8 PG (ref 26.5–33)
MCHC RBC AUTO-ENTMCNC: 33.3 G/DL (ref 31.5–36.5)
MCV RBC AUTO: 93 FL (ref 78–100)
P AXIS - MUSE: 38 DEGREES
PLATELET # BLD AUTO: 212 10E3/UL (ref 150–450)
PR INTERVAL - MUSE: 196 MS
QRS DURATION - MUSE: 92 MS
QT - MUSE: 416 MS
QTC - MUSE: 408 MS
R AXIS - MUSE: 12 DEGREES
RBC # BLD AUTO: 5.2 10E6/UL (ref 3.8–5.9)
SYSTOLIC BLOOD PRESSURE - MUSE: NORMAL MMHG
T AXIS - MUSE: 2 DEGREES
VENTRICULAR RATE- MUSE: 58 BPM
WBC # BLD AUTO: 5.3 10E3/UL (ref 4–11)

## 2023-11-28 PROCEDURE — 93010 ELECTROCARDIOGRAM REPORT: CPT | Performed by: GENERAL ACUTE CARE HOSPITAL

## 2023-11-28 PROCEDURE — 99214 OFFICE O/P EST MOD 30 MIN: CPT | Mod: 25 | Performed by: INTERNAL MEDICINE

## 2023-11-28 PROCEDURE — 36415 COLL VENOUS BLD VENIPUNCTURE: CPT | Performed by: INTERNAL MEDICINE

## 2023-11-28 PROCEDURE — 84403 ASSAY OF TOTAL TESTOSTERONE: CPT

## 2023-11-28 PROCEDURE — 80048 BASIC METABOLIC PNL TOTAL CA: CPT | Performed by: INTERNAL MEDICINE

## 2023-11-28 PROCEDURE — G0103 PSA SCREENING: HCPCS

## 2023-11-28 PROCEDURE — 85027 COMPLETE CBC AUTOMATED: CPT | Performed by: INTERNAL MEDICINE

## 2023-11-28 PROCEDURE — 93005 ELECTROCARDIOGRAM TRACING: CPT | Performed by: INTERNAL MEDICINE

## 2023-11-28 RX ORDER — RESPIRATORY SYNCYTIAL VIRUS VACCINE 120MCG/0.5
0.5 KIT INTRAMUSCULAR ONCE
Qty: 1 EACH | Refills: 0 | Status: SHIPPED | OUTPATIENT
Start: 2023-11-28 | End: 2023-11-28

## 2023-11-28 RX ORDER — ATORVASTATIN CALCIUM 40 MG/1
20 TABLET, FILM COATED ORAL DAILY
Qty: 90 TABLET | Refills: 3 | Status: SHIPPED | OUTPATIENT
Start: 2023-11-28 | End: 2024-01-24

## 2023-11-28 RX ORDER — ZOLEDRONIC ACID 5 MG/100ML
5 INJECTION, SOLUTION INTRAVENOUS ONCE
COMMUNITY
End: 2023-11-29

## 2023-11-28 ASSESSMENT — PAIN SCALES - GENERAL: PAINLEVEL: MODERATE PAIN (5)

## 2023-11-28 NOTE — PATIENT INSTRUCTIONS
Preparing for Your Surgery  Getting started  A nurse will call you to review your health history and instructions. They will give you an arrival time based on your scheduled surgery time. Please be ready to share:  Your doctor's clinic name and phone number  Your medical, surgical, and anesthesia history  A list of allergies and sensitivities  A list of medicines, including herbal treatments and over-the-counter drugs  Whether the patient has a legal guardian (ask how to send us the papers in advance)  Please tell us if you're pregnant--or if there's any chance you might be pregnant. Some surgeries may injure a fetus (unborn baby), so they require a pregnancy test. Surgeries that are safe for a fetus don't always need a test, and you can choose whether to have one.   If you have a child who's having surgery, please ask for a copy of Preparing for Your Child's Surgery.    Preparing for surgery  Within 10 to 30 days of surgery: Have a pre-op exam (sometimes called an H&P, or History and Physical). This can be done at a clinic or pre-operative center.  If you're having a , you may not need this exam. Talk to your care team.  At your pre-op exam, talk to your care team about all medicines you take. If you need to stop any medicines before surgery, ask when to start taking them again.  We do this for your safety. Many medicines can make you bleed too much during surgery. Some change how well surgery (anesthesia) drugs work.  Call your insurance company to let them know you're having surgery. (If you don't have insurance, call [phone].)  Call your clinic if there's any change in your health. This includes signs of a cold or flu (sore throat, runny nose, cough, rash, fever). It also includes a scrape or scratch near the surgery site.  If you have questions on the day of surgery, call your hospital or surgery center.  Eating and drinking guidelines  For your safety: Unless your surgeon tells you otherwise,  follow the guidelines below.  Eat and drink as usual until 8 hours before you arrive for surgery. After that, no food or milk.  Drink clear liquids until 2 hours before you arrive. These are liquids you can see through, like water, Gatorade, and Propel Water. They also include plain black coffee and tea (no cream or milk), candy, and breath mints. You can spit out gum when you arrive.  If you drink alcohol: Stop drinking it the night before surgery.  If your care team tells you to take medicine on the morning of surgery, it's okay to take it with a sip of water.  Preventing infection  Shower or bathe the night before and morning of your surgery. Follow the instructions your clinic gave you. (If no instructions, use regular soap.)  Don't shave or clip hair near your surgery site. We'll remove the hair if needed.  Don't smoke or vape the morning of surgery. You may chew nicotine gum up to 2 hours before surgery. A nicotine patch is okay.  Note: Some surgeries require you to completely quit smoking and nicotine. Check with your surgeon.  Your care team will make every effort to keep you safe from infection. We will:  Clean our hands often with soap and water (or an alcohol-based hand rub).  Clean the skin at your surgery site with a special soap that kills germs.  Give you a special gown to keep you warm. (Cold raises the risk of infection.)  Wear special hair covers, masks, gowns and gloves during surgery.  Give antibiotic medicine, if prescribed. Not all surgeries need antibiotics.  What to bring on the day of surgery  Photo ID and insurance card  Copy of your health care directive, if you have one  Glasses and hearing aids (bring cases)  You can't wear contacts during surgery  Inhaler and eye drops, if you use them (tell us about these when you arrive)  CPAP machine or breathing device, if you use them  A few personal items, if spending the night  If you have . . .  A pacemaker, ICD (cardiac defibrillator) or other  implant: Bring the ID card.  An implanted stimulator: Bring the remote control.  A legal guardian: Bring a copy of the certified (court-stamped) guardianship papers.  Please remove any jewelry, including body piercings. Leave jewelry and other valuables at home.  If you're going home the day of surgery  You must have a responsible adult drive you home. They should stay with you overnight as well.  If you don't have someone to stay with you, and you aren't safe to go home alone, we may keep you overnight. Insurance often won't pay for this.  After surgery  If it's hard to control your pain or you need more pain medicine, please call your surgeon's office.  Questions?   If you have any questions for your care team, list them here: _________________________________________________________________________________________________________________________________________________________________________ ____________________________________ ____________________________________ ____________________________________  For informational purposes only. Not to replace the advice of your health care provider. Copyright   2003, 2019 [employer]. All rights reserved. Clinically reviewed by Yvette Baez MD. [employer] 091353 - REV 12/22.    How to Take Your Medication Before Surgery  - HOLD (do not take) Aspirin for 7 days  - HOLD (do not take) Celebrex or any other anti-inflammatory 7 days prior to procedure.  - STOP taking all vitamins and herbal supplements 14 days before surgery.  Morning of your procedure take your levothyroxine with a sip of water.  Hold all other medications until after surgery.

## 2023-11-28 NOTE — PROGRESS NOTES
[facility]  [address] [place] [address]  Phone: [phone]  Fax: [phone]  Primary Provider: Colby Hernandez  Pre-op Performing Provider: COLBY HERNANDEZ      PREOPERATIVE EVALUATION:  Today's date: 11/28/2023    Binu is a 78 year old, presenting for the following:  Pre-Op Exam (DOS 12/27/23/RIGHT TOTAL HIP ARTHROPLASTY DIRECT ANTERIOR APPROACH ORTHOGRID) and Recheck Medication        11/28/2023    10:49 AM   Additional Questions   Roomed by rose valdes       Surgical Information:  Surgery/Procedure: RIGHT TOTAL HIP ARTHROPLASTY DIRECT ANTERIOR APPROACH ORTHOGRID   Surgery Location: [facility]  Surgeon: Wale Benavides MD   Surgery Date: 12/27/23  Time of Surgery: TBD  Where patient plans to recover: At home  Fax number for surgical facility: Note does not need to be faxed, will be available electronically in Epic.    1. Preoperative examination  Proceed with Hauth arthroplasty as planned.  He was given perioperative med management instructions.  - EKG 12-lead, tracing only  - CBC with platelets; Future  - Basic metabolic panel  (Ca, Cl, CO2, Creat, Gluc, K, Na, BUN); Future  - CBC with platelets  - Basic metabolic panel  (Ca, Cl, CO2, Creat, Gluc, K, Na, BUN)    2. Primary osteoarthritis of right hip  I discussed with patient's imperative he be compliant with cares postoperatively.    3. Adjustment disorder with mixed anxiety and depressed mood  He refuses medications for mood.    4. Hypercholesterolemia  Continue statin.  Recent LDL was at goal.  - atorvastatin (LIPITOR) 40 MG tablet; Take 0.5 tablets (20 mg) by mouth daily  Dispense: 90 tablet; Refill: 3    5. Peripheral polyneuropathy  I have urged follow-up with neurology as he still would like an answer as to why he has his neuropathy.    6. Coronary artery disease involving autologous artery coronary bypass graft without angina pectoris  His minimal chest discomfort is not clearly  anginal.  Stress testing recently without significant issues.    7. Need for shingles vaccine    - zoster vaccine recombinant adjuvanted (SHINGRIX) injection; Inject 0.5 mLs into the muscle once for 1 dose Pharmacist administered  Dispense: 0.5 mL; Refill: 0    8. Need for Tdap vaccination    - Tdap, tetanus-diptheria-acell pertussis, (BOOSTRIX) 5-2.5-18.5 LF-MCG/0.5 ARRON injection; Inject 0.5 mLs into the muscle once for 1 dose  Dispense: 0.5 mL; Refill: 0    9. Need for vaccination against respiratory syncytial virus    - respiratory syncytial virus vaccine, bivalent (ABRYSVO) injection; Inject 0.5 mLs into the muscle once for 1 dose  Dispense: 1 each; Refill: 0     Subjective       HPI related to upcoming procedure: Binu is a complicated gentleman with multiple medical problems including nonobstructive coronary disease, sarcoidosis, recent upper respiratory infection, prostate cancer, peripheral neuropathy, and chronic dysthymia and poor compliance.  He today comes in for preop.  He has chronic right hip discomfort for which she is going to be undergoing hip arthroplasty.  He is struggling with a recent back spasm which is getting better.  He had a upper respiratory infection which he is recovered from.  He went 30 minutes on a exercise bike yesterday at the gym.  He had very minimal chest discomfort he states.  He has longstanding history of noncardiac chest pains.  He follows closely with cardiology at the [facility].  He had a stress test here recently which was nondiagnostic and had a angiogram a year ago which showed nonocclusive disease.        11/21/2023    10:47 AM   Preop Questions   1. Have you ever had a heart attack or stroke? No   2. Have you ever had surgery on your heart or blood vessels, such as a stent placement, a coronary artery bypass, or surgery on an artery in your head, neck, heart, or legs? No   3. Do you have chest pain with activity? No   4. Do you have a history of  heart failure? No    5. Do you currently have a cold, bronchitis or symptoms of other infection? No   6. Do you have a cough, shortness of breath, or wheezing? No   7. Do you or anyone in your family have previous history of blood clots? No   8. Do you or does anyone in your family have a serious bleeding problem such as prolonged bleeding following surgeries or cuts? No   9. Have you ever had problems with anemia or been told to take iron pills? No   10. Have you had any abnormal blood loss such as black, tarry or bloody stools, or abnormal vaginal bleeding? No   10. Have you had any abnormal blood loss such as black, tarry or bloody stools? No   11. Have you ever had a blood transfusion? No   12. Are you willing to have a blood transfusion if it is medically needed before, during, or after your surgery? Yes   13. Have you or any of your relatives ever had problems with anesthesia? No   14. Do you have sleep apnea, excessive snoring or daytime drowsiness? No   15. Do you have any artifical heart valves or other implanted medical devices like a pacemaker, defibrillator, or continuous glucose monitor? No   16. Do you have artificial joints? No   17. Are you allergic to latex? No       Health Care Directive:  Patient does not have a Health Care Directive or Living Will: Patient states has Advance Directive and will bring in a copy to clinic.    Preoperative Review of :   reviewed - no record of controlled substances prescribed.      Status of Chronic Conditions:  See problem list for active medical problems.  Problems all longstanding and stable, except as noted/documented.  See ROS for pertinent symptoms related to these conditions.    Review of Systems  Constitutional, neuro, ENT, endocrine, pulmonary, cardiac, gastrointestinal, genitourinary, musculoskeletal, integument and psychiatric systems are negative, except as otherwise noted.    Patient Active Problem List    Diagnosis Date Noted    Blurred vision 08/14/2023      Priority: Medium    Diverticulitis of colon 05/31/2023     Priority: Medium    Dysthymia 05/17/2023     Priority: Medium    Osteoporosis without current pathological fracture, unspecified osteoporosis type 01/22/2023     Priority: Medium    Status post coronary angiogram 10/20/2022     Priority: Medium    SVT (supraventricular tachycardia) 03/08/2022     Priority: Medium    Peripheral neuropathy 12/08/2021     Priority: Medium    Onychomycosis 10/04/2021     Priority: Medium    Diverticular disease of colon 09/08/2021     Priority: Medium     Formatting of this note might be different from the original.  Created by Conversion    Replacement Utility updated for latest IMO load      Autoimmune thyroiditis 05/03/2021     Priority: Medium     Formatting of this note might be different from the original.  Formatting of this note might be different from the original.  Created by Conversion      Sarcoidosis 04/07/2020     Priority: Medium     Created by Conversion      Vitamin D deficiency 04/07/2020     Priority: Medium     Created by Conversion    Replacement Utility updated for latest IMO load      Personal history of malignant neoplasm of prostate 06/14/2018     Priority: Medium    Erectile dysfunction following radical prostatectomy 06/07/2018     Priority: Medium    Right bundle branch block 02/08/2018     Priority: Medium      Past Medical History:   Diagnosis Date    Abdominal pain, unspecified site     Abnormal thyroid ultrasound     BBB (bundle branch block)     right    Chronic lymphocytic thyroiditis     Diverticulosis of colon (without mention of hemorrhage)     Hypertension     Localized superficial swelling, mass, or lump     Malignant neoplasm of prostate (H)     Nontoxic multinodular goiter     Other and unspecified hyperlipidemia     Other nonspecific abnormal serum enzyme levels     Pain in joint, shoulder region     Persistent hoarseness     Sarcoidosis     Scleritis, unspecified     Unspecified  hypothyroidism     Unspecified vitamin D deficiency      Past Surgical History:   Procedure Laterality Date    CV CORONARY ANGIOGRAM N/A 10/20/2022    Procedure: Coronary Angiogram;  Surgeon: Mir Farr MD;  Location:  [facility]    CV FRACTIONAL FLOW RATIO WIRE N/A 10/20/2022    Procedure: Fractional Flow Ratio Wire;  Surgeon: Mir Farr MD;  Location:  [facility]    HC SHLDR ARTHROSCOP,PART ACROMIOPLAS Right     Description: Shoulder Arthroscopy, Space Decompression And Acromioplasty;  Recorded: 02/18/2014;    MS LAP,PROSTATECTOMY,RADICAL,W/NERVE SPARE,INCL ROBOTIC Bilateral 2/27/2018    Procedure: ROBOTIC ASSISTED RADICAL RETROPUBIC PROSTATECTOMY, BILATERAL PELVIC LYMPH NODE DISSECTION LYSIS OF ADHESIONS;  Surgeon: Wale Rodriguez MD;  [place] [facility] [place]  Service: Urology    MS RADIAL KERATOTOMY      Description: Cornea Radial Keratotomy;  Recorded: 02/18/2014;     Current Outpatient Medications   Medication Sig Dispense Refill    aspirin 81 MG EC tablet Take 81 mg by mouth daily      atorvastatin (LIPITOR) 40 MG tablet Take 0.5 tablets (20 mg) by mouth daily      calcium citrate-vitamin D (CITRACAL) 315-200 MG-UNIT TABS per tablet Take 1 tablet by mouth 2 times daily      Cyanocobalamin 5000 MCG SUBL Place 1 tablet under the tongue daily      ketoconazole (NIZORAL) 2 % external cream Apply topically as needed 60 g 3    levothyroxine (SYNTHROID/LEVOTHROID) 75 MCG tablet Take 1 tablet (75 mcg) by mouth daily 90 tablet 3    losartan (COZAAR) 50 MG tablet Take 1.5 tablets (75 mg) by mouth daily      pilocarpine (PILOCAR) 2 % ophthalmic solution Apply 1 drop to eye 2 times daily as needed      Probiotic Product (MISC INTESTINAL MEGHA REGULAT) CAPS Take 1 capsule by mouth daily      alendronate (FOSAMAX) 70 MG tablet Take 70 mg by mouth every 7 days (Patient not taking: Reported on 11/28/2023)      celecoxib (CELEBREX) 200 MG capsule  "TAKE 1 CAPSULE BY MOUTH DAILY (Patient not taking: Reported on 11/28/2023) 30 capsule 11    nitroGLYcerin (NITROSTAT) 0.4 MG sublingual tablet For chest pain place 1 tablet under the tongue every 5 minutes for 3 doses. If symptoms persist 5 minutes after 1st dose call 911. (Patient not taking: Reported on 11/28/2023) 25 tablet 3    sertraline (ZOLOFT) 25 MG tablet TAKE 1 TABLET BY MOUTH DAILY (Patient not taking: Reported on 11/28/2023) 90 tablet 0       Allergies   Allergen Reactions    Ciprofloxacin Unknown    Levaquin [Levofloxacin] Unknown        Social History     Tobacco Use    Smoking status: Never    Smokeless tobacco: Never   Substance Use Topics    Alcohol use: Not Currently     Comment: Alcoholic Drinks/day: rarely     Family History   Problem Relation Age of Onset    No Known Problems Mother     Coronary Artery Disease Father     Aortic aneurysm Father     Prostate Cancer Maternal Grandfather     Prostate Cancer Maternal Uncle      History   Drug Use No         Objective     /64 (BP Location: Left arm, Patient Position: Sitting, Cuff Size: Adult Large)   Pulse 65   Temp 98.1  F (36.7  C)   Resp 15   Ht 1.778 m (5' 10\")   Wt 87 kg (191 lb 11.2 oz)   LMP  (LMP Unknown)   SpO2 98%   BMI 27.51 kg/m      Physical Exam    GENERAL APPEARANCE: healthy, alert and no distress     EYES: EOMI,  PERRL     HENT: ear canals and TM's normal and nose and mouth without ulcers or lesions     NECK: no adenopathy, no asymmetry, masses, or scars and thyroid normal to palpation     RESP: lungs clear to auscultation - no rales, rhonchi or wheezes     CV: regular rates and rhythm, normal S1 S2, no S3 or S4 and no murmur, click or rub     ABDOMEN:  soft, nontender, no HSM or masses and bowel sounds normal     MS: extremities normal- no gross deformities noted, no evidence of inflammation in joints, FROM in all extremities.     SKIN: no suspicious lesions or rashes     NEURO: Normal strength and tone, sensory exam " grossly normal, mentation intact and speech normal     PSYCH: mentation appears normal. and affect normal/bright     LYMPHATICS: No cervical adenopathy    Recent Labs   Lab Test 08/31/23  1152 08/30/23  1122 01/24/23  1537 10/20/22  1233   HGB 15.6 15.7   < > 15.1    233   < > 244   INR  --   --   --  0.98    141   < > 141   POTASSIUM 4.7 4.5   < > 4.2   CR 1.12 1.06   < > 1.01    < > = values in this interval not displayed.        Diagnostics:  Labs pending at this time.  Results will be reviewed when available.   EKG reveals sinus bradycardia with rate of 58.  No acute ST or T wave changes.    Recent stress testing was completed.  He did 7-1/2 minutes on a Vishnu protocol.  EKG was nondiagnostic.  They did not do any additional imaging.    Revised Cardiac Risk Index (RCRI):  The patient has the following serious cardiovascular risks for perioperative complications:   - Coronary Artery Disease (MI, positive stress test, angina, Qs on EKG) = 1 point     RCRI Interpretation: 1 point: Class II (low risk - 0.9% complication rate)         Signed Electronically by: COLBY BROCK MD  Copy of this evaluation report is provided to requesting physician.

## 2023-11-29 LAB
ANION GAP SERPL CALCULATED.3IONS-SCNC: 8 MMOL/L (ref 7–15)
BUN SERPL-MCNC: 16.6 MG/DL (ref 8–23)
CALCIUM SERPL-MCNC: 9.6 MG/DL (ref 8.8–10.2)
CHLORIDE SERPL-SCNC: 103 MMOL/L (ref 98–107)
CREAT SERPL-MCNC: 1.02 MG/DL (ref 0.51–1.17)
DEPRECATED HCO3 PLAS-SCNC: 27 MMOL/L (ref 22–29)
EGFRCR SERPLBLD CKD-EPI 2021: 76 ML/MIN/1.73M2
GLUCOSE SERPL-MCNC: 87 MG/DL (ref 70–99)
POTASSIUM SERPL-SCNC: 5.1 MMOL/L (ref 3.4–5.3)
PSA SERPL DL<=0.01 NG/ML-MCNC: 0.02 NG/ML (ref 0–6.5)
SODIUM SERPL-SCNC: 138 MMOL/L (ref 135–145)

## 2023-11-29 RX ORDER — LEVOTHYROXINE SODIUM 75 UG/1
75 TABLET ORAL DAILY
Qty: 100 TABLET | Refills: 0 | Status: SHIPPED | OUTPATIENT
Start: 2023-11-29 | End: 2024-02-07

## 2023-11-29 RX ORDER — NAPROXEN SODIUM 220 MG
220 TABLET ORAL 2 TIMES DAILY PRN
COMMUNITY
End: 2024-01-11

## 2023-11-29 NOTE — PROGRESS NOTES
Planning to discharge home on POD 1 in the morning with his girlfriend and daughters helping him.       11/29/23 0987   Discharge Planning   Patient/Family Anticipates Transition to home with family   Concerns to be Addressed no discharge needs identified   Living Arrangements   People in Home significant other   Type of Residence Private Residence   Is your private residence a single family home or apartment? Single family home  ([facility])   Number of Stairs, Within Home, Primary other (see comments)  (2 exterior steps and then 1 flight of stairs to lower level, but doesn't need to go down to lower level.)   Once home, are you able to live on one level? Yes   Which level? Main Level   Bathroom Shower/Tub Walk-in shower   Equipment Currently Used at Home shower chair   Support System   Support Systems Spouse/Significant Other;Children  (girlfriend, Yessi, and daughters, Carmina, Ivonne, and Lori)   Do you have someone available to stay with you one or two nights once you are home? Yes   Education   Patient attended total joint pre-op class/received pre-op teaching  email/phone call

## 2023-11-29 NOTE — TELEPHONE ENCOUNTER
MYLAN-LEVOTHYROX 75MCG TABLET      Last Written Prescription Date:  1/25/23  Last Fill Quantity: 90,   # refills: 3  Last Office Visit : 1/20/23  Future Office visit:  2/2/24  TSH 8/31/23

## 2023-11-30 LAB — TESTOST SERPL-MCNC: 247 NG/DL (ref 8–950)

## 2023-12-01 RX ORDER — CEFAZOLIN SODIUM/WATER 2 G/20 ML
2 SYRINGE (ML) INTRAVENOUS SEE ADMIN INSTRUCTIONS
Status: CANCELLED | OUTPATIENT
Start: 2023-12-06

## 2023-12-01 RX ORDER — ACETAMINOPHEN 325 MG/1
975 TABLET ORAL ONCE
Status: CANCELLED | OUTPATIENT
Start: 2023-12-01 | End: 2023-12-01

## 2023-12-01 RX ORDER — CEFAZOLIN SODIUM/WATER 2 G/20 ML
2 SYRINGE (ML) INTRAVENOUS
Status: CANCELLED | OUTPATIENT
Start: 2023-12-06

## 2023-12-01 RX ORDER — TRANEXAMIC ACID 650 MG/1
1950 TABLET ORAL ONCE
Status: CANCELLED | OUTPATIENT
Start: 2023-12-01 | End: 2023-12-01

## 2023-12-05 NOTE — TREATMENT PLAN
Orthopedic Surgery Pre-Op Plan: Artis Galvin  pre-op review. This is NOT an H&P   Surgeon: Dr. Benavides   [facility]  Name of Surgery: Right Total Hip Arthroplasty-Direct Anterior Approach Orthogrid  Date of Surgery: 12/6/23  H&P: Completed on 11/28/23 by Dr. Angel Hernandez at [facility].   History of ASA, NSAIDS, vitamin and/or herbal supplements, GLP-1 Agonist medication taken within 10 days?: Yes- ASA 81 mg daily for CAD, Celebrex and Naproxen-patient instructed to hold these medications for 7 days before surgery.  History of blood thinners?: No    Plan:   1) Discharge Plan: Home on POD 1 in the morning with his girlfriend and daughters helping him. Please see Discharge Planning section near bottom of this note for further details.     2) Coronary Artery Disease: Mild to Moderate Non-Obstructive Disease per coronary angiogram 10/2022. Patient with history of episodic chest pain symptoms over the past year despite non-obstructive disease on angiogram. Hospitalized for chest pain, diaphoresis, lightheadedness on 8/31-9/1/23. Serial troponins negative, EKG without ischemic changes, resting Echo normal, CT of chest/abdomen/pelvis showed stable dilatation of ascending aorta. Chest pain resolved and discharged home. Follow up stress test on 10/9/23 was non-diagnostic due to baseline ST changes. Felt that patient's chest pain is likely not from coronary disease and may not be from cardiac cause. He does follow with Pulmonology for sarcoidosis.  Cleared by PCP for surgery at low risk for serious perioperative cardiovascular complications (0.9% per RCRI).  On medical management of CAD with ASA 81 mg, statin, and nitroglycerin as needed.    3) Moderate Dilatation of Ascending Aorta: Stable. 4.6 cm on most recent Echo 9/1/23. Will continue to monitor with Cardiology.     4) Hypercholesterolemia: On atorvastatin.    5) Hypertension: Appears well controlled on losartan.  Patient  instructed to hold losartan on the morning of surgery.    6) Sarcoidosis: per last Pulmonary visit note from 8/10/23- history of biopsy proven sarcoidosis from 50 years ago. CT chest does not show any significant lung parenchyma involvement.  Has normal PFTs.  Pulmonology doubts that he has active sarcoid.  Sleep study was recommended but patient has not gotten this done yet (scheduled for 1/2/24 at [facility]).      7) Prostate Cancer: S/P Prostatectomy: in 2018: has follow up visits for this at [facility].     8) Peripheral Neuropathy: Etiology unclear.  Plans to follow-up with neurology after recovery from hip surgery.    9) Hypothyroidism: On levothyroxine.    10) Dysthymia: Declines medications for his mood.     Patient appears medically optimized for upcoming surgery. I would recommend Hospitalist Consult to assist with medical management. Please call me below with any questions on this patient.       Review of Systems Notable for: Coronary artery disease-mild to moderate non-obstructive disease per coronary angiogram 10/2022, moderate dilatation of ascending aorta-stable, hypercholesterolemia, hypertension, sarcoidosis, prostate cancer-s/p prostatectomy in 2018, peripheral neuropathy, hypothyroidism, dysthymia.    Past Medical History:   Past Medical History:   Diagnosis Date    Abdominal pain, unspecified site     Abnormal thyroid ultrasound     Adjustment disorder with mixed anxiety and depressed mood     Arthritis     Autoimmune thyroiditis     BBB (bundle branch block)     right    Chronic lymphocytic thyroiditis     Coronary artery disease     Diverticulosis of colon (without mention of hemorrhage)     Hypertension     Localized superficial swelling, mass, or lump     Low back pain 11/2023    Malignant neoplasm of prostate (H) 2018    Nontoxic multinodular goiter     Osteoporosis     Other and unspecified hyperlipidemia     Other nonspecific abnormal serum enzyme levels     Pain in joint,  shoulder region     Peripheral neuropathy     Persistent hoarseness     Sarcoidosis     Seen on chest xray at age 28    Scleritis, unspecified     SVT (supraventricular tachycardia)     Unspecified hypothyroidism     Unspecified vitamin D deficiency      Past Surgical History:   Procedure Laterality Date    CAPSULOTOMY Bilateral     CATARACT EXTRACTION Bilateral     CV CORONARY ANGIOGRAM N/A 10/20/2022    Procedure: Coronary Angiogram;  Surgeon: Mir Farr MD;  Location:  [facility]ARDI[facility] CATH LAB    CV FRACTIONAL FLOW RATIO WIRE N/A 10/20/2022    Procedure: Fractional Flow Ratio Wire;  Surgeon: Mir Farr MD;  Location:  HE[facility] CARDI[facility] CATH LAB    EYE SURGERY Bilateral     Laser eye surgery    HC SHLDR ARTHROSCOP,PART ACROMIOPLAS Right     Description: Shoulder Arthroscopy, Space Decompression And Acromioplasty;  Recorded: 02/18/2014;    TN LAP,PROSTATECTOMY,RADICAL,W/NERVE SPARE,INCL ROBOTIC Bilateral 02/27/2018    Procedure: ROBOTIC ASSISTED RADICAL RETROPUBIC PROSTATECTOMY, BILATERAL PELVIC LYMPH NODE DISSECTION LYSIS OF ADHESIONS;  Surgeon: Wale Rodriguez MD;  Location: [facility] [place];  Service: Urology    TN RADIAL KERATOTOMY Bilateral     Description: Cornea Radial Keratotomy;  Recorded: 02/18/2014;       Current Medications:  Patient's Medications   New Prescriptions    No medications on file   Previous Medications    ASPIRIN 81 MG EC TABLET    Take 81 mg by mouth every other day Stopped 11/29/23 before surgery    ATORVASTATIN (LIPITOR) 40 MG TABLET    Take 0.5 tablets (20 mg) by mouth daily    CALCIUM CITRATE-VITAMIN D (CITRACAL) 315-200 MG-UNIT TABS PER TABLET    Take 1 tablet by mouth every other day    CELECOXIB (CELEBREX) 200 MG CAPSULE    TAKE 1 CAPSULE BY MOUTH DAILY    CYANOCOBALAMIN 5000 MCG SUBL    Place 1 tablet under the tongue every other day    KETOCONAZOLE (NIZORAL) 2 % EXTERNAL CREAM    Apply topically as needed    LEVOTHYROXINE  "(SYNTHROID/LEVOTHROID) 75 MCG TABLET    TAKE 1 TABLET BY MOUTH DAILY    LOSARTAN (COZAAR) 50 MG TABLET    Take 50 mg by mouth daily    NAPROXEN SODIUM (ANAPROX) 220 MG TABLET    Take 220 mg by mouth 2 times daily as needed for moderate pain Stopped 11/29/23 before surgery    NITROGLYCERIN (NITROSTAT) 0.4 MG SUBLINGUAL TABLET    For chest pain place 1 tablet under the tongue every 5 minutes for 3 doses. If symptoms persist 5 minutes after 1st dose call 911.    PILOCARPINE (PILOCAR) 2 % OPHTHALMIC SOLUTION    Apply 1 drop to eye 2 times daily as needed    PROBIOTIC PRODUCT (MISC INTESTINAL MEGHA REGULAT) CAPS    Take 1 capsule by mouth every other day   Modified Medications    No medications on file   Discontinued Medications    ALENDRONATE (FOSAMAX) 70 MG TABLET    Take 70 mg by mouth every 7 days    SERTRALINE (ZOLOFT) 25 MG TABLET    TAKE 1 TABLET BY MOUTH DAILY    ZOLEDRONIC ACID (RECLAST) 5 MG/100ML SOLN INFUSION    Inject 5 mg into the vein once Once per year       ALLERGIES:  Allergies   Allergen Reactions    Ciprofloxacin Other (See Comments)     Pt states he was told not to take because of \"another medicine he is on\"    Levaquin [Levofloxacin] Other (See Comments)     Pt states he was told not to take because of \"another medicine he is on\"         Social History  Social History     Tobacco Use    Smoking status: Never    Smokeless tobacco: Never   Vaping Use    Vaping Use: Never used   Substance Use Topics    Alcohol use: Not Currently     Comment: Alcoholic Drinks/day: rarely    Drug use: No       Any Abnormal Recent Diagnostics? No    Discharge Planning:   Planning to discharge home on POD 1 in the morning with his girlfriend and daughters helping him.    11/29/23 0951   Discharge Planning   Patient/Family Anticipates Transition to home with family   Concerns to be Addressed no discharge needs identified   Living Arrangements   People in Home significant other   Type of Residence Private Residence   Is your " private residence a single family home or apartment? Single family home  ([facility])   Number of Stairs, Within Home, Primary other (see comments)  (2 exterior steps and then 1 flight of stairs to lower level, but doesn't need to go down to lower level.)   Once home, are you able to live on one level? Yes   Which level? Main Level   Bathroom Shower/Tub Walk-in shower   Equipment Currently Used at Home shower chair   Support System   Support Systems Spouse/Significant Other;Children  (girlfriend, Yessi, and daughters, Carmina, Ivonne, and Lori)   Do you have someone available to stay with you one or two nights once you are home? Yes       JAIDA Hogan, CNP   Advanced Practice Nurse Navigator- Orthopedics  [facility]   Phone: [phone]

## 2023-12-07 ENCOUNTER — TRANSFERRED RECORDS (OUTPATIENT)
Dept: HEALTH INFORMATION MANAGEMENT | Facility: CLINIC | Age: 78
End: 2023-12-07
Payer: COMMERCIAL

## 2023-12-21 ENCOUNTER — TRANSFERRED RECORDS (OUTPATIENT)
Dept: HEALTH INFORMATION MANAGEMENT | Facility: CLINIC | Age: 78
End: 2023-12-21
Payer: COMMERCIAL

## 2023-12-21 ENCOUNTER — TELEPHONE (OUTPATIENT)
Dept: INTERNAL MEDICINE | Facility: CLINIC | Age: 78
End: 2023-12-21
Payer: COMMERCIAL

## 2023-12-21 NOTE — TELEPHONE ENCOUNTER
December 21, 2023    Outside records received from  Orthopedics.  Records were placed in the inbox for Dr. Hernandez to review.  A copy was sent to HIM to be scanned into the patient's chart.    Ainsley Landaverde

## 2024-01-10 ASSESSMENT — PATIENT HEALTH QUESTIONNAIRE - PHQ9
SUM OF ALL RESPONSES TO PHQ QUESTIONS 1-9: 3
SUM OF ALL RESPONSES TO PHQ QUESTIONS 1-9: 3

## 2024-01-11 ENCOUNTER — OFFICE VISIT (OUTPATIENT)
Dept: INTERNAL MEDICINE | Facility: CLINIC | Age: 79
End: 2024-01-11
Payer: COMMERCIAL

## 2024-01-11 ENCOUNTER — APPOINTMENT (OUTPATIENT)
Dept: GENERAL RADIOLOGY | Facility: CLINIC | Age: 79
End: 2024-01-11
Attending: EMERGENCY MEDICINE
Payer: COMMERCIAL

## 2024-01-11 ENCOUNTER — HOSPITAL ENCOUNTER (OUTPATIENT)
Facility: CLINIC | Age: 79
Setting detail: OBSERVATION
Discharge: HOME | End: 2024-01-12
Attending: EMERGENCY MEDICINE | Admitting: INTERNAL MEDICINE
Payer: COMMERCIAL

## 2024-01-11 VITALS
WEIGHT: 189 LBS | DIASTOLIC BLOOD PRESSURE: 64 MMHG | SYSTOLIC BLOOD PRESSURE: 110 MMHG | OXYGEN SATURATION: 99 % | BODY MASS INDEX: 27.06 KG/M2 | RESPIRATION RATE: 17 BRPM | TEMPERATURE: 98.7 F | HEART RATE: 72 BPM | HEIGHT: 70 IN

## 2024-01-11 DIAGNOSIS — Z01.818 PREOPERATIVE EXAMINATION: Primary | ICD-10-CM

## 2024-01-11 DIAGNOSIS — I10 PRIMARY HYPERTENSION: Primary | ICD-10-CM

## 2024-01-11 DIAGNOSIS — I21.4 NSTEMI (NON-ST ELEVATED MYOCARDIAL INFARCTION) (H): ICD-10-CM

## 2024-01-11 DIAGNOSIS — I25.810 CORONARY ARTERY DISEASE INVOLVING AUTOLOGOUS ARTERY CORONARY BYPASS GRAFT WITHOUT ANGINA PECTORIS: ICD-10-CM

## 2024-01-11 DIAGNOSIS — R07.9 CHEST PAIN, UNSPECIFIED TYPE: Primary | ICD-10-CM

## 2024-01-11 DIAGNOSIS — R94.31 ACUTE ELECTROCARDIOGRAM CHANGES: ICD-10-CM

## 2024-01-11 DIAGNOSIS — R07.9 CHEST PAIN, UNSPECIFIED TYPE: ICD-10-CM

## 2024-01-11 LAB
ALBUMIN SERPL BCG-MCNC: 4 G/DL (ref 3.5–5.2)
ALP SERPL-CCNC: 121 U/L (ref 40–150)
ALT SERPL W P-5'-P-CCNC: 19 U/L (ref 0–70)
ANION GAP SERPL CALCULATED.3IONS-SCNC: 11 MMOL/L (ref 7–15)
AST SERPL W P-5'-P-CCNC: 23 U/L (ref 0–45)
ATRIAL RATE - MUSE: 64 BPM
ATRIAL RATE - MUSE: 65 BPM
ATRIAL RATE - MUSE: 68 BPM
BASOPHILS # BLD AUTO: 0.1 10E3/UL (ref 0–0.2)
BASOPHILS NFR BLD AUTO: 1 %
BILIRUB SERPL-MCNC: 0.5 MG/DL
BUN SERPL-MCNC: 20.1 MG/DL (ref 8–23)
CALCIUM SERPL-MCNC: 9.4 MG/DL (ref 8.8–10.2)
CHLORIDE SERPL-SCNC: 103 MMOL/L (ref 98–107)
CHOLEST SERPL-MCNC: 134 MG/DL
CREAT SERPL-MCNC: 1.06 MG/DL (ref 0.51–1.17)
DEPRECATED HCO3 PLAS-SCNC: 25 MMOL/L (ref 22–29)
DIASTOLIC BLOOD PRESSURE - MUSE: NORMAL MMHG
EGFRCR SERPLBLD CKD-EPI 2021: 72 ML/MIN/1.73M2
EOSINOPHIL # BLD AUTO: 0.1 10E3/UL (ref 0–0.7)
EOSINOPHIL NFR BLD AUTO: 2 %
ERYTHROCYTE [DISTWIDTH] IN BLOOD BY AUTOMATED COUNT: 12.9 % (ref 10–15)
ERYTHROCYTE [DISTWIDTH] IN BLOOD BY AUTOMATED COUNT: 13 % (ref 10–15)
GLUCOSE SERPL-MCNC: 83 MG/DL (ref 70–99)
HCT VFR BLD AUTO: 45.4 % (ref 35–53)
HCT VFR BLD AUTO: 46.1 % (ref 35–53)
HDLC SERPL-MCNC: 36 MG/DL
HGB BLD-MCNC: 15.5 G/DL (ref 11.7–17.7)
HGB BLD-MCNC: 15.6 G/DL (ref 11.7–17.7)
IMM GRANULOCYTES # BLD: 0.1 10E3/UL
IMM GRANULOCYTES NFR BLD: 1 %
INTERPRETATION ECG - MUSE: NORMAL
LDLC SERPL CALC-MCNC: 67 MG/DL
LYMPHOCYTES # BLD AUTO: 0.7 10E3/UL (ref 0.8–5.3)
LYMPHOCYTES NFR BLD AUTO: 11 %
MCH RBC QN AUTO: 31.5 PG (ref 26.5–33)
MCH RBC QN AUTO: 31.6 PG (ref 26.5–33)
MCHC RBC AUTO-ENTMCNC: 33.8 G/DL (ref 31.5–36.5)
MCHC RBC AUTO-ENTMCNC: 34.1 G/DL (ref 31.5–36.5)
MCV RBC AUTO: 93 FL (ref 78–100)
MCV RBC AUTO: 93 FL (ref 78–100)
MONOCYTES # BLD AUTO: 0.7 10E3/UL (ref 0–1.3)
MONOCYTES NFR BLD AUTO: 11 %
NEUTROPHILS # BLD AUTO: 5.2 10E3/UL (ref 1.6–8.3)
NEUTROPHILS NFR BLD AUTO: 74 %
NONHDLC SERPL-MCNC: 98 MG/DL
NRBC # BLD AUTO: 0 10E3/UL
NRBC BLD AUTO-RTO: 0 /100
P AXIS - MUSE: 20 DEGREES
P AXIS - MUSE: 28 DEGREES
P AXIS - MUSE: 41 DEGREES
PLATELET # BLD AUTO: 221 10E3/UL (ref 150–450)
PLATELET # BLD AUTO: 231 10E3/UL (ref 150–450)
POTASSIUM SERPL-SCNC: 4.7 MMOL/L (ref 3.4–5.3)
PR INTERVAL - MUSE: 184 MS
PR INTERVAL - MUSE: 186 MS
PR INTERVAL - MUSE: 192 MS
PROT SERPL-MCNC: 6.8 G/DL (ref 6.4–8.3)
QRS DURATION - MUSE: 148 MS
QT - MUSE: 424 MS
QT - MUSE: 438 MS
QT - MUSE: 440 MS
QTC - MUSE: 450 MS
QTC - MUSE: 453 MS
QTC - MUSE: 455 MS
R AXIS - MUSE: -7 DEGREES
R AXIS - MUSE: 26 DEGREES
R AXIS - MUSE: 71 DEGREES
RBC # BLD AUTO: 4.9 10E6/UL (ref 3.8–5.9)
RBC # BLD AUTO: 4.95 10E6/UL (ref 3.8–5.9)
SODIUM SERPL-SCNC: 139 MMOL/L (ref 135–145)
SYSTOLIC BLOOD PRESSURE - MUSE: NORMAL MMHG
T AXIS - MUSE: -15 DEGREES
T AXIS - MUSE: 2 DEGREES
T AXIS - MUSE: 4 DEGREES
TRIGL SERPL-MCNC: 157 MG/DL
TROPONIN T SERPL HS-MCNC: 26 NG/L
TROPONIN T SERPL HS-MCNC: 29 NG/L
VENTRICULAR RATE- MUSE: 64 BPM
VENTRICULAR RATE- MUSE: 65 BPM
VENTRICULAR RATE- MUSE: 68 BPM
WBC # BLD AUTO: 6.9 10E3/UL (ref 4–11)
WBC # BLD AUTO: 7.1 10E3/UL (ref 4–11)

## 2024-01-11 PROCEDURE — 71046 X-RAY EXAM CHEST 2 VIEWS: CPT | Mod: 26 | Performed by: RADIOLOGY

## 2024-01-11 PROCEDURE — 36415 COLL VENOUS BLD VENIPUNCTURE: CPT

## 2024-01-11 PROCEDURE — 99285 EMERGENCY DEPT VISIT HI MDM: CPT | Mod: 25 | Performed by: EMERGENCY MEDICINE

## 2024-01-11 PROCEDURE — 93010 ELECTROCARDIOGRAM REPORT: CPT | Mod: 77 | Performed by: EMERGENCY MEDICINE

## 2024-01-11 PROCEDURE — 80053 COMPREHEN METABOLIC PANEL: CPT | Performed by: EMERGENCY MEDICINE

## 2024-01-11 PROCEDURE — 84484 ASSAY OF TROPONIN QUANT: CPT | Performed by: EMERGENCY MEDICINE

## 2024-01-11 PROCEDURE — 85027 COMPLETE CBC AUTOMATED: CPT

## 2024-01-11 PROCEDURE — 71046 X-RAY EXAM CHEST 2 VIEWS: CPT

## 2024-01-11 PROCEDURE — 36415 COLL VENOUS BLD VENIPUNCTURE: CPT | Performed by: EMERGENCY MEDICINE

## 2024-01-11 PROCEDURE — 93005 ELECTROCARDIOGRAM TRACING: CPT | Performed by: INTERNAL MEDICINE

## 2024-01-11 PROCEDURE — 83036 HEMOGLOBIN GLYCOSYLATED A1C: CPT

## 2024-01-11 PROCEDURE — 93005 ELECTROCARDIOGRAM TRACING: CPT | Mod: 76 | Performed by: EMERGENCY MEDICINE

## 2024-01-11 PROCEDURE — 99213 OFFICE O/P EST LOW 20 MIN: CPT | Performed by: INTERNAL MEDICINE

## 2024-01-11 PROCEDURE — 96366 THER/PROPH/DIAG IV INF ADDON: CPT

## 2024-01-11 PROCEDURE — 82465 ASSAY BLD/SERUM CHOLESTEROL: CPT

## 2024-01-11 PROCEDURE — 96365 THER/PROPH/DIAG IV INF INIT: CPT

## 2024-01-11 PROCEDURE — 250N000013 HC RX MED GY IP 250 OP 250 PS 637

## 2024-01-11 PROCEDURE — 99222 1ST HOSP IP/OBS MODERATE 55: CPT | Mod: 25 | Performed by: INTERNAL MEDICINE

## 2024-01-11 PROCEDURE — 93005 ELECTROCARDIOGRAM TRACING: CPT | Performed by: EMERGENCY MEDICINE

## 2024-01-11 PROCEDURE — 93010 ELECTROCARDIOGRAM REPORT: CPT | Performed by: INTERNAL MEDICINE

## 2024-01-11 PROCEDURE — 85025 COMPLETE CBC W/AUTO DIFF WBC: CPT | Performed by: EMERGENCY MEDICINE

## 2024-01-11 PROCEDURE — 120N000002 HC R&B MED SURG/OB UMMC

## 2024-01-11 PROCEDURE — 250N000011 HC RX IP 250 OP 636

## 2024-01-11 RX ORDER — POLYETHYLENE GLYCOL 3350 17 G/17G
17 POWDER, FOR SOLUTION ORAL DAILY PRN
Status: DISCONTINUED | OUTPATIENT
Start: 2024-01-11 | End: 2024-01-12 | Stop reason: HOSPADM

## 2024-01-11 RX ORDER — ASPIRIN 81 MG/1
81 TABLET ORAL EVERY OTHER DAY
Status: DISCONTINUED | OUTPATIENT
Start: 2024-01-12 | End: 2024-01-11

## 2024-01-11 RX ORDER — ASPIRIN 81 MG/1
81 TABLET ORAL DAILY
Status: DISCONTINUED | OUTPATIENT
Start: 2024-01-12 | End: 2024-01-12 | Stop reason: HOSPADM

## 2024-01-11 RX ORDER — PILOCARPINE HYDROCHLORIDE 20 MG/ML
1 SOLUTION/ DROPS OPHTHALMIC 2 TIMES DAILY PRN
Status: DISCONTINUED | OUTPATIENT
Start: 2024-01-11 | End: 2024-01-12 | Stop reason: HOSPADM

## 2024-01-11 RX ORDER — AMOXICILLIN 250 MG
1 CAPSULE ORAL 2 TIMES DAILY PRN
Status: DISCONTINUED | OUTPATIENT
Start: 2024-01-11 | End: 2024-01-12 | Stop reason: HOSPADM

## 2024-01-11 RX ORDER — LIDOCAINE 40 MG/G
CREAM TOPICAL
Status: DISCONTINUED | OUTPATIENT
Start: 2024-01-11 | End: 2024-01-12 | Stop reason: HOSPADM

## 2024-01-11 RX ORDER — ASPIRIN 325 MG
325 TABLET, DELAYED RELEASE (ENTERIC COATED) ORAL ONCE
Status: COMPLETED | OUTPATIENT
Start: 2024-01-11 | End: 2024-01-11

## 2024-01-11 RX ORDER — ASPIRIN 325 MG
325 TABLET, DELAYED RELEASE (ENTERIC COATED) ORAL DAILY
Status: DISCONTINUED | OUTPATIENT
Start: 2024-01-12 | End: 2024-01-11

## 2024-01-11 RX ORDER — HEPARIN SODIUM 10000 [USP'U]/100ML
0-5000 INJECTION, SOLUTION INTRAVENOUS CONTINUOUS
Status: DISCONTINUED | OUTPATIENT
Start: 2024-01-11 | End: 2024-01-12

## 2024-01-11 RX ORDER — LOSARTAN POTASSIUM 50 MG/1
50 TABLET ORAL DAILY
Status: DISCONTINUED | OUTPATIENT
Start: 2024-01-12 | End: 2024-01-12 | Stop reason: HOSPADM

## 2024-01-11 RX ORDER — ATORVASTATIN CALCIUM 20 MG/1
20 TABLET, FILM COATED ORAL EVERY EVENING
Status: DISCONTINUED | OUTPATIENT
Start: 2024-01-11 | End: 2024-01-12 | Stop reason: HOSPADM

## 2024-01-11 RX ORDER — HEPARIN SODIUM 5000 [USP'U]/.5ML
5000 INJECTION, SOLUTION INTRAVENOUS; SUBCUTANEOUS EVERY 8 HOURS
Status: DISCONTINUED | OUTPATIENT
Start: 2024-01-11 | End: 2024-01-11

## 2024-01-11 RX ORDER — LEVOTHYROXINE SODIUM 75 UG/1
75 TABLET ORAL DAILY
Status: DISCONTINUED | OUTPATIENT
Start: 2024-01-12 | End: 2024-01-12 | Stop reason: HOSPADM

## 2024-01-11 RX ORDER — AMOXICILLIN 250 MG
2 CAPSULE ORAL 2 TIMES DAILY PRN
Status: DISCONTINUED | OUTPATIENT
Start: 2024-01-11 | End: 2024-01-12 | Stop reason: HOSPADM

## 2024-01-11 RX ORDER — RESPIRATORY SYNCYTIAL VIRUS VACCINE 120MCG/0.5
0.5 KIT INTRAMUSCULAR ONCE
Qty: 1 EACH | Refills: 0 | Status: CANCELLED | OUTPATIENT
Start: 2024-01-11 | End: 2024-01-11

## 2024-01-11 RX ADMIN — ASPIRIN 325 MG: 325 TABLET, COATED ORAL at 22:53

## 2024-01-11 RX ADMIN — HEPARIN SODIUM AND DEXTROSE 1050 UNITS/HR: 10000; 5 INJECTION INTRAVENOUS at 22:33

## 2024-01-11 RX ADMIN — ATORVASTATIN CALCIUM 20 MG: 20 TABLET, FILM COATED ORAL at 22:54

## 2024-01-11 RX ADMIN — TICAGRELOR 180 MG: 90 TABLET ORAL at 22:54

## 2024-01-11 ASSESSMENT — ACTIVITIES OF DAILY LIVING (ADL)
ADLS_ACUITY_SCORE: 35

## 2024-01-11 NOTE — PROGRESS NOTES
[facility]  [address] [place] [address]  Phone: [phone]  Fax: [phone]  Primary Provider: Colby Hernandez  Pre-op Performing Provider: COLBY HERNANDEZ      PREOPERATIVE EVALUATION:  Today's date: 1/11/2024    Binu is a 78 year old, presenting for the following:  Pre-Op Exam (Rt Hip Arthroplasty with Dr. Benavides on 1/17/24 at [facility]  ) and Chest Pain (Dull chest pains - started this morning, No sob, and BP this am was normal )        1/11/2024     2:05 PM   Additional Questions   Roomed by Elinor   Accompanied by Self       Surgical Information:  Surgery/Procedure: Right Hip Arthroplasty   Surgery Location: [facility]   Surgeon: Dr. Benavides  Surgery Date: 1/17/24  Time of Surgery: TBD  Where patient plans to recover: At home with family  Fax number for surgical facility: Note does not need to be faxed, will be available electronically in Epic.    1. Chest pain, unspecified type  Given the acute chest pains and changes in the EKG will send him to the emergency room via 911.    2. Acute electrocardiogram changes  As above.  He has a history of a right bundle that was present back in 2018 but subsequent EKGs have not shown this.  I am concerned with the possible ischemic appearing changes.    Subjective       HPI related to upcoming procedure: Patient comes in today for a preop however he has had an hour or 2 of chest pains.  Left-sided in nature.  Not associated with shortness of breath or diaphoresis.  Exertion does not seem to make it worse.  Has had similar episodes in the past he does have known coronary disease.  On EKG he has new EKG changes with new bundle branch block as well as possible lateral  MI criteria.        1/4/2024    11:00 AM   Preop Questions   1. Have you ever had a heart attack or stroke? No   2. Have you ever had surgery on your heart or blood vessels, such as a stent placement, a coronary artery  "bypass, or surgery on an artery in your head, neck, heart, or legs? No   3. Do you have chest pain with activity? No   4. Do you have a history of  heart failure? No   5. Do you currently have a cold, bronchitis or symptoms of other infection? No   6. Do you have a cough, shortness of breath, or wheezing? YES    7. Do you or anyone in your family have previous history of blood clots? No   8. Do you or does anyone in your family have a serious bleeding problem such as prolonged bleeding following surgeries or cuts? No   9. Have you ever had problems with anemia or been told to take iron pills? No   10. Have you had any abnormal blood loss such as black, tarry or bloody stools, or abnormal vaginal bleeding? No   10. Have you had any abnormal blood loss such as black, tarry or bloody stools? No   11. Have you ever had a blood transfusion? No   12. Are you willing to have a blood transfusion if it is medically needed before, during, or after your surgery? NO    13. Have you or any of your relatives ever had problems with anesthesia? No   14. Do you have sleep apnea, excessive snoring or daytime drowsiness? No   15. Do you have any artifical heart valves or other implanted medical devices like a pacemaker, defibrillator, or continuous glucose monitor? No   16. Do you have artificial joints? No   17. Are you allergic to latex? No         Objective     /64 (BP Location: Left arm, Patient Position: Sitting, Cuff Size: Adult Regular)   Pulse 72   Temp 98.7  F (37.1  C) (Tympanic)   Resp 17   Ht 1.778 m (5' 10\")   Wt 85.7 kg (189 lb)   LMP  (LMP Unknown)   SpO2 99%   BMI 27.12 kg/m      Physical Exam  Pleasant gentleman in no distress.    Recent Labs   Lab Test 11/28/23  1203 08/31/23  1152 01/24/23  1537 10/20/22  1233   HGB 16.0 15.6   < > 15.1    241   < > 244   INR  --   --   --  0.98    140   < > 141   POTASSIUM 5.1 4.7   < > 4.2   CR 1.02 1.12   < > 1.01    < > = values in this interval not " displayed.        Diagnostics:         Signed Electronically by: COLBY BROCK MD  Copy of this evaluation report is provided to requesting physician.

## 2024-01-11 NOTE — H&P
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    The patient notes that today while at rest he developed dull chest pain that was substernal/left-sided and continued for hours. Was not associated with diaphoresis or SOB. He attempted to go to the gym and the dull chest pain continued, did not worsen with exertion. He felt \"out of it\" and brought it up to his primary care provider during a pre-op exam today for a right hip arthroplasty scheduled for 1/17. EKG obtained and demonstrated a new RBBB. Referred to ED where troponin was mildly elevated at 29 > 26. Patient admitted to cardiology for workup.    Review of Systems:    Complete review of systems was performed and negative except per HPI.    PMH:  Past Medical History:   Diagnosis Date    Abdominal pain, unspecified site     Abnormal thyroid ultrasound     Adjustment disorder with mixed anxiety and depressed mood     Arthritis     Autoimmune thyroiditis     BBB (bundle branch block)     right    Chronic lymphocytic thyroiditis     Coronary artery disease     Diverticulosis of colon (without mention of hemorrhage)     Hypertension     Localized superficial swelling, mass, or lump     Low back pain 11/2023    Malignant neoplasm of prostate (H) 2018    Nontoxic multinodular goiter     Osteoporosis     Other and unspecified hyperlipidemia     Other nonspecific abnormal serum enzyme levels     Pain in joint, shoulder region     Peripheral neuropathy     Persistent hoarseness     Sarcoidosis     Seen on chest xray at age 28    Scleritis, unspecified     Status post coronary " angiogram 10/20/2022    SVT (supraventricular tachycardia)     Unspecified hypothyroidism     Unspecified vitamin D deficiency      Active Problems:  Patient Active Problem List    Diagnosis Date Noted    Chest pain 01/11/2024     Priority: Medium    Blurred vision 08/14/2023     Priority: Medium    Diverticulitis of colon 05/31/2023     Priority: Medium    Dysthymia 05/17/2023     Priority: Medium    Osteoporosis without current pathological fracture, unspecified osteoporosis type 01/22/2023     Priority: Medium    Status post coronary angiogram 10/20/2022     Priority: Medium    SVT (supraventricular tachycardia) 03/08/2022     Priority: Medium    Peripheral neuropathy 12/08/2021     Priority: Medium    Onychomycosis 10/04/2021     Priority: Medium    Diverticular disease of colon 09/08/2021     Priority: Medium     Formatting of this note might be different from the original.  Created by Conversion    Replacement Utility updated for latest IMO load      Autoimmune thyroiditis 05/03/2021     Priority: Medium     Formatting of this note might be different from the original.  Formatting of this note might be different from the original.  Created by Conversion      Sarcoidosis 04/07/2020     Priority: Medium     Created by Conversion      Vitamin D deficiency 04/07/2020     Priority: Medium     Created by Conversion    Replacement Utility updated for latest IMO load      Personal history of malignant neoplasm of prostate 06/14/2018     Priority: Medium    Erectile dysfunction following radical prostatectomy 06/07/2018     Priority: Medium    Right bundle branch block 02/08/2018     Priority: Medium     Social History:  Social History     Tobacco Use    Smoking status: Never    Smokeless tobacco: Never   Vaping Use    Vaping Use: Never used   Substance Use Topics    Alcohol use: Not Currently     Comment: Alcoholic Drinks/day: rarely    Drug use: No     Family History:  Family History   Problem Relation Age of Onset     No Known Problems Mother     Coronary Artery Disease Father     Aortic aneurysm Father     Prostate Cancer Maternal Grandfather     Prostate Cancer Maternal Uncle      Physical Exam:  Temp:  [97.3  F (36.3  C)-98.7  F (37.1  C)] 97.3  F (36.3  C)  Pulse:  [65-72] 65  Resp:  [17-18] 18  BP: (110-116)/(64-82) 116/82  SpO2:  [96 %-99 %] 96 %  No intake or output data in the 24 hours ending 01/11/24 1706  GEN: pleasant, no acute distress  HEENT: no icterus  CV: RRR, normal s1/s2, no murmurs  CHEST: clear to ausculation bilaterally, no rales or wheezing  ABD: soft, non-tender, normoactive bowel sounds  EXTR: no edema peripherally  NEURO: alert oriented, speech fluent/appropriate    Diagnostics:  All labs and imaging were reviewed, of note:    All laboratory data reviewed  EKG 1/11/23    EKG 11/28/23    TTE 9/1/23  Interpretation Summary  Global and regional left ventricular function is normal with an EF of 60-65%.  Right ventricular function, chamber size, wall motion, and thickness are  normal.  Moderate dilatation of the aorta is present with the ascending aorta dilated  to 4.6 cm.  No pericardial effusion is present.     There is no prior study for direct comparison.    CORS 10/20/22    Conclusion    Moderate non obstructive CAD with hemodynamically non significant lesions in the mid LAD (dPR 0.93) and D1 (0.95). Otherwise mild non obstructive CAD elsewhere.      Plan     Follow bedrest per protocol   Continued medical management and lifestyle modifications for cardiovascular risk factor optimizations.   Follow up visit with Nurse Practitioner in 1-2 weeks.   Discharge today per protocol     Coronary Findings    Diagnostic  Dominance: Right  Left Main   The vessel is moderate in size and is angiographically normal.      Left Anterior Descending   The vessel is moderate in size.   Mid LAD-1 lesion is 60% stenosed. Pressure wire/iFR used.   Mid LAD-2 lesion is 50% stenosed. Measured dPr. Pre adenosine administration  dPr: 0.93.      First Diagonal Branch   The vessel is moderate in size.   1st Diag lesion is 50% stenosed. Measured dPr. Pre adenosine administration dPr: 0.95. iFR 0.95      Second Diagonal Branch   The vessel is small and is angiographically normal.      Ramus Intermedius   The vessel is small.   Ramus lesion is 30% stenosed.      Left Circumflex      First Obtuse Marginal Branch   The vessel is large.      Right Coronary Artery   The vessel is moderate in size.   Prox RCA lesion is 30% stenosed.   Dist RCA lesion is 35% stenosed.      Right Posterior Descending Artery   The vessel is small and is angiographically normal.      Right Posterior Atrioventricular Artery   The vessel is small and is angiographically normal.      First Right Posterolateral Branch   The vessel is small and is angiographically normal.         Intervention     No interventions have been documented.     FFR/IFR/Peak/Mean Gradient      Event Details User   3:41 PM 10/20/22 *DPR/IFR Measurement Complete DPR measurement 9.3 MID LAD completed. LW   3:43 PM 10/20/22 *DPR/IFR Measurement Complete DPR measurement 9.5 D1 completed. 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    #Cardiac chest pain (CROW score 3; HEART score 3)  #Hx of moderate CAD  Patient admitted with left-sided dull chest pain at rest that persisted for hours, mild troponin leak (29>26) and new RBBB on EKG, in setting of known moderate CAD. Furthermore, he is >75 years and had accompanying delirium with his chest pain, which per guidelines should also lead to a consideration of ACS. Admitted for ischemic  evaluation.  - Give aspirin 325 mg & brilinta 180 mg    > ASA 81 mg daily & brilinta 90 mg BID thereafter  - Start heparin gtt  - Continue PTA lipitor 20 mg daily  - Obtain lipid panel, A1c  - NPO at midnight for possible coronary angiogram    #Hypothyroidism  - Continue PTA synthroid    #HTN  - Hold PTA losartan given possible angiogram and contrast use    FEN: NPO at midnight  PPx: heparin gtt  Code: full    Disposition Plan   Admit to cards 1; likely 1-2 days    Mary Acosta DO  Internal Medicine, PGY-3

## 2024-01-11 NOTE — ED PROVIDER NOTES
ED Provider Note  [facility]      History     Chief Complaint   Patient presents with    Abnormal Labs     ABNORMAL EKG    Chest Pain     HPI  Artis Galvin is a 78 year old male PMH of SVT, right bundle branch block, HTN, prostate cancer,  who presents to the ER for evaluation of chest pain.    Patient was at a pre-operative physical in preparation for a hip replacement when he developed chest pain at a 6/7 pain level. He had something similar last year and was kept overnight in the ER and had testing which did not show anything. He states he gets twinges of this pain but it is not that bad.  Pain is at a 0 at presentation. He states his voice is crackling more than normal. He also notes some tinnitus with the chest pain. He denies jaw pain or arm pain. He states chest pain started 3 hours ago and is now completely resolved. He states he does not feel that physical activity worsens the pain. He  did not take nitroglycerin at home. He felt lightheaded, sweaty, and 'crumby' when he was at the gym prior to his appointment. He also noted some SOB.    Past Medical History  Past Medical History:   Diagnosis Date    Abdominal pain, unspecified site     Abnormal thyroid ultrasound     Adjustment disorder with mixed anxiety and depressed mood     Arthritis     Autoimmune thyroiditis     BBB (bundle branch block)     right    Chronic lymphocytic thyroiditis     Coronary artery disease     Diverticulosis of colon (without mention of hemorrhage)     Hypertension     Localized superficial swelling, mass, or lump     Low back pain 11/2023    Malignant neoplasm of prostate (H) 2018    Nontoxic multinodular goiter     NSTEMI (non-ST elevated myocardial infarction) (H) 1/11/2024    Osteoporosis     Other and unspecified hyperlipidemia     Other nonspecific abnormal serum enzyme levels     Pain in joint, shoulder region     Peripheral neuropathy     Persistent hoarseness     Sarcoidosis     Seen on  "chest xray at age 28    Scleritis, unspecified     Status post coronary angiogram 10/20/2022    SVT (supraventricular tachycardia)     Unspecified hypothyroidism     Unspecified vitamin D deficiency      Past Surgical History:   Procedure Laterality Date    CAPSULOTOMY Bilateral     CATARACT EXTRACTION Bilateral     CV CORONARY ANGIOGRAM N/A 10/20/2022    Procedure: Coronary Angiogram;  Surgeon: Mir Farr MD;  Location:  [facility]    CV FRACTIONAL FLOW RATIO WIRE N/A 10/20/2022    Procedure: Fractional Flow Ratio Wire;  Surgeon: Mir Farr MD;  Location:  [facility]    EYE SURGERY Bilateral     Laser eye surgery    HC SHLDR ARTHROSCOP,PART ACROMIOPLAS Right     Description: Shoulder Arthroscopy, Space Decompression And Acromioplasty;  Recorded: 02/18/2014;    NH LAP,PROSTATECTOMY,RADICAL,W/NERVE SPARE,INCL ROBOTIC Bilateral 02/27/2018    Procedure: ROBOTIC ASSISTED RADICAL RETROPUBIC PROSTATECTOMY, BILATERAL PELVIC LYMPH NODE DISSECTION LYSIS OF ADHESIONS;  Surgeon: Wale Rodriguez MD;  Location: [facility] [place];  Service: Urology    NH RADIAL KERATOTOMY Bilateral     Description: Cornea Radial Keratotomy;  Recorded: 02/18/2014;     aspirin 81 MG EC tablet  atorvastatin (LIPITOR) 40 MG tablet  Cyanocobalamin 5000 MCG SUBL  levothyroxine (SYNTHROID/LEVOTHROID) 75 MCG tablet  losartan (COZAAR) 50 MG tablet  nitroGLYcerin (NITROSTAT) 0.4 MG sublingual tablet  pilocarpine (PILOCAR) 2 % ophthalmic solution      Allergies   Allergen Reactions    Ciprofloxacin Other (See Comments)     Pt states he was told not to take because of \"another medicine he is on\"    Levaquin [Levofloxacin] Other (See Comments)     Pt states he was told not to take because of \"another medicine he is on\"       Family History  Family History   Problem Relation Age of Onset    No Known Problems Mother     Coronary Artery Disease Father     Aortic aneurysm Father     Prostate " Cancer Maternal Grandfather     Prostate Cancer Maternal Uncle      Social History   Social History     Tobacco Use    Smoking status: Never    Smokeless tobacco: Never   Vaping Use    Vaping Use: Never used   Substance Use Topics    Alcohol use: Not Currently     Comment: Alcoholic Drinks/day: rarely    Drug use: No         A medically appropriate review of systems was performed with pertinent positives and negatives noted in the HPI, and all other systems negative.    Physical Exam   BP: 116/82  Pulse: 65  Temp: 97.3  F (36.3  C)  Resp: 18  SpO2: 96 %  Physical Exam  Vitals and nursing note reviewed.   Constitutional:       General: Binu Galvin is not in acute distress.     Appearance: Normal appearance. Binu Galvin is well-developed and normal weight. Binu Galvin is not ill-appearing or diaphoretic.   HENT:      Head: Normocephalic and atraumatic.      Nose: Nose normal.      Mouth/Throat:      Mouth: Mucous membranes are moist.   Eyes:      General: No scleral icterus.     Conjunctiva/sclera: Conjunctivae normal.   Cardiovascular:      Rate and Rhythm: Normal rate.   Pulmonary:      Effort: Pulmonary effort is normal. No respiratory distress.      Breath sounds: No stridor.   Abdominal:      General: There is no distension.   Musculoskeletal:         General: No deformity or signs of injury. Normal range of motion.      Cervical back: Normal range of motion and neck supple. No rigidity.   Skin:     General: Skin is warm and dry.      Coloration: Skin is not jaundiced or pale.   Neurological:      General: No focal deficit present.      Mental Status: Binu Galvin is alert and oriented to person, place, and time.   Psychiatric:         Mood and Affect: Mood normal.         Behavior: Behavior normal.           ED Course, Procedures, & Data      Procedures            EKG Interpretation:      Interpreted by Rosa De La Rosa MD  Time reviewed: 1508  Symptoms at time of EKG: None   Rhythm: normal sinus   Rate:  "Normal  Axis: Normal  Ectopy: none  Conduction: right bundle branch block (complete)  ST Segments/ T Waves: Non-specific ST-T wave changes  Q Waves: lateral leads  Comparison to prior: Unchanged    Clinical Impression: no acute changes and right bundle branch block                       Results for orders placed or performed during the hospital encounter of 01/11/24   XR Chest 2 Views     Status: None    Narrative    Exam: XR CHEST 2 VIEWS, 1/11/2024 4:00 PM    Comparison: CTA chest abdomen and pelvis 3/1/2023    History: chest pain, aortic aneyurism    Findings:  2 views of the chest. Trachea is midline. Cardiomediastinal silhouette  is within normal limits. No focal airspace opacity. No pneumothorax or  pleural effusion. The visualized upper abdomen is unremarkable. No  acute osseous abnormalities. Mild degenerative changes in the upper  lumbar spine.      Impression    Impression: No acute airspace disease.    I have personally reviewed the examination and initial interpretation  and I agree with the findings.    CHANA WILSON MD         SYSTEM ID:  [id number]   Comprehensive metabolic panel     Status: Normal   Result Value Ref Range    Sodium 139 135 - 145 mmol/L    Potassium 4.7 3.4 - 5.3 mmol/L    Carbon Dioxide (CO2) 25 22 - 29 mmol/L    Anion Gap 11 7 - 15 mmol/L    Urea Nitrogen 20.1 8.0 - 23.0 mg/dL    Creatinine 1.06 0.51 - 1.17 mg/dL    GFR Estimate 72 >60 mL/min/1.73m2    Calcium 9.4 8.8 - 10.2 mg/dL    Chloride 103 98 - 107 mmol/L    Glucose 83 70 - 99 mg/dL    Alkaline Phosphatase 121 40 - 150 U/L    AST 23 0 - 45 U/L    ALT 19 0 - 70 U/L    Protein Total 6.8 6.4 - 8.3 g/dL    Albumin 4.0 3.5 - 5.2 g/dL    Bilirubin Total 0.5 <=1.2 mg/dL    Narrative    The generation of reference intervals for this test is currently based on binary male or female sex. If the electronic health record information indicates another gender identity or if Legal Sex is recorded as \"Unknown\", both male and female " "reference intervals are provided where applicable, and should be considered according to the individual's appropriate clinical context.   Troponin T, High Sensitivity     Status: Abnormal   Result Value Ref Range    Troponin T, High Sensitivity 29 (H) <=22 ng/L   CBC with platelets and differential     Status: Abnormal   Result Value Ref Range    WBC Count 6.9 4.0 - 11.0 10e3/uL    RBC Count 4.95 3.80 - 5.90 10e6/uL    Hemoglobin 15.6 11.7 - 17.7 g/dL    Hematocrit 46.1 35.0 - 53.0 %    MCV 93 78 - 100 fL    MCH 31.5 26.5 - 33.0 pg    MCHC 33.8 31.5 - 36.5 g/dL    RDW 13.0 10.0 - 15.0 %    Platelet Count 231 150 - 450 10e3/uL    % Neutrophils 74 %    % Lymphocytes 11 %    % Monocytes 11 %    % Eosinophils 2 %    % Basophils 1 %    % Immature Granulocytes 1 %    NRBCs per 100 WBC 0 <1 /100    Absolute Neutrophils 5.2 1.6 - 8.3 10e3/uL    Absolute Lymphocytes 0.7 (L) 0.8 - 5.3 10e3/uL    Absolute Monocytes 0.7 0.0 - 1.3 10e3/uL    Absolute Eosinophils 0.1 0.0 - 0.7 10e3/uL    Absolute Basophils 0.1 0.0 - 0.2 10e3/uL    Absolute Immature Granulocytes 0.1 <=0.4 10e3/uL    Absolute NRBCs 0.0 10e3/uL    Narrative    The generation of reference intervals for this test is currently based on binary male or female sex. If the electronic health record information indicates another gender identity or if Legal Sex is recorded as \"Unknown\", both male and female reference intervals are provided where applicable, and should be considered according to the individual's appropriate clinical context.   Troponin T, High Sensitivity     Status: Abnormal   Result Value Ref Range    Troponin T, High Sensitivity 26 (H) <=22 ng/L   Lipid panel reflex to direct LDL     Status: Abnormal   Result Value Ref Range    Cholesterol 134 <200 mg/dL    Triglycerides 157 (H) <150 mg/dL    Direct Measure HDL 36 (L) >=40 mg/dL    LDL Cholesterol Calculated 67 <=100 mg/dL    Non HDL Cholesterol 98 <130 mg/dL    Narrative    Cholesterol  Desirable:  <200 " mg/dL    Triglycerides  Normal:  Less than 150 mg/dL  Borderline High:  150-199 mg/dL  High:  200-499 mg/dL  Very High:  Greater than or equal to 500 mg/dL    Direct Measure HDL  Female:  Greater than or equal to 50 mg/dL   Male:  Greater than or equal to 40 mg/dL    LDL Cholesterol  Desirable:  <100mg/dL  Above Desirable:  100-129 mg/dL   Borderline High:  130-159 mg/dL   High:  160-189 mg/dL   Very High:  >= 190 mg/dL    Non HDL Cholesterol  Desirable:  130 mg/dL  Above Desirable:  130-159 mg/dL  Borderline High:  160-189 mg/dL  High:  190-219 mg/dL  Very High:  Greater than or equal to 220 mg/dL   EKG 12 lead     Status: None   Result Value Ref Range    Systolic Blood Pressure  mmHg    Diastolic Blood Pressure  mmHg    Ventricular Rate 65 BPM    Atrial Rate 65 BPM    MT Interval 184 ms    QRS Duration 148 ms     ms    QTc 455 ms    P Axis 41 degrees    R AXIS 71 degrees    T Axis 2 degrees    Interpretation ECG       Sinus rhythm  Indeterminate axis  Right bundle branch block  Lateral infarct , age undetermined  Cannot rule out Inferior infarct , age undetermined  Abnormal ECG  Unconfirmed report - interpretation of this ECG is computer generated - see medical record for final interpretation    Confirmed by - EMERGENCY ROOM, PHYSICIAN ([id number]),  CASSANDRA RUSHING ([id number]) on 1/11/2024 5:35:04 PM     EKG 12-lead, tracing only     Status: None   Result Value Ref Range    Systolic Blood Pressure  mmHg    Diastolic Blood Pressure  mmHg    Ventricular Rate 64 BPM    Atrial Rate 64 BPM    MT Interval 186 ms    QRS Duration 148 ms     ms    QTc 453 ms    P Axis 20 degrees    R AXIS -7 degrees    T Axis -15 degrees    Interpretation ECG       Sinus rhythm  Right bundle branch block  Possible Lateral infarct , age undetermined  Abnormal ECG  Unconfirmed report - interpretation of this ECG is computer generated - see medical record for final interpretation    Confirmed by - EMERGENCY ROOM, PHYSICIAN ([id number]),   CASSANDRA RUSHING ([id number]) on 1/11/2024 5:24:00 PM     CBC with platelets differential     Status: Abnormal    Narrative    The following orders were created for panel order CBC with platelets differential.  Procedure                               Abnormality         Status                     ---------                               -----------         ------                     CBC with platelets and d...[[id number]]  Abnormal            Final result                 Please view results for these tests on the individual orders.   Results for orders placed or performed in visit on 01/11/24   EKG 12-lead, tracing only     Status: None   Result Value Ref Range    Systolic Blood Pressure  mmHg    Diastolic Blood Pressure  mmHg    Ventricular Rate 68 BPM    Atrial Rate 68 BPM    KS Interval 192 ms    QRS Duration 148 ms     ms    QTc 450 ms    P Axis 28 degrees    R AXIS 26 degrees    T Axis 4 degrees    Interpretation ECG       Sinus rhythm  Right bundle branch block  Possible Lateral infarct , age undetermined  Abnormal ECG  When compared with ECG of 28-NOV-2023 10:58,  No significant change was found  Confirmed by RUSTY RIVAS, LES LOC: ([id number]) on 1/11/2024 4:44:25 PM       Medications   lidocaine 1 % 0.1-1 mL (has no administration in time range)   lidocaine (LMX4) cream (has no administration in time range)   sodium chloride (PF) 0.9% PF flush 3 mL (has no administration in time range)   sodium chloride (PF) 0.9% PF flush 3 mL (has no administration in time range)   senna-docusate (SENOKOT-S/PERICOLACE) 8.6-50 MG per tablet 1 tablet (has no administration in time range)     Or   senna-docusate (SENOKOT-S/PERICOLACE) 8.6-50 MG per tablet 2 tablet (has no administration in time range)   melatonin tablet 1 mg (has no administration in time range)   polyethylene glycol (MIRALAX) Packet 17 g (has no administration in time range)   atorvastatin (LIPITOR) tablet 20 mg (has no administration in time range)   cyanocobalamin  (VITAMIN B-12) sublingual tablet SUBL 5,000 mcg (has no administration in time range)   levothyroxine (SYNTHROID/LEVOTHROID) tablet 75 mcg (has no administration in time range)   losartan (COZAAR) tablet 50 mg ( Oral Automatically Held 1/15/24 0800)   pilocarpine (PILOCAR) 2 % ophthalmic solution 1 drop (has no administration in time range)   aspirin EC tablet 81 mg (has no administration in time range)   heparin loading dose for LOW INTENSITY TREATMENT * Give BEFORE starting heparin infusion (has no administration in time range)   heparin infusion 25,000 units in D5W 250 mL ANTICOAGULANT (has no administration in time range)   ticagrelor (BRILINTA) tablet 180 mg (has no administration in time range)     Followed by   ticagrelor (BRILINTA) tablet 90 mg (has no administration in time range)   aspirin (ASA) EC tablet 325 mg (has no administration in time range)     Labs Ordered and Resulted from Time of ED Arrival to Time of ED Departure   TROPONIN T, HIGH SENSITIVITY - Abnormal       Result Value    Troponin T, High Sensitivity 29 (*)    CBC WITH PLATELETS AND DIFFERENTIAL - Abnormal    WBC Count 6.9      RBC Count 4.95      Hemoglobin 15.6      Hematocrit 46.1      MCV 93      MCH 31.5      MCHC 33.8      RDW 13.0      Platelet Count 231      % Neutrophils 74      % Lymphocytes 11      % Monocytes 11      % Eosinophils 2      % Basophils 1      % Immature Granulocytes 1      NRBCs per 100 WBC 0      Absolute Neutrophils 5.2      Absolute Lymphocytes 0.7 (*)     Absolute Monocytes 0.7      Absolute Eosinophils 0.1      Absolute Basophils 0.1      Absolute Immature Granulocytes 0.1      Absolute NRBCs 0.0     TROPONIN T, HIGH SENSITIVITY - Abnormal    Troponin T, High Sensitivity 26 (*)    LIPID REFLEX TO DIRECT LDL PANEL - Abnormal    Cholesterol 134      Triglycerides 157 (*)     Direct Measure HDL 36 (*)     LDL Cholesterol Calculated 67      Non HDL Cholesterol 98     COMPREHENSIVE METABOLIC PANEL - Normal     Sodium 139      Potassium 4.7      Carbon Dioxide (CO2) 25      Anion Gap 11      Urea Nitrogen 20.1      Creatinine 1.06      GFR Estimate 72      Calcium 9.4      Chloride 103      Glucose 83      Alkaline Phosphatase 121      AST 23      ALT 19      Protein Total 6.8      Albumin 4.0      Bilirubin Total 0.5     CBC WITH PLATELETS   HEMOGLOBIN A1C     XR Chest 2 Views   Final Result   Impression: No acute airspace disease.      I have personally reviewed the examination and initial interpretation   and I agree with the findings.      CHANA WILSON MD            SYSTEM ID:  [id number]             Critical care was not performed.     Medical Decision Making  The patient's presentation was of high complexity (an acute health issue posing potential threat to life or bodily function).    The patient's evaluation involved:  review of external note(s) from 1 sources (see separate area of note for details)  review of 3+ test result(s) ordered prior to this encounter (see separate area of note for details)  ordering and/or review of 3+ test(s) in this encounter (see separate area of note for details)  discussion of management or test interpretation with another health professional (see separate area of note for details)    The patient's management necessitated high risk (a decision regarding hospitalization).    Assessment & Plan    Artis Galvin is a 78 year old male PMH of SVT, right bundle branch block, HTN, prostate cancer,  who presents to the ER for evaluation of chest pain.    Ddx: ACS, pneumonia, GERD, musculoskeletal chest pain    Patient afebrile, normotensive, with normal heart rate and satting 96% on room air.  No pain at the time of my assessment.  EKG limited by baseline right bundle branch block.  X-ray clear.  CBC normal.  Initial troponin 29.  CMP normal.  Patient not having any sharp or stabbing/tearing severe chest pain or vital sign abnormalities so I did not pursue evaluation of aortic dilation  at this time.    Per my review of patient's previous cardiac evaluation, he went a stress test in October 2023 which was nondiagnostic due to baseline ST changes.  Discussed with the cards 1 staff reviewed patient's previous coronary angiogram from October 2022.  He noted that this showed up to 60% stenosis of the mid LAD.  He recommended admission onto the cards 1 service for further rule out.    Patient initially decided to leave AGAINST MEDICAL ADVICE despite a prolonged discussion about the risks of leaving without further monitoring.  He subsequently spoke with one of his daughters who is in the healthcare field.  She encouraged him to return for admission and he obliged.  Cardiology team called back to update them on patient's decision to proceed with admission.      I have reviewed the nursing notes. I have reviewed the findings, diagnosis, plan and need for follow up with the patient.    New Prescriptions    No medications on file       Final diagnoses:   Chest pain, unspecified type   NSTEMI (non-ST elevated myocardial infarction) (H)       Rosa De La Rosa  [facility] EMERGENCY DEPARTMENT  1/11/2024     Rosa De La Rosa MD  01/11/24 6711

## 2024-01-11 NOTE — ED TRIAGE NOTES
Per medics: spf PT brought in for abnormal EKG at a preop visit. Pt said he had chest pain at the clinic visit. When medics arrived he did not have chest pain. En route- pt told medics this is not abnormal for him. Pt does not have chest pain. He has a bundle branch block per medics. Nothing given en route. VSS

## 2024-01-11 NOTE — DISCHARGE INSTRUCTIONS
Please make an appointment to follow up with Your Primary Care Provider and Cardiology Clinic (phone: [phone]) as soon as possible.    You have decided to leave against medical advice and acknowledged that the risk of leaving is that you may suffer a life threatening or permenantly disabling event as a result of leaving without completing the recommended medical care.     We encourage you to return for evaluation at any time, if you change your mind or would like to be evaluated.     Return to the Emergency Department if you develop worsening chest pain, shortness of breath, fever, coughing up blood, or fainting.

## 2024-01-12 VITALS
RESPIRATION RATE: 18 BRPM | SYSTOLIC BLOOD PRESSURE: 131 MMHG | HEART RATE: 64 BPM | TEMPERATURE: 98.1 F | DIASTOLIC BLOOD PRESSURE: 83 MMHG | OXYGEN SATURATION: 97 %

## 2024-01-12 PROBLEM — I10 PRIMARY HYPERTENSION: Status: ACTIVE | Noted: 2024-01-12

## 2024-01-12 LAB
HBA1C MFR BLD: 5.7 %
HOLD SPECIMEN: NORMAL
HOLD SPECIMEN: NORMAL
UFH PPP CHRO-ACNC: 0.25 IU/ML
UFH PPP CHRO-ACNC: 0.42 IU/ML

## 2024-01-12 PROCEDURE — 250N000013 HC RX MED GY IP 250 OP 250 PS 637: Performed by: CASE MANAGER/CARE COORDINATOR

## 2024-01-12 PROCEDURE — 85520 HEPARIN ASSAY: CPT | Mod: 91 | Performed by: INTERNAL MEDICINE

## 2024-01-12 PROCEDURE — 36415 COLL VENOUS BLD VENIPUNCTURE: CPT | Performed by: INTERNAL MEDICINE

## 2024-01-12 PROCEDURE — 36415 COLL VENOUS BLD VENIPUNCTURE: CPT

## 2024-01-12 PROCEDURE — G0378 HOSPITAL OBSERVATION PER HR: HCPCS

## 2024-01-12 PROCEDURE — 250N000013 HC RX MED GY IP 250 OP 250 PS 637

## 2024-01-12 PROCEDURE — 96366 THER/PROPH/DIAG IV INF ADDON: CPT

## 2024-01-12 PROCEDURE — 85520 HEPARIN ASSAY: CPT

## 2024-01-12 PROCEDURE — 99239 HOSP IP/OBS DSCHRG MGMT >30: CPT | Mod: FS | Performed by: CASE MANAGER/CARE COORDINATOR

## 2024-01-12 RX ORDER — ISOSORBIDE MONONITRATE 30 MG/1
30 TABLET, EXTENDED RELEASE ORAL DAILY
Status: DISCONTINUED | OUTPATIENT
Start: 2024-01-12 | End: 2024-01-12 | Stop reason: HOSPADM

## 2024-01-12 RX ORDER — ISOSORBIDE MONONITRATE 30 MG/1
30 TABLET, EXTENDED RELEASE ORAL DAILY
Status: DISCONTINUED | OUTPATIENT
Start: 2024-01-12 | End: 2024-01-12

## 2024-01-12 RX ORDER — ISOSORBIDE MONONITRATE 30 MG/1
30 TABLET, EXTENDED RELEASE ORAL DAILY
Qty: 90 TABLET | Refills: 3 | Status: SHIPPED | OUTPATIENT
Start: 2024-01-13 | End: 2024-01-24

## 2024-01-12 RX ADMIN — Medication 5000 MCG: at 09:00

## 2024-01-12 RX ADMIN — ASPIRIN 81 MG: 81 TABLET ORAL at 09:00

## 2024-01-12 RX ADMIN — ISOSORBIDE MONONITRATE 30 MG: 30 TABLET, EXTENDED RELEASE ORAL at 09:03

## 2024-01-12 RX ADMIN — LOSARTAN POTASSIUM 50 MG: 50 TABLET, FILM COATED ORAL at 09:03

## 2024-01-12 RX ADMIN — LEVOTHYROXINE SODIUM 75 MCG: 75 TABLET ORAL at 09:00

## 2024-01-12 ASSESSMENT — ACTIVITIES OF DAILY LIVING (ADL)
ADLS_ACUITY_SCORE: 35

## 2024-01-12 NOTE — DISCHARGE SUMMARY
[address] [address]  p: [phone]    Discharge Summary: Cardiology Service    Artis Galvin MRN# [id number]   YOB: 1945 Age: 78 year old       Admission Date: 1/11/2024  Discharge Date: 01/12/24    Discharge Diagnoses:  # Non-obstructive coronary artery disease  # Stable angina  # Right bundle branch block   # HTN  # Ascending aortic aneurysm   # Hypothyroidism    Brief HPI:  Artis Galvin is a 78 year old M with PMH moderate non-obstructive CAD (CORS 10/2022), HTN, HLD, ascending aortic aneurysm (4.6cm  on TTE 9/2023), SVT/AVNRT, pulmonary sarcoidosis (diagnosed 50 years ago, however recent investigation believes this is not active), osteoperosis, prostate cancer s/p prostatectomy who was referred to ED for possible cardiac chest pain.    Hospital Course by Diagnosis:  # Non-obstructive coronary artery disease  # Stable angina  # Right bundle branch block   While undergoing pre-operative workup for upcoming hip arthroplasty scheduled for next week, Binu noted to his PCP that the same day he had developed dull chest pain that was substernal/left-sided and continued for hours. The pain was not associated with diaphoresis or SOB. He attempted to go to the gym and the dull chest pain continued, did not worsen with exertion. EKG obtained in clinic and demonstrated a new RBBB (pt has had on prior EKGs). Referred to ED where troponin was mildly elevated at 29 > 26. He was loaded with aspirin 325mg and Brilanta 180 mg in ED and started on a heparin gtt. Most recent coronary angiogram 10/22 showed moderate non-obstructive CAD, with 60% stenosis in mLAD, 50% in D1, 30% in ramus, and 30% in RCA.   Echocardiogram 8/2023: normal EF 60-65%. CP stress test 10/2023 with hypertensive response to exercise.  Option for coronary angiogram was discussed with patient; however, due to cath lab scheduling constraints he would likely have to wait  inpatient until Monday for an angiogram. As his chest pain had resolved, the option was discussed to discharge him to home with a stress test early next week in order to clear him for hip surgery. Patient and family were amenable to this option.  - Dobutamine stress echo -- scheduled for Monday 1/15 at 11am  - continue asa 81mg daily  - continue atorvastatin 20mg daily  - Start Imdur 30mg as anti-anginal  - SL NTG PRN    # Ascending aortic aneurysm   Measured at 4.6cm on TTE 9/2023.  - SBP goal <130  - cont PTA losartan 50mg daily  - Imdur 30mg daily  - annual echo monitoring    # HTN  Goal SBP <130, hypertensive in ED. Pt notes home SBP is typically 110-120.  - cont PTA losartan 50mg daily  - start Imdur 30mg daily    # Hypothyroidism  Managed by PCP  - cont PTA synthroid 75mcg daily     Pertinent Procedures:  N/A    Consults:  N/A    Medication Changes:  Start Imdur 30mg daily     Discharge medications:   Current Discharge Medication List        START taking these medications    Details   isosorbide mononitrate (IMDUR) 30 MG 24 hr tablet Take 1 tablet (30 mg) by mouth daily  Qty: 90 tablet, Refills: 3    Associated Diagnoses: Chest pain, unspecified type; Primary hypertension           CONTINUE these medications which have NOT CHANGED    Details   aspirin 81 MG EC tablet Take 81 mg by mouth every other day Stopped 1/2/24 before surgery      atorvastatin (LIPITOR) 40 MG tablet Take 0.5 tablets (20 mg) by mouth daily  Qty: 90 tablet, Refills: 3    Associated Diagnoses: Hypercholesterolemia      Cyanocobalamin 5000 MCG SUBL Place 1 tablet under the tongue every other day      levothyroxine (SYNTHROID/LEVOTHROID) 75 MCG tablet TAKE 1 TABLET BY MOUTH DAILY  Qty: 100 tablet, Refills: 0    Comments: Please send a replace/new response with 100-Day Supply if appropriate to maximize member benefit. Requesting 1 year supply.  Associated Diagnoses: Hashimoto's thyroiditis      losartan (COZAAR) 50 MG tablet Take 50 mg by  mouth daily    Associated Diagnoses: SVT (supraventricular tachycardia)      nitroGLYcerin (NITROSTAT) 0.4 MG sublingual tablet For chest pain place 1 tablet under the tongue every 5 minutes for 3 doses. If symptoms persist 5 minutes after 1st dose call 911.  Qty: 25 tablet, Refills: 3    Associated Diagnoses: Stable angina pectoris      pilocarpine (PILOCAR) 2 % ophthalmic solution Apply 1 drop to eye 2 times daily as needed             Follow-up:  Dobutamine stress echo on Monday 1/15 at 11am    Labs or imaging requiring follow-up after discharge:  None    Code status:  Full    Condition on discharge  Temp:  [96.9  F (36.1  C)-98.7  F (37.1  C)] 98.1  F (36.7  C)  Pulse:  [59-72] 64  Resp:  [17-18] 18  BP: (110-162)/(64-98) 131/83  Cuff Mean (mmHg):  [] 100  SpO2:  [96 %-99 %] 97 %  General: Alert, interactive, NAD  Eyes: sclera anicteric, EOMI  Neck:  carotid 2+ bilaterally  Cardiovascular: regular rate and rhythm, normal S1 and S2, no murmurs, gallops, or rubs  Resp: clear to auscultation bilaterally, no rales, wheezes, or rhonchi  GI: Soft, nontender, nondistended. +BS.  No HSM or masses, no rebound or guarding.  Extremities: no edema, no cyanosis or clubbing, dorsalis pedis and posterior tibialis pulses 2+ bilaterally  Skin: Warm and dry, no jaundice or rash  Neuro: CN 2-12 intact, moves all extremities equally  Psych: Alert & oriented x 3    Imaging with results:  Echocardiogram 2023:  Name: LELA JIMENEZ  MRN: [id number]  : 1945  Study Date: 2023 07:19 AM  Age: 78 yrs  Gender: Male  Patient Location: [facility]  Reason For Study: Chest Pain, Chest Pressure, Chest Tightness  Ordering Physician: SHEN SCHULTZ  Performed By: Rocio Caputo     BSA: 2.0 m2  Height: 70 in  Weight: 191 lb  HR: 59  BP: 137/80 mmHg  ______________________________________________________________________________  Procedure  Complete Portable Echo  Adult.  ______________________________________________________________________________  Interpretation Summary  Global and regional left ventricular function is normal with an EF of 60-65%.  Right ventricular function, chamber size, wall motion, and thickness are  normal.  Moderate dilatation of the aorta is present with the ascending aorta dilated  to 4.6 cm.  No pericardial effusion is present.     There is no prior study for direct comparison.  ______________________________________________________________________________  Left Ventricle  Global and regional left ventricular function is normal with an EF of 60-65%.  Left ventricular size is normal. Left ventricular wall thickness is normal.  Thickening of the anterobasal septum is present. Left ventricular diastolic  function is normal.     Right Ventricle  Right ventricular function, chamber size, wall motion, and thickness are  normal.     Atria  Both atria appear normal.     Mitral Valve  Mild mitral annular calcification is present. Trace mitral insufficiency is  present.     Aortic Valve  The aortic valve is tricuspid. Mild aortic valve calcification is present.  Mild aortic insufficiency is present.     Tricuspid Valve  The tricuspid valve is normal. Trace tricuspid insufficiency is present. The  peak velocity of the tricuspid regurgitant jet is not obtainable.     Pulmonic Valve  The pulmonic valve is normal. Mild pulmonic insufficiency is present.     Vessels  The inferior vena cava was normal in size with preserved respiratory  variability. Moderate dilatation of the aorta is present. Ascending aorta 4.6  cm.     Pericardium  No pericardial effusion is present.     Compared to Previous Study  There is no prior study for direct comparison.     Attestation  I have personally viewed the imaging and agree with the interpretation and  report as documented by the fellow, Daniel John, and/or edited by  me.  ______________________________________________________________________________  MMode/2D Measurements & Calculations  IVSd: 1.0 cm  LVIDd: 4.4 cm  LVIDs: 2.7 cm  LVPWd: 0.90 cm  FS: 37.8 %  LV mass(C)d: 140.6 grams  LV mass(C)dI: 68.7 grams/m2  Ao root diam: 3.6 cm  asc Aorta Diam: 4.7 cm  LVOT diam: 2.0 cm  LVOT area: 3.3 cm2  Ao root diam Index (cm/m2): 1.8  asc Aorta Diam Index (cm/m2): 2.3  LA Volume (BP): 59.3 ml     LA Volume Index (BP): 28.9 ml/m2  RWT: 0.41  TAPSE: 2.4 cm     Doppler Measurements & Calculations  MV E max rudolph: 59.9 cm/sec  MV A max rudolph: 61.2 cm/sec  MV E/A: 0.98  MV dec time: 0.24 sec  PA acc time: 0.11 sec  E/E' av.6  Lateral E/e': 6.8  Medial E/e': 8.3  RV S Rudolph: 12.4 cm/sec     ______________________________________________________________________________  Report approved by: Juarez GALLARDO 2023 11:22 AM               Component 4 mo ago   LVEF 60-65%          Coronary Angiogram 10/2022:  Physicians    Panel Physicians Referring Physician Case Authorizing Physician   Mir Farr MD (Primary) Self, Referred, Mir Montilla, MD Reyes Castro, Jorge, MD (Fellow - Assisting)       Procedures    Panel 1    Primary Surgeon: Mir Farr MD   Procedure: Coronary Angiogram    Fractional Flow Ratio Wire        Indications    Stable angina pectoris [I20.8 (ICD-10-CM)]   Aneurysm of ascending aorta without rupture (H24) [I71.21 (ICD-10-CM)]     Comments/Patient Narrative    77 y.o male with history of hypertension, hyperlipidemia, and significant family history of aortic aneurysms, including personal history. He also has a history of known CAD diagnosed on coronary CT with a significant CT FFR in his LAD territory. Previously he has been treated medically for his know CAD. Now he presents for CORS     Pre Procedure Diagnosis    stable known CAD    Post Procedure Diagnosis    stable CAD      Conclusion    Moderate non obstructive  CAD with hemodynamically non significant lesions in the mid LAD (dPR 0.93) and D1 (0.95). Otherwise mild non obstructive CAD elsewhere.      Plan     Follow bedrest per protocol   Continued medical management and lifestyle modifications for cardiovascular risk factor optimizations.   Follow up visit with Nurse Practitioner in 1-2 weeks.   Discharge today per protocol     Coronary Findings    Diagnostic  Dominance: Right  Left Main   The vessel is moderate in size and is angiographically normal.      Left Anterior Descending   The vessel is moderate in size.   Mid LAD-1 lesion is 60% stenosed. Pressure wire/iFR used.   Mid LAD-2 lesion is 50% stenosed. Measured dPr. Pre adenosine administration dPr: 0.93.      First Diagonal Branch   The vessel is moderate in size.   1st Diag lesion is 50% stenosed. Measured dPr. Pre adenosine administration dPr: 0.95. iFR 0.95      Second Diagonal Branch   The vessel is small and is angiographically normal.      Ramus Intermedius   The vessel is small.   Ramus lesion is 30% stenosed.      Left Circumflex      First Obtuse Marginal Branch   The vessel is large.      Right Coronary Artery   The vessel is moderate in size.   Prox RCA lesion is 30% stenosed.   Dist RCA lesion is 35% stenosed.      Right Posterior Descending Artery   The vessel is small and is angiographically normal.      Right Posterior Atrioventricular Artery   The vessel is small and is angiographically normal.      First Right Posterolateral Branch   The vessel is small and is angiographically normal.         Intervention     No interventions have been documented.     FFR/IFR/Peak/Mean Gradient      Event Details User   3:41 PM 10/20/22 *DPR/IFR Measurement Complete DPR measurement 9.3 MID LAD completed. LW   3:43 PM 10/20/22 *DPR/IFR Measurement Complete DPR measurement 9.5 D1 completed. TERRELL Mendoza, JAIDA, CNP  [facility]  Patient discussed with staff cardiologist, Dr. Paul, who agrees with the  above documentation and plan. Documentation represents joint decision making.   Time Spent on this Encounter   SARAH MUÑOZ APRN CNP, personally saw the patient today and spent greater than 30 minutes discharging this patient.

## 2024-01-12 NOTE — MEDICATION SCRIBE - ADMISSION MEDICATION HISTORY
Medication Scribe Admission Medication History    Admission medication history is complete. The information provided in this note is only as accurate as the sources available at the time of the update.    Information Source(s): Patient and CareEverywhere/SureScripts via in-person    Pertinent Information: None    Changes made to PTA medication list:  Added: None  Deleted: Probiotic, naproxen 220 mg, Celecoxib 200 mg, Calcium w/ vitamin D  Changed: None    Medication Affordability:       Allergies reviewed with patient and updates made in EHR: yes    Medication History Completed By: Jackie Oreilly 1/11/2024 7:22 PM    Prior to Admission medications    Medication Sig Last Dose Taking? Auth Provider Long Term End Date   aspirin 81 MG EC tablet Take 81 mg by mouth every other day Stopped 1/2/24 before surgery Past Month at unknown Yes Reported, Patient     atorvastatin (LIPITOR) 40 MG tablet Take 0.5 tablets (20 mg) by mouth daily 1/10/2024 at pm Yes Angel Hernandez MD Yes    Cyanocobalamin 5000 MCG SUBL Place 1 tablet under the tongue every other day 1/10/2024 at unknown Yes Reported, Patient     ketoconazole (NIZORAL) 2 % external cream Apply topically as needed Past Month at unkown Yes Angel Hernandez MD     levothyroxine (SYNTHROID/LEVOTHROID) 75 MCG tablet TAKE 1 TABLET BY MOUTH DAILY 1/11/2024 at am Yes López Shin MD Yes    losartan (COZAAR) 50 MG tablet Take 50 mg by mouth daily 1/10/2024 at pm Yes Mir Farr MD Yes    nitroGLYcerin (NITROSTAT) 0.4 MG sublingual tablet For chest pain place 1 tablet under the tongue every 5 minutes for 3 doses. If symptoms persist 5 minutes after 1st dose call 911.  Yes Angel Hernandez MD Yes    pilocarpine (PILOCAR) 2 % ophthalmic solution Apply 1 drop to eye 2 times daily as needed 1/10/2024 at unknown Yes Reported, Patient Yes

## 2024-01-12 NOTE — PROGRESS NOTES
D: 1/11 Chest pain with new RBBB    I: Monitored vitals and assessed pt status.   Changed: Afebrile VSS. NSR on RA. No c/o chest pain or nausea. Independent to bathroom. Heparin drip running. NPO at [facility] for possible angiogram. No acute evens.   Running: Heparin 1050 units/hr   PRN: none    A: Neuro: A&O*4  Tele: NSR  Lung: RA  GI/: Independent to bathroom  Skin: Intact  Pain: Denies  Independent      Temp:  [96.9  F (36.1  C)-97.8  F (36.6  C)] 97.8  F (36.6  C)  Pulse:  [59-67] 67  Resp:  [18] 18  BP: (116-162)/(69-98) 162/98  Cuff Mean (mmHg):  [97] 97  SpO2:  [96 %-99 %] 99 %      P: Continue to monitor Pt status and report changes to treatment team.

## 2024-01-12 NOTE — UTILIZATION REVIEW
"  Admission Status; Secondary Review Determination         Under the authority of the Utilization Management Committee, the utilization review process indicated a secondary review on the above patient.  The review outcome is based on review of the medical records, discussions with staff, and applying clinical experience noted on the date of the review.          (x) Observation Status Appropriate - This patient does not meet hospital inpatient criteria and is placed in observation status. If this patient's primary payer is Medicare and was admitted as an inpatient, Condition Code 44 should be used and patient status changed to \"observation\".     RATIONALE FOR DETERMINATION     78 year old male with pmh of hypothyroidism, htn, history of nonobstructive CAD who was referred to the ED from preop appt with pcp for a hip surgery due to possible cardiac chest pain. His EKG in clinic revealed new RBBB and had a minimally elevated troponin from 29 then 26. He was started on a heparin drip. Today, the plan was potentially an angiogram but he remained CP free and will be discharging with plan for outpatient stress testing. Appropriate for observation status in this day 1 discharge, discussed with Ms. Reji    The severity of illness, intensity of service provided, expected LOS and risk for adverse outcome make the care appropriate for further observation; however, doesn't meet criteria for hospital inpatient admission. Dr  notified of this determination.    This document was produced using voice recognition software.      The information on this document is developed by the utilization review team in order for the business office to ensure compliance.  This only denotes the appropriateness of proper admission status and does not reflect the quality of care rendered.         The definitions of Inpatient Status and Observation Status used in making the determination above are those provided in the CMS Coverage Manual, Chapter " 1 and Chapter 6, section 70.4.      Sincerely,     Nguyễn Moreno DO   Physician Advisor  Utilization Management  [facility].

## 2024-01-15 ENCOUNTER — HOSPITAL ENCOUNTER (OUTPATIENT)
Dept: CARDIOLOGY | Facility: CLINIC | Age: 79
Discharge: HOME | End: 2024-01-15
Attending: INTERNAL MEDICINE
Payer: COMMERCIAL

## 2024-01-15 ENCOUNTER — HOSPITAL ENCOUNTER (OUTPATIENT)
Dept: NUCLEAR MEDICINE | Facility: CLINIC | Age: 79
Setting detail: NUCLEAR MEDICINE
Discharge: HOME | End: 2024-01-15
Attending: INTERNAL MEDICINE
Payer: COMMERCIAL

## 2024-01-15 ENCOUNTER — HOSPITAL ENCOUNTER (OUTPATIENT)
Dept: CARDIOLOGY | Facility: CLINIC | Age: 79
Discharge: HOME | End: 2024-01-15
Attending: CASE MANAGER/CARE COORDINATOR
Payer: COMMERCIAL

## 2024-01-15 VITALS — HEART RATE: 57 BPM | SYSTOLIC BLOOD PRESSURE: 117 MMHG | DIASTOLIC BLOOD PRESSURE: 64 MMHG

## 2024-01-15 DIAGNOSIS — R07.9 CHEST PAIN: ICD-10-CM

## 2024-01-15 DIAGNOSIS — R07.9 CHEST PAIN, UNSPECIFIED TYPE: ICD-10-CM

## 2024-01-15 LAB — LVEF ECHO: NORMAL

## 2024-01-15 PROCEDURE — 250N000011 HC RX IP 250 OP 636: Performed by: INTERNAL MEDICINE

## 2024-01-15 PROCEDURE — 93325 DOPPLER ECHO COLOR FLOW MAPG: CPT

## 2024-01-15 PROCEDURE — 78452 HT MUSCLE IMAGE SPECT MULT: CPT | Mod: 26 | Performed by: RADIOLOGY

## 2024-01-15 PROCEDURE — 93308 TTE F-UP OR LMTD: CPT | Mod: 26 | Performed by: STUDENT IN AN ORGANIZED HEALTH CARE EDUCATION/TRAINING PROGRAM

## 2024-01-15 PROCEDURE — 93016 CV STRESS TEST SUPVJ ONLY: CPT | Performed by: INTERNAL MEDICINE

## 2024-01-15 PROCEDURE — 93018 CV STRESS TEST I&R ONLY: CPT | Performed by: INTERNAL MEDICINE

## 2024-01-15 PROCEDURE — 93321 DOPPLER ECHO F-UP/LMTD STD: CPT | Mod: 26 | Performed by: STUDENT IN AN ORGANIZED HEALTH CARE EDUCATION/TRAINING PROGRAM

## 2024-01-15 PROCEDURE — 343N000001 HC RX 343: Performed by: INTERNAL MEDICINE

## 2024-01-15 PROCEDURE — 93325 DOPPLER ECHO COLOR FLOW MAPG: CPT | Mod: 26 | Performed by: STUDENT IN AN ORGANIZED HEALTH CARE EDUCATION/TRAINING PROGRAM

## 2024-01-15 PROCEDURE — 78452 HT MUSCLE IMAGE SPECT MULT: CPT

## 2024-01-15 PROCEDURE — A9502 TC99M TETROFOSMIN: HCPCS | Performed by: INTERNAL MEDICINE

## 2024-01-15 PROCEDURE — 93017 CV STRESS TEST TRACING ONLY: CPT

## 2024-01-15 RX ORDER — ACYCLOVIR 200 MG/1
0-1 CAPSULE ORAL
Status: DISCONTINUED | OUTPATIENT
Start: 2024-01-15 | End: 2024-01-16 | Stop reason: HOSPADM

## 2024-01-15 RX ORDER — REGADENOSON 0.08 MG/ML
0.4 INJECTION, SOLUTION INTRAVENOUS ONCE
Status: COMPLETED | OUTPATIENT
Start: 2024-01-15 | End: 2024-01-15

## 2024-01-15 RX ORDER — AMINOPHYLLINE 25 MG/ML
50-100 INJECTION, SOLUTION INTRAVENOUS
Status: DISCONTINUED | OUTPATIENT
Start: 2024-01-15 | End: 2024-01-16 | Stop reason: HOSPADM

## 2024-01-15 RX ORDER — ALBUTEROL SULFATE 90 UG/1
2 AEROSOL, METERED RESPIRATORY (INHALATION) EVERY 5 MIN PRN
Status: DISCONTINUED | OUTPATIENT
Start: 2024-01-15 | End: 2024-01-16 | Stop reason: HOSPADM

## 2024-01-15 RX ORDER — CAFFEINE CITRATE 20 MG/ML
60 SOLUTION INTRAVENOUS
Status: DISCONTINUED | OUTPATIENT
Start: 2024-01-15 | End: 2024-01-16 | Stop reason: HOSPADM

## 2024-01-15 RX ADMIN — TETROFOSMIN 8.4 MILLICURIE: 1.38 INJECTION, POWDER, LYOPHILIZED, FOR SOLUTION INTRAVENOUS at 11:44

## 2024-01-15 RX ADMIN — TETROFOSMIN 31.9 MILLICURIE: 1.38 INJECTION, POWDER, LYOPHILIZED, FOR SOLUTION INTRAVENOUS at 12:40

## 2024-01-15 RX ADMIN — REGADENOSON 0.4 MG: 0.08 INJECTION, SOLUTION INTRAVENOUS at 12:37

## 2024-01-16 ENCOUNTER — TELEPHONE (OUTPATIENT)
Dept: CARDIOLOGY | Facility: CLINIC | Age: 79
End: 2024-01-16

## 2024-01-16 ENCOUNTER — TELEPHONE (OUTPATIENT)
Dept: INTERNAL MEDICINE | Facility: CLINIC | Age: 79
End: 2024-01-16

## 2024-01-16 ENCOUNTER — OFFICE VISIT (OUTPATIENT)
Dept: INTERNAL MEDICINE | Facility: CLINIC | Age: 79
End: 2024-01-16
Payer: COMMERCIAL

## 2024-01-16 VITALS
RESPIRATION RATE: 12 BRPM | HEIGHT: 70 IN | SYSTOLIC BLOOD PRESSURE: 116 MMHG | BODY MASS INDEX: 27.06 KG/M2 | OXYGEN SATURATION: 98 % | DIASTOLIC BLOOD PRESSURE: 62 MMHG | HEART RATE: 67 BPM | WEIGHT: 189 LBS | TEMPERATURE: 98.1 F

## 2024-01-16 DIAGNOSIS — M16.10 HIP ARTHRITIS: ICD-10-CM

## 2024-01-16 DIAGNOSIS — R07.9 CHEST PAIN, UNSPECIFIED TYPE: ICD-10-CM

## 2024-01-16 DIAGNOSIS — R49.0 HOARSENESS: ICD-10-CM

## 2024-01-16 DIAGNOSIS — R42 DIZZINESS: ICD-10-CM

## 2024-01-16 DIAGNOSIS — Z01.818 PREOPERATIVE EXAMINATION: Primary | ICD-10-CM

## 2024-01-16 PROCEDURE — 93005 ELECTROCARDIOGRAM TRACING: CPT | Performed by: INTERNAL MEDICINE

## 2024-01-16 PROCEDURE — 93010 ELECTROCARDIOGRAM REPORT: CPT | Performed by: INTERNAL MEDICINE

## 2024-01-16 PROCEDURE — 99214 OFFICE O/P EST MOD 30 MIN: CPT | Mod: 25 | Performed by: INTERNAL MEDICINE

## 2024-01-16 PROCEDURE — 87635 SARS-COV-2 COVID-19 AMP PRB: CPT | Performed by: INTERNAL MEDICINE

## 2024-01-16 RX ORDER — RESPIRATORY SYNCYTIAL VIRUS VACCINE 120MCG/0.5
0.5 KIT INTRAMUSCULAR ONCE
Qty: 1 EACH | Refills: 0 | Status: CANCELLED | OUTPATIENT
Start: 2024-01-16 | End: 2024-01-16

## 2024-01-16 NOTE — TELEPHONE ENCOUNTER
Writer attempted to reach Cardiology and was transferred and disconnected several times.  Writer also attempted to dial direct number provider by [facility] however the call could not be completed.  Routing to [facility] Cardiology pool.

## 2024-01-16 NOTE — TELEPHONE ENCOUNTER
[facility] Call Center    Phone Message    May a detailed message be left on voicemail: yes     Reason for Call: Other: Please call pt asap with lexiscan results.  He has surgery scheduled for tomorrow.     Action Taken: Other: cardio    Travel Screening: Not Applicable    Thank you!  [facility]

## 2024-01-16 NOTE — TELEPHONE ENCOUNTER
----- Message from Angel Hernandez MD sent at 1/16/2024 12:05 PM CST -----  Please contact cardiology at the  and have them read his stress test that was done yesterday.  Still not read and he is supposed to be having surgery tomorrow.  He will be coming in for a preop with me later this afternoon.  Thank you.

## 2024-01-16 NOTE — PROGRESS NOTES
[facility]  [address]  [place] [address]  Phone: [phone]  Fax: [phone]  Primary Provider: Colby Hernandez  Pre-op Performing Provider: COLBY HERNANDEZ      PREOPERATIVE EVALUATION:  Today's date: 1/16/2024    Binu is a 78 year old, presenting for the following:  Pre-Op Exam (Right TKA with Dr. Benavides on 1/17/24 at [facility] ) and Dizziness (Pt notes some mild dizziness since the lexiscan (discuss/review report) )        1/16/2024     4:07 PM   Additional Questions   Roomed by BRYAN Aldrich   Accompanied by Self       Surgical Information:  Surgery/Procedure: Right Hip Arthroplasty   Surgery Location: [facility]   Surgeon: Dr. Benavides  Surgery Date: 1/17/24  Time of Surgery: 12:30 pm  Where patient plans to recover: At home with family  Fax number for surgical facility: Note does not need to be faxed, will be available electronically in Epic.    1. Preoperative examination  I do not feel comfortable sending this gentleman to surgery tomorrow.  He does not feel that good.   he just recently had a Brilinta load and we still do not have a stress test result.  We are going to cancel the surgery today and he will follow-up with cardiology and myself within the next couple weeks and we can reschedule the surgery  - EKG 12-lead, tracing only    2. Hip arthritis  As above.    3. Chest pain, unspecified type  As above    4. Dizziness  As above.  - EKG 12-lead, tracing only    5. Hoarseness  Will make sure he does not have COVID today    Subjective       HPI related to upcoming procedure: Binu comes in today for preop.  He is supposed to have surgery for on his hip tomorrow.  Unfortunately, he was admitted to the  last week because he came in for preop then and had chest pain.  He had mildly elevated troponin.  Has known nonocclusive disease.  Angio could not be arranged so they did an echo loaded him with Brilinta and sent him home  for stress test yesterday.  Unfortunately those stress test results still not done yet.  He is not feeling very good though.  He has been dizzy since having a stress test yesterday.  He feels unwell and hoarse today.        1/16/2024    10:22 AM   Preop Questions   1. Have you ever had a heart attack or stroke? No   2. Have you ever had surgery on your heart or blood vessels, such as a stent placement, a coronary artery bypass, or surgery on an artery in your head, neck, heart, or legs? No   3. Do you have chest pain with activity? UNKNOWN    4. Do you have a history of  heart failure? No   5. Do you currently have a cold, bronchitis or symptoms of other infection? No   6. Do you have a cough, shortness of breath, or wheezing? No   7. Do you or anyone in your family have previous history of blood clots? No   8. Do you or does anyone in your family have a serious bleeding problem such as prolonged bleeding following surgeries or cuts? No   9. Have you ever had problems with anemia or been told to take iron pills? No   10. Have you had any abnormal blood loss such as black, tarry or bloody stools, or abnormal vaginal bleeding? No   10. Have you had any abnormal blood loss such as black, tarry or bloody stools? No   11. Have you ever had a blood transfusion? No   12. Are you willing to have a blood transfusion if it is medically needed before, during, or after your surgery? Yes   13. Have you or any of your relatives ever had problems with anesthesia? No   14. Do you have sleep apnea, excessive snoring or daytime drowsiness? No   15. Do you have any artifical heart valves or other implanted medical devices like a pacemaker, defibrillator, or continuous glucose monitor? No   16. Do you have artificial joints? No   17. Are you allergic to latex? No       Health Care Directive:  Patient does not have a Health Care Directive or Living Will: Patient states has Advance Directive and will bring in a copy to  clinic.    Preoperative Review of :   reviewed - controlled substances reflected in medication list.          Review of Systems  Constitutional, neuro, ENT, endocrine, pulmonary, cardiac, gastrointestinal, genitourinary, musculoskeletal, integument and psychiatric systems are negative, except as otherwise noted.    Patient Active Problem List    Diagnosis Date Noted    Primary hypertension 01/12/2024     Priority: Medium    Chest pain 01/11/2024     Priority: Medium    NSTEMI (non-ST elevated myocardial infarction) (H) 01/11/2024     Priority: Medium    Chest pain, unspecified type 01/11/2024     Priority: Medium    Blurred vision 08/14/2023     Priority: Medium    Diverticulitis of colon 05/31/2023     Priority: Medium    Dysthymia 05/17/2023     Priority: Medium    Osteoporosis without current pathological fracture, unspecified osteoporosis type 01/22/2023     Priority: Medium    Status post coronary angiogram 10/20/2022     Priority: Medium    SVT (supraventricular tachycardia) 03/08/2022     Priority: Medium    Peripheral neuropathy 12/08/2021     Priority: Medium    Onychomycosis 10/04/2021     Priority: Medium    Diverticular disease of colon 09/08/2021     Priority: Medium     Formatting of this note might be different from the original.  Created by Conversion    Replacement Utility updated for latest IMO load      Autoimmune thyroiditis 05/03/2021     Priority: Medium     Formatting of this note might be different from the original.  Formatting of this note might be different from the original.  Created by Conversion      Sarcoidosis 04/07/2020     Priority: Medium     Created by Conversion      Vitamin D deficiency 04/07/2020     Priority: Medium     Created by Conversion    Replacement Utility updated for latest IMO load      Personal history of malignant neoplasm of prostate 06/14/2018     Priority: Medium    Erectile dysfunction following radical prostatectomy 06/07/2018     Priority: Medium     Right bundle branch block 02/08/2018     Priority: Medium      Past Medical History:   Diagnosis Date    Abdominal pain, unspecified site     Abnormal thyroid ultrasound     Adjustment disorder with mixed anxiety and depressed mood     Arthritis     Autoimmune thyroiditis     BBB (bundle branch block)     right    Chronic lymphocytic thyroiditis     Coronary artery disease     Diverticulosis of colon (without mention of hemorrhage)     Hypertension     Localized superficial swelling, mass, or lump     Low back pain 11/2023    Malignant neoplasm of prostate (H) 2018    Nontoxic multinodular goiter     NSTEMI (non-ST elevated myocardial infarction) (H) 1/11/2024    Osteoporosis     Other and unspecified hyperlipidemia     Other nonspecific abnormal serum enzyme levels     Pain in joint, shoulder region     Peripheral neuropathy     Persistent hoarseness     Sarcoidosis     Seen on chest xray at age 28    Scleritis, unspecified     Status post coronary angiogram 10/20/2022    SVT (supraventricular tachycardia)     Unspecified hypothyroidism     Unspecified vitamin D deficiency      Past Surgical History:   Procedure Laterality Date    CAPSULOTOMY Bilateral     CATARACT EXTRACTION Bilateral     CV CORONARY ANGIOGRAM N/A 10/20/2022    Procedure: Coronary Angiogram;  Surgeon: Mir Farr MD;  Location:  [facility]    CV FRACTIONAL FLOW RATIO WIRE N/A 10/20/2022    Procedure: Fractional Flow Ratio Wire;  Surgeon: Mir Farr MD;  [facility]    EYE SURGERY Bilateral     Laser eye surgery    HC SHLDR ARTHROSCOP,PART ACROMIOPLAS Right     Description: Shoulder Arthroscopy, Space Decompression And Acromioplasty;  Recorded: 02/18/2014;    MA LAP,PROSTATECTOMY,RADICAL,W/NERVE SPARE,INCL ROBOTIC Bilateral 02/27/2018    Procedure: ROBOTIC ASSISTED RADICAL RETROPUBIC PROSTATECTOMY, BILATERAL PELVIC LYMPH NODE DISSECTION LYSIS OF ADHESIONS;  Surgeon: Wale  "PATRICIA Rodriguez MD;  Location: [facility];  Service: Urology    NY RADIAL KERATOTOMY Bilateral     Description: Cornea Radial Keratotomy;  Recorded: 02/18/2014;     Current Outpatient Medications   Medication Sig Dispense Refill    atorvastatin (LIPITOR) 40 MG tablet Take 0.5 tablets (20 mg) by mouth daily 90 tablet 3    Cyanocobalamin 5000 MCG SUBL Place 1 tablet under the tongue every other day      isosorbide mononitrate (IMDUR) 30 MG 24 hr tablet Take 1 tablet (30 mg) by mouth daily 90 tablet 3    levothyroxine (SYNTHROID/LEVOTHROID) 75 MCG tablet TAKE 1 TABLET BY MOUTH DAILY 100 tablet 0    losartan (COZAAR) 50 MG tablet Take 50 mg by mouth daily      nitroGLYcerin (NITROSTAT) 0.4 MG sublingual tablet For chest pain place 1 tablet under the tongue every 5 minutes for 3 doses. If symptoms persist 5 minutes after 1st dose call 911. 25 tablet 3    pilocarpine (PILOCAR) 2 % ophthalmic solution Apply 1 drop to eye 2 times daily as needed      aspirin 81 MG EC tablet Take 81 mg by mouth every other day Stopped 1/2/24 before surgery (Patient not taking: Reported on 1/16/2024)         Allergies   Allergen Reactions    Ciprofloxacin Other (See Comments)     Pt states he was told not to take because of \"another medicine he is on\"    Levaquin [Levofloxacin] Other (See Comments)     Pt states he was told not to take because of \"another medicine he is on\"          Social History     Tobacco Use    Smoking status: Never    Smokeless tobacco: Never   Substance Use Topics    Alcohol use: Not Currently     Comment: Alcoholic Drinks/day: rarely     Family History   Problem Relation Age of Onset    No Known Problems Mother     Coronary Artery Disease Father     Aortic aneurysm Father     Prostate Cancer Maternal Grandfather     Prostate Cancer Maternal Uncle      History   Drug Use No         Objective     /62 (BP Location: Left arm, Patient Position: Sitting, Cuff Size: Adult Regular)   Pulse 67   Temp 98.1  F (36.7 " " C) (Tympanic)   Resp 12   Ht 1.778 m (5' 10\")   Wt 85.7 kg (189 lb)   LMP  (LMP Unknown)   SpO2 98%   BMI 27.12 kg/m      Physical Exam  Pleasant gentleman who is somewhat dysthymic today.  Looks little pale.  Heart is regular.  Vitals noted.  Further exam deferred.    Recent Labs   Lab Test 01/11/24  2226 01/11/24  1550 11/28/23  1203 01/24/23  1537 10/20/22  1233   HGB 15.5 15.6 16.0   < > 15.1    231 212   < > 244   INR  --   --   --   --  0.98   NA  --  139 138   < > 141   POTASSIUM  --  4.7 5.1   < > 4.2   CR  --  1.06 1.02   < > 1.01   A1C  --  5.7*  --   --   --     < > = values in this interval not displayed.        Diagnostics:         Revised Cardiac Risk Index (RCRI):  The patient has the following serious cardiovascular risks for perioperative complications:       RCRI Interpretation:          Signed Electronically by: COLBY BROCK MD  Copy of this evaluation report is provided to requesting physician.      "

## 2024-01-17 LAB
ATRIAL RATE - MUSE: 56 BPM
DIASTOLIC BLOOD PRESSURE - MUSE: NORMAL MMHG
INTERPRETATION ECG - MUSE: NORMAL
P AXIS - MUSE: 21 DEGREES
PR INTERVAL - MUSE: 202 MS
QRS DURATION - MUSE: 94 MS
QT - MUSE: 432 MS
QTC - MUSE: 416 MS
R AXIS - MUSE: 3 DEGREES
SYSTOLIC BLOOD PRESSURE - MUSE: NORMAL MMHG
T AXIS - MUSE: 0 DEGREES
VENTRICULAR RATE- MUSE: 56 BPM

## 2024-01-17 NOTE — CONFIDENTIAL NOTE
S-(situation): patient has postponed his hip surgery twice. He had gone in for his pre op physical and his PCP ended up sending him for a cardiac stress test b/c of his EKG. Initially were going to do a DSE but patient has an AAA and the test was changed to a NM stress.  He is asking for results of the stress test and a visit with cardiology for pre op clearance.  NM stress test results incomplete.    B-(background): Patient was at a pre-operative physical in preparation for a hip replacement when he developed chest pain at a 6/7 pain level. He had something similar last year and was kept overnight in the ER and had testing which did not show anything. He states he gets twinges of this pain but it is not that bad.  Pain is at a 0 at presentation. He states his voice is crackling more than normal. He also notes some tinnitus with the chest pain. He denies jaw pain or arm pain. He states chest pain started 3 hours ago and is now completely resolved. He states he does not feel that physical activity worsens the pain. He  did not take nitroglycerin at home. He felt lightheaded, sweaty, and 'crumby' when he was at the gym prior to his appointment. He also noted some SOB.    A-(assessment): chest pain    R-(recommendations): arrange cardiology pre op clearance. Ask cardiology to finish NM stress report and release to patient.

## 2024-01-18 LAB — SARS-COV-2 RNA RESP QL NAA+PROBE: NEGATIVE

## 2024-01-22 NOTE — CONFIDENTIAL NOTE
Called patient with NM stress results and let him know that Dr. Farr had reviewed it and the test was negative for ischemia.

## 2024-01-24 ENCOUNTER — OFFICE VISIT (OUTPATIENT)
Dept: CARDIOLOGY | Facility: CLINIC | Age: 79
End: 2024-01-24
Attending: INTERNAL MEDICINE
Payer: COMMERCIAL

## 2024-01-24 VITALS
SYSTOLIC BLOOD PRESSURE: 128 MMHG | WEIGHT: 189.9 LBS | BODY MASS INDEX: 27.25 KG/M2 | OXYGEN SATURATION: 95 % | DIASTOLIC BLOOD PRESSURE: 76 MMHG | HEART RATE: 63 BPM

## 2024-01-24 DIAGNOSIS — I71.21 ANEURYSM OF ASCENDING AORTA WITHOUT RUPTURE (H): ICD-10-CM

## 2024-01-24 DIAGNOSIS — Z01.810 PRE-OPERATIVE CARDIOVASCULAR EXAMINATION: ICD-10-CM

## 2024-01-24 DIAGNOSIS — R42 DIZZY: Primary | ICD-10-CM

## 2024-01-24 DIAGNOSIS — R07.9 CHEST PAIN, UNSPECIFIED TYPE: ICD-10-CM

## 2024-01-24 DIAGNOSIS — E78.5 HYPERLIPIDEMIA LDL GOAL <100: ICD-10-CM

## 2024-01-24 DIAGNOSIS — M79.10 MYALGIA: ICD-10-CM

## 2024-01-24 LAB
CV STRESS MAX HR HE: 81
RATE PRESSURE PRODUCT: 9234
STRESS ECHO BASELINE DIASTOLIC HE: 64
STRESS ECHO BASELINE HR: 54 BPM
STRESS ECHO BASELINE SYSTOLIC BP: 117
STRESS ECHO CALCULATED PERCENT HR: 57 %
STRESS ECHO LAST STRESS DIASTOLIC BP: 64
STRESS ECHO LAST STRESS SYSTOLIC BP: 114
STRESS ECHO TARGET HR: 143

## 2024-01-24 PROCEDURE — 93005 ELECTROCARDIOGRAM TRACING: CPT

## 2024-01-24 PROCEDURE — G0463 HOSPITAL OUTPT CLINIC VISIT: HCPCS | Performed by: CASE MANAGER/CARE COORDINATOR

## 2024-01-24 PROCEDURE — 93010 ELECTROCARDIOGRAM REPORT: CPT | Performed by: INTERNAL MEDICINE

## 2024-01-24 PROCEDURE — 99214 OFFICE O/P EST MOD 30 MIN: CPT | Performed by: CASE MANAGER/CARE COORDINATOR

## 2024-01-24 RX ORDER — ASPIRIN 81 MG/1
81 TABLET ORAL DAILY
Status: ON HOLD | COMMUNITY
Start: 2024-01-24 | End: 2024-03-07

## 2024-01-24 RX ORDER — ROSUVASTATIN CALCIUM 10 MG/1
10 TABLET, COATED ORAL DAILY
Qty: 30 TABLET | Refills: 3 | Status: SHIPPED | OUTPATIENT
Start: 2024-01-24 | End: 2024-03-04

## 2024-01-24 ASSESSMENT — PAIN SCALES - GENERAL: PAINLEVEL: NO PAIN (0)

## 2024-01-24 NOTE — PROGRESS NOTES
[facility] [place] [facility] [place]      HPI:    Artis Galvin is a pleasant 78 year old adult with medical history pertinent for ascending aortic aneurysm, non-obstructive CAD, HTN, SVT/AVNRT, carotid stenosis, and HLD. He presents to cardiology clinic for hospital follow up and cardiac clearance for surgery.    While undergoing pre-operative workup for upcoming hip arthroplasty, Binu noted to his PCP that the same day he had developed dull chest pain that was substernal/left-sided and continued for hours. The pain was not associated with diaphoresis or SOB. He attempted to go to the gym and the dull chest pain continued, did not worsen with exertion. EKG in clinic demonstrated RBBB (pt has had on prior EKGs). Referred to ED where troponin was mildly elevated at 29 > 26. He was loaded with aspirin 325mg and Brilanta 180 mg in ED and started on a heparin gtt. Most recent coronary angiogram 10/22 showed moderate non-obstructive CAD, with 60% stenosis in mLAD, 50% in D1, 30% in ramus, and 30% in RCA. Option for coronary angiogram was discussed with patient; however, due to cath lab scheduling constraints he would have to wait for several days for an angiogram. As his chest pain had resolved, the option was discussed to discharge him to home with a stress test early next week in order to clear him for hip surgery. Patient and family were amenable to this option.     Today in clinic, he denies chest pain, palpitations, dizziness, syncope, or lower extremity edema. He feels more sluggish and fatigued, and just generally more out of shape in the past year. He knows that his activity is limited by hip and back pain and wonders if depression could also be contributing. He also notes mild calf aches for many months.       PAST MEDICAL HISTORY:  Past Medical History:   Diagnosis Date    Abdominal pain, unspecified site     Abnormal thyroid ultrasound     Adjustment disorder with mixed anxiety and depressed  mood     Arthritis     Autoimmune thyroiditis     BBB (bundle branch block)     right    Chronic lymphocytic thyroiditis     Coronary artery disease     Diverticulosis of colon (without mention of hemorrhage)     Hypertension     Localized superficial swelling, mass, or lump     Low back pain 11/2023    Malignant neoplasm of prostate (H) 2018    Nontoxic multinodular goiter     NSTEMI (non-ST elevated myocardial infarction) (H) 1/11/2024    Osteoporosis     Other and unspecified hyperlipidemia     Other nonspecific abnormal serum enzyme levels     Pain in joint, shoulder region     Peripheral neuropathy     Persistent hoarseness     Sarcoidosis     Seen on chest xray at age 28    Scleritis, unspecified     Status post coronary angiogram 10/20/2022    SVT (supraventricular tachycardia)     Unspecified hypothyroidism     Unspecified vitamin D deficiency        FAMILY HISTORY:  Family History   Problem Relation Age of Onset    No Known Problems Mother     Coronary Artery Disease Father     Aortic aneurysm Father     Prostate Cancer Maternal Grandfather     Prostate Cancer Maternal Uncle        SOCIAL HISTORY:  Social History     Socioeconomic History    Marital status: Patient Declined   Tobacco Use    Smoking status: Never    Smokeless tobacco: Never   Vaping Use    Vaping Use: Never used   Substance and Sexual Activity    Alcohol use: Not Currently     Comment: Alcoholic Drinks/day: rarely    Drug use: No   Social History Narrative    Single. . 4 grown daughters.    Currently unemployed.     Social Determinants of Health     Financial Resource Strain: Low Risk  (1/4/2024)    Financial Resource Strain     Within the past 12 months, have you or your family members you live with been unable to get utilities (heat, electricity) when it was really needed?: No   Food Insecurity: Low Risk  (1/4/2024)    Food Insecurity     Within the past 12 months, did you worry that your food would run out before you got money to  buy more?: No     Within the past 12 months, did the food you bought just not last and you didn t have money to get more?: No   Transportation Needs: Low Risk  (1/4/2024)    Transportation Needs     Within the past 12 months, has lack of transportation kept you from medical appointments, getting your medicines, non-medical meetings or appointments, work, or from getting things that you need?: No   Interpersonal Safety: Low Risk  (11/28/2023)    Interpersonal Safety     Do you feel physically and emotionally safe where you currently live?: Yes     Within the past 12 months, have you been hit, slapped, kicked or otherwise physically hurt by someone?: No     Within the past 12 months, have you been humiliated or emotionally abused in other ways by your partner or ex-partner?: No   Housing Stability: Low Risk  (1/4/2024)    Housing Stability     Do you have housing? : Yes     Are you worried about losing your housing?: No       CURRENT MEDICATIONS:  aspirin 81 MG EC tablet, Take 81 mg by mouth every other day Stopped 1/2/24 before surgery (Patient not taking: Reported on 1/16/2024)  atorvastatin (LIPITOR) 40 MG tablet, Take 0.5 tablets (20 mg) by mouth daily  Cyanocobalamin 5000 MCG SUBL, Place 1 tablet under the tongue every other day  isosorbide mononitrate (IMDUR) 30 MG 24 hr tablet, Take 1 tablet (30 mg) by mouth daily  levothyroxine (SYNTHROID/LEVOTHROID) 75 MCG tablet, TAKE 1 TABLET BY MOUTH DAILY  losartan (COZAAR) 50 MG tablet, Take 50 mg by mouth daily  nitroGLYcerin (NITROSTAT) 0.4 MG sublingual tablet, For chest pain place 1 tablet under the tongue every 5 minutes for 3 doses. If symptoms persist 5 minutes after 1st dose call 911.  pilocarpine (PILOCAR) 2 % ophthalmic solution, Apply 1 drop to eye 2 times daily as needed    No current facility-administered medications on file prior to visit.      ROS:   Refer to HPI    EXAM:  /76 (BP Location: Right arm, Patient Position: Chair, Cuff Size: Adult Large)    Pulse 63   Wt 86.1 kg (189 lb 14.4 oz)   SpO2 95%   BMI 27.25 kg/m    GENERAL: Appears comfortable, in no acute distress.   HEENT: Eye symmetrical, no discharge or icterus bilaterally. Mucous membranes moist and without lesions.  CV: RRR, +S1S2, no murmur, rub, or gallop.   RESPIRATORY: Respirations regular, even, and unlabored. Lungs CTA throughout.   GI: Soft and non distended with normoactive bowel sounds present in all quadrants. No tenderness, rebound, guarding.   EXTREMITIES: no peripheral edema. 2+ bilateral pedal pulses.   NEUROLOGIC: Alert and oriented x 3. No focal deficits.   MUSCULOSKELETAL: No joint swelling or tenderness.   SKIN: No jaundice. No rashes or lesions.     Labs, reviewed with patient in clinic today:  CBC RESULTS:  Lab Results   Component Value Date    WBC 7.1 01/11/2024    RBC 4.90 01/11/2024    HGB 15.5 01/11/2024    HCT 45.4 01/11/2024    MCV 93 01/11/2024    MCH 31.6 01/11/2024    MCHC 34.1 01/11/2024    RDW 12.9 01/11/2024     01/11/2024       CMP RESULTS:  Lab Results   Component Value Date     01/11/2024    POTASSIUM 4.7 01/11/2024    POTASSIUM 4.7 06/06/2022    CHLORIDE 103 01/11/2024    CHLORIDE 107 06/06/2022    CO2 25 01/11/2024    CO2 25 06/06/2022    ANIONGAP 11 01/11/2024    ANIONGAP 10 06/06/2022    GLC 83 01/11/2024    GLC 88 06/06/2022    BUN 20.1 01/11/2024    BUN 17 06/06/2022    CR 1.06 01/11/2024    GFRESTIMATED 72 01/11/2024    GFRESTIMATED 52 (L) 05/18/2023    GFRESTIMATED >60 04/21/2021    GFRESTIMATED 83 05/05/2020    GFRESTBLACK >60 04/21/2021    GFRESTBLACK >90 05/05/2020    DEANDRA 9.4 01/11/2024    BILITOTAL 0.5 01/11/2024    ALBUMIN 4.0 01/11/2024    ALBUMIN 3.4 (L) 05/24/2022    ALKPHOS 121 01/11/2024    ALT 19 01/11/2024    AST 23 01/11/2024        INR RESULTS:  Lab Results   Component Value Date    INR 0.98 10/20/2022       Lab Results   Component Value Date    MAG 2.1 11/04/2020     Lab Results   Component Value Date    NTBNPI 146 08/31/2023      Lab Results   Component Value Date    NTBNP 97 08/10/2023       LIPIDS:  Recent Labs   Lab Test 24  1836 23  1122   CHOL 134 119   HDL 36* 35*   LDL 67 56   TRIG 157* 141         EKG 24      Echocardiogram:  Recent Results (from the past 4320 hour(s))   Echocardiogram Limited   Result Value    LVEF  55-60%    [facility]    [id number]  [id number]  [id number]  [id number]^ANTHONY^SARAH^HUGO     [facility],[place]  Echocardiography Laboratory  [address] [place] [id number]     Name: LELA JIMENEZ  MRN: [id number]  : 1945  Study Date: 01/15/2024 10:15 AM  Age: 78 yrs  Gender: Male  Patient Location: [facility]  Reason For Study: Chest pain, unspecified type  Ordering Physician: SARAH CRUZ  Referring Physician: SARAH CRUZ  Performed By: Viridiana Elizabeth RDCS, RVT     BSA: 2.0 m2  Height: 70 in  Weight: 185 lb  ______________________________________________________________________________  Procedure  Limited Echocardiogram with portions of two-dimensional, color and spectral  Doppler performed.  ______________________________________________________________________________  Interpretation Summary  Left ventricular size, wall motion and function are normal. The ejection  fraction is 55-60%.  Mild concentric wall thickening consistent with left ventricular hypertrophy  is present.  Global right ventricular function is normal.  Mild aortic insufficiency is present.  Moderate dilatation of the aorta is present with the ascending aorta measuring  4.6 cm.  ______________________________________________________________________________  Left Ventricle  Left ventricular size, wall motion and function are normal. The ejection  fraction is 55-60%. Mild concentric wall thickening consistent with left  ventricular hypertrophy is present.     Right Ventricle  The right ventricle is normal size. Global right ventricular function is  normal.     Mitral Valve  Trace mitral  insufficiency is present.     Aortic Valve  Mild aortic insufficiency is present.     Tricuspid Valve  Mild tricuspid insufficiency is present. Pulmonary artery systolic pressure  cannot be assessed.     Pulmonic Valve  The valve leaflets are not well visualized.     Vessels  Moderate dilatation of the aorta is present with the ascending aorta measuring  4.6 cm. The inferior vena cava was normal in size with preserved respiratory  variability.     Pericardium  No pericardial effusion is present.     Compared to Previous Study  No significant changes noted.     ______________________________________________________________________________  MMode/2D Measurements & Calculations  IVSd: 1.2 cm  LVIDd: 4.3 cm  LVIDs: 2.6 cm  LVPWd: 1.0 cm     FS: 38.9 %  LV mass(C)d: 166.6 grams  LV mass(C)dI: 82.5 grams/m2  Ao root diam: 3.7 cm  asc Aorta Diam: 4.6 cm  Ao root diam index Ht(cm/m): 2.1  Ao root diam index BSA (cm/m2): 1.8  Asc Ao diam index BSA (cm/m2): 2.3  Asc Ao diam index Ht(cm/m): 2.6  RWT: 0.47     Doppler Measurements & Calculations  AI P1/2t: 574.2 msec     ______________________________________________________________________________  Report approved by: MD Moses Ascencio 01/15/2024 12:00 PM         Echocardiogram Complete   Result Value    LVEF  60-65%    Narrative    [id number]  [id number]  [id number]  [id number]^MARION^SHEN^OSCAR     [facility]  Echocardiography Laboratory  [address] [place] [id number]     Name: LELA JIMENEZ  MRN: [id number]  : 1945  Study Date: 2023 07:19 AM  Age: 78 yrs  Gender: Male  Patient Location: [facility]  Reason For Study: Chest Pain, Chest Pressure, Chest Tightness  Ordering Physician: SHEN SCHULTZ  Performed By: Rocio Caputo     BSA: 2.0 m2  Height: 70 in  Weight: 191 lb  HR: 59  BP: 137/80  mmHg  ______________________________________________________________________________  Procedure  Complete Portable Echo Adult.  ______________________________________________________________________________  Interpretation Summary  Global and regional left ventricular function is normal with an EF of 60-65%.  Right ventricular function, chamber size, wall motion, and thickness are  normal.  Moderate dilatation of the aorta is present with the ascending aorta dilated  to 4.6 cm.  No pericardial effusion is present.     There is no prior study for direct comparison.  ______________________________________________________________________________  Left Ventricle  Global and regional left ventricular function is normal with an EF of 60-65%.  Left ventricular size is normal. Left ventricular wall thickness is normal.  Thickening of the anterobasal septum is present. Left ventricular diastolic  function is normal.     Right Ventricle  Right ventricular function, chamber size, wall motion, and thickness are  normal.     Atria  Both atria appear normal.     Mitral Valve  Mild mitral annular calcification is present. Trace mitral insufficiency is  present.     Aortic Valve  The aortic valve is tricuspid. Mild aortic valve calcification is present.  Mild aortic insufficiency is present.     Tricuspid Valve  The tricuspid valve is normal. Trace tricuspid insufficiency is present. The  peak velocity of the tricuspid regurgitant jet is not obtainable.     Pulmonic Valve  The pulmonic valve is normal. Mild pulmonic insufficiency is present.     Vessels  The inferior vena cava was normal in size with preserved respiratory  variability. Moderate dilatation of the aorta is present. Ascending aorta 4.6  cm.     Pericardium  No pericardial effusion is present.     Compared to Previous Study  There is no prior study for direct comparison.     Attestation  I have personally viewed the imaging and agree with the interpretation and  report  as documented by the fellow, Daniel John, and/or edited by me.  ______________________________________________________________________________  MMode/2D Measurements & Calculations  IVSd: 1.0 cm  LVIDd: 4.4 cm  LVIDs: 2.7 cm  LVPWd: 0.90 cm  FS: 37.8 %  LV mass(C)d: 140.6 grams  LV mass(C)dI: 68.7 grams/m2  Ao root diam: 3.6 cm  asc Aorta Diam: 4.7 cm  LVOT diam: 2.0 cm  LVOT area: 3.3 cm2  Ao root diam Index (cm/m2): 1.8  asc Aorta Diam Index (cm/m2): 2.3  LA Volume (BP): 59.3 ml     LA Volume Index (BP): 28.9 ml/m2  RWT: 0.41  TAPSE: 2.4 cm     Doppler Measurements & Calculations  MV E max rudolph: 59.9 cm/sec  MV A max rudolph: 61.2 cm/sec  MV E/A: 0.98  MV dec time: 0.24 sec  PA acc time: 0.11 sec  E/E' av.6  Lateral E/e': 6.8  Medial E/e': 8.3  RV S Rudolph: 12.4 cm/sec     ______________________________________________________________________________  Report approved by: Juarez GALLARDO 2023 11:22 AM             Coronary Angiogram:    Assessment and Plan:     Kamar is a 78 year old adult with a PMH of ascending aortic aneurysm, non-obstructive CAD, HTN, SVT/AVNRT, carotid stenosis, and HLD.    # Myalgias  Unclear if related to statin use, pt notes that they have been chronic for a long time. Discussed option of switching statins, chart review indicates that Binu had been on rosuva in the past (he does not recall this). He is willing to switch for symptoms improvement.   - stop atorva, start rosuvastatin 10mg daily    # Non-obstructive coronary artery disease   # Stable angina  Dobutamine stress echo 1/15/24 negative for inducible myocardial ischemia or infarction.  - stop atorva, start rosuvastatin 10mg   - continue aspirin 81mg daily  - SL NTG PRN  - No contraindication to surgery from cardiology perspective     # HTN  Goal SBP <130, home BP typically 120s systolics  - cont PTA losartan 50mg daily  - Imdur 30mg daily    # Ascending aortic aneurysm   Measured at 4.6cm on TTE 2023.  - SBP goal <130  - cont PTA  losartan 50mg daily  - Imdur 30mg daily  - follow-up echo in 6 months     # HLD  Most recent LDL 67  - stop atorva, start rosuvastatin 10mg daily    # Carotid stenosis, mild  B/L carotid U/S 8/2023 measured mild plaque formation in bilateral arteries  - cont asa 81  - cont statin    Follow up:  RTC 1 year, 6 months for echo  Chart review time today: 10 minutes  Visit time today: 22 minutes  Total time spent today: 32 minutes        JAIDA CASTILLO CNP  General Cardiology   01/24/24

## 2024-01-24 NOTE — LETTER
1/24/2024      RE: Artis Galvin  [address]  [place] [address]       Dear Colleague,    Thank you for the opportunity to participate in the care of your patient, Artis Galvin, at the [facility] [place] at [facility]. Please see a copy of my visit note below.      [facility] - [facility] [place] Clinic Note      HPI:    Artis Galvin is a pleasant 78 year old adult with medical history pertinent for ascending aortic aneurysm, non-obstructive CAD, HTN, SVT/AVNRT, carotid stenosis, and HLD. He presents to cardiology clinic for hospital follow up and cardiac clearance for surgery.    While undergoing pre-operative workup for upcoming hip arthroplasty, Binu noted to his PCP that the same day he had developed dull chest pain that was substernal/left-sided and continued for hours. The pain was not associated with diaphoresis or SOB. He attempted to go to the gym and the dull chest pain continued, did not worsen with exertion. EKG in clinic demonstrated RBBB (pt has had on prior EKGs). Referred to ED where troponin was mildly elevated at 29 > 26. He was loaded with aspirin 325mg and Brilanta 180 mg in ED and started on a heparin gtt. Most recent coronary angiogram 10/22 showed moderate non-obstructive CAD, with 60% stenosis in mLAD, 50% in D1, 30% in ramus, and 30% in RCA. Option for coronary angiogram was discussed with patient; however, due to cath lab scheduling constraints he would have to wait for several days for an angiogram. As his chest pain had resolved, the option was discussed to discharge him to home with a stress test early next week in order to clear him for hip surgery. Patient and family were amenable to this option.     Today in clinic, he denies chest pain, palpitations, dizziness, syncope, or lower extremity edema. He feels more sluggish and fatigued, and just generally more out of shape in the past year. He  knows that his activity is limited by hip and back pain and wonders if depression could also be contributing. He also notes mild calf aches for many months.       PAST MEDICAL HISTORY:  Past Medical History:   Diagnosis Date     Abdominal pain, unspecified site      Abnormal thyroid ultrasound      Adjustment disorder with mixed anxiety and depressed mood      Arthritis      Autoimmune thyroiditis      BBB (bundle branch block)     right     Chronic lymphocytic thyroiditis      Coronary artery disease      Diverticulosis of colon (without mention of hemorrhage)      Hypertension      Localized superficial swelling, mass, or lump      Low back pain 11/2023     Malignant neoplasm of prostate (H) 2018     Nontoxic multinodular goiter      NSTEMI (non-ST elevated myocardial infarction) (H) 1/11/2024     Osteoporosis      Other and unspecified hyperlipidemia      Other nonspecific abnormal serum enzyme levels      Pain in joint, shoulder region      Peripheral neuropathy      Persistent hoarseness      Sarcoidosis     Seen on chest xray at age 28     Scleritis, unspecified      Status post coronary angiogram 10/20/2022     SVT (supraventricular tachycardia)      Unspecified hypothyroidism      Unspecified vitamin D deficiency        FAMILY HISTORY:  Family History   Problem Relation Age of Onset     No Known Problems Mother      Coronary Artery Disease Father      Aortic aneurysm Father      Prostate Cancer Maternal Grandfather      Prostate Cancer Maternal Uncle        SOCIAL HISTORY:  Social History     Socioeconomic History     Marital status: Patient Declined   Tobacco Use     Smoking status: Never     Smokeless tobacco: Never   Vaping Use     Vaping Use: Never used   Substance and Sexual Activity     Alcohol use: Not Currently     Comment: Alcoholic Drinks/day: rarely     Drug use: No   Social History Narrative    Single. . 4 grown daughters.    Currently unemployed.     Social Determinants of Health      Financial Resource Strain: Low Risk  (1/4/2024)    Financial Resource Strain      Within the past 12 months, have you or your family members you live with been unable to get utilities (heat, electricity) when it was really needed?: No   Food Insecurity: Low Risk  (1/4/2024)    Food Insecurity      Within the past 12 months, did you worry that your food would run out before you got money to buy more?: No      Within the past 12 months, did the food you bought just not last and you didn t have money to get more?: No   Transportation Needs: Low Risk  (1/4/2024)    Transportation Needs      Within the past 12 months, has lack of transportation kept you from medical appointments, getting your medicines, non-medical meetings or appointments, work, or from getting things that you need?: No   Interpersonal Safety: Low Risk  (11/28/2023)    Interpersonal Safety      Do you feel physically and emotionally safe where you currently live?: Yes      Within the past 12 months, have you been hit, slapped, kicked or otherwise physically hurt by someone?: No      Within the past 12 months, have you been humiliated or emotionally abused in other ways by your partner or ex-partner?: No   Housing Stability: Low Risk  (1/4/2024)    Housing Stability      Do you have housing? : Yes      Are you worried about losing your housing?: No       CURRENT MEDICATIONS:  aspirin 81 MG EC tablet, Take 81 mg by mouth every other day Stopped 1/2/24 before surgery (Patient not taking: Reported on 1/16/2024)  atorvastatin (LIPITOR) 40 MG tablet, Take 0.5 tablets (20 mg) by mouth daily  Cyanocobalamin 5000 MCG SUBL, Place 1 tablet under the tongue every other day  isosorbide mononitrate (IMDUR) 30 MG 24 hr tablet, Take 1 tablet (30 mg) by mouth daily  levothyroxine (SYNTHROID/LEVOTHROID) 75 MCG tablet, TAKE 1 TABLET BY MOUTH DAILY  losartan (COZAAR) 50 MG tablet, Take 50 mg by mouth daily  nitroGLYcerin (NITROSTAT) 0.4 MG sublingual tablet, For chest  pain place 1 tablet under the tongue every 5 minutes for 3 doses. If symptoms persist 5 minutes after 1st dose call 911.  pilocarpine (PILOCAR) 2 % ophthalmic solution, Apply 1 drop to eye 2 times daily as needed    No current facility-administered medications on file prior to visit.      ROS:   Refer to HPI    EXAM:  /76 (BP Location: Right arm, Patient Position: Chair, Cuff Size: Adult Large)   Pulse 63   Wt 86.1 kg (189 lb 14.4 oz)   SpO2 95%   BMI 27.25 kg/m    GENERAL: Appears comfortable, in no acute distress.   HEENT: Eye symmetrical, no discharge or icterus bilaterally. Mucous membranes moist and without lesions.  CV: RRR, +S1S2, no murmur, rub, or gallop.   RESPIRATORY: Respirations regular, even, and unlabored. Lungs CTA throughout.   GI: Soft and non distended with normoactive bowel sounds present in all quadrants. No tenderness, rebound, guarding.   EXTREMITIES: no peripheral edema. 2+ bilateral pedal pulses.   NEUROLOGIC: Alert and oriented x 3. No focal deficits.   MUSCULOSKELETAL: No joint swelling or tenderness.   SKIN: No jaundice. No rashes or lesions.     Labs, reviewed with patient in clinic today:  CBC RESULTS:  Lab Results   Component Value Date    WBC 7.1 01/11/2024    RBC 4.90 01/11/2024    HGB 15.5 01/11/2024    HCT 45.4 01/11/2024    MCV 93 01/11/2024    MCH 31.6 01/11/2024    MCHC 34.1 01/11/2024    RDW 12.9 01/11/2024     01/11/2024       CMP RESULTS:  Lab Results   Component Value Date     01/11/2024    POTASSIUM 4.7 01/11/2024    POTASSIUM 4.7 06/06/2022    CHLORIDE 103 01/11/2024    CHLORIDE 107 06/06/2022    CO2 25 01/11/2024    CO2 25 06/06/2022    ANIONGAP 11 01/11/2024    ANIONGAP 10 06/06/2022    GLC 83 01/11/2024    GLC 88 06/06/2022    BUN 20.1 01/11/2024    BUN 17 06/06/2022    CR 1.06 01/11/2024    GFRESTIMATED 72 01/11/2024    GFRESTIMATED 52 (L) 05/18/2023    GFRESTIMATED >60 04/21/2021    GFRESTIMATED 83 05/05/2020    GFRESTBLACK >60 04/21/2021     GFRESTBLACK >90 2020    DEANDRA 9.4 2024    BILITOTAL 0.5 2024    ALBUMIN 4.0 2024    ALBUMIN 3.4 (L) 2022    ALKPHOS 121 2024    ALT 19 2024    AST 23 2024        INR RESULTS:  Lab Results   Component Value Date    INR 0.98 10/20/2022       Lab Results   Component Value Date    MAG 2.1 2020     Lab Results   Component Value Date    NTBNPI 146 2023     Lab Results   Component Value Date    NTBNP 97 08/10/2023       LIPIDS:  Recent Labs   Lab Test 24  1836 23  1122   CHOL 134 119   HDL 36* 35*   LDL 67 56   TRIG 157* 141         EKG 24      Echocardiogram:  Recent Results (from the past 4320 hour(s))   Echocardiogram Limited   Result Value    LVEF  55-60%    Narrative    [id number]  [id number]  [id number]  [id number]^ANTHONY^SARAH^HUGO     [facility] [place]  Echocardiography Laboratory  [address] [place] [id number]     Name: LELA JIMENEZ  MRN: [id number]  : 1945  Study Date: 01/15/2024 10:15 AM  Age: 78 yrs  Gender: Male  Patient Location: [facility]  Reason For Study: Chest pain, unspecified type  Ordering Physician: SARAH CRUZ  Referring Physician: SARAH CRUZ  Performed By: Viridiana Elizabeth RDCS, MONIKAT     BSA: 2.0 m2  Height: 70 in  Weight: 185 lb  ______________________________________________________________________________  Procedure  Limited Echocardiogram with portions of two-dimensional, color and spectral  Doppler performed.  ______________________________________________________________________________  Interpretation Summary  Left ventricular size, wall motion and function are normal. The ejection  fraction is 55-60%.  Mild concentric wall thickening consistent with left ventricular hypertrophy  is present.  Global right ventricular function is normal.  Mild aortic insufficiency is present.  Moderate dilatation of the aorta is present with the ascending aorta measuring  4.6  cm.  ______________________________________________________________________________  Left Ventricle  Left ventricular size, wall motion and function are normal. The ejection  fraction is 55-60%. Mild concentric wall thickening consistent with left  ventricular hypertrophy is present.     Right Ventricle  The right ventricle is normal size. Global right ventricular function is  normal.     Mitral Valve  Trace mitral insufficiency is present.     Aortic Valve  Mild aortic insufficiency is present.     Tricuspid Valve  Mild tricuspid insufficiency is present. Pulmonary artery systolic pressure  cannot be assessed.     Pulmonic Valve  The valve leaflets are not well visualized.     Vessels  Moderate dilatation of the aorta is present with the ascending aorta measuring  4.6 cm. The inferior vena cava was normal in size with preserved respiratory  variability.     Pericardium  No pericardial effusion is present.     Compared to Previous Study  No significant changes noted.     ______________________________________________________________________________  MMode/2D Measurements & Calculations  IVSd: 1.2 cm  LVIDd: 4.3 cm  LVIDs: 2.6 cm  LVPWd: 1.0 cm     FS: 38.9 %  LV mass(C)d: 166.6 grams  LV mass(C)dI: 82.5 grams/m2  Ao root diam: 3.7 cm  asc Aorta Diam: 4.6 cm  Ao root diam index Ht(cm/m): 2.1  Ao root diam index BSA (cm/m2): 1.8  Asc Ao diam index BSA (cm/m2): 2.3  Asc Ao diam index Ht(cm/m): 2.6  RWT: 0.47     Doppler Measurements & Calculations  AI P1/2t: 574.2 msec     ______________________________________________________________________________  Report approved by: MD Moses Ascencio 01/15/2024 12:00 PM         Echocardiogram Complete   Result Value    LVEF  60-65%    Narrative    [id number]  [id number]  [id number]  [id number]^MARION^SHEN^OSCAR     [facility]  Echocardiography Laboratory  [address] [place] [id number]     Name: LELA JIMENEZ  MRN:  [id number]  : 1945  Study Date: 2023 07:19 AM  Age: 78 yrs  Gender: Male  Patient [facility]: [facility]  Reason For Study: Chest Pain, Chest Pressure, Chest Tightness  Ordering Physician: SHEN SCHULTZ  Performed By: Rocio Caputo     BSA: 2.0 m2  Height: 70 in  Weight: 191 lb  HR: 59  BP: 137/80 mmHg  ______________________________________________________________________________  Procedure  Complete Portable Echo Adult.  ______________________________________________________________________________  Interpretation Summary  Global and regional left ventricular function is normal with an EF of 60-65%.  Right ventricular function, chamber size, wall motion, and thickness are  normal.  Moderate dilatation of the aorta is present with the ascending aorta dilated  to 4.6 cm.  No pericardial effusion is present.     There is no prior study for direct comparison.  ______________________________________________________________________________  Left Ventricle  Global and regional left ventricular function is normal with an EF of 60-65%.  Left ventricular size is normal. Left ventricular wall thickness is normal.  Thickening of the anterobasal septum is present. Left ventricular diastolic  function is normal.     Right Ventricle  Right ventricular function, chamber size, wall motion, and thickness are  normal.     Atria  Both atria appear normal.     Mitral Valve  Mild mitral annular calcification is present. Trace mitral insufficiency is  present.     Aortic Valve  The aortic valve is tricuspid. Mild aortic valve calcification is present.  Mild aortic insufficiency is present.     Tricuspid Valve  The tricuspid valve is normal. Trace tricuspid insufficiency is present. The  peak velocity of the tricuspid regurgitant jet is not obtainable.     Pulmonic Valve  The pulmonic valve is normal. Mild pulmonic insufficiency is present.     Vessels  The inferior vena cava was normal in size with preserved  respiratory  variability. Moderate dilatation of the aorta is present. Ascending aorta 4.6  cm.     Pericardium  No pericardial effusion is present.     Compared to Previous Study  There is no prior study for direct comparison.     Attestation  I have personally viewed the imaging and agree with the interpretation and  report as documented by the fellow, Daniel John, and/or edited by me.  ______________________________________________________________________________  MMode/2D Measurements & Calculations  IVSd: 1.0 cm  LVIDd: 4.4 cm  LVIDs: 2.7 cm  LVPWd: 0.90 cm  FS: 37.8 %  LV mass(C)d: 140.6 grams  LV mass(C)dI: 68.7 grams/m2  Ao root diam: 3.6 cm  asc Aorta Diam: 4.7 cm  LVOT diam: 2.0 cm  LVOT area: 3.3 cm2  Ao root diam Index (cm/m2): 1.8  asc Aorta Diam Index (cm/m2): 2.3  LA Volume (BP): 59.3 ml     LA Volume Index (BP): 28.9 ml/m2  RWT: 0.41  TAPSE: 2.4 cm     Doppler Measurements & Calculations  MV E max rudolph: 59.9 cm/sec  MV A max rudolph: 61.2 cm/sec  MV E/A: 0.98  MV dec time: 0.24 sec  PA acc time: 0.11 sec  E/E' av.6  Lateral E/e': 6.8  Medial E/e': 8.3  RV S Rudolph: 12.4 cm/sec     ______________________________________________________________________________  Report approved by: Juarez GALLARDO 2023 11:22 AM             Coronary Angiogram:    Assessment and Plan:     Kamar is a 78 year old adult with a PMH of ascending aortic aneurysm, non-obstructive CAD, HTN, SVT/AVNRT, carotid stenosis, and HLD.    # Myalgias  Unclear if related to statin use, pt notes that they have been chronic for a long time. Discussed option of switching statins, chart review indicates that Binu had been on rosuva in the past (he does not recall this). He is willing to switch for symptoms improvement.   - stop atorva, start rosuvastatin 10mg daily    # Non-obstructive coronary artery disease   # Stable angina  Dobutamine stress echo 1/15/24 negative for inducible myocardial ischemia or infarction.  - stop atorva, start  rosuvastatin 10mg   - continue aspirin 81mg daily  - SL NTG PRN  - No contraindication to surgery from cardiology perspective     # HTN  Goal SBP <130, home BP typically 120s systolics  - cont PTA losartan 50mg daily  - Imdur 30mg daily    # Ascending aortic aneurysm   Measured at 4.6cm on TTE 9/2023.  - SBP goal <130  - cont PTA losartan 50mg daily  - Imdur 30mg daily  - follow-up echo in 6 months     # HLD  Most recent LDL 67  - stop atorva, start rosuvastatin 10mg daily    # Carotid stenosis, mild  B/L carotid U/S 8/2023 measured mild plaque formation in bilateral arteries  - cont asa 81  - cont statin    Follow up:  RTC 1 year, 6 months for echo  Chart review time today: 10 minutes  Visit time today: 22 minutes  Total time spent today: 32 minutes        JAIDA CASTILLO CNP  General Cardiology   01/24/24            Please do not hesitate to contact me if you have any questions/concerns.     Sincerely,     JAIDA CASTILLO CNP

## 2024-01-24 NOTE — PATIENT INSTRUCTIONS
You were seen today in the Cardiovascular Clinic at the [facility] by:       JAIDA VILLALBA CNP    Your visit summary and instructions are as follows:    Stop atorvastatin, start rosuvastatin 10mg daily      Return to cardiology clinic in 1 year     Thank you for your visit today!     Please MyChart message me or call my nurse if you have any questions or concerns.      During Business Hours:  [phone], option # 1 (University) then option # 4 (medical questions) and ask to speak with my nurse.     After hours, weekends or holidays:   [phone], Option #4  Ask to speak to the On-Call Cardiologist. Inform them you are a cardiology patient at the [facility].

## 2024-01-24 NOTE — NURSING NOTE
Chief Complaint   Patient presents with    Follow Up     cardiac clearance for hip replacement     Vitals were taken and medications reconciled.    Vazquez Bailey, EMT  2:30 PM

## 2024-01-25 LAB
ATRIAL RATE - MUSE: 60 BPM
DIASTOLIC BLOOD PRESSURE - MUSE: NORMAL MMHG
INTERPRETATION ECG - MUSE: NORMAL
P AXIS - MUSE: 12 DEGREES
PR INTERVAL - MUSE: 214 MS
QRS DURATION - MUSE: 92 MS
QT - MUSE: 420 MS
QTC - MUSE: 420 MS
R AXIS - MUSE: 13 DEGREES
SYSTOLIC BLOOD PRESSURE - MUSE: NORMAL MMHG
T AXIS - MUSE: -5 DEGREES
VENTRICULAR RATE- MUSE: 60 BPM

## 2024-02-02 ENCOUNTER — OFFICE VISIT (OUTPATIENT)
Dept: ENDOCRINOLOGY | Facility: CLINIC | Age: 79
End: 2024-02-02
Payer: COMMERCIAL

## 2024-02-02 VITALS
OXYGEN SATURATION: 98 % | HEIGHT: 70 IN | BODY MASS INDEX: 27.35 KG/M2 | SYSTOLIC BLOOD PRESSURE: 147 MMHG | WEIGHT: 191 LBS | DIASTOLIC BLOOD PRESSURE: 83 MMHG

## 2024-02-02 DIAGNOSIS — M81.0 OSTEOPOROSIS WITHOUT CURRENT PATHOLOGICAL FRACTURE, UNSPECIFIED OSTEOPOROSIS TYPE: ICD-10-CM

## 2024-02-02 DIAGNOSIS — E06.3 HASHIMOTO'S THYROIDITIS: Primary | ICD-10-CM

## 2024-02-02 PROCEDURE — 99213 OFFICE O/P EST LOW 20 MIN: CPT | Performed by: INTERNAL MEDICINE

## 2024-02-02 ASSESSMENT — PAIN SCALES - GENERAL: PAINLEVEL: NO PAIN (0)

## 2024-02-02 NOTE — LETTER
2/2/2024       RE: Artis Galvin  [address]  [place] [address]     Dear Colleague,    Thank you for referring your patient, Artis Galvin, to the [facility] ENDOCRINOLOGY [facility] [place] at [facility]. Please see a copy of my visit note below.    ENDOCRINOLOGY CLINIC    Reason for visit: thyroid nodules, hypothyroidism and osteoporosis    Subjective:   Artis is a 78 year old adult with metastatic prostate cancer s/p prostatectomy, hypothyroidism secondary to Hashimoto's thyroiditis, hypertension, AAA who is seen for follow-up of thyroid nodules, hypothyroidism and osteoporosis.       He was noted to have right thyroid mass from CT C/A/P in July 2021 that showed 29.8 x 19.9 x 32.7 mm heterogenously enhancing right thyroid mass. He recalled having ultrasound thyroid in 2014 at [facility] after having voice changed and was told that he had Hashimoto's thyroiditis. TSH of 6.4 on 6/4/2021. He has positive TPO Ab of 355. He was started on levothyroxine 50 mcg daily with normalization of TFT. He stopped levothyroxine after last visit with but TSH increased to 6.2 so it was restarted 2/2022. TSH was 4.5 at [facility] in 12/2022 and his oncologist recommended dose increase to 75 mcg daily.     Regarding osteoporosis: he did have DXA scan 9/2021 that showed the lowest T-score of -2.7 at the right hip. He has not had DXA previously. He was started on Fosamax 70 mg weekly but has struggled with compliance.     Interval History:    Has been feeling fatigue, tired and intermittently dizzy. He reports those symptoms are worsening. He was seen by PCP and was told that he may have depression and was tried on 3 different medications without significant improvement.   He feels more short of breath and could not walk longer distance anymore. He will see pulmonologist soon    Osteoporosis  He is not taking Fosamax regularly. Has not been on it  5921-8023-SH admitted hypotensive, warm, dry, SR rate 76, extremities warm to touch, 2+ right pedal pulse, IVF opened up by CRNA, Dr Latoya Brown notified, to infuse additional 500cc LR bolus for a while. Combination of forgetfulness and doesn't want to take medications. No hx of pill esophagitis other SE.   Fractures - none  Weight change - stable, 188-190 lbs  Has chronic back pain since 21 year old, slowing getting worse  Will have hip replacement soon     Bone Health:  Family history of osteoporosis or fracture: his sister has osteoporosis  BMI: 27.3  Steroid use: none  Chronic disease: metastatic prostate cancer s/p prostatectomy and radiation  Calcium intake:none from diet  Vitamin D intake: none  Alcohol: 6 pack of beer per 6 weeks  Smoking: none  Previously on Lupron treatment (GnRH analog) - stopped ~1/2022  - has NOT been taking calcium/vit D supplement       Thyroid  - restarted levothyroxine 50 mcg daily in 2/2022  - started taking levothyroxine 75 mcg daily in 12/5/2022 per recommendation of Oncologist at [facility]  - taking in AM with water on empty stomach    Underwent thyroid US 10/2021 with three TR 3 nodules with largest being 2.5 cm   Underwent FNA 11/2021 of mid-right thyroid nodule with pathology consistent with benign nodule   No difficulty with swallowing or breathing.    ROS:  Full review of systems taken with the help of the intake sheet. Otherwise a complete 14 point review of systems was taken and is negative unless stated in the history above.    Past Medical/Surgical History:  Past Medical History:   Diagnosis Date    Abdominal pain, unspecified site     Abnormal thyroid ultrasound     Adjustment disorder with mixed anxiety and depressed mood     Arthritis     Autoimmune thyroiditis     BBB (bundle branch block)     right    Chronic lymphocytic thyroiditis     Coronary artery disease     Diverticulosis of colon (without mention of hemorrhage)     Hypertension     Localized superficial swelling, mass, or lump     Low back pain 11/2023    Malignant neoplasm of prostate (H) 2018    Nontoxic multinodular goiter     NSTEMI (non-ST elevated myocardial infarction) (H) 1/11/2024     "Osteoporosis     Other and unspecified hyperlipidemia     Other nonspecific abnormal serum enzyme levels     Pain in joint, shoulder region     Peripheral neuropathy     Persistent hoarseness     Sarcoidosis     Seen on chest xray at age 28    Scleritis, unspecified     Status post coronary angiogram 10/20/2022    SVT (supraventricular tachycardia)     Unspecified hypothyroidism     Unspecified vitamin D deficiency      Past Surgical History:   Procedure Laterality Date    CAPSULOTOMY Bilateral     CATARACT EXTRACTION Bilateral     CV CORONARY ANGIOGRAM N/A 10/20/2022    Procedure: Coronary Angiogram;  Surgeon: Mir Farr MD;  Location:  HEART CARDIAC CATH LAB    CV FRACTIONAL FLOW RATIO WIRE N/A 10/20/2022    Procedure: Fractional Flow Ratio Wire;  Surgeon: Mir Farr MD;  Location:  HEART CARDIAC CATH LAB    EYE SURGERY Bilateral     Laser eye surgery    HC SHLDR ARTHROSCOP,PART ACROMIOPLAS Right     Description: Shoulder Arthroscopy, Space Decompression And Acromioplasty;  Recorded: 02/18/2014;    VT LAP,PROSTATECTOMY,RADICAL,W/NERVE SPARE,INCL ROBOTIC Bilateral 02/27/2018    Procedure: ROBOTIC ASSISTED RADICAL RETROPUBIC PROSTATECTOMY, BILATERAL PELVIC LYMPH NODE DISSECTION LYSIS OF ADHESIONS;  Surgeon: Wale Rodriguez MD;  Location: [facility];  Service: Urology    VT RADIAL KERATOTOMY Bilateral     Description: Cornea Radial Keratotomy;  Recorded: 02/18/2014;       Allergies:  Allergies   Allergen Reactions    Ciprofloxacin Other (See Comments)     Pt states he was told not to take because of \"another medicine he is on\"    Levaquin [Levofloxacin] Other (See Comments)     Pt states he was told not to take because of \"another medicine he is on\"         Current Medications   Current Outpatient Medications   Medication Sig Dispense Refill    aspirin 81 MG EC tablet Take 1 tablet (81 mg) by mouth every other day Stopped 1/2/24 before surgery      Cyanocobalamin " 5000 MCG SUBL Place 1 tablet under the tongue every other day      levothyroxine (SYNTHROID/LEVOTHROID) 75 MCG tablet TAKE 1 TABLET BY MOUTH DAILY 100 tablet 0    losartan (COZAAR) 50 MG tablet Take 50 mg by mouth daily      nitroGLYcerin (NITROSTAT) 0.4 MG sublingual tablet For chest pain place 1 tablet under the tongue every 5 minutes for 3 doses. If symptoms persist 5 minutes after 1st dose call 911. 25 tablet 3    pilocarpine (PILOCAR) 2 % ophthalmic solution Apply 1 drop to eye 2 times daily as needed      rosuvastatin (CRESTOR) 10 MG tablet Take 1 tablet (10 mg) by mouth daily 30 tablet 3     Family History:  Family History   Problem Relation Age of Onset    No Known Problems Mother     Coronary Artery Disease Father     Aortic aneurysm Father     Prostate Cancer Maternal Grandfather     Prostate Cancer Maternal Uncle        Social History:  Social History     Tobacco Use    Smoking status: Never    Smokeless tobacco: Never   Substance Use Topics    Alcohol use: Not Currently     Comment: Alcoholic Drinks/day: rarely       Physical Exam   Vitals: There were no vitals taken for this visit.  BMI= There is no height or weight on file to calculate BMI.   General: tired appearing, appears younger than stated age, no acute distress, pleasant and conversant  Mental Status/neuro: alert and oriented  Face: symmetrical, normal facial color  Eyes: anicteric, PERRL, no proptosis or lid lag  Neck: supple, no LAD  Thyroid: normal size and texture, possible nodule in right upper/mid thyroid palpable but otherwise wnlno bruits  Heart: regular rhythm  Legs: no swelling or edema    Labs : I reviewed data from epic and extract and summarize the pertinent data here.     DXA 9/2021  Impression  The most negative and valid T-score of -2.7 at the level of the right femoral neck corresponds with steoporosis according to WHO criteria for postmenopausal females and men age 50 and over.   Results   Lumbar spine   T-score -1.5 , BMD  1.043 g/cm2.      Left Femur neck  T-score -2.5 , BMD 0.740 g/cm2.  Left Total femur  T-score -1.7 , BMD 0.858 g/cm2.     Right Femur neck  T-score -2.7, BMD  0.721 g/cm2.  Right Total femur  T-score -2.2 , BMD  0.786 g/cm2.     Latest Ref Rng 5/17/2023  4:17 PM 8/31/2023  5:09 PM   ENDO THYROID LABS-[facility]      TSH 0.30 - 4.20 uIU/mL 2.16  2.08        Assessment and Plan  Artis is a 78 year old adult with metastatic prostate cancer s/p prostatectomy, hypothyroidism secondary to Hashimoto's thyroiditis, hypertension, AAA who is seen for follow-up of thyroid nodules, hypothyroidism and osteoporosis.      1) Thyroid nodule(s)  Noted from CT chest on 7/15/2021 that showed 29.8 x 19.9 x 32.7 mm heterogenously enhancing right thyroid mass. Underwent thyroid US 10/2021 with three TR 3 nodules with largest being 2.5 cm. Underwent FNA 11/2021 of mid-right thyroid nodule with pathology consistent with benign nodule.   - consider repeat Thyroid US this year -- if stable, no further US required.     2) Hashimoto's thyroiditis with subclinical hypothyroidism  Currently on levothyroxine 75 mcg daily which he has been doing since 12/5/2022.   TSH has been good      3) osteoporosis without pathologic fracture  DXA 9/17/2021 that showed the lowest T-score of -2.7 at the right hip.  Ca wnl. Vit D 35 2021. No previous fracture but has family hx of osteoporosis. He is also on androgen deprivation therapy with GnRH analog so he is at risk for further bone loss.   PCP discussed with him about switching to IV Reclast due to compliance issue but he did not want to do so.  Has not taken Fosamax 70 mg weekly.      PLAN:   - continue Levothyroxine at 75 mg  - recommend Calcium 1200 mg daily and Vit D 800-1000 international unit(s) daily   - try to restart moderate weight bearing exercise  - DXA this year  - recommend to follow up with PCP and pulmonologist for fatigue, decreased exercise intolerance and dyspnea.    External notes/medical  records independently reviewed, labs and imaging independently reviewed, medical management and tests to be discussed/communicated to patient.    Time: I spent 27 minutes spent on the date of the encounter preparing to see patient (including chart review and preparation), obtaining and or reviewing additional medical history, performing a physical exam and evaluation, documenting clinical information in the electronic health record, independently interpreting results, communicating results to the patient and coordinating care.    López Shin MD, MS  Division of Diabetes and Endocrinology  Department of Medicine

## 2024-02-02 NOTE — PROGRESS NOTES
ENDOCRINOLOGY CLINIC    Reason for visit: thyroid nodules, hypothyroidism and osteoporosis    Subjective:   Artis is a 78 year old adult with metastatic prostate cancer s/p prostatectomy, hypothyroidism secondary to Hashimoto's thyroiditis, hypertension, AAA who is seen for follow-up of thyroid nodules, hypothyroidism and osteoporosis.       He was noted to have right thyroid mass from CT C/A/P in July 2021 that showed 29.8 x 19.9 x 32.7 mm heterogenously enhancing right thyroid mass. He recalled having ultrasound thyroid in 2014 at [facility] after having voice changed and was told that he had Hashimoto's thyroiditis. TSH of 6.4 on 6/4/2021. He has positive TPO Ab of 355. He was started on levothyroxine 50 mcg daily with normalization of TFT. He stopped levothyroxine after last visit with but TSH increased to 6.2 so it was restarted 2/2022. TSH was 4.5 at [facility] in 12/2022 and his oncologist recommended dose increase to 75 mcg daily.     Regarding osteoporosis: he did have DXA scan 9/2021 that showed the lowest T-score of -2.7 at the right hip. He has not had DXA previously. He was started on Fosamax 70 mg weekly but has struggled with compliance.     Interval History:    Has been feeling fatigue, tired and intermittently dizzy. He reports those symptoms are worsening. He was seen by PCP and was told that he may have depression and was tried on 3 different medications without significant improvement.   He feels more short of breath and could not walk longer distance anymore. He will see pulmonologist soon    Osteoporosis  He is not taking Fosamax regularly. Has not been on it for a while. Combination of forgetfulness and doesn't want to take medications. No hx of pill esophagitis other SE.   Fractures - none  Weight change - stable, 188-190 lbs  Has chronic back pain since 21 year old, slowing getting worse  Will have hip replacement soon     Bone Health:  Family history of osteoporosis or fracture:  his sister has osteoporosis  BMI: 27.3  Steroid use: none  Chronic disease: metastatic prostate cancer s/p prostatectomy and radiation  Calcium intake:none from diet  Vitamin D intake: none  Alcohol: 6 pack of beer per 6 weeks  Smoking: none  Previously on Lupron treatment (GnRH analog) - stopped ~1/2022  - has NOT been taking calcium/vit D supplement       Thyroid  - restarted levothyroxine 50 mcg daily in 2/2022  - started taking levothyroxine 75 mcg daily in 12/5/2022 per recommendation of Oncologist at [facility]  - taking in AM with water on empty stomach    Underwent thyroid US 10/2021 with three TR 3 nodules with largest being 2.5 cm   Underwent FNA 11/2021 of mid-right thyroid nodule with pathology consistent with benign nodule   No difficulty with swallowing or breathing.    ROS:  Full review of systems taken with the help of the intake sheet. Otherwise a complete 14 point review of systems was taken and is negative unless stated in the history above.    Past Medical/Surgical History:  Past Medical History:   Diagnosis Date    Abdominal pain, unspecified site     Abnormal thyroid ultrasound     Adjustment disorder with mixed anxiety and depressed mood     Arthritis     Autoimmune thyroiditis     BBB (bundle branch block)     right    Chronic lymphocytic thyroiditis     Coronary artery disease     Diverticulosis of colon (without mention of hemorrhage)     Hypertension     Localized superficial swelling, mass, or lump     Low back pain 11/2023    Malignant neoplasm of prostate (H) 2018    Nontoxic multinodular goiter     NSTEMI (non-ST elevated myocardial infarction) (H) 1/11/2024    Osteoporosis     Other and unspecified hyperlipidemia     Other nonspecific abnormal serum enzyme levels     Pain in joint, shoulder region     Peripheral neuropathy     Persistent hoarseness     Sarcoidosis     Seen on chest xray at age 28    Scleritis, unspecified     Status post coronary angiogram 10/20/2022    SVT  "(supraventricular tachycardia)     Unspecified hypothyroidism     Unspecified vitamin D deficiency      Past Surgical History:   Procedure Laterality Date    CAPSULOTOMY Bilateral     CATARACT EXTRACTION Bilateral     CV CORONARY ANGIOGRAM N/A 10/20/2022    Procedure: Coronary Angiogram;  Surgeon: Mri Farr MD;  Location:  HEART CARDIAC CATH LAB    CV FRACTIONAL FLOW RATIO WIRE N/A 10/20/2022    Procedure: Fractional Flow Ratio Wire;  Surgeon: Mir Farr MD;  Location:  HEART CARDIAC CATH LAB    EYE SURGERY Bilateral     Laser eye surgery    HC SHLDR ARTHROSCOP,PART ACROMIOPLAS Right     Description: Shoulder Arthroscopy, Space Decompression And Acromioplasty;  Recorded: 02/18/2014;    ND LAP,PROSTATECTOMY,RADICAL,W/NERVE SPARE,INCL ROBOTIC Bilateral 02/27/2018    Procedure: ROBOTIC ASSISTED RADICAL RETROPUBIC PROSTATECTOMY, BILATERAL PELVIC LYMPH NODE DISSECTION LYSIS OF ADHESIONS;  Surgeon: Wale Rodriguez MD;  [place] [facility] [place]  Service: Urology    ND RADIAL KERATOTOMY Bilateral     Description: Cornea Radial Keratotomy;  Recorded: 02/18/2014;       Allergies:  Allergies   Allergen Reactions    Ciprofloxacin Other (See Comments)     Pt states he was told not to take because of \"another medicine he is on\"    Levaquin [Levofloxacin] Other (See Comments)     Pt states he was told not to take because of \"another medicine he is on\"         Current Medications   Current Outpatient Medications   Medication Sig Dispense Refill    aspirin 81 MG EC tablet Take 1 tablet (81 mg) by mouth every other day Stopped 1/2/24 before surgery      Cyanocobalamin 5000 MCG SUBL Place 1 tablet under the tongue every other day      levothyroxine (SYNTHROID/LEVOTHROID) 75 MCG tablet TAKE 1 TABLET BY MOUTH DAILY 100 tablet 0    losartan (COZAAR) 50 MG tablet Take 50 mg by mouth daily      nitroGLYcerin (NITROSTAT) 0.4 MG sublingual tablet For chest pain place 1 tablet under the " tongue every 5 minutes for 3 doses. If symptoms persist 5 minutes after 1st dose call 911. 25 tablet 3    pilocarpine (PILOCAR) 2 % ophthalmic solution Apply 1 drop to eye 2 times daily as needed      rosuvastatin (CRESTOR) 10 MG tablet Take 1 tablet (10 mg) by mouth daily 30 tablet 3     Family History:  Family History   Problem Relation Age of Onset    No Known Problems Mother     Coronary Artery Disease Father     Aortic aneurysm Father     Prostate Cancer Maternal Grandfather     Prostate Cancer Maternal Uncle        Social History:  Social History     Tobacco Use    Smoking status: Never    Smokeless tobacco: Never   Substance Use Topics    Alcohol use: Not Currently     Comment: Alcoholic Drinks/day: rarely       Physical Exam   Vitals: There were no vitals taken for this visit.  BMI= There is no height or weight on file to calculate BMI.   General: tired appearing, appears younger than stated age, no acute distress, pleasant and conversant  Mental Status/neuro: alert and oriented  Face: symmetrical, normal facial color  Eyes: anicteric, PERRL, no proptosis or lid lag  Neck: supple, no LAD  Thyroid: normal size and texture, possible nodule in right upper/mid thyroid palpable but otherwise wnlno bruits  Heart: regular rhythm  Legs: no swelling or edema    Labs : I reviewed data from epic and extract and summarize the pertinent data here.     DXA 9/2021  Impression  The most negative and valid T-score of -2.7 at the level of the right femoral neck corresponds with steoporosis according to WHO criteria for postmenopausal females and men age 50 and over.   Results   Lumbar spine   T-score -1.5 , BMD 1.043 g/cm2.      Left Femur neck  T-score -2.5 , BMD 0.740 g/cm2.  Left Total femur  T-score -1.7 , BMD 0.858 g/cm2.     Right Femur neck  T-score -2.7, BMD  0.721 g/cm2.  Right Total femur  T-score -2.2 , BMD  0.786 g/cm2.     Latest Ref Rng 5/17/2023  4:17 PM 8/31/2023  5:09 PM   ENDO THYROID LABS-[facility]      TSH 0.30  - 4.20 uIU/mL 2.16  2.08        Assessment and Plan  Artis is a 78 year old adult with metastatic prostate cancer s/p prostatectomy, hypothyroidism secondary to Hashimoto's thyroiditis, hypertension, AAA who is seen for follow-up of thyroid nodules, hypothyroidism and osteoporosis.      1) Thyroid nodule(s)  Noted from CT chest on 7/15/2021 that showed 29.8 x 19.9 x 32.7 mm heterogenously enhancing right thyroid mass. Underwent thyroid US 10/2021 with three TR 3 nodules with largest being 2.5 cm. Underwent FNA 11/2021 of mid-right thyroid nodule with pathology consistent with benign nodule.   - consider repeat Thyroid US this year -- if stable, no further US required.     2) Hashimoto's thyroiditis with subclinical hypothyroidism  Currently on levothyroxine 75 mcg daily which he has been doing since 12/5/2022.   TSH has been good      3) osteoporosis without pathologic fracture  DXA 9/17/2021 that showed the lowest T-score of -2.7 at the right hip.  Ca wnl. Vit D 35 2021. No previous fracture but has family hx of osteoporosis. He is also on androgen deprivation therapy with GnRH analog so he is at risk for further bone loss.   PCP discussed with him about switching to IV Reclast due to compliance issue but he did not want to do so.  Has not taken Fosamax 70 mg weekly.      PLAN:   - continue Levothyroxine at 75 mg  - recommend Calcium 1200 mg daily and Vit D 800-1000 international unit(s) daily   - try to restart moderate weight bearing exercise  - DXA this year  - recommend to follow up with PCP and pulmonologist for fatigue, decreased exercise intolerance and dyspnea.    External notes/medical records independently reviewed, labs and imaging independently reviewed, medical management and tests to be discussed/communicated to patient.    Time: I spent 27 minutes spent on the date of the encounter preparing to see patient (including chart review and preparation), obtaining and or reviewing additional medical  history, performing a physical exam and evaluation, documenting clinical information in the electronic health record, independently interpreting results, communicating results to the patient and coordinating care.    López Shin MD, MS  Division of Diabetes and Endocrinology  Department of Medicine

## 2024-02-02 NOTE — NURSING NOTE
"Chief Complaint   Patient presents with    Osteoporosis    Thyroid Problem     Vital signs:      BP: (!) 147/83       SpO2: 98 %     Height: 177.8 cm (5' 10\") Weight: 86.6 kg (191 lb)  Estimated body mass index is 27.41 kg/m  as calculated from the following:    Height as of this encounter: 1.778 m (5' 10\").    Weight as of this encounter: 86.6 kg (191 lb).        "

## 2024-02-02 NOTE — PATIENT INSTRUCTIONS
Schedule ultrasound thyroid and DXA scan    If you have any questions, please do not hesitate to call clinic line at [phone] and ask for Endocrinology clinic.  If you need to fax, please fax to clinic fax number at [phone]    After clinic hours or weekends, please contact [phone] and ask for Endocrinologist-on call      Sincerely,    López Shin MD  Endocrinology    Please call and schedule at one of these locations that provide the service you need.     DEXA, CT, MRI, US, XRAY    [facility]   ([facility] [place] [place] [phone]   [facility] ([place], [place]) [phone]   [facility] ([facility]) [phone]   [facility] ([facility]) [phone]     Welcome to the [facility] Endocrinology and Diabetes Clinics     Our Endocrinology Clinics are here to provide you with a team-based, collaborative approach in the diagnosis and treatment of patients with diabetes and endocrine disorders. The team is made up of Physicians, Physician Assistants, Certified Diabetes Educators, Registered Nurses, Medical Assistants, Emergency Medical Technicians, and many others, all of whom have the unified goal of providing our patients with high quality care.     Please see below for some helpful tips to best navigate and use the [facility] Endocrinology clinic:     [employer] Respect: At [facility], we are committed to a respectful and safe space for all patients, visitors, and staff.  We believe that mutual respect between patients and their care team is the foundation of quality care.  It is our expectation that you will be treated with respect by your care team.  In turn, we ask that all communication with the care team (written and verbal) be respectful and free from profanity, threatening, or abusive language.  Disrespectful communication undermines our therapeutic relationship with you and may result in us being unable to continue to provide  your care.    Refills: A provider must see you at least annually to prescribe and refill medications. This is to ensure your safety as well as meet insurance and compliance regulations.    Scheduling: Many of our Providers offer both in-person or video visits. Please call to schedule any needed follow ups as soon as possible because our provider schedules fill up very quickly. Our care team has the right to require an in-person visit when they believe that it is medically necessary. Please remember that for any virtual visits, you must be in the state of [place] at the time of the visit, otherwise we are unable to see you and you will need to be rescheduled.    Missed Appointments: If you need to cancel or miss your scheduled appointment, please call the clinic at [phone] to reschedule.  Please note if you repeatedly miss appointments or repeatedly miss appointments without calling to inform us ahead of time (no-show), the clinic may elect to not allow you to reschedule without speaking to a manager, may require a Partnership In Care Agreement prior to rescheduling, or could result in you no longer being able to receive care from the clinic. Providing the clinic with timely notification if you have to miss an appointment, allows us to better serve the needs of all of our patients.    Primary Care Provider: Our Endocrinologists are Specialists in their field. We expect you to have a Primary Care Provider established to handle any needs outside of your diabetes and endocrine care.  We would be happy to assist you find a Primary Care Provider, if you do not have one.    M[employer]: [employer] is a wonderful resource that allows you access to your Care Team via online or the urvashi. Please ask a member of the team if you would like help creating an account. Please note that it may take up to 2 business days for a response. [employer] messages are not reviewed on weekends or after business hours.  Emergent or urgent care  needs should never be communicated via [employer].  If you experience a medical emergency call 911 or go to the nearest emergency room.    Labs: It is recommended that you stay within the [facility] for labs but you are welcome to obtain ordered labs (with some exceptions) from any location of your choice as long as they are able to complete and process the needed labs. If you need us to fax orders to your preferred lab, please provide us the name and fax number of the lab you would like to go to so we can fax the orders. If your labs are drawn outside of the [facility], please have them fax the results to [phone] ([place]) or [phone] ([place]) or via [facility]. It is your responsibility to ensure that outside lab results are sent to us.    We look forward to working with you. Please do not hesitate to reach out with any questions.    Thank you,    The Endocrine Team    [facility] Address:   [place] Address:     [address] [place] [address]    Phone: [phone]  Fax: [phone]   [address] [place] [address]    Phone: [phone]  Fax: [phone]     [facility] Cost Estimate Phone Number: [phone]    General Lab and Imaging Scheduling Phone Number: [phone]

## 2024-02-04 DIAGNOSIS — E06.3 HASHIMOTO'S THYROIDITIS: ICD-10-CM

## 2024-02-07 ENCOUNTER — OFFICE VISIT (OUTPATIENT)
Dept: INTERNAL MEDICINE | Facility: CLINIC | Age: 79
End: 2024-02-07
Payer: COMMERCIAL

## 2024-02-07 VITALS
SYSTOLIC BLOOD PRESSURE: 124 MMHG | WEIGHT: 197.8 LBS | TEMPERATURE: 97.6 F | HEART RATE: 68 BPM | DIASTOLIC BLOOD PRESSURE: 56 MMHG | RESPIRATION RATE: 16 BRPM | HEIGHT: 70 IN | OXYGEN SATURATION: 98 % | BODY MASS INDEX: 28.32 KG/M2

## 2024-02-07 DIAGNOSIS — I25.10 NONOCCLUSIVE CORONARY ATHEROSCLEROSIS OF NATIVE CORONARY ARTERY: ICD-10-CM

## 2024-02-07 DIAGNOSIS — I47.10 SVT (SUPRAVENTRICULAR TACHYCARDIA) (H): ICD-10-CM

## 2024-02-07 DIAGNOSIS — I10 PRIMARY HYPERTENSION: ICD-10-CM

## 2024-02-07 DIAGNOSIS — Z87.19 HISTORY OF DIVERTICULITIS: ICD-10-CM

## 2024-02-07 DIAGNOSIS — Z01.818 PREOPERATIVE EXAMINATION: Primary | ICD-10-CM

## 2024-02-07 DIAGNOSIS — G62.9 PERIPHERAL POLYNEUROPATHY: ICD-10-CM

## 2024-02-07 DIAGNOSIS — R45.89 ANXIETY ABOUT HEALTH: ICD-10-CM

## 2024-02-07 DIAGNOSIS — F34.1 DYSTHYMIA: ICD-10-CM

## 2024-02-07 DIAGNOSIS — D86.0 SARCOIDOSIS OF LUNG (H): ICD-10-CM

## 2024-02-07 DIAGNOSIS — M16.10 HIP ARTHRITIS: ICD-10-CM

## 2024-02-07 PROBLEM — I21.4 NSTEMI (NON-ST ELEVATED MYOCARDIAL INFARCTION) (H): Status: RESOLVED | Noted: 2024-01-11 | Resolved: 2024-02-07

## 2024-02-07 LAB
ERYTHROCYTE [DISTWIDTH] IN BLOOD BY AUTOMATED COUNT: 12.5 % (ref 10–15)
HCT VFR BLD AUTO: 44.4 % (ref 35–53)
HGB BLD-MCNC: 14.8 G/DL (ref 11.7–17.7)
MCH RBC QN AUTO: 30.6 PG (ref 26.5–33)
MCHC RBC AUTO-ENTMCNC: 33.3 G/DL (ref 31.5–36.5)
MCV RBC AUTO: 92 FL (ref 78–100)
PLATELET # BLD AUTO: 243 10E3/UL (ref 150–450)
RBC # BLD AUTO: 4.83 10E6/UL (ref 3.8–5.9)
WBC # BLD AUTO: 5.4 10E3/UL (ref 4–11)

## 2024-02-07 PROCEDURE — 99214 OFFICE O/P EST MOD 30 MIN: CPT | Performed by: INTERNAL MEDICINE

## 2024-02-07 PROCEDURE — 36415 COLL VENOUS BLD VENIPUNCTURE: CPT | Performed by: INTERNAL MEDICINE

## 2024-02-07 PROCEDURE — 80048 BASIC METABOLIC PNL TOTAL CA: CPT | Performed by: INTERNAL MEDICINE

## 2024-02-07 PROCEDURE — 85027 COMPLETE CBC AUTOMATED: CPT | Performed by: INTERNAL MEDICINE

## 2024-02-07 RX ORDER — LEVOTHYROXINE SODIUM 75 UG/1
75 TABLET ORAL DAILY
Qty: 100 TABLET | Refills: 2 | Status: SHIPPED | OUTPATIENT
Start: 2024-02-07

## 2024-02-07 RX ORDER — RESPIRATORY SYNCYTIAL VIRUS VACCINE 120MCG/0.5
0.5 KIT INTRAMUSCULAR ONCE
Qty: 1 EACH | Refills: 0 | Status: CANCELLED | OUTPATIENT
Start: 2024-02-07 | End: 2024-02-07

## 2024-02-07 NOTE — TELEPHONE ENCOUNTER
Last visit date: 2/2/2024  [facility] End[facility] [place]     López Shin MD  Endocrinology, Diabetes, and Metabolism     TSH   Date Value Ref Range Status   08/31/2023 2.08 0.30 - 4.20 uIU/mL Final   12/08/2021 4.50 0.30 - 5.00 uIU/mL Final

## 2024-02-07 NOTE — PATIENT INSTRUCTIONS
Preparing for Your Surgery  Getting started  A nurse will call you to review your health history and instructions. They will give you an arrival time based on your scheduled surgery time. Please be ready to share:  Your doctor's clinic name and phone number  Your medical, surgical, and anesthesia history  A list of allergies and sensitivities  A list of medicines, including herbal treatments and over-the-counter drugs  Whether the patient has a legal guardian (ask how to send us the papers in advance)  Please tell us if you're pregnant--or if there's any chance you might be pregnant. Some surgeries may injure a fetus (unborn baby), so they require a pregnancy test. Surgeries that are safe for a fetus don't always need a test, and you can choose whether to have one.   If you have a child who's having surgery, please ask for a copy of Preparing for Your Child's Surgery.    Preparing for surgery  Within 10 to 30 days of surgery: Have a pre-op exam (sometimes called an H&P, or History and Physical). This can be done at a clinic or pre-operative center.  If you're having a , you may not need this exam. Talk to your care team.  At your pre-op exam, talk to your care team about all medicines you take. If you need to stop any medicines before surgery, ask when to start taking them again.  We do this for your safety. Many medicines can make you bleed too much during surgery. Some change how well surgery (anesthesia) drugs work.  Call your insurance company to let them know you're having surgery. (If you don't have insurance, call [phone].)  Call your clinic if there's any change in your health. This includes signs of a cold or flu (sore throat, runny nose, cough, rash, fever). It also includes a scrape or scratch near the surgery site.  If you have questions on the day of surgery, call your hospital or surgery center.  Eating and drinking guidelines  For your safety: Unless your surgeon tells you otherwise,  follow the guidelines below.  Eat and drink as usual until 8 hours before you arrive for surgery. After that, no food or milk.  Drink clear liquids until 2 hours before you arrive. These are liquids you can see through, like water, Gatorade, and Propel Water. They also include plain black coffee and tea (no cream or milk), candy, and breath mints. You can spit out gum when you arrive.  If you drink alcohol: Stop drinking it the night before surgery.  If your care team tells you to take medicine on the morning of surgery, it's okay to take it with a sip of water.  Preventing infection  Shower or bathe the night before and morning of your surgery. Follow the instructions your clinic gave you. (If no instructions, use regular soap.)  Don't shave or clip hair near your surgery site. We'll remove the hair if needed.  Don't smoke or vape the morning of surgery. You may chew nicotine gum up to 2 hours before surgery. A nicotine patch is okay.  Note: Some surgeries require you to completely quit smoking and nicotine. Check with your surgeon.  Your care team will make every effort to keep you safe from infection. We will:  Clean our hands often with soap and water (or an alcohol-based hand rub).  Clean the skin at your surgery site with a special soap that kills germs.  Give you a special gown to keep you warm. (Cold raises the risk of infection.)  Wear special hair covers, masks, gowns and gloves during surgery.  Give antibiotic medicine, if prescribed. Not all surgeries need antibiotics.  What to bring on the day of surgery  Photo ID and insurance card  Copy of your health care directive, if you have one  Glasses and hearing aids (bring cases)  You can't wear contacts during surgery  Inhaler and eye drops, if you use them (tell us about these when you arrive)  CPAP machine or breathing device, if you use them  A few personal items, if spending the night  If you have . . .  A pacemaker, ICD (cardiac defibrillator) or other  implant: Bring the ID card.  An implanted stimulator: Bring the remote control.  A legal guardian: Bring a copy of the certified (court-stamped) guardianship papers.  Please remove any jewelry, including body piercings. Leave jewelry and other valuables at home.  If you're going home the day of surgery  You must have a responsible adult drive you home. They should stay with you overnight as well.  If you don't have someone to stay with you, and you aren't safe to go home alone, we may keep you overnight. Insurance often won't pay for this.  After surgery  If it's hard to control your pain or you need more pain medicine, please call your surgeon's office.  Questions?   If you have any questions for your care team, list them here: _________________________________________________________________________________________________________________________________________________________________________ ____________________________________ ____________________________________ ____________________________________  For informational purposes only. Not to replace the advice of your health care provider. Copyright   2003, 2019 [employer]. All rights reserved. Clinically reviewed by Yvette Baez MD. [employer] 392083 - REV 12/22.    How to Take Your Medication Before Surgery  - HOLD (do not take) Aspirin for 7 days  - STOP taking all vitamins and herbal supplements 14 days before surgery.  - please take your levothyroxine with just a sip of water the morning of surgery.

## 2024-02-07 NOTE — PROGRESS NOTES
Preoperative Evaluation  [facility]  [address]  [place] [address]  Phone: [phone]  Fax: [phone]  Primary Provider: Colby Hernandez  Pre-op Performing Provider: COLBY HERNANDEZ  Feb 7, 2024       Binu is a 78 year old, presenting for the following:  Pre-Op Exam        2/7/2024     3:00 PM   Additional Questions   Roomed by Haider RODRIGUEZ RN     Surgical Information  Surgery/Procedure: Right Hip Arthroplasty  Surgery Location: [facility]  Surgeon: Dr. Benavides  Surgery Date: 3/6/24  Time of Surgery: TBD  Where patient plans to recover: At home with family  Fax number for surgical facility: Note does not need to be faxed, will be available electronically in Epic.    1. Preoperative examination  Proceed with surgery as planned.  Recently had negative stress testing.  He was given perioperative med management instructions.  - CBC with platelets; Future  - Basic metabolic panel  (Ca, Cl, CO2, Creat, Gluc, K, Na, BUN); Future  - CBC with platelets  - Basic metabolic panel  (Ca, Cl, CO2, Creat, Gluc, K, Na, BUN)    2. Hip arthritis  Discussed with him what he can expect with the surgery.    3. Dysthymia  Intolerant of medications in the past.  Could consider a trial of tricyclic postoperatively but would not start that at this time    4. Anxiety about health  As above.    5. Peripheral polyneuropathy  As above.    6. Nonocclusive coronary atherosclerosis of native coronary artery  Continue statin he is now on rosuvastatin.    7. Primary hypertension  Blood pressure controlled.  Hold losartan perioperatively per  - Basic metabolic panel  (Ca, Cl, CO2, Creat, Gluc, K, Na, BUN); Future  - Basic metabolic panel  (Ca, Cl, CO2, Creat, Gluc, K, Na, BUN)    8. History of diverticulitis  I told him that if he develops symptoms of diverticulitis more significant than football shaped stools he should let me know.    9. Sarcoidosis of lung  "(H24)  Asymptomatic at this point.    10. SVT (supraventricular tachycardia)  Asymptomatic and no recent recurrence.    Subjective       HPI related to upcoming procedure: Binu is a very complicated 78-year-old gentleman with severe hip arthritis who is going to be undergoing hip arthroplasty soon.  He is actually in for the third preop.  His first 1 was canceled because he was having chest pains at the time with EKG changes.  Then he came back for follow-up and the stress test he had had had not been read and he was not feeling good so I canceled that again.  Today he comes in today and he is feeling good.  He has chronic anxiety and dysthymia.  He tells me he was talking to someone that told him that they had similar symptoms and took amitriptyline and he wonders if that would be a good medicine.  He has been resistant to mood medications in the past and has not really agreed with them.  He has tried them without much improvement of his symptoms and or side effects.  He tells me last night he did not think he was breathing normally.  He gets these \"sigh\" breaths and these are usual for him but last night he was not getting them so he was concerned.  He is better today.  There is no shortness of breath or cough or other problems.  Stools were football shaped a couple days ago and wonders if his \"diverticulitis is coming back.  No pain or other symptoms of diverticulitis.        1/31/2024    11:09 AM   Preop Questions   1. Have you ever had a heart attack or stroke? No   2. Have you ever had surgery on your heart or blood vessels, such as a stent placement, a coronary artery bypass, or surgery on an artery in your head, neck, heart, or legs? No   3. Do you have chest pain with activity? No   4. Do you have a history of  heart failure? UNKNOWN - NO HEART FAILURE   5. Do you currently have a cold, bronchitis or symptoms of other infection? No   6. Do you have a cough, shortness of breath, or wheezing? No   7. Do you " or anyone in your family have previous history of blood clots? No   8. Do you or does anyone in your family have a serious bleeding problem such as prolonged bleeding following surgeries or cuts? No   9. Have you ever had problems with anemia or been told to take iron pills? No   10. Have you had any abnormal blood loss such as black, tarry or bloody stools, or abnormal vaginal bleeding? No   10. Have you had any abnormal blood loss such as black, tarry or bloody stools? No   11. Have you ever had a blood transfusion? No   12. Are you willing to have a blood transfusion if it is medically needed before, during, or after your surgery? Yes   13. Have you or any of your relatives ever had problems with anesthesia? No   14. Do you have sleep apnea, excessive snoring or daytime drowsiness? UNKNOWN - none known   15. Do you have any artifical heart valves or other implanted medical devices like a pacemaker, defibrillator, or continuous glucose monitor? No   16. Do you have artificial joints? No   17. Are you allergic to latex? No       Health Care Directive  Patient does not have a Health Care Directive or Living Will: Discussed advance care planning with patient; information given to patient to review.    Preoperative Review of    reviewed - no record of controlled substances prescribed.      Status of Chronic Conditions:  See problem list for active medical problems.  Problems all longstanding and stable, except as noted/documented.  See ROS for pertinent symptoms related to these conditions.    Patient Active Problem List    Diagnosis Date Noted    Primary hypertension 01/12/2024     Priority: Medium    Chest pain 01/11/2024     Priority: Medium    NSTEMI (non-ST elevated myocardial infarction) (H) 01/11/2024     Priority: Medium    Chest pain, unspecified type 01/11/2024     Priority: Medium    Blurred vision 08/14/2023     Priority: Medium    Diverticulitis of colon 05/31/2023     Priority: Medium    Dysthymia  05/17/2023     Priority: Medium    Osteoporosis without current pathological fracture, unspecified osteoporosis type 01/22/2023     Priority: Medium    Status post coronary angiogram 10/20/2022     Priority: Medium    SVT (supraventricular tachycardia) 03/08/2022     Priority: Medium    Peripheral neuropathy 12/08/2021     Priority: Medium    Onychomycosis 10/04/2021     Priority: Medium    Diverticular disease of colon 09/08/2021     Priority: Medium     Formatting of this note might be different from the original.  Created by Conversion    Replacement Utility updated for latest IMO load      Autoimmune thyroiditis 05/03/2021     Priority: Medium     Formatting of this note might be different from the original.  Formatting of this note might be different from the original.  Created by Conversion      Sarcoidosis 04/07/2020     Priority: Medium     Created by Conversion      Vitamin D deficiency 04/07/2020     Priority: Medium     Created by Conversion    Replacement Utility updated for latest IMO load      Personal history of malignant neoplasm of prostate 06/14/2018     Priority: Medium    Erectile dysfunction following radical prostatectomy 06/07/2018     Priority: Medium    Right bundle branch block 02/08/2018     Priority: Medium      Past Medical History:   Diagnosis Date    Abdominal pain, unspecified site     Abnormal thyroid ultrasound     Adjustment disorder with mixed anxiety and depressed mood     Arthritis     Autoimmune thyroiditis     BBB (bundle branch block)     right    Chronic lymphocytic thyroiditis     Coronary artery disease     Diverticulosis of colon (without mention of hemorrhage)     Hypertension     Localized superficial swelling, mass, or lump     Low back pain 11/2023    Malignant neoplasm of prostate (H) 2018    Nontoxic multinodular goiter     NSTEMI (non-ST elevated myocardial infarction) (H) 1/11/2024    Osteoporosis     Other and unspecified hyperlipidemia     Other nonspecific  abnormal serum enzyme levels     Pain in joint, shoulder region     Peripheral neuropathy     Persistent hoarseness     Sarcoidosis     Seen on chest xray at age 28    Scleritis, unspecified     Status post coronary angiogram 10/20/2022    SVT (supraventricular tachycardia)     Unspecified hypothyroidism     Unspecified vitamin D deficiency      Past Surgical History:   Procedure Laterality Date    CAPSULOTOMY Bilateral     CATARACT EXTRACTION Bilateral     CV CORONARY ANGIOGRAM N/A 10/20/2022    Procedure: Coronary Angiogram;  Surgeon: Mir Farr MD;  Location:  [facility]    CV FRACTIONAL FLOW RATIO WIRE N/A 10/20/2022    Procedure: Fractional Flow Ratio Wire;  Surgeon: Mir Farr MD;  Location:  [facility]    EYE SURGERY Bilateral     Laser eye surgery    HC SHLDR ARTHROSCOP,PART ACROMIOPLAS Right     Description: Shoulder Arthroscopy, Space Decompression And Acromioplasty;  Recorded: 02/18/2014;    TN LAP,PROSTATECTOMY,RADICAL,W/NERVE SPARE,INCL ROBOTIC Bilateral 02/27/2018    Procedure: ROBOTIC ASSISTED RADICAL RETROPUBIC PROSTATECTOMY, BILATERAL PELVIC LYMPH NODE DISSECTION LYSIS OF ADHESIONS;  Surgeon: Wale Rodriguez MD;  [place] [facility] [place]    TN RADIAL KERATOTOMY Bilateral     Description: Cornea Radial Keratotomy;  Recorded: 02/18/2014;     Current Outpatient Medications   Medication Sig Dispense Refill    aspirin 81 MG EC tablet Take 1 tablet (81 mg) by mouth every other day Stopped 1/2/24 before surgery      Cyanocobalamin 5000 MCG SUBL Place 1 tablet under the tongue every other day      levothyroxine (SYNTHROID/LEVOTHROID) 75 MCG tablet TAKE 1 TABLET BY MOUTH DAILY 100 tablet 2    losartan (COZAAR) 50 MG tablet Take 50 mg by mouth daily      pilocarpine (PILOCAR) 2 % ophthalmic solution Apply 1 drop to eye 2 times daily as needed      nitroGLYcerin (NITROSTAT) 0.4 MG sublingual tablet For chest pain  "place 1 tablet under the tongue every 5 minutes for 3 doses. If symptoms persist 5 minutes after 1st dose call 911. (Patient not taking: Reported on 2/2/2024) 25 tablet 3    rosuvastatin (CRESTOR) 10 MG tablet Take 1 tablet (10 mg) by mouth daily (Patient not taking: Reported on 2/7/2024) 30 tablet 3       Allergies   Allergen Reactions    Ciprofloxacin Other (See Comments)     Pt states he was told not to take because of \"another medicine he is on\"    Levaquin [Levofloxacin] Other (See Comments)     Pt states he was told not to take because of \"another medicine he is on\"          Social History     Tobacco Use    Smoking status: Never    Smokeless tobacco: Never   Substance Use Topics    Alcohol use: Not Currently     Comment: Alcoholic Drinks/day: rarely     Family History   Problem Relation Age of Onset    No Known Problems Mother     Coronary Artery Disease Father     Aortic aneurysm Father     Prostate Cancer Maternal Grandfather     Prostate Cancer Maternal Uncle      History   Drug Use No         Review of Systems    Review of Systems  Constitutional, HEENT, cardiovascular, pulmonary, gi and gu systems are negative, except as otherwise noted.  Objective    /56 (BP Location: Left arm, Patient Position: Sitting, Cuff Size: Adult Regular)   Pulse 68   Temp 97.6  F (36.4  C) (Tympanic)   Resp 16   Ht 1.783 m (5' 10.2\")   Wt 89.7 kg (197 lb 12.8 oz)   SpO2 98%   BMI 28.22 kg/m     Estimated body mass index is 28.22 kg/m  as calculated from the following:    Height as of this encounter: 1.783 m (5' 10.2\").    Weight as of this encounter: 89.7 kg (197 lb 12.8 oz).  Physical Exam  GENERAL: alert and no distress  EYES: Eyes grossly normal to inspection, PERRL and conjunctivae and sclerae normal  HENT: ear canals and TM's normal, nose and mouth without ulcers or lesions  NECK: no adenopathy, no asymmetry, masses, or scars  RESP: lungs clear to auscultation - no rales, rhonchi or wheezes  CV: Regular with 1 " out of 6 to 2 out of 6 systolic murmur at the apex.  ABDOMEN: soft, nontender, no hepatosplenomegaly, no masses and bowel sounds normal  MS: no gross musculoskeletal defects noted, no edema  SKIN: no suspicious lesions or rashes  NEURO: Normal strength and tone, mentation intact and speech normal  PSYCH: mentation appears normal, affect normal/bright    Recent Labs   Lab Test 01/11/24  2226 01/11/24  1550 11/28/23  1203 01/24/23  1537 10/20/22  1233   HGB 15.5 15.6 16.0   < > 15.1    231 212   < > 244   INR  --   --   --   --  0.98   NA  --  139 138   < > 141   POTASSIUM  --  4.7 5.1   < > 4.2   CR  --  1.06 1.02   < > 1.01   A1C  --  5.7*  --   --   --     < > = values in this interval not displayed.        Diagnostics  Labs pending at this time.  Results will be reviewed when available.   No EKG this visit, completed in the last 90 days.    Revised Cardiac Risk Index (RCRI)  The patient has the following serious cardiovascular risks for perioperative complications:   - Coronary Artery Disease (MI, positive stress test, angina, Qs on EKG) = 1 point     RCRI Interpretation: 1 point: Class II (low risk - 0.9% complication rate)         Signed Electronically by: COLBY BROCK MD  Copy of this evaluation report is provided to requesting physician.

## 2024-02-07 NOTE — H&P (VIEW-ONLY)
Preoperative Evaluation  [facility]  [address] [facility] [address] [facility] [address] [facility] [address]  [place] [address]  Phone: [phone]  Fax: [phone]  Primary Provider: Colby Hernandez  Pre-op Performing Provider: COLBY HERNANDEZ  Feb 7, 2024       Binu is a 78 year old, presenting for the following:  Pre-Op Exam        2/7/2024     3:00 PM   Additional Questions   Roomed by Haider RODRIGUEZ RN     Surgical Information  Surgery/Procedure: Right Hip Arthroplasty  Surgery Location: [facility]  Surgeon: Dr. Benavides  Surgery Date: 3/6/24  Time of Surgery: TBD  Where patient plans to recover: At home with family  Fax number for surgical facility: Note does not need to be faxed, will be available electronically in Epic.    1. Preoperative examination  Proceed with surgery as planned.  Recently had negative stress testing.  He was given perioperative med management instructions.  - CBC with platelets; Future  - Basic metabolic panel  (Ca, Cl, CO2, Creat, Gluc, K, Na, BUN); Future  - CBC with platelets  - Basic metabolic panel  (Ca, Cl, CO2, Creat, Gluc, K, Na, BUN)    2. Hip arthritis  Discussed with him what he can expect with the surgery.    3. Dysthymia  Intolerant of medications in the past.  Could consider a trial of tricyclic postoperatively but would not start that at this time    4. Anxiety about health  As above.    5. Peripheral polyneuropathy  As above.    6. Nonocclusive coronary atherosclerosis of native coronary artery  Continue statin he is now on rosuvastatin.    7. Primary hypertension  Blood pressure controlled.  Hold losartan perioperatively per  - Basic metabolic panel  (Ca, Cl, CO2, Creat, Gluc, K, Na, BUN); Future  - Basic metabolic panel  (Ca, Cl, CO2, Creat, Gluc, K, Na, BUN)    8. History of diverticulitis  I told him that if he develops symptoms of diverticulitis more significant than football shaped stools he should let me know.    9. Sarcoidosis of lung  "(H24)  Asymptomatic at this point.    10. SVT (supraventricular tachycardia)  Asymptomatic and no recent recurrence.    Subjective       HPI related to upcoming procedure: Binu is a very complicated 78-year-old gentleman with severe hip arthritis who is going to be undergoing hip arthroplasty soon.  He is actually in for the third preop.  His first 1 was canceled because he was having chest pains at the time with EKG changes.  Then he came back for follow-up and the stress test he had had had not been read and he was not feeling good so I canceled that again.  Today he comes in today and he is feeling good.  He has chronic anxiety and dysthymia.  He tells me he was talking to someone that told him that they had similar symptoms and took amitriptyline and he wonders if that would be a good medicine.  He has been resistant to mood medications in the past and has not really agreed with them.  He has tried them without much improvement of his symptoms and or side effects.  He tells me last night he did not think he was breathing normally.  He gets these \"sigh\" breaths and these are usual for him but last night he was not getting them so he was concerned.  He is better today.  There is no shortness of breath or cough or other problems.  Stools were football shaped a couple days ago and wonders if his \"diverticulitis is coming back.  No pain or other symptoms of diverticulitis.        1/31/2024    11:09 AM   Preop Questions   1. Have you ever had a heart attack or stroke? No   2. Have you ever had surgery on your heart or blood vessels, such as a stent placement, a coronary artery bypass, or surgery on an artery in your head, neck, heart, or legs? No   3. Do you have chest pain with activity? No   4. Do you have a history of  heart failure? UNKNOWN - NO HEART FAILURE   5. Do you currently have a cold, bronchitis or symptoms of other infection? No   6. Do you have a cough, shortness of breath, or wheezing? No   7. Do you " or anyone in your family have previous history of blood clots? No   8. Do you or does anyone in your family have a serious bleeding problem such as prolonged bleeding following surgeries or cuts? No   9. Have you ever had problems with anemia or been told to take iron pills? No   10. Have you had any abnormal blood loss such as black, tarry or bloody stools, or abnormal vaginal bleeding? No   10. Have you had any abnormal blood loss such as black, tarry or bloody stools? No   11. Have you ever had a blood transfusion? No   12. Are you willing to have a blood transfusion if it is medically needed before, during, or after your surgery? Yes   13. Have you or any of your relatives ever had problems with anesthesia? No   14. Do you have sleep apnea, excessive snoring or daytime drowsiness? UNKNOWN - none known   15. Do you have any artifical heart valves or other implanted medical devices like a pacemaker, defibrillator, or continuous glucose monitor? No   16. Do you have artificial joints? No   17. Are you allergic to latex? No       Health Care Directive  Patient does not have a Health Care Directive or Living Will: Discussed advance care planning with patient; information given to patient to review.    Preoperative Review of    reviewed - no record of controlled substances prescribed.      Status of Chronic Conditions:  See problem list for active medical problems.  Problems all longstanding and stable, except as noted/documented.  See ROS for pertinent symptoms related to these conditions.    Patient Active Problem List    Diagnosis Date Noted    Primary hypertension 01/12/2024     Priority: Medium    Chest pain 01/11/2024     Priority: Medium    NSTEMI (non-ST elevated myocardial infarction) (H) 01/11/2024     Priority: Medium    Chest pain, unspecified type 01/11/2024     Priority: Medium    Blurred vision 08/14/2023     Priority: Medium    Diverticulitis of colon 05/31/2023     Priority: Medium    Dysthymia  05/17/2023     Priority: Medium    Osteoporosis without current pathological fracture, unspecified osteoporosis type 01/22/2023     Priority: Medium    Status post coronary angiogram 10/20/2022     Priority: Medium    SVT (supraventricular tachycardia) 03/08/2022     Priority: Medium    Peripheral neuropathy 12/08/2021     Priority: Medium    Onychomycosis 10/04/2021     Priority: Medium    Diverticular disease of colon 09/08/2021     Priority: Medium     Formatting of this note might be different from the original.  Created by Conversion    Replacement Utility updated for latest IMO load      Autoimmune thyroiditis 05/03/2021     Priority: Medium     Formatting of this note might be different from the original.  Formatting of this note might be different from the original.  Created by Conversion      Sarcoidosis 04/07/2020     Priority: Medium     Created by Conversion      Vitamin D deficiency 04/07/2020     Priority: Medium     Created by Conversion    Replacement Utility updated for latest IMO load      Personal history of malignant neoplasm of prostate 06/14/2018     Priority: Medium    Erectile dysfunction following radical prostatectomy 06/07/2018     Priority: Medium    Right bundle branch block 02/08/2018     Priority: Medium      Past Medical History:   Diagnosis Date    Abdominal pain, unspecified site     Abnormal thyroid ultrasound     Adjustment disorder with mixed anxiety and depressed mood     Arthritis     Autoimmune thyroiditis     BBB (bundle branch block)     right    Chronic lymphocytic thyroiditis     Coronary artery disease     Diverticulosis of colon (without mention of hemorrhage)     Hypertension     Localized superficial swelling, mass, or lump     Low back pain 11/2023    Malignant neoplasm of prostate (H) 2018    Nontoxic multinodular goiter     NSTEMI (non-ST elevated myocardial infarction) (H) 1/11/2024    Osteoporosis     Other and unspecified hyperlipidemia     Other nonspecific  abnormal serum enzyme levels     Pain in joint, shoulder region     Peripheral neuropathy     Persistent hoarseness     Sarcoidosis     Seen on chest xray at age 28    Scleritis, unspecified     Status post coronary angiogram 10/20/2022    SVT (supraventricular tachycardia)     Unspecified hypothyroidism     Unspecified vitamin D deficiency      Past Surgical History:   Procedure Laterality Date    CAPSULOTOMY Bilateral     CATARACT EXTRACTION Bilateral     CV CORONARY ANGIOGRAM N/A 10/20/2022    Procedure: Coronary Angiogram;  Surgeon: Mir Farr MD;  Location:  [facility]    CV FRACTIONAL FLOW RATIO WIRE N/A 10/20/2022    Procedure: Fractional Flow Ratio Wire;  Surgeon: Mir Farr MD;  Location:  [facility]    EYE SURGERY Bilateral     Laser eye surgery    HC SHLDR ARTHROSCOP,PART ACROMIOPLAS Right     Description: Shoulder Arthroscopy, Space Decompression And Acromioplasty;  Recorded: 02/18/2014;    NE LAP,PROSTATECTOMY,RADICAL,W/NERVE SPARE,INCL ROBOTIC Bilateral 02/27/2018    Procedure: ROBOTIC ASSISTED RADICAL RETROPUBIC PROSTATECTOMY, BILATERAL PELVIC LYMPH NODE DISSECTION LYSIS OF ADHESIONS;  Surgeon: Wale Rodriguez MD;  Location: [facility];  Service: Urology    NE RADIAL KERATOTOMY Bilateral     Description: Cornea Radial Keratotomy;  Recorded: 02/18/2014;     Current Outpatient Medications   Medication Sig Dispense Refill    aspirin 81 MG EC tablet Take 1 tablet (81 mg) by mouth every other day Stopped 1/2/24 before surgery      Cyanocobalamin 5000 MCG SUBL Place 1 tablet under the tongue every other day      levothyroxine (SYNTHROID/LEVOTHROID) 75 MCG tablet TAKE 1 TABLET BY MOUTH DAILY 100 tablet 2    losartan (COZAAR) 50 MG tablet Take 50 mg by mouth daily      pilocarpine (PILOCAR) 2 % ophthalmic solution Apply 1 drop to eye 2 times daily as needed      nitroGLYcerin (NITROSTAT) 0.4 MG sublingual tablet For chest pain  "place 1 tablet under the tongue every 5 minutes for 3 doses. If symptoms persist 5 minutes after 1st dose call 911. (Patient not taking: Reported on 2/2/2024) 25 tablet 3    rosuvastatin (CRESTOR) 10 MG tablet Take 1 tablet (10 mg) by mouth daily (Patient not taking: Reported on 2/7/2024) 30 tablet 3       Allergies   Allergen Reactions    Ciprofloxacin Other (See Comments)     Pt states he was told not to take because of \"another medicine he is on\"    Levaquin [Levofloxacin] Other (See Comments)     Pt states he was told not to take because of \"another medicine he is on\"          Social History     Tobacco Use    Smoking status: Never    Smokeless tobacco: Never   Substance Use Topics    Alcohol use: Not Currently     Comment: Alcoholic Drinks/day: rarely     Family History   Problem Relation Age of Onset    No Known Problems Mother     Coronary Artery Disease Father     Aortic aneurysm Father     Prostate Cancer Maternal Grandfather     Prostate Cancer Maternal Uncle      History   Drug Use No         Review of Systems    Review of Systems  Constitutional, HEENT, cardiovascular, pulmonary, gi and gu systems are negative, except as otherwise noted.  Objective    /56 (BP Location: Left arm, Patient Position: Sitting, Cuff Size: Adult Regular)   Pulse 68   Temp 97.6  F (36.4  C) (Tympanic)   Resp 16   Ht 1.783 m (5' 10.2\")   Wt 89.7 kg (197 lb 12.8 oz)   SpO2 98%   BMI 28.22 kg/m     Estimated body mass index is 28.22 kg/m  as calculated from the following:    Height as of this encounter: 1.783 m (5' 10.2\").    Weight as of this encounter: 89.7 kg (197 lb 12.8 oz).  Physical Exam  GENERAL: alert and no distress  EYES: Eyes grossly normal to inspection, PERRL and conjunctivae and sclerae normal  HENT: ear canals and TM's normal, nose and mouth without ulcers or lesions  NECK: no adenopathy, no asymmetry, masses, or scars  RESP: lungs clear to auscultation - no rales, rhonchi or wheezes  CV: Regular with 1 " out of 6 to 2 out of 6 systolic murmur at the apex.  ABDOMEN: soft, nontender, no hepatosplenomegaly, no masses and bowel sounds normal  MS: no gross musculoskeletal defects noted, no edema  SKIN: no suspicious lesions or rashes  NEURO: Normal strength and tone, mentation intact and speech normal  PSYCH: mentation appears normal, affect normal/bright    Recent Labs   Lab Test 01/11/24  2226 01/11/24  1550 11/28/23  1203 01/24/23  1537 10/20/22  1233   HGB 15.5 15.6 16.0   < > 15.1    231 212   < > 244   INR  --   --   --   --  0.98   NA  --  139 138   < > 141   POTASSIUM  --  4.7 5.1   < > 4.2   CR  --  1.06 1.02   < > 1.01   A1C  --  5.7*  --   --   --     < > = values in this interval not displayed.        Diagnostics  Labs pending at this time.  Results will be reviewed when available.   No EKG this visit, completed in the last 90 days.    Revised Cardiac Risk Index (RCRI)  The patient has the following serious cardiovascular risks for perioperative complications:   - Coronary Artery Disease (MI, positive stress test, angina, Qs on EKG) = 1 point     RCRI Interpretation: 1 point: Class II (low risk - 0.9% complication rate)         Signed Electronically by: COLBY BROCK MD  Copy of this evaluation report is provided to requesting physician.

## 2024-02-08 LAB
ANION GAP SERPL CALCULATED.3IONS-SCNC: 8 MMOL/L (ref 7–15)
BUN SERPL-MCNC: 12.6 MG/DL (ref 8–23)
CALCIUM SERPL-MCNC: 9.3 MG/DL (ref 8.8–10.2)
CHLORIDE SERPL-SCNC: 105 MMOL/L (ref 98–107)
CREAT SERPL-MCNC: 1.08 MG/DL (ref 0.51–1.17)
DEPRECATED HCO3 PLAS-SCNC: 27 MMOL/L (ref 22–29)
EGFRCR SERPLBLD CKD-EPI 2021: 71 ML/MIN/1.73M2
GLUCOSE SERPL-MCNC: 113 MG/DL (ref 70–99)
POTASSIUM SERPL-SCNC: 4.8 MMOL/L (ref 3.4–5.3)
SODIUM SERPL-SCNC: 140 MMOL/L (ref 135–145)

## 2024-02-28 RX ORDER — ATORVASTATIN CALCIUM 20 MG/1
20 TABLET, FILM COATED ORAL EVERY EVENING
COMMUNITY
End: 2024-05-07

## 2024-02-28 RX ORDER — NAPROXEN SODIUM 220 MG
220 TABLET ORAL 2 TIMES DAILY PRN
Status: ON HOLD | COMMUNITY
End: 2024-03-07

## 2024-02-28 NOTE — PROGRESS NOTES
Planning to discharge home on POD 1 in the morning with his significant other helping him.       02/28/24 1143   Discharge Planning   Patient/Family Anticipates Transition to home with family   Concerns to be Addressed all concerns addressed in this encounter   Living Arrangements   People in Home significant other   Type of Residence Private Residence   Is your private residence a single family home or apartment? Single family home   Number of Stairs, Within Home, Primary none  (2 exterior steps)   Once home, are you able to live on one level? Yes   Which level? Main Level   Bathroom Shower/Tub Walk-in shower   Equipment Currently Used at Home shower chair  (Has a walker, cane, and crutches)   Support System   Support Systems Spouse/Significant Other  (significant other, Yessi)   Do you have someone available to stay with you one or two nights once you are home? Yes   Education   Patient attended total joint pre-op class/received pre-op teaching  email/phone call

## 2024-03-05 ENCOUNTER — TRANSFERRED RECORDS (OUTPATIENT)
Dept: HEALTH INFORMATION MANAGEMENT | Facility: CLINIC | Age: 79
End: 2024-03-05
Payer: COMMERCIAL

## 2024-03-05 ENCOUNTER — ANESTHESIA EVENT (OUTPATIENT)
Dept: SURGERY | Facility: CLINIC | Age: 79
End: 2024-03-05
Payer: COMMERCIAL

## 2024-03-06 ENCOUNTER — APPOINTMENT (OUTPATIENT)
Dept: PHYSICAL THERAPY | Facility: CLINIC | Age: 79
End: 2024-03-06
Attending: ORTHOPAEDIC SURGERY
Payer: COMMERCIAL

## 2024-03-06 ENCOUNTER — HOSPITAL ENCOUNTER (OUTPATIENT)
Facility: CLINIC | Age: 79
Discharge: HOME | End: 2024-03-07
Attending: ORTHOPAEDIC SURGERY | Admitting: ORTHOPAEDIC SURGERY
Payer: COMMERCIAL

## 2024-03-06 ENCOUNTER — ANESTHESIA (OUTPATIENT)
Dept: SURGERY | Facility: CLINIC | Age: 79
End: 2024-03-06
Payer: COMMERCIAL

## 2024-03-06 ENCOUNTER — APPOINTMENT (OUTPATIENT)
Dept: RADIOLOGY | Facility: CLINIC | Age: 79
End: 2024-03-06
Attending: ORTHOPAEDIC SURGERY
Payer: COMMERCIAL

## 2024-03-06 DIAGNOSIS — Z96.649 STATUS POST TOTAL REPLACEMENT OF HIP, UNSPECIFIED LATERALITY: ICD-10-CM

## 2024-03-06 DIAGNOSIS — Z96.641 STATUS POST TOTAL REPLACEMENT OF RIGHT HIP: ICD-10-CM

## 2024-03-06 DIAGNOSIS — M16.11 PRIMARY OSTEOARTHRITIS OF RIGHT HIP: Primary | ICD-10-CM

## 2024-03-06 PROBLEM — E06.3 AUTOIMMUNE THYROIDITIS: Status: ACTIVE | Noted: 2021-05-03

## 2024-03-06 PROBLEM — Z85.46 PERSONAL HISTORY OF MALIGNANT NEOPLASM OF PROSTATE: Status: ACTIVE | Noted: 2018-06-14

## 2024-03-06 PROCEDURE — 250N000011 HC RX IP 250 OP 636: Performed by: PHYSICIAN ASSISTANT

## 2024-03-06 PROCEDURE — 250N000011 HC RX IP 250 OP 636: Performed by: ANESTHESIOLOGY

## 2024-03-06 PROCEDURE — 250N000013 HC RX MED GY IP 250 OP 250 PS 637: Performed by: INTERNAL MEDICINE

## 2024-03-06 PROCEDURE — 258N000003 HC RX IP 258 OP 636: Performed by: ANESTHESIOLOGY

## 2024-03-06 PROCEDURE — 97530 THERAPEUTIC ACTIVITIES: CPT | Mod: GP

## 2024-03-06 PROCEDURE — C1776 JOINT DEVICE (IMPLANTABLE): HCPCS | Performed by: ORTHOPAEDIC SURGERY

## 2024-03-06 PROCEDURE — 370N000017 HC ANESTHESIA TECHNICAL FEE, PER MIN: Performed by: ORTHOPAEDIC SURGERY

## 2024-03-06 PROCEDURE — 999N000182 XR SURGERY CARM FLUORO GREATER THAN 5 MIN: Mod: TC

## 2024-03-06 PROCEDURE — 272N000001 HC OR GENERAL SUPPLY STERILE: Performed by: ORTHOPAEDIC SURGERY

## 2024-03-06 PROCEDURE — 250N000009 HC RX 250: Performed by: NURSE ANESTHETIST, CERTIFIED REGISTERED

## 2024-03-06 PROCEDURE — 250N000013 HC RX MED GY IP 250 OP 250 PS 637: Performed by: ORTHOPAEDIC SURGERY

## 2024-03-06 PROCEDURE — 250N000013 HC RX MED GY IP 250 OP 250 PS 637: Performed by: HOSPITALIST

## 2024-03-06 PROCEDURE — 258N000003 HC RX IP 258 OP 636: Performed by: NURSE ANESTHETIST, CERTIFIED REGISTERED

## 2024-03-06 PROCEDURE — 250N000011 HC RX IP 250 OP 636: Performed by: ORTHOPAEDIC SURGERY

## 2024-03-06 PROCEDURE — 97161 PT EVAL LOW COMPLEX 20 MIN: CPT | Mod: GP

## 2024-03-06 PROCEDURE — 999N000141 HC STATISTIC PRE-PROCEDURE NURSING ASSESSMENT: Performed by: ORTHOPAEDIC SURGERY

## 2024-03-06 PROCEDURE — 250N000009 HC RX 250: Performed by: PHYSICIAN ASSISTANT

## 2024-03-06 PROCEDURE — 258N000001 HC RX 258: Performed by: ORTHOPAEDIC SURGERY

## 2024-03-06 PROCEDURE — 97116 GAIT TRAINING THERAPY: CPT | Mod: GP

## 2024-03-06 PROCEDURE — 250N000011 HC RX IP 250 OP 636: Performed by: NURSE ANESTHETIST, CERTIFIED REGISTERED

## 2024-03-06 PROCEDURE — 250N000013 HC RX MED GY IP 250 OP 250 PS 637: Performed by: PHYSICIAN ASSISTANT

## 2024-03-06 PROCEDURE — 710N000010 HC RECOVERY PHASE 1, LEVEL 2, PER MIN: Performed by: ORTHOPAEDIC SURGERY

## 2024-03-06 PROCEDURE — 99205 OFFICE O/P NEW HI 60 MIN: CPT | Performed by: HOSPITALIST

## 2024-03-06 PROCEDURE — 250N000009 HC RX 250: Performed by: ANESTHESIOLOGY

## 2024-03-06 PROCEDURE — C1713 ANCHOR/SCREW BN/BN,TIS/BN: HCPCS | Performed by: ORTHOPAEDIC SURGERY

## 2024-03-06 PROCEDURE — 360N000078 HC SURGERY LEVEL 5, PER MIN: Performed by: ORTHOPAEDIC SURGERY

## 2024-03-06 DEVICE — PINNACLE HIP SOLUTIONS ALTRX POLYETHYLENE ACETABULAR LINER +4 NEUTRAL 36MM ID 54MM OD
Type: IMPLANTABLE DEVICE | Site: HIP | Status: FUNCTIONAL
Brand: PINNACLE ALTRX

## 2024-03-06 DEVICE — PINNACLE GRIPTION ACETABULAR SHELL SECTOR 54MM OD
Type: IMPLANTABLE DEVICE | Site: HIP | Status: FUNCTIONAL
Brand: PINNACLE GRIPTION

## 2024-03-06 DEVICE — BIOLOX DELTA CERAMIC FEMORAL HEAD +5.0 36MM DIA 12/14 TAPER
Type: IMPLANTABLE DEVICE | Site: HIP | Status: FUNCTIONAL
Brand: BIOLOX DELTA

## 2024-03-06 DEVICE — PINNACLE CANCELLOUS BONE SCREW 6.5MM X 30MM
Type: IMPLANTABLE DEVICE | Site: HIP | Status: FUNCTIONAL
Brand: PINNACLE

## 2024-03-06 DEVICE — ACTIS DUOFIX HIP PROSTHESIS (FEMORAL STEM 12/14 TAPER CEMENTLESS SIZE 11 STD COLLAR)  CE
Type: IMPLANTABLE DEVICE | Site: HIP | Status: FUNCTIONAL
Brand: ACTIS

## 2024-03-06 RX ORDER — CEFAZOLIN SODIUM 2 G/100ML
2 INJECTION, SOLUTION INTRAVENOUS EVERY 8 HOURS
Qty: 200 ML | Refills: 0 | Status: COMPLETED | OUTPATIENT
Start: 2024-03-06 | End: 2024-03-07

## 2024-03-06 RX ORDER — TRANEXAMIC ACID 650 MG/1
1950 TABLET ORAL ONCE
Status: COMPLETED | OUTPATIENT
Start: 2024-03-06 | End: 2024-03-06

## 2024-03-06 RX ORDER — CEFAZOLIN SODIUM/WATER 2 G/20 ML
2 SYRINGE (ML) INTRAVENOUS SEE ADMIN INSTRUCTIONS
Status: DISCONTINUED | OUTPATIENT
Start: 2024-03-06 | End: 2024-03-06

## 2024-03-06 RX ORDER — SODIUM CHLORIDE, SODIUM LACTATE, POTASSIUM CHLORIDE, CALCIUM CHLORIDE 600; 310; 30; 20 MG/100ML; MG/100ML; MG/100ML; MG/100ML
INJECTION, SOLUTION INTRAVENOUS CONTINUOUS
Status: DISCONTINUED | OUTPATIENT
Start: 2024-03-06 | End: 2024-03-06 | Stop reason: HOSPADM

## 2024-03-06 RX ORDER — DEXAMETHASONE SODIUM PHOSPHATE 10 MG/ML
INJECTION, SOLUTION INTRAMUSCULAR; INTRAVENOUS PRN
Status: DISCONTINUED | OUTPATIENT
Start: 2024-03-06 | End: 2024-03-06

## 2024-03-06 RX ORDER — CEFAZOLIN SODIUM/WATER 2 G/20 ML
2 SYRINGE (ML) INTRAVENOUS
Status: COMPLETED | OUTPATIENT
Start: 2024-03-06 | End: 2024-03-06

## 2024-03-06 RX ORDER — POLYETHYLENE GLYCOL 3350 17 G/17G
17 POWDER, FOR SOLUTION ORAL DAILY
Status: DISCONTINUED | OUTPATIENT
Start: 2024-03-07 | End: 2024-03-07 | Stop reason: HOSPADM

## 2024-03-06 RX ORDER — LANOLIN ALCOHOL/MO/W.PET/CERES
3 CREAM (GRAM) TOPICAL
Status: DISCONTINUED | OUTPATIENT
Start: 2024-03-06 | End: 2024-03-07 | Stop reason: HOSPADM

## 2024-03-06 RX ORDER — PROCHLORPERAZINE MALEATE 5 MG
5 TABLET ORAL EVERY 6 HOURS PRN
Status: DISCONTINUED | OUTPATIENT
Start: 2024-03-06 | End: 2024-03-07 | Stop reason: HOSPADM

## 2024-03-06 RX ORDER — SODIUM CHLORIDE, SODIUM LACTATE, POTASSIUM CHLORIDE, CALCIUM CHLORIDE 600; 310; 30; 20 MG/100ML; MG/100ML; MG/100ML; MG/100ML
INJECTION, SOLUTION INTRAVENOUS CONTINUOUS
Status: DISCONTINUED | OUTPATIENT
Start: 2024-03-06 | End: 2024-03-06

## 2024-03-06 RX ORDER — OXYCODONE HYDROCHLORIDE 5 MG/1
5 TABLET ORAL EVERY 4 HOURS PRN
Status: DISCONTINUED | OUTPATIENT
Start: 2024-03-06 | End: 2024-03-07 | Stop reason: HOSPADM

## 2024-03-06 RX ORDER — LIDOCAINE 40 MG/G
CREAM TOPICAL
Status: DISCONTINUED | OUTPATIENT
Start: 2024-03-06 | End: 2024-03-07 | Stop reason: HOSPADM

## 2024-03-06 RX ORDER — PILOCARPINE HYDROCHLORIDE 20 MG/ML
1-2 SOLUTION/ DROPS OPHTHALMIC DAILY
Status: DISCONTINUED | OUTPATIENT
Start: 2024-03-06 | End: 2024-03-07 | Stop reason: HOSPADM

## 2024-03-06 RX ORDER — HYDROMORPHONE HCL IN WATER/PF 6 MG/30 ML
0.4 PATIENT CONTROLLED ANALGESIA SYRINGE INTRAVENOUS EVERY 5 MIN PRN
Status: DISCONTINUED | OUTPATIENT
Start: 2024-03-06 | End: 2024-03-06 | Stop reason: HOSPADM

## 2024-03-06 RX ORDER — ONDANSETRON 2 MG/ML
4 INJECTION INTRAMUSCULAR; INTRAVENOUS EVERY 6 HOURS PRN
Status: DISCONTINUED | OUTPATIENT
Start: 2024-03-06 | End: 2024-03-07 | Stop reason: HOSPADM

## 2024-03-06 RX ORDER — NALOXONE HYDROCHLORIDE 0.4 MG/ML
0.4 INJECTION, SOLUTION INTRAMUSCULAR; INTRAVENOUS; SUBCUTANEOUS
Status: DISCONTINUED | OUTPATIENT
Start: 2024-03-06 | End: 2024-03-07 | Stop reason: HOSPADM

## 2024-03-06 RX ORDER — ONDANSETRON 2 MG/ML
4 INJECTION INTRAMUSCULAR; INTRAVENOUS EVERY 30 MIN PRN
Status: DISCONTINUED | OUTPATIENT
Start: 2024-03-06 | End: 2024-03-06 | Stop reason: HOSPADM

## 2024-03-06 RX ORDER — PROPOFOL 10 MG/ML
INJECTION, EMULSION INTRAVENOUS CONTINUOUS PRN
Status: DISCONTINUED | OUTPATIENT
Start: 2024-03-06 | End: 2024-03-06

## 2024-03-06 RX ORDER — FENTANYL CITRATE 0.05 MG/ML
INJECTION, SOLUTION INTRAMUSCULAR; INTRAVENOUS PRN
Status: DISCONTINUED | OUTPATIENT
Start: 2024-03-06 | End: 2024-03-06

## 2024-03-06 RX ORDER — ONDANSETRON 4 MG/1
4 TABLET, ORALLY DISINTEGRATING ORAL EVERY 6 HOURS PRN
Status: DISCONTINUED | OUTPATIENT
Start: 2024-03-06 | End: 2024-03-07 | Stop reason: HOSPADM

## 2024-03-06 RX ORDER — NALOXONE HYDROCHLORIDE 0.4 MG/ML
0.2 INJECTION, SOLUTION INTRAMUSCULAR; INTRAVENOUS; SUBCUTANEOUS
Status: DISCONTINUED | OUTPATIENT
Start: 2024-03-06 | End: 2024-03-07 | Stop reason: HOSPADM

## 2024-03-06 RX ORDER — BUPIVACAINE HYDROCHLORIDE 7.5 MG/ML
INJECTION, SOLUTION INTRASPINAL
Status: COMPLETED | OUTPATIENT
Start: 2024-03-06 | End: 2024-03-06

## 2024-03-06 RX ORDER — UREA 10 %
500 LOTION (ML) TOPICAL DAILY
Status: DISCONTINUED | OUTPATIENT
Start: 2024-03-07 | End: 2024-03-07 | Stop reason: HOSPADM

## 2024-03-06 RX ORDER — NITROGLYCERIN 0.4 MG/1
0.4 TABLET SUBLINGUAL EVERY 5 MIN PRN
Status: DISCONTINUED | OUTPATIENT
Start: 2024-03-06 | End: 2024-03-07 | Stop reason: HOSPADM

## 2024-03-06 RX ORDER — FENTANYL CITRATE 50 UG/ML
50 INJECTION, SOLUTION INTRAMUSCULAR; INTRAVENOUS EVERY 5 MIN PRN
Status: DISCONTINUED | OUTPATIENT
Start: 2024-03-06 | End: 2024-03-06 | Stop reason: HOSPADM

## 2024-03-06 RX ORDER — ACETAMINOPHEN 325 MG/1
975 TABLET ORAL EVERY 8 HOURS
Qty: 27 TABLET | Refills: 0 | Status: DISCONTINUED | OUTPATIENT
Start: 2024-03-06 | End: 2024-03-07 | Stop reason: HOSPADM

## 2024-03-06 RX ORDER — ONDANSETRON 2 MG/ML
INJECTION INTRAMUSCULAR; INTRAVENOUS PRN
Status: DISCONTINUED | OUTPATIENT
Start: 2024-03-06 | End: 2024-03-06

## 2024-03-06 RX ORDER — ATORVASTATIN CALCIUM 10 MG/1
20 TABLET, FILM COATED ORAL EVERY EVENING
Status: DISCONTINUED | OUTPATIENT
Start: 2024-03-06 | End: 2024-03-07 | Stop reason: HOSPADM

## 2024-03-06 RX ORDER — LOSARTAN POTASSIUM 50 MG/1
50 TABLET ORAL EVERY EVENING
Status: DISCONTINUED | OUTPATIENT
Start: 2024-03-06 | End: 2024-03-07 | Stop reason: HOSPADM

## 2024-03-06 RX ORDER — HYDROMORPHONE HCL IN WATER/PF 6 MG/30 ML
0.2 PATIENT CONTROLLED ANALGESIA SYRINGE INTRAVENOUS EVERY 5 MIN PRN
Status: DISCONTINUED | OUTPATIENT
Start: 2024-03-06 | End: 2024-03-06 | Stop reason: HOSPADM

## 2024-03-06 RX ORDER — EPHEDRINE SULFATE 50 MG/ML
INJECTION, SOLUTION INTRAMUSCULAR; INTRAVENOUS; SUBCUTANEOUS PRN
Status: DISCONTINUED | OUTPATIENT
Start: 2024-03-06 | End: 2024-03-06

## 2024-03-06 RX ORDER — LIDOCAINE 40 MG/G
CREAM TOPICAL
Status: DISCONTINUED | OUTPATIENT
Start: 2024-03-06 | End: 2024-03-06

## 2024-03-06 RX ORDER — HYDROMORPHONE HCL IN WATER/PF 6 MG/30 ML
0.4 PATIENT CONTROLLED ANALGESIA SYRINGE INTRAVENOUS
Status: DISCONTINUED | OUTPATIENT
Start: 2024-03-06 | End: 2024-03-07 | Stop reason: HOSPADM

## 2024-03-06 RX ORDER — LEVOTHYROXINE SODIUM 75 UG/1
75 TABLET ORAL DAILY
Status: DISCONTINUED | OUTPATIENT
Start: 2024-03-07 | End: 2024-03-07 | Stop reason: HOSPADM

## 2024-03-06 RX ORDER — FENTANYL CITRATE 50 UG/ML
25 INJECTION, SOLUTION INTRAMUSCULAR; INTRAVENOUS EVERY 5 MIN PRN
Status: DISCONTINUED | OUTPATIENT
Start: 2024-03-06 | End: 2024-03-06 | Stop reason: HOSPADM

## 2024-03-06 RX ORDER — ASPIRIN 81 MG/1
81 TABLET ORAL 2 TIMES DAILY
Status: DISCONTINUED | OUTPATIENT
Start: 2024-03-06 | End: 2024-03-07 | Stop reason: HOSPADM

## 2024-03-06 RX ORDER — OXYCODONE HYDROCHLORIDE 5 MG/1
10 TABLET ORAL EVERY 4 HOURS PRN
Status: DISCONTINUED | OUTPATIENT
Start: 2024-03-06 | End: 2024-03-07 | Stop reason: HOSPADM

## 2024-03-06 RX ORDER — NALOXONE HYDROCHLORIDE 0.4 MG/ML
0.1 INJECTION, SOLUTION INTRAMUSCULAR; INTRAVENOUS; SUBCUTANEOUS
Status: DISCONTINUED | OUTPATIENT
Start: 2024-03-06 | End: 2024-03-06 | Stop reason: HOSPADM

## 2024-03-06 RX ORDER — ONDANSETRON 4 MG/1
4 TABLET, ORALLY DISINTEGRATING ORAL EVERY 30 MIN PRN
Status: DISCONTINUED | OUTPATIENT
Start: 2024-03-06 | End: 2024-03-06 | Stop reason: HOSPADM

## 2024-03-06 RX ORDER — HYDROMORPHONE HCL IN WATER/PF 6 MG/30 ML
0.2 PATIENT CONTROLLED ANALGESIA SYRINGE INTRAVENOUS
Status: DISCONTINUED | OUTPATIENT
Start: 2024-03-06 | End: 2024-03-07 | Stop reason: HOSPADM

## 2024-03-06 RX ORDER — MAGNESIUM SULFATE 4 G/50ML
4 INJECTION INTRAVENOUS ONCE
Status: COMPLETED | OUTPATIENT
Start: 2024-03-06 | End: 2024-03-06

## 2024-03-06 RX ORDER — SODIUM CHLORIDE, SODIUM LACTATE, POTASSIUM CHLORIDE, CALCIUM CHLORIDE 600; 310; 30; 20 MG/100ML; MG/100ML; MG/100ML; MG/100ML
INJECTION, SOLUTION INTRAVENOUS CONTINUOUS
Status: DISCONTINUED | OUTPATIENT
Start: 2024-03-06 | End: 2024-03-07 | Stop reason: HOSPADM

## 2024-03-06 RX ORDER — ACETAMINOPHEN 325 MG/1
650 TABLET ORAL EVERY 4 HOURS PRN
Status: DISCONTINUED | OUTPATIENT
Start: 2024-03-09 | End: 2024-03-07 | Stop reason: HOSPADM

## 2024-03-06 RX ORDER — BISACODYL 10 MG
10 SUPPOSITORY, RECTAL RECTAL DAILY PRN
Status: DISCONTINUED | OUTPATIENT
Start: 2024-03-06 | End: 2024-03-07 | Stop reason: HOSPADM

## 2024-03-06 RX ORDER — AMOXICILLIN 250 MG
1 CAPSULE ORAL 2 TIMES DAILY
Status: DISCONTINUED | OUTPATIENT
Start: 2024-03-06 | End: 2024-03-07 | Stop reason: HOSPADM

## 2024-03-06 RX ORDER — ACETAMINOPHEN 325 MG/1
975 TABLET ORAL ONCE
Status: COMPLETED | OUTPATIENT
Start: 2024-03-06 | End: 2024-03-06

## 2024-03-06 RX ADMIN — Medication 5 MG: at 10:11

## 2024-03-06 RX ADMIN — PROPOFOL 75 MCG/KG/MIN: 10 INJECTION, EMULSION INTRAVENOUS at 09:13

## 2024-03-06 RX ADMIN — LIDOCAINE HYDROCHLORIDE 50 MG: 10 INJECTION, SOLUTION EPIDURAL; INFILTRATION; INTRACAUDAL; PERINEURAL at 09:13

## 2024-03-06 RX ADMIN — Medication 3 MG: at 23:59

## 2024-03-06 RX ADMIN — LOSARTAN POTASSIUM 50 MG: 50 TABLET, FILM COATED ORAL at 21:13

## 2024-03-06 RX ADMIN — MIDAZOLAM 0.5 MG: 1 INJECTION INTRAMUSCULAR; INTRAVENOUS at 08:58

## 2024-03-06 RX ADMIN — PHENYLEPHRINE HYDROCHLORIDE 100 MCG: 10 INJECTION INTRAVENOUS at 10:02

## 2024-03-06 RX ADMIN — PHENYLEPHRINE HYDROCHLORIDE 100 MCG: 10 INJECTION INTRAVENOUS at 09:47

## 2024-03-06 RX ADMIN — CEFAZOLIN SODIUM 2 G: 2 INJECTION, SOLUTION INTRAVENOUS at 18:09

## 2024-03-06 RX ADMIN — Medication 5 MG: at 09:26

## 2024-03-06 RX ADMIN — FENTANYL CITRATE 50 MCG: 0.05 INJECTION, SOLUTION INTRAMUSCULAR; INTRAVENOUS at 09:05

## 2024-03-06 RX ADMIN — PHENYLEPHRINE HYDROCHLORIDE 100 MCG: 10 INJECTION INTRAVENOUS at 10:03

## 2024-03-06 RX ADMIN — OXYCODONE 5 MG: 5 TABLET ORAL at 12:16

## 2024-03-06 RX ADMIN — TRANEXAMIC ACID 1950 MG: 650 TABLET ORAL at 08:04

## 2024-03-06 RX ADMIN — MAGNESIUM SULFATE HEPTAHYDRATE 4 G: 4 INJECTION, SOLUTION INTRAVENOUS at 08:01

## 2024-03-06 RX ADMIN — ONDANSETRON 4 MG: 2 INJECTION INTRAMUSCULAR; INTRAVENOUS at 09:33

## 2024-03-06 RX ADMIN — SENNOSIDES AND DOCUSATE SODIUM 1 TABLET: 8.6; 5 TABLET ORAL at 21:13

## 2024-03-06 RX ADMIN — ACETAMINOPHEN 975 MG: 325 TABLET ORAL at 18:08

## 2024-03-06 RX ADMIN — OXYCODONE 5 MG: 5 TABLET ORAL at 18:09

## 2024-03-06 RX ADMIN — ASPIRIN 81 MG: 81 TABLET, COATED ORAL at 21:13

## 2024-03-06 RX ADMIN — Medication 10 MG: at 10:01

## 2024-03-06 RX ADMIN — SODIUM CHLORIDE, POTASSIUM CHLORIDE, SODIUM LACTATE AND CALCIUM CHLORIDE: 600; 310; 30; 20 INJECTION, SOLUTION INTRAVENOUS at 09:31

## 2024-03-06 RX ADMIN — Medication 2 G: at 08:58

## 2024-03-06 RX ADMIN — DEXAMETHASONE SODIUM PHOSPHATE 10 MG: 10 INJECTION, SOLUTION INTRAMUSCULAR; INTRAVENOUS at 09:15

## 2024-03-06 RX ADMIN — PHENYLEPHRINE HYDROCHLORIDE 0.6 MCG/KG/MIN: 10 INJECTION INTRAVENOUS at 09:16

## 2024-03-06 RX ADMIN — FENTANYL CITRATE 50 MCG: 0.05 INJECTION, SOLUTION INTRAMUSCULAR; INTRAVENOUS at 09:07

## 2024-03-06 RX ADMIN — ATORVASTATIN CALCIUM 20 MG: 10 TABLET, FILM COATED ORAL at 21:13

## 2024-03-06 RX ADMIN — Medication 5 MG: at 09:47

## 2024-03-06 RX ADMIN — PHENYLEPHRINE HYDROCHLORIDE 100 MCG: 10 INJECTION INTRAVENOUS at 09:22

## 2024-03-06 RX ADMIN — SODIUM CHLORIDE, POTASSIUM CHLORIDE, SODIUM LACTATE AND CALCIUM CHLORIDE: 600; 310; 30; 20 INJECTION, SOLUTION INTRAVENOUS at 07:54

## 2024-03-06 RX ADMIN — ACETAMINOPHEN 975 MG: 325 TABLET ORAL at 08:04

## 2024-03-06 RX ADMIN — BUPIVACAINE HYDROCHLORIDE IN DEXTROSE 1.8 ML: 7.5 INJECTION, SOLUTION SUBARACHNOID at 09:06

## 2024-03-06 ASSESSMENT — ACTIVITIES OF DAILY LIVING (ADL)
ADLS_ACUITY_SCORE: 24
ADLS_ACUITY_SCORE: 27
ADLS_ACUITY_SCORE: 26
ADLS_ACUITY_SCORE: 24
ADLS_ACUITY_SCORE: 27
ADLS_ACUITY_SCORE: 27
ADLS_ACUITY_SCORE: 20
ADLS_ACUITY_SCORE: 24
ADLS_ACUITY_SCORE: 27
ADLS_ACUITY_SCORE: 25
ADLS_ACUITY_SCORE: 27
ADLS_ACUITY_SCORE: 24
ADLS_ACUITY_SCORE: 27
ADLS_ACUITY_SCORE: 24

## 2024-03-06 ASSESSMENT — ENCOUNTER SYMPTOMS: DYSRHYTHMIAS: 1

## 2024-03-06 NOTE — ANESTHESIA PREPROCEDURE EVALUATION
Anesthesia Pre-Procedure Evaluation    Patient: Artis Galvin   MRN: [id number] : 1945        Procedure : Procedure(s):  RIGHT TOTAL HIP ARTHROPLASTY DIRECT ANTERIOR APPROACH ORTHOGRID          Past Medical History:   Diagnosis Date     Abnormal thyroid ultrasound      Adjustment disorder with mixed anxiety and depressed mood      Arthritis      Autoimmune thyroiditis      BBB (bundle branch block)     right     Chronic lymphocytic thyroiditis      Coronary artery disease      Diverticulosis of colon (without mention of hemorrhage)      Hypertension      Low back pain 2023     Malignant neoplasm of prostate (H) 2018     Nontoxic multinodular goiter      NSTEMI (non-ST elevated myocardial infarction) (H) 2024     Osteoporosis      Other and unspecified hyperlipidemia      Pain in joint, shoulder region      Peripheral neuropathy      Persistent hoarseness      Sarcoidosis     Seen on chest xray at age 28     Scleritis, unspecified      Status post coronary angiogram 10/20/2022     SVT (supraventricular tachycardia)      Unspecified hypothyroidism      Unspecified vitamin D deficiency       Past Surgical History:   Procedure Laterality Date     CAPSULOTOMY Bilateral      CATARACT EXTRACTION Bilateral      CV CORONARY ANGIOGRAM N/A 10/20/2022    Procedure: Coronary Angiogram;  Surgeon: Mir Farr MD;  Location:  [facility]     CV FRACTIONAL FLOW RATIO WIRE N/A 10/20/2022    Procedure: Fractional Flow Ratio Wire;  Surgeon: Mir Farr MD;  Location:  [facility]     EYE SURGERY Bilateral     Laser eye surgery     HC SHLDR ARTHROSCOP,PART ACROMIOPLAS Right     Description: Shoulder Arthroscopy, Space Decompression And Acromioplasty;  Recorded: 2014;     PA LAP,PROSTATECTOMY,RADICAL,W/NERVE SPARE,INCL ROBOTIC Bilateral 2018    Procedure: ROBOTIC ASSISTED RADICAL RETROPUBIC PROSTATECTOMY, BILATERAL PELVIC LYMPH NODE DISSECTION  "LYSIS OF ADHESIONS;  Surgeon: Wale Rodriguez MD;  Location: [facility];  Service: Urology     SC RADIAL KERATOTOMY Bilateral     Description: Cornea Radial Keratotomy;  Recorded: 02/18/2014;      Allergies   Allergen Reactions     Ciprofloxacin Other (See Comments)     Pt states he was told not to take because of \"another medicine he is on\"     Levaquin [Levofloxacin] Other (See Comments)     Pt states he was told not to take because of \"another medicine he is on\"        Social History     Tobacco Use     Smoking status: Never     Smokeless tobacco: Never   Substance Use Topics     Alcohol use: Not Currently     Comment: Alcoholic Drinks/day: rarely      Wt Readings from Last 1 Encounters:   02/07/24 89.7 kg (197 lb 12.8 oz)        Anesthesia Evaluation            ROS/MED HX  ENT/Pulmonary:  - neg pulmonary ROS  COPD: hx sarcoid.   Neurologic:  - neg neurologic ROS   (+)    peripheral neuropathy,                            Cardiovascular:     (+)  hypertension- -  CAD - past MI - -                        dysrhythmias (SVT), Other,          (-) angina and angina   METS/Exercise Tolerance:     Hematologic:  - neg hematologic  ROS     Musculoskeletal:   (+)  arthritis,             GI/Hepatic:    (-) GERD   Renal/Genitourinary:  - neg Renal ROS     Endo:       Psychiatric/Substance Use:     (+) psychiatric history depression and anxiety       Infectious Disease:  - neg infectious disease ROS     Malignancy:       Other:          Physical Exam    Airway        Mallampati: II   TM distance: > 3 FB   Neck ROM: full   Mouth opening: > 3 cm    Respiratory Devices and Support         Dental       (+) Minor Abnormalities - some fillings, tiny chips      Cardiovascular   cardiovascular exam normal       Rhythm and rate: regular and normal     Pulmonary   pulmonary exam normal        breath sounds clear to auscultation       OUTSIDE LABS:  CBC:   Lab Results   Component Value Date    WBC 5.4 02/07/2024    WBC 7.1 " "01/11/2024    HGB 14.8 02/07/2024    HGB 15.5 01/11/2024    HCT 44.4 02/07/2024    HCT 45.4 01/11/2024     02/07/2024     01/11/2024     BMP:   Lab Results   Component Value Date     02/07/2024     01/11/2024    POTASSIUM 4.8 02/07/2024    POTASSIUM 4.7 01/11/2024    CHLORIDE 105 02/07/2024    CHLORIDE 103 01/11/2024    CO2 27 02/07/2024    CO2 25 01/11/2024    BUN 12.6 02/07/2024    BUN 20.1 01/11/2024    CR 1.08 02/07/2024    CR 1.06 01/11/2024     (H) 02/07/2024    GLC 83 01/11/2024     COAGS:   Lab Results   Component Value Date    PTT 27 10/20/2022    INR 0.98 10/20/2022     POC: No results found for: \"BGM\", \"HCG\", \"HCGS\"  HEPATIC:   Lab Results   Component Value Date    ALBUMIN 4.0 01/11/2024    PROTTOTAL 6.8 01/11/2024    ALT 19 01/11/2024    AST 23 01/11/2024    ALKPHOS 121 01/11/2024    BILITOTAL 0.5 01/11/2024     OTHER:   Lab Results   Component Value Date    LACT 1.7 03/07/2018    A1C 5.7 (H) 01/11/2024    DEANDRA 9.3 02/07/2024    MAG 2.1 11/04/2020    TSH 2.08 08/31/2023    T4 1.33 01/24/2023    CRP 0.2 04/10/2020    SED 10 09/06/2023       Anesthesia Plan    ASA Status:  3       Anesthesia Type: Spinal.         Techniques and Equipment:       - Drips/Meds: Ketamine     Consents    Anesthesia Plan(s) and associated risks, benefits, and realistic alternatives discussed. Questions answered and patient/representative(s) expressed understanding.     - Discussed:     - Discussed with:  Patient            Postoperative Care    Pain management: IV analgesics, Multi-modal analgesia.   PONV prophylaxis: Ondansetron (or other 5HT-3), Dexamethasone or Solumedrol     Comments:             Gerard Catselan MD    I have reviewed the pertinent notes and labs in the chart from the past 30 days and (re)examined the patient.  Any updates or changes from those notes are reflected in this note.             # Drug Induced Platelet Defect: home medication list includes an antiplatelet medication  # " "Overweight: Estimated body mass index is 28.22 kg/m  as calculated from the following:    Height as of 2/7/24: 1.783 m (5' 10.2\").    Weight as of 2/7/24: 89.7 kg (197 lb 12.8 oz).      "

## 2024-03-06 NOTE — PHARMACY-ADMISSION MEDICATION HISTORY
Pharmacist Admission Medication History    Admission medication history is complete. The information provided in this note is only as accurate as the sources available at the time of the update.    Information Source(s): Patient and CareEverywhere/[employer] via in-person    Pertinent Information: Both atorvastatin and rosuvastatin filled through [employer]. Pt states he is finishing course of atorvastatin, then switching to rosuvastatin.    Allergies reviewed with patient and updates made in EHR: yes    Medication History Completed By: Priyanka Wang PharmD 3/6/2024 8:17 AM    PTA Med List   Medication Sig Note Last Dose    aspirin 81 MG EC tablet Take 81 mg by mouth daily 3/4/2024: LD 2/14 Past Month at am    atorvastatin (LIPITOR) 20 MG tablet Take 20 mg by mouth every evening Taking until completed and then will start rosuvastatin 3/6/2024: Taking until completed and then will start rosuvastatin 3/5/2024 at pm    cholecalciferol (VITAMIN D3) 125 mcg (5000 units) capsule Take 125 mcg by mouth daily  Past Week at am    levothyroxine (SYNTHROID/LEVOTHROID) 75 MCG tablet TAKE 1 TABLET BY MOUTH DAILY  3/6/2024 at am    losartan (COZAAR) 50 MG tablet Take 50 mg by mouth every evening  3/5/2024 at pm    naproxen sodium (ANAPROX) 220 MG tablet Take 220 mg by mouth 2 times daily as needed for moderate pain Stopping 3/1/24 before surgery  Past Week at prn    nitroGLYcerin (NITROSTAT) 0.4 MG sublingual tablet For chest pain place 1 tablet under the tongue every 5 minutes for 3 doses. If symptoms persist 5 minutes after 1st dose call 911.  Unknown at prn    pilocarpine (PILOCAR) 2 % ophthalmic solution Place 1-2 drops into both eyes daily  3/5/2024 at am, has with    vitamin B-12 (CYANOCOBALAMIN) 500 MCG tablet Take 500 mcg by mouth daily  Past Week at am

## 2024-03-06 NOTE — CONSULTS
[facility]  Consult Note - Hospitalist Service  Date of Admission:  3/6/2024  Consult Requested by: Orthopedics  Reason for Consult: Postop medical cares    Assessment & Plan   Artis Galvin is a 78 year old old adult with a history of HTN, HLD, hypothyroidism, OA underwent right hip surgery. [facility] service was asked to evaluate patient for postoperative medical management as follows below. Please resume the home medications as reconciled and further noted below with ordered hold parameters.  Vital signs have been stable post-operatively including hemodynamically stable blood pressure and heart rate. Thank you for this consult; we will continue to follow this patient until discharge.    HTN  -Order home medications with the written tiered hold parameters given risk for post-operative hypotension. Given ACEi/ARB/diuretic medication, ordered creatine, potassium, and magnesium to evaluate renal function and electrolytes, respectively.      HLD  -Order home statin.    Hypothyroidism  -Order home thyroid replacement/levothyroxine.     S/p Right Hip Surgery  Hip OA  Acute Post-Op Pain  -Agree with current pain control regimen; consider acute pain team consultation if increasingly difficult to control or proves refractory.   -PT evaluation.   -OT evaluation.  -IS frequently, initially every hour while awake as tolerated. Directly encouraged and discussed.      Post-Op DVT Prophylaxis  -As per primary surgery team.    Procedure(s):  RIGHT TOTAL HIP ARTHROPLASTY DIRECT ANTERIOR APPROACH ORTHOGRID  Post-operative Day: Day of Surgery  Code status:Prior     Estimated Blood Loss:  200 mL    Hospital Problem List   No problem-specific Assessment & Plan notes found for this encounter.    Principal Problem:    S/P total hip arthroplasty  Active Problems:    Personal history of malignant neoplasm of prostate    Autoimmune thyroiditis    Primary hypertension    Nonocclusive coronary atherosclerosis of native  "coronary artery      -Reviewed the patient's preoperative H and P and updated missing elements.  -Home medication reconciliation has been reviewed.  Medications have been ordered as noted from the home list and changes are documented above      Clinically Significant Risk Factors Present on Admission                # Drug Induced Platelet Defect: home medication list includes an antiplatelet medication   # Hypertension: Noted on problem list      # Overweight: Estimated body mass index is 26.98 kg/m  as calculated from the following:    Height as of this encounter: 1.778 m (5' 10\").    Weight as of this encounter: 85.3 kg (188 lb).              Kane Molina MD  Hospitalist Service  Securely message with [employer] (more info)  Text page via [facility] Paging/Directory   ______________________________________________________________________    Chief Complaint   Postop medical care\s    History is obtained from the patient and EMR    History of Present Illness   Walter Testa is a 78 year old old adult with a history of HTN, HLD, hypothyroidism, OA underwent right hip surgery.     He has been in his usual state of health prior to presenting for this elective surgery.  He denies any associated symptoms prior to coming in today, and also denies any recent travel domestically or internationally, and also denies any recent sick contacts around him including any family or friends who have been sick.  When asked, he denies any fevers, rigors, chest pain or shortness of breath, nausea or vomiting or abdominal pain, changes in bowel movements/pattern, urinary symptoms, focal weakness, or any other new and associated symptoms at this time.  He has been compliant with his medications.  14 point review of systems performed with the patient without any other pertinent positives at this time.    Presently, he states his pain is currently well-tolerated.  He denies any new complaints or symptoms at this time.    All questions he had at " this time were answered.       Past Medical History    Past Medical History:   Diagnosis Date    Abnormal thyroid ultrasound     Adjustment disorder with mixed anxiety and depressed mood     Arthritis     Autoimmune thyroiditis     BBB (bundle branch block)     right    Chronic lymphocytic thyroiditis     Coronary artery disease     Diverticulosis of colon (without mention of hemorrhage)     Hypertension     Low back pain 11/2023    Malignant neoplasm of prostate (H) 2018    Nontoxic multinodular goiter     NSTEMI (non-ST elevated myocardial infarction) (H) 01/11/2024    Osteoporosis     Other and unspecified hyperlipidemia     Pain in joint, shoulder region     Peripheral neuropathy     Persistent hoarseness     Sarcoidosis     Seen on chest xray at age 28    Scleritis, unspecified     Status post coronary angiogram 10/20/2022    SVT (supraventricular tachycardia)     Unspecified hypothyroidism     Unspecified vitamin D deficiency        Past Surgical History   Past Surgical History:   Procedure Laterality Date    CAPSULOTOMY Bilateral     CATARACT EXTRACTION Bilateral     CV CORONARY ANGIOGRAM N/A 10/20/2022    Procedure: Coronary Angiogram;  Surgeon: Mir Farr MD;  Location:  HEART [facility]ARD[facility] CATH LAB    CV FRACTIONAL FLOW RATIO WIRE N/A 10/20/2022    Procedure: Fractional Flow Ratio Wire;  Surgeon: Mir Farr MD;  Location:  HE[facility] CATH LAB    EYE SURGERY Bilateral     Laser eye surgery    HC SHLDR ARTHROSCOP,PART ACROMIOPLAS Right     Description: Shoulder Arthroscopy, Space Decompression And Acromioplasty;  Recorded: 02/18/2014;    SC LAP,PROSTATECTOMY,RADICAL,W/NERVE SPARE,INCL ROBOTIC Bilateral 02/27/2018    Procedure: ROBOTIC ASSISTED RADICAL RETROPUBIC PROSTATECTOMY, BILATERAL PELVIC LYMPH NODE DISSECTION LYSIS OF ADHESIONS;  Surgeon: Wale Rodriguez MD;  Location: [facility];  Service: Urology    SC RADIAL KERATOTOMY Bilateral     Description:  Cornea Radial Keratotomy;  Recorded: 02/18/2014;       Medications   I have reviewed this patient's current medications  Medications Prior to Admission   Medication Sig Dispense Refill Last Dose    aspirin 81 MG EC tablet Take 81 mg by mouth daily   Past Month at am    atorvastatin (LIPITOR) 20 MG tablet Take 20 mg by mouth every evening Taking until completed and then will start rosuvastatin   3/5/2024 at pm    cholecalciferol (VITAMIN D3) 125 mcg (5000 units) capsule Take 125 mcg by mouth daily   Past Week at am    levothyroxine (SYNTHROID/LEVOTHROID) 75 MCG tablet TAKE 1 TABLET BY MOUTH DAILY 100 tablet 2 3/6/2024 at am    losartan (COZAAR) 50 MG tablet Take 50 mg by mouth every evening   3/5/2024 at pm    naproxen sodium (ANAPROX) 220 MG tablet Take 220 mg by mouth 2 times daily as needed for moderate pain Stopping 3/1/24 before surgery   Past Week at prn    nitroGLYcerin (NITROSTAT) 0.4 MG sublingual tablet For chest pain place 1 tablet under the tongue every 5 minutes for 3 doses. If symptoms persist 5 minutes after 1st dose call 911. 25 tablet 3 Unknown at prn    pilocarpine (PILOCAR) 2 % ophthalmic solution Place 1-2 drops into both eyes daily   3/5/2024 at am, has with    vitamin B-12 (CYANOCOBALAMIN) 500 MCG tablet Take 500 mcg by mouth daily   Past Week at am             Physical Exam   Vital Signs: Temp: (!) 96.3  F (35.7  C) Temp src: Core BP: 106/59 Pulse: 63   Resp: 14 SpO2: 94 % O2 Device: None (Room air) Oxygen Delivery: 10 LPM  Weight: 188 lbs 0 oz    GENERAL:  Alert, appears comfortable, in no acute distress, appears stated age, on room air   HEAD:  Normocephalic, without obvious abnormality, atraumatic   EYES:  Conjunctiva/corneas clear, no scleral icterus, EOM's appears intact grossly   NOSE: Nares normal, septum midline, mucosa normal, no drainage   THROAT: Lips, mucosa, and tongue appear normal   NECK: Symmetrical, trachea appears midline   BACK:   Symmetric, no curvature noted   LUNGS:    Symmetric chest rise on inhalation, respirations unlabored   CHEST WALL:  No apparent deformity   HEART:  No thrills or heaves visualized   ABDOMEN:   No masses or organomegaly apparent; non-scaphoid   EXTREMITIES: Extremities appear normal, atraumatic, no cyanosis   SKIN: No exanthems in the visualized areas   NEURO: Alert and oriented x4, moves all four extremities freely and spontaneously   PSYCH: Cooperative, behavior is appropriate     Medical Decision Making       81 MINUTES SPENT BY ME on the date of service doing chart review, history, exam, documentation & further activities per the note.      Data         Imaging results reviewed over the past 24 hrs:   Recent Results (from the past 24 hour(s))   XR Surgery MARLI  Fluoro G/T 5 Min    Narrative    This exam was marked as non-reportable because it will not be read by a   radiologist or a [place] non-radiologist provider.

## 2024-03-06 NOTE — OP NOTE
DATE OF SERVICE:3/6/2024    PREOPERATIVE DIAGNOSIS: Osteoarthritis of right hip [M16.11]    POSTOPERATIVE DIAGNOSIS: Osteoarthritis of right hip [M16.11]     OPERATION: Right total hip arthroplasty, direct anterior approach.     ANESTHESIA: Choice     PREP: Routine.     SURGEON: Wale Benavides MD.     ASSISTANT: Jimenez Cano PA-C.     PEDRO LUIS was needed for this case secondary to exposure needs.     INDICATION: Artis Galvin is a 78 year old year old  with extensive arthritis there right hip. They have failed nonsurgical treatment, tried injections, modification of   activities, as well as anti-inflammatory medications. They has night pain, ADLs symptoms daily, and limited range of motion and limited function.     Findings:, Severe end-stage arthritis with labral tearing.  We did use Ortho grid during this case to improve, cup position as well as offset leg length calculations.    Patient brought to the operating room, placed supine, given a spinal anesthetic, was given perioperative antibiotics. The patient then placed on a hana table after   Right hip was then prepped and draped in normal  sterile fashion. Appropriate timeout was completed. At this time, fluoroscopy was brought in the pelvis rotated appropriately and an AP x-ray taken of the right and left hip for postoperative templating. Next, a 10 cm incision was made distal and  lateral to the ASIS, carried down to the tensor fascia. Tensor retracted laterally. The circumflex was identified and coagulated. Retractor was placed over the anterior brim of the acetabulum and superior femoral neck and anterior capsulotomy was then completed. A capsule was then placed intracapsular and a femoral neck cut was completed and femoral head removed without difficulty. They had extensive osteophytes and grade 4 changes. At this point in time, retractors both anteriorly and posteriorly from the acetabulum showed relatively anteverted acetabulum.   Removed some osteophytes.  Went ahead and reamed to 53 mm outer diameter, impacted a 54 mm Gription [place] cup in the appropriate amount of anteversion and   inclination, confirmed by fluoroscopy. Next a 30 mm screw was placed in a upper outer quadrant and a +4   neutral liner for a 36 mm head was impacted.        Next, attention was brought to the  femoral side. The leg was rotated 120 degrees, fully adducted and extended. Went   and broached to a size 11 broach with a size 11 broach and a  5 mm head. I reduced the hip. Offset and leg lengths were essentially identical   to her right hip. There was good fit and fill throughout. Trial implants were   removed. I went ahead and impacted a size 11 Actis stem. A 5 x 36  mm ceramic head was impacted. Hip was then located. Final fluoroscopy films showed concentric reduction, appropriate offset and leg lengths. No fracture identified. The wound was then copiously irrigated. The capsule repaired with a Vicryl suture.  The zenaida-capsular area was injected with a Orthopedic injection. The fascia with a PDS suture, 2-0 Vicryl and a Monocryl stitch. Sterile compressive dressing. The patient brought to recovery in stable condition. All sponge and needle counts correct. No intraoperative complication identified. No specimens.    Blood loss approximately 200 mL.     LUI ALMAGUER MD

## 2024-03-06 NOTE — OR NURSING
Dr. Burroughs at bedside.  Notified him of patient BP of 81/49.  MDA advised to run the remaining of current IV bag in as a bolus.  This RN verbalized understanding.  Bolus infusing

## 2024-03-06 NOTE — ANESTHESIA POSTPROCEDURE EVALUATION
Patient: Artis Galvin    Procedure: Procedure(s):  RIGHT TOTAL HIP ARTHROPLASTY DIRECT ANTERIOR APPROACH ORTHOGRID       Anesthesia Type:  Spinal    Note:  Disposition: Inpatient   Postop Pain Control: Uneventful            Sign Out: Well controlled pain   PONV: No   Neuro/Psych: Uneventful            Sign Out: Acceptable/Baseline neuro status   Airway/Respiratory: Uneventful            Sign Out: Acceptable/Baseline resp. status   CV/Hemodynamics: Uneventful            Sign Out: Acceptable CV status; No obvious hypovolemia; No obvious fluid overload   Other NRE: NONE   DID A NON-ROUTINE EVENT OCCUR? No       Last vitals:  Vitals Value Taken Time   BP 93/52 03/06/24 1120   Temp 35.7  C (96.26  F) 03/06/24 1121   Pulse 59 03/06/24 1121   Resp 17 03/06/24 1121   SpO2 98 % 03/06/24 1121   Vitals shown include unfiled device data.    Electronically Signed By: Gerard Castelan MD  March 6, 2024  11:22 AM

## 2024-03-06 NOTE — INTERVAL H&P NOTE
"I have reviewed the virtual H&P that is linked to this encounter. The physical exam performed by anesthesia during this surgical encounter serves as the physical portion of that the virtual H&P. There are no significant changes from that history and physical as noted.    Clinical Conditions Present on Arrival:  Clinically Significant Risk Factors Present on Admission                 # Drug Induced Platelet Defect: home medication list includes an antiplatelet medication  # Overweight: Estimated body mass index is 28.22 kg/m  as calculated from the following:    Height as of 2/7/24: 1.783 m (5' 10.2\").    Weight as of 2/7/24: 89.7 kg (197 lb 12.8 oz).       "

## 2024-03-06 NOTE — ANESTHESIA CARE TRANSFER NOTE
Patient: Artis Galvin    Procedure: Procedure(s):  RIGHT TOTAL HIP ARTHROPLASTY DIRECT ANTERIOR APPROACH ORTHOGRID       Diagnosis: Osteoarthritis of right hip [M16.11]  Diagnosis Additional Information: No value filed.    Anesthesia Type:   Spinal     Note:    Oropharynx: oropharynx clear of all foreign objects and spontaneously breathing  Level of Consciousness: drowsy  Oxygen Supplementation: face mask  Level of Supplemental Oxygen (L/min / FiO2): 6  Independent Airway: airway patency satisfactory and stable  Dentition: dentition unchanged  Vital Signs Stable: post-procedure vital signs reviewed and stable  Report to RN Given: handoff report given  Patient transferred to: PACU    Handoff Report: Identifed the Patient, Identified the Reponsible Provider, Reviewed the pertinent medical history, Discussed the surgical course, Reviewed Intra-OP anesthesia mangement and issues during anesthesia, Set expectations for post-procedure period and Allowed opportunity for questions and acknowledgement of understanding      Vitals:  Vitals Value Taken Time   /60 03/06/24 1033   Temp 36.3  C (97.4  F) 03/06/24 1037   Pulse 65 03/06/24 1035   Resp 8 03/06/24 1035   SpO2 98 % 03/06/24 1035   Vitals shown include unfiled device data.    Electronically Signed By: JAIDA Masterson CRNA  March 6, 2024  10:37 AM

## 2024-03-06 NOTE — PROGRESS NOTES
03/06/24 1500   Appointment Info   Signing Clinician's Name / Credentials (PT) Miles Pro, PT, DPT   Quick Adds   Quick Adds Certification   Living Environment   People in Home spouse   Current Living Arrangements house   Home Accessibility stairs to enter home   Number of Stairs, Main Entrance 2   Self-Care   Usual Activity Tolerance good   Current Activity Tolerance moderate   Equipment Currently Used at Home none   Fall history within last six months no   General Information   Onset of Illness/Injury or Date of Surgery 03/06/24   Referring Physician Dr. Benavides   Patient/Family Therapy Goals Statement (PT) Decrease pain with mobility   Pertinent History of Current Problem (include personal factors and/or comorbidities that impact the POC) s/p R GIL   Existing Precautions/Restrictions weight bearing   Weight-Bearing Status - LLE full weight-bearing   Weight-Bearing Status - RLE weight-bearing as tolerated   Range of Motion (ROM)   ROM Comment WFL, decreased RLE s/p GIL   Strength (Manual Muscle Testing)   Strength Comments WFL   Bed Mobility   Bed Mobility supine-sit;sit-supine   Supine-Sit [place] (Bed Mobility) modified independence   Sit-Supine [place] (Bed Mobility) modified independence   Impairments Contributing to Impaired Bed Mobility pain;decreased strength   Assistive Device (Bed Mobility) bed rails   Transfers   Transfers sit-stand transfer   Sit-Stand Transfer   Sit-Stand [place] (Transfers) contact guard   Assistive Device (Sit-Stand Transfers) walker, front-wheeled   Gait/Stairs (Locomotion)   [place] Level (Gait) contact guard   Assistive Device (Gait) walker, front-wheeled   Distance in Feet (Gait) 10'   Pattern (Gait) step-through   Deviations/Abnormal Patterns (Gait) khushboo decreased;stride length decreased   Clinical Impression   Criteria for Skilled Therapeutic Intervention Yes, treatment indicated   PT Diagnosis (PT) impaired functional mobility   Influenced by the  following impairments pain, decreased ROM, decreased strength   Functional limitations due to impairments gait, stairs, transfers   Clinical Presentation (PT Evaluation Complexity) stable   Clinical Presentation Rationale pt presents as medically diagnosed   Clinical Decision Making (Complexity) low complexity   Planned Therapy Interventions (PT) gait training;home exercise program;patient/family education;ROM (range of motion);stair training;strengthening;transfer training   Risk & Benefits of therapy have been explained care plan/treatment goals reviewed;patient;spouse/significant other;daughter   PT Total Evaluation Time   PT Eval, Low Complexity Minutes (57543) 10   Therapy Certification   Start of care date 03/06/24   Certification date from 03/06/24   Certification date to 04/06/24   Medical Diagnosis s/p GIL   Physical Therapy Goals   PT Frequency Daily   PT Predicted Duration/Target Date for Goal Attainment 03/08/24   PT Goals PT Goal 1;Gait;Transfers;Stairs   PT: Transfers Modified independent;Sit to/from stand;Assistive device   PT: Gait Modified independent;Rolling walker;150 feet   PT: Stairs Supervision/stand-by assist;2 stairs;Rail on both sides   PT: Goal 1 Independent with GIL HEP   Interventions   Interventions Quick Adds Therapeutic Activity;Gait Training   Therapeutic Activity   Therapeutic Activities: dynamic activities to improve functional performance Minutes (36844) 15   Symptoms Noted During/After Treatment Increased pain   Treatment Detail/Skilled Intervention Supine<>sit Mod I with HOB elevated and use of bed rail, pt moving well. Educated on WB, no precautions, POC, role of therapies. Sitting SBA at EOB. Increased time for set up and due to first time being up. St to/from stand x 2 CGA, cueing for safe hand placement and LE positioning, mild lightheadedness initially but resolved.   Gait Training   Gait Training Minutes (04031) 10   Symptoms Noted During/After Treatment (Gait Training)  increased pain   Treatment Detail/Skilled Intervention Pt amb well in the halls CGA with FWW, educated on walking program, encouraged mobility with nursing tonight as able. No LOB or adverse s/s. Educated on pain control, icing, pacing. One seated rest break. Cues for navigation and safety.   Distance in Feet 100', 50'   [place] Level (Gait Training) contact guard   Physical Assistance Level (Gait Training) verbal cues;supervision   Weight Bearing (Gait Training) weight-bearing as tolerated   Assistive Device (Gait Training) rolling walker   Pattern Analysis (Gait Training) swing-through gait   Gait Analysis Deviations decreased khushboo;decreased step length   Impairments (Gait Analysis/Training) pain;strength decreased   PT Discharge Planning   PT Plan Stair trial, amb, review GIL HEP   PT Discharge Recommendation (DC Rec) other (see comments)   PT Rationale for DC Rec defer to ortho   PT Brief overview of current status CGA for transfers and amb 100' with FWW   PT Equipment Needed at Discharge   (No needs)   [facility]  OUTPATIENT PHYSICAL THERAPY EVALUATION  PLAN OF TREATMENT FOR OUTPATIENT REHABILITATION  (COMPLETE FOR INITIAL CLAIMS ONLY)  Patient's Last Name, First Name, M.I.  YOB: 1945  Artis Galvin                        Provider's Name  [facility] Medical Record No.  [id number]                             Onset Date:  03/06/24   Start of Care Date:  (P) 03/06/24   Type:     _X_PT   ___OT   ___SLP Medical Diagnosis:  (P) s/p GIL              PT Diagnosis:  impaired functional mobility Visits from SOC:  1     See note for plan of treatment, functional goals and certification details    I CERTIFY THE NEED FOR THESE SERVICES FURNISHED UNDER        THIS PLAN OF TREATMENT AND WHILE UNDER MY CARE     (Physician co-signature of this document indicates review and certification of the therapy plan).

## 2024-03-06 NOTE — ANESTHESIA PROCEDURE NOTES
"Intrathecal injection Procedure Note    Pre-Procedure   Staff -        Anesthesiologist:  Gerard Castelan MD       Performed By: anesthesiologist       Location: OR       Procedure Start/Stop Times: 3/6/2024 9:06 AM and 3/6/2024 9:09 AM       Pre-Anesthestic Checklist: patient identified, risks and benefits discussed, informed consent, monitors and equipment checked and pre-op evaluation  Timeout:       Correct Patient: Yes        Correct Procedure: Yes        Correct Site: Yes        Correct Position: Yes   Procedure Documentation  Procedure: intrathecal injection       Diagnosis: hip replacement       Patient Position: sitting       Patient Prep/Sterile Barriers: sterile gloves, mask, patient draped       Skin prep: Chloraprep       Insertion Site: L3-4. (midline approach).       Needle Gauge: 24.        Needle Length (Inches): 4        Spinal Needle Type: Pencan       Introducer used       Introducer: 20 G       # of attempts: 1 and  # of redirects:  0    Assessment/Narrative         Paresthesias: No.       CSF fluid: clear.    Medication(s) Administered   0.75% Hyperbaric Bupivacaine (Intrathecal) - Intrathecal   1.8 mL - 3/6/2024 9:06:00 AM  Medication Administration Time: 3/6/2024 9:06 AM      FOR [facility]) ONLY:   Pain Team Contact information: please page the Pain Team [employer]. Search \"Pain\". During daytime hours, please page the attending first. At night please page the resident first.      "

## 2024-03-06 NOTE — OR NURSING
MDA at bedside. Notified him of patient BP of 81/49. MDA advised to run the remaining of current IV bag in as a bolus. This RN verbalized understanding. Bolus infusing

## 2024-03-07 ENCOUNTER — APPOINTMENT (OUTPATIENT)
Dept: OCCUPATIONAL THERAPY | Facility: CLINIC | Age: 79
End: 2024-03-07
Attending: ORTHOPAEDIC SURGERY
Payer: COMMERCIAL

## 2024-03-07 ENCOUNTER — APPOINTMENT (OUTPATIENT)
Dept: PHYSICAL THERAPY | Facility: CLINIC | Age: 79
End: 2024-03-07
Attending: ORTHOPAEDIC SURGERY
Payer: COMMERCIAL

## 2024-03-07 VITALS
TEMPERATURE: 98 F | OXYGEN SATURATION: 95 % | BODY MASS INDEX: 26.92 KG/M2 | DIASTOLIC BLOOD PRESSURE: 64 MMHG | HEIGHT: 70 IN | WEIGHT: 188 LBS | SYSTOLIC BLOOD PRESSURE: 132 MMHG | HEART RATE: 79 BPM | RESPIRATION RATE: 16 BRPM

## 2024-03-07 LAB
ANION GAP SERPL CALCULATED.3IONS-SCNC: 10 MMOL/L (ref 7–15)
BUN SERPL-MCNC: 15.2 MG/DL (ref 8–23)
CALCIUM SERPL-MCNC: 9 MG/DL (ref 8.8–10.2)
CHLORIDE SERPL-SCNC: 103 MMOL/L (ref 98–107)
CREAT SERPL-MCNC: 1.05 MG/DL (ref 0.51–1.17)
DEPRECATED HCO3 PLAS-SCNC: 25 MMOL/L (ref 22–29)
EGFRCR SERPLBLD CKD-EPI 2021: 73 ML/MIN/1.73M2
GLUCOSE SERPL-MCNC: 122 MG/DL (ref 70–99)
HGB BLD-MCNC: 12.9 G/DL (ref 11.7–17.7)
MAGNESIUM SERPL-MCNC: 2 MG/DL (ref 1.7–2.3)
POTASSIUM SERPL-SCNC: 4.7 MMOL/L (ref 3.4–5.3)
SODIUM SERPL-SCNC: 138 MMOL/L (ref 135–145)

## 2024-03-07 PROCEDURE — 36415 COLL VENOUS BLD VENIPUNCTURE: CPT | Performed by: HOSPITALIST

## 2024-03-07 PROCEDURE — 97116 GAIT TRAINING THERAPY: CPT | Mod: GP

## 2024-03-07 PROCEDURE — 85018 HEMOGLOBIN: CPT | Performed by: ORTHOPAEDIC SURGERY

## 2024-03-07 PROCEDURE — 83735 ASSAY OF MAGNESIUM: CPT | Performed by: HOSPITALIST

## 2024-03-07 PROCEDURE — 99215 OFFICE O/P EST HI 40 MIN: CPT | Performed by: HOSPITALIST

## 2024-03-07 PROCEDURE — 97110 THERAPEUTIC EXERCISES: CPT | Mod: GP

## 2024-03-07 PROCEDURE — 97535 SELF CARE MNGMENT TRAINING: CPT | Mod: GO

## 2024-03-07 PROCEDURE — 250N000013 HC RX MED GY IP 250 OP 250 PS 637: Performed by: ORTHOPAEDIC SURGERY

## 2024-03-07 PROCEDURE — 250N000011 HC RX IP 250 OP 636: Performed by: ORTHOPAEDIC SURGERY

## 2024-03-07 PROCEDURE — 82374 ASSAY BLOOD CARBON DIOXIDE: CPT | Performed by: HOSPITALIST

## 2024-03-07 PROCEDURE — 250N000013 HC RX MED GY IP 250 OP 250 PS 637: Performed by: HOSPITALIST

## 2024-03-07 PROCEDURE — 97166 OT EVAL MOD COMPLEX 45 MIN: CPT | Mod: GO

## 2024-03-07 RX ORDER — ACETAMINOPHEN 325 MG/1
975 TABLET ORAL EVERY 8 HOURS
Qty: 27 TABLET | Refills: 0 | Status: SHIPPED | OUTPATIENT
Start: 2024-03-07 | End: 2024-07-03

## 2024-03-07 RX ORDER — AMOXICILLIN 250 MG
1 CAPSULE ORAL 2 TIMES DAILY
Qty: 30 TABLET | Refills: 1 | Status: SHIPPED | OUTPATIENT
Start: 2024-03-07 | End: 2024-04-09

## 2024-03-07 RX ORDER — OXYCODONE HYDROCHLORIDE 5 MG/1
5-10 TABLET ORAL EVERY 4 HOURS PRN
Qty: 20 TABLET | Refills: 0 | Status: SHIPPED | OUTPATIENT
Start: 2024-03-07 | End: 2024-04-09

## 2024-03-07 RX ORDER — ASPIRIN 81 MG/1
81 TABLET ORAL 2 TIMES DAILY
Status: SHIPPED
Start: 2024-03-07 | End: 2024-05-07 | Stop reason: SINTOL

## 2024-03-07 RX ORDER — ONDANSETRON 4 MG/1
4 TABLET, ORALLY DISINTEGRATING ORAL EVERY 6 HOURS PRN
Qty: 30 TABLET | Refills: 0 | Status: SHIPPED | OUTPATIENT
Start: 2024-03-07 | End: 2024-04-09

## 2024-03-07 RX ORDER — ASPIRIN 81 MG/1
81 TABLET ORAL 2 TIMES DAILY
Qty: 60 TABLET | Refills: 0 | Status: CANCELLED | OUTPATIENT
Start: 2024-03-07

## 2024-03-07 RX ADMIN — LEVOTHYROXINE SODIUM 75 MCG: 75 TABLET ORAL at 05:55

## 2024-03-07 RX ADMIN — CEFAZOLIN SODIUM 2 G: 2 INJECTION, SOLUTION INTRAVENOUS at 00:01

## 2024-03-07 RX ADMIN — ASPIRIN 81 MG: 81 TABLET, COATED ORAL at 08:24

## 2024-03-07 RX ADMIN — POLYETHYLENE GLYCOL 3350 17 G: 17 POWDER, FOR SOLUTION ORAL at 08:24

## 2024-03-07 RX ADMIN — OXYCODONE 10 MG: 5 TABLET ORAL at 10:34

## 2024-03-07 RX ADMIN — ACETAMINOPHEN 975 MG: 325 TABLET ORAL at 00:00

## 2024-03-07 RX ADMIN — SENNOSIDES AND DOCUSATE SODIUM 1 TABLET: 8.6; 5 TABLET ORAL at 08:24

## 2024-03-07 RX ADMIN — ACETAMINOPHEN 975 MG: 325 TABLET ORAL at 08:24

## 2024-03-07 RX ADMIN — CYANOCOBALAMIN TAB 500 MCG 500 MCG: 500 TAB at 08:24

## 2024-03-07 ASSESSMENT — ACTIVITIES OF DAILY LIVING (ADL)
ADLS_ACUITY_SCORE: 29
ADLS_ACUITY_SCORE: 29
ADLS_ACUITY_SCORE: 27
ADLS_ACUITY_SCORE: 29
ADLS_ACUITY_SCORE: 27
ADLS_ACUITY_SCORE: 29

## 2024-03-07 NOTE — PROGRESS NOTES
"                  [facility] Progress Note      Post-operative Day: 1 Day Post-Op    Procedure(s):  RIGHT TOTAL HIP ARTHROPLASTY DIRECT ANTERIOR APPROACH ORTHOGRID      Subjective:    Pain: minimal  Chest pain, SOB:  No      Objective:  Blood pressure 105/57, pulse 79, temperature 98  F (36.7  C), temperature source Oral, resp. rate 16, height 1.778 m (5' 10\"), weight 85.3 kg (188 lb), SpO2 95%.    Patient Vitals for the past 24 hrs:   BP Temp Temp src Pulse Resp SpO2   03/07/24 1026 105/57 -- -- -- -- --   03/07/24 0900 90/54 98  F (36.7  C) Oral 79 16 95 %   03/07/24 0257 (!) 153/66 97.9  F (36.6  C) Oral 87 16 98 %   03/06/24 2300 118/66 97.9  F (36.6  C) Oral 71 16 96 %   03/06/24 2000 (!) 141/65 97.7  F (36.5  C) Oral 72 16 94 %   03/06/24 1800 (!) 143/69 97.2  F (36.2  C) Oral 69 16 95 %   03/06/24 1700 (!) 163/72 97.2  F (36.2  C) Oral 69 16 95 %   03/06/24 1630 127/65 97.2  F (36.2  C) Oral 69 16 95 %   03/06/24 1600 128/69 97.2  F (36.2  C) Oral 68 16 94 %   03/06/24 1515 -- -- -- 67 -- 96 %   03/06/24 1500 116/61 -- -- 64 16 97 %   03/06/24 1430 112/58 -- -- 66 -- 93 %   03/06/24 1400 112/57 -- -- 62 -- 93 %   03/06/24 1330 113/59 -- -- 65 -- 94 %   03/06/24 1300 138/66 -- -- 71 18 97 %   03/06/24 1230 130/60 -- -- 74 16 95 %   03/06/24 1200 112/55 -- -- 62 -- 99 %   03/06/24 1130 106/59 -- -- 63 14 94 %   03/06/24 1115 94/52 (!) 96.3  F (35.7  C) -- 62 13 98 %   03/06/24 1110 95/52 (!) 96.1  F (35.6  C) -- 63 17 99 %   03/06/24 1105 100/57 (!) 96.1  F (35.6  C) -- 63 13 98 %       Wt Readings from Last 4 Encounters:   03/06/24 85.3 kg (188 lb)   02/07/24 89.7 kg (197 lb 12.8 oz)   02/02/24 86.6 kg (191 lb)   01/24/24 86.1 kg (189 lb 14.4 oz)         Motor function, sensation, and circulation of bilateral lower extremities intact   Yes  Wound status: incisions are clean dry and intact. Yes  Post op dressing intact. Yes  Calf tenderness: Bilateral  No    Pertinent Labs   Lab Results: " personally reviewed.     Recent Labs   Lab Test 03/07/24  0623 02/07/24  1546 01/11/24  2226 01/11/24  1550 01/24/23  1537 10/20/22  1233 05/26/20  1059 04/10/20  1104 03/19/20  0847 03/09/20  1504   INR  --   --   --   --   --  0.98  --   --   --   --    WBC  --  5.4 7.1 6.9   < > 5.7   < >  --    < >  --    HGB 12.9 14.8 15.5 15.6   < > 15.1   < >  --    < >  --    HCT  --  44.4 45.4 46.1   < > 44.4   < >  --    < >  --    MCV  --  92 93 93   < > 92   < >  --    < >  --    PLT  --  243 221 231   < > 244   < >  --    < >  --     140  --  139   < > 141   < > 142   < >  --    CRP  --   --   --   --   --   --   --  0.2  --  <0.1    < > = values in this interval not displayed.       Plan: Anticoagulation protocol: Aspirin 81 mg BID              Pain medications:  scopainmedication: oxycodone and tylenol            Weight bearing status:  WBAT            Disposition:  Home today pending PT/OT             Continue cares and rehabilitation     Report completed by:  CRISTI PORTER PA-C  Date: 3/7/2024  Time: 11:04 AM

## 2024-03-07 NOTE — PLAN OF CARE
Problem: Adult Inpatient Plan of Care  Goal: Plan of Care Review  Description: The Plan of Care Review/Shift note should be completed every shift.  The Outcome Evaluation is a brief statement about your assessment that the patient is improving, declining, or no change.  This information will be displayed automatically on your shift  note.  Outcome: Progressing     Problem: Adult Inpatient Plan of Care  Goal: Optimal Comfort and Wellbeing  Outcome: Progressing  Intervention: Monitor Pain and Promote Comfort  Recent Flowsheet Documentation  Taken 3/7/2024 0045 by Clara Cassidy RN  Pain Management Interventions:   cold applied   emotional support   pillow support provided   rest  Taken 3/6/2024 2100 by Clara Cassidy RN  Pain Management Interventions:   cold applied   emotional support   pillow support provided   rest     Problem: Adult Inpatient Plan of Care  Goal: Readiness for Transition of Care  Outcome: Progressing   Goal Outcome Evaluation:    Pt is A&OX4 and VSS except slightly elevated BP. Pt is on RA. Pt is SBA w/ walker and GB and WBAT on right hip. Pt pain is minimal and controlled w/ scheduled Tylenol. Pt has voided and passed gas. Pt neuros q 4hrs complete w/ baseline neuropathy. Early discharge home 03/07/2024 pending AM therapies.

## 2024-03-07 NOTE — PROGRESS NOTES
Occupational Therapy Discharge Summary    Reason for therapy discharge:    Discharged to home.    Progress towards therapy goal(s). See goals on Care Plan in [facility] electronic health record for goal details.  Goals met    Therapy recommendation(s):    No further therapy is recommended.

## 2024-03-07 NOTE — PLAN OF CARE
Patient vital signs are at baseline: Yes  Patient able to ambulate as they were prior to admission or with assist devices provided by therapies during their stay:  Yes  Patient MUST void prior to discharge:  Yes  Patient able to tolerate oral intake:  Yes  Pain has adequate pain control using Oral analgesics:  Yes  Does patient have an identified :  Yes  Has goal D/C date and time been discussed with patient:  Yes, early discharge tomorrow with significant other to discharge.        Problem: Adult Inpatient Plan of Care  Goal: Absence of Hospital-Acquired Illness or Injury  Outcome: Progressing  Intervention: Identify and Manage Fall Risk  Recent Flowsheet Documentation  Taken 3/6/2024 1810 by Arnaldo Bass RN  Safety Promotion/Fall Prevention:   activity supervised   clutter free environment maintained   increased rounding and observation   nonskid shoes/slippers when out of bed   room near nurse's station   room door open   room organization consistent   safety round/check completed   increase visualization of patient  Taken 3/6/2024 1615 by Arnaldo Bass RN  Safety Promotion/Fall Prevention:   activity supervised   clutter free environment maintained   increased rounding and observation   nonskid shoes/slippers when out of bed   room near nurse's station   room door open   room organization consistent   safety round/check completed   increase visualization of patient  Intervention: Prevent Skin Injury  Recent Flowsheet Documentation  Taken 3/6/2024 1810 by Arnaldo Bass RN  Body Position:   position changed independently   weight shifting  Skin Protection: adhesive use limited  Device Skin Pressure Protection:   absorbent pad utilized/changed   adhesive use limited  Taken 3/6/2024 1615 by Arnaldo Bass RN  Body Position:   position changed independently   weight shifting  Skin Protection: adhesive use limited  Device Skin Pressure Protection:   absorbent pad utilized/changed   adhesive use  limited  Intervention: Prevent and Manage VTE (Venous Thromboembolism) Risk  Recent Flowsheet Documentation  Taken 3/6/2024 1810 by Arnaldo Bass RN  VTE Prevention/Management: (Pump Missing) SCDs (sequential compression devices) off  Taken 3/6/2024 1615 by Arnaldo Bass RN  VTE Prevention/Management: (Pump Missing) SCDs (sequential compression devices) off  Intervention: Prevent Infection  Recent Flowsheet Documentation  Taken 3/6/2024 1810 by Arnaldo Bass RN  Infection Prevention:   personal protective equipment utilized   hand hygiene promoted  Taken 3/6/2024 1615 by Arnaldo Bass RN  Infection Prevention:   personal protective equipment utilized   hand hygiene promoted

## 2024-03-07 NOTE — PROGRESS NOTES
Care Management Discharge Note    Discharge Date: 03/07/2024       Discharge Disposition: Home    Discharge Services: None    Discharge DME: None    Discharge Transportation: family or friend will provide    Private pay costs discussed: Not applicable    Does the patient's insurance plan have a 3 day qualifying hospital stay waiver?  Yes     Which insurance plan 3 day waiver is available? Alternative insurance waiver    Will the waiver be used for post-acute placement? No    PAS Confirmation Code:  n/a  Patient/family educated on Medicare website which has current facility and service quality ratings: n/a    Education Provided on the Discharge Plan: Yes  Persons Notified of Discharge Plans: pt   Patient/Family in Agreement with the Plan: yes    Handoff Referral Completed: Yes    Additional Information:  Pt to discharge back to home. Family/friend to transport.    Pt to follow-up with Surgeon Team in clinic.    No CM needs for discharge.     Oleg Hernandez RN

## 2024-03-07 NOTE — PROGRESS NOTES
03/07/24 0732   Appointment Info   Signing Clinician's Name / Credentials (OT) TAMIKA Thomas/L, CLT   Rehab Comments (OT) OT juno   Quick Adds   Quick Adds Certification   Living Environment   People in Home spouse   Current Living Arrangements house   Self-Care   Usual Activity Tolerance good   Current Activity Tolerance moderate   Equipment Currently Used at Home raised toilet seat;shower chair  (walk-in shower)   Fall history within last six months no   Activity/Exercise/Self-Care Comment Pt IND w/ ADLs and IADLs at baseline   General Information   Onset of Illness/Injury or Date of Surgery 03/06/24   Referring Physician Dr. Benavides   Patient/Family Therapy Goal Statement (OT) To go home   Additional Occupational Profile Info/Pertinent History of Current Problem s/p GIL   Existing Precautions/Restrictions weight bearing;no hip ER;no hip hyperextension   Right Upper Extremity (Weight-bearing Status) weight-bearing as tolerated (WBAT)   Cognitive Status Examination   Orientation Status orientation to person, place and time   Visual Perception   Visual Impairment/Limitations WFL   Sensory   Sensory Quick Adds sensation intact   Pain Assessment   Patient Currently in Pain No   Posture   Posture not impaired   Range of Motion Comprehensive   General Range of Motion no range of motion deficits identified   Strength Comprehensive (MMT)   General Manual Muscle Testing (MMT) Assessment no strength deficits identified   Muscle Tone Assessment   Muscle Tone Quick Adds No deficits were identified   Coordination   Upper Extremity Coordination No deficits were identified   Bed Mobility   Bed Mobility supine-sit;sit-supine   Comment (Bed Mobility) SBA-CGA   Transfers   Transfers bed-chair transfer;sit-stand transfer;toilet transfer;shower transfer   Transfer Comments SBA-CGA   Transfer Skill: Bed to Chair/Chair to Bed   Transfer Comments CGA   Sit-Stand Transfer   Sit/Stand Transfer Comments CGA   Shower Transfer    Shower Transfer Comments CGA   Toilet Transfer   Toilet Transfer Comments CGA   Balance   Balance Comments decreased   Activities of Daily Living   BADL Assessment/Intervention lower body dressing;toileting;bathing   Bathing Assessment/Intervention   [place] Level (Bathing) lower body;minimum assist (75% patient effort)   Lower Body Dressing Assessment/Training   [place] Level (Lower Body Dressing) lower body dressing skills;maximum assist (25% patient effort)   Toileting   [place] Level (Toileting) adjust/manage clothing;supervision   Clinical Impression   Criteria for Skilled Therapeutic Interventions Met (OT) Yes, treatment indicated   OT Diagnosis decreased ADLs   Influenced by the following impairments GIL   OT Problem List-Impairments impacting ADL activity tolerance impaired;mobility;pain;post-surgical precautions;flexibility;balance   Assessment of Occupational Performance 3-5 Performance Deficits   Identified Performance Deficits LE dressing/bathing, bed mobility, all transfers, toileting   Planned Therapy Interventions (OT) ADL retraining;bed mobility training;transfer training   Clinical Decision Making Complexity (OT) detailed assessment/moderate complexity   Risk & Benefits of therapy have been explained evaluation/treatment results reviewed;patient   OT Total Evaluation Time   OT Eval, Moderate Complexity Minutes (78459) 10   Therapy Certification   Medical Diagnosis GIL   Start of Care Date 03/07/24   Certification date from 03/07/24   Certification date to 04/06/24   OT Goals   Therapy Frequency (OT) One time eval and treatment   OT Predicted Duration/Target Date for Goal Attainment 03/07/24   OT Goals Lower Body Dressing;Bed Mobility;Toilet Transfer/Toileting   OT: Lower Body Dressing Modified independent;within precautions;Goal Met;Completed;using adaptive equipment   OT: Bed Mobility Modified independent;supine to/from sitting;rolling;Goal Met;Completed   OT: Toilet  Transfer/Toileting Modified independent;toilet transfer;cleaning and garment management;Goal Met;Completed   Interventions   Interventions Quick Adds Self-Care/Home Management   Self-Care/Home Management   Self-Care/Home Mgmt/ADL, Compensatory, Meal Prep Minutes (98494) 28   Symptoms Noted During/After Treatment (Meal Preparation/Planning Training) none   Treatment Detail/Skilled Intervention Pt edu on hip px - demonstrated ability to complete ADLs w/in px. Pt edu on compensatory strategies for LE dressing using reacher and sock aid - completed Mod I w/ AE. STS w/ SBA and FWW. Pt amb. 15 ft to BR w/ FWW Mod I. Edu on toilet/walkin shower transfer w/ grab bars - completed each Mod I. Pt instructed on bed mobility techniques - completed Mod I. Pt edu on sleeping position and car transfers - pt verbalized understanding.   OT Discharge Planning   OT Plan OT d/c   OT Discharge Recommendation (DC Rec) other (see comments)  (defer to ortho)   OT Rationale for DC Rec good support at home from S.O.   OT Brief overview of current status mod I for BADL   OT Equipment Needed at Discharge   (pt declined sock aide/reacher/leg )   Total Session Time   Timed Code Treatment Minutes 28   Total Session Time (sum of timed and untimed services) 38    [facility]  OUTPATIENT OCCUPATIONAL THERAPY  EVALUATION  PLAN OF TREATMENT FOR OUTPATIENT REHABILITATION  (COMPLETE FOR INITIAL CLAIMS ONLY)  Patient's Last Name, First Name, M.I.  YOB: 1945  Artis Galvin                          Provider's Name  [facility] Medical Record No.  [id number]                             Onset Date:  03/06/24   Start of Care Date:  03/07/24   Type:     ___PT   _X_OT   ___SLP Medical Diagnosis:  GIL                    OT Diagnosis:  decreased ADLs Visits from SOC:  1     See note for plan of treatment, functional goals and certification details    I CERTIFY THE NEED FOR THESE  SERVICES FURNISHED UNDER        THIS PLAN OF TREATMENT AND WHILE UNDER MY CARE     (Physician co-signature of this document indicates review and certification of the therapy plan).

## 2024-03-07 NOTE — DISCHARGE SUMMARY
[facility] Discharge Summary                                       LELA JIMENEZ [id number]   Age: 78 year old  PCP: Angel Hernandez, [phone] 1945     Date of Admission:  3/6/2024  Date of Discharge::  3/7/2024  Discharge Physician:  CRISTI PORTER PA-C    Code status:  Full Code    Admission Information:  Admission Diagnosis:  Osteoarthritis of right hip [M16.11]  S/P total hip arthroplasty [Z96.649]    Post-Operative Day: 1 Day Post-Op     Reason for admission:  The patient was admitted for the following:Procedure(s) (LRB):  RIGHT TOTAL HIP ARTHROPLASTY DIRECT ANTERIOR APPROACH ORTHOGRID (Right)    Principal Problem:    S/P total hip arthroplasty  Active Problems:    Personal history of malignant neoplasm of prostate    Autoimmune thyroiditis    Primary hypertension    Nonocclusive coronary atherosclerosis of native coronary artery      Allergies:  Ciprofloxacin and Levaquin [levofloxacin]    Following the procedure noted above the patient was transferred to the post-op floor and started on:    Therapy:  physical therapy and occupational therapy  Anticoagulation Plan: Aspirin 81 mg BID    Pain Management: scopainmedication: oxycodone and tylenol  Weight bearing status: Weight bearing as tolerated     The patient was followed and co-managed by the hospitalist service during the inpatient treatment course  Complications:  None  Consultations:  None     Pertinent Labs   Lab Results: personally reviewed.     Recent Labs   Lab Test 03/07/24  0623 02/07/24  1546 01/11/24  2226 01/11/24  1550 01/24/23  1537 10/20/22  1233 05/26/20  1059 04/10/20  1104 03/19/20  0847 03/09/20  1504   INR  --   --   --   --   --  0.98  --   --   --   --    WBC  --  5.4 7.1 6.9   < > 5.7   < >  --    < >  --    HGB 12.9 14.8 15.5 15.6   < > 15.1   < >  --    < >  --    HCT  --  44.4 45.4 46.1   < > 44.4   < >  --    < >  --    MCV  --  92 93 93   < > 92   < >  --    < >  --    PLT  --  243 221 231   <  > 244   < >  --    < >  --     140  --  139   < > 141   < > 142   < >  --    CRP  --   --   --   --   --   --   --  0.2  --  <0.1    < > = values in this interval not displayed.          Discharge Information:  Condition at discharge: Stable  Discharge destination:  Discharged to home     Medications at discharge:  Current Discharge Medication List        START taking these medications    Details   acetaminophen (TYLENOL) 325 MG tablet Take 3 tablets (975 mg) by mouth every 8 hours  Qty: 27 tablet, Refills: 0    Associated Diagnoses: Primary osteoarthritis of right hip; Status post total replacement of hip, unspecified laterality      ondansetron (ZOFRAN ODT) 4 MG ODT tab Take 1 tablet (4 mg) by mouth every 6 hours as needed for nausea or vomiting  Qty: 30 tablet, Refills: 0    Associated Diagnoses: Primary osteoarthritis of right hip; Status post total replacement of hip, unspecified laterality      oxyCODONE (ROXICODONE) 5 MG tablet Take 1-2 tablets (5-10 mg) by mouth every 4 hours as needed for moderate pain  Qty: 20 tablet, Refills: 0    Associated Diagnoses: Primary osteoarthritis of right hip; Status post total replacement of hip, unspecified laterality      senna-docusate (SENOKOT-S/PERICOLACE) 8.6-50 MG tablet Take 1 tablet by mouth 2 times daily  Qty: 30 tablet, Refills: 1    Associated Diagnoses: Primary osteoarthritis of right hip; Status post total replacement of hip, unspecified laterality           CONTINUE these medications which have CHANGED    Details   aspirin 81 MG EC tablet Take 1 tablet (81 mg) by mouth 2 times daily    Associated Diagnoses: Status post total replacement of hip, unspecified laterality           CONTINUE these medications which have NOT CHANGED    Details   atorvastatin (LIPITOR) 20 MG tablet Take 20 mg by mouth every evening Taking until completed and then will start rosuvastatin      cholecalciferol (VITAMIN D3) 125 mcg (5000 units) capsule Take 125 mcg by mouth daily       levothyroxine (SYNTHROID/LEVOTHROID) 75 MCG tablet TAKE 1 TABLET BY MOUTH DAILY  Qty: 100 tablet, Refills: 2    Comments: Please send a replace/new response with 100-Day Supply if appropriate to maximize member benefit. Requesting 1 year supply.  Associated Diagnoses: Hashimoto's thyroiditis      losartan (COZAAR) 50 MG tablet Take 50 mg by mouth every evening    Associated Diagnoses: SVT (supraventricular tachycardia)      nitroGLYcerin (NITROSTAT) 0.4 MG sublingual tablet For chest pain place 1 tablet under the tongue every 5 minutes for 3 doses. If symptoms persist 5 minutes after 1st dose call 911.  Qty: 25 tablet, Refills: 3    Associated Diagnoses: Stable angina pectoris      pilocarpine (PILOCAR) 2 % ophthalmic solution Place 1-2 drops into both eyes daily      vitamin B-12 (CYANOCOBALAMIN) 500 MCG tablet Take 500 mcg by mouth daily           STOP taking these medications       naproxen sodium (ANAPROX) 220 MG tablet Comments:   Reason for Stopping:                          Follow-Up Care:  Patient should be seen in a [facility] office in 10-14 days by the patient's Orthopedic Surgeon/Physician Assistant.      CRISTI PORTER PA-C  Call [phone] with any questions

## 2024-03-07 NOTE — PROGRESS NOTES
Physical Therapy Discharge Summary    Reason for therapy discharge:    All goals and outcomes met, no further needs identified.    Progress towards therapy goal(s). See [facility] on Care [facility] for goal details.  Goals met    Therapy recommendation(s):    Continue home exercise program.

## 2024-03-07 NOTE — PLAN OF CARE
Patient vital signs are at baseline: Yes, pt did have softer blood pressures. Rechecks were taken, provider was updated and gave a green light to discharge.   Patient able to ambulate as they were prior to admission or with assist devices provided by therapies during their stay:  Yes  Patient MUST void prior to discharge:  Yes  Patient able to tolerate oral intake:  Yes  Pain has adequate pain control using Oral analgesics:  Yes  Does patient have an identified :  Yes  Has goal D/C date and time been discussed with patient:  Yes    Discharge RN was given an update that pt would be able to discharge. All questions answered.

## 2024-03-07 NOTE — PROGRESS NOTES
[facility]    Medicine Progress Note - Hospitalist Service    Date of Admission:  3/6/2024    Assessment & Plan   Artis Galvin is a 78 year old old adult with a history of HTN, HLD, hypothyroidism, OA underwent right hip surgery. [facility] service was asked to evaluate patient for postoperative medical management as follows below. Please resume the home medications as reconciled and further noted below with ordered hold parameters.  Vital signs have been stable post-operatively including hemodynamically stable blood pressure and heart rate. Thank you for this consult; we will continue to follow this patient until discharge.    HTN  -Order home medications with the written tiered hold parameters given risk for post-operative hypotension. Given ACEi/ARB/diuretic medication, ordered creatine, potassium, and magnesium to evaluate renal function and electrolytes, respectively.   -Labs 3/7/24 all wnl and unremarkable     HLD  -Order home statin.    Hypothyroidism  -Order home thyroid replacement/levothyroxine.     S/p Right Hip Surgery  Hip OA  Acute Post-Op Pain  -Agree with current pain control regimen; consider acute pain team consultation if increasingly difficult to control or proves refractory.   -PT evaluation.   -OT evaluation.  -IS frequently, initially every hour while awake as tolerated. Directly encouraged and discussed.      Post-Op DVT Prophylaxis  -As per primary surgery team.    Procedure(s):  RIGHT TOTAL HIP ARTHROPLASTY DIRECT ANTERIOR APPROACH ORTHOGRID  Post-operative Day: Day of Surgery  Code status:Prior     Estimated Blood Loss:  200 mL    Hospital Problem List   No problem-specific Assessment & Plan notes found for this encounter.    Principal Problem:    S/P total hip arthroplasty  Active Problems:    Personal history of malignant neoplasm of prostate    Autoimmune thyroiditis    Primary hypertension    Nonocclusive coronary atherosclerosis of native coronary  "artery      -Reviewed the patient's preoperative H and P and updated missing elements.  -Home medication reconciliation has been reviewed.  Medications have been ordered as noted from the home list and changes are documented above           Diet: Advance Diet as Tolerated: Regular Diet Adult  Discharge Instruction - Regular Diet Adult    DVT Prophylaxis: Defer to primary service  Hammond Catheter: Not present  Lines: None     Cardiac Monitoring: None  Code Status: Full Code      Clinically Significant Risk Factors Present on Admission                # Drug Induced Platelet Defect: home medication list includes an antiplatelet medication   # Hypertension: Noted on problem list      # Overweight: Estimated body mass index is 26.98 kg/m  as calculated from the following:    Height as of this encounter: 1.778 m (5' 10\").    Weight as of this encounter: 85.3 kg (188 lb).              Disposition Plan      Expected Discharge Date: 03/07/2024                    Kane Molina MD  Hospitalist Service  [facility]  Securely message with [employer] (more info)  Text page via [employer] Paging/Directory   ______________________________________________________________________    Interval History   No acute events overnight.    Transient soft BP with orthostatic hypotension, and some LH and dizziness. Resolved with supportive care, fluids, increased oral intake; systolic blood pressures improved to 130s and become asymptomatic. No report of chest pain, shortness of breath, or other new complaints.  Discussed plan of care with patient. Answered all questions to patient's verbalized understanding and satisfaction.    Medication reconciliation completed for anticipated discharge and discussed with patient as well as counseling regarding lifestyle modifications and behaviors in setting of post-operative recovery in the coming weeks, outpatient rehabilitative follow-up plan pending final PT/OT recommendations, and " follow-up in clinic with the primary care provider and with surgery as scheduled. All questions were answered to stated and verbalized satisfaction.     Physical Exam   Vital Signs: Temp: 97.9  F (36.6  C) Temp src: Oral BP: (!) 153/66 Pulse: 87   Resp: 16 SpO2: 98 % O2 Device: None (Room air) Oxygen Delivery: 10 LPM  Weight: 188 lbs 0 oz    GENERAL:  Alert, appears comfortable, in no acute distress, appears stated age on ambient room air   HEAD:  Normocephalic, without obvious abnormality, atraumatic   EYES:  PERRL, conjunctiva/corneas clear, no scleral icterus, EOM's intact   NOSE: Nares normal, septum midline, mucosa normal, no drainage   THROAT: Lips, mucosa, and tongue normal; teeth and gums normal, mouth moist   NECK: Supple, symmetrical, trachea midline   BACK:   Symmetric, no curvature, ROM normal   LUNGS:   Clear to auscultation bilaterally, no rales, rhonchi, or wheezing, symmetric chest rise on inhalation, respirations unlabored   CHEST WALL:  No tenderness or deformity   HEART:  Regular rate and rhythm, S1 and S2 normal, no murmur, rub, or gallop    ABDOMEN:   Soft, non-tender, bowel sounds active all four quadrants, no masses, no organomegaly, no rebound or guarding   EXTREMITIES: Extremities normal, atraumatic, no cyanosis or edema    SKIN: Dry to touch, no exanthems in the visualized areas   NEURO: Alert, oriented x 4, moves all four extremities freely/spontaneously   PSYCH: Cooperative, behavior is appropriate    \F2F2    Medical Decision Making     53 MINUTES SPENT BY ME on the date of service doing chart review, history, exam, documentation & further activities per the note.      Data     I have personally reviewed the following data over the past 24 hrs:    N/A  \   12.9   / N/A     138 103 15.2 /  122 (H)   4.7 25 1.05 \       Imaging results reviewed over the past 24 hrs:   Recent Results (from the past 24 hour(s))   XR Surgery MARLI  Fluoro G/T 5 Min    Narrative    This exam was marked as  non-reportable because it will not be read by a   radiologist or a [place] non-radiologist provider.

## 2024-03-07 NOTE — DISCHARGE SUMMARY
Discharge AVS reviewed with patient and family. Questions answered and teaching acknowledged. Belongings and paperwork sent with.

## 2024-03-08 ENCOUNTER — PATIENT OUTREACH (OUTPATIENT)
Dept: CARE COORDINATION | Facility: CLINIC | Age: 79
End: 2024-03-08
Payer: COMMERCIAL

## 2024-03-08 NOTE — PROGRESS NOTES
Clinic Care Coordination Contact  [facility]: Post-Discharge Note  SITUATION                                                      Admission:    Admission Date: 03/06/24   Reason for Admission: total hip  Discharge:   Discharge Date: 03/07/24  Discharge Diagnosis: total hip    BACKGROUND                                                      Per hospital discharge summary and inpatient provider notes:   [facility] Discharge Summary                                       Admission Information:  Admission Diagnosis:  Osteoarthritis of right hip [M16.11]  S/P total hip arthroplasty [Z96.649]     Post-Operative Day: 1 Day Post-Op     Reason for admission:  The patient was admitted for the following:Procedure(s) (LRB):  RIGHT TOTAL HIP ARTHROPLASTY DIRECT ANTERIOR APPROACH ORTHOGRID (Right)     Principal Problem:    S/P total hip arthroplasty  Active Problems:    Personal history of malignant neoplasm of prostate    Autoimmune thyroiditis    Primary hypertension    Nonocclusive coronary atherosclerosis of native coronary artery    ASSESSMENT           Discharge Assessment  How are you doing now that you are home?: in pain  How are your symptoms? (Red Flag symptoms escalate to triage hotline per guidelines): Unchanged  Do you feel your condition is stable enough to be safe at home until your provider visit?: Yes  Does the patient have their discharge instructions? : Yes  Does the patient have questions regarding their discharge instructions? : No  Were you started on any new medications or were there changes to any of your previous medications? : Yes  Does the patient have all of their medications?: Yes  Do you have questions regarding any of your medications? : No  Do you have all of your needed medical supplies or equipment (DME)?  (i.e. oxygen tank, CPAP, cane, etc.): Yes  Discharge follow-up appointment scheduled within 14 calendar days? : Yes  Discharge Follow Up Appointment Date:  03/21/24  Discharge Follow Up Appointment Scheduled with?: Specialty Care Provider         Post-op (Clinicians Only)  Did the patient have surgery or a procedure: Yes  Incision: closed  Eating & Drinking: eating and drinking without complaints/concerns  PO Intake: regular diet  Additional Symptoms: decreased appetite  Bowel Function: constipation  Urinary Status: voiding without complaint/concerns    He was told that hip replacement is less bothersome than a knee, but he was not expecting the pain he is having.  He has been conservative in taking the oxy.  Encouraged to take as prescribed as he is only a day out from surgery.  He is mostly concerned about constipation.  He has not had a BM for four days and feels uncomfortable.  He has taken the Miralax without impact.  He has not used the Senna yet.  He is above his usual weight by 8 pounds.  He would like to discuss his constipation treatment with RN at clinic if possible.  He will also reach out to [facility] to see if he can get some advice.  Encouraged him to be active as best he can and drink water.  Appetite is decreased.  He would like a call back today if possible.     He has walker to ambulate. Has partner at home who is assisting him as needed.  Reviewed care coordination.      PLAN                                                      Outpatient Plan:  Will route to PCP for review.  Sending letter.   Future Appointments   Date Time Provider Department Center   3/11/2024  3:30 PM Rogers Mae MD [facility]   4/9/2024  3:40 PM Angel Hernandez MD [facility]   8/29/2024  2:20 PM [facility]         For any urgent concerns, please contact our 24 hour nurse triage line: [phone] ([phone])         ROLAND Nickerson

## 2024-03-08 NOTE — LETTER
[facility] CARE COORDINATION  [facility]  March 8, 2024    Artis Galvin  [address]  [place] [address]      Dear Artis,    I am a clinic care coordinator who works with COLBY BROCK MD with the [facility]. I wanted to thank you for spending the time to talk with me.  Below is a description of clinic care coordination and how I can further assist you.       The clinic care coordination team is made up of a registered nurse, , financial resource worker and community health worker who understand the health care system. The goal of clinic care coordination is to help you manage your health and improve access to the health care system. Our team works alongside your provider to assist you in determining your health and social needs. We can help you obtain health care and community resources, providing you with necessary information and education. We can work with you through any barriers and develop a care plan that helps coordinate and strengthen the communication between you and your care team.  Our services are voluntary and are offered without charge to you personally.    Please feel free to contact me with any questions or concerns regarding care coordination and what we can offer.      We are focused on providing you with the highest-quality healthcare experience possible.    Sincerely,     Salma Marina,   [facility]  [phone]

## 2024-03-11 ENCOUNTER — HOSPITAL ENCOUNTER (OUTPATIENT)
Dept: ULTRASOUND IMAGING | Facility: HOSPITAL | Age: 79
Discharge: HOME | End: 2024-03-11
Attending: ORTHOPAEDIC SURGERY | Admitting: ORTHOPAEDIC SURGERY
Payer: COMMERCIAL

## 2024-03-11 DIAGNOSIS — R06.02 SOB (SHORTNESS OF BREATH): ICD-10-CM

## 2024-03-11 DIAGNOSIS — Z96.641 S/P TOTAL RIGHT HIP ARTHROPLASTY: ICD-10-CM

## 2024-03-11 PROCEDURE — 93971 EXTREMITY STUDY: CPT | Mod: RT

## 2024-03-12 NOTE — TELEPHONE ENCOUNTER
"Spoke with patient who stated he had hip surgery 3/6/24.     Reports taking Oxycodone every 4 hours and experiencing constipation. Did not have a bowel movement until 3/9. First bowel movement  in 5-6 days. Now taking oxycodone PRN    Reports 2-3 bowel movements since 3/9 that have been \"squirty\" and mixture of \"solid and gas.\" Reports small stool size. Denies loose stools.     Reports color of stool brown to yellow brown. Denies blood in stool. Reports cramping in hip area on side right side but denies abdominal cramping.     Reports previous abdominal pain prior to surgery in lower right abdomen and was seen by orthopedics. Reports orthopedics said that abdominal cramping  is common with bad hips. Reports this is mostly resolved now.     Reports temperature yesterday was 99 degrees. Has not taken temperature today. Reports cramping in right leg as well as mild swelling throughout leg. Reports next follow up with orthopedics in two weeks. Did advise patient reach out to [facility] with symptoms.     Currently Miralax once a day and senna twice daily. Aware PCP out of office. Would like covering provider recommendation on medication. Is it ok for patient to take Miralax once daily and senna twice daily?    "

## 2024-03-12 NOTE — TELEPHONE ENCOUNTER
Relayed provider recommendation to patient.  Patient states he is waiting or a call back from his orthopedic surgeon.  Patient denies fever at this time or further swelling.  Writer advised patient be seen if these symptoms return.  Patient advised to return call to clinic if he has further issues or is not hearing back from ortho.

## 2024-03-21 ENCOUNTER — TRANSFERRED RECORDS (OUTPATIENT)
Dept: HEALTH INFORMATION MANAGEMENT | Facility: CLINIC | Age: 79
End: 2024-03-21
Payer: COMMERCIAL

## 2024-04-09 ENCOUNTER — OFFICE VISIT (OUTPATIENT)
Dept: INTERNAL MEDICINE | Facility: CLINIC | Age: 79
End: 2024-04-09
Payer: COMMERCIAL

## 2024-04-09 VITALS
HEART RATE: 70 BPM | RESPIRATION RATE: 13 BRPM | BODY MASS INDEX: 27.06 KG/M2 | WEIGHT: 189 LBS | DIASTOLIC BLOOD PRESSURE: 66 MMHG | SYSTOLIC BLOOD PRESSURE: 107 MMHG | HEIGHT: 70 IN | TEMPERATURE: 98 F | OXYGEN SATURATION: 96 %

## 2024-04-09 DIAGNOSIS — G89.29 CHRONIC RIGHT-SIDED LOW BACK PAIN WITHOUT SCIATICA: ICD-10-CM

## 2024-04-09 DIAGNOSIS — M79.89 RIGHT LEG SWELLING: ICD-10-CM

## 2024-04-09 DIAGNOSIS — Z96.641 S/P TOTAL RIGHT HIP ARTHROPLASTY: Primary | ICD-10-CM

## 2024-04-09 DIAGNOSIS — M54.50 CHRONIC RIGHT-SIDED LOW BACK PAIN WITHOUT SCIATICA: ICD-10-CM

## 2024-04-09 DIAGNOSIS — F43.23 ADJUSTMENT DISORDER WITH MIXED ANXIETY AND DEPRESSED MOOD: ICD-10-CM

## 2024-04-09 DIAGNOSIS — G62.9 PERIPHERAL POLYNEUROPATHY: ICD-10-CM

## 2024-04-09 PROCEDURE — 99214 OFFICE O/P EST MOD 30 MIN: CPT | Performed by: INTERNAL MEDICINE

## 2024-04-09 RX ORDER — CELECOXIB 200 MG/1
CAPSULE ORAL
COMMUNITY
Start: 2024-03-13 | End: 2024-07-03

## 2024-04-09 RX ORDER — RESPIRATORY SYNCYTIAL VIRUS VACCINE 120MCG/0.5
0.5 KIT INTRAMUSCULAR ONCE
Qty: 1 EACH | Refills: 0 | Status: CANCELLED | OUTPATIENT
Start: 2024-04-09 | End: 2024-04-09

## 2024-04-09 ASSESSMENT — PAIN SCALES - GENERAL: PAINLEVEL: SEVERE PAIN (7)

## 2024-04-09 NOTE — PROGRESS NOTES
1. S/P total right hip arthroplasty  He is doing really well I think.  He needs to get more active and do more therapy and I discussed this with him.  It is actually imperative for him.  He needs to get into therapy.    2. Adjustment disorder with mixed anxiety and depressed mood  I have urged him to start the sertraline that I prescribed in the past    3. Peripheral polyneuropathy  Urged him to follow-up with neurology as previously recommended.    4. Chronic right-sided low back pain without sciatica  I have suggested that he do some therapy for his back as well.  - Physical Therapy  Referral; Future    5. Right leg swelling  I gave him some compression stockings.  Apparently they did not give him any NALLELY hose at the hospital.  Swelling at this time is exceedingly minimal.  Reassurance today.  - Orthotics, Mastectomy and Custom Compression Orders     Subjective   Binu is a 78 year old, presenting for the following health issues:  Follow Up (2 month follow up from right hip surgery. He stated he would like to discuss other generalities. Patient has stopped taking Lipitor.  He has been taking Rosuvastatin for 2 days.)    History of Present Illness       Back Pain:  Binu Galvin presents for follow up of back pain. Patient's back pain is a chronic problem.  Location of back pain:  Right lower back  Description of back pain: dull ache  Back pain spreads: right thigh    Since patient first noticed back pain, pain is: always present, but gets better and worse  Does back pain interfere with Binu Galvin's job:  No       Hypertension: Binu Galvin presents for follow up of hypertension.  Binu Galvin does check blood pressure  regularly outside of the clinic. Outside blood pressures have been over 140/90. Binu Galvin does not follow a low salt diet.     Vascular Disease:  Binu Galvin presents for follow up of vascular disease.      Binu Galvin takes daily aspirin.Binu Galvin consumes 1 sweetened beverage(s)  "daily.Binu Galvin exercises with enough effort to increase Binu Galvin's heart rate 9 or less minutes per day.  Binu Galvin exercises with enough effort to increase Binu Galvin's heart rate 3 or less days per week.   Binu Galvin is taking medications regularly.           Binu comes in today for follow-up of his recent hospitalization and his other multiple medical problems.  He underwent hip arthroplasty about a month ago.  He is convalescing slowly.  He is not doing therapy.  He feels fatigued all the time.  This is not new.  He is chronically dysthymic which impairs his healing I am sure.  He states his neuropathy is bad.  He has noted some swelling in the leg that was worked on.  He is not using his compression stockings.  He had a cold recently which is getting over.  He is now taking rosuvastatin.  He would be willing to start the mood medication at this time.  I have prescribed sertraline for him towards the end of last year but he never took it.          Objective    /66 (BP Location: Left arm, Patient Position: Sitting, Cuff Size: Adult Regular)   Pulse 70   Temp 98  F (36.7  C) (Tympanic)   Resp 13   Ht 1.778 m (5' 10\")   Wt 85.7 kg (189 lb)   LMP  (LMP Unknown)   SpO2 96%   BMI 27.12 kg/m    Body mass index is 27.12 kg/m .  Physical Exam   Pleasant gentleman.  Dysthymic.  No new findings on exam.  He looks well.  Trace edema at the right lower extremity at the ankle.  Just minimally erythematous.            Signed Electronically by: COLBY BROCK MD  DME (Durable Medical Equipment) Orders and Documentation  Orders Placed This Encounter   Procedures    Orthotics, Mastectomy and Custom Compression Orders        The patient was assessed and it was determined the patient is in need of the following listed DME Supplies/Equipment. Please complete supporting documentation below to demonstrate medical necessity.         "

## 2024-04-18 ENCOUNTER — TRANSFERRED RECORDS (OUTPATIENT)
Dept: HEALTH INFORMATION MANAGEMENT | Facility: CLINIC | Age: 79
End: 2024-04-18
Payer: COMMERCIAL

## 2024-05-03 ENCOUNTER — OFFICE VISIT (OUTPATIENT)
Dept: PODIATRY | Facility: CLINIC | Age: 79
End: 2024-05-03
Payer: COMMERCIAL

## 2024-05-03 ENCOUNTER — LAB (OUTPATIENT)
Dept: LAB | Facility: CLINIC | Age: 79
End: 2024-05-03
Payer: COMMERCIAL

## 2024-05-03 VITALS
BODY MASS INDEX: 27.06 KG/M2 | DIASTOLIC BLOOD PRESSURE: 80 MMHG | SYSTOLIC BLOOD PRESSURE: 133 MMHG | WEIGHT: 189 LBS | OXYGEN SATURATION: 99 % | HEART RATE: 64 BPM | HEIGHT: 70 IN

## 2024-05-03 DIAGNOSIS — M20.10 VALGUS DEFORMITY OF GREAT TOE, UNSPECIFIED LATERALITY: ICD-10-CM

## 2024-05-03 DIAGNOSIS — B35.1 ONYCHOMYCOSIS: Primary | ICD-10-CM

## 2024-05-03 DIAGNOSIS — B35.1 ONYCHOMYCOSIS: ICD-10-CM

## 2024-05-03 DIAGNOSIS — M20.42 HAMMERTOE, BILATERAL: ICD-10-CM

## 2024-05-03 DIAGNOSIS — M20.41 HAMMERTOE, BILATERAL: ICD-10-CM

## 2024-05-03 PROCEDURE — 99213 OFFICE O/P EST LOW 20 MIN: CPT | Performed by: PODIATRIST

## 2024-05-03 PROCEDURE — 36415 COLL VENOUS BLD VENIPUNCTURE: CPT

## 2024-05-03 PROCEDURE — 80076 HEPATIC FUNCTION PANEL: CPT

## 2024-05-03 ASSESSMENT — PAIN SCALES - GENERAL: PAINLEVEL: NO PAIN (1)

## 2024-05-03 NOTE — PROGRESS NOTES
FOOT AND ANKLE SURGERY/PODIATRY PROGRESS NOTE        ASSESSMENT:   Onychomycosis  Marcia  HAV      TREATMENT:  -I discussed with the patient that the nail discoloration along the distal hallux nails on both feet may be related to residual onychomycosis.    -We discussed resuming course of oral terbinafine which he would like to pursue at this time.    -I referred him for hepatic panel.  If okay, we will begin 90-day course of oral terbinafine.    -Patient's questions invited and answered.  He was encouraged to call my office with any further questions or concerns.     Mynor Pierce DPM  [facility] Podiatry/Foot & Ankle Surgery      HPI: Artis Galvin was seen again today for concerns related to nail discoloration on his left great toe.  Patient previously finished 90-day course of oral terbinafine and noted significant improvement in the overall presentation of the great toenails.  He tolerated the medication well.    Past Medical History:   Diagnosis Date    Abnormal thyroid ultrasound     Adjustment disorder with mixed anxiety and depressed mood     Arthritis     Autoimmune thyroiditis     BBB (bundle branch block)     right    Chronic lymphocytic thyroiditis     Coronary artery disease     Diverticulosis of colon (without mention of hemorrhage)     Hypertension     Low back pain 11/2023    Malignant neoplasm of prostate (H) 2018    Nontoxic multinodular goiter     NSTEMI (non-ST elevated myocardial infarction) (H) 01/11/2024    Osteoporosis     Other and unspecified hyperlipidemia     Pain in joint, shoulder region     Peripheral neuropathy     Persistent hoarseness     Sarcoidosis     Seen on chest xray at age 28    Scleritis, unspecified     Status post coronary angiogram 10/20/2022    SVT (supraventricular tachycardia) (H24)     Unspecified hypothyroidism     Unspecified vitamin D deficiency        Past Surgical History:   Procedure Laterality Date    CAPSULOTOMY Bilateral     CATARACT  "EXTRACTION Bilateral     CV CORONARY ANGIOGRAM N/A 10/20/2022    Procedure: Coronary Angiogram;  Surgeon: Mir Farr MD;  Location:  HEART CARDIAC CATH LAB    CV FRACTIONAL FLOW RATIO WIRE N/A 10/20/2022    Procedure: Fractional Flow Ratio Wire;  Surgeon: Mir Farr MD;  Location:  HEART CARDIAC CATH LAB    EYE SURGERY Bilateral     Laser eye surgery    HC SHLDR ARTHROSCOP,PART ACROMIOPLAS Right     Description: Shoulder Arthroscopy, Space Decompression And Acromioplasty;  Recorded: 02/18/2014;    IA LAP,PROSTATECTOMY,RADICAL,W/NERVE SPARE,INCL ROBOTIC Bilateral 02/27/2018    Procedure: ROBOTIC ASSISTED RADICAL RETROPUBIC PROSTATECTOMY, BILATERAL PELVIC LYMPH NODE DISSECTION LYSIS OF ADHESIONS;  Surgeon: Wale Rodriguez MD;  Location: [facility];  Service: Urology    IA RADIAL KERATOTOMY Bilateral     Description: Cornea Radial Keratotomy;  Recorded: 02/18/2014;    TOTAL HIP ARTHROPLASTY Right 3/6/2024    Procedure: RIGHT TOTAL HIP ARTHROPLASTY DIRECT ANTERIOR APPROACH ORTHOGRID;  Surgeon: Wale Benavides MD;  Location: [facility] Main OR       Allergies   Allergen Reactions    Ciprofloxacin Other (See Comments)     Pt states he was told not to take because of \"another medicine he is on\"    Levaquin [Levofloxacin] Other (See Comments)     Pt states he was told not to take because of \"another medicine he is on\"           Current Outpatient Medications:     acetaminophen (TYLENOL) 325 MG tablet, Take 3 tablets (975 mg) by mouth every 8 hours, Disp: 27 tablet, Rfl: 0    aspirin 81 MG EC tablet, Take 1 tablet (81 mg) by mouth 2 times daily, Disp: , Rfl:     celecoxib (CELEBREX) 200 MG capsule, , Disp: , Rfl:     cholecalciferol (VITAMIN D3) 125 mcg (5000 units) capsule, Take 125 mcg by mouth daily, Disp: , Rfl:     levothyroxine (SYNTHROID/LEVOTHROID) 75 MCG tablet, TAKE 1 TABLET BY MOUTH DAILY, Disp: 100 tablet, Rfl: 2    losartan (COZAAR) 50 MG tablet, " Take 50 mg by mouth every evening, Disp: , Rfl:     nitroGLYcerin (NITROSTAT) 0.4 MG sublingual tablet, For chest pain place 1 tablet under the tongue every 5 minutes for 3 doses. If symptoms persist 5 minutes after 1st dose call 911., Disp: 25 tablet, Rfl: 3    pilocarpine (PILOCAR) 2 % ophthalmic solution, Place 1-2 drops into both eyes daily, Disp: , Rfl:     vitamin B-12 (CYANOCOBALAMIN) 500 MCG tablet, Take 500 mcg by mouth daily, Disp: , Rfl:     atorvastatin (LIPITOR) 20 MG tablet, Take 20 mg by mouth every evening Taking until completed and then will start rosuvastatin (Patient not taking: Reported on 4/9/2024), Disp: , Rfl:     Family History   Problem Relation Age of Onset    No Known Problems Mother     Coronary Artery Disease Father     Aortic aneurysm Father     Prostate Cancer Maternal Grandfather     Prostate Cancer Maternal Uncle        Social History     Socioeconomic History    Marital status: Patient Declined     Spouse name: Not on file    Number of children: Not on file    Years of education: Not on file    Highest education level: Not on file   Occupational History    Not on file   Tobacco Use    Smoking status: Never    Smokeless tobacco: Never   Vaping Use    Vaping status: Never Used   Substance and Sexual Activity    Alcohol use: Not Currently     Comment: Alcoholic Drinks/day: rarely    Drug use: No    Sexual activity: Not on file   Other Topics Concern    Not on file   Social History Narrative    Single. . 4 grown daughters.    Currently unemployed.     Social Determinants of Health     Financial Resource Strain: Low Risk  (1/4/2024)    Financial Resource Strain     Within the past 12 months, have you or your family members you live with been unable to get utilities (heat, electricity) when it was really needed?: No   Food Insecurity: No Food Insecurity (2/9/2024)    [facility]    Hunger Vital Sign     Worried About Running Out of Food in the Last Year:  "Never true     Ran Out of Food in the Last Year: Never true   Transportation Needs: No Transportation Needs (2/9/2024)    Received from [facility]    PRAPARE - Transportation     Lack of Transportation (Medical): No     Lack of Transportation (Non-Medical): No   Physical Activity: Insufficiently Active (2/9/2024)    Received from [facility]    Exercise Vital Sign     Days of Exercise per Week: 1 day     Minutes of Exercise per Session: 30 min   Stress: No Stress Concern Present (12/1/2022)    Received from [facility]    [employer] [place] [employer] - Occupational Stress Questionnaire     Feeling of Stress : Not at all   Social Connections: Unknown (12/1/2022)    Received from [facility]    Social Connection and Isolation Panel [NHANES]     Frequency of Communication with Friends and Family: More than three times a week     Frequency of Social Gatherings with Friends and Family: Three times a week     Attends Zoroastrian Services: Patient declined     Active Member of Clubs or Organizations: Patient declined     Attends Club or Organization Meetings: Patient declined     Marital Status: Patient declined   Interpersonal Safety: Low Risk  (11/28/2023)    Interpersonal Safety     Do you feel physically and emotionally safe where you currently live?: Yes     Within the past 12 months, have you been hit, slapped, kicked or otherwise physically hurt by someone?: No     Within the past 12 months, have you been humiliated or emotionally abused in other ways by your partner or ex-partner?: No   Housing Stability: Low Risk  (2/9/2024)    Received from [facility]    Housing Stability     What is your living situation today?: I have a steady place to live       10 point Review of Systems is negative except for fungal nails which is noted in HPI.     /80   Pulse 64   Ht 1.778 m (5' 10\")   Wt 85.7 kg (189 lb)   LMP  (LMP Unknown)   SpO2 99%  "  BMI 27.12 kg/m      BMI= Body mass index is 27.12 kg/m .    OBJECTIVE:  General appearance: Patient is alert and fully cooperative with history & exam.  No sign of distress is noted during the visit.    Vascular: Dorsalis pedis palpable bilateral.  There is no appreciable edema noted.  Dermatologic: Very distal aspect of the bilateral hallux nails has some discoloration with possible dystrophic changes.  No erythema bilateral feet.  Neurologic: Diminished light touch bilateral feet.  Musculoskeletal: Contracted digits bilateral feet. Mild bunion deformity bilateral feet.

## 2024-05-03 NOTE — PATIENT INSTRUCTIONS
our doctor has recommended you start taking Terbinafine. Terbinafine is an antifungal medication used to treat certain types of fungal infections. You will take this medication everyday for 90 days.     Prior to starting Terbinafine you will need to have your labs drawn. You can stop into the lab located on the first floor to have these drawn.     We will repeat the labs again after you have been taking the medication for 6 weeks. You can walk-in to the lab located on the first floor on or around  6/17/24      Terbinafine tablets  Brand Names: Lamisil, Terbinex   What is this medicine?  TERBINAFINE (TER bin a feen) is an antifungal medicine. It is used to treat certain kinds of fungal or yeast infections.  How should I use this medicine?  Take this medicine by mouth with a full glass of water. Follow the directions on the prescription label. You can take this medicine with food or on an empty stomach. Take your medicine at regular intervals. Do not take your medicine more often than directed. Do not skip doses or stop your medicine early even if you feel better. Do not stop taking except on your doctor's advice. Talk to your pediatrician regarding the use of this medicine in children. Special care may be needed.  What side effects may I notice from receiving this medicine?  Side effects that you should report to your doctor or health care professional as soon as possible:  allergic reactions like skin rash or hives, swelling of the face, lips, or tongue  changes in vision  dark urine  fever or infection  general ill feeling or flu-like symptoms  light-colored stools  loss of appetite, nausea  redness, blistering, peeling or loosening of the skin, including inside the mouth  right upper belly pain  unusually weak or tired  yellowing of the eyes or skin  Side effects that usually do not require medical attention (report to your doctor or health care professional if they continue or are bothersome):  changes in  taste  diarrhea  hair loss  muscle or joint pain  stomach gas  stomach upset  What may interact with this medicine?  Do not take this medicine with any of the following medications:  thioridazine  This medicine may also interact with the following medications:  beta-blockers  caffeine  cimetidine  cyclosporine  medicines for depression, anxiety, or psychotic disturbances  medicines for fungal infections like fluconazole and ketoconazole  medicines for irregular heartbeat like amiodarone, flecainide and propafenone  rifampin  warfarin  What if I miss a dose?  If you miss a dose, take it as soon as you can. If it is almost time for your next dose, take only that dose. Do not take double or extra doses.  Where should I keep my medicine?  Keep out of the reach of children.  Store at room temperature below 25 degrees C (77 degrees F). Protect from light. Throw away any unused medicine after the expiration date.  What should I tell my health care provider before I take this medicine?  They need to know if you have any of these conditions:  drink alcoholic beverages  kidney disease  liver disease  an unusual or allergic reaction to terbinafine, other medicines, foods, dyes, or preservatives  pregnant or trying to get pregnant  breast-feeding  What should I watch for while using this medicine?  Visit your doctor or health care provider regularly. Tell your doctor right away if you have nausea or vomiting, loss of appetite, stomach pain on your right upper side, yellow skin, dark urine, light stools, or are over tired. Some fungal infections need many weeks or months of treatment to cure. If you are taking this medicine for a long time, you will need to have important blood work done.

## 2024-05-03 NOTE — LETTER
5/3/2024         RE: Artis Galvin  [address]  [place] [address]        Dear Colleague,    Thank you for referring your patient, Artis Galvin, to the [facility]. Please see a copy of my visit note below.        FOOT AND ANKLE SURGERY/PODIATRY PROGRESS NOTE        ASSESSMENT:   Onychomycosis  Marcia  HAV      TREATMENT:  -I discussed with the patient that the nail discoloration along the distal hallux nails on both feet may be related to residual onychomycosis.    -We discussed resuming course of oral terbinafine which he would like to pursue at this time.    -I referred him for hepatic panel.  If okay, we will begin 90-day course of oral terbinafine.    -Patient's questions invited and answered.  He was encouraged to call my office with any further questions or concerns.     Mynor Pierce DPM  [facility] Podiatry/Foot & Ankle Surgery      HPI: Artis Galvin was seen again today for concerns related to nail discoloration on his left great toe.  Patient previously finished 90-day course of oral terbinafine and noted significant improvement in the overall presentation of the great toenails.  He tolerated the medication well.    Past Medical History:   Diagnosis Date     Abnormal thyroid ultrasound      Adjustment disorder with mixed anxiety and depressed mood      Arthritis      Autoimmune thyroiditis      BBB (bundle branch block)     right     Chronic lymphocytic thyroiditis      Coronary artery disease      Diverticulosis of colon (without mention of hemorrhage)      Hypertension      Low back pain 11/2023     Malignant neoplasm of prostate (H) 2018     Nontoxic multinodular goiter      NSTEMI (non-ST elevated myocardial infarction) (H) 01/11/2024     Osteoporosis      Other and unspecified hyperlipidemia      Pain in joint, shoulder region      Peripheral neuropathy      Persistent hoarseness      Sarcoidosis     Seen on chest xray at age 28      "Scleritis, unspecified      Status post coronary angiogram 10/20/2022     SVT (supraventricular tachycardia) (H24)      Unspecified hypothyroidism      Unspecified vitamin D deficiency        Past Surgical History:   Procedure Laterality Date     CAPSULOTOMY Bilateral      CATARACT EXTRACTION Bilateral      CV CORONARY ANGIOGRAM N/A 10/20/2022    Procedure: Coronary Angiogram;  Surgeon: Mir Farr MD;  Location:  HEART CARDIAC CATH LAB     CV FRACTIONAL FLOW RATIO WIRE N/A 10/20/2022    Procedure: Fractional Flow Ratio Wire;  Surgeon: Mir Farr MD;  Location:  HEART CARDIAC CATH LAB     EYE SURGERY Bilateral     Laser eye surgery     HC SHLDR ARTHROSCOP,PART ACROMIOPLAS Right     Description: Shoulder Arthroscopy, Space Decompression And Acromioplasty;  Recorded: 02/18/2014;     NH LAP,PROSTATECTOMY,RADICAL,W/NERVE SPARE,INCL ROBOTIC Bilateral 02/27/2018    Procedure: ROBOTIC ASSISTED RADICAL RETROPUBIC PROSTATECTOMY, BILATERAL PELVIC LYMPH NODE DISSECTION LYSIS OF ADHESIONS;  Surgeon: Wale Rodriguez MD;  Location: [facility];  Service: Urology     NH RADIAL KERATOTOMY Bilateral     Description: Cornea Radial Keratotomy;  Recorded: 02/18/2014;     TOTAL HIP ARTHROPLASTY Right 3/6/2024    Procedure: RIGHT TOTAL HIP ARTHROPLASTY DIRECT ANTERIOR APPROACH ORTHOGRID;  Surgeon: Wale Benavides MD;  Location: [facility] Main OR       Allergies   Allergen Reactions     Ciprofloxacin Other (See Comments)     Pt states he was told not to take because of \"another medicine he is on\"     Levaquin [Levofloxacin] Other (See Comments)     Pt states he was told not to take because of \"another medicine he is on\"           Current Outpatient Medications:      acetaminophen (TYLENOL) 325 MG tablet, Take 3 tablets (975 mg) by mouth every 8 hours, Disp: 27 tablet, Rfl: 0     aspirin 81 MG EC tablet, Take 1 tablet (81 mg) by mouth 2 times daily, Disp: , Rfl:      celecoxib " (CELEBREX) 200 MG capsule, , Disp: , Rfl:      cholecalciferol (VITAMIN D3) 125 mcg (5000 units) capsule, Take 125 mcg by mouth daily, Disp: , Rfl:      levothyroxine (SYNTHROID/LEVOTHROID) 75 MCG tablet, TAKE 1 TABLET BY MOUTH DAILY, Disp: 100 tablet, Rfl: 2     losartan (COZAAR) 50 MG tablet, Take 50 mg by mouth every evening, Disp: , Rfl:      nitroGLYcerin (NITROSTAT) 0.4 MG sublingual tablet, For chest pain place 1 tablet under the tongue every 5 minutes for 3 doses. If symptoms persist 5 minutes after 1st dose call 911., Disp: 25 tablet, Rfl: 3     pilocarpine (PILOCAR) 2 % ophthalmic solution, Place 1-2 drops into both eyes daily, Disp: , Rfl:      vitamin B-12 (CYANOCOBALAMIN) 500 MCG tablet, Take 500 mcg by mouth daily, Disp: , Rfl:      atorvastatin (LIPITOR) 20 MG tablet, Take 20 mg by mouth every evening Taking until completed and then will start rosuvastatin (Patient not taking: Reported on 4/9/2024), Disp: , Rfl:     Family History   Problem Relation Age of Onset     No Known Problems Mother      Coronary Artery Disease Father      Aortic aneurysm Father      Prostate Cancer Maternal Grandfather      Prostate Cancer Maternal Uncle        Social History     Socioeconomic History     Marital status: Patient Declined     Spouse name: Not on file     Number of children: Not on file     Years of education: Not on file     Highest education level: Not on file   Occupational History     Not on file   Tobacco Use     Smoking status: Never     Smokeless tobacco: Never   Vaping Use     Vaping status: Never Used   Substance and Sexual Activity     Alcohol use: Not Currently     Comment: Alcoholic Drinks/day: rarely     Drug use: No     Sexual activity: Not on file   Other Topics Concern     Not on file   Social History Narrative    Single. . 4 grown daughters.    Currently unemployed.     Social Determinants of Health     Financial Resource Strain: Low Risk  (1/4/2024)    Financial Resource Strain       Within the past 12 months, have you or your family members you live with been unable to get utilities (heat, electricity) when it was really needed?: No   Food Insecurity: No Food Insecurity (2/9/2024)    Received from [facility]    Hunger Vital Sign      Worried About Running Out of Food in the Last Year: Never true      Ran Out of Food in the Last Year: Never true   Transportation Needs: No Transportation Needs (2/9/2024)    Received from [facility]    PRAPARE - Transportation      Lack of Transportation (Medical): No      Lack of Transportation (Non-Medical): No   Physical Activity: Insufficiently Active (2/9/2024)    Received from [facility]    Exercise Vital Sign      Days of Exercise per Week: 1 day      Minutes of Exercise per Session: 30 min   Stress: No Stress Concern Present (12/1/2022)    Received from [facility]    [employer] [place] [employer] - Occupational Stress Questionnaire      Feeling of Stress : Not at all   Social Connections: Unknown (12/1/2022)    Received from [facility]    Social Connection and Isolation Panel [NHANES]      Frequency of Communication with Friends and Family: More than three times a week      Frequency of Social Gatherings with Friends and Family: Three times a week      Attends [employer]ist Services: Patient declined      Active Member of Clubs or Organizations: Patient declined      Attends Club or Organization Meetings: Patient declined      Marital Status: Patient declined   Interpersonal Safety: Low Risk  (11/28/2023)    Interpersonal Safety      Do you feel physically and emotionally safe where you currently live?: Yes      Within the past 12 months, have you been hit, slapped, kicked or otherwise physically hurt by someone?: No      Within the past 12 months, have you been humiliated or emotionally abused in other ways by your partner or ex-partner?: No   Housing Stability: Low Risk   "(2/9/2024)    [facility]    Housing Stability      What is your living situation today?: I have a steady place to live       10 point Review of Systems is negative except for fungal nails which is noted in HPI.     /80   Pulse 64   Ht 1.778 m (5' 10\")   Wt 85.7 kg (189 lb)   LMP  (LMP Unknown)   SpO2 99%   BMI 27.12 kg/m      BMI= Body mass index is 27.12 kg/m .    OBJECTIVE:  General appearance: Patient is alert and fully cooperative with history & exam.  No sign of distress is noted during the visit.    Vascular: Dorsalis pedis palpable bilateral.  There is no appreciable edema noted.  Dermatologic: Very distal aspect of the bilateral hallux nails has some discoloration with possible dystrophic changes.  No erythema bilateral feet.  Neurologic: Diminished light touch bilateral feet.  Musculoskeletal: Contracted digits bilateral feet. Mild bunion deformity bilateral feet.      Again, thank you for allowing me to participate in the care of your patient.        Sincerely,        Mynor Pierce DPM  "

## 2024-05-04 LAB
ALBUMIN SERPL BCG-MCNC: 4.2 G/DL (ref 3.5–5.2)
ALP SERPL-CCNC: 141 U/L (ref 40–150)
ALT SERPL W P-5'-P-CCNC: 16 U/L (ref 0–70)
AST SERPL W P-5'-P-CCNC: 15 U/L (ref 0–45)
BILIRUB DIRECT SERPL-MCNC: <0.2 MG/DL (ref 0–0.3)
BILIRUB SERPL-MCNC: 0.4 MG/DL
PROT SERPL-MCNC: 6.9 G/DL (ref 6.4–8.3)

## 2024-05-05 DIAGNOSIS — B35.1 ONYCHOMYCOSIS: Primary | ICD-10-CM

## 2024-05-05 RX ORDER — TERBINAFINE HYDROCHLORIDE 250 MG/1
250 TABLET ORAL DAILY
Qty: 90 TABLET | Refills: 0 | Status: SHIPPED | OUTPATIENT
Start: 2024-05-05 | End: 2024-08-03

## 2024-05-06 ENCOUNTER — TELEPHONE (OUTPATIENT)
Dept: VASCULAR SURGERY | Facility: CLINIC | Age: 79
End: 2024-05-06
Payer: COMMERCIAL

## 2024-05-06 NOTE — TELEPHONE ENCOUNTER
Pt returned call. Updated pt that labs were normal and medication was sent to [facility]. He voiced understanding.    Ayesha Worrell RN, CWOCN

## 2024-05-07 ENCOUNTER — OFFICE VISIT (OUTPATIENT)
Dept: INTERNAL MEDICINE | Facility: CLINIC | Age: 79
End: 2024-05-07
Payer: COMMERCIAL

## 2024-05-07 VITALS
WEIGHT: 190.3 LBS | SYSTOLIC BLOOD PRESSURE: 114 MMHG | BODY MASS INDEX: 27.24 KG/M2 | RESPIRATION RATE: 15 BRPM | HEART RATE: 70 BPM | HEIGHT: 70 IN | TEMPERATURE: 97.4 F | DIASTOLIC BLOOD PRESSURE: 67 MMHG | OXYGEN SATURATION: 98 %

## 2024-05-07 DIAGNOSIS — M54.50 CHRONIC RIGHT-SIDED LOW BACK PAIN WITHOUT SCIATICA: ICD-10-CM

## 2024-05-07 DIAGNOSIS — F32.A CHRONIC DEPRESSION: Primary | ICD-10-CM

## 2024-05-07 DIAGNOSIS — I25.10 NONOCCLUSIVE CORONARY ATHEROSCLEROSIS OF NATIVE CORONARY ARTERY: ICD-10-CM

## 2024-05-07 DIAGNOSIS — G62.9 PERIPHERAL POLYNEUROPATHY: ICD-10-CM

## 2024-05-07 DIAGNOSIS — Z96.649 STATUS POST TOTAL REPLACEMENT OF HIP, UNSPECIFIED LATERALITY: ICD-10-CM

## 2024-05-07 DIAGNOSIS — G89.29 CHRONIC RIGHT-SIDED LOW BACK PAIN WITHOUT SCIATICA: ICD-10-CM

## 2024-05-07 DIAGNOSIS — L98.9 SKIN LESION: ICD-10-CM

## 2024-05-07 DIAGNOSIS — M79.18 MYALGIA, MULTIPLE SITES: ICD-10-CM

## 2024-05-07 DIAGNOSIS — Z79.899 HIGH RISK MEDICATION USE: ICD-10-CM

## 2024-05-07 DIAGNOSIS — B35.1 ONYCHOMYCOSIS: ICD-10-CM

## 2024-05-07 PROCEDURE — 99214 OFFICE O/P EST MOD 30 MIN: CPT | Performed by: INTERNAL MEDICINE

## 2024-05-07 RX ORDER — ESCITALOPRAM OXALATE 10 MG/1
TABLET ORAL
Qty: 30 TABLET | Refills: 0 | Status: SHIPPED | OUTPATIENT
Start: 2024-05-07 | End: 2024-07-03

## 2024-05-07 RX ORDER — ASPIRIN 81 MG/1
81 TABLET ORAL 2 TIMES DAILY
COMMUNITY
Start: 2024-05-07 | End: 2024-06-16 | Stop reason: DRUGHIGH

## 2024-05-07 RX ORDER — RESPIRATORY SYNCYTIAL VIRUS VACCINE 120MCG/0.5
0.5 KIT INTRAMUSCULAR ONCE
Qty: 1 EACH | Refills: 0 | Status: CANCELLED | OUTPATIENT
Start: 2024-05-07 | End: 2024-05-07

## 2024-05-07 RX ORDER — ROSUVASTATIN CALCIUM 10 MG/1
10 TABLET, COATED ORAL DAILY
COMMUNITY
End: 2024-08-28 | Stop reason: SINTOL

## 2024-05-07 NOTE — PROGRESS NOTES
1. Chronic depression  Lexapro would probably get him feeling better quickly.  An alternative would be tricyclic given his neuropathy but given his age and hesitant to try that.  I do not know how we will do with that but I am hoping he tolerates it better than the other medications we have tried  - escitalopram (LEXAPRO) 10 MG tablet; Take 0.5 tablets (5 mg) by mouth daily for 4 days, THEN 1 tablet (10 mg) daily for 26 days.  Dispense: 30 tablet; Refill: 0  - Adult Mental Health  Referral; Future    2. Myalgia, multiple sites  I do not think that he has pain due to his statin I discussed this with him.  He has pain with or without the statin.  A lot of his issues I think could be helped by a health psychologist.  I am going to make that referral  - [facility]; [facility]    3. Peripheral polyneuropathy  I recommended he meet with a neurologist.  I have also asked that he speak with the health psychologist to so he can learn to deal with his longstanding chronic pain  - Adult Mental Health  Referral; Future    4. High risk medication use  I counseled him about the risks involved with terbinafine use.    5. Onychomycosis  His onychomycosis at least on the foot he showed me is extremely mild.  I counseled him on the risks versus benefits    6. Skin lesion  I have urged him to meet with Dr. Oconnell, his dermatologist regarding the probably benign appearing nevus on his face.  It could be a seborrheic keratosis as well.    7. Nonocclusive coronary atherosclerosis of native coronary artery  Continue statin and I have urged use of baby aspirin.    8. Status post total replacement of hip, unspecified laterality  As above  - aspirin 81 MG EC tablet; Take 1 tablet (81 mg) by mouth 2 times daily    9. Chronic right-sided low back pain without sciatica  As above.  - Adult Mental Health  Referral; Future     Subjective   Binu is a 79 year old, presenting for the following  "health issues:  Follow Up (4 week follow up. Pt took Zoloft for 8 days and stopped taking. Discuss PT. Pt declined PHQ-9 and ROMERO 7.) and Recheck Medication      5/7/2024     4:07 PM   Additional Questions   Roomed by scott martinez   Accompanied by self         5/7/2024     4:07 PM   Patient Reported Additional Medications   Patient reports taking the following new medications none     History of Present Illness       Reason for visit:  NeuropathyMike consumes 1 sweetened beverage(s) daily.Binu exercises with enough effort to increase Binu's heart rate 9 or less minutes per day.  Binu exercises with enough effort to increase Binu's heart rate 3 or less days per week.   Binu is taking medications regularly.           I comes in today for follow-up.  He has chronic pain that is multifactorial.  He has chronic back pain these had since his 20s.  He has chronic myalgias which he thought was perhaps due to cholesterol medications but he has a symptoms despite being off of cholesterol medications in the past.  He recently was given a prescription for terbinafine for onychomycosis.  He has a lesion that he keeps picking at on his face.  He has been taking Aleve which seems to help his symptoms.  He wonders if he can continue with that.  He did not tolerate sertraline.  Made him quite sleepy.  He wants to take something for his mood he tells me but he wants to be able to feel better quicker with less side effects.  He has tried multiple medications including sertraline venlafaxine Wellbutrin and duloxetine.  His neuropathy continues to trouble him but he did not call the neurologist and get an appointment with them as requested.          Objective    /67 (BP Location: Left arm, Patient Position: Sitting, Cuff Size: Adult Regular)   Pulse 70   Temp 97.4  F (36.3  C) (Oral)   Resp 15   Ht 1.778 m (5' 10\")   Wt 86.3 kg (190 lb 4.8 oz)   LMP  (LMP Unknown)   SpO2 98%   BMI 27.31 kg/m    Body mass index is 27.31 " kg/m .  Physical Exam   Pleasant gentleman.  He actually looks pretty good today.  He is moving around fairly well after his hip replacement a few months ago            Signed Electronically by: COLBY BROCK MD

## 2024-05-08 ENCOUNTER — MYC MEDICAL ADVICE (OUTPATIENT)
Dept: CARDIOLOGY | Facility: CLINIC | Age: 79
End: 2024-05-08
Payer: COMMERCIAL

## 2024-05-08 ENCOUNTER — TELEPHONE (OUTPATIENT)
Dept: INTERNAL MEDICINE | Facility: CLINIC | Age: 79
End: 2024-05-08
Payer: COMMERCIAL

## 2024-05-08 NOTE — TELEPHONE ENCOUNTER
Spoke with pharmacist Jagdeep from [facility] to clarify the instructions of patient's escitalopram RX. Nothing further needed.

## 2024-05-08 NOTE — TELEPHONE ENCOUNTER
escitalopram (LEXAPRO) 10 MG tablet 30 tablet 0 5/7/2024 6/6/2024 No   Sig - Route: Take 0.5 tablets (5 mg) by mouth daily for 4 days, THEN 1 tablet (10 mg) daily for 26 days. - Oral     Pharmacy requesting clarification on SIG please advise and reach out to pharmacy

## 2024-05-16 ENCOUNTER — THERAPY VISIT (OUTPATIENT)
Dept: PHYSICAL THERAPY | Facility: REHABILITATION | Age: 79
End: 2024-05-16
Attending: INTERNAL MEDICINE
Payer: COMMERCIAL

## 2024-05-16 DIAGNOSIS — M54.50 CHRONIC RIGHT-SIDED LOW BACK PAIN WITHOUT SCIATICA: ICD-10-CM

## 2024-05-16 DIAGNOSIS — G89.29 CHRONIC RIGHT-SIDED LOW BACK PAIN WITHOUT SCIATICA: ICD-10-CM

## 2024-05-16 PROCEDURE — 97161 PT EVAL LOW COMPLEX 20 MIN: CPT | Mod: GP | Performed by: PHYSICAL THERAPIST

## 2024-05-16 PROCEDURE — 97110 THERAPEUTIC EXERCISES: CPT | Mod: GP | Performed by: PHYSICAL THERAPIST

## 2024-05-16 NOTE — PROGRESS NOTES
PHYSICAL THERAPY EVALUATION  Type of Visit: Evaluation    See electronic medical record for Abuse and Falls Screening details.    Subjective       Presenting condition or subjective complaint: chronic back pain  Date of onset: 24    Relevant medical history: Cancer; Chest pain; High blood pressure; Implanted device; Radiation treatment; Thyroid problems; Vision problems   Dates & types of surgery: prostate, hip, radiation    Prior diagnostic imaging/testing results: MRI     Prior therapy history for the same diagnosis, illness or injury: No      Started having back pain in his 20's. Has been off/on, at times able to play [employer] and go to gym. Other times unable due to pain. Wondering if there's something he can do to strengthen low back. Low back pain makes it difficult to stand from the floor. Feels apprehensive with transitional movements and twisting motions. Will have pain with turning over in bed. Feels cracking/tearing sensations at times.       Prior Level of Function  No pain with transitioning floor to standing, sitting to/from standing, turning in bed    Living Environment  Social support: With a significant other or spouse   Type of home: [facility]   Stairs to enter the home: Yes 2     Ramp: No   Stairs inside the home: Yes 14 Is there a railing: Yes   Help at home:    Equipment owned: Walker; Walker with wheels     Employment: No    Hobbies/Interests: exercise    Patient goals for therapy: return to normal activities    Pain assessment: Location: right low back/Ratin-7/10     Objective   LUMBAR SPINE EVALUATION  PAIN:   INTEGUMENTARY (edema, incisions):   POSTURE: Standing Posture: Rounded shoulders, Forward head, Thoracic kyphosis increased  GAIT:   Weightbearing Status:   Assistive Device(s):   Gait Deviations:   BALANCE/PROPRIOCEPTION:   WEIGHTBEARING ALIGNMENT:   NON-WEIGHTBEARING ALIGNMENT:    ROM:   (Degrees) Left AROM Left PROM  Right AROM Right PROM   Hip Flexion       Hip Extension        Hip Abduction       Hip Adduction       Hip Internal Rotation       Hip External Rotation       Knee Flexion       Knee Extension       Lumbar Side glide     Lumbar Flexion Limited 50% pain at end range   Lumbar Extension Limited 50% pain at end range   Pain:   End feel:   PELVIC/SI SCREEN:   STRENGTH:     MYOTOMES:    Left Right   T12-L3 (Hip Flexion) 4+ 4   L2-4 (Quads)  5 5   L4 (Ankle DF) 5 5   L5 (Great Toe Ext) 5 5   S1 (Toe Raise) 5 5     DTR S:   CORD SIGNS:   DERMATOMES:   NEURAL TENSION:   FLEXIBILITY:   LUMBAR/HIP Special Tests:    PELVIS/SI SPECIAL TESTS:   FUNCTIONAL TESTS:  sit to stands: able to perform after several trials. Did not complete 30 second sit to stand test due to performing increased attempts for assessment and fatigue  PALPATION:   SPINAL SEGMENTAL CONCLUSIONS:       Assessment & Plan   CLINICAL IMPRESSIONS  Medical Diagnosis: M54.50, G89.29 (ICD-10-CM) - Chronic right-sided low back pain without sciatica    Treatment Diagnosis: chronic low back pain with mobility and muscle power deficits   Impression/Assessment: Patient is a 79 year old adult with chief complaints of chronic low back pain with mobility and muscle power deficits.  The following significant findings have been identified: Pain, Decreased ROM/flexibility, Decreased strength, Impaired gait, Impaired muscle performance, Decreased activity tolerance, and Impaired posture. These impairments interfere with their ability to perform recreational activities, household mobility, and community mobility as compared to previous level of function. Patient will benefit from skilled physical therapy to address impairments and functional limitations in order to achieve their goals.       Clinical Decision Making (Complexity):  Clinical Presentation: Stable/Uncomplicated  Clinical Presentation Rationale: based on medical and personal factors listed in PT evaluation  Clinical Decision Making (Complexity): Low complexity    PLAN OF  CARE  Treatment Interventions:  Interventions: Gait Training, Manual Therapy, Neuromuscular Re-education, Therapeutic Activity, Therapeutic Exercise, Self-Care/Home Management    Long Term Goals     PT Goal 1  Goal Identifier: HEP  Goal Description: Patient will demonstrate >50% compliance with home exercise program to promote independence and progress towards  Target Date: 08/08/24  PT Goal 2  Goal Identifier: 30 second sit to stand  Goal Description: Patient will achieve a score of 11 or greater repetitions in 30 second sit to stand test to reflect normative trunk and lower extremity strength values.  Target Date: 08/08/24  PT Goal 3  Goal Identifier: kneeling to standing  Goal Description: Patient will perform kneelign to standing transition with appropriate concentric and eccentric movement control withotu upper extremtiy assist to improve tolerance with transitional movements  Target Date: 08/08/24      Frequency of Treatment: every other week  Duration of Treatment: 12 weeks    Recommended Referrals to Other Professionals:   Education Assessment:   Learner/Method: Patient    Risks and benefits of evaluation/treatment have been explained.   Patient/Family/caregiver agrees with Plan of Care.     Evaluation Time:     PT Eval, Low Complexity Minutes (01576): 20       Signing Clinician: Bola Kulkarni, PT      [facility]                                                                                   OUTPATIENT PHYSICAL THERAPY      PLAN OF TREATMENT FOR OUTPATIENT REHABILITATION   Patient's Last Name, First Name, Artis Joy YOB: 1945   Provider's Name   [facility]   Medical Record No.  [id number]     Onset Date: 04/09/24  Start of Care Date: 05/16/24     Medical Diagnosis:  M54.50, G89.29 (ICD-10-CM) - Chronic right-sided low back pain without sciatica      PT Treatment Diagnosis:  chronic low back pain with mobility and  muscle power deficits Plan of Treatment  Frequency/Duration: every other week/ 12 weeks    Certification date from 05/16/24 to 08/08/24         See note for plan of treatment details and functional goals     Bola Kulkarni, PT                         I CERTIFY THE NEED FOR THESE SERVICES FURNISHED UNDER        THIS PLAN OF TREATMENT AND WHILE UNDER MY CARE     (Physician attestation of this document indicates review and certification of the therapy plan).              Referring Provider:  Angel Hernandez    Initial Assessment  See Epic Evaluation- Start of Care Date: 05/16/24

## 2024-05-22 NOTE — PROGRESS NOTES
[facility] [place] [facility] [place]      HPI:    Artis Galvin is a pleasant 78 year old adult with medical history pertinent for ascending aortic aneurysm, non-obstructive CAD, HTN, SVT/AVNRT, carotid stenosis, and HLD. He presents to cardiology clinic for hospital follow up and cardiac clearance for surgery.    While undergoing pre-operative workup for upcoming hip arthroplasty, Binu noted to his PCP that the same day he had developed dull chest pain that was substernal/left-sided and continued for hours. The pain was not associated with diaphoresis or SOB. He attempted to go to the gym and the dull chest pain continued, did not worsen with exertion. EKG in clinic demonstrated RBBB (pt has had on prior EKGs). Referred to ED where troponin was mildly elevated at 29 > 26. He was loaded with aspirin 325mg and Brilanta 180 mg in ED and started on a heparin gtt. Most recent coronary angiogram 10/22 showed moderate non-obstructive CAD, with 60% stenosis in mLAD, 50% in D1, 30% in ramus, and 30% in RCA. Option for coronary angiogram was discussed with patient; however, due to cath lab scheduling constraints he would have to wait for several days for an angiogram. As his chest pain had resolved, the option was discussed to discharge him to home with a stress test early next week in order to clear him for hip surgery. Patient and family were amenable to this option.     Today in clinic, he denies chest pain, palpitations, dizziness, syncope, or lower extremity edema. He feels more sluggish and fatigued, and just generally more out of shape in the past year. He knows that his activity is limited by hip and back pain and wonders if depression could also be contributing. He also notes mild calf aches for many months.     Interval History 5/29/24:  At his previous visit in January, we stopped atorvastatin due to myalgias and started rosuvastatin. Binu reports persistent myalgias in his legs on rosuvastatin 10mg  daily, which limited the distance that he can walk.   He stopped taking rosuvastatin several weeks ago and feels that the myalgias have improved, though not completed subsided.  Compounding his leg pain is peripheral neuropathy, for which he follows up with neurology tomorrow.   Binu has been listening to cardiology podcasts from [facility] and is interested in trialing injectable lipid-lowering medication.       PAST MEDICAL HISTORY:  Past Medical History:   Diagnosis Date    Abnormal thyroid ultrasound     Adjustment disorder with mixed anxiety and depressed mood     Arthritis     Autoimmune thyroiditis     BBB (bundle branch block)     right    Chronic lymphocytic thyroiditis     Coronary artery disease     Diverticulosis of colon (without mention of hemorrhage)     Hypertension     Low back pain 11/2023    Malignant neoplasm of prostate (H) 2018    Nontoxic multinodular goiter     NSTEMI (non-ST elevated myocardial infarction) (H) 01/11/2024    Osteoporosis     Other and unspecified hyperlipidemia     Pain in joint, shoulder region     Peripheral neuropathy     Persistent hoarseness     Sarcoidosis     Seen on chest xray at age 28    Scleritis, unspecified     Status post coronary angiogram 10/20/2022    SVT (supraventricular tachycardia) (H24)     Unspecified hypothyroidism     Unspecified vitamin D deficiency        FAMILY HISTORY:  Family History   Problem Relation Age of Onset    No Known Problems Mother     Coronary Artery Disease Father     Aortic aneurysm Father     Prostate Cancer Maternal Grandfather     Prostate Cancer Maternal Uncle        SOCIAL HISTORY:  Social History     Socioeconomic History    Marital status: Patient Declined   Tobacco Use    Smoking status: Never    Smokeless tobacco: Never   Vaping Use    Vaping Use: Never used   Substance and Sexual Activity    Alcohol use: Not Currently     Comment: Alcoholic Drinks/day: rarely    Drug use: No   Social History Narrative    Single. .  4 grown daughters.    Currently unemployed.     Social Determinants of Health     Financial Resource Strain: Low Risk  (1/4/2024)    Financial Resource Strain     Within the past 12 months, have you or your family members you live with been unable to get utilities (heat, electricity) when it was really needed?: No   Food Insecurity: Low Risk  (1/4/2024)    Food Insecurity     Within the past 12 months, did you worry that your food would run out before you got money to buy more?: No     Within the past 12 months, did the food you bought just not last and you didn t have money to get more?: No   Transportation Needs: Low Risk  (1/4/2024)    Transportation Needs     Within the past 12 months, has lack of transportation kept you from medical appointments, getting your medicines, non-medical meetings or appointments, work, or from getting things that you need?: No   Interpersonal Safety: Low Risk  (11/28/2023)    Interpersonal Safety     Do you feel physically and emotionally safe where you currently live?: Yes     Within the past 12 months, have you been hit, slapped, kicked or otherwise physically hurt by someone?: No     Within the past 12 months, have you been humiliated or emotionally abused in other ways by your partner or ex-partner?: No   Housing Stability: Low Risk  (1/4/2024)    Housing Stability     Do you have housing? : Yes     Are you worried about losing your housing?: No       CURRENT MEDICATIONS:  Current Outpatient Medications   Medication Sig Dispense Refill    acetaminophen (TYLENOL) 325 MG tablet Take 3 tablets (975 mg) by mouth every 8 hours 27 tablet 0    aspirin 81 MG EC tablet Take 1 tablet (81 mg) by mouth 2 times daily      celecoxib (CELEBREX) 200 MG capsule       cholecalciferol (VITAMIN D3) 125 mcg (5000 units) capsule Take 125 mcg by mouth daily      escitalopram (LEXAPRO) 10 MG tablet Take 0.5 tablets (5 mg) by mouth daily for 4 days, THEN 1 tablet (10 mg) daily for 26 days. 30 tablet 0     levothyroxine (SYNTHROID/LEVOTHROID) 75 MCG tablet TAKE 1 TABLET BY MOUTH DAILY 100 tablet 2    losartan (COZAAR) 50 MG tablet Take 50 mg by mouth every evening      nitroGLYcerin (NITROSTAT) 0.4 MG sublingual tablet For chest pain place 1 tablet under the tongue every 5 minutes for 3 doses. If symptoms persist 5 minutes after 1st dose call 911. 25 tablet 3    pilocarpine (PILOCAR) 2 % ophthalmic solution Place 1-2 drops into both eyes daily      rosuvastatin (CRESTOR) 10 MG tablet Take 10 mg by mouth daily      terbinafine (LAMISIL) 250 MG tablet Take 1 tablet (250 mg) by mouth daily for 90 days 90 tablet 0    vitamin B-12 (CYANOCOBALAMIN) 500 MCG tablet Take 500 mcg by mouth daily       No current facility-administered medications for this visit.       ROS:   Refer to HPI    EXAM:  /74 (BP Location: Left arm, Patient Position: Chair, Cuff Size: Adult Regular)   Pulse 70   Wt 86.8 kg (191 lb 6.4 oz)   LMP  (LMP Unknown)   SpO2 95%   BMI 27.46 kg/m    GENERAL: Appears comfortable, in no acute distress.   HEENT: Eye symmetrical, no discharge or icterus bilaterally. Mucous membranes moist and without lesions.  CV: RRR, +S1S2, no murmur, rub, or gallop.   RESPIRATORY: Respirations regular, even, and unlabored. Lungs CTA throughout.   GI: Soft and non distended with normoactive bowel sounds present in all quadrants. No tenderness, rebound, guarding.   EXTREMITIES: no peripheral edema. 2+ bilateral pedal pulses.   NEUROLOGIC: Alert and oriented x 3. No focal deficits.   MUSCULOSKELETAL: No joint swelling or tenderness.   SKIN: No jaundice. No rashes or lesions.     Labs, reviewed with patient in clinic today:  CBC RESULTS:  Lab Results   Component Value Date    WBC 5.4 02/07/2024    RBC 4.83 02/07/2024    HGB 12.9 03/07/2024    HCT 44.4 02/07/2024    MCV 92 02/07/2024    MCH 30.6 02/07/2024    MCHC 33.3 02/07/2024    RDW 12.5 02/07/2024     02/07/2024       CMP RESULTS:  Lab Results   Component Value  Date     2024    POTASSIUM 4.7 2024    POTASSIUM 4.7 2022    CHLORIDE 103 2024    CHLORIDE 107 2022    CO2 25 2024    CO2 25 2022    ANIONGAP 10 2024    ANIONGAP 10 2022     (H) 2024    GLC 88 2022    BUN 15.2 2024    BUN 17 2022    CR 1.05 2024    GFRESTIMATED 73 2024    GFRESTIMATED 52 (L) 2023    GFRESTIMATED >60 2021    GFRESTIMATED 83 2020    GFRESTBLACK >60 2021    GFRESTBLACK >90 2020    DEANDRA 9.0 2024    BILITOTAL 0.4 2024    ALBUMIN 4.2 2024    ALBUMIN 3.4 (L) 2022    ALKPHOS 141 2024    ALT 16 2024    AST 15 2024        INR RESULTS:  Lab Results   Component Value Date    INR 0.98 10/20/2022       Lab Results   Component Value Date    MAG 2.0 2024     Lab Results   Component Value Date    NTBNPI 146 2023     Lab Results   Component Value Date    NTBNP 97 08/10/2023       LIPIDS:  Recent Labs   Lab Test 24  1836 23  1122   CHOL 134 119   HDL 36* 35*   LDL 67 56   TRIG 157* 141         EKG 24      Echocardiogram:  Recent Results (from the past 4320 hour(s))   Echocardiogram Limited   Result Value    LVEF  55-60%    Narrative    [id number]  [id number]  [id number]  [id number]^ANTHONY^SARAH^HUGO     [facility]  Echocardiography Laboratory  [address] [place] [id number]     Name: LELA JIMENEZ  MRN: [id number]  : 1945  Study Date: 01/15/2024 10:15 AM  Age: 78 yrs  Gender: Male  Patient Location: [facility]  Reason For Study: Chest pain, unspecified type  Ordering Physician: SARAH CRUZ  Referring Physician: SARAH CRUZ  Performed By: Viridiana Elizabeth RDCS, RVT     BSA: 2.0 m2  Height: 70 in  Weight: 185 lb  ______________________________________________________________________________  Procedure  Limited Echocardiogram with portions of two-dimensional, color and  spectral  Doppler performed.  ______________________________________________________________________________  Interpretation Summary  Left ventricular size, wall motion and function are normal. The ejection  fraction is 55-60%.  Mild concentric wall thickening consistent with left ventricular hypertrophy  is present.  Global right ventricular function is normal.  Mild aortic insufficiency is present.  Moderate dilatation of the aorta is present with the ascending aorta measuring  4.6 cm.  ______________________________________________________________________________  Left Ventricle  Left ventricular size, wall motion and function are normal. The ejection  fraction is 55-60%. Mild concentric wall thickening consistent with left  ventricular hypertrophy is present.     Right Ventricle  The right ventricle is normal size. Global right ventricular function is  normal.     Mitral Valve  Trace mitral insufficiency is present.     Aortic Valve  Mild aortic insufficiency is present.     Tricuspid Valve  Mild tricuspid insufficiency is present. Pulmonary artery systolic pressure  cannot be assessed.     Pulmonic Valve  The valve leaflets are not well visualized.     Vessels  Moderate dilatation of the aorta is present with the ascending aorta measuring  4.6 cm. The inferior vena cava was normal in size with preserved respiratory  variability.     Pericardium  No pericardial effusion is present.     Compared to Previous Study  No significant changes noted.     ______________________________________________________________________________  MMode/2D Measurements & Calculations  IVSd: 1.2 cm  LVIDd: 4.3 cm  LVIDs: 2.6 cm  LVPWd: 1.0 cm     FS: 38.9 %  LV mass(C)d: 166.6 grams  LV mass(C)dI: 82.5 grams/m2  Ao root diam: 3.7 cm  asc Aorta Diam: 4.6 cm  Ao root diam index Ht(cm/m): 2.1  Ao root diam index BSA (cm/m2): 1.8  Asc Ao diam index BSA (cm/m2): 2.3  Asc Ao diam index Ht(cm/m): 2.6  RWT: 0.47     Doppler Measurements &  Calculations  AI P1/2t: 574.2 msec     ______________________________________________________________________________  Report approved by: MD Moses Ascencio 01/15/2024 12:00 PM         Echocardiogram Complete   Result Value    LVEF  60-65%    Narrative    [id number]  [id number]  [id number]  [id number]^MARION^SHEN^OSCAR     [facility]  Echocardiography Laboratory  [address] [place] [id number]     Name: LELA JIMENEZ  MRN: [id number]  : 1945  Study Date: 2023 07:19 AM  Age: 78 yrs  Gender: Male  Patient Location: [facility]  Reason For Study: Chest Pain, Chest Pressure, Chest Tightness  Ordering Physician: SHEN SCHULTZ  Performed By: Rocio Caputo     BSA: 2.0 m2  Height: 70 in  Weight: 191 lb  HR: 59  BP: 137/80 mmHg  ______________________________________________________________________________  Procedure  Complete Portable Echo Adult.  ______________________________________________________________________________  Interpretation Summary  Global and regional left ventricular function is normal with an EF of 60-65%.  Right ventricular function, chamber size, wall motion, and thickness are  normal.  Moderate dilatation of the aorta is present with the ascending aorta dilated  to 4.6 cm.  No pericardial effusion is present.     There is no prior study for direct comparison.  ______________________________________________________________________________  Left Ventricle  Global and regional left ventricular function is normal with an EF of 60-65%.  Left ventricular size is normal. Left ventricular wall thickness is normal.  Thickening of the anterobasal septum is present. Left ventricular diastolic  function is normal.     Right Ventricle  Right ventricular function, chamber size, wall motion, and thickness are  normal.     Atria  Both atria appear normal.     Mitral Valve  Mild mitral annular calcification is present. Trace mitral  insufficiency is  present.     Aortic Valve  The aortic valve is tricuspid. Mild aortic valve calcification is present.  Mild aortic insufficiency is present.     Tricuspid Valve  The tricuspid valve is normal. Trace tricuspid insufficiency is present. The  peak velocity of the tricuspid regurgitant jet is not obtainable.     Pulmonic Valve  The pulmonic valve is normal. Mild pulmonic insufficiency is present.     Vessels  The inferior vena cava was normal in size with preserved respiratory  variability. Moderate dilatation of the aorta is present. Ascending aorta 4.6  cm.     Pericardium  No pericardial effusion is present.     Compared to Previous Study  There is no prior study for direct comparison.     Attestation  I have personally viewed the imaging and agree with the interpretation and  report as documented by the fellow, Daniel John, and/or edited by me.  ______________________________________________________________________________  MMode/2D Measurements & Calculations  IVSd: 1.0 cm  LVIDd: 4.4 cm  LVIDs: 2.7 cm  LVPWd: 0.90 cm  FS: 37.8 %  LV mass(C)d: 140.6 grams  LV mass(C)dI: 68.7 grams/m2  Ao root diam: 3.6 cm  asc Aorta Diam: 4.7 cm  LVOT diam: 2.0 cm  LVOT area: 3.3 cm2  Ao root diam Index (cm/m2): 1.8  asc Aorta Diam Index (cm/m2): 2.3  LA Volume (BP): 59.3 ml     LA Volume Index (BP): 28.9 ml/m2  RWT: 0.41  TAPSE: 2.4 cm     Doppler Measurements & Calculations  MV E max rudolph: 59.9 cm/sec  MV A max rudolph: 61.2 cm/sec  MV E/A: 0.98  MV dec time: 0.24 sec  PA acc time: 0.11 sec  E/E' av.6  Lateral E/e': 6.8  Medial E/e': 8.3  RV S Rudolph: 12.4 cm/sec     ______________________________________________________________________________  Report approved by: Juarez GALLARDO 2023 11:22 AM             Coronary Angiogram:    Assessment and Plan:   Binu Galvin is a 78 year old adult with a PMH of ascending aortic aneurysm, non-obstructive CAD, HTN, SVT/AVNRT, carotid stenosis, and HLD.    # Statin  intolerance  # Myalgias   Has experienced life limiting statins on atorvastatin and rosuvastatin. Stopped taking rosuvastatin 2 weeks ago with partial resolution of symptoms. Discussed option of PCKS9i and Leqvio and the difference in mechanism and dosing schedule, he would prefer to trial clinic-administered medication  - PA submitted for Leqvio    # HLD  # Dyslipidemia   Most recent LDL 67  - PA submitted for Leqvio    # Non-obstructive coronary artery disease   # Stable angina  Dobutamine stress echo 1/15/24 negative for inducible myocardial ischemia or infarction.  - PA submitted for Leqvio  - continue aspirin 81mg daily  - SL NTG PRN    # HTN  Goal SBP <130, home BP typically 120s systolics  - cont PTA losartan 50mg daily  - Imdur 30mg daily    # Ascending aortic aneurysm   Measured at 4.6cm on TTE 9/2023.  - SBP goal <130  - cont PTA losartan 50mg daily  - Imdur 30mg daily  - follow-up echo next month     # Carotid stenosis, mild  B/L carotid U/S 8/2023 measured mild plaque formation in bilateral arteries  - cont asa 81  - cont statin    Follow up:  RTC 1 year  Chart review time today: 10 minutes  Visit time today: 21 minutes  Total time spent today: 31 minutes    JAIDA CASTILLO CNP  General Cardiology   05/29/24

## 2024-05-29 ENCOUNTER — OFFICE VISIT (OUTPATIENT)
Dept: CARDIOLOGY | Facility: CLINIC | Age: 79
End: 2024-05-29
Attending: INTERNAL MEDICINE
Payer: COMMERCIAL

## 2024-05-29 VITALS
OXYGEN SATURATION: 95 % | WEIGHT: 191.4 LBS | BODY MASS INDEX: 27.46 KG/M2 | HEART RATE: 70 BPM | SYSTOLIC BLOOD PRESSURE: 112 MMHG | DIASTOLIC BLOOD PRESSURE: 74 MMHG

## 2024-05-29 DIAGNOSIS — I71.21 ANEURYSM OF ASCENDING AORTA WITHOUT RUPTURE (H): ICD-10-CM

## 2024-05-29 DIAGNOSIS — M79.10 MYALGIA: ICD-10-CM

## 2024-05-29 DIAGNOSIS — I65.23 BILATERAL CAROTID ARTERY STENOSIS: ICD-10-CM

## 2024-05-29 DIAGNOSIS — E78.5 HYPERLIPIDEMIA LDL GOAL <100: ICD-10-CM

## 2024-05-29 DIAGNOSIS — Z78.9 STATIN INTOLERANCE: Primary | ICD-10-CM

## 2024-05-29 DIAGNOSIS — I25.10 CORONARY ARTERY DISEASE INVOLVING NATIVE CORONARY ARTERY OF NATIVE HEART WITHOUT ANGINA PECTORIS: ICD-10-CM

## 2024-05-29 PROCEDURE — 99214 OFFICE O/P EST MOD 30 MIN: CPT | Performed by: CASE MANAGER/CARE COORDINATOR

## 2024-05-29 PROCEDURE — G0463 HOSPITAL OUTPT CLINIC VISIT: HCPCS | Performed by: CASE MANAGER/CARE COORDINATOR

## 2024-05-29 ASSESSMENT — PAIN SCALES - GENERAL: PAINLEVEL: NO PAIN (0)

## 2024-05-29 NOTE — PATIENT INSTRUCTIONS
You were seen today in the Cardiovascular Clinic at the [facility] by:       JAIDA VILLALBA CNP    Your visit summary and instructions are as follows:    Order submitted for Inclisiran - we will follow up with you  Echo next week       Return to cardiology clinic as needed     Thank you for your visit today!     Please MyChart message me or call my nurse if you have any questions or concerns.      During Business Hours:  [phone], option # 1 (University) then option # 4 (medical questions) and ask to speak with my nurse.     After hours, weekends or holidays:   [phone], Option #4  Ask to speak to the On-Call Cardiologist. Inform them you are a cardiology patient at the [facility].

## 2024-05-29 NOTE — LETTER
5/29/2024      RE: Artis Galvin  [address]  [place] [address]       Dear Colleague,    Thank you for the opportunity to participate in the care of your patient, Artis Galvin, at the [facility] [place] [facility]. Please see a copy of my visit note below.      [facility] Cardiology - [facility]   Cardiology Clinic Note      HPI:    Artis Galvin is a pleasant 78 year old adult with medical history pertinent for ascending aortic aneurysm, non-obstructive CAD, HTN, SVT/AVNRT, carotid stenosis, and HLD. He presents to cardiology clinic for hospital follow up and cardiac clearance for surgery.    While undergoing pre-operative workup for upcoming hip arthroplasty, Binu noted to his PCP that the same day he had developed dull chest pain that was substernal/left-sided and continued for hours. The pain was not associated with diaphoresis or SOB. He attempted to go to the gym and the dull chest pain continued, did not worsen with exertion. EKG in clinic demonstrated RBBB (pt has had on prior EKGs). Referred to ED where troponin was mildly elevated at 29 > 26. He was loaded with aspirin 325mg and Brilanta 180 mg in ED and started on a heparin gtt. Most recent coronary angiogram 10/22 showed moderate non-obstructive CAD, with 60% stenosis in mLAD, 50% in D1, 30% in ramus, and 30% in RCA. Option for coronary angiogram was discussed with patient; however, due to cath lab scheduling constraints he would have to wait for several days for an angiogram. As his chest pain had resolved, the option was discussed to discharge him to home with a stress test early next week in order to clear him for hip surgery. Patient and family were amenable to this option.     Today in clinic, he denies chest pain, palpitations, dizziness, syncope, or lower extremity edema. He feels more sluggish and fatigued, and just generally more out of shape in the past year. He  knows that his activity is limited by hip and back pain and wonders if depression could also be contributing. He also notes mild calf aches for many months.     Interval History 5/29/24:  At his previous visit in January, we stopped atorvastatin due to myalgias and started rosuvastatin. Binu reports persistent myalgias in his legs on rosuvastatin 10mg daily, which limited the distance that he can walk.   He stopped taking rosuvastatin several weeks ago and feels that the myalgias have improved, though not completed subsided.  Compounding his leg pain is peripheral neuropathy, for which he follows up with neurology tomorrow.   Binu has been listening to cardiology podcasts from [facility] and is interested in trialing injectable lipid-lowering medication.       PAST MEDICAL HISTORY:  Past Medical History:   Diagnosis Date     Abnormal thyroid ultrasound      Adjustment disorder with mixed anxiety and depressed mood      Arthritis      Autoimmune thyroiditis      BBB (bundle branch block)     right     Chronic lymphocytic thyroiditis      Coronary artery disease      Diverticulosis of colon (without mention of hemorrhage)      Hypertension      Low back pain 11/2023     Malignant neoplasm of prostate (H) 2018     Nontoxic multinodular goiter      NSTEMI (non-ST elevated myocardial infarction) (H) 01/11/2024     Osteoporosis      Other and unspecified hyperlipidemia      Pain in joint, shoulder region      Peripheral neuropathy      Persistent hoarseness      Sarcoidosis     Seen on chest xray at age 28     Scleritis, unspecified      Status post coronary angiogram 10/20/2022     SVT (supraventricular tachycardia) (H24)      Unspecified hypothyroidism      Unspecified vitamin D deficiency        FAMILY HISTORY:  Family History   Problem Relation Age of Onset     No Known Problems Mother      Coronary Artery Disease Father      Aortic aneurysm Father      Prostate Cancer Maternal Grandfather      Prostate Cancer  Maternal Uncle        SOCIAL HISTORY:  Social History     Socioeconomic History     Marital status: Patient Declined   Tobacco Use     Smoking status: Never     Smokeless tobacco: Never   Vaping Use     Vaping Use: Never used   Substance and Sexual Activity     Alcohol use: Not Currently     Comment: Alcoholic Drinks/day: rarely     Drug use: No   Social History Narrative    Single. . 4 grown daughters.    Currently unemployed.     Social Determinants of Health     Financial Resource Strain: Low Risk  (1/4/2024)    Financial Resource Strain      Within the past 12 months, have you or your family members you live with been unable to get utilities (heat, electricity) when it was really needed?: No   Food Insecurity: Low Risk  (1/4/2024)    Food Insecurity      Within the past 12 months, did you worry that your food would run out before you got money to buy more?: No      Within the past 12 months, did the food you bought just not last and you didn t have money to get more?: No   Transportation Needs: Low Risk  (1/4/2024)    Transportation Needs      Within the past 12 months, has lack of transportation kept you from medical appointments, getting your medicines, non-medical meetings or appointments, work, or from getting things that you need?: No   Interpersonal Safety: Low Risk  (11/28/2023)    Interpersonal Safety      Do you feel physically and emotionally safe where you currently live?: Yes      Within the past 12 months, have you been hit, slapped, kicked or otherwise physically hurt by someone?: No      Within the past 12 months, have you been humiliated or emotionally abused in other ways by your partner or ex-partner?: No   Housing Stability: Low Risk  (1/4/2024)    Housing Stability      Do you have housing? : Yes      Are you worried about losing your housing?: No       CURRENT MEDICATIONS:  Current Outpatient Medications   Medication Sig Dispense Refill     acetaminophen (TYLENOL) 325 MG tablet Take  3 tablets (975 mg) by mouth every 8 hours 27 tablet 0     aspirin 81 MG EC tablet Take 1 tablet (81 mg) by mouth 2 times daily       celecoxib (CELEBREX) 200 MG capsule        cholecalciferol (VITAMIN D3) 125 mcg (5000 units) capsule Take 125 mcg by mouth daily       escitalopram (LEXAPRO) 10 MG tablet Take 0.5 tablets (5 mg) by mouth daily for 4 days, THEN 1 tablet (10 mg) daily for 26 days. 30 tablet 0     levothyroxine (SYNTHROID/LEVOTHROID) 75 MCG tablet TAKE 1 TABLET BY MOUTH DAILY 100 tablet 2     losartan (COZAAR) 50 MG tablet Take 50 mg by mouth every evening       nitroGLYcerin (NITROSTAT) 0.4 MG sublingual tablet For chest pain place 1 tablet under the tongue every 5 minutes for 3 doses. If symptoms persist 5 minutes after 1st dose call 911. 25 tablet 3     pilocarpine (PILOCAR) 2 % ophthalmic solution Place 1-2 drops into both eyes daily       rosuvastatin (CRESTOR) 10 MG tablet Take 10 mg by mouth daily       terbinafine (LAMISIL) 250 MG tablet Take 1 tablet (250 mg) by mouth daily for 90 days 90 tablet 0     vitamin B-12 (CYANOCOBALAMIN) 500 MCG tablet Take 500 mcg by mouth daily       No current facility-administered medications for this visit.       ROS:   Refer to HPI    EXAM:  /74 (BP Location: Left arm, Patient Position: Chair, Cuff Size: Adult Regular)   Pulse 70   Wt 86.8 kg (191 lb 6.4 oz)   LMP  (LMP Unknown)   SpO2 95%   BMI 27.46 kg/m    GENERAL: Appears comfortable, in no acute distress.   HEENT: Eye symmetrical, no discharge or icterus bilaterally. Mucous membranes moist and without lesions.  CV: RRR, +S1S2, no murmur, rub, or gallop.   RESPIRATORY: Respirations regular, even, and unlabored. Lungs CTA throughout.   GI: Soft and non distended with normoactive bowel sounds present in all quadrants. No tenderness, rebound, guarding.   EXTREMITIES: no peripheral edema. 2+ bilateral pedal pulses.   NEUROLOGIC: Alert and oriented x 3. No focal deficits.   MUSCULOSKELETAL: No joint  swelling or tenderness.   SKIN: No jaundice. No rashes or lesions.     Labs, reviewed with patient in clinic today:  CBC RESULTS:  Lab Results   Component Value Date    WBC 5.4 2024    RBC 4.83 2024    HGB 12.9 2024    HCT 44.4 2024    MCV 92 2024    MCH 30.6 2024    MCHC 33.3 2024    RDW 12.5 2024     2024       CMP RESULTS:  Lab Results   Component Value Date     2024    POTASSIUM 4.7 2024    POTASSIUM 4.7 2022    CHLORIDE 103 2024    CHLORIDE 107 2022    CO2 25 2024    CO2 25 2022    ANIONGAP 10 2024    ANIONGAP 10 2022     (H) 2024    GLC 88 2022    BUN 15.2 2024    BUN 17 2022    CR 1.05 2024    GFRESTIMATED 73 2024    GFRESTIMATED 52 (L) 2023    GFRESTIMATED >60 2021    GFRESTIMATED 83 2020    GFRESTBLACK >60 2021    GFRESTBLACK >90 2020    DEANDRA 9.0 2024    BILITOTAL 0.4 2024    ALBUMIN 4.2 2024    ALBUMIN 3.4 (L) 2022    ALKPHOS 141 2024    ALT 16 2024    AST 15 2024        INR RESULTS:  Lab Results   Component Value Date    INR 0.98 10/20/2022       Lab Results   Component Value Date    MAG 2.0 2024     Lab Results   Component Value Date    NTBNPI 146 2023     Lab Results   Component Value Date    NTBNP 97 08/10/2023       LIPIDS:  Recent Labs   Lab Test 24  1836 23  1122   CHOL 134 119   HDL 36* 35*   LDL 67 56   TRIG 157* 141         EKG 24      Echocardiogram:  Recent Results (from the past 4320 hour(s))   Echocardiogram Limited   Result Value    LVEF  55-60%    [facility]    [id number]  [id number]  [id number]  [id number]^ANTHONY^SARAH^HUGO     [facility]  Echocardiography Laboratory  [address] [place] [id number]     Name: LELA JIMENEZ  MRN: [id number]  : 1945  Study Date: 01/15/2024 10:15 AM  Age:  78 yrs  Gender: Male  Patient Location: [id number]  Reason For Study: Chest pain, unspecified type  Ordering Physician: SARAH CRUZ  Referring Physician: SARAH CRUZ  Performed By: Viridiana Elizabeth RDCS, T     BSA: 2.0 m2  Height: 70 in  Weight: 185 lb  ______________________________________________________________________________  Procedure  Limited Echocardiogram with portions of two-dimensional, color and spectral  Doppler performed.  ______________________________________________________________________________  Interpretation Summary  Left ventricular size, wall motion and function are normal. The ejection  fraction is 55-60%.  Mild concentric wall thickening consistent with left ventricular hypertrophy  is present.  Global right ventricular function is normal.  Mild aortic insufficiency is present.  Moderate dilatation of the aorta is present with the ascending aorta measuring  4.6 cm.  ______________________________________________________________________________  Left Ventricle  Left ventricular size, wall motion and function are normal. The ejection  fraction is 55-60%. Mild concentric wall thickening consistent with left  ventricular hypertrophy is present.     Right Ventricle  The right ventricle is normal size. Global right ventricular function is  normal.     Mitral Valve  Trace mitral insufficiency is present.     Aortic Valve  Mild aortic insufficiency is present.     Tricuspid Valve  Mild tricuspid insufficiency is present. Pulmonary artery systolic pressure  cannot be assessed.     Pulmonic Valve  The valve leaflets are not well visualized.     Vessels  Moderate dilatation of the aorta is present with the ascending aorta measuring  4.6 cm. The inferior vena cava was normal in size with preserved respiratory  variability.     Pericardium  No pericardial effusion is present.     Compared to Previous Study  No significant changes noted.      ______________________________________________________________________________  MMode/2D Measurements & Calculations  IVSd: 1.2 cm  LVIDd: 4.3 cm  LVIDs: 2.6 cm  LVPWd: 1.0 cm     FS: 38.9 %  LV mass(C)d: 166.6 grams  LV mass(C)dI: 82.5 grams/m2  Ao root diam: 3.7 cm  asc Aorta Diam: 4.6 cm  Ao root diam index Ht(cm/m): 2.1  Ao root diam index BSA (cm/m2): 1.8  Asc Ao diam index BSA (cm/m2): 2.3  Asc Ao diam index Ht(cm/m): 2.6  RWT: 0.47     Doppler Measurements & Calculations  AI P1/2t: 574.2 msec     ______________________________________________________________________________  Report approved by: MD Moses Ascencio 01/15/2024 12:00 PM         Echocardiogram Complete   Result Value    LVEF  60-65%    Narrative    [id number]  [id number]  [id number]  [id number]^MARION^SHEN^OSCAR     [facility]  Echocardiography Laboratory  [address] [place] [address]     Name: LELA JIMENEZ  MRN: [id number]  : 1945  Study Date: 2023 07:19 AM  Age: 78 yrs  Gender: Male  Patient Location: [facility]  Reason For Study: Chest Pain, Chest Pressure, Chest Tightness  Ordering Physician: SHEN SCHULTZ  Performed By: Rocio Caputo     BSA: 2.0 m2  Height: 70 in  Weight: 191 lb  HR: 59  BP: 137/80 mmHg  ______________________________________________________________________________  Procedure  Complete Portable Echo Adult.  ______________________________________________________________________________  Interpretation Summary  Global and regional left ventricular function is normal with an EF of 60-65%.  Right ventricular function, chamber size, wall motion, and thickness are  normal.  Moderate dilatation of the aorta is present with the ascending aorta dilated  to 4.6 cm.  No pericardial effusion is present.     There is no prior study for direct comparison.  ______________________________________________________________________________  Left  Ventricle  Global and regional left ventricular function is normal with an EF of 60-65%.  Left ventricular size is normal. Left ventricular wall thickness is normal.  Thickening of the anterobasal septum is present. Left ventricular diastolic  function is normal.     Right Ventricle  Right ventricular function, chamber size, wall motion, and thickness are  normal.     Atria  Both atria appear normal.     Mitral Valve  Mild mitral annular calcification is present. Trace mitral insufficiency is  present.     Aortic Valve  The aortic valve is tricuspid. Mild aortic valve calcification is present.  Mild aortic insufficiency is present.     Tricuspid Valve  The tricuspid valve is normal. Trace tricuspid insufficiency is present. The  peak velocity of the tricuspid regurgitant jet is not obtainable.     Pulmonic Valve  The pulmonic valve is normal. Mild pulmonic insufficiency is present.     Vessels  The inferior vena cava was normal in size with preserved respiratory  variability. Moderate dilatation of the aorta is present. Ascending aorta 4.6  cm.     Pericardium  No pericardial effusion is present.     Compared to Previous Study  There is no prior study for direct comparison.     Attestation  I have personally viewed the imaging and agree with the interpretation and  report as documented by the fellow, Daniel John, and/or edited by me.  ______________________________________________________________________________  MMode/2D Measurements & Calculations  IVSd: 1.0 cm  LVIDd: 4.4 cm  LVIDs: 2.7 cm  LVPWd: 0.90 cm  FS: 37.8 %  LV mass(C)d: 140.6 grams  LV mass(C)dI: 68.7 grams/m2  Ao root diam: 3.6 cm  asc Aorta Diam: 4.7 cm  LVOT diam: 2.0 cm  LVOT area: 3.3 cm2  Ao root diam Index (cm/m2): 1.8  asc Aorta Diam Index (cm/m2): 2.3  LA Volume (BP): 59.3 ml     LA Volume Index (BP): 28.9 ml/m2  RWT: 0.41  TAPSE: 2.4 cm     Doppler Measurements & Calculations  MV E max kendra: 59.9 cm/sec  MV A max kendra: 61.2 cm/sec  MV E/A:  0.98  MV dec time: 0.24 sec  PA acc time: 0.11 sec  E/E' av.6  Lateral E/e': 6.8  Medial E/e': 8.3  RV S Rudolph: 12.4 cm/sec     ______________________________________________________________________________  Report approved by: Juarez GALLARDO 2023 11:22 AM             Coronary Angiogram:    Assessment and Plan:   Binu Galvin is a 78 year old adult with a PMH of ascending aortic aneurysm, non-obstructive CAD, HTN, SVT/AVNRT, carotid stenosis, and HLD.    # Statin intolerance  # Myalgias   Has experienced life limiting statins on atorvastatin and rosuvastatin. Stopped taking rosuvastatin 2 weeks ago with partial resolution of symptoms. Discussed option of PCKS9i and Leqvio and the difference in mechanism and dosing schedule, he would prefer to trial clinic-administered medication  - PA submitted for Leqvio    # HLD  # Dyslipidemia   Most recent LDL 67  - PA submitted for Leqvio    # Non-obstructive coronary artery disease   # Stable angina  Dobutamine stress echo 1/15/24 negative for inducible myocardial ischemia or infarction.  - PA submitted for Leqvio  - continue aspirin 81mg daily  - SL NTG PRN    # HTN  Goal SBP <130, home BP typically 120s systolics  - cont PTA losartan 50mg daily  - Imdur 30mg daily    # Ascending aortic aneurysm   Measured at 4.6cm on TTE 2023.  - SBP goal <130  - cont PTA losartan 50mg daily  - Imdur 30mg daily  - follow-up echo next month     # Carotid stenosis, mild  B/L carotid U/S 2023 measured mild plaque formation in bilateral arteries  - cont asa 81  - cont statin    Follow up:  RTC 1 year  Chart review time today: 10 minutes  Visit time today: 21 minutes  Total time spent today: 31 minutes    JAIDA CASTILLO CNP  General Cardiology   24              Please do not hesitate to contact me if you have any questions/concerns.     Sincerely,     JAIDA CASTILLO CNP

## 2024-05-29 NOTE — NURSING NOTE
Chief Complaint   Patient presents with    Follow Up     discuss statin therapy       Vitals were taken, medications reconciled.    Paige Thurner, Facilitator   2:54 PM

## 2024-05-30 ENCOUNTER — TRANSFERRED RECORDS (OUTPATIENT)
Dept: HEALTH INFORMATION MANAGEMENT | Facility: CLINIC | Age: 79
End: 2024-05-30
Payer: COMMERCIAL

## 2024-06-08 ENCOUNTER — HEALTH MAINTENANCE LETTER (OUTPATIENT)
Age: 79
End: 2024-06-08

## 2024-06-16 ENCOUNTER — APPOINTMENT (OUTPATIENT)
Dept: CT IMAGING | Facility: CLINIC | Age: 79
End: 2024-06-16
Attending: STUDENT IN AN ORGANIZED HEALTH CARE EDUCATION/TRAINING PROGRAM
Payer: COMMERCIAL

## 2024-06-16 ENCOUNTER — TRANSFERRED RECORDS (OUTPATIENT)
Dept: HEALTH INFORMATION MANAGEMENT | Facility: CLINIC | Age: 79
End: 2024-06-16

## 2024-06-16 ENCOUNTER — APPOINTMENT (OUTPATIENT)
Dept: MRI IMAGING | Facility: CLINIC | Age: 79
End: 2024-06-16
Attending: STUDENT IN AN ORGANIZED HEALTH CARE EDUCATION/TRAINING PROGRAM
Payer: COMMERCIAL

## 2024-06-16 ENCOUNTER — HOSPITAL ENCOUNTER (EMERGENCY)
Facility: CLINIC | Age: 79
Discharge: HOME | End: 2024-06-17
Attending: STUDENT IN AN ORGANIZED HEALTH CARE EDUCATION/TRAINING PROGRAM | Admitting: STUDENT IN AN ORGANIZED HEALTH CARE EDUCATION/TRAINING PROGRAM
Payer: COMMERCIAL

## 2024-06-16 DIAGNOSIS — R07.9 CHEST PAIN, UNSPECIFIED TYPE: ICD-10-CM

## 2024-06-16 DIAGNOSIS — I65.02 STENOSIS OF LEFT VERTEBRAL ARTERY: ICD-10-CM

## 2024-06-16 DIAGNOSIS — H53.122 TRANSIENT VISUAL LOSS OF LEFT EYE: ICD-10-CM

## 2024-06-16 LAB
ANION GAP SERPL CALCULATED.3IONS-SCNC: 12 MMOL/L (ref 7–15)
APTT PPP: 27 SECONDS (ref 22–38)
BASOPHILS # BLD AUTO: 0 10E3/UL (ref 0–0.2)
BASOPHILS NFR BLD AUTO: 1 %
BUN SERPL-MCNC: 14.7 MG/DL (ref 8–23)
CALCIUM SERPL-MCNC: 9.4 MG/DL (ref 8.8–10.2)
CHLORIDE SERPL-SCNC: 104 MMOL/L (ref 98–107)
CHOLEST SERPL-MCNC: 224 MG/DL
CREAT SERPL-MCNC: 0.94 MG/DL (ref 0.51–1.17)
CRP SERPL-MCNC: <3 MG/L
DEPRECATED HCO3 PLAS-SCNC: 23 MMOL/L (ref 22–29)
EGFRCR SERPLBLD CKD-EPI 2021: 83 ML/MIN/1.73M2
EOSINOPHIL # BLD AUTO: 0.1 10E3/UL (ref 0–0.7)
EOSINOPHIL NFR BLD AUTO: 3 %
ERYTHROCYTE [DISTWIDTH] IN BLOOD BY AUTOMATED COUNT: 12.6 % (ref 10–15)
ERYTHROCYTE [SEDIMENTATION RATE] IN BLOOD BY WESTERGREN METHOD: 16 MM/HR (ref 0–30)
GLUCOSE SERPL-MCNC: 91 MG/DL (ref 70–99)
HBA1C MFR BLD: 5.9 %
HCT VFR BLD AUTO: 45.2 % (ref 35–53)
HDLC SERPL-MCNC: 34 MG/DL
HGB BLD-MCNC: 15.2 G/DL (ref 13.3–17.7)
IMM GRANULOCYTES # BLD: 0 10E3/UL
IMM GRANULOCYTES NFR BLD: 1 %
INR PPP: 0.96 (ref 0.85–1.15)
LDLC SERPL CALC-MCNC: 112 MG/DL
LYMPHOCYTES # BLD AUTO: 0.9 10E3/UL (ref 0.8–5.3)
LYMPHOCYTES NFR BLD AUTO: 17 %
MCH RBC QN AUTO: 31 PG (ref 26.5–33)
MCHC RBC AUTO-ENTMCNC: 33.6 G/DL (ref 31.5–36.5)
MCV RBC AUTO: 92 FL (ref 78–100)
MONOCYTES # BLD AUTO: 0.6 10E3/UL (ref 0–1.3)
MONOCYTES NFR BLD AUTO: 12 %
NEUTROPHILS # BLD AUTO: 3.4 10E3/UL (ref 1.6–8.3)
NEUTROPHILS NFR BLD AUTO: 66 %
NONHDLC SERPL-MCNC: 190 MG/DL
NRBC # BLD AUTO: 0 10E3/UL
NRBC BLD AUTO-RTO: 0 /100
PLATELET # BLD AUTO: 250 10E3/UL (ref 150–450)
POTASSIUM SERPL-SCNC: 4.4 MMOL/L (ref 3.4–5.3)
RBC # BLD AUTO: 4.91 10E6/UL (ref 3.8–5.9)
SODIUM SERPL-SCNC: 139 MMOL/L (ref 135–145)
TRIGL SERPL-MCNC: 389 MG/DL
TROPONIN T SERPL HS-MCNC: 25 NG/L
WBC # BLD AUTO: 5.1 10E3/UL (ref 4–11)

## 2024-06-16 PROCEDURE — 85025 COMPLETE CBC W/AUTO DIFF WBC: CPT | Performed by: STUDENT IN AN ORGANIZED HEALTH CARE EDUCATION/TRAINING PROGRAM

## 2024-06-16 PROCEDURE — 93005 ELECTROCARDIOGRAM TRACING: CPT | Performed by: STUDENT IN AN ORGANIZED HEALTH CARE EDUCATION/TRAINING PROGRAM

## 2024-06-16 PROCEDURE — 85610 PROTHROMBIN TIME: CPT | Performed by: STUDENT IN AN ORGANIZED HEALTH CARE EDUCATION/TRAINING PROGRAM

## 2024-06-16 PROCEDURE — 250N000011 HC RX IP 250 OP 636: Performed by: STUDENT IN AN ORGANIZED HEALTH CARE EDUCATION/TRAINING PROGRAM

## 2024-06-16 PROCEDURE — 99285 EMERGENCY DEPT VISIT HI MDM: CPT | Performed by: STUDENT IN AN ORGANIZED HEALTH CARE EDUCATION/TRAINING PROGRAM

## 2024-06-16 PROCEDURE — 70496 CT ANGIOGRAPHY HEAD: CPT

## 2024-06-16 PROCEDURE — 85730 THROMBOPLASTIN TIME PARTIAL: CPT | Performed by: STUDENT IN AN ORGANIZED HEALTH CARE EDUCATION/TRAINING PROGRAM

## 2024-06-16 PROCEDURE — 85652 RBC SED RATE AUTOMATED: CPT | Performed by: STUDENT IN AN ORGANIZED HEALTH CARE EDUCATION/TRAINING PROGRAM

## 2024-06-16 PROCEDURE — 250N000013 HC RX MED GY IP 250 OP 250 PS 637: Performed by: STUDENT IN AN ORGANIZED HEALTH CARE EDUCATION/TRAINING PROGRAM

## 2024-06-16 PROCEDURE — 86140 C-REACTIVE PROTEIN: CPT | Performed by: STUDENT IN AN ORGANIZED HEALTH CARE EDUCATION/TRAINING PROGRAM

## 2024-06-16 PROCEDURE — 99283 EMERGENCY DEPT VISIT LOW MDM: CPT | Mod: 4UV | Performed by: OPHTHALMOLOGY

## 2024-06-16 PROCEDURE — 84484 ASSAY OF TROPONIN QUANT: CPT | Performed by: STUDENT IN AN ORGANIZED HEALTH CARE EDUCATION/TRAINING PROGRAM

## 2024-06-16 PROCEDURE — 70498 CT ANGIOGRAPHY NECK: CPT | Mod: 26 | Performed by: RADIOLOGY

## 2024-06-16 PROCEDURE — 80061 LIPID PANEL: CPT

## 2024-06-16 PROCEDURE — 80048 BASIC METABOLIC PNL TOTAL CA: CPT | Performed by: STUDENT IN AN ORGANIZED HEALTH CARE EDUCATION/TRAINING PROGRAM

## 2024-06-16 PROCEDURE — 36415 COLL VENOUS BLD VENIPUNCTURE: CPT | Performed by: STUDENT IN AN ORGANIZED HEALTH CARE EDUCATION/TRAINING PROGRAM

## 2024-06-16 PROCEDURE — 70496 CT ANGIOGRAPHY HEAD: CPT | Mod: 26 | Performed by: RADIOLOGY

## 2024-06-16 PROCEDURE — 70551 MRI BRAIN STEM W/O DYE: CPT | Mod: 26 | Performed by: RADIOLOGY

## 2024-06-16 PROCEDURE — 99285 EMERGENCY DEPT VISIT HI MDM: CPT | Mod: 25 | Performed by: STUDENT IN AN ORGANIZED HEALTH CARE EDUCATION/TRAINING PROGRAM

## 2024-06-16 PROCEDURE — 83036 HEMOGLOBIN GLYCOSYLATED A1C: CPT

## 2024-06-16 PROCEDURE — 70551 MRI BRAIN STEM W/O DYE: CPT

## 2024-06-16 PROCEDURE — 93010 ELECTROCARDIOGRAM REPORT: CPT | Performed by: STUDENT IN AN ORGANIZED HEALTH CARE EDUCATION/TRAINING PROGRAM

## 2024-06-16 RX ORDER — CLOPIDOGREL BISULFATE 75 MG/1
75 TABLET ORAL DAILY
Qty: 20 TABLET | Refills: 0 | Status: SHIPPED | OUTPATIENT
Start: 2024-06-16 | End: 2024-07-05

## 2024-06-16 RX ORDER — CLOPIDOGREL BISULFATE 75 MG/1
300 TABLET ORAL ONCE
Status: COMPLETED | OUTPATIENT
Start: 2024-06-16 | End: 2024-06-17

## 2024-06-16 RX ORDER — IOPAMIDOL 755 MG/ML
67 INJECTION, SOLUTION INTRAVASCULAR ONCE
Status: COMPLETED | OUTPATIENT
Start: 2024-06-16 | End: 2024-06-16

## 2024-06-16 RX ORDER — EZETIMIBE 10 MG/1
10 TABLET ORAL DAILY
Qty: 30 TABLET | Refills: 0 | Status: SHIPPED | OUTPATIENT
Start: 2024-06-16 | End: 2024-08-28

## 2024-06-16 RX ORDER — ASPIRIN 81 MG/1
324 TABLET, CHEWABLE ORAL ONCE
Status: COMPLETED | OUTPATIENT
Start: 2024-06-16 | End: 2024-06-17

## 2024-06-16 RX ORDER — DIAZEPAM 5 MG
5 TABLET ORAL ONCE
Status: COMPLETED | OUTPATIENT
Start: 2024-06-16 | End: 2024-06-16

## 2024-06-16 RX ORDER — ASPIRIN 325 MG
325 TABLET, DELAYED RELEASE (ENTERIC COATED) ORAL DAILY
Qty: 30 TABLET | Refills: 0 | Status: SHIPPED | OUTPATIENT
Start: 2024-06-16 | End: 2024-07-16

## 2024-06-16 RX ADMIN — DIAZEPAM 5 MG: 5 TABLET ORAL at 20:57

## 2024-06-16 RX ADMIN — IOPAMIDOL 67 ML: 755 INJECTION, SOLUTION INTRAVENOUS at 22:49

## 2024-06-16 ASSESSMENT — ACTIVITIES OF DAILY LIVING (ADL)
ADLS_ACUITY_SCORE: 38
ADLS_ACUITY_SCORE: 36
ADLS_ACUITY_SCORE: 36

## 2024-06-16 ASSESSMENT — COLUMBIA-SUICIDE SEVERITY RATING SCALE - C-SSRS
6. HAVE YOU EVER DONE ANYTHING, STARTED TO DO ANYTHING, OR PREPARED TO DO ANYTHING TO END YOUR LIFE?: NO
1. IN THE PAST MONTH, HAVE YOU WISHED YOU WERE DEAD OR WISHED YOU COULD GO TO SLEEP AND NOT WAKE UP?: NO
2. HAVE YOU ACTUALLY HAD ANY THOUGHTS OF KILLING YOURSELF IN THE PAST MONTH?: NO

## 2024-06-16 NOTE — ED TRIAGE NOTES
"Pt reports visual disturbance that happened last night and again this afternoon. Pt states \"left eye went fuzzy dark gray and lasted about 3-6 min both times then resolved. Pt also states that over the past 3-5 days chest pain will occur at rest.   Pt has PMH of peripheral neuropathy.      Triage Assessment (Adult)       Row Name 06/16/24 1839          Triage Assessment    Airway WDL WDL        Respiratory WDL    Respiratory WDL WDL        Skin Circulation/Temperature WDL    Skin Circulation/Temperature WDL WDL        Cardiac WDL    Cardiac WDL WDL        Peripheral/Neurovascular WDL    Peripheral Neurovascular WDL X;all        Cognitive/Neuro/Behavioral WDL    Cognitive/Neuro/Behavioral WDL WDL                     "

## 2024-06-16 NOTE — ED PROVIDER NOTES
[place] EMERGENCY DEPARTMENT ([facility])    6/16/24       ED PROVIDER NOTE    History     Chief Complaint   Patient presents with    Visual Disturbance     Left eye      HPI  Artis Galvin is a 79 year old adult with a history of SVT, right bundle branch block, HTN, prostate cancer, 4.8 cm thoracic ascending aortic aneurysm, and peripheral neuropathy, who presents to the ED for evaluation of transient vision loss.    Patient notes that last night he had an episode lasting about 3 to 6 minutes of what he describes as a dark, cloudy loss of vision that came down over the upper half of his left eye vision.  This went away on its own, and then he had another episode today at around noon lasting about the same amount of time.  Patient also endorses that he has been having on and off nonexertional, nonpleuritic chest pain that is 1 out of 10 in its worst severity, currently not having chest pain, for the last 3 to 5 days.    He has had some headaches in the past generally lasting 2 to 3 seconds at a time, sharp and overlying his left temple.  No jaw claudication with this.     Patient was seen this morning at [facility].  Per the note, he was found to have normal cornea, normal-appearing nerves with flat, sharp and margins and no edema, normal-appearing macula, and was sent in for the note to rule out giant cell arteritis.     Currently denies any vision changes other than the blurriness from when he was dilated at his optometry appointment this morning.    Past Medical History  Past Medical History:   Diagnosis Date    Abnormal thyroid ultrasound     Adjustment disorder with mixed anxiety and depressed mood     Arthritis     Autoimmune thyroiditis     BBB (bundle branch block)     right    Chronic lymphocytic thyroiditis     Coronary artery disease     Diverticulosis of colon (without mention of hemorrhage)     Hypertension     Low back pain 11/2023    Malignant neoplasm of prostate (H)  2018    Nontoxic multinodular goiter     NSTEMI (non-ST elevated myocardial infarction) (H) 01/11/2024    Osteoporosis     Other and unspecified hyperlipidemia     Pain in joint, shoulder region     Peripheral neuropathy     Persistent hoarseness     Sarcoidosis     Seen on chest xray at age 28    Scleritis, unspecified     Status post coronary angiogram 10/20/2022    SVT (supraventricular tachycardia) (H24)     Unspecified hypothyroidism     Unspecified vitamin D deficiency      Past Surgical History:   Procedure Laterality Date    CAPSULOTOMY Bilateral     CATARACT EXTRACTION Bilateral     CV CORONARY ANGIOGRAM N/A 10/20/2022    Procedure: Coronary Angiogram;  Surgeon: Mir Farr MD;  Location:  HEART CARDIAC CATH LAB    CV FRACTIONAL FLOW RATIO WIRE N/A 10/20/2022    Procedure: Fractional Flow Ratio Wire;  Surgeon: Mir Farr MD;  Location: [facility] CARDIAC CATH LAB    EYE SURGERY Bilateral     Laser eye surgery    HC SHLDR ARTHROSCOP,PART ACROMIOPLAS Right     Description: Shoulder Arthroscopy, Space Decompression And Acromioplasty;  Recorded: 02/18/2014;    AZ LAP,PROSTATECTOMY,RADICAL,W/NERVE SPARE,INCL ROBOTIC Bilateral 02/27/2018    Procedure: ROBOTIC ASSISTED RADICAL RETROPUBIC PROSTATECTOMY, BILATERAL PELVIC LYMPH NODE DISSECTION LYSIS OF ADHESIONS;  Surgeon: Wale Rodriguez MD;  Location: [facility];  Service: Urology    AZ RADIAL KERATOTOMY Bilateral     Description: Cornea Radial Keratotomy;  Recorded: 02/18/2014;    TOTAL HIP ARTHROPLASTY Right 3/6/2024    Procedure: RIGHT TOTAL HIP ARTHROPLASTY DIRECT ANTERIOR APPROACH ORTHOGRID;  Surgeon: Wale Benavides MD;  Location: [facility]     aspirin (ASA) 325 MG EC tablet  clopidogrel (PLAVIX) 75 MG tablet  ezetimibe (ZETIA) 10 MG tablet  acetaminophen (TYLENOL) 325 MG tablet  celecoxib (CELEBREX) 200 MG capsule  cholecalciferol (VITAMIN D3) 125 mcg (5000 units) capsule  escitalopram  "(LEXAPRO) 10 MG tablet  levothyroxine (SYNTHROID/LEVOTHROID) 75 MCG tablet  losartan (COZAAR) 50 MG tablet  nitroGLYcerin (NITROSTAT) 0.4 MG sublingual tablet  pilocarpine (PILOCAR) 2 % ophthalmic solution  rosuvastatin (CRESTOR) 10 MG tablet  terbinafine (LAMISIL) 250 MG tablet  vitamin B-12 (CYANOCOBALAMIN) 500 MCG tablet      Allergies   Allergen Reactions    Atorvastatin Muscle Pain (Myalgia)    Rosuvastatin Muscle Pain (Myalgia)    Ciprofloxacin Other (See Comments)     Pt states he was told not to take because of \"another medicine he is on\"    Levaquin [Levofloxacin] Other (See Comments)     Pt states he was told not to take because of \"another medicine he is on\"       Family History  Family History   Problem Relation Age of Onset    No Known Problems Mother     Coronary Artery Disease Father     Aortic aneurysm Father     Prostate Cancer Maternal Grandfather     Prostate Cancer Maternal Uncle      Social History   Social History     Tobacco Use    Smoking status: Never    Smokeless tobacco: Never   Vaping Use    Vaping status: Never Used   Substance Use Topics    Alcohol use: Not Currently     Comment: Alcoholic Drinks/day: rarely    Drug use: No      A complete review of systems was performed with pertinent positives and negatives noted in the HPI, and all other systems negative.    Physical Exam   BP: 134/82  Pulse: 58  Temp: 98.1  F (36.7  C)  Resp: 14  SpO2: 97 %  Physical Exam  GEN: Well appearing, non toxic, cooperative  HEENT: normocephalic and atraumatic, PERRLA, EOMI, pupils dilated proximately 6 mm bilaterally, reactive although sluggishly to light both contralaterally and ipsilaterally, posterior retinal exam limited, but no obvious definable retinal tears noted; bilateral temples with 2+ temporal artery pulses without any tenderness to palpation  CV: well-perfused, normal skin color for ethnicity, regular rate and rhythm  PULM: breathing comfortably, in no respiratory distress, clear to " auscultation upper and lower lung fields  ABD: nondistended, soft, nontender  EXT: Full range of motion.  No edema.  Neuro:  CN II-XII intact  Awake, alert, oriented x3  Motor: Grossly intact 5/5 motor function of bilateral upper and lower extremities.  Relatively preserved muscle tone bilateral upper and lower extremities  Sensory: Grossly intact sensation in bilateral upper extremities, lower extremities with some baseline peripheral neuropathy from the knees down, although he is still able to feel bilaterally and it feels baseline for him   Coordination: No truncal or limb ataxia, normal finger-to-nose, normal rapid alternating movements  HINTS:   Head impulse: normal corrective saccades   Nystagmus: no nystagmus   Test of Skew: no skew noted    SKIN: No rashes, ecchymosis, or lacerations  PSYCH: Calm and cooperative, interactive     ED Course, Procedures, & Data      Procedures            EKG Interpretation:      Interpreted by Heidy Mcgrath MD  Time reviewed: 1904  Symptoms at time of EKG: none   Rhythm: Sinus bradycardia  Rate: 55  Axis: normal  Ectopy: none  Conduction: Right bundle branch block  ST Segments/ T Waves: Bundle branch related T wave inversions in V1-V3  Q Waves: none  Comparison to prior: See below    Clinical Impression: New right bundle branch block compared to previous comparison from January of this year, otherwise no significant changes     Results for orders placed or performed during the hospital encounter of 06/16/24   CTA Head Neck with Contrast     Status: None    Narrative    EXAM: CTA HEAD NECK W CONTRAST  LOCATION: [facility]  DATE: 6/16/2024    INDICATION: Acute neuro deficit, stroke TIA suspected; L temporal tenderness   and  transient vision loss, temoral arteritis?  COMPARISON: MRI brain June 16, 2024..  CONTRAST: Intravenous contrast was administered.  TECHNIQUE: Axial helical CT images of the head and neck vessels obtained during  the arterial phase of intravenous contrast administration. Axial 2D reconstructed images and multiplanar 3D MIP reconstructed images of the head and neck vessels were   performed by the technologist. Dose reduction techniques were used. All stenosis measurements made according to NASCET criteria unless otherwise specified.    FINDINGS:     HEAD CTA:  Bolus timing limits evaluation of the distal intracranial arteries.  ANTERIOR CIRCULATION: No stenosis/occlusion, aneurysm, or high flow vascular malformation. Standard Elim IRA of Hall anatomy.    POSTERIOR CIRCULATION: No stenosis/occlusion, aneurysm, or high flow vascular malformation. Balanced vertebral arteries supply a normal basilar artery.     DURAL VENOUS SINUSES: Expected enhancement of the major dural venous sinuses.    NECK CTA:  RIGHT CAROTID: No measurable stenosis or dissection.    LEFT CAROTID: No measurable stenosis or dissection.    VERTEBRAL ARTERIES: Severe stenosis of the left vertebral artery origin. Mild stenosis of the left V4 segment. Balanced vertebral arteries.    AORTIC ARCH: Classic aortic arch anatomy with no significant stenosis at the origin of the great vessels.    NONVASCULAR STRUCTURES: 2.5 cm right thyroid nodule, previously evaluated.      Impression    IMPRESSION:     HEAD CTA:   1.  Bolus timing limits evaluation of the distal intracranial arteries. Within this limitation, no stenosis or occlusion.    NECK CTA:  1.  Severe stenosis of the left vertebral artery origin.  2.  No additional hemodynamically significant stenosis or occlusion.   MR Brain w/o Contrast     Status: None    Narrative    EXAM: MR BRAIN W/O CONTRAST  LOCATION: [facility]  DATE: 6/16/2024    INDICATION: Transient left eye vision loss.  COMPARISON: Brain MRI dated 1/31/2023.  TECHNIQUE: Routine multiplanar multisequence head MRI without intravenous contrast.    FINDINGS:  INTRACRANIAL CONTENTS: No abnormal  "intracranial restricted diffusion is identified suggest recent infarct. The ventricles appear normal in size and configuration. Mild generalized brain parenchymal volume loss. Mild patchy nonspecific T2 FLAIR   hyperintense signal in cerebral white matter, likely due to chronic small vessel ischemic disease. No intracranial hemorrhage, extra-axial fluid collection, or mass effect.    SELLA: No abnormality accounting for technique.    OSSEOUS STRUCTURES/SOFT TISSUES: Normal marrow signal. The major intracranial vascular flow voids are maintained.     ORBITS: No abnormality accounting for technique.     SINUSES/MASTOIDS: Small polypoid lesions in the left sphenoid and left maxillary sinus regions may represent retention cysts or polyps. No middle ear or mastoid effusion.       Impression    IMPRESSION:  1.  No acute intracranial process is identified. Specifically, no evidence for acute/subacute infarct, intracranial hemorrhage, or mass effect.  2.  Brain atrophy and presumed chronic small vessel ischemic changes, as described.   Basic metabolic panel     Status: Normal   Result Value Ref Range    Sodium 139 135 - 145 mmol/L    Potassium 4.4 3.4 - 5.3 mmol/L    Chloride 104 98 - 107 mmol/L    Carbon Dioxide (CO2) 23 22 - 29 mmol/L    Anion Gap 12 7 - 15 mmol/L    Urea Nitrogen 14.7 8.0 - 23.0 mg/dL    Creatinine 0.94 0.51 - 1.17 mg/dL    GFR Estimate 83 >60 mL/min/1.73m2    Calcium 9.4 8.8 - 10.2 mg/dL    Glucose 91 70 - 99 mg/dL    Narrative    The generation of reference intervals for this test is currently based on binary male or female sex. If the electronic health record information indicates another gender identity or if Legal Sex is recorded as \"Unknown\", both male and female reference intervals are provided where applicable, and should be considered according to the individual's appropriate clinical context.   INR     Status: Normal   Result Value Ref Range    INR 0.96 0.85 - 1.15   Partial thromboplastin time " "    Status: Normal   Result Value Ref Range    aPTT 27 22 - 38 Seconds   Troponin T, High Sensitivity     Status: Abnormal   Result Value Ref Range    Troponin T, High Sensitivity 25 (H) <=22 ng/L   Erythrocyte sedimentation rate auto     Status: Normal   Result Value Ref Range    Erythrocyte Sedimentation Rate 16 0 - 30 mm/hr    Narrative    The generation of reference intervals for this test is currently based on binary male or female sex. If the electronic health record information indicates another gender identity or if Legal Sex is recorded as \"Unknown\", both male and female reference intervals are provided where applicable, and should be considered according to the individual's appropriate clinical context.   CRP inflammation     Status: Normal   Result Value Ref Range    CRP Inflammation <3.00 <5.00 mg/L   Hemoglobin A1c     Status: Abnormal   Result Value Ref Range    Hemoglobin A1C 5.9 (H) <5.7 %   Lipid panel reflex to direct LDL     Status: Abnormal   Result Value Ref Range    Cholesterol 224 (H) <200 mg/dL    Triglycerides 389 (H) <150 mg/dL    Direct Measure HDL 34 (L) >=40 mg/dL    LDL Cholesterol Calculated 112 (H) <=100 mg/dL    Non HDL Cholesterol 190 (H) <130 mg/dL    Narrative    Cholesterol  Desirable:  <200 mg/dL    Triglycerides  Normal:  Less than 150 mg/dL  Borderline High:  150-199 mg/dL  High:  200-499 mg/dL  Very High:  Greater than or equal to 500 mg/dL    Direct Measure HDL  Female:  Greater than or equal to 50 mg/dL   Male:  Greater than or equal to 40 mg/dL    LDL Cholesterol  Desirable:  <100mg/dL  Above Desirable:  100-129 mg/dL   Borderline High:  130-159 mg/dL   High:  160-189 mg/dL   Very High:  >= 190 mg/dL    Non HDL Cholesterol  Desirable:  130 mg/dL  Above Desirable:  130-159 mg/dL  Borderline High:  160-189 mg/dL  High:  190-219 mg/dL  Very High:  Greater than or equal to 220 mg/dL   CBC with platelets and differential     Status: None   Result Value Ref Range    WBC Count " "5.1 4.0 - 11.0 10e3/uL    RBC Count 4.91 3.80 - 5.90 10e6/uL    Hemoglobin 15.2 13.3 - 17.7 g/dL    Hematocrit 45.2 35.0 - 53.0 %    MCV 92 78 - 100 fL    MCH 31.0 26.5 - 33.0 pg    MCHC 33.6 31.5 - 36.5 g/dL    RDW 12.6 10.0 - 15.0 %    Platelet Count 250 150 - 450 10e3/uL    % Neutrophils 66 %    % Lymphocytes 17 %    % Monocytes 12 %    % Eosinophils 3 %    % Basophils 1 %    % Immature Granulocytes 1 %    NRBCs per 100 WBC 0 <1 /100    Absolute Neutrophils 3.4 1.6 - 8.3 10e3/uL    Absolute Lymphocytes 0.9 0.8 - 5.3 10e3/uL    Absolute Monocytes 0.6 0.0 - 1.3 10e3/uL    Absolute Eosinophils 0.1 0.0 - 0.7 10e3/uL    Absolute Basophils 0.0 0.0 - 0.2 10e3/uL    Absolute Immature Granulocytes 0.0 <=0.4 10e3/uL    Absolute NRBCs 0.0 10e3/uL    Narrative    The generation of reference intervals for this test is currently based on binary male or female sex. If the electronic health record information indicates another gender identity or if Legal Sex is recorded as \"Unknown\", both male and female reference intervals are provided where applicable, and should be considered according to the individual's appropriate clinical context.   Troponin T, High Sensitivity     Status: Normal   Result Value Ref Range    Troponin T, High Sensitivity 20 <=22 ng/L   EKG 12 lead     Status: None (Preliminary result)   Result Value Ref Range    Systolic Blood Pressure  mmHg    Diastolic Blood Pressure  mmHg    Ventricular Rate 55 BPM    Atrial Rate 55 BPM    HI Interval 204 ms    QRS Duration 148 ms     ms    QTc 441 ms    P Axis 25 degrees    R AXIS -7 degrees    T Axis 3 degrees    Interpretation ECG       Sinus bradycardia  Right bundle branch block  Possible Lateral infarct , age undetermined  Abnormal ECG     CBC with Platelets & Differential     Status: None    Narrative    The following orders were created for panel order CBC with Platelets & Differential.  Procedure                               Abnormality         Status     "                 ---------                               -----------         ------                     CBC with platelets and d...[[id number]]                      Final result                 Please view results for these tests on the individual orders.     Medications   aspirin (ASA) chewable tablet 324 mg (has no administration in time range)   clopidogrel (PLAVIX) tablet 300 mg (has no administration in time range)   diazepam (VALIUM) tablet 5 mg (5 mg Oral $Given 6/16/24 2057)   iopamidol (ISOVUE-370) solution 67 mL (67 mLs Intravenous $Given 6/16/24 2249)   sodium chloride (PF) 0.9% PF flush 90 mL (90 mLs Intravenous $Given 6/16/24 2249)     Labs Ordered and Resulted from Time of ED Arrival to Time of ED Departure   TROPONIN T, HIGH SENSITIVITY - Abnormal       Result Value    Troponin T, High Sensitivity 25 (*)    HEMOGLOBIN A1C - Abnormal    Hemoglobin A1C 5.9 (*)    LIPID REFLEX TO DIRECT LDL PANEL - Abnormal    Cholesterol 224 (*)     Triglycerides 389 (*)     Direct Measure HDL 34 (*)     LDL Cholesterol Calculated 112 (*)     Non HDL Cholesterol 190 (*)    BASIC METABOLIC PANEL - Normal    Sodium 139      Potassium 4.4      Chloride 104      Carbon Dioxide (CO2) 23      Anion Gap 12      Urea Nitrogen 14.7      Creatinine 0.94      GFR Estimate 83      Calcium 9.4      Glucose 91     INR - Normal    INR 0.96     PARTIAL THROMBOPLASTIN TIME - Normal    aPTT 27     ERYTHROCYTE SEDIMENTATION RATE AUTO - Normal    Erythrocyte Sedimentation Rate 16     CRP INFLAMMATION - Normal    CRP Inflammation <3.00     TROPONIN T, HIGH SENSITIVITY - Normal    Troponin T, High Sensitivity 20     CBC WITH PLATELETS AND DIFFERENTIAL    WBC Count 5.1      RBC Count 4.91      Hemoglobin 15.2      Hematocrit 45.2      MCV 92      MCH 31.0      MCHC 33.6      RDW 12.6      Platelet Count 250      % Neutrophils 66      % Lymphocytes 17      % Monocytes 12      % Eosinophils 3      % Basophils 1      % Immature Granulocytes 1       NRBCs per 100 WBC 0      Absolute Neutrophils 3.4      Absolute Lymphocytes 0.9      Absolute Monocytes 0.6      Absolute Eosinophils 0.1      Absolute Basophils 0.0      Absolute Immature Granulocytes 0.0      Absolute NRBCs 0.0     GLUCOSE MONITOR NURSING POCT     CTA Head Neck with Contrast   Final Result   IMPRESSION:       HEAD CTA:    1.  Bolus timing limits evaluation of the distal intracranial arteries. Within this limitation, no stenosis or occlusion.      NECK CTA:   1.  Severe stenosis of the left vertebral artery origin.   2.  No additional hemodynamically significant stenosis or occlusion.      MR Brain w/o Contrast   Final Result   IMPRESSION:   1.  No acute intracranial process is identified. Specifically, no evidence for acute/subacute infarct, intracranial hemorrhage, or mass effect.   2.  Brain atrophy and presumed chronic small vessel ischemic changes, as described.           Assessment & Plan    79-year-old patient with a history of SVT, previous right bundle branch block although not noted on his EKG from January of this year, 4.8 cm thoracic aortic aneurysm and peripheral neuropathy presenting to the emergency department due to episodes of 3 to 6-minute upper left eye vision loss which has completely resolved at this time associated with episodic chest pain lasting for upwards of 5 minutes at a time nonexertional, nonpleuritic, currently chest pain and vision deficit brief this time noted be hemodynamically stable in no significant distress, no murmurs rubs or gallops appreciated.  Vision at this point in time is reassuring.    Differential for him is broad and includes CVA, amaurosis fugax, optic neuritis, temporal arteritis/giant cell arteritis, ACS, dysrhythmia, TIA    Currently neurologically normal and at baseline for the patient.  He is at high risk when it comes to cardiac disease as well as his known aortic aneurysm.  For this reason we will plan to obtain CT angiogram of his head and  neck and and extend this down to his aortic root to ensure that there is no abnormalities with his known aneurysm.  I spoke with neurology who otherwise recommends that we switch his CT noncontrast head that was ordered to an MRI of his brain instead especially as he has no current symptoms.  I also spoke with ophthalmology who will plan to evaluate the patient as well.    Considering GCA, he has normal temporal artery, no tenderness there, will plan for ESR and CRP as well as further discussion with ophthalmology regarding his risk for this    12:06 AM ophthalmology overall reassured from their standpoint.  Low suspicion of an acute intraocular current pathology.  With the imaging here, vertebral artery on the left noted to be severely stenotic.  Neurology does not believe this needs to be followed up with vascular surgery but they do recommend that he follow-up with stroke neurology outpatient.  They recommend he be started on full-strength aspirin daily indefinitely as well as being loaded with Plavix here, and take Plavix for the next 20 days.  They also recommend that he be started on a statin which was started here as well.  Troponin is reassuring.  EKG nonischemic.  He should follow-up with his PCP or cardiology regarding his chest pain, however at this point in time do not believe he had a primary cardiac event, although transient dysrhythmia and intracardiac emboli cannot be excluded.  Patient is not amenable to admission, and will therefore plan for outpatient management and follow-up as he prefers    I have reviewed the nursing notes. I have reviewed the findings, diagnosis, plan and need for follow up with the patient.    New Prescriptions    ASPIRIN (ASA) 325 MG EC TABLET    Take 1 tablet (325 mg) by mouth daily for 30 days    CLOPIDOGREL (PLAVIX) 75 MG TABLET    Take 1 tablet (75 mg) by mouth daily    EZETIMIBE (ZETIA) 10 MG TABLET    Take 1 tablet (10 mg) by mouth daily for 30 days       Final  diagnoses:   Transient visual loss of left eye   Stenosis of left vertebral artery   Chest pain, unspecified type     Heidy Mcgrath MD  [facility] EMERGENCY DEPARTMENT  6/16/2024     Heidy Mcgrath MD  06/17/24 0008

## 2024-06-17 ENCOUNTER — MYC MEDICAL ADVICE (OUTPATIENT)
Dept: INTERNAL MEDICINE | Facility: CLINIC | Age: 79
End: 2024-06-17

## 2024-06-17 VITALS
RESPIRATION RATE: 16 BRPM | DIASTOLIC BLOOD PRESSURE: 82 MMHG | HEART RATE: 58 BPM | SYSTOLIC BLOOD PRESSURE: 128 MMHG | TEMPERATURE: 98.1 F | OXYGEN SATURATION: 98 %

## 2024-06-17 DIAGNOSIS — H53.122 TRANSIENT VISUAL LOSS OF LEFT EYE: Primary | ICD-10-CM

## 2024-06-17 LAB
ATRIAL RATE - MUSE: 55 BPM
DIASTOLIC BLOOD PRESSURE - MUSE: NORMAL MMHG
INTERPRETATION ECG - MUSE: NORMAL
P AXIS - MUSE: 25 DEGREES
PR INTERVAL - MUSE: 204 MS
QRS DURATION - MUSE: 148 MS
QT - MUSE: 462 MS
QTC - MUSE: 441 MS
R AXIS - MUSE: -7 DEGREES
SYSTOLIC BLOOD PRESSURE - MUSE: NORMAL MMHG
T AXIS - MUSE: 3 DEGREES
TROPONIN T SERPL HS-MCNC: 20 NG/L
VENTRICULAR RATE- MUSE: 55 BPM

## 2024-06-17 PROCEDURE — 250N000013 HC RX MED GY IP 250 OP 250 PS 637: Performed by: STUDENT IN AN ORGANIZED HEALTH CARE EDUCATION/TRAINING PROGRAM

## 2024-06-17 RX ADMIN — ASPIRIN 81 MG CHEWABLE TABLET 324 MG: 81 TABLET CHEWABLE at 00:49

## 2024-06-17 RX ADMIN — CLOPIDOGREL BISULFATE 300 MG: 75 TABLET ORAL at 00:48

## 2024-06-17 NOTE — DISCHARGE INSTRUCTIONS
You were seen in the emergency department due to vision loss that came and went as well as chest pain.  You had lab work here, a CT scan of your arteries and an MRI of your brain.  CT scans of your arteries do show that one of your arteries in your neck is narrow, but is not a cause of your symptoms today.  This is not something that needs to be surgically fixed.  Your MRI does not show any signs of a stroke, however with your symptoms that you had over the last couple of days, our neurologist are concerned that this represented a mini stroke or a TIA.  For this reason they do recommend that you take aspirin and Plavix as well as a new medication to control your cholesterol.  It would recommend that you follow-up with the stroke neurologist outside of the hospital.  Return to the emergency room with any worsening severe symptoms including complete vision loss, weakness, numbness or tingling of your arms or legs, speech changes, gait instability, or any other concerning symptoms.    They do want you to continue to plan for your outpatient echocardiogram follow-up as planned previously.

## 2024-06-17 NOTE — TELEPHONE ENCOUNTER
He should be on something.  Sandeep would be fine if he can tolerate it.  He should see a neurologist.  He should have ER follow-up.  We can do that virtually if I have openings, or I can see him when I return from my vacation.

## 2024-06-17 NOTE — CONSULTS
OPHTHALMOLOGY CONSULT NOTE  06/16/24    Patient: Artis Galvin  Consulted by: ED  Reason for Consult: transient vision loss, left eye     HISTORY OF PRESENTING ILLNESS:     Artis Galvin is a 79 year old adult who presents after 3 episodes of veil down his eye obstructing the superior hemifield that is slightly diagonal in the past 12 hours. Complete loss in this area and describes the color to be charcoal black. States that the episodes last minutes. Denies associated pain. States that he has had these episodes for the last year and a half but they were more frequent in the last 12 hours, prompting his visit to the ED.     GCA ROS negative.     Review of systems were otherwise negative except for that which has been stated above.      OCULAR/MEDICAL/SURGICAL HISTORIES:     Past Ocular History:  Last eye exam: 1 week ago   Prior eye surgery/laser: radial keratectomy  Contact lens wear: Occasional   Glasses: Yes  Eyedrops: None    Family History:  No FOHx of blindness, glaucoma, or AMD      Social History:  Social History     Tobacco Use    Smoking status: Never    Smokeless tobacco: Never   Vaping Use    Vaping status: Never Used   Substance Use Topics    Alcohol use: Not Currently     Comment: Alcoholic Drinks/day: rarely    Drug use: No         Past Medical History:   Diagnosis Date    Abnormal thyroid ultrasound     Adjustment disorder with mixed anxiety and depressed mood     Arthritis     Autoimmune thyroiditis     BBB (bundle branch block)     right    Chronic lymphocytic thyroiditis     Coronary artery disease     Diverticulosis of colon (without mention of hemorrhage)     Hypertension     Low back pain 11/2023    Malignant neoplasm of prostate (H) 2018    Nontoxic multinodular goiter     NSTEMI (non-ST elevated myocardial infarction) (H) 01/11/2024    Osteoporosis     Other and unspecified hyperlipidemia     Pain in joint, shoulder region     Peripheral neuropathy     Persistent hoarseness      Sarcoidosis     Seen on chest xray at age 28    Scleritis, unspecified     Status post coronary angiogram 10/20/2022    SVT (supraventricular tachycardia) (H24)     Unspecified hypothyroidism     Unspecified vitamin D deficiency        Past Surgical History:   Procedure Laterality Date    CAPSULOTOMY Bilateral     CATARACT EXTRACTION Bilateral     CV CORONARY ANGIOGRAM N/A 10/20/2022    Procedure: Coronary Angiogram;  Surgeon: Mir Farr MD;  Location:  [facility]    CV FRACTIONAL FLOW RATIO WIRE N/A 10/20/2022    Procedure: Fractional Flow Ratio Wire;  Surgeon: Mir Farr MD;  Location:  [facility]    EYE SURGERY Bilateral     Laser eye surgery    HC SHLDR ARTHROSCOP,PART ACROMIOPLAS Right     Description: Shoulder Arthroscopy, Space Decompression And Acromioplasty;  Recorded: 02/18/2014;    MN LAP,PROSTATECTOMY,RADICAL,W/NERVE SPARE,INCL ROBOTIC Bilateral 02/27/2018    Procedure: ROBOTIC ASSISTED RADICAL RETROPUBIC PROSTATECTOMY, BILATERAL PELVIC LYMPH NODE DISSECTION LYSIS OF ADHESIONS;  Surgeon: Wale Rodriguez MD;  Location: [facility] [place];  Service: Urology    MN RADIAL KERATOTOMY Bilateral     Description: Cornea Radial Keratotomy;  Recorded: 02/18/2014;    TOTAL HIP ARTHROPLASTY Right 3/6/2024    Procedure: RIGHT TOTAL HIP ARTHROPLASTY DIRECT ANTERIOR APPROACH ORTHOGRID;  Surgeon: Wale Benavides MD;  Location: [facility]       EXAMINATION:     Base Eye Exam       Visual Acuity (Snellen - Linear)         Right Left    Dist cc 20/30 20/20   Pharm dilated at outside clinic              Tonometry (Tonopen, 10:13 PM)         Right Left    Pressure 13 11              Pupils         Dark Light Shape React    Right 5 5 Round Minimal    Left 5 5 Round Minimal   Pharm dilated at outside clinic              Visual Fields         Left Right     Full Full              Extraocular Movement         Right Left     Full, Ortho Full,  Ortho              Dilation       Both eyes: 2.5% Zaki Synephrine, 1.0% Mydriacyl @ 10:13 PM                  Additional Tests       Color         Right Left    Ishihara 11/11 11/11                  Slit Lamp and Fundus Exam       External Exam         Right Left    External Normal Normal              Slit Lamp Exam         Right Left    Lids/Lashes Normal Normal    Conjunctiva/Sclera White and quiet White and quiet    Cornea RK scar RK scar    Anterior Chamber Deep and quiet Deep and quiet    Iris Round and reactive -> Pharm Dilated Round and reactive -> Pharm Dilated    Lens PCIOL in good position, open PC PCIOL in good position, open PC    Anterior Vitreous Normal Normal              Fundus Exam         Right Left    Disc Normal Normal    C/D Ratio 0.3 0.4    Macula Normal Normal; sharp flr    Vessels Normal Normal; no tortuosity, no attenuation, no hollenhurst plaques    Periphery Normal; no tears or holes 360 on  Normal; no pallor or hemorrhage, no tears or holes 360 on                     Labs/Studies/Imaging Performed:    ESR (06/16/24) - WNL   CRP (06/16/24) - pending   HgA1c (06/16/24) - 5.9%  CBC (06/16/24) - WNL   BMP (06/16/24) - pending   Lipid (06/16/24) - elevated  cholesterol, triglycerides, LDL, and non HDL      ASSESSMENT/PLAN:     Artis Galvin is a 79 year old adult who presents with     # Amaurosis fugax   # TIA   # Concern for impending BRAO, left   - History notable for episodes of veil down his eye, obstructing the superior half of his visual field in a diagonal. Visio now back to baseline. Denies any GCA ROS   - Exam notable for 20/20 vision in the affected eye, full color vision, unable to check pupil due to pharm dilation at outside eye clinic. No documented pupil exam on outside clinic note   - SLE and DFE otherwise unremarkable iwthout retinal pallor or ischemia, no hollenhurst plaques, appropriate appearing vessels        RECOMMENDATIONS:  - Stroke work up per ED and neurology    - Will touch base with retina in the AM to see if there is a utility for clinic visit for FA and OCT macula (discussed with patient that we will reach out if retina fellow feels that it will be helpful)     It is our pleasure to participate in this patient's care and treatment. Please contact us with any further questions or concerns.    Phyllis Casillas MD  Resident Physician, PGY-2  Department of Ophthalmology

## 2024-06-17 NOTE — CONSULTS
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     Patient states that he has had 3 transient episodes of L eye vision loss (superior hemifield, slightly tilted down) in the past 24 hours. 1st occurrence last night 6/15 lasting 1 minute when he was out in public - sitting at a table. 2nd episode this morning 6/16 lasting 6 minutes. 3rd episode this afternoon lasting 2-3 minutes. All episodes occurred randomly while he was resting / sitting. Describes complete vision loss of the superior hemifield (light black, charcoal color) each time. Not associated with headache.    No significant headache or throbbing sensations of his temples. No jaw pain or pain with chewing foods. No fevers, chills, unintentional weight loss. No other focal deficits. Has been feeling years of fatigue, worst in the past 3-4 days. Every so often has brief, transient stabbing sensations in his L temple - very seldem. Mild pain, doesn't require pain medications. Last L temple pain was yesterday.    He has had episodes in the past where it seems like he sees colors that are not actually there (feels that his color receptors forget to turn off), occurs in his R eye. He had one of these episodes about 3-5 days ago. Occurs about once every couple of months. Lasts a few minutes before resolving. Not associated headaches but is associated with some tinnitus.    He has had prior episodes of L eye vision loss, but never this frequent. He was saw optometry earlier today who dilated his eyes.    Patient will take aspirin intermittently but hasn't taken one in a long time. Has tried atorvastatin and rosuvastatin  in the past but has developed myalgias and was discontinued from these. Is getting worked up for PCSK9 inhibitor outpatient.    Has never smoked tobacco in his life.    TIA Evaluation Summarized    MRI and/or Head CT CTA Head/Neck/Cardiac: IMPRESSION:   HEAD CTA:   1.  Bolus timing limits evaluation of the distal intracranial arteries. Within this limitation, no stenosis or occlusion.     NECK CTA:  1.  Severe stenosis of the left vertebral artery origin.  2.  No additional hemodynamically significant stenosis or occlusion.    MRI Brain w/o Contrast: IMPRESSION:  1.  No acute intracranial process is identified. Specifically, no evidence for acute/subacute infarct, intracranial hemorrhage, or mass effect.  2.  Brain atrophy and presumed chronic small vessel ischemic changes, as described.   Intracranial Vasculature See above    Cervical Vasculature See above      Echocardiogram Declined admission to perform TTE -> states he has upcoming outpatient scheduled TTE in a couple days at [facility] on 6/18/24   EKG/Telemetry Sinus bradycardia    Other Testing ESR 16  CRP < 3     LDL 6/16/2024: 112 mg/dL   A1C 6/16/2024: 5.9 %       ABCD2 Patients Score   Age ? 60 years 1 point 1   Blood Pressure    SBP ? 140 or DBP ?  90    1 point 0   Clinical Features    - Unilateral weakness    - Speech disturbance w/o weakness    - Other    2 points  1 point    0 points 0   Duration of symptoms    ? 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 Imaging without evidence of stroke, incidentally found to have severe stenosis of L vertebral artery. Will treat patient with DAPT (aspirin, plavix) x 21 days, subsequently continue full dose aspirin indefinitely. Given intolerance to statins, also started ezetimibe. Continue workup for obtaining PCSK9 inhibitor outpatient.    Patient has no clinical symptoms or objective signs suggestive of temporal arteritis. Exam without tenderness of his L temple. ESR / CRP normal. Ophthalmology evaluated the patient without any obvious deficits but will consider need for referral to retinal clinic in the AM.    Patient was recommended admission for thorough TIA workup including TTE. Patient declined admission. He was told of the risks of potential recurrent TIA / stroke, worst case could lead to permanent vision loss. He elected to complete stroke workup and obtain TTE in two days at [facility] as scheduled 6/18/24. It is somewhat reassuring that CTA cardiac does not reveal any thrombi.     #Transient ischemic attack x 3   #Amaurosis fugax, L superior lesli-field concerning for transient Inferior BRAO   #Incidental severe L vertebral artery stenosis    - Long term BP goal < 140 / 90  - Daily aspirin 325 mg for secondary stroke prevention  - Plavix (clopidogrel) 300 mg PO loading dose x 1  - Plavix (clopidogrel) 75 mg PO Daily x 20 days (start date 6/18/24, end date   - Outpatient TTE (reportedly scheduled at [facility] 6/18/24, end date 7/7/24)  - A1c 5.9, at goal < 7  - Lipid Panel, ; goal < 70   -Start ezetimibe 10 mg daily   -Continued workup for PCSK9 inhibitor with PCP v.s. cardiology   -Developed myalgias to two different statins in the past     Patient Follow-up    -Follow up with PCP in 1-2 weeks   -Follow up in 8-12 weeks with general neurology ([phone])    Thank you for this consult.     Radha Hawk MD  PGY-3 Neurology Resident     Patient was discussed with vascular fellow,   Sameer over the phone.  _____________________________________________________    Clinically Significant Risk Factors Present on Admission                # Drug Induced Platelet Defect: home medication list includes an antiplatelet medication   # Hypertension: Noted on problem list                           Past Medical History    Past Medical History:   Diagnosis Date    Abnormal thyroid ultrasound     Adjustment disorder with mixed anxiety and depressed mood     Arthritis     Autoimmune thyroiditis     BBB (bundle branch block)     right    Chronic lymphocytic thyroiditis     Coronary artery disease     Diverticulosis of colon (without mention of hemorrhage)     Hypertension     Low back pain 11/2023    Malignant neoplasm of prostate (H) 2018    Nontoxic multinodular goiter     NSTEMI (non-ST elevated myocardial infarction) (H) 01/11/2024    Osteoporosis     Other and unspecified hyperlipidemia     Pain in joint, shoulder region     Peripheral neuropathy     Persistent hoarseness     Sarcoidosis     Seen on chest xray at age 28    Scleritis, unspecified     Status post coronary angiogram 10/20/2022    SVT (supraventricular tachycardia) (H24)     Unspecified hypothyroidism     Unspecified vitamin D deficiency      Medications   Home Meds  Prior to Admission medications    Medication Sig Start Date End Date Taking? Authorizing Provider   acetaminophen (TYLENOL) 325 MG tablet Take 3 tablets (975 mg) by mouth every 8 hours 3/7/24   Mikaela Garzon PA-C   aspirin 81 MG EC tablet Take 1 tablet (81 mg) by mouth 2 times daily 5/7/24   Angel Hernandez MD   celecoxib (CELEBREX) 200 MG capsule  3/13/24   Reported, Patient   cholecalciferol (VITAMIN D3) 125 mcg (5000 units) capsule Take 125 mcg by mouth daily    Reported, Patient   escitalopram (LEXAPRO) 10 MG tablet Take 0.5 tablets (5 mg) by mouth daily for 4 days, THEN 1 tablet (10 mg) daily for 26 days. 5/7/24 6/6/24  Angel Hernandez MD   levothyroxine  "(SYNTHROID/LEVOTHROID) 75 MCG tablet TAKE 1 TABLET BY MOUTH DAILY 2/7/24   López Shin MD   losartan (COZAAR) 50 MG tablet Take 50 mg by mouth every evening 10/11/23   Mir Farr MD   nitroGLYcerin (NITROSTAT) 0.4 MG sublingual tablet For chest pain place 1 tablet under the tongue every 5 minutes for 3 doses. If symptoms persist 5 minutes after 1st dose call 911. 9/8/21   Angel Hernandez MD   pilocarpine (PILOCAR) 2 % ophthalmic solution Place 1-2 drops into both eyes daily    Reported, Patient   rosuvastatin (CRESTOR) 10 MG tablet Take 10 mg by mouth daily  Patient not taking: Reported on 5/29/2024    Reported, Patient   terbinafine (LAMISIL) 250 MG tablet Take 1 tablet (250 mg) by mouth daily for 90 days 5/5/24 8/3/24  Mynor Pierce DPM   vitamin B-12 (CYANOCOBALAMIN) 500 MCG tablet Take 500 mcg by mouth daily    Unknown, Entered By History       Scheduled Meds  No current facility-administered medications for this encounter.       Infusion Meds  No current facility-administered medications for this encounter.       Allergies   Allergies   Allergen Reactions    Atorvastatin Muscle Pain (Myalgia)    Rosuvastatin Muscle Pain (Myalgia)    Ciprofloxacin Other (See Comments)     Pt states he was told not to take because of \"another medicine he is on\"    Levaquin [Levofloxacin] Other (See Comments)     Pt states he was told not to take because of \"another medicine he is on\"            PHYSICAL EXAMINATION   Temp:  [98.1  F (36.7  C)] 98.1  F (36.7  C)  Pulse:  [58] 58  Resp:  [14] 14  BP: (134)/(82) 134/82  SpO2:  [97 %] 97 %    General Exam  General:  Patient sitting up in a chair without acute distress   HEENT:  Normocephalic, atraumatic   Cardio:  Bradycardic per vitals   Pulmonary:  No respiratory distress, breathing comfortably on room air   Abdomen:  Non-distended   Extremities: Edema in bilateral feet     Skin:  Warm, dry, intact     Neuro Exam  Mental Status:  Awake and " "alert, oriented to age, month, time, and situation. Language fluent with intact comprehension, naming, and repetition.  Cranial Nerves:  PERRL, visual fields intact, EOMI with normal smooth pursuit, facial sensation intact to light touch, facial movements symmetric, hearing not formally tested but intact to conversation, no dysarthria, tongue midline  Motor:  5/5 strength in all extremities. No pronator drift   Reflexes:  2+ BUE's, 2+ R patellar, 0 L patellar, 0 b/l achilles.   Sensory:  Sensation intact to light touch bilaterally without extinction  Coordination:  No ataxia with FNF or heel to shin  Station/Gait:  Mildly slowed but otherwise normal gait      Stroke Scales    NIHSS  1a. Level of Consciousness 0-->Alert, keenly responsive   1b. LOC Questions 0-->Answers both questions correctly   1c. LOC Commands 0-->Performs both tasks correctly   2.   Best Gaze 0-->Normal   3.   Visual 0-->No visual loss   4.   Facial Palsy 0-->Normal symmetrical movements   5a. Motor Arm, Left 0-->No drift, limb holds 90 (or 45) degrees for full 10 secs   5b. Motor Arm, Right 0-->No drift, limb holds 90 (or 45) degrees for full 10 secs   6a. Motor Leg, Left 0-->No drift, leg holds 30 degree position for full 5 secs   6b. Motor Leg, right 0-->No drift, leg holds 30 degree position for full 5 secs   7.   Limb Ataxia 0-->Absent   8.   Sensory 0-->Normal, no sensory loss   9.   Best Language 0-->No aphasia, normal   10. Dysarthria 0-->Normal   11. Extinction and Inattention  0-->No abnormality   Total 0 (06/16/24 2000)       Imaging  I personally reviewed all imaging; relevant findings per HPI.    Labs Data   CBC  No results for input(s): \"WBC\", \"RBC\", \"HGB\", \"HCT\", \"PLT\" in the last 168 hours.  Basic Metabolic Panel   No results for input(s): \"NA\", \"POTASSIUM\", \"CHLORIDE\", \"CO2\", \"BUN\", \"CR\", \"GLC\", \"DEANDRA\" in the last 168 hours.  Liver Panel  No results for input(s): \"PROTTOTAL\", \"ALBUMIN\", \"BILITOTAL\", \"ALKPHOS\", \"AST\", \"ALT\", " "\"BILIDIRECT\" in the last 168 hours.  INR    Recent Labs   Lab Test 10/20/22  1233   INR 0.98           Stroke Consult Data Data   This was a non-emergent, non-telestroke consult.  "

## 2024-06-18 ENCOUNTER — HOSPITAL ENCOUNTER (OUTPATIENT)
Dept: CARDIOLOGY | Facility: HOSPITAL | Age: 79
Discharge: HOME | End: 2024-06-18
Attending: CASE MANAGER/CARE COORDINATOR | Admitting: CASE MANAGER/CARE COORDINATOR
Payer: COMMERCIAL

## 2024-06-18 DIAGNOSIS — I71.21 ANEURYSM OF ASCENDING AORTA WITHOUT RUPTURE (H): ICD-10-CM

## 2024-06-18 LAB — LVEF ECHO: NORMAL

## 2024-06-18 PROCEDURE — 93306 TTE W/DOPPLER COMPLETE: CPT | Mod: 26 | Performed by: INTERNAL MEDICINE

## 2024-06-18 PROCEDURE — 93306 TTE W/DOPPLER COMPLETE: CPT

## 2024-06-19 NOTE — TELEPHONE ENCOUNTER
I called Binu and spoke with him regarding this [employer] message. He is requesting that we send/fax a referral to [facility] to see if he can get in sooner than scheduled on 9/17/24.     I have pended a Neurology referral to [facility] to sign if agreeable with request.     I have arranged Binu for a in person Hospital follow up visit on Wednesday 7/3/24 at 11:00 am.

## 2024-06-19 NOTE — TELEPHONE ENCOUNTER
I do not understand half of what is being said in these messages.  He should make an appointment with me for follow-up for hospital/ER follow-up.  This will have to be when I return.  In person or virtual is fine.

## 2024-06-20 DIAGNOSIS — I71.21 ANEURYSM OF ASCENDING AORTA WITHOUT RUPTURE (H): Primary | ICD-10-CM

## 2024-06-21 ENCOUNTER — NURSE TRIAGE (OUTPATIENT)
Dept: INTERNAL MEDICINE | Facility: CLINIC | Age: 79
End: 2024-06-21
Payer: COMMERCIAL

## 2024-06-21 ENCOUNTER — TELEPHONE (OUTPATIENT)
Dept: CARDIOLOGY | Facility: CLINIC | Age: 79
End: 2024-06-21
Payer: COMMERCIAL

## 2024-06-21 DIAGNOSIS — I20.89 STABLE ANGINA PECTORIS (H): ICD-10-CM

## 2024-06-21 NOTE — TELEPHONE ENCOUNTER
Central Prior Authorization Team   Phone: [phone]    PA Initiation    Medication: Repatha 140mg/ml  Insurance Company: [employer] Part D - Phone [phone] Fax [phone]  Pharmacy Filling the Rx: [id number] - [place], [place] - [address]  Filling Pharmacy Phone: [phone]  Filling Pharmacy Fax:    Start Date: 6/21/2024

## 2024-06-24 ENCOUNTER — MYC MEDICAL ADVICE (OUTPATIENT)
Dept: CARDIOLOGY | Facility: CLINIC | Age: 79
End: 2024-06-24
Payer: COMMERCIAL

## 2024-06-24 RX ORDER — NITROGLYCERIN 0.4 MG/1
TABLET SUBLINGUAL
Qty: 25 TABLET | Refills: 3 | Status: SHIPPED | OUTPATIENT
Start: 2024-06-24

## 2024-06-24 NOTE — TELEPHONE ENCOUNTER
Nurse Triage SBAR    Is this a 2nd Level Triage? YES, LICENSED PRACTITIONER REVIEW IS REQUIRED    Situation: Flu-like symptoms, chest pain    Background: Patient was in ED for transient vision loss.     Assessment: Contacted patient for TCM outreach. Patient reports no new episodes of vision loss. Patient is taking Aspirin, Zetia, and Plavix. Patient reports body aches, chills, fatigue, temperature of 100.5 orally over the past 2 days. Reports today has not taken temperature and unable to check at time of call. Cough audible at time of call. Confirms headache and neck pain. COVID negative. No known exposure. Reports eating and drinking per baseline.       Reports intermittent chest pain that last a couple minutes, not more than 5 minutes, that resolves. Reports this has been occurring since Fathers day weekend. Reports he did report this in the ER.     Protocol Recommended Disposition:   See in Office Today    Recommendation: Be seen by provider. NO appointments today- go to . Patient declined visit with alternative provider and would like to follow up at 7/3 visit with PCP.    Care advice given.  Discussed when to use nitroglycerin and call 911. Patient verbalized understanding.     Routed to provider    Does the patient meet one of the following criteria for ADS visit consideration? 16+ years old, with an [facility] PCP     TIP  Providers, please consider if this condition is appropriate for management at one of our Acute and Diagnostic Services sites.     If patient is a good candidate, please use dotphrase <dot>triageresponse and select Refer to ADS to document.      Reason for Disposition   Fever > 100.4 F (38.0 C)    Additional Information   Negative: Followed an injury to chest   Negative: SEVERE chest pain   Negative: Pain also in shoulder(s) or arm(s) or jaw   Negative: Difficulty breathing   Negative: Cocaine use within last 3 days   Negative: Major surgery in the past month   Negative: Hip or leg fracture  (broken bone) in past month (or had cast on leg or ankle in past month)   Negative: Illness requiring prolonged bedrest in past month (e.g., immobilization, long hospital stay)   Negative: Long-distance travel in past month (e.g., car, bus, train, plane; with trip lasting 6 or more hours)   Negative: History of prior 'blood clot' in leg or lungs (i.e., deep vein thrombosis, pulmonary embolism)   Negative: History of inherited increased risk of blood clots (e.g., Factor 5 Leiden, Anti-thrombin 3, Protein C or Protein S deficiency, Prothrombin mutation)   Negative: Cancer treatment in the past two months (or has cancer now)   Negative: Heart beating irregularly or very rapidly   Negative: Chest pain lasting longer than 5 minutes and occurred in last 3 days (72 hours) (Exception: Feels exactly the same as previously diagnosed heartburn and has accompanying sour taste in mouth.)   Negative: Chest pain or 'angina' comes and goes and is happening more often (increasing in frequency) or getting worse (increasing in severity) (Exception: Chest pains that last only a few seconds.)   Negative: Dizziness or lightheadedness   Negative: Coughing up blood   Negative: Patient sounds very sick or weak to the triager   Negative: Patient says chest pain feels exactly the same as previously diagnosed 'heartburn' and describes burning in chest and accompanying sour taste in mouth   Negative: Difficult to awaken or acting confused (e.g., disoriented, slurred speech)   Negative: Pale cold skin and very weak (can't stand)   Negative: Difficulty breathing and bluish (or gray) lips or face   Negative: New-onset rash with purple (or blood-colored) spots or dots   Negative: Sounds like a life-threatening emergency to the triager   Negative: Fever onset within 24 hours of receiving vaccine   Negative: Fever within 14 days of COVID-19 Exposure   Negative: Pregnant   Negative: Postpartum (from 0 to 6 weeks after delivery)   Negative: Headache  and stiff neck (can't touch chin to chest)   Negative: Difficulty breathing   Negative: IV Drug Use (IVDU)   Negative: Fever > 103 F (39.4 C)   Negative: Fever > 100.0 F (37.8 C) and indwelling urinary catheter (e.g., Hammond, coude)   Negative: Fever > 100.4 F (38.0 C) and has port (portacath), central line, or PICC line   Negative: Drinking very little and dehydration suspected (e.g., no urine > 12 hours, very dry mouth, very lightheaded)   Negative: Patient sounds very sick or weak to the triager   Negative: Fever > 101 F (38.3 C) and over 60 years of age   Negative: Fever > 100.0 F (37.8 C) and diabetes mellitus or a weak immune system (e.g., HIV positive, chemotherapy, splenectomy)   Negative: Fever > 100.0 F (37.8 C) and bedridden (e.g., CVA, chronic illness, recovering from surgery)   Negative: Fever > 100.4 F (38.0 C) and surgery in the past month   Negative: Transplant patient (e.g., liver, heart, lung, kidney)   Negative: Widespread rash and cause unknown   Negative: Severe chills (i.e., feeling extremely cold WITH shaking chills)   Negative: Patient wants to be seen   Negative: Fever present > 3 days (72 hours)   Negative: Fever comes and goes (intermittent) and lasts > 3 weeks   Negative: Fever > 100.0 F (37.8 C) and foreign travel to a developing country in the past month   Negative: Fever with no signs of serious infection or localizing symptoms   Negative: Chest pain lasting longer than 5 minutes and ANY of the following:         Pain is crushing, pressure-like, or heavy         Over 44 years old          Over 30 years old and one cardiac risk factor (e.g diabetes, high blood pressure, high cholesterol, smoker, or family history of heart disease)         History of heart disease (e.g. angina, heart attack, heart failure, bypass surgery, takes nitroglycerin)   Negative: Heart beating < 50 beats per minute OR > 140 beats per minute   Negative: Visible sweat on face or sweat dripping down face   Negative:  Shock suspected (e.g., cold/pale/clammy skin, too weak to stand, low BP, rapid pulse)   Negative: Passed out (i.e., lost consciousness, collapsed and was not responding)   Negative: SEVERE difficulty breathing (e.g., struggling for each breath, speaks in single words)   Negative: Difficult to awaken or acting confused (e.g., disoriented, slurred speech)   Negative: Sounds like a life-threatening emergency to the triager    Protocols used: Fever-A-OH, Chest Pain-A-OH

## 2024-06-24 NOTE — TELEPHONE ENCOUNTER
Transitions of Care Outreach  Chief Complaint   Patient presents with    Hospital F/U       Most Recent Admission Date: 6/16/2024   Most Recent Admission Diagnosis:      Most Recent Discharge Date: 6/17/2024   Most Recent Discharge Diagnosis: Transient visual loss of left eye - H53.122  Stenosis of left vertebral artery - I65.02  Chest pain, unspecified type - R07.9     Transitions of Care Assessment    Discharge Assessment  How are you doing now that you are home?: No more episodes of vision loss  How are your symptoms? (Red Flag symptoms escalate to triage hotline per guidelines): Improved  Do you know how to contact your clinic care team if you have future questions or changes to your health status? : Yes  Does the patient have their discharge instructions? : Yes  Does the patient have questions regarding their discharge instructions? : No  Were you started on any new medications or were there changes to any of your previous medications? : Yes  Does the patient have all of their medications?: Yes  Do you have questions regarding any of your medications? : No  Do you have all of your needed medical supplies or equipment (DME)?  (i.e. oxygen tank, CPAP, cane, etc.): Yes    Follow up Plan     Discharge Follow-Up  Discharge follow up appointment scheduled in alignment with recommended follow up timeframe or Transitions of Risk Category? (Low = within 30 days; Moderate= within 14 days; High= within 7 days): Yes  Discharge Follow Up Appointment Date: 07/03/24  Discharge Follow Up Appointment Scheduled with?: Primary Care Provider    Future Appointments   Date Time Provider Department Center   7/3/2024 11:00 AM Angel Hernandez MD [facility]   8/28/2024  3:20 PM Angel Hernandez MD [facility]   9/17/2024 10:00 AM Priyanka Astudillo APRN CNP [facility]       Outpatient Plan as outlined on AVS reviewed with patient.    For any urgent concerns, please contact our 24 hour nurse triage line: [phone]  ([phone])       Kanika Bachelor, RN

## 2024-06-24 NOTE — CONFIDENTIAL NOTE
Description of symptoms including cough, low-grade temperature and intermittent chest pain most suggestive of viral syndrome    If chest pain occurs routinely with exertion would recommend to be seen earlier otherwise okay to wait    Okay to refill nitroglycerin

## 2024-06-25 NOTE — TELEPHONE ENCOUNTER
Prior Authorization Approval    Authorization Effective Date: 6/21/2024  Authorization Expiration Date: 12/21/2024  Medication: Repatha 140mg/ml  Approved Dose/Quantity:   Reference #:     Insurance Company: [employer] - Phone [phone] Fax [phone]  Expected CoPay:       CoPay Card Available:      Foundation Assistance Needed:    Which Pharmacy is filling the prescription (Not needed for infusion/clinic administered): [id number] - [place], [place] - [address] [place] [address]  Pharmacy Notified:  yes  Patient Notified:  yes- Pharmacy will contact patient when ready to /ship

## 2024-06-25 NOTE — TELEPHONE ENCOUNTER
Spoke with patient and relayed recommendation from Dr. Sequeira:      Brain Sequeira MD         6/24/24  6:05 PM  Unsigned Note  Description of symptoms including cough, low-grade temperature and intermittent chest pain most suggestive of viral syndrome     If chest pain occurs routinely with exertion would recommend to be seen earlier otherwise okay to wait     Okay to refill nitroglycerin          Patient states fever is resolved. Cough still present. Did advise to monitor sputum color. If sputum color changes to yellow/green/brown or symptoms do not continue to improve to contact clinic.     Patient verbalized understanding. No further questions.

## 2024-06-27 ENCOUNTER — TELEPHONE (OUTPATIENT)
Dept: CARDIOLOGY | Facility: CLINIC | Age: 79
End: 2024-06-27
Payer: COMMERCIAL

## 2024-06-27 NOTE — TELEPHONE ENCOUNTER
[facility] Call Center    Phone Message    May a detailed message be left on voicemail: yes     Reason for Call: Other: Gregor from [facility] would like to speak with care team member regarding faxed form to be filled out, can refer to case ID [id number] when calling back       Action Taken: Other: cardiolgoy    Travel Screening: Not Applicable    Thank you!  [facility]      Date of Service: 6/27/24

## 2024-07-03 ENCOUNTER — OFFICE VISIT (OUTPATIENT)
Dept: INTERNAL MEDICINE | Facility: CLINIC | Age: 79
End: 2024-07-03
Payer: COMMERCIAL

## 2024-07-03 VITALS
OXYGEN SATURATION: 99 % | SYSTOLIC BLOOD PRESSURE: 119 MMHG | BODY MASS INDEX: 26.92 KG/M2 | TEMPERATURE: 97.6 F | HEIGHT: 70 IN | WEIGHT: 188 LBS | HEART RATE: 63 BPM | RESPIRATION RATE: 12 BRPM | DIASTOLIC BLOOD PRESSURE: 70 MMHG

## 2024-07-03 DIAGNOSIS — I25.10 NONOCCLUSIVE CORONARY ATHEROSCLEROSIS OF NATIVE CORONARY ARTERY: ICD-10-CM

## 2024-07-03 DIAGNOSIS — F34.1 DYSTHYMIA: ICD-10-CM

## 2024-07-03 DIAGNOSIS — I65.02 STENOSIS OF LEFT VERTEBRAL ARTERY: ICD-10-CM

## 2024-07-03 DIAGNOSIS — M79.605 PAIN IN BOTH LOWER EXTREMITIES: ICD-10-CM

## 2024-07-03 DIAGNOSIS — M79.604 PAIN IN BOTH LOWER EXTREMITIES: ICD-10-CM

## 2024-07-03 DIAGNOSIS — I10 PRIMARY HYPERTENSION: ICD-10-CM

## 2024-07-03 DIAGNOSIS — Z86.73 HISTORY OF TIA (TRANSIENT ISCHEMIC ATTACK): Primary | ICD-10-CM

## 2024-07-03 DIAGNOSIS — G62.9 PERIPHERAL POLYNEUROPATHY: ICD-10-CM

## 2024-07-03 PROCEDURE — G2211 COMPLEX E/M VISIT ADD ON: HCPCS | Performed by: INTERNAL MEDICINE

## 2024-07-03 PROCEDURE — 99214 OFFICE O/P EST MOD 30 MIN: CPT | Performed by: INTERNAL MEDICINE

## 2024-07-03 RX ORDER — RESPIRATORY SYNCYTIAL VIRUS VACCINE 120MCG/0.5
0.5 KIT INTRAMUSCULAR ONCE
Qty: 1 EACH | Refills: 0 | Status: CANCELLED | OUTPATIENT
Start: 2024-07-03 | End: 2024-07-03

## 2024-07-03 ASSESSMENT — PAIN SCALES - GENERAL: PAINLEVEL: NO PAIN (0)

## 2024-07-03 NOTE — PROGRESS NOTES
1. History of TIA (transient ischemic attack)  I concur that he should continue with aspirin and Plavix and follow-up with neurology.  He should be on his much cholesterol-lowering medication as he can tolerate rather that be PCSK9 or high intensity statin.  He is working with his cardiologist regarding this.    2. Stenosis of left vertebral artery  As above.    3. Pain in both lower extremities  I told Binu that I do not think he has vascular disease causing his neuropathy symptoms.  He has excellent circulation it seems on exam.    4. Peripheral polyneuropathy  Continue to follow with neurology.    5. Primary hypertension  Blood pressure looks excellent today.    6. Nonocclusive coronary atherosclerosis of native coronary artery  As above.  Continue aspirin.  Working on getting some cholesterol lowering medication    7. Dysthymia  Mood is dysthymic.  He refuses to do any mood questionnaires.  I think he would benefit from mood medications but he has been intolerant.  He may try the Lexapro at some point but it is unclear if he will.        Subjective   Binu is a 79 year old, presenting for the following health issues:  Follow Up (Follow up for stable angina pectoris/angina,  Patient declined PHQ9 amd GAD7)      7/3/2024    10:54 AM   Additional Questions   Roomed by Ginny HUNG         Binu comes in today for general follow-up.  Binu is a pleasant gentleman whom I have known for several years now.  Multiple medical problems including coronary disease, sarcoid, hypertension and chronic dysthymia.  Since I saw him last he had an acute loss of vision of 1 eye.  Thought to be a TIA.  Was seen in emergency room.  There no stroke was seen on imaging.  He did have severe stenosis of his left vertebral artery.  Neurology was recommended but that appointment is not for another couple months.  He is trying to get into [place].  He is on aspirin and Plavix now.  He is also supposed to be taking Zetia.  He has been  "intolerant of statins but he does state that he feels the same when he is on a statin when he is not on a statin.  I concur that he tends to feel poorly whether he is on the medicine or not.  His chronic dysthymia it tends to make things difficult.  He has been tried on multiple mood medications which she does not tolerate.  I put him on Lexapro but he has not taken that yet.  He wonders if he should take Lamisil for a onychomycotic toenail.  He has neuropathy in his feet and legs.  He wonders if this is due to peripheral vascular disease or peripheral artery disease.          Objective    /70 (BP Location: Left arm, Patient Position: Sitting, Cuff Size: Adult Regular)   Pulse 63   Temp 97.6  F (36.4  C) (Tympanic)   Resp 12   Ht 1.778 m (5' 10\")   Wt 85.3 kg (188 lb)   LMP  (LMP Unknown)   SpO2 99%   BMI 26.98 kg/m    Body mass index is 26.98 kg/m .  Physical Exam   He has excellent pulses in his lower extremities.  No changes of venous insufficiency of his lower extremities bilaterally.            Signed Electronically by: COLBY BROCK MD    "

## 2024-07-05 DIAGNOSIS — G45.9 TIA (TRANSIENT ISCHEMIC ATTACK): Primary | ICD-10-CM

## 2024-07-05 RX ORDER — CLOPIDOGREL BISULFATE 75 MG/1
75 TABLET ORAL DAILY
Qty: 30 TABLET | Refills: 5 | Status: SHIPPED | OUTPATIENT
Start: 2024-07-05 | End: 2024-08-08

## 2024-07-05 NOTE — CONFIDENTIAL NOTE
Patient asked about appeal for Inclisiran and it has been denied. His insurance is willing to cover Repatha. He would like to think about this over the weekend.  He states he still has chest pain anywhere from 3 minutes to 1/2 hour almost daily. He states it is a dull ache. He states the ache goes away on it's own. He is curious as to when he would need a coronary angiogram again.   He has only used nitroglycerin once and it was years ago.  Recent ishcemic testing has been negative.     ECHO 6/18/24  1.Left ventricular size, wall motion and function are normal. The ejection  fraction is 60-65%.  2.Normal right ventricle size and systolic function.  3.Bi atrial enlargement.  4.No hemodynamically significant valvular abnormalities on 2D or color flow  imaging.  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  Compared to the prior study dated 1/15/2024, the aorta diameter has increased  slightly.     NM stress 1/15/34    The nuclear stress test is negative for inducible myocardial ischemia or infarction.    A prior study was conducted on 3/27/2020.    Cardiac Cath 10/20/22  Moderate non obstructive CAD with hemodynamically non significant lesions in the mid LAD (dPR 0.93) and D1 (0.95). Otherwise mild non obstructive CAD elsewhere.     Asked patient to continue to monitor symptoms. If his chest pain is unrelieved after nitro he should call [phone] or come to the ER

## 2024-07-08 ENCOUNTER — MYC MEDICAL ADVICE (OUTPATIENT)
Dept: CARDIOLOGY | Facility: CLINIC | Age: 79
End: 2024-07-08
Payer: COMMERCIAL

## 2024-07-08 ENCOUNTER — TRANSFERRED RECORDS (OUTPATIENT)
Dept: HEALTH INFORMATION MANAGEMENT | Facility: CLINIC | Age: 79
End: 2024-07-08
Payer: COMMERCIAL

## 2024-07-08 ENCOUNTER — TELEPHONE (OUTPATIENT)
Dept: CARDIOLOGY | Facility: CLINIC | Age: 79
End: 2024-07-08
Payer: COMMERCIAL

## 2024-07-08 DIAGNOSIS — Z78.9 STATIN INTOLERANCE: ICD-10-CM

## 2024-07-08 DIAGNOSIS — E78.5 HYPERLIPIDEMIA LDL GOAL <100: Primary | ICD-10-CM

## 2024-07-08 NOTE — TELEPHONE ENCOUNTER
[facility] Call Center    Phone Message    May a detailed message be left on voicemail: yes     Reason for Call: Other: inclisiran (LEQVIO) SQ injection 284 mg - PT has been approved for this med but needs to know the cost for the OV, the injection and the medication.  The costs for this medication and the administration of it will be covered under Part B of the pt's Medicare.   Please call pt to advise so he knows what to expect and if he can afford it.       Action Taken: Message routed to:  [facility] cardio    Travel Screening: Not Applicable    Thank you!  [facility]       Date of Service:

## 2024-07-15 ENCOUNTER — ALLIED HEALTH/NURSE VISIT (OUTPATIENT)
Dept: CARDIOLOGY | Facility: CLINIC | Age: 79
End: 2024-07-15
Attending: INTERNAL MEDICINE
Payer: COMMERCIAL

## 2024-07-15 DIAGNOSIS — E78.5 HYPERLIPIDEMIA LDL GOAL <100: Primary | ICD-10-CM

## 2024-07-15 DIAGNOSIS — Z78.9 STATIN INTOLERANCE: ICD-10-CM

## 2024-07-15 DIAGNOSIS — Z78.9 STATIN INTOLERANCE: Primary | ICD-10-CM

## 2024-07-15 DIAGNOSIS — E78.5 HYPERLIPIDEMIA LDL GOAL <100: ICD-10-CM

## 2024-07-29 ENCOUNTER — ALLIED HEALTH/NURSE VISIT (OUTPATIENT)
Dept: CARDIOLOGY | Facility: CLINIC | Age: 79
End: 2024-07-29
Payer: COMMERCIAL

## 2024-07-29 DIAGNOSIS — E78.5 HYPERLIPIDEMIA LDL GOAL <100: ICD-10-CM

## 2024-07-29 DIAGNOSIS — Z78.9 STATIN INTOLERANCE: Primary | ICD-10-CM

## 2024-07-29 PROCEDURE — 96372 THER/PROPH/DIAG INJ SC/IM: CPT | Performed by: CASE MANAGER/CARE COORDINATOR

## 2024-07-29 PROCEDURE — 250N000011 HC RX IP 250 OP 636: Performed by: CASE MANAGER/CARE COORDINATOR

## 2024-07-29 RX ADMIN — INCLISIRAN 284 MG: 284 INJECTION, SOLUTION SUBCUTANEOUS at 14:38

## 2024-07-29 NOTE — NURSING NOTE
The following medication was given:     MEDICATION: Inclisarin  ROUTE: SQ  SITE: Forearm - Left  DOSE: 284mg/ml  LOT #: [id number]  :  Oleg[place]  EXPIRATION DATE:  NOVEMBER 2026     Patient tolerated injection without difficulty   Return in 3 months for second injection

## 2024-08-06 ENCOUNTER — MYC MEDICAL ADVICE (OUTPATIENT)
Dept: INTERNAL MEDICINE | Facility: CLINIC | Age: 79
End: 2024-08-06
Payer: COMMERCIAL

## 2024-08-06 DIAGNOSIS — G45.9 TIA (TRANSIENT ISCHEMIC ATTACK): ICD-10-CM

## 2024-08-08 RX ORDER — CLOPIDOGREL BISULFATE 75 MG/1
75 TABLET ORAL DAILY
Qty: 90 TABLET | Refills: 0 | Status: SHIPPED | OUTPATIENT
Start: 2024-08-08 | End: 2024-08-28

## 2024-08-08 NOTE — TELEPHONE ENCOUNTER
I do not think the Plavix is causing his dizziness.  He needs to remain on this medication until he meets with the neurologist.  Hopefully he is seeing them soon.

## 2024-08-23 SDOH — HEALTH STABILITY: PHYSICAL HEALTH: ON AVERAGE, HOW MANY DAYS PER WEEK DO YOU ENGAGE IN MODERATE TO STRENUOUS EXERCISE (LIKE A BRISK WALK)?: 3 DAYS

## 2024-08-23 ASSESSMENT — SOCIAL DETERMINANTS OF HEALTH (SDOH): HOW OFTEN DO YOU GET TOGETHER WITH FRIENDS OR RELATIVES?: THREE TIMES A WEEK

## 2024-08-26 ENCOUNTER — HOSPITAL ENCOUNTER (OUTPATIENT)
Dept: BONE DENSITY | Facility: HOSPITAL | Age: 79
Discharge: HOME | End: 2024-08-26
Attending: INTERNAL MEDICINE
Payer: COMMERCIAL

## 2024-08-26 ENCOUNTER — HOSPITAL ENCOUNTER (OUTPATIENT)
Dept: ULTRASOUND IMAGING | Facility: HOSPITAL | Age: 79
Discharge: HOME | End: 2024-08-26
Attending: INTERNAL MEDICINE
Payer: COMMERCIAL

## 2024-08-26 DIAGNOSIS — M81.0 OSTEOPOROSIS WITHOUT CURRENT PATHOLOGICAL FRACTURE, UNSPECIFIED OSTEOPOROSIS TYPE: ICD-10-CM

## 2024-08-26 DIAGNOSIS — E06.3 HASHIMOTO'S THYROIDITIS: ICD-10-CM

## 2024-08-26 PROCEDURE — 76536 US EXAM OF HEAD AND NECK: CPT

## 2024-08-26 PROCEDURE — 77080 DXA BONE DENSITY AXIAL: CPT

## 2024-08-26 PROCEDURE — 77091 TBS TECHL CALCULATION ONLY: CPT

## 2024-08-28 ENCOUNTER — OFFICE VISIT (OUTPATIENT)
Dept: INTERNAL MEDICINE | Facility: CLINIC | Age: 79
End: 2024-08-28
Attending: INTERNAL MEDICINE
Payer: COMMERCIAL

## 2024-08-28 VITALS
HEIGHT: 70 IN | DIASTOLIC BLOOD PRESSURE: 79 MMHG | HEART RATE: 59 BPM | BODY MASS INDEX: 27.2 KG/M2 | OXYGEN SATURATION: 99 % | TEMPERATURE: 97.1 F | RESPIRATION RATE: 11 BRPM | WEIGHT: 190 LBS | SYSTOLIC BLOOD PRESSURE: 128 MMHG

## 2024-08-28 DIAGNOSIS — F34.1 DYSTHYMIA: ICD-10-CM

## 2024-08-28 DIAGNOSIS — B35.3 TINEA PEDIS, UNSPECIFIED LATERALITY: ICD-10-CM

## 2024-08-28 DIAGNOSIS — Z00.00 ENCOUNTER FOR MEDICARE ANNUAL WELLNESS EXAM: Primary | ICD-10-CM

## 2024-08-28 DIAGNOSIS — I25.10 NONOCCLUSIVE CORONARY ATHEROSCLEROSIS OF NATIVE CORONARY ARTERY: ICD-10-CM

## 2024-08-28 DIAGNOSIS — Z29.11 NEED FOR VACCINATION AGAINST RESPIRATORY SYNCYTIAL VIRUS: ICD-10-CM

## 2024-08-28 DIAGNOSIS — Z23 NEED FOR SHINGLES VACCINE: ICD-10-CM

## 2024-08-28 DIAGNOSIS — G62.9 PERIPHERAL POLYNEUROPATHY: ICD-10-CM

## 2024-08-28 DIAGNOSIS — M81.0 OSTEOPOROSIS WITHOUT CURRENT PATHOLOGICAL FRACTURE, UNSPECIFIED OSTEOPOROSIS TYPE: ICD-10-CM

## 2024-08-28 DIAGNOSIS — I65.02 STENOSIS OF LEFT VERTEBRAL ARTERY: ICD-10-CM

## 2024-08-28 DIAGNOSIS — H91.93 BILATERAL HEARING LOSS, UNSPECIFIED HEARING LOSS TYPE: ICD-10-CM

## 2024-08-28 DIAGNOSIS — Z86.73 HISTORY OF TIA (TRANSIENT ISCHEMIC ATTACK): ICD-10-CM

## 2024-08-28 DIAGNOSIS — Z23 NEED FOR TDAP VACCINATION: ICD-10-CM

## 2024-08-28 DIAGNOSIS — M54.2 NECK PAIN: ICD-10-CM

## 2024-08-28 DIAGNOSIS — E06.3 AUTOIMMUNE THYROIDITIS: ICD-10-CM

## 2024-08-28 LAB — TSH SERPL DL<=0.005 MIU/L-ACNC: 2.4 UIU/ML (ref 0.3–4.2)

## 2024-08-28 PROCEDURE — 84443 ASSAY THYROID STIM HORMONE: CPT | Performed by: INTERNAL MEDICINE

## 2024-08-28 PROCEDURE — G0439 PPPS, SUBSEQ VISIT: HCPCS | Performed by: INTERNAL MEDICINE

## 2024-08-28 PROCEDURE — 99214 OFFICE O/P EST MOD 30 MIN: CPT | Mod: 25 | Performed by: INTERNAL MEDICINE

## 2024-08-28 PROCEDURE — 36415 COLL VENOUS BLD VENIPUNCTURE: CPT | Performed by: INTERNAL MEDICINE

## 2024-08-28 RX ORDER — KETOCONAZOLE 20 MG/G
CREAM TOPICAL DAILY
Qty: 15 G | Refills: 1 | Status: SHIPPED | OUTPATIENT
Start: 2024-08-28 | End: 2024-09-24

## 2024-08-28 ASSESSMENT — PAIN SCALES - GENERAL: PAINLEVEL: MODERATE PAIN (4)

## 2024-08-28 NOTE — PATIENT INSTRUCTIONS
Patient Education   Preventive Care Advice   This is general advice given by our system to help you stay healthy. However, your care team may have specific advice just for you. Please talk to your care team about your preventive care needs.  Nutrition  Eat 5 or more servings of fruits and vegetables each day.  Try wheat bread, brown rice and whole grain pasta (instead of white bread, rice, and pasta).  Get enough calcium and vitamin D. Check the label on foods and aim for 100% of the RDA (recommended daily allowance).  Lifestyle  Exercise at least 150 minutes each week  (30 minutes a day, 5 days a week).  Do muscle strengthening activities 2 days a week. These help control your weight and prevent disease.  No smoking.  Wear sunscreen to prevent skin cancer.  Have a dental exam and cleaning every 6 months.  Yearly exams  See your health care team every year to talk about:  Any changes in your health.  Any medicines your care team has prescribed.  Preventive care, family planning, and ways to prevent chronic diseases.  Shots (vaccines)   HPV shots (up to age 26), if you've never had them before.  Hepatitis B shots (up to age 59), if you've never had them before.  COVID-19 shot: Get this shot when it's due.  Flu shot: Get a flu shot every year.  Tetanus shot: Get a tetanus shot every 10 years.  Pneumococcal, hepatitis A, and RSV shots: Ask your care team if you need these based on your risk.  Shingles shot (for age 50 and up)  General health tests  Diabetes screening:  Starting at age 35, Get screened for diabetes at least every 3 years.  If you are younger than age 35, ask your care team if you should be screened for diabetes.  Cholesterol test: At age 39, start having a cholesterol test every 5 years, or more often if advised.  Bone density scan (DEXA): At age 50, ask your care team if you should have this scan for osteoporosis (brittle bones).  Hepatitis C: Get tested at least once in your life.  STIs (sexually  transmitted infections)  Before age 24: Ask your care team if you should be screened for STIs.  After age 24: Get screened for STIs if you're at risk. You are at risk for STIs (including HIV) if:  You are sexually active with more than one person.  You don't use condoms every time.  You or a partner was diagnosed with a sexually transmitted infection.  If you are at risk for HIV, ask about PrEP medicine to prevent HIV.  Get tested for HIV at least once in your life, whether you are at risk for HIV or not.  Cancer screening tests  Cervical cancer screening: If you have a cervix, begin getting regular cervical cancer screening tests starting at age 21.  Breast cancer scan (mammogram): If you've ever had breasts, begin having regular mammograms starting at age 40. This is a scan to check for breast cancer.  Colon cancer screening: It is important to start screening for colon cancer at age 45.  Have a colonoscopy test every 10 years (or more often if you're at risk) Or, ask your provider about stool tests like a FIT test every year or Cologuard test every 3 years.  To learn more about your testing options, visit:   .  For help making a decision, visit:   https://bit.ly/sr43880.  Prostate cancer screening test: If you have a prostate, ask your care team if a prostate cancer screening test (PSA) at age 55 is right for you.  Lung cancer screening: If you are a current or former smoker ages 50 to 80, ask your care team if ongoing lung cancer screenings are right for you.  For informational purposes only. Not to replace the advice of your health care provider. Copyright   2023 [facility]. All rights reserved. Clinically reviewed by the [facility] Transitions Program. [employer] 644808 - REV 01/24.  Eating Healthy Foods: Care Instructions  With every meal, you can make healthy food choices. Try to eat a variety of fruits, vegetables, whole grains, lean proteins, and low-fat dairy products. This can help  "you get the right balance of nutrients, including vitamins and minerals. Small changes add up over time. You can start by adding one healthy food to your meals each day.    Try to make half your plate fruits and vegetables, one-fourth whole grains, and one-fourth lean proteins. Try including dairy with your meals.   Eat more fruits and vegetables. Try to have them with most meals and snacks.   Foods for healthy eating    Fruits    These can be fresh, frozen, canned, or dried.  Try to choose whole fruit rather than fruit juice.  Eat a variety of colors.    Vegetables    These can be fresh, frozen, canned, or dried.  Beans, peas, and lentils count too.    Whole grains    Choose whole-grain breads, cereals, and noodles.  Try brown rice.    Lean proteins    These can include lean meat, poultry, fish, and eggs.  You can also have tofu, beans, peas, lentils, nuts, and seeds.    Dairy    Try milk, yogurt, and cheese.  Choose low-fat or fat-free when you can.  If you need to, use lactose-free milk or fortified plant-based milk products, such as soy milk.    Water    Drink water when you're thirsty.  Limit sugar-sweetened drinks, including soda, fruit drinks, and sports drinks.  Where can you learn more?  Go to https://www.[employer].net/patiented  Enter T756 in the search box to learn more about \"Eating Healthy Foods: Care Instructions.\"  Current as of: September 20, 2023               Content Version: 14.0    3998-8979 [employer].   Care instructions adapted under license by your healthcare professional. If you have questions about a medical condition or this instruction, always ask your healthcare professional. [employer] disclaims any warranty or liability for your use of this information.      Preventing Falls: Care Instructions  Injuries and health problems such as trouble walking or poor eyesight can increase your risk of falling. So can some medicines. But there are things you can do to " "help prevent falls. You can exercise to get stronger. You can also arrange your home to make it safer.    Talk to your doctor about the medicines you take. Ask if any of them increase the risk of falls and whether they can be changed or stopped.   Try to exercise regularly. It can help improve your strength and balance. This can help lower your risk of falling.     Practice fall safety and prevention.    Wear low-heeled shoes that fit well and give your feet good support. Talk to your doctor if you have foot problems that make this hard.  Carry a cellphone or wear a medical alert device that you can use to call for help.  Use stepladders instead of chairs to reach high objects. Don't climb if you're at risk for falls. Ask for help, if needed.  Wear the correct eyeglasses, if you need them.    Make your home safer.    Remove rugs, cords, clutter, and furniture from walkways.  Keep your house well lit. Use night-lights in hallways and bathrooms.  Install and use sturdy handrails on stairways.  Wear nonskid footwear, even inside. Don't walk barefoot or in socks without shoes.    Be safe outside.    Use handrails, curb cuts, and ramps whenever possible.  Keep your hands free by using a shoulder bag or backpack.  Try to walk in well-lit areas. Watch out for uneven ground, changes in pavement, and debris.  Be careful in the winter. Walk on the grass or gravel when sidewalks are slippery. Use de-icer on steps and walkways. Add non-slip devices to shoes.    Put grab bars and nonskid mats in your shower or tub and near the toilet. Try to use a shower chair or bath bench when bathing.   Get into a tub or shower by putting in your weaker leg first. Get out with your strong side first. Have a phone or medical alert device in the bathroom with you.   Where can you learn more?  Go to https://www.[employer].net/patiented  Enter G117 in the search box to learn more about \"Preventing Falls: Care Instructions.\"  Current as of: July " 17, 2023               Content Version: 14.0    9454-4164 [employer].   Care instructions adapted under license by your healthcare professional. If you have questions about a medical condition or this instruction, always ask your healthcare professional. [employer] disclaims any warranty or liability for your use of this information.      Hearing Loss: Care Instructions  Overview     Hearing loss is a sudden or slow decrease in how well you hear. It can range from slight to profound. Permanent hearing loss can occur with aging. It also can happen when you are exposed long-term to loud noise. Examples include listening to loud music, riding motorcycles, or being around other loud machines.  Hearing loss can affect your work and home life. It can make you feel lonely or depressed. You may feel that you have lost your independence. But hearing aids and other devices can help you hear better and feel connected to others.  Follow-up care is a key part of your treatment and safety. Be sure to make and go to all appointments, and call your doctor if you are having problems. It's also a good idea to know your test results and keep a list of the medicines you take.  How can you care for yourself at home?  Avoid loud noises whenever possible. This helps keep your hearing from getting worse.  Always wear hearing protection around loud noises.  Wear a hearing aid as directed.  A professional can help you pick a hearing aid that will work best for you.  You can also get hearing aids over the counter for mild to moderate hearing loss.  Have hearing tests as your doctor suggests. They can show whether your hearing has changed. Your hearing aid may need to be adjusted.  Use other devices as needed. These may include:  Telephone amplifiers and hearing aids that can connect to a television, stereo, radio, or microphone.  Devices that use lights or vibrations. These alert you to the doorbell, a ringing  "telephone, or a baby monitor.  Television closed-captioning. This shows the words at the bottom of the screen. Most new TVs can do this.  TTY (text telephone). This lets you type messages back and forth on the telephone instead of talking or listening. These devices are also called TDD. When messages are typed on the keyboard, they are sent over the phone line to a receiving TTY. The message is shown on a monitor.  Use text messaging, social media, and email if it is hard for you to communicate by telephone.  Try to learn a listening technique called speechreading. It is not lipreading. You pay attention to people's gestures, expressions, posture, and tone of voice. These clues can help you understand what a person is saying. Face the person you are talking to, and have them face you. Make sure the lighting is good. You need to see the other person's face clearly.  Think about counseling if you need help to adjust to your hearing loss.  When should you call for help?  Watch closely for changes in your health, and be sure to contact your doctor if:    You think your hearing is getting worse.     You have new symptoms, such as dizziness or nausea.   Where can you learn more?  Go to https://www.[employer].net/patiented  Enter R798 in the search box to learn more about \"Hearing Loss: Care Instructions.\"  Current as of: September 27, 2023               Content Version: 14.0    0076-5957 [employer].   Care instructions adapted under license by your healthcare professional. If you have questions about a medical condition or this instruction, always ask your healthcare professional. [employer] disclaims any warranty or liability for your use of this information.         "

## 2024-08-28 NOTE — PROGRESS NOTES
Preventive Care Visit  [facility]  COLBY BROCK MD, Internal Medicine  Aug 28, 2024      1. Encounter for Medicare annual wellness exam  He declines a lot of health maintenance at this time.  We discussed immunizations today.  Stay active.  Eat healthy.    2. Neck pain  He notes a cracking and creaking in his neck.  Reassurance today.  Continue therapy as prescribed by [facility].  I suggested maybe a trial of some Voltaren gel.    3. Autoimmune thyroiditis  Check TSH and follow-up with his endocrinologist.  - TSH with free T4 reflex; Future  - TSH with free T4 reflex    4. Tinea pedis, unspecified laterality  He wants a refill of ketoconazole cream.  - ketoconazole (NIZORAL) 2 % external cream; Apply topically daily.  Dispense: 15 g; Refill: 1    5. Bilateral hearing loss, unspecified hearing loss type  He is going to pursue hearing aids it sounds like.  I think that is a good idea.  - Adult Audiology Referral; Future    6. Peripheral polyneuropathy  He is worried about his neuropathy.  No significant change.  We discussed doing active.  Offered more physical therapy and he declines.  He wonders about a study with Cialis and neuropathy.  I told him I do not know anything about that at this time.  He should continue to discuss further with his neurologist.      7. Dysthymia  He refuses medications for his chronic dysthymia.    8. Nonocclusive coronary atherosclerosis of native coronary artery  Now on Leqvio    9. Need for shingles vaccine    - zoster vaccine recombinant adjuvanted (SHINGRIX) injection; Inject 0.5 mLs into the muscle once for 1 dose. Pharmacist administered  Dispense: 0.5 mL; Refill: 0    10. Need for Tdap vaccination    - Tdap, tetanus-diptheria-acell pertussis, (BOOSTRIX) 5-2.5-18.5 LF-MCG/0.5 ARRON injection; Inject 0.5 mLs into the muscle once for 1 dose.  Dispense: 0.5 mL; Refill: 0    11. Need for vaccination against respiratory syncytial virus    - RSV vaccine, bivalent,  ABRYSVO, injection; Inject 0.5 mLs into the muscle once for 1 dose. Pharmacist administered  Dispense: 0.5 mL; Refill: 0     12.  Osteoporosis: He was supposed to be started on Reclast a few years ago but he never did follow through.  He needs follow-up with his endocrinologist as his bone density again shows significant osteoporosis.    13.  History of TIA: Off of Plavix per neurology.  Now on Leqvio and aspirin.  Continue same.  No further issues.    14.  Stenosis of left vertebral artery: Per neurology.    Jimbo Schmid is a 79 year old, presenting for the following:  Wellness Visit (Patient would like to discuss his medications/situation. Patient declined to do mini cog, PHQ9 and gait speed test. He has been having neck pain for a few months.)        8/28/2024     3:11 PM   Additional Questions   Roomed by Ginny VAZQUEZ         Health Care Directive  Patient does not have a Health Care Directive or Living Will: Discussed advance care planning with patient; information given to patient to review.    ANABELL Schmid is an exceedingly complicated 79-year-old gentleman with multimedical problems who comes in today for annual wellness.  He refuses to do a lot of the annual wellness testing including his cognitive testing.  He has chronic dysthymia which he refuses medications but he is poorly compliant with taking his medications.  Things have been going fairly well.  His biggest concern today is a cracking sensation in his neck when he turns his neck.  Very disconcerting to him.  He recently had follow-up with his endocrinologist for thyroid nodules and osteoporosis with a bone density.  He gets care through multiple providers at the [place] as well as at [facility].  Fractionate this care considerably.  He tries to stay active.  Notes he does not walk as smoothly as he used to but attributes that to his neuropathy.  Neurologist recommended therapy for him.  He does not like driving down to [place] for that and he has  not found a therapist that he likes here in the city's.          8/23/2024   General Health   How would you rate your overall physical health? (!) FAIR   Feel stress (tense, anxious, or unable to sleep) Patient declined            8/23/2024   Nutrition   Diet: Regular (no restrictions)            8/23/2024   Exercise   Days per week of moderate/strenous exercise 3 days            8/23/2024   Social Factors   Frequency of gathering with friends or relatives Three times a week   Worry food won't last until get money to buy more No    No   Food not last or not have enough money for food? No    No   Do you have housing? (Housing is defined as stable permanent housing and does not include staying ouside in a car, in a tent, in an abandoned building, in an overnight shelter, or couch-surfing.) Yes    Yes   Are you worried about losing your housing? No    No   Lack of transportation? No    No   Unable to get utilities (heat,electricity)? No    No       Multiple values from one day are sorted in reverse-chronological order         8/28/2024   Fall Risk   Reason Gait Speed Test Not Completed Patient declines             8/23/2024   Activities of Daily Living- Home Safety   Needs help with the following daily activites None of the above   Safety concerns in the home None of the above            8/23/2024   Dental   Dentist two times every year? Yes            8/23/2024   Hearing Screening   Hearing concerns? (!) I NEED TO ASK PEOPLE TO SPEAK UP OR REPEAT THEMSELVES.    (!) IT'S HARD TO FOLLOW A CONVERSATION IN A NOISY RESTAURANT OR CROWDED ROOM.    (!) TROUBLE FOLLOWING DIALOGUE IN THE THEATHER.    (!) TROUBLE UNDERSTANDING SPEECH ON THE TELEPHONE       Multiple values from one day are sorted in reverse-chronological order         8/23/2024   Driving Risk Screening   Patient/family members have concerns about driving No            8/23/2024   General Alertness/Fatigue Screening   Have you been more tired than usual lately?  (!) DECLINE            8/23/2024   Urinary Incontinence Screening   Bothered by leaking urine in past 6 months No               Today's PHQ-9 Score:       1/10/2024    10:29 AM   PHQ-9 SCORE   PHQ-9 Total Score MyChart 3 (Minimal depression)   PHQ-9 Total Score 3           8/23/2024   Substance Use   Alcohol more than 3/day or more than 7/wk Not Applicable   Do you have a current opioid prescription? No   How severe/bad is pain from 1 to 10? 4/10   Do you use any other substances recreationally? No        Social History     Tobacco Use    Smoking status: Never    Smokeless tobacco: Never   Vaping Use    Vaping status: Never Used   Substance Use Topics    Alcohol use: Not Currently     Comment: Alcoholic Drinks/day: rarely    Drug use: No       ASCVD Risk   The ASCVD Risk score (Danae GARCIA, et al., 2019) failed to calculate for the following reasons:    The patient has a prior MI or stroke diagnosis            Reviewed and updated as needed this visit by Provider                      Current providers sharing in care for this patient include:  Patient Care Team:  Angel Hernandez MD as PCP - General (Internal Medicine)  Elder Cazares MD as MD (Interventional Cardiology)  Haider Mathews MD as MD (Cardiology)  Angel Hernandez MD as Assigned PCP  López Shin MD as Assigned Endocrinology Provider  Ellyn Ramirez AuD as Audiologist (Audiology)  Eleni Villa MD as MD (Neurology)  Prince Goyal PA-C as Physician Assistant  Mir Farr MD as MD (Cardiovascular Disease)  Rogers Mae MD as Assigned Pulmonology Provider  Jerilyn Lloyd PA-C as Physician Assistant (Cardiology)  Yamileth Mendoza, JAIDA CNP as Assigned Heart and Vascular Provider  Mynor Pierce DPM as Assigned Surgical Provider    The following health maintenance items are reviewed in Epic and correct as of today:  Health Maintenance   Topic Date Due    RSV VACCINE (Pregnancy & 60+)  "(1 - 1-dose 60+ series) Never done    ZOSTER IMMUNIZATION (2 of 2) 03/09/2015    DTAP/TDAP/TD IMMUNIZATION (2 - Tdap) 06/09/2019    MEDICARE ANNUAL WELLNESS VISIT  10/24/2020    COVID-19 Vaccine (3 - 2023-24 season) 09/01/2023    PHQ-9  07/11/2024    TSH W/FREE T4 REFLEX  08/31/2024    INFLUENZA VACCINE (1) 09/01/2024    LIPID  06/16/2025    ANNUAL REVIEW OF HM ORDERS  07/03/2025    FALL RISK ASSESSMENT  08/28/2025    COLORECTAL CANCER SCREENING  10/08/2025    GLUCOSE  06/16/2027    ADVANCE CARE PLANNING  02/07/2029    DEXA  08/26/2039    HEPATITIS C SCREENING  Completed    DEPRESSION ACTION PLAN  Completed    Pneumococcal Vaccine: 65+ Years  Completed    HPV IMMUNIZATION  Aged Out    MENINGITIS IMMUNIZATION  Aged Out    RSV MONOCLONAL ANTIBODY  Aged Out            Objective    Exam  BP (!) 140/79 (BP Location: Left arm, Patient Position: Sitting, Cuff Size: Adult Regular)   Pulse 59   Temp 97.1  F (36.2  C) (Tympanic)   Resp 11   Ht 1.778 m (5' 10\")   Wt 86.2 kg (190 lb)   LMP  (LMP Unknown)   SpO2 99%   BMI 27.26 kg/m     Estimated body mass index is 27.26 kg/m  as calculated from the following:    Height as of this encounter: 1.778 m (5' 10\").    Weight as of this encounter: 86.2 kg (190 lb).    Physical Exam  Pleasant gentleman who looks younger than his age.  Good range of motion of his neck.  No cogwheeling.  Normal gait with no shuffling or stooped posture.  Heart regular.        8/28/2024   Mini Cog   Mini-Cog Not Completed (choose reason) Patient declines          Patient declines, there are NO concerns for cognitive deficits.           Signed Electronically by: COLBY BROCK MD    "

## 2024-09-23 DIAGNOSIS — B35.3 TINEA PEDIS, UNSPECIFIED LATERALITY: ICD-10-CM

## 2024-09-24 RX ORDER — KETOCONAZOLE 20 MG/G
CREAM TOPICAL DAILY
Qty: 30 G | Refills: 1 | Status: SHIPPED | OUTPATIENT
Start: 2024-09-24

## 2024-09-28 ENCOUNTER — TELEPHONE (OUTPATIENT)
Dept: CARDIOLOGY | Facility: CLINIC | Age: 79
End: 2024-09-28
Payer: COMMERCIAL

## 2024-09-28 NOTE — TELEPHONE ENCOUNTER
9/28 Patient confirmed scheduled appointment:  Date: 1/22/2025   Time: 2:20 pm  Visit type: Return Cardiology  Provider: Reji  Location: [facility]  Testing/imaging: echo prior on 12/6 at [facility]   Additional notes: N/A

## 2024-10-14 DIAGNOSIS — E06.3 HASHIMOTO'S THYROIDITIS: ICD-10-CM

## 2024-10-21 RX ORDER — LEVOTHYROXINE SODIUM 75 UG/1
75 TABLET ORAL DAILY
Qty: 100 TABLET | Refills: 0 | Status: SHIPPED | OUTPATIENT
Start: 2024-10-21

## 2024-10-21 NOTE — TELEPHONE ENCOUNTER
levothyroxine (SYNTHROID/LEVOTHROID) 75 MCG tablet 100 tablet 2 2/7/2024     Last Office Visit: 2/2/24  Future Office visit:  none     Thyroid Protocol Passed     Namrata Bentley RN  [facility]/Red Flag Triage & Med Refill Team

## 2024-10-24 DIAGNOSIS — E78.5 HYPERLIPIDEMIA LDL GOAL <100: Primary | ICD-10-CM

## 2024-10-24 DIAGNOSIS — Z78.9 STATIN INTOLERANCE: ICD-10-CM

## 2024-10-28 ENCOUNTER — ALLIED HEALTH/NURSE VISIT (OUTPATIENT)
Dept: CARDIOLOGY | Facility: CLINIC | Age: 79
End: 2024-10-28
Payer: COMMERCIAL

## 2024-10-28 DIAGNOSIS — E78.5 HYPERLIPIDEMIA LDL GOAL <100: Primary | ICD-10-CM

## 2024-10-28 PROCEDURE — 250N000011 HC RX IP 250 OP 636: Mod: JZ | Performed by: CASE MANAGER/CARE COORDINATOR

## 2024-10-28 PROCEDURE — 96372 THER/PROPH/DIAG INJ SC/IM: CPT | Performed by: CASE MANAGER/CARE COORDINATOR

## 2024-10-28 RX ADMIN — INCLISIRAN 284 MG: 284 INJECTION, SOLUTION SUBCUTANEOUS at 14:11

## 2024-10-28 NOTE — PROGRESS NOTES
Patient received 2nd Leqvio injection today, October 28, 2024, and tolerated well. Next injection to be for in 6 months. Will have Jennifer Toker reach out to patient to coordinate.

## 2024-10-29 ENCOUNTER — TELEPHONE (OUTPATIENT)
Dept: LAB | Facility: CLINIC | Age: 79
End: 2024-10-29
Payer: COMMERCIAL

## 2024-10-29 NOTE — TELEPHONE ENCOUNTER
Patient Contacted for the patient to call back and schedule the following:    Appointment type: FASTING LABS  Provider: ANTHONY  Return date: 11/11/2024  Specialty phone number: [phone] OPT 1  Additional appointment(s) needed: N/A  Additonal Notes: PT JUST NEEDED LABS. APPT IN [place].

## 2024-10-30 ENCOUNTER — TELEPHONE (OUTPATIENT)
Dept: CARDIOLOGY | Facility: CLINIC | Age: 79
End: 2024-10-30
Payer: COMMERCIAL

## 2024-10-30 NOTE — TELEPHONE ENCOUNTER
10/30/2024 12:32PM Nuzhat Gillespie  Patient confirmed scheduled appointment:  Date: 4/28/2025  Time: 2PM  Visit type: NURSE ONLY (LEQVIO INJECTION)  Provider:  [facility] NURSE (ALLAN PALOMARES)  [address] [address] [place] [id number]  Testing/imaging: NA  Additional notes: 10/30 Scheduled Nurse Only (Leqvio injection) 4/28/2025. ALBERTO Gillespie 10/30/2024 12:32PM

## 2024-10-30 NOTE — TELEPHONE ENCOUNTER
----- Message from Nadia CAMPBELL sent at 10/30/2024  7:42 AM CDT -----  Hi,  Can you call [facility] and set up a 6 month follow up Leqvio injection (nurse visit) please?  Jennifer  ----- Message -----  From: Esthela Liz RN  Sent: 10/29/2024  12:00 AM CDT  To: Nadia Henson, RN    Hello!    I gave this pt his 2nd Leqvio injection today. I told him you'd reach out to set up his third injection (for in 6 months).     Also, Yamileth told me she'd like repeat lipids in 2-4 weeks. I ordered this and sent pt a mychart to let him know.    Thx!

## 2024-11-08 ENCOUNTER — OFFICE VISIT (OUTPATIENT)
Dept: AUDIOLOGY | Facility: CLINIC | Age: 79
End: 2024-11-08
Attending: INTERNAL MEDICINE
Payer: COMMERCIAL

## 2024-11-08 DIAGNOSIS — H90.3 SENSORINEURAL HEARING LOSS (SNHL), BILATERAL: Primary | ICD-10-CM

## 2024-11-08 DIAGNOSIS — H93.19 TINNITUS: ICD-10-CM

## 2024-11-08 NOTE — PROGRESS NOTES
AUDIOLOGY REPORT    SUBJECTIVE:  Artis Galvin is a 79 year old adult who was seen in the Audiology Clinic at the [facility] for audiologic evaluation, referred by Angel Hernandez M.D. Patient reports possible further decrease in hearing and increase in bilateral tinnitus since previous hearing test in 2022. Patient reports brief spinning dizziness post fast head turns and other movements for the past 1-2 years; is scheduled to undergo diagnostics for this with [facility] at the end of the month. Patient reports intermittent bilateral ear pressure. Patient denies other ear symptoms and previous ear surgeries.     OBJECTIVE:  Otoscopic exam indicates ears are clear of cerumen bilaterally.     Pure Tone Thresholds assessed using conventional audiometry with good reliability from 250-8000 Hz bilaterally using insert earphones and circumaural headphones     RIGHT: Normal sloping to moderately severe sensorineural hearing loss (SNHL) (re 4/27/22: 5-10dB decrease across nearly all frequencies).     LEFT: Normal sloping to moderately severe sensorineural hearing loss (SNHL) (re 4/27/22: 5-15dB decrease across nearly all frequencies).         Tympanogram:    RIGHT: Normal eardrum mobility    LEFT: Normal eardrum mobility    Reflexes (reported by stimulus ear):  RIGHT: Ipsilateral is present at normal levels  RIGHT: Contralateral is present at normal levels  LEFT: Ipsilateral is present at normal levels  LEFT: Contralateral is present at elevated levels    Speech Reception Threshold:    RIGHT: 35 dB HL    LEFT: 35 dB HL  Word Recognition Score:     RIGHT: 92% at 75 dB HL using NU-6 recorded word list.    LEFT: 100% at 75 dB HL using NU-6 recorded word list.    ASSESSMENT:   Normal sloping to moderately severe sensorineural hearing loss bilaterally.     Compared to patient's previous audiogram dated 4/27/2022, hearing has decreased in both ears.    PLAN:  Patient was counseled  regarding today's results. Patient is a good hearing aid candidate; schedule hearing aid consult to discuss options if desires. Patient is interested in learning more and noted cosmetics are a concern for him. Return for retest should changes be noted or new concerns arise. Please call this clinic with questions regarding these results or recommendations.      Bekah Sanchez. CCC-A  Licensed Audiologist   [place] #[id number]

## 2024-11-11 ENCOUNTER — LAB (OUTPATIENT)
Dept: LAB | Facility: CLINIC | Age: 79
End: 2024-11-11
Payer: COMMERCIAL

## 2024-11-11 DIAGNOSIS — E78.5 HYPERLIPIDEMIA LDL GOAL <100: ICD-10-CM

## 2024-11-11 LAB
CHOLEST SERPL-MCNC: 127 MG/DL
FASTING STATUS PATIENT QL REPORTED: ABNORMAL
HDLC SERPL-MCNC: 36 MG/DL
LDLC SERPL CALC-MCNC: 54 MG/DL
NONHDLC SERPL-MCNC: 91 MG/DL
TRIGL SERPL-MCNC: 187 MG/DL

## 2024-11-11 PROCEDURE — 80061 LIPID PANEL: CPT

## 2024-11-11 PROCEDURE — 36415 COLL VENOUS BLD VENIPUNCTURE: CPT

## 2024-11-13 ENCOUNTER — MYC MEDICAL ADVICE (OUTPATIENT)
Dept: CARDIOLOGY | Facility: CLINIC | Age: 79
End: 2024-11-13
Payer: COMMERCIAL

## 2024-12-04 ENCOUNTER — OFFICE VISIT (OUTPATIENT)
Dept: INTERNAL MEDICINE | Facility: CLINIC | Age: 79
End: 2024-12-04
Payer: COMMERCIAL

## 2024-12-04 VITALS
SYSTOLIC BLOOD PRESSURE: 120 MMHG | BODY MASS INDEX: 27.49 KG/M2 | WEIGHT: 192 LBS | RESPIRATION RATE: 11 BRPM | TEMPERATURE: 98.1 F | OXYGEN SATURATION: 99 % | HEART RATE: 58 BPM | DIASTOLIC BLOOD PRESSURE: 72 MMHG | HEIGHT: 70 IN

## 2024-12-04 DIAGNOSIS — Z28.20 VACCINE REFUSED BY PATIENT: ICD-10-CM

## 2024-12-04 DIAGNOSIS — G62.9 PERIPHERAL POLYNEUROPATHY: ICD-10-CM

## 2024-12-04 DIAGNOSIS — G25.9 MOVEMENT DISORDER: Primary | ICD-10-CM

## 2024-12-04 DIAGNOSIS — F34.1 DYSTHYMIA: ICD-10-CM

## 2024-12-04 DIAGNOSIS — M81.0 OSTEOPOROSIS WITHOUT CURRENT PATHOLOGICAL FRACTURE, UNSPECIFIED OSTEOPOROSIS TYPE: ICD-10-CM

## 2024-12-04 DIAGNOSIS — I20.89 STABLE ANGINA PECTORIS (H): ICD-10-CM

## 2024-12-04 PROCEDURE — G2211 COMPLEX E/M VISIT ADD ON: HCPCS | Performed by: INTERNAL MEDICINE

## 2024-12-04 PROCEDURE — 99214 OFFICE O/P EST MOD 30 MIN: CPT | Performed by: INTERNAL MEDICINE

## 2024-12-04 RX ORDER — ASPIRIN 81 MG/1
81 TABLET, CHEWABLE ORAL DAILY
COMMUNITY

## 2024-12-04 NOTE — PROGRESS NOTES
1. Movement disorder (Primary)  I do suspect that he probably does have some parkinsonism.  Follow-up with neurology as planned.    2. Osteoporosis without current pathological fracture, unspecified osteoporosis type  He never did hear from endocrinology regarding a plan for his osteoporosis.  He was supposed to follow-up with them.  I have asked that that get scheduled  - Adult Endocrinology  Referral; Future    3. Peripheral polyneuropathy  Follow-up with neurology as planned.  I told him that even if he does get put on Sinemet this is not going to help his neuropathy.    4. Dysthymia  Refuses medications.    5. Stable angina pectoris (H)  Continue inclisiran and aspirin.    6. Vaccine refused by patient  He refuses all vaccines.    The longitudinal plan of care for the diagnosis(es)/condition(s) as documented were addressed during this visit. Due to the added complexity in care, I will continue to support Binu in the subsequent management and with ongoing continuity of care.    Subjective   Binu is a 79 year old, presenting for the following health issues:  Follow Up (3 months follow up; ongoing Neuropathy persists ) and Movement Disorder  (Has been seeing [facility] regarding Sx's  )    History of Present Illness       Back Pain:  Binu presents for follow up of back pain. Patient's back pain is a chronic problem.  Location of back pain:  Right lower back  Description of back pain: dull ache  Back pain spreads: nowhere    Since patient first noticed back pain, pain is: always present, but gets better and worse  Does back pain interfere with Binu's job:  No       Heart Failure:  Binu presents for follow up of heart failure. Binu is not experiencing shortness of breath at night, with rest or with activity  Binu is not experiencing any lower extremity edema.   Binu denies orthopenea and is not coughing at night. Patient is checking weight daily. Binu has recently had a None.  Binu has no side effects  from medications.  Binu has has a medical visit for heart failure 1 time since the last visit.    Hypertension: Binu presents for follow up of hypertension.  Binu does check blood pressure  regularly outside of the clinic. Outpatient blood pressures have not been over 140/90. Binu does not follow a low salt diet.     Vascular Disease:  Binu presents for follow up of vascular disease.      Binu takes daily aspirin.Binu consumes 1 sweetened beverage(s) daily.Binu exercises with enough effort to increase Binu's heart rate 10 to 19 minutes per day.  Binu exercises with enough effort to increase Binu's heart rate 3 or less days per week.      Last Echo:   Echo result w/o MOPS: Interpretation Summary 1.Left ventricular size, wall motion and function are normal. The ejectionfraction is 60-65%.2.Normal right ventricle size and systolic function.3.Bi atrial enlargement.4.No hemodynamically significant valvular abnormalities on 2D or color flowimaging.5.Mild Aortic root and moderate ascending dilatation is present, 39 mm and 48mm respectively.Compared to the prior study dated 1/15/2024, the aorta diameter has increasedslightly.          Binu comes in today for periodic review.  He has a complicated past medical history with multiple medical problems.  He is resistant to a lot of medications as well as treatments.  He refuses immunizations.  He has chronic depression/defiant dysthymia for which he refuses to have medications.  He was recently diagnosed with possible parkinsonism by his neurologist.  He will be following up with them to discuss potential treatment.  He is concerned about his neuropathy.  He originally he saw the neurologist for the neuropathy issue but that was never really addressed.  MRI does not show any significant stenosis but his EMG shows possible S1 radiculopathy.  Otherwise things are going fairly well.  No recent angina.  Has follow-up with cardiology coming up.  He never did hear from the  "endocrinologist regarding his bone density or osteoporosis.  He does not know if he has an appointment with his endocrinologist yet.          Objective    /72 (BP Location: Left arm, Patient Position: Sitting, Cuff Size: Adult Regular)   Pulse 58   Temp 98.1  F (36.7  C) (Tympanic)   Resp 11   Ht 1.778 m (5' 10\")   Wt 87.1 kg (192 lb)   LMP  (LMP Unknown)   SpO2 99%   BMI 27.55 kg/m    Body mass index is 27.55 kg/m .  Physical Exam   Pleasant gentleman.  Somewhat of a masked face but not really more than usual have known him for many years and he smiles and looks fairly normal.  He does not have a stooped gait.  Does not shuffle too much but I can see where one would think he may be somewhat parkinsonian.            Signed Electronically by: COLBY BROCK MD    "

## 2024-12-06 ENCOUNTER — ANCILLARY PROCEDURE (OUTPATIENT)
Dept: CARDIOLOGY | Facility: CLINIC | Age: 79
End: 2024-12-06
Attending: CASE MANAGER/CARE COORDINATOR
Payer: COMMERCIAL

## 2024-12-06 DIAGNOSIS — I71.21 ANEURYSM OF ASCENDING AORTA WITHOUT RUPTURE (H): ICD-10-CM

## 2024-12-06 LAB — LVEF ECHO: NORMAL

## 2024-12-06 PROCEDURE — 93306 TTE W/DOPPLER COMPLETE: CPT | Performed by: INTERNAL MEDICINE

## 2024-12-09 DIAGNOSIS — I71.21 ANEURYSM OF ASCENDING AORTA WITHOUT RUPTURE (H): Primary | ICD-10-CM

## 2024-12-11 ENCOUNTER — TELEPHONE (OUTPATIENT)
Dept: INTERNAL MEDICINE | Facility: CLINIC | Age: 79
End: 2024-12-11
Payer: COMMERCIAL

## 2024-12-11 NOTE — TELEPHONE ENCOUNTER
----- Message from COLBY BROCK sent at 12/11/2024  5:38 PM CST -----  Looking ahead in my schedule I see patient accidentally got scheduled only for a 20-minute visit.  I always put 40-minute visits for this patient on his yellow slip.  Perhaps I forgot to this time. he will need to be contacted and moved to a 40-minute block.  Thank you.

## 2024-12-16 NOTE — TELEPHONE ENCOUNTER
DC instructions reviewed with patient and mother  All questions and concerns addressed  Pt called back and rescheduled appt to a 40min.

## 2024-12-25 DIAGNOSIS — E06.3 HASHIMOTO'S THYROIDITIS: Primary | ICD-10-CM

## 2024-12-26 ENCOUNTER — MYC MEDICAL ADVICE (OUTPATIENT)
Dept: ENDOCRINOLOGY | Facility: CLINIC | Age: 79
End: 2024-12-26
Payer: COMMERCIAL

## 2024-12-30 RX ORDER — LEVOTHYROXINE SODIUM 75 UG/1
75 TABLET ORAL DAILY
Qty: 100 TABLET | Refills: 2 | Status: SHIPPED | OUTPATIENT
Start: 2024-12-30

## 2025-01-14 ENCOUNTER — TRANSCRIBE ORDERS (OUTPATIENT)
Dept: OTHER | Age: 80
End: 2025-01-14

## 2025-01-14 DIAGNOSIS — I25.10 CORONARY ARTERY DISEASE: ICD-10-CM

## 2025-01-14 DIAGNOSIS — I71.20 THORACIC AORTIC ANEURYSM: ICD-10-CM

## 2025-01-14 DIAGNOSIS — M06.9 RHEUMATOID ARTHRITIS (H): ICD-10-CM

## 2025-01-14 DIAGNOSIS — S86.019A ACHILLES TENDON RUPTURE: ICD-10-CM

## 2025-01-14 DIAGNOSIS — R26.9 ABNORMAL GAIT: Primary | ICD-10-CM

## 2025-01-14 DIAGNOSIS — I10 HYPERTENSION: ICD-10-CM

## 2025-01-14 DIAGNOSIS — C61 PROSTATE CANCER (H): ICD-10-CM

## 2025-01-14 DIAGNOSIS — E03.9 HYPOTHYROIDISM: ICD-10-CM

## 2025-01-14 DIAGNOSIS — D86.9 SARCOIDOSIS: ICD-10-CM

## 2025-01-14 DIAGNOSIS — E78.5 HYPERLIPIDEMIA: ICD-10-CM

## 2025-01-14 DIAGNOSIS — Z98.890 HISTORY OF HIP SURGERY: ICD-10-CM

## 2025-01-14 DIAGNOSIS — G62.9 PERIPHERAL NEUROPATHY: ICD-10-CM

## 2025-01-16 NOTE — TELEPHONE ENCOUNTER
Action 1/16/23 MV 1.24pm   Action Taken Imaging request emailed to [facility]       RECORDS RECEIVED FROM: internal   REASON FOR VISIT: Abnormal gait    PROVIDER: Dr. Roman   DATE OF APPT: 2/28/25   NOTES (FOR ALL VISITS) STATUS DETAILS   OFFICE NOTE from referring provider Care Everywhere Dr Sapna Gibbons @ [facility]:  12/16/24  10/10/24  8/19/24   EMG Care Everywhere [facility]:  9/20/24   MEDICATION LIST Care Everywhere    IMAGING  (FOR ALL VISITS)     AKANKSHA SCAN (MOVEMENT) In process [facility]:  NM Brain 11/21/24   MRI (HEAD, NECK, SPINE) Internal [facility]:  MRI Brain 6/16/24  MRI Brain 1/31/23   CT (HEAD, NECK, SPINE) Internal [facility]:  CTA Head Neck 6/16/24

## 2025-01-20 DIAGNOSIS — R94.02 ABNORMAL BRAIN SCAN: Primary | ICD-10-CM

## 2025-01-20 PROBLEM — N52.31 ERECTILE DYSFUNCTION FOLLOWING RADICAL PROSTATECTOMY: Status: ACTIVE | Noted: 2018-06-07

## 2025-01-20 PROBLEM — D49.9 NEOPLASTIC DISEASE: Status: ACTIVE | Noted: 2018-03-16

## 2025-01-20 PROBLEM — I71.9 AORTIC ANEURYSM: Status: ACTIVE | Noted: 2025-01-20

## 2025-01-20 PROBLEM — N39.3 STRESS INCONTINENCE: Status: ACTIVE | Noted: 2018-06-07

## 2025-01-20 PROBLEM — I44.7 LEFT BUNDLE BRANCH BLOCK (LBBB): Status: ACTIVE | Noted: 2025-01-20

## 2025-01-20 PROBLEM — Z48.89 ENCOUNTER FOR POSTOPERATIVE CARE: Status: ACTIVE | Noted: 2018-03-07

## 2025-01-20 PROBLEM — Z85.46 HISTORY OF MALIGNANT NEOPLASM OF PROSTATE: Status: ACTIVE | Noted: 2018-06-14

## 2025-01-20 PROBLEM — R68.89 ABNORMAL CLINICAL FINDING: Status: ACTIVE | Noted: 2017-08-18

## 2025-01-20 RX ORDER — CARBIDOPA AND LEVODOPA 25; 100 MG/1; MG/1
TABLET ORAL
COMMUNITY
Start: 2024-12-16

## 2025-01-20 NOTE — PROGRESS NOTES
Chart review    [address]  [place] [address]  Mobile Phone  [phone]  Email  [email or web address]    Lori Lopez      Assessment:  (R94.02) abnormal dopamine scan (DaTSCAN) 11/21/2024  (primary encounter diagnosis)  Parkinsonism    Dopamine Scan 11/21/2024  Markedly abnormal striatal radiotracer distribution/uptake, most prominent in the left putamen. Moderately decreased caudate nuclei uptake left worse than right.       Review of diagnosis    parkinsonism    Avoidance of dopamine blockers   Not taking    Motor complication review       Review of Impulse control disorders       Review of surgical or medication options       Gait/Balance/Falls       Exercise/Therapy performed/offered       Cognitive/Driving       Mood   dysthymia    Hallucinations/delusions       Sleep       Bladder/Renal/Prostate/Gyn/Other   Penile disorder  Prostate cancer  Stress incontinence  ED      GI/Constipation/GERD       ENDO/Lipid/DM/Bone density/Thyroid  Autoimmune thyroiditis  Osteoporosis  Vit D deficiency     Cardio/heart/Hyper or Hypotensive   RBBB  SVT  History of TIA  Aortic aneurysm  Left vertebral artery narrowing  Nonocclusive coronary artery   Hypertension  Angiogram     Vision/Dry Eyes/Cataracts/Glaucoma/Macular   Blurred vision    Heme/Anticoagulation/Antiplatelet/Anemia/Other  Taking aspirin     ENT/Resp  Lung sarcoidosis    Skin/Cancer/Seborrhea/other  onchomycosis    Musculoskeletal/Pain/Headache  Amaurosis fugax  Peripheral neuropathy  S1  EMG changes  Left vertebral narrowing  Hip arthroplasty    EMG  9/20/2024  CLINICAL INTERPRETATION: This is an abnormal study.  The electrodiagnostic abnormalities are most in keeping with a chronic bilateral S1 radiculopathy without active denervation.  However, it is difficult to exclude a mild, motor-predominant,   length-dependent peripheral neuropathy based on the results of this study.      Head Neck CTA 6/16/2024  HEAD CTA:   1.  Bolus timing limits evaluation of the distal intracranial arteries. Within this limitation, no stenosis or occlusion.     NECK CTA:  1.  Severe stenosis of the left vertebral artery origin.  2.  No additional hemodynamically significant stenosis or occlusion.    Brain MRI 6/16/2024  1.  No acute intracranial process is identified. Specifically, no evidence for acute/subacute infarct, intracranial hemorrhage, or mass effect.  2.  Brain atrophy and presumed chronic small vessel ischemic changes, as described.    Brain mRI 1/31/2023  1.  No acute/subacute infarction, intracranial hemorrhage, mass effect, or hydrocephalus.  2.  Presumed sequelae of mild chronic small vessel ischemic disease, though mildly progressed from prior.  3.  Stable mild global brain parenchymal volume loss.      Other:      Medications            Asprin 81mg        Carbidopa/levodopa Sinemet 25/100        Cholecalciferol Vit D3 125 5000        Ketoconazole nizoral 2% cream        Levothyroxine synthroid 75mcg        Losartan cozaar 50mg         Nitroglycerin nitrostat 0.4mg SL        Pilocarpine pilocar 2% oph soln        Vit B12 cyancocobalamin 500mcg                                                                                                          12/16/2024 Sapna Gibbons [place]  He is a 79 y.o. year old male who i previously saw for left amaurosis fugax. See prior notes. He then contacted me for concern of peripheral neuropathy and gait difficulty,     In the last 4-5 years patient has had a gradual progressive decline in his gait. He has attributed it to peripheral neuropathy even though that diagnosis has not been made. He tells me that from the knees down he feels like  someone else's program  . He has reduced feeling in his lower legs. He has trouble walking downstairs and feels like his calves are not as strong. He feels like he shuffles. He has mild pain in his feet and feels like  there are marbles in his socks.    He has also noted that his voice has become more hoarse, he has trouble with handwriting and trouble getting in and out of cars but not SUV. He stopped playing [employer] 2-3 years ago because of an Achilles tendon rupture and he is not able to keep up with other people his age in terms of physical activities. He does not think he is forgetful, but he does have trouble remembering. He denies a tremor.    He has had back issues on and off since the age of 21. He does not think he has ever had pain down his legs. He has not had back surgery. Currently he is not having back pain. He is not sure why he had a lumbar MRI in December of last year.     EMG: CLINICAL INTERPRETATION: This is an abnormal study. The electrodiagnostic abnormalities are most in keeping with a chronic bilateral S1 radiculopathy without active denervation. However, it is difficult to exclude a mild, motor-predominant, length-dependent peripheral neuropathy based on the results of this study.     MPRESSION: Outside review of December 2023 MRI Lumbar Spine  Mild lumbar spondylosis, as detailed in the findings. Mild spinal canal and bilateral foraminal narrowing at L3-4. No specific cause for S1 radiculopathy is identified.    IMPRESSION:  Abnormal DaTscan, worse on the left     ASSESSMENT / PLAN   #1 Left amaurosis  #2 Incidental vertebral origin stenosis  # Hypertension  # Hyperlipidemia  Patient had 3 episodes of what sounds like left amaurosis in June of 2024. Fortunately, he has not had any recurrence. He has had a thorough workup (will try to get outside films), but I recommended a 30 day cardiac monitor to rule out atrial fibrillation, negative.    Continue daily aspirin and risk factor management. Vertebral stenosis is incidental.     # Probable Parkinson's disease  # Gait Decline  # Hoarse voice  # Mild peripheral neuropathy  # Chronic S1 radiculopathy  I think patient has some features suggestive of  parkinsonism. His voice has become more hoarse, he notes that he shuffles, he has masked face with decreased blink reflex and decreased right arm swing. AKANKSHA scan is abnormal. We discussed various options of trying empirically levodopa carbidopa starting at 1/2 tablet TID and increase to 1 tablet TID after 1 week if no response and no side effects. Reviewed side effects. Take BP 1 hour after dose and report any SBP<100 or light headedness. Report to me in 1 week about response. Continue to try to get into movement disorders.     Dopamine Scan 11/21/2024  Markedly abnormal striatal radiotracer distribution/uptake, most prominent in the left putamen. Moderately decreased caudate nuclei uptake left worse than right.     EMG  9/20/2024  CLINICAL INTERPRETATION: This is an abnormal study.  The electrodiagnostic abnormalities are most in keeping with a chronic bilateral S1 radiculopathy without active denervation.  However, it is difficult to exclude a mild, motor-predominant,   length-dependent peripheral neuropathy based on the results of this study.     8/19/2024 patt Gibbons [place]    Patient is a 79 y.o. year old male who presents for the evaluation of left amaurosis fugax. Supervisory note for Ayesha Rangel    HISTORY OF PRESENT ILLNESS  Patient has had a few prior episodes of transient left visual loss over the years.     In June of 2024 patient presented to the emergency room after experiencing 3 episodes of transient visual loss. At that time he experienced a cloudy dark area of vision come down in the upper half of his left eye only. This lasted several minutes and then resolved. The next day he had 2similar episode which prompted him to come to the ER. There was no pain associated with this. No jaw claudication.    He subsequently underwent CT, CT angiogram demonstrating no significant stenosis of the carotid arteries, but there was severe stenosis of the left vertebral artery origin. MRI brain revealed no  acute changes. His hemoglobin A1c was 5.9% and his LDL was 112. Patient was loaded with Plavix and told to take dual antiplatelet therapy and start a statin. Echocardiogram was planned for the outpatient setting and by report was unrevealing.    Patient had some additional complaints of neck pain and sitffness outlined by Dr. Rangel and hoarseness.   ASSESSMENT / PLAN     #1 Left amaurosis  #2 Incidental vertebral origin stenosis  # Hypertension  # Hyperlipidemia  Patient had 3 episodes of what sounds like left amaurosis in June of 2024. Fortunately, he has not had any recurrence. He has been on aspirin and Plavix since that time.    He has had a thorough workup (will try to get outside films), but I would add a 30 day cardiac monitor to rule out atrial fibrillation.    At this point in time, he can discontinue the Plavix but continue the aspirin 162 mg daily. We can recheck his lipids to see if he has responded to the lipid lowering medications prescribed (intolerant of statin).    - Risk factors:  Vascular risk factor goals for cerebral ischemia include: BP<130/80, LDL<70mg/dl If atherosclerotic stroke and beginning LDL >100mg/dl, normoglycemia, no tobacco, normal BMI (<25).     Vertebral stenosis is incidental and would be treated with antiplatelet and risk factor management    ADDENDUM: Received outside MRI and CTA. Fortunately these both look normal. No DWI changes. Minimal leukoaraiosis.     # Neck pain  # History of rheumatoid arthritis  - Plain xrays; spine center    Head Neck CTA 6/16/2024  HEAD CTA:   1.  Bolus timing limits evaluation of the distal intracranial arteries. Within this limitation, no stenosis or occlusion.     NECK CTA:  1.  Severe stenosis of the left vertebral artery origin.  2.  No additional hemodynamically significant stenosis or occlusion.    Brain MRI 6/16/2024  1.  No acute intracranial process is identified. Specifically, no evidence for acute/subacute infarct, intracranial  hemorrhage, or mass effect.  2.  Brain atrophy and presumed chronic small vessel ischemic changes, as described.    Brain mRI 1/31/2023  1.  No acute/subacute infarction, intracranial hemorrhage, mass effect, or hydrocephalus.  2.  Presumed sequelae of mild chronic small vessel ischemic disease, though mildly progressed from prior.  3.  Stable mild global brain parenchymal volume loss.        Marlyn Cat   MV    1/16/25  1:27 PM  Note  Action 1/16/23 MV 1.24pm   Action Taken Imaging request emailed to [facility]              RECORDS RECEIVED FROM: internal   REASON FOR VISIT: Abnormal gait    PROVIDER: Dr. Roman   DATE OF APPT: 2/28/25   NOTES (FOR ALL VISITS) STATUS DETAILS   OFFICE NOTE from referring provider Care Everywhere Dr Sapna Gibbons @ [facility]:  12/16/24  10/10/24  8/19/24   EMG Care Everywhere [facility]:  9/20/24   MEDICATION LIST Care Everywhere     IMAGING  (FOR ALL VISITS)       AKANKSHA SCAN (MOVEMENT) In process [facility]:  NM Brain 11/21/24   MRI (HEAD, NECK, SPINE) Internal [facility]:  MRI Brain 6/16/24  MRI Brain 1/31/23   CT (HEAD, NECK, SPINE) Internal [facility]:  CTA Head Neck 6/16/24

## 2025-01-22 ENCOUNTER — OFFICE VISIT (OUTPATIENT)
Dept: CARDIOLOGY | Facility: CLINIC | Age: 80
End: 2025-01-22
Attending: CASE MANAGER/CARE COORDINATOR
Payer: COMMERCIAL

## 2025-01-22 VITALS
OXYGEN SATURATION: 99 % | WEIGHT: 193.8 LBS | SYSTOLIC BLOOD PRESSURE: 142 MMHG | DIASTOLIC BLOOD PRESSURE: 81 MMHG | BODY MASS INDEX: 27.81 KG/M2 | HEART RATE: 64 BPM

## 2025-01-22 DIAGNOSIS — I10 PRIMARY HYPERTENSION: ICD-10-CM

## 2025-01-22 DIAGNOSIS — I71.21 ANEURYSM OF ASCENDING AORTA WITHOUT RUPTURE: ICD-10-CM

## 2025-01-22 DIAGNOSIS — Z78.9 STATIN INTOLERANCE: ICD-10-CM

## 2025-01-22 DIAGNOSIS — E78.5 HYPERLIPIDEMIA LDL GOAL <100: Primary | ICD-10-CM

## 2025-01-22 DIAGNOSIS — Z86.73 HISTORY OF TIA (TRANSIENT ISCHEMIC ATTACK): ICD-10-CM

## 2025-01-22 PROCEDURE — G2211 COMPLEX E/M VISIT ADD ON: HCPCS | Performed by: CASE MANAGER/CARE COORDINATOR

## 2025-01-22 PROCEDURE — 99214 OFFICE O/P EST MOD 30 MIN: CPT | Performed by: CASE MANAGER/CARE COORDINATOR

## 2025-01-22 PROCEDURE — G0463 HOSPITAL OUTPT CLINIC VISIT: HCPCS | Performed by: CASE MANAGER/CARE COORDINATOR

## 2025-01-22 ASSESSMENT — PAIN SCALES - GENERAL: PAINLEVEL_OUTOF10: NO PAIN (0)

## 2025-01-22 NOTE — PROGRESS NOTES
[facility] Cardiology - [facility] – [place], [place]   Cardiology Clinic Note      HPI:    Artis Galvin is a pleasant 78 year old adult with medical history pertinent for ascending aortic aneurysm, non-obstructive CAD, HTN, SVT/AVNRT, carotid stenosis, and HLD. He presents to cardiology clinic for hospital follow up and cardiac clearance for surgery.    While undergoing pre-operative workup for upcoming hip arthroplasty, Binu noted to his PCP that the same day he had developed dull chest pain that was substernal/left-sided and continued for hours. The pain was not associated with diaphoresis or SOB. He attempted to go to the gym and the dull chest pain continued, did not worsen with exertion. EKG in clinic demonstrated RBBB (pt has had on prior EKGs). Referred to ED where troponin was mildly elevated at 29 > 26. He was loaded with aspirin 325mg and Brilanta 180 mg in ED and started on a heparin gtt. Most recent coronary angiogram 10/22 showed moderate non-obstructive CAD, with 60% stenosis in mLAD, 50% in D1, 30% in ramus, and 30% in RCA. Option for coronary angiogram was discussed with patient; however, due to cath lab scheduling constraints he would have to wait for several days for an angiogram. As his chest pain had resolved, the option was discussed to discharge him to home with a stress test early next week in order to clear him for hip surgery. Patient and family were amenable to this option.     Today in clinic, he denies chest pain, palpitations, dizziness, syncope, or lower extremity edema. He feels more sluggish and fatigued, and just generally more out of shape in the past year. He knows that his activity is limited by hip and back pain and wonders if depression could also be contributing. He also notes mild calf aches for many months.     Interval History 5/29/24:  At his previous visit in January, we stopped atorvastatin due to myalgias and started rosuvastatin. Binu reports persistent myalgias in his legs on rosuvastatin 10mg  daily, which limited the distance that he can walk.   He stopped taking rosuvastatin several weeks ago and feels that the myalgias have improved, though not completed subsided.  Compounding his leg pain is peripheral neuropathy, for which he follows up with neurology tomorrow.   Binu has been listening to cardiology podcasts from [facility] and is interested in trialing injectable lipid-lowering medication.     Interval History 01/22/25  Binu had an ED visit in June with an episode of transiet vision loss thought to be amaurosis fungax, found to have incidental severe L vertebral artery stenosis. Neurology & ophthalmology were consulted, he was started on DAPT (aspirin & clopidogrel) for 20 days. He has been following at [place] neurology with a diagnosis of Parkinson's.     Binu has several vague symptoms -- he notes an intermittent toothache the past few weeks. He also has several episodes of similar grayed vision (similar to symptoms in ED). Home systolic -130s. Very mild episodes of chest discomfort, ranks 2/10.       PAST MEDICAL HISTORY:  Past Medical History:   Diagnosis Date    abnormal dopamine scan ([facility]TSCAN) 11/21/2024 01/20/2025    Dopamine Scan 11/21/2024  Markedly abnormal striatal radiotracer distribution/uptake, most prominent in the left putamen. Moderately decreased caudate nuclei uptake left worse than right.       Abnormal thyroid ultrasound     Adjustment disorder with mixed anxiety and depressed mood     Arthritis     Autoimmune thyroiditis     BBB (bundle branch block)     right    Chronic lymphocytic thyroiditis     Coronary artery disease     Diverticulosis of colon (without mention of hemorrhage)     Hypertension     Low back pain 11/2023    Malignant neoplasm of prostate (H) 2018    Nontoxic multinodular goiter     NSTEMI (non-ST elevated myocardial infarction) (H) 01/11/2024    Osteoporosis     Other and unspecified hyperlipidemia     Pain in joint, shoulder region     Peripheral  neuropathy     Persistent hoarseness     Sarcoidosis     Seen on chest xray at age 28    Scleritis, unspecified     Status post coronary angiogram 10/20/2022    SVT (supraventricular tachycardia)     Unspecified hypothyroidism     Unspecified vitamin D deficiency        FAMILY HISTORY:  Family History   Problem Relation Age of Onset    No Known Problems Mother     Coronary Artery Disease Father     Aortic aneurysm Father     Prostate Cancer Maternal Grandfather     Prostate Cancer Maternal Uncle        SOCIAL HISTORY:  Social History     Socioeconomic History    Marital status: Patient Declined   Tobacco Use    Smoking status: Never    Smokeless tobacco: Never   Vaping Use    Vaping Use: Never used   Substance and Sexual Activity    Alcohol use: Not Currently     Comment: Alcoholic Drinks/day: rarely    Drug use: No   Social History Narrative    Single. . 4 grown daughters.    Currently unemployed.     Social Determinants of Health     Financial Resource Strain: Low Risk  (1/4/2024)    Financial Resource Strain     Within the past 12 months, have you or your family members you live with been unable to get utilities (heat, electricity) when it was really needed?: No   Food Insecurity: Low Risk  (1/4/2024)    Food Insecurity     Within the past 12 months, did you worry that your food would run out before you got money to buy more?: No     Within the past 12 months, did the food you bought just not last and you didn t have money to get more?: No   Transportation Needs: Low Risk  (1/4/2024)    Transportation Needs     Within the past 12 months, has lack of transportation kept you from medical appointments, getting your medicines, non-medical meetings or appointments, work, or from getting things that you need?: No   Interpersonal Safety: Low Risk  (11/28/2023)    Interpersonal Safety     Do you feel physically and emotionally safe where you currently live?: Yes     Within the past 12 months, have you been hit,  slapped, kicked or otherwise physically hurt by someone?: No     Within the past 12 months, have you been humiliated or emotionally abused in other ways by your partner or ex-partner?: No   Housing Stability: Low Risk  (1/4/2024)    Housing Stability     Do you have housing? : Yes     Are you worried about losing your housing?: No       CURRENT MEDICATIONS:  Current Outpatient Medications   Medication Sig Dispense Refill    aspirin (ASA) 81 MG chewable tablet Take 81 mg by mouth daily.      carbidopa-levodopa (SINEMET)  MG tablet Take 0.5 tablets by mouth 3 (three) times a day. May increase to 1 tablet TID if no side effects after 1 week.      cholecalciferol (VITAMIN D3) 125 mcg (5000 units) capsule Take 125 mcg by mouth daily      ketoconazole (NIZORAL) 2 % external cream APPLY TOPICALLY DAILY 30 g 1    levothyroxine (SYNTHROID/LEVOTHROID) 75 MCG tablet Take 1 tablet (75 mcg) by mouth daily. 100 tablet 2    losartan (COZAAR) 50 MG tablet Take 50 mg by mouth every evening      nitroGLYcerin (NITROSTAT) 0.4 MG sublingual tablet For chest pain place 1 tablet under the tongue every 5 minutes for 3 doses. If symptoms persist 5 minutes after 1st dose call 911. 25 tablet 3    pilocarpine (PILOCAR) 2 % ophthalmic solution Place 1-2 drops into both eyes daily      vitamin B-12 (CYANOCOBALAMIN) 500 MCG tablet Take 500 mcg by mouth daily       Current Facility-Administered Medications   Medication Dose Route Frequency Provider Last Rate Last Admin    inclisiran (LEQVIO) SQ injection 284 mg  284 mg Subcutaneous Once Yamileth Mendoza, APRN CNP           ROS:   Refer to HPI    EXAM:  BP (!) 142/81   Pulse 64   Wt 87.9 kg (193 lb 12.8 oz)   LMP  (LMP Unknown)   SpO2 99%   BMI 27.81 kg/m    GENERAL: Appears comfortable, in no acute distress.   HEENT: Eye symmetrical, no discharge or icterus bilaterally. Mucous membranes moist and without lesions.  CV: RRR, +S1S2, no murmur, rub, or gallop.   RESPIRATORY: Respirations  regular, even, and unlabored. Lungs CTA throughout.   GI: Soft and non distended with normoactive bowel sounds present in all quadrants. No tenderness, rebound, guarding.   EXTREMITIES: no peripheral edema. 2+ bilateral pedal pulses.   NEUROLOGIC: Alert and oriented x 3. No focal deficits.   MUSCULOSKELETAL: No joint swelling or tenderness.   SKIN: No jaundice. No rashes or lesions.     Labs, reviewed with patient in clinic today:  CBC RESULTS:  Lab Results   Component Value Date    WBC 5.1 06/16/2024    RBC 4.91 06/16/2024    HGB 15.2 06/16/2024    HCT 45.2 06/16/2024    MCV 92 06/16/2024    MCH 31.0 06/16/2024    MCHC 33.6 06/16/2024    RDW 12.6 06/16/2024     06/16/2024       CMP RESULTS:  Lab Results   Component Value Date     06/16/2024    POTASSIUM 4.4 06/16/2024    POTASSIUM 4.7 06/06/2022    CHLORIDE 104 06/16/2024    CHLORIDE 107 06/06/2022    CO2 23 06/16/2024    CO2 25 06/06/2022    ANIONGAP 12 06/16/2024    ANIONGAP 10 06/06/2022    GLC 91 06/16/2024    GLC 88 06/06/2022    BUN 14.7 06/16/2024    BUN 17 06/06/2022    CR 0.94 06/16/2024    GFRESTIMATED 83 06/16/2024    GFRESTIMATED 52 (L) 05/18/2023    GFRESTIMATED >60 04/21/2021    GFRESTIMATED 83 05/05/2020    GFRESTBLACK >60 04/21/2021    GFRESTBLACK >90 05/05/2020    DEANDRA 9.4 06/16/2024    BILITOTAL 0.4 05/03/2024    ALBUMIN 4.2 05/03/2024    ALBUMIN 3.4 (L) 05/24/2022    ALKPHOS 141 05/03/2024    ALT 16 05/03/2024    AST 15 05/03/2024        INR RESULTS:  Lab Results   Component Value Date    INR 0.96 06/16/2024       Lab Results   Component Value Date    MAG 2.0 03/07/2024     Lab Results   Component Value Date    NTBNPI 146 08/31/2023     Lab Results   Component Value Date    NTBNP 97 08/10/2023       LIPIDS:  Recent Labs   Lab Test 01/11/24  1836 08/30/23  1122   CHOL 134 119   HDL 36* 35*   LDL 67 56   TRIG 157* 141         EKG 1/24/24      Echocardiogram 12/6/24  Interpretation Summary     Ascending aorta 4.5 cm.  Ascending Aorta  dilatation is present.     This study was compared with the study from 2024 .  No significant changes noted..     Recommend CTA chest.  ______________________________________________________________________________  Left Ventricle  Left ventricular size, wall motion and function are normal. The ejection  fraction is 60-65%. Left ventricular wall thickness is normal. Left  ventricular diastolic function is normal.     Right Ventricle  Right ventricular function, chamber size, wall motion, and thickness are  normal.     Atria  Both atria appear normal.     Mitral Valve  The mitral valve is normal. Trace mitral insufficiency is present.     Aortic Valve  The aortic valve is tricuspid. Mild aortic insufficiency is present.     Tricuspid Valve  The tricuspid valve is normal.     Pulmonic Valve  The pulmonic valve is normal.     Vessels  The inferior vena cava was normal in size with preserved respiratory  variability. Sinuses of Valsalva 3.8 cm. Ascending aorta 4.5 cm. Ascending  Aorta dilatation is present.     Pericardium  No pericardial effusion is present.     Miscellaneous  No significant valvular abnormalities present.     Compared to Previous Study  This study was compared with the study from 2024 . No significant changes  noted.  ______________________________________________________________________________  MMode/2D Measurements & Calculations  IVSd: 0.95 cm  LVIDd: 5.0 cm  LVIDs: 2.8 cm  LVPWd: 1.0 cm  FS: 43.4 %  LV mass(C)d: 177.5 grams  LV mass(C)dI: 86.5 grams/m2  asc Aorta Diam: 4.7 cm  LVOT diam: 2.2 cm  LVOT area: 3.7 cm2  Asc Ao diam index BSA (cm/m2): 2.3  Asc Ao diam index Ht(cm/m): 2.6  RWT: 0.41     Doppler Measurements & Calculations  MV E max kendra: 64.2 cm/sec  MV A max kendra: 69.1 cm/sec  MV E/A: 0.93  MV dec slope: 252.8 cm/sec2  MV dec time: 0.25 sec  PA acc time: 0.11 sec     E/E' av.0  Lateral E/e': 9.8  Medial E/e': 12.1      ______________________________________________________________________________  Report approved by: JUMA GOFF MD on 12/06/2024 02:38 PM    Coronary Angiogram 10/2022:    Coronary Findings    Diagnostic  Dominance: Right  Left Main   The vessel is moderate in size and is angiographically normal.      Left Anterior Descending   The vessel is moderate in size.   Mid LAD-1 lesion is 60% stenosed. Pressure wire/iFR used.   Mid LAD-2 lesion is 50% stenosed. Measured dPr. Pre adenosine administration dPr: 0.93.      First Diagonal Branch   The vessel is moderate in size.   1st Diag lesion is 50% stenosed. Measured dPr. Pre adenosine administration dPr: 0.95. iFR 0.95      Second Diagonal Branch   The vessel is small and is angiographically normal.      Ramus Intermedius   The vessel is small.   Ramus lesion is 30% stenosed.      Left Circumflex      First Obtuse Marginal Branch   The vessel is large.      Right Coronary Artery   The vessel is moderate in size.   Prox RCA lesion is 30% stenosed.   Dist RCA lesion is 35% stenosed.      Right Posterior Descending Artery   The vessel is small and is angiographically normal.      Right Posterior Atrioventricular Artery   The vessel is small and is angiographically normal.      First Right Posterolateral Branch   The vessel is small and is angiographically normal.             Assessment and Plan:   Binu Galvin is a 79 year old adult with a PMH of ascending aortic aneurysm, non-obstructive CAD, HTN, SVT/AVNRT, carotid stenosis, and HLD.    # Statin intolerance  # Myalgias   # HLD  Has experienced life limiting statins on atorvastatin and rosuvastatin. Stopped taking rosuvastatin 2 weeks ago with partial resolution of symptoms. Discussed option of PCKS9i and Leqvio and the difference in mechanism and dosing schedule, he would prefer to trial clinic-administered medication. Most recent LDL 54, well controlled on inclisiran  - cont inclisiran q6 months     # Non-obstructive  coronary artery disease   # Stable angina  Dobutamine stress echo 1/15/24 negative for inducible myocardial ischemia or infarction.  - cont inclisiran q6 months   - continue aspirin 81mg daily  - SL NTG PRN    # HTN  Goal SBP <130, home BP typically 120s systolics  - cont PTA losartan 50mg daily  - Imdur 30mg daily    # Ascending aortic aneurysm   Measured at 4.5cm on TTE 12/2024, stable  - SBP goal <130/80  - cont PTA losartan 50mg daily  - Imdur 30mg daily  - CTA chest in 6 months     # Carotid stenosis, mild  B/L carotid U/S 8/2023 measured mild plaque formation in bilateral arteries  - cont asa 325mg (h/o   - cont inclisiran    # Amaurosis fungax, L eye  # TIAs   ED visit 6/2024 with transient loss of vision in L eye, neuro & opthalmology consulted, CT noted severe stenosis of L vertebral artery, no acute infarct or hemorrhage on MRI brain. Holter monitor with no afib. D/w patient that if transient episodes of vision loss continue to occur he needs to present to ER. Also encourage follow-up with neurology & ophthalmology  - cont aspirin 325 daily  - continue inclisiran     # Parkinson's   Follows with Dr. Gibbons in neurology at [place], most recent visit 12/16/24. Abnormal AKANKSHA scan.  - carbidopa-levodopa    Follow up:  RTC 1 year  Chart review time today: 10 minutes  Visit time today: 35 minutes  Total time spent today: 35 minutes    Yamileth Mendoza CNP  General Cardiology   1/22/25      The longitudinal plan of care for the diagnosis(es)/condition(s) as documented were addressed during this visit. Due to the added complexity in care, I will continue to support Binu in the subsequent management and with ongoing continuity of care.

## 2025-01-22 NOTE — PATIENT INSTRUCTIONS
You were seen today in the Cardiovascular Clinic at the [facility] by:       JAIDA VILLALBA CNP    Your visit summary and instructions are as follows:    CT aorta in 6 months  Jennifer will call you in 1 week for blood pressures      Return to cardiology clinic in 1 year     Thank you for your visit today!     Please MyChart message me or call my nurse if you have any questions or concerns.      During Business Hours:  [phone], option # 1 (University) then option # 4 (medical questions) and ask to speak with my nurse.     After hours, weekends or holidays:   [phone], Option #4  Ask to speak to the On-Call Cardiologist. Inform them you are a cardiology patient at the [facility].

## 2025-01-22 NOTE — NURSING NOTE
Chief Complaint   Patient presents with    Follow Up     RETURN CARDIOLOGY     Vitals were taken and medications reconciled.    Taz Hendricks, MENDY  2:28 PM

## 2025-01-22 NOTE — LETTER
1/22/2025      RE: Artis Galvin  [address]  [place] [address]       Dear Colleague,    Thank you for the opportunity to participate in the care of your patient, Artis Galvin, at the [facility]. Please see a copy of my visit note below.      [facility] [place] [facility] [place]      HPI:    Artis Galvin is a pleasant 78 year old adult with medical history pertinent for ascending aortic aneurysm, non-obstructive CAD, HTN, SVT/AVNRT, carotid stenosis, and HLD. He presents to cardiology clinic for hospital follow up and cardiac clearance for surgery.    While undergoing pre-operative workup for upcoming hip arthroplasty, Binu noted to his PCP that the same day he had developed dull chest pain that was substernal/left-sided and continued for hours. The pain was not associated with diaphoresis or SOB. He attempted to go to the gym and the dull chest pain continued, did not worsen with exertion. EKG in clinic demonstrated RBBB (pt has had on prior EKGs). Referred to ED where troponin was mildly elevated at 29 > 26. He was loaded with aspirin 325mg and Brilanta 180 mg in ED and started on a heparin gtt. Most recent coronary angiogram 10/22 showed moderate non-obstructive CAD, with 60% stenosis in mLAD, 50% in D1, 30% in ramus, and 30% in RCA. Option for coronary angiogram was discussed with patient; however, due to cath lab scheduling constraints he would have to wait for several days for an angiogram. As his chest pain had resolved, the option was discussed to discharge him to home with a stress test early next week in order to clear him for hip surgery. Patient and family were amenable to this option.     Today in clinic, he denies chest pain, palpitations, dizziness, syncope, or lower extremity edema. He feels more sluggish and fatigued, and just generally more out of shape in the past year. He  knows that his activity is limited by hip and back pain and wonders if depression could also be contributing. He also notes mild calf aches for many months.     Interval History 5/29/24:  At his previous visit in January, we stopped atorvastatin due to myalgias and started rosuvastatin. Binu reports persistent myalgias in his legs on rosuvastatin 10mg daily, which limited the distance that he can walk.   He stopped taking rosuvastatin several weeks ago and feels that the myalgias have improved, though not completed subsided.  Compounding his leg pain is peripheral neuropathy, for which he follows up with neurology tomorrow.   Binu has been listening to cardiology podcasts from [facility] and is interested in trialing injectable lipid-lowering medication.     Interval History 01/22/25  Binu had an ED visit in June with an episode of transiet vision loss thought to be amaurosis fungax, found to have incidental severe L vertebral artery stenosis. Neurology & ophthalmology were consulted, he was started on DAPT (aspirin & clopidogrel) for 20 days. He has been following at [facility] with a diagnosis of Parkinson's.     Binu has several vague symptoms -- he notes an intermittent toothache the past few weeks. He also has several episodes of similar grayed vision (similar to symptoms in ED). Home systolic -130s. Very mild episodes of chest discomfort, ranks 2/10.       PAST MEDICAL HISTORY:  Past Medical History:   Diagnosis Date     abnormal dopamine scan (DaTSCAN) 11/21/2024 01/20/2025    Dopamine Scan 11/21/2024  Markedly abnormal striatal radiotracer distribution/uptake, most prominent in the left putamen. Moderately decreased caudate nuclei uptake left worse than right.        Abnormal thyroid ultrasound      Adjustment disorder with mixed anxiety and depressed mood      Arthritis      Autoimmune thyroiditis      BBB (bundle branch block)     right     Chronic lymphocytic thyroiditis      Coronary artery  disease      Diverticulosis of colon (without mention of hemorrhage)      Hypertension      Low back pain 11/2023     Malignant neoplasm of prostate (H) 2018     Nontoxic multinodular goiter      NSTEMI (non-ST elevated myocardial infarction) (H) 01/11/2024     Osteoporosis      Other and unspecified hyperlipidemia      Pain in joint, shoulder region      Peripheral neuropathy      Persistent hoarseness      Sarcoidosis     Seen on chest xray at age 28     Scleritis, unspecified      Status post coronary angiogram 10/20/2022     SVT (supraventricular tachycardia)      Unspecified hypothyroidism      Unspecified vitamin D deficiency        FAMILY HISTORY:  Family History   Problem Relation Age of Onset     No Known Problems Mother      Coronary Artery Disease Father      Aortic aneurysm Father      Prostate Cancer Maternal Grandfather      Prostate Cancer Maternal Uncle        SOCIAL HISTORY:  Social History     Socioeconomic History     Marital status: Patient Declined   Tobacco Use     Smoking status: Never     Smokeless tobacco: Never   Vaping Use     Vaping Use: Never used   Substance and Sexual Activity     Alcohol use: Not Currently     Comment: Alcoholic Drinks/day: rarely     Drug use: No   Social History Narrative    Single. . 4 grown daughters.    Currently unemployed.     Social Determinants of Health     Financial Resource Strain: Low Risk  (1/4/2024)    Financial Resource Strain      Within the past 12 months, have you or your family members you live with been unable to get utilities (heat, electricity) when it was really needed?: No   Food Insecurity: Low Risk  (1/4/2024)    Food Insecurity      Within the past 12 months, did you worry that your food would run out before you got money to buy more?: No      Within the past 12 months, did the food you bought just not last and you didn t have money to get more?: No   Transportation Needs: Low Risk  (1/4/2024)    Transportation Needs      Within the  past 12 months, has lack of transportation kept you from medical appointments, getting your medicines, non-medical meetings or appointments, work, or from getting things that you need?: No   Interpersonal Safety: Low Risk  (11/28/2023)    Interpersonal Safety      Do you feel physically and emotionally safe where you currently live?: Yes      Within the past 12 months, have you been hit, slapped, kicked or otherwise physically hurt by someone?: No      Within the past 12 months, have you been humiliated or emotionally abused in other ways by your partner or ex-partner?: No   Housing Stability: Low Risk  (1/4/2024)    Housing Stability      Do you have housing? : Yes      Are you worried about losing your housing?: No       CURRENT MEDICATIONS:  Current Outpatient Medications   Medication Sig Dispense Refill     aspirin (ASA) 81 MG chewable tablet Take 81 mg by mouth daily.       carbidopa-levodopa (SINEMET)  MG tablet Take 0.5 tablets by mouth 3 (three) times a day. May increase to 1 tablet TID if no side effects after 1 week.       cholecalciferol (VITAMIN D3) 125 mcg (5000 units) capsule Take 125 mcg by mouth daily       ketoconazole (NIZORAL) 2 % external cream APPLY TOPICALLY DAILY 30 g 1     levothyroxine (SYNTHROID/LEVOTHROID) 75 MCG tablet Take 1 tablet (75 mcg) by mouth daily. 100 tablet 2     losartan (COZAAR) 50 MG tablet Take 50 mg by mouth every evening       nitroGLYcerin (NITROSTAT) 0.4 MG sublingual tablet For chest pain place 1 tablet under the tongue every 5 minutes for 3 doses. If symptoms persist 5 minutes after 1st dose call 911. 25 tablet 3     pilocarpine (PILOCAR) 2 % ophthalmic solution Place 1-2 drops into both eyes daily       vitamin B-12 (CYANOCOBALAMIN) 500 MCG tablet Take 500 mcg by mouth daily       Current Facility-Administered Medications   Medication Dose Route Frequency Provider Last Rate Last Admin     inclisiran (LEQVIO) SQ injection 284 mg  284 mg Subcutaneous Once  Yamileth Mendoza, JAIDA CNP           ROS:   Refer to HPI    EXAM:  BP (!) 142/81   Pulse 64   Wt 87.9 kg (193 lb 12.8 oz)   LMP  (LMP Unknown)   SpO2 99%   BMI 27.81 kg/m    GENERAL: Appears comfortable, in no acute distress.   HEENT: Eye symmetrical, no discharge or icterus bilaterally. Mucous membranes moist and without lesions.  CV: RRR, +S1S2, no murmur, rub, or gallop.   RESPIRATORY: Respirations regular, even, and unlabored. Lungs CTA throughout.   GI: Soft and non distended with normoactive bowel sounds present in all quadrants. No tenderness, rebound, guarding.   EXTREMITIES: no peripheral edema. 2+ bilateral pedal pulses.   NEUROLOGIC: Alert and oriented x 3. No focal deficits.   MUSCULOSKELETAL: No joint swelling or tenderness.   SKIN: No jaundice. No rashes or lesions.     Labs, reviewed with patient in clinic today:  CBC RESULTS:  Lab Results   Component Value Date    WBC 5.1 06/16/2024    RBC 4.91 06/16/2024    HGB 15.2 06/16/2024    HCT 45.2 06/16/2024    MCV 92 06/16/2024    MCH 31.0 06/16/2024    MCHC 33.6 06/16/2024    RDW 12.6 06/16/2024     06/16/2024       CMP RESULTS:  Lab Results   Component Value Date     06/16/2024    POTASSIUM 4.4 06/16/2024    POTASSIUM 4.7 06/06/2022    CHLORIDE 104 06/16/2024    CHLORIDE 107 06/06/2022    CO2 23 06/16/2024    CO2 25 06/06/2022    ANIONGAP 12 06/16/2024    ANIONGAP 10 06/06/2022    GLC 91 06/16/2024    GLC 88 06/06/2022    BUN 14.7 06/16/2024    BUN 17 06/06/2022    CR 0.94 06/16/2024    GFRESTIMATED 83 06/16/2024    GFRESTIMATED 52 (L) 05/18/2023    GFRESTIMATED >60 04/21/2021    GFRESTIMATED 83 05/05/2020    GFRESTBLACK >60 04/21/2021    GFRESTBLACK >90 05/05/2020    DEANDRA 9.4 06/16/2024    BILITOTAL 0.4 05/03/2024    ALBUMIN 4.2 05/03/2024    ALBUMIN 3.4 (L) 05/24/2022    ALKPHOS 141 05/03/2024    ALT 16 05/03/2024    AST 15 05/03/2024        INR RESULTS:  Lab Results   Component Value Date    INR 0.96 06/16/2024       Lab Results    Component Value Date    MAG 2.0 03/07/2024     Lab Results   Component Value Date    NTBNPI 146 08/31/2023     Lab Results   Component Value Date    NTBNP 97 08/10/2023       LIPIDS:  Recent Labs   Lab Test 01/11/24  1836 08/30/23  1122   CHOL 134 119   HDL 36* 35*   LDL 67 56   TRIG 157* 141         EKG 1/24/24      Echocardiogram 12/6/24  Interpretation Summary     Ascending aorta 4.5 cm.  Ascending Aorta dilatation is present.     This study was compared with the study from 6/2024 .  No significant changes noted..     Recommend CTA chest.  ______________________________________________________________________________  Left Ventricle  Left ventricular size, wall motion and function are normal. The ejection  fraction is 60-65%. Left ventricular wall thickness is normal. Left  ventricular diastolic function is normal.     Right Ventricle  Right ventricular function, chamber size, wall motion, and thickness are  normal.     Atria  Both atria appear normal.     Mitral Valve  The mitral valve is normal. Trace mitral insufficiency is present.     Aortic Valve  The aortic valve is tricuspid. Mild aortic insufficiency is present.     Tricuspid Valve  The tricuspid valve is normal.     Pulmonic Valve  The pulmonic valve is normal.     Vessels  The inferior vena cava was normal in size with preserved respiratory  variability. Sinuses of Valsalva 3.8 cm. Ascending aorta 4.5 cm. Ascending  Aorta dilatation is present.     Pericardium  No pericardial effusion is present.     Miscellaneous  No significant valvular abnormalities present.     Compared to Previous Study  This study was compared with the study from 6/2024 . No significant changes  noted.  ______________________________________________________________________________  MMode/2D Measurements & Calculations  IVSd: 0.95 cm  LVIDd: 5.0 cm  LVIDs: 2.8 cm  LVPWd: 1.0 cm  FS: 43.4 %  LV mass(C)d: 177.5 grams  LV mass(C)dI: 86.5 grams/m2  asc Aorta Diam: 4.7 cm  LVOT diam:  2.2 cm  LVOT area: 3.7 cm2  Asc Ao diam index BSA (cm/m2): 2.3  Asc Ao diam index Ht(cm/m): 2.6  RWT: 0.41     Doppler Measurements & Calculations  MV E max kendra: 64.2 cm/sec  MV A max kendra: 69.1 cm/sec  MV E/A: 0.93  MV dec slope: 252.8 cm/sec2  MV dec time: 0.25 sec  PA acc time: 0.11 sec     E/E' av.0  Lateral E/e': 9.8  Medial E/e': 12.1     ______________________________________________________________________________  Report approved by: JUMA GOFF MD on 2024 02:38 PM    Coronary Angiogram 10/2022:    Coronary Findings    Diagnostic  Dominance: Right  Left Main   The vessel is moderate in size and is angiographically normal.      Left Anterior Descending   The vessel is moderate in size.   Mid LAD-1 lesion is 60% stenosed. Pressure wire/iFR used.   Mid LAD-2 lesion is 50% stenosed. Measured dPr. Pre adenosine administration dPr: 0.93.      First Diagonal Branch   The vessel is moderate in size.   1st Diag lesion is 50% stenosed. Measured dPr. Pre adenosine administration dPr: 0.95. iFR 0.95      Second Diagonal Branch   The vessel is small and is angiographically normal.      Ramus Intermedius   The vessel is small.   Ramus lesion is 30% stenosed.      Left Circumflex      First Obtuse Marginal Branch   The vessel is large.      Right Coronary Artery   The vessel is moderate in size.   Prox RCA lesion is 30% stenosed.   Dist RCA lesion is 35% stenosed.      Right Posterior Descending Artery   The vessel is small and is angiographically normal.      Right Posterior Atrioventricular Artery   The vessel is small and is angiographically normal.      First Right Posterolateral Branch   The vessel is small and is angiographically normal.             Assessment and Plan:   Binu Galvin is a 79 year old adult with a PMH of ascending aortic aneurysm, non-obstructive CAD, HTN, SVT/AVNRT, carotid stenosis, and HLD.    # Statin intolerance  # Myalgias   # HLD  Has experienced life limiting statins on atorvastatin  and rosuvastatin. Stopped taking rosuvastatin 2 weeks ago with partial resolution of symptoms. Discussed option of PCKS9i and Leqvio and the difference in mechanism and dosing schedule, he would prefer to trial clinic-administered medication. Most recent LDL 54, well controlled on inclisiran  - cont inclisiran q6 months     # Non-obstructive coronary artery disease   # Stable angina  Dobutamine stress echo 1/15/24 negative for inducible myocardial ischemia or infarction.  - cont inclisiran q6 months   - continue aspirin 81mg daily  - SL NTG PRN    # HTN  Goal SBP <130, home BP typically 120s systolics  - cont PTA losartan 50mg daily  - Imdur 30mg daily    # Ascending aortic aneurysm   Measured at 4.5cm on TTE 12/2024, stable  - SBP goal <130/80  - cont PTA losartan 50mg daily  - Imdur 30mg daily  - CTA chest in 6 months     # Carotid stenosis, mild  B/L carotid U/S 8/2023 measured mild plaque formation in bilateral arteries  - cont asa 325mg (h/o   - cont inclisiran    # Amaurosis fungax, L eye  # TIAs   ED visit 6/2024 with transient loss of vision in L eye, neuro & opthalmology consulted, CT noted severe stenosis of L vertebral artery, no acute infarct or hemorrhage on MRI brain. Holter monitor with no afib. D/w patient that if transient episodes of vision loss continue to occur he needs to present to ER. Also encourage follow-up with neurology & ophthalmology  - cont aspirin 325 daily  - continue inclisiran     # Parkinson's   Follows with Dr. Gibbons in neurology at [place], most recent visit 12/16/24. Abnormal AKANKSHA scan.  - carbidopa-levodopa    Follow up:  RTC 1 year  Chart review time today: 10 minutes  Visit time today: 35 minutes  Total time spent today: 35 minutes    Yamileth Mendoza CNP  General Cardiology   1/22/25      The longitudinal plan of care for the diagnosis(es)/condition(s) as documented were addressed during this visit. Due to the added complexity in care, I will continue to support Binu in the  subsequent management and with ongoing continuity of care.        Please do not hesitate to contact me if you have any questions/concerns.     Sincerely,     JAIDA CASTILLO CNP

## 2025-01-29 PROBLEM — F80.0 ARTICULATION DISORDER: Status: ACTIVE | Noted: 2025-01-29

## 2025-01-29 PROBLEM — R49.0 HOARSENESS OF VOICE: Status: ACTIVE | Noted: 2025-01-29

## 2025-01-29 PROBLEM — R49.9 VOICE DISORDER: Status: ACTIVE | Noted: 2025-01-29

## 2025-01-29 PROBLEM — G20.C PRIMARY PARKINSONISM (H): Status: ACTIVE | Noted: 2025-01-29

## 2025-01-29 NOTE — PROGRESS NOTES
Diagnosis/Summary/Recommendations:    PATIENT: Artis Galvin  79 year old adult     : 1945    KARTIK: 2025     MRN: [id number]  [address] [place] [address] [address]  [place] [address]  Mobile Phone  [phone]  Email  [email or web address]    Lori Galvin  Nay Hightower  Ivonne Galvin [facility].     Patient Contacts  Contact Name Contact Address Communication Relationship to Patient   Lori Galvin [address]  [facility] [address] [phone] (Mobile)  [phone] (Home) Daughter, Personal Relationship   Nay Nunes [address]  [place], [place] [id number] [phone] (Mobile)  [phone] (Home) Daughter, Personal Relationship   Ivonneclaire Garciaa [address] [place] [address] [phone] (Mobile)  [phone] (Home) Daughter, Personal Relationship   Yessi Hightower [address] [place] [address] [phone] (Home)  [email or web address] Significant Other, Personal Relationship     Assessment:  (G20.C) Parkinsonism, unspecified Parkinsonism type (H)  (primary encounter diagnosis)  (G20.C) Primary parkinsonism (H)    Parents are [place] [facility]    Sister   No family history of parkinson     Seen by Dr. Sapna Parker for evaluation of his numbness of his right leg and had encouraged to get a datscan on 2024 and was abnormal and then was encouraged to start on sinemet and started on the medication and had an immediate positive effect - changed his mood     At higher doses he got dizzy and stopped it and then saw Dr. Sanz and restarted on sinemet and had vomiting when he was taking 2 on an empty stomach and is now on 1 tab 3/day     Had been seen at [facility] for numbness of his toes and had problems moving around playing racMint that began 5-6 years ago and has increased over the past few years.     He has tremor in his right hand when he yawns     Hand writing is sloppier and not sure it is  smaller.     Feels like his lower extremities are not as good and right foot drops down.     He exercises and does biking.     He uses his left hand for fine movements  Write on blackboard with his right hand  He may have been born left handed and was pushed to use his right hand   He cannot switch some tasks he just does     11/21/2024  Markedly abnormal striatal radiotracer distribution/uptake, most prominent in the left putamen. Moderately decreased caudate nuclei uptake left worse than right.     Brain mri 6/16/2024  1.  No acute intracranial process is identified. Specifically, no evidence for acute/subacute infarct, intracranial hemorrhage, or mass effect.  2.  Brain atrophy and presumed chronic small vessel ischemic changes, as described.    6/16/2024 CTA head and neck  HEAD CTA:   1.  Bolus timing limits evaluation of the distal intracranial arteries. Within this limitation, no stenosis or occlusion.     NECK CTA:  1.  Severe stenosis of the left vertebral artery origin.  2.  No additional hemodynamically significant stenosis or occlusion.    Brain MRI 1/31/2023  1.  No acute/subacute infarction, intracranial hemorrhage, mass effect, or hydrocephalus.  2.  Presumed sequelae of mild chronic small vessel ischemic disease, though mildly progressed from prior.  3.  Stable mild global brain parenchymal volume loss.    Review of diagnosis    parkinson    Avoidance of dopamine blockers   Not taking    Motor complication review   No wearing off    Review of Impulse control disorders   N/a    Review of surgical or medication options   Sinemet - may have dizziness/out of it which is intermittent and unpredictable.     Gait/Balance/Falls   no    Exercise/Therapy performed/offered   Not exercising as much as he use to     Cognitive/Driving   Has some delayed recall on names, etc.   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      - 1 time   Lived with her for 13 years and did not divorce for 20 years.   Legally  20 years  Cohabitating   All 4 are from the same woman and him.     Dysthymia  He has some depression  Not interested in therapy  Not taking an antidepressant now  Had tried wellbutrin/bupropion  Tried duloxetine - felt dull, low libido/no libido.     Daughter - Nay Nunes  Daughter Lori Galvin  Daughter Ivonne schaefer   Daughter Meggan - lives in [place] - was put for adoption.     Yessi hightower is his girlfriend.     No spending issues.     Hallucinations/delusions   No hallucinations  May have illusion in corner of his eye.     Sleep   Variable sleep  Getting up a few times a night - wakes up for no clear reason  Has not had a sleep study  May snore on occasion  Has not had clear rem behavior features -not acting out his dreams, etc.     Bladder/Renal/Prostate/Gyn/Other   Urination at night  He had some problem with incomplete emptying.   Prostate cancer  Surgery 2018 for prostate  Had recurrence and had 30 radiation treatments.   Disorder of penis  Stress incontinence  ED    GI/Constipation/GERD   Weight has been increasing   197 lbs today - highest weight  No swallowing issues  He is eating a \"Lot\"    Occasional constipation.   Had used something in the past - gave himself an enema once in the past year.   Yessi tells him that he does not drink enough water.     CT Abdomen 5/18/2023  Acute uncomplicated diverticulitis of the ascending colon.     MR Pancreas 1/2/2022  1.  Mild diffuse hepatic steatosis.  2.  Subcentimeter cystic structure head of the pancreas is likely " "an  intraductal papillary mucinous neoplasm. No aggressive or suspicious  features.    ENDO/Lipid/DM/Bone density/Thyroid    Vitamin D deficiency  Not taking vitamin D regularly    No results found for: \"TUA810\", \"JSOD623\", \"RPJY27HVWCG\", \"VITD3\", \"D2VIT\", \"D3VIT\", \"DTOT\", \"FM32716776\", \"BP14939683\", \"SW27285053\", \"CP10906271\", \"EJ32783553\", \"[id number]\"      TSH   Date Value Ref Range Status   08/28/2024 2.40 0.30 - 4.20 uIU/mL Final   12/08/2021 4.50 0.30 - 5.00 uIU/mL Final     Autoimmune thyroiditis  On thyroid medication.     Thyroid ultrasound 8/26/2024  1.  Bilateral thyroid nodules including 2.3 cm nodule at the lower pole of the right thyroid lobe and 1.4 cm nodule at the upper pole right thyroid lobe, both demonstrate decreased in size as compared to 10/1/2021 exam, when they measured 3.4 and 1.6 cm,   respectively. There is 0.6 cm nodule at the interpolar region of the left thyroid lobe, not seen on prior study.  2.  There is 5 mm hypoechoic nodule along the lower pole of the left upper lobe, could represent mildly prominent parathyroid gland.    Thyroid ultrasound 10/1/2021  There are TR 3 nodules in the right lobe of the thyroid as described  above the largest of which is nodule 2 for which FNA is recommended,  and the second largest is nodule 1 for which follow-up ultrasound at  1, 3, and 5 years is recommended.    Thyroid biopsy 11/30/2021    Osteoporosis   Not taking vitamin D regularly  Not taking calcium     Dexa scan 8/26/2024  IMPRESSION: OSTEOPOROSIS. T score meets the WHO criteria for osteoporosis at one or more measured sites. The risk of osteoporotic fracture increases approximately two-fold for each standard deviation decrease in T-score.     Dexa scan 9/17/2021  The most negative and valid T-score of -2.7 at the level of the right femoral neck corresponds with osteoporosis according to WHO criteria for postmenopausal females and men age 50 and over. The risk of osteoporotic fracture " increases approximately 2-fold for each 1.0 SD decrease in T-score.     Lab Results   Component Value Date    A1C 5.9 06/16/2024    A1C 5.7 01/11/2024    A1C 5.4 04/10/2020     Most recent was 5.3 on 9/20/2024 which was normal.     LIPID management     Recent Labs   Lab Test 11/11/24  1314 06/16/24  2036   CHOL 127 224*   HDL 36* 34*   LDL 54 112*   TRIG 187* 389*       Lipid agent infusion leqvio inclisiran 284 mg infusions   3 months apart and then 6 months apart.     Cardio/heart/Hyper or Hypotensive   Aortic aneurysm - stable   Left BBB  Right BBB  History of TIA  Left vertebral artery stenosis  Nonocclusive coronary artery atherosclerosis  S/p coronary angiogram  SVT  Hypertension    /81 (BP Location: Right arm, Patient Position: Sitting, Cuff Size: Adult Large)   Pulse 66   Resp 16   Wt 89.6 kg (197 lb 9.6 oz)   LMP  (LMP Unknown)   SpO2 97%   BMI 28.35 kg/m      He has had some dizziness/lightheadedness with sinemet but still has issues even with a lower dose.     Losartan cozaar    Echocardiogram 12/6/2024  Ascending aorta 4.5 cm.  Ascending Aorta dilatation is present.     This study was compared with the study from 6/2024 .  No significant changes noted..    Left ventricular size, wall motion and function are normal. The ejection  fraction is 60-65%. Left ventricular wall thickness is normal. Left  ventricular diastolic function is normal.     Right Ventricle  Right ventricular function, chamber size, wall motion, and thickness are  normal.     Atria  Both atria appear normal.     Mitral Valve  The mitral valve is normal. Trace mitral insufficiency is present.     Aortic Valve  The aortic valve is tricuspid. Mild aortic insufficiency is present.     Tricuspid Valve  The tricuspid valve is normal.     Pulmonic Valve  The pulmonic valve is normal.     Vessels  The inferior vena cava was normal in size with preserved respiratory  variability. Sinuses of Valsalva 3.8 cm. Ascending aorta 4.5 cm.  Ascending  Aorta dilatation is present.     Pericardium  No pericardial effusion is present.     Miscellaneous  No significant valvular abnormalities present.    Aortic aneurysm  Thoracic aortic aneurysm, 4.8 cm    1/15/2024  1. No acute abnormality on the CT images of the lower chest and upper  abdomen. Ascending thoracic aortic aneurysm, better detailed on  comparison CTA.  2. Please see separate report for the nuclear medicine portion of the  study.    8/31/2023 CT Chest/  1. Stable aneurysmal dilatation of the ascending aorta, with a maximal  diameter measured on true axial images of approximately 4.8 cm. No  evidence of dissection or intramural hematoma or other acute aortic  injury.     2. No abnormality to explain etiology of chest pain.    Ecg 6/16/2024  Component  Ref Range & Units 6/16/24  7:04 PM 1/24/24  3:14 PM     Systolic Blood Pressure  mmHg      Diastolic Blood Pressure  mmHg      Ventricular Rate  BPM 55 60    Atrial Rate  BPM 55 60    OK Interval  ms 204 214    QRS Duration  ms 148 92    QT  ms 462 420    QTc  ms 441 420    P Axis  degrees 25 12    R AXIS  degrees -7 13    T Axis  degrees 3 -5    Interpretation ECG Sinus bradycardia  Right bundle branch block  Possible Lateral infarct , age undetermined  Abnormal ECG  Unconfirmed report - interpretation of this ECG is computer generated - see medical record for final interpretation  Confirmed by - EMERGENCY ROOM, PHYSICIAN ([id number]),  VINOD JURADO ([id number]) on 6/17/2024 8:28:13        Vision/Dry Eyes/Cataracts/Glaucoma/Macular   RK/RA eye surgery in the past - procedure - OK radial keratotomy   Eye surgery bilateral  Laser surgery   Cataract surgery   Eye capsulotomy   Pilocarpine drops to narrow things down.   Gray are on the upper 1/2 of vision - had two problems on one day and wondering if it was a possible branch occlusion TIA issue - seen at [facility] in the past - came into the office of fathers day and send to the ED - l ast for 3-5  minutes and resolved. Followed by eye doctors.     Heme/Anticoagulation/Antiplatelet/Anemia/Other  Aspirin 81mg   Folbee, B6/ folic acid.   No clots   No clear anemia     ENT/Resp  Tinnitus  May have some voice changes  Some swallowing problems.  Has some jaw pain  Altered voice  Lung sarcoidosis - not sure if this is present. And had a scalene node biopsy that showed non caseating granuloma and consistent with sarcoid  This is not an issue.   No breathing or respiratory problems.   Loss of smell  Has had drooling   No significant runny nose     CT 8/18/2022 showed Stable sub 6 mm pulmonary nodules. No new pulmonary nodule  demonstrated.    Skin/Cancer/Seborrhea/other  No skin cancer   onychomycosis    Musculoskeletal/Pain/Headache  Right Hip surgery   See surgical records - he says it was his right hip.     Neuropathy     Peripheral neuropathy  Hip surgery  Achilles tendon rupture    Other:  Breast ultrasound 11/16/2022  1.  Small lesion in the left breast correlates with palpable abnormality. It most likely represents a benign lipoma or hemangioma lipoma. Clinical follow-up is recommended. The patient was told that if he notices the lump increasing in size, the lesion   should be removed.     ACR BI-RADS Category 2: Benign.    Medications            Aspirin 81mg vary       Carbidopa/levodopa sinemet 25/100 1 1 1     Cholecalciferol Vit D3 125 5000 Not y et       Fa pyridoxine cyanocobalamin  1       Inclisiran leqvio infusions for lipid        Ketoconazole nizoral 2% cream        Levothyroxine synthroid 75mcg 1       Losartan cozaar 50mg   1     Nitroglycerin nitrostat 0.4        Pilocarpine pilocar 2% oph soln        Vit B12 cyanocobalamin 500mcg off                                                                                                 Plan:    Genetic testing done at [facility] 1/27/2025 and were variants of undetermined significance but no pathogenic variants.     Needs followup on his bone density - may see  endocrinology August to discuss reclast, etc. Management.     Parkinson disease right side onset with some challenges with the sinemet as had intermittent dizziness, etc.     Pharmacy (MTM) consultation and medication management  Please call the scheduling number @ [phone] to set up an appointment with pharmacists Irlanda Patterson, Bhavya Carcamo, or Rama Saenz.     Has ongoing anxiety that is affecting his ability to travel along with balance changes.     [facility]  [address] [place] [address]      Ayesha Cedeno, Ph.D., LP  [phone]    Emilee Carmona,Ph.D.,LP  [phone] (inpatient)    Leidy Britt,Ph.D.,  [phone]    Coco Torres, Ph.D.  [phone]    Karina Chase, Ph.D., LP  [phone]    Brain Pineda, Ph.D., ABPP, LP  [phone]    Omayra Noriega,Ph.D.,   [phone]    Joanna Mesa psychologist  [phone]   [facility]  [address] [place] [id number].            Future Appointments 2/28/2025 - 8/27/2025        Date Visit Type Length Department Provider     4/7/2025  3:40 PM OFFICE VISIT 40 min [facility] INTERNAL MEDICINE Angel Hernandez MD    Location Instructions:     [facility] is located at [address]  in [place], just east of the [facility] exit off of [address]  and the [facility] [facility] [employer] soccer [place]. Access the free parking lot directly from [facility] if traveling east; if traveling west on [address], turn south on [address]. [address] and the [employer] [place] light rail also stop at the intersection of [address] and [facility].               4/28/2025  2:00 PM NURSE ONLY 30 min  CARDIOVASCULAR CTR  CVC NURSE    Location Instructions:     [facility] ([facility] is in a dense urban area with multiple transportation and parking options. You may wish to review options for  service and self-parking in more  detail on the [facility] s website at [email or web address].   This appointment is in a hospital-based location.&nbsp; Before your visit, you may want to check with your insurance company for coverage and referral options, including cost differences between services provided in different clinic settings.&nbsp; For more information visit this link on the [employer] Website:&nbsp; [email or web address]              5/30/2025  2:00 PM CT CHEST W 20 min [facility] CT UCSCCT1    Location Instructions:     The [facility] ([facility]) is in a dense urban area with multiple transportation and parking options. You may wish to review options for  service and self-parking in more detail on the [facility] website at [email or web address]/[facility].               8/25/2025  1:00 PM NEW ENDOCRINE 60 min MG López Quintana MD                         Coding statement:   Medical Decision Making:  #  Chronic progressive medical conditions addressed  - see above --   Review and/or interpretation of unique test or documentation from a provider outside of neurology urd   Independent historian provided additional details  no I  Prescription drug management and review of potential side effects and/or monitoring for side effects  -- see above ---  Health impacted by social determinants of health  no    I have reviewed the note as documented above.  This accurately captures the substance of my conversation with the patient and total time spent preparing for visit, executing visit and completing visit on the day of the visit:  60 minutes.  The portion of this total time included face to face time     The longitudinal plan of care for Artis Galvin was addressed during this visit. Due to the added complexity in care, I will continue to support Artis Galvin in the subsequent management of this condition(s) and with the ongoing continuity of care of this condition(s).      Isrrael Roman MD      ______________________________________    Last visit date and details:       Evaluation of 94 genes associated with Parkinson disease    Specimen WB Whole Blood   Genes Analyzed ADORA1, AHSA1, ANG, ANO3, TABATHA, IMF93A8, AT, TOT6GU0,  ATP7B, I98kmt40, CHCHD2, CHMP2B, CLN3, CP, CSF1R, DQV16K0,  DCAF17, DCTN1, DDC, DNAJB2, DNAJC12, DNAJC13, DNAJC6,  DNM1L, EIF4G1, FBXO7, FTL, FUS, GBA (GBA1), GCH1, GIGYF2,  GRN, HTRA2, KIF5A, LRP10, LRRK2, LYST, MAPT, OPTN, PANK2,  PARK7, PDE10A, PDE8B, PDGFB, PDGFRB, PEX1, PINK1, PLA2G6,  PLD3, PODXL, POLG, POLG2, WFOHI9S, PRKN, PRKRA, PRRT2,  PSEN1, PSEN2, PTRHD1, RAB29, RAB39B, RIC3, SIGMAR1,  MZK58A9, MYQ64P2, NMI14M36, GPS50V54, SLC6A3, SNCA, SNCB,  SOD1, SPG11, SPR, SQSTM1, SYNJ1, TAF1, TAF15, TARDBP,  TENM4, TH, THAP1, XQZI606, TOR1A, TUBA4A, TWNK, UBQLN2,  UCHL1, UNC13A, VCP, VPS13A, VPS13C, VPS35, WDR45 and XPR1   Disclaimer Clinical Correlations  The gene(s) analyzed by this test may have more than one  associated phenotype or inheritance pattern. Additionally,  the genetic variants detected may have reduced penetrance,  variable expressivity, or different classifications based  on disease state. Classification and reporting of the  variants detected is based on the intended disease state of  the test ordered. For more information regarding the  phenotypic spectrum which may be involved for a specific  gene, see OMIM (www.ncbi.nlm.nih.gov/omim) or GeneReviews  (www.genereviews.org).    An online research opportunity called [employer]  ([employer]), a project of [employer], is available for  the recipient of this genetic test. This patient registry  collects de-identified genetic and health information to  advance the knowledge of genetic variants. [facility] is a  collaborator of [employer]. This may not be applicable for all  tests.    If testing was performed because of a clinically  significant family history it is often useful to first test  an affected family  member. Detection of a reportable  variant(s) in an affected family member would allow for  more informative testing of at risk individuals.    To discuss the availability of further testing options or  for assistance in the interpretation of these results, [facility] genetic counselors can be contacted at  [phone].    Technical Limitations  Next generation sequencing may not detect all types of  genomic variants. In rare cases, false negative or false  positive results may occur. The depth of coverage may be  variable for some target regions, but assay performance  below the minimum acceptable criteria or for failed regions  will be noted. The clinical phenotype observed in this  individual and/or family may be due to genetic variants not  targeted by this test. Given these limitations, negative  results do not rule out the diagnosis of a genetic  disorder. If a specific clinical disorder is suspected,  evaluation by alternative methods can be considered.    There may be regions of genes that cannot be effectively  evaluated for sequencing or deletion and duplication  analysis as a result of technical limitations of the assay,  including regions of homology, high GC content, and  repetitive sequences. Confirmation of select reportable  variants was performed by alternate methodologies based on  internal laboratory criteria.    Additionally, low level mosaic variants may not be  detected.    This test is not designed to differentiate between somatic  and germline variants. If there is a possibility that any  detected variant is somatic, additional testing may be  necessary to clarify the significance of results.    Genes may be added or removed based on updated clinical  relevance. Please refer to the Targeted Genes and  Methodology Details for the Parkinson Disease Gene Panel in  the Special Instructions section of the Test Catalog for  the most up to date list of genes included in this  test.    Reclassification of Variants Policy  See www.[email or web address] [email or web address] (TEST ID PARDP) for information  regarding the laboratory's policy for reclassification of  variants.    Variant Evaluation  Variant curation is performed using published ACMG-AMP  recommendations as a guideline. Other gene-specific  guidelines may also be considered. Variants classified as  benign or likely benign are not reported.    Results from in silico evaluation tools may change over  time and should be interpreted with caution and  professional clinical judgment.    TEST CLASSIFICATION  This test was developed and its performance characteristics  determined by [facility] in a manner consistent with CLIA  requirements. This test has not been cleared or approved by  the U.S. Food and Drug Administration.   Released By Chintan Serrano, Ph.D.   Result Summary Variants of Uncertain Significance Detected   Result The following VARIANTS OF UNCERTAIN SIGNIFICANCE were  detected:  ATP7B (NM_000053.4), chr13(GRCh37):g.[id number]>A,  c.3275C>T, p.Cou8162Qdc (p.X0997D), heterozygous  VPS13C (NM_020821.3), chr15(GRCh37):g.69945440I>C,  c.6803A>G, p.Bdu5232Aoa (p.I7210B), heterozygous    No additional reportable variants were detected within all  other tested genes. See the Genes Analyzed section for a  complete list of genes evaluated by this assay.   Method Next generation sequencing (NGS) and/or Vaishali sequencing  was performed to test for the presence of variants in  coding regions and intron/exon boundaries of the genes  analyzed, as well as some other regions that have known  pathogenic variants. NGS and/or a polymerase chain reaction  (PCR)-based quantitative method was performed to test for  the presence of deletions and duplications in the genes  analyzed. The human genome reference GRCh37/hg19 build was  used for sequence read alignment. At least 99% of the bases  are covered at a read depth over 30X. Sensitivity is  estimated at above 99% for  single nucleotide variants,  above 94% for indels less than 40 base pairs (bp), above  95% for deletions up to 75 bp and insertions up to 47 bp.  See the Genes Analyzed field for a list of genes tested.    There may be regions of genes that cannot be effectively  evaluated for sequencing or deletion and duplication  analysis as a result of technical limitations of the assay,  including regions of homology, high GC content, and  repetitive sequences. Confirmation of select reportable  variants was performed by alternate methodologies based on  internal laboratory criteria. See [email or web address]  (TEST ID PARDP) for details regarding genes with regions  not routinely covered.   Interpretation ATP7B c.3275C>T (p.Yiq2658Igh), VARIANT OF UNCERTAIN  SIGNIFICANCE  The heterozygous c.3275C>T (p.Hjw1262Zlr) missense variant  in the ATP7B gene (RADHA:[id number]) is classified as a variant  of uncertain significance. Pathogenic variants in the ATP7B  gene have been associated with autosomal recessive Vignesh  disease (1). The c.3275C>T (p.Aqo6218Wgm) variant has been  reported in the compound heterozygous state with a  pathogenic or likely pathogenic variant in two individuals  with clinical presentations consistent with Vignesh disease  (2,3). However, functional studies have not been performed  to either support or refute the pathogenicity of this  variant. The overall minor allele frequency for this  variant ([id number]) is approximately 0.01% with a  frequency up to 0.028% in /Admixed American  sub-populations (4,5). This amino acid is moderately  conserved across species and an in silico meta-predictor  suggests that this amino acid change is unlikely to impact  protein function. Taken together, the evidence is not  sufficient to determine whether this variant is benign or  pathogenic. Therefore, this variant is classified as a  variant of uncertain significance.    VPS13C c.6803A>G (p.Eww2569Irv), VARIANT OF  UNCERTAIN  SIGNIFICANCE  The heterozygous c.6803A>G (p.Khh9641Bep) missense variant  in the VPS13C gene (RADHA:[id number]) is classified as a variant  of uncertain significance. Pathogenic variants in the  VPS13C gene have been associated with autosomal recessive  early-onset Parkinson disease (6). To our knowledge, the  c.6803A>G (p.Qcj0828Kla) variant has not been reported in  affected individuals in the literature. This variant is  absent from large population databases (4,5). This amino  acid is moderately conserved across species and an in  silico meta-predictor suggests that this amino acid change  is unlikely to impact protein function. Taken together, the  evidence is not sufficient to determine whether this  variant is benign or pathogenic. Therefore, this variant is  classified as a variant of uncertain significance.    Individuals may have a pathogenic variant in one of the  interrogated genes that is not detectable by the methods  utilized. Additionally, the clinical phenotype that is  observed in this individual and/or family may be due to a  pathogenic variant or variants in another gene not targeted  by this test.    Consultation with a genetics professional is recommended  for interpretation of this result and to determine whether  reproductive risk assessment and familial testing may be of  benefit to this family.    FOLLOW-UP OPTION(S)  Familial Testing:  If relevant, genetic testing for family members is  available by ordering FMTT / Familial Variant, Targeted  Testing for the specific variant(s) detected. Please  contact the laboratory at [phone] or the online test  catalog at [email or web address] for information about  FMTT.    Reflex to Whole Exome Sequencing:  These results were interpreted from exome sequencing data  analyzed for the specific gene(s) indicated. If clinically  indicated, reflex analysis and interpretation of the whole  exome using existing data can be completed by  ordering  WESPR/Whole Exome Sequencing Panel Reflex, Varies. Note:  this reflex test cannot be ordered if the current testing  was performed on a prenatal specimen.      REFERENCES:  1) GeneReviews: Vignesh Disease  (https://www.ncbi.nlm.nih.gov/books/VXH9722/) (PMID:  51842132)  2) Tashi K, Dennis Fagan A, Thania M, Raul M, Tori J.  Vignesh's disease in [place] and regional countries:  Homozygosity and hepatic phenotype predominance. World J  Gastroenterol. 2017;23(36):8870-0151.  doi:10.3748/wjg.v23.i36.6715 (PMID 79841221)  3) Darin VAZQUEZ, Arlene VAZQUEZ, Saroj VAZQUEZ, Mary Lou VAZQUEZ,  Mary CHU. Variants of uncertain significance in the era  of next-generation sequencing. J Am Assoc Nurse Pract.  2022;34(8):8610-0711. Published 2022 Aug 1.  doi:10.1097/JXX.8100367311569654 (PMID 59245873)  4) dbSNP: www.ncbi.nlm.nih.gov/snp/  5) [employer] Browser: The Wireless Registry.[employer].org/  6) GeneReviews: Parkinson Disease Overview  (https://www.ncbi.nlm.nih.gov/books/EVZ7508/) (PMID:  13258530)   Resulting Agency [facility]       Darci garcia M.D., Ph.D. - 01/27/2025 2:30 PM CST  Formatting of this note is different from the original.  SUBJECTIVE    Referring Provider: Sapna Gibbons M.D.    CHIEF COMPLAINT/REASON FOR VISIT  Artis Galvin is a 79 y.o. male who presents for evaluation of parkinsonism.    HISTORY OF PRESENT ILLNESS  Mr. Galvin is a very pleasant 79-year-old right-handed man who comes here today together with his daughter with a transplant coordinator here at the [facility] candidate referred to the movement Disorder Clinic by Dr. Gibbons.  His symptoms started about 3 4 years ago when he was noticing that he was slower in moving. He also mentioned that he is having problems with his legs in the sense that he feels that the lower legs do not belong to him and they seem to be having a different the way he would be having compared to rest of the body. Overall is having slower in his used to be and also his voice is  softer, is having problems with handwriting .  DaTSCAN was performed by Dr. Parker the reported a positive results with left more than right hypo captation of the tracer. Carbidopa levodopa was started I need to look up to 1 tablets 3 times per day without great improvement of his symptoms. It is coming today for follow-up appointments.    OBJECTIVE  PHYSICAL EXAMINATION  In summary:    MDS-UPDRS Part III: Motor Examination  On medication? No  Patient's clinical state: Off  Is the patient on Levodopa: No  Minutes since last dose:    R L R L  3.1 Speech 2 Gait/stance  3.2 Facial expression 2 3.9 Arising from chair 0  3.10 Gait 2  Rigidity 3.11 Freezing of gait 0  3.3 Neck 2 3.12 Postural stability 0  Arms 1 0 3.13 Posture 0  Legs 1 0 3.14 Global spontaneity of movement 2    Bradykinesia Tremor  3.4 Finger tapping 2 1 3.15 Postural (hand) 0 0  3.5 Hand movements 2 1 3.16 Kinetic (hand) 0 0  3.6 Pronation/Supination 0 0 3.17a/b Rest amplitude (arms) 0 0  3.7 Toe taps 2 1 3.17c/d Rest amplitude (legs) 0 0  3.8 Leg agility 0 0 3.17e Rest amplitude (lip/jaw) 0  3.18 Rest constancy 0  TOTAL 21    Were dyskinesias present? No  If yes, did these movements interfere with ratings?  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 Galvin comes today with symptoms and signs of parkinsonism that clearly are evident today my examination. At this point, I see that he does not have any good response to the medication. On the other hand, his symptoms did not not benefit from the medication also because it did not take enough of carbidopa levodopa to see a difference. For this reason, I would like him to start again carbidopa levodopa starting 1 tablet times per day and increasing of 0.5 tablets every week up to a maximum tablets 3 times per day. We discussed about additional tests that I would like to order, in  light of his positive results on 20/3 me for a possible Sikhism heritage would be appropriate to obtain a number lab works which would include lysosomal cascade and also genetic analysis.    I would like to see Mr. Galvin again in the next 2 months but I am looking forward to hear from him if there are any changes that he or his family is observing.    It has been a pleasure to talk to him today and I would like to thank marc Reyna for the very kind referral  Total Time: 38 minutes    PATIENT EDUCATION  Ready to learn, no apparent learning barriers were identified; learning preferences include listening. Explained diagnosis and treatment plan; patient expressed understanding of the content.  Electronically signed by Darci Sanz M.D., Ph.D. at 01/27/2025 3:59 PM CST         Sapna Gibbons M.D. - 12/16/2024 1:00 PM CST  Formatting of this note is different from the original.  SUBJECTIVE    Patient returns for follow up of Parkinsonism.    He is a 79 y.o. year old male who i previously saw for left amaurosis fugax. See prior notes. He then contacted me for concern of peripheral neuropathy and gait difficulty,    In the last 4-5 years patient has had a gradual progressive decline in his gait. He has attributed it to peripheral neuropathy even though that diagnosis has not been made. He tells me that from the knees down he feels like  someone else's program  . He has reduced feeling in his lower legs. He has trouble walking downstairs and feels like his calves are not as strong. He feels like he shuffles. He has mild pain in his feet and feels like there are marbles in his socks.    He has also noted that his voice has become more hoarse, he has trouble with handwriting and trouble getting in and out of cars but not SUV. He stopped playing [employer] 2-3 years ago because of an Achilles tendon rupture and he is not able to keep up with other people his age in terms of physical activities. He does not think he  is forgetful, but he does have trouble remembering. He denies a tremor.    He has had back issues on and off since the age of 21. He does not think he has ever had pain down his legs. He has not had back surgery. Currently he is not having back pain. He is not sure why he had a lumbar MRI in December of last year.     Past Medical History  Hypertension  Hyperlipidemia  Coronary artery disease  Thoracic aortic aneurysm, 4.8 cm  Rheumatoid arthritis  Prostate cancer xrt/surg  Sarcoidosis  Autoimmune thyroid disease/hypothyroidism  Peripheral neuropathy  Hip surgery  Achilles tendon rupture    Social History:  Tobacco: reports that he has never smoked. He has never used smokeless tobacco.  Alcohol: reports that he does not currently use alcohol.  Drugs: reports no history of drug use.     EMG: CLINICAL INTERPRETATION: This is an abnormal study. The electrodiagnostic abnormalities are most in keeping with a chronic bilateral S1 radiculopathy without active denervation. However, it is difficult to exclude a mild, motor-predominant, length-dependent peripheral neuropathy based on the results of this study.    MPRESSION: Outside review of December 2023 MRI Lumbar Spine  Mild lumbar spondylosis, as detailed in the findings. Mild spinal canal and bilateral foraminal narrowing at L3-4. No specific cause for S1 radiculopathy is identified.    IMPRESSION:  Abnormal DaTscan, worse on the left    ASSESSMENT / PLAN  #1 Left amaurosis  #2 Incidental vertebral origin stenosis  # Hypertension  # Hyperlipidemia  Patient had 3 episodes of what sounds like left amaurosis in June of 2024. Fortunately, he has not had any recurrence. He has had a thorough workup (will try to get outside films), but I recommended a 30 day cardiac monitor to rule out atrial fibrillation, negative.    Continue daily aspirin and risk factor management. Vertebral stenosis is incidental.    # Probable Parkinson's disease  # Gait Decline  # Hoarse voice  # Mild  peripheral neuropathy  # Chronic S1 radiculopathy  I think patient has some features suggestive of parkinsonism. His voice has become more hoarse, he notes that he shuffles, he has masked face with decreased blink reflex and decreased right arm swing. AKANKSHA scan is abnormal. We discussed various options of trying empirically levodopa carbidopa starting at 1/2 tablet TID and increase to 1 tablet TID after 1 week if no response and no side effects. Reviewed side effects. Take BP 1 hour after dose and report any SBP<100 or light headedness. Report to me in 1 week about response. Continue to try to get into movement disorders.  -- --- -- -- -- -- -- --  COMMUNICATION: Send non urgent messages through portal (2-4 day turnaround); Urgent messages, call. Emergent issues, seek local emergency care.  CALL SOONER IF: Acute stroke symptoms (unilateral numbness/weakness/incoordination; speech/swallowing difficulties, gait ataxia, vision loss)    Electronically signed by Sapna Gibbons M.D. at 12/16/2024 1:31 PM CST     8/19/2024  Sapna manning M.D. - 08/19/2024 10:30 AM CDT  Formatting of this note is different from the original.  Images from the original note were not included.  SUBJECTIVE    CHIEF COMPLAINT / REASON FOR VISIT  Patient is a 79 y.o. year old male who presents for the evaluation of left amaurosis fugax. Supervisory note for Ayesha Rangel    HISTORY OF PRESENT ILLNESS  Patient has had a few prior episodes of transient left visual loss over the years.    In June of 2024 patient presented to the emergency room after experiencing 3 episodes of transient visual loss. At that time he experienced a cloudy dark area of vision come down in the upper half of his left eye only. This lasted several minutes and then resolved. The next day he had 2similar episode which prompted him to come to the ER. There was no pain associated with this. No jaw claudication.    He subsequently underwent CT, CT angiogram  demonstrating no significant stenosis of the carotid arteries, but there was severe stenosis of the left vertebral artery origin. MRI brain revealed no acute changes. His hemoglobin A1c was 5.9% and his LDL was 112. Patient was loaded with Plavix and told to take dual antiplatelet therapy and start a statin. Echocardiogram was planned for the outpatient setting and by report was unrevealing.    Patient had some additional complaints of neck pain and sitffness outlined by Dr. Rangel and hoarseness.     #1 Left amaurosis  #2 Incidental vertebral origin stenosis  # Hypertension  # Hyperlipidemia  Patient had 3 episodes of what sounds like left amaurosis in June of 2024. Fortunately, he has not had any recurrence. He has been on aspirin and Plavix since that time.    He has had a thorough workup (will try to get outside films), but I would add a 30 day cardiac monitor to rule out atrial fibrillation.    At this point in time, he can discontinue the Plavix but continue the aspirin 162 mg daily. We can recheck his lipids to see if he has responded to the lipid lowering medications prescribed (intolerant of statin).    - Risk factors:  Vascular risk factor goals for cerebral ischemia include: BP<130/80, LDL<70mg/dl If atherosclerotic stroke and beginning LDL >100mg/dl, normoglycemia, no tobacco, normal BMI (<25).     Vertebral stenosis is incidental and would be treated with antiplatelet and risk factor management    ADDENDUM: Received outside MRI and CTA. Fortunately these both look normal. No DWI changes. Minimal leukoaraiosis.     # Neck pain  # History of rheumatoid arthritis  - Plain xrays; spine center       11/1/2022 [facility]      ICD-10-CM     1. Peripheral polyneuropathy  G62.9 Adult Neurology  Referral             Mr. Galvin is a 77-year-old man here for consultation regarding possible neuropathy affecting the lower extremities.  He had a work-up completed recently through the [facility]  neurology clinic.  Unfortunately I cannot review these results with him because we do not have them at this time.  He does have planned follow-up with the neurologist at [facility] so I recommended he keep this.  If he would prefer to follow-up here we will have to get the records and decide about the next steps going forward based on his test results.  We did talk about symptomatic management for neuropathy and I have given him some information about gabapentin as an option.    January 16, 2025  Stephania Marlyn   MV    1/16/25  1:27 PM  Note  Action 1/16/23 MV 1.24pm   Action Taken Imaging request emailed to [facility] 1/22/25 MV 4.40pm   Action Taken Images resolved              RECORDS RECEIVED FROM: internal   REASON FOR VISIT: Abnormal gait    PROVIDER: Dr. Roman   DATE OF APPT: 2/28/25   NOTES (FOR ALL VISITS) STATUS DETAILS   OFFICE NOTE from referring provider Care Everywhere Dr Sapna Gibbons @ [facility]:  12/16/24  10/10/24  8/19/24   EMG Care Everywhere [facility]:  9/20/24   MEDICATION LIST Care Everywhere     IMAGING  (FOR ALL VISITS)       AKANKSHA SCAN (MOVEMENT) PACS [facility]:  NM Brain 11/21/24   MRI (HEAD, NECK, SPINE) Internal [facility]:  MRI Brain 6/16/24  MRI Brain 1/31/23   CT (HEAD, NECK, SPINE) Internal [facility]:  CTA Head Neck 6/16/24                     ______________________________________      Patient was asked about 14 Review of systems including changes in vision (dry eyes, double vision), hearing, heart, lungs, musculoskeletal, depression, anxiety, snoring, RBD, insomnia, urinary frequency, urinary urgency, constipation, swallowing problems, hematological, ID, allergies, skin problems: seborrhea, endocrinological: thyroid, diabetes, cholesterol; balance, weight changes, and other neurological problems and these were not significant at this time except for   Patient Active Problem List   Diagnosis    Right bundle branch block    Sarcoidosis of lung    Vitamin D deficiency    Diverticular disease of colon     "Erectile dysfunction following radical prostatectomy    Personal history of malignant neoplasm of prostate    Autoimmune thyroiditis    Onychomycosis    Peripheral neuropathy    SVT (supraventricular tachycardia)    Status post coronary angiogram    Osteoporosis without current pathological fracture, unspecified osteoporosis type    Dysthymia    Diverticulitis of colon    Blurred vision    Chest pain    Chest pain, unspecified type    Primary hypertension    Nonocclusive coronary atherosclerosis of native coronary artery    S/P total hip arthroplasty    History of TIA (transient ischemic attack)    Stenosis of left vertebral artery    Abnormal clinical finding    Encounter for postoperative care    Aortic aneurysm    Disorder of penis    Malignant tumor of prostate (H)    Neoplastic disease    Stress incontinence    History of malignant neoplasm of prostate    Left bundle branch block (LBBB)    abnormal dopamine scan (DaTSCAN) 11/21/2024    Primary parkinsonism (H)          Allergies   Allergen Reactions    Atorvastatin Muscle Pain (Myalgia)    Rosuvastatin Muscle Pain (Myalgia)    Ciprofloxacin Other (See Comments)     Pt states he was told not to take because of \"another medicine he is on\"    Levaquin [Levofloxacin] Other (See Comments)     Pt states he was told not to take because of \"another medicine he is on\"       Past Surgical History:   Procedure Laterality Date    CAPSULOTOMY Bilateral     CATARACT EXTRACTION Bilateral     CV CORONARY ANGIOGRAM N/A 10/20/2022    Procedure: Coronary Angiogram;  Surgeon: Mir Farr MD;  Location:  [facility]    CV FRACTIONAL FLOW RATIO WIRE N/A 10/20/2022    Procedure: Fractional Flow Ratio Wire;  Surgeon: Mir Farr MD;  Location:  [facility]    EYE SURGERY Bilateral     Laser eye surgery    HC SHLDR ARTHROSCOP,PART ACROMIOPLAS Right     Description: Shoulder Arthroscopy, Space Decompression And " Acromioplasty;  Recorded: 02/18/2014;    VA LAP,PROSTATECTOMY,RADICAL,W/NERVE SPARE,INCL ROBOTIC Bilateral 02/27/2018    Procedure: ROBOTIC ASSISTED RADICAL RETROPUBIC PROSTATECTOMY, BILATERAL PELVIC LYMPH NODE DISSECTION LYSIS OF ADHESIONS;  Surgeon: Wale Rodriguez MD;  Location: [facility];  Service: Urology    VA RADIAL KERATOTOMY Bilateral     Description: Cornea Radial Keratotomy;  Recorded: 02/18/2014;    TOTAL HIP ARTHROPLASTY Right 3/6/2024    Procedure: RIGHT TOTAL HIP ARTHROPLASTY DIRECT ANTERIOR APPROACH ORTHOGRID;  Surgeon: Wale Benavides MD;  Location: [facility]     Past Medical History:   Diagnosis Date    abnormal dopamine scan (DaTSCAN) 11/21/2024 01/20/2025    Dopamine Scan 11/21/2024  Markedly abnormal striatal radiotracer distribution/uptake, most prominent in the left putamen. Moderately decreased caudate nuclei uptake left worse than right.       Abnormal thyroid ultrasound     Adjustment disorder with mixed anxiety and depressed mood     Arthritis     Autoimmune thyroiditis     BBB (bundle branch block)     right    Chronic lymphocytic thyroiditis     Coronary artery disease     Diverticulosis of colon (without mention of hemorrhage)     Hypertension     Low back pain 11/2023    Malignant neoplasm of prostate (H) 2018    Nontoxic multinodular goiter     NSTEMI (non-ST elevated myocardial infarction) (H) 01/11/2024    Osteoporosis     Other and unspecified hyperlipidemia     Pain in joint, shoulder region     Peripheral neuropathy     Persistent hoarseness     Primary parkinsonism (H) 01/29/2025    Primary parkinsonism (H) 1/29/2025    Sarcoidosis     Seen on chest xray at age 28    Scleritis, unspecified     Status post coronary angiogram 10/20/2022    SVT (supraventricular tachycardia)     Unspecified hypothyroidism     Unspecified vitamin D deficiency      Social History     Socioeconomic History    Marital status: Patient Declined     Spouse name: Not on file     Number of children: Not on file    Years of education: Not on file    Highest education level: Not on file   Occupational History    Not on file   Tobacco Use    Smoking status: Never    Smokeless tobacco: Never   Vaping Use    Vaping status: Never Used   Substance and Sexual Activity    Alcohol use: Not Currently     Comment: Alcoholic Drinks/day: rarely    Drug use: No    Sexual activity: Not on file   Other Topics Concern    Not on file   Social History Narrative    Single. . 4 grown daughters.    Currently unemployed.        Social History:    Tobacco: reports that he has never smoked. He has never used smokeless tobacco.    Alcohol: reports that he does not currently use alcohol.    Drugs: reports no history of drug use.      Social Drivers of Health     Financial Resource Strain: Low Risk  (8/23/2024)    Financial Resource Strain     Within the past 12 months, have you or your family members you live with been unable to get utilities (heat, electricity) when it was really needed?: No   Food Insecurity: Low Risk  (8/23/2024)    Food Insecurity     Within the past 12 months, did you worry that your food would run out before you got money to buy more?: No     Within the past 12 months, did the food you bought just not last and you didn t have money to get more?: No   Transportation Needs: Low Risk  (8/23/2024)    Transportation Needs     Within the past 12 months, has lack of transportation kept you from medical appointments, getting your medicines, non-medical meetings or appointments, work, or from getting things that you need?: No   Physical Activity: Unknown (8/23/2024)    Exercise Vital Sign     Days of Exercise per Week: 3 days     Minutes of Exercise per Session: Not on file   Stress: Patient Declined (8/23/2024)    [place] [employer] - Occupational Stress Questionnaire     Feeling of Stress : Patient declined   Social Connections: Unknown (8/23/2024)    Social Connection and  Isolation Panel [NHANES]     Frequency of Communication with Friends and Family: Not on file     Frequency of Social Gatherings with Friends and Family: Three times a week     Attends Faith Services: Not on file     Active Member of Clubs or Organizations: Not on file     Attends Club or Organization Meetings: Not on file     Marital Status: Not on file   Interpersonal Safety: Low Risk  (8/28/2024)    Interpersonal Safety     Do you feel physically and emotionally safe where you currently live?: Yes     Within the past 12 months, have you been hit, slapped, kicked or otherwise physically hurt by someone?: No     Within the past 12 months, have you been humiliated or emotionally abused in other ways by your partner or ex-partner?: No   Housing Stability: Low Risk  (8/23/2024)    Housing Stability     Do you have housing? : Yes     Are you worried about losing your housing?: No       Drug and lactation database from the [place] [employer]:  http://toxnet.nlm.nih.gov/cgi-bin/sis/htmlgen?LACT      B/P: Data Unavailable, T: Data Unavailable, P: Data Unavailable, R: Data Unavailable 0 lbs 0 oz  There were no vitals taken for this visit., There is no height or weight on file to calculate BMI.  Medications and Vitals not listed above were documented in the cart and reviewed by me.     Current Outpatient Medications   Medication Sig Dispense Refill    aspirin (ASA) 81 MG chewable tablet Take 325 mg by mouth daily.      carbidopa-levodopa (SINEMET)  MG tablet Take 0.5 tablets by mouth 3 (three) times a day. May increase to 1 tablet TID if no side effects after 1 week. (Patient not taking: Reported on 1/22/2025)      cholecalciferol (VITAMIN D3) 125 mcg (5000 units) capsule Take 125 mcg by mouth daily      ketoconazole (NIZORAL) 2 % external cream APPLY TOPICALLY DAILY 30 g 1    levothyroxine (SYNTHROID/LEVOTHROID) 75 MCG tablet Take 1 tablet (75 mcg) by mouth daily. 100 tablet 2    losartan  (COZAAR) 50 MG tablet Take 1 tablet (50 mg) by mouth every evening. 90 tablet 0    nitroGLYcerin (NITROSTAT) 0.4 MG sublingual tablet For chest pain place 1 tablet under the tongue every 5 minutes for 3 doses. If symptoms persist 5 minutes after 1st dose call 911. 25 tablet 3    pilocarpine (PILOCAR) 2 % ophthalmic solution Place 1-2 drops into both eyes daily      vitamin B-12 (CYANOCOBALAMIN) 500 MCG tablet Take 500 mcg by mouth daily           Isrrael Roman MD

## 2025-01-30 ENCOUNTER — TELEPHONE (OUTPATIENT)
Dept: CARDIOLOGY | Facility: CLINIC | Age: 80
End: 2025-01-30
Payer: COMMERCIAL

## 2025-01-30 NOTE — CONFIDENTIAL NOTE
S-(situation): BP has been running 118 to 130. He is now taking Sinemet.    B-(background): Artis Galvin is a pleasant 78 year old adult with medical history pertinent for ascending aortic aneurysm, non-obstructive CAD, HTN, SVT/AVNRT, carotid stenosis, and HLD. He presents to cardiology clinic for hospital follow up and cardiac clearance for surgery.     A-(assessment): normotensive    R-(recommendations): Chest CT,  Friday, May 30 at 2 pm at the [facility] [place]

## 2025-02-04 ENCOUNTER — OFFICE VISIT (OUTPATIENT)
Dept: INTERNAL MEDICINE | Facility: CLINIC | Age: 80
End: 2025-02-04
Payer: COMMERCIAL

## 2025-02-04 VITALS
HEART RATE: 66 BPM | OXYGEN SATURATION: 98 % | BODY MASS INDEX: 27.83 KG/M2 | WEIGHT: 194.4 LBS | HEIGHT: 70 IN | DIASTOLIC BLOOD PRESSURE: 69 MMHG | TEMPERATURE: 96.9 F | SYSTOLIC BLOOD PRESSURE: 124 MMHG | RESPIRATION RATE: 16 BRPM

## 2025-02-04 DIAGNOSIS — R07.0 THROAT DISCOMFORT: ICD-10-CM

## 2025-02-04 DIAGNOSIS — G20.C PRIMARY PARKINSONISM (H): Primary | ICD-10-CM

## 2025-02-04 DIAGNOSIS — M81.0 OSTEOPOROSIS WITHOUT CURRENT PATHOLOGICAL FRACTURE, UNSPECIFIED OSTEOPOROSIS TYPE: ICD-10-CM

## 2025-02-04 DIAGNOSIS — F34.1 DYSTHYMIA: ICD-10-CM

## 2025-02-04 DIAGNOSIS — G62.9 PERIPHERAL POLYNEUROPATHY: ICD-10-CM

## 2025-02-04 DIAGNOSIS — I87.2 VENOUS (PERIPHERAL) INSUFFICIENCY: ICD-10-CM

## 2025-02-04 DIAGNOSIS — C77.5 PROSTATE CANCER METASTATIC TO INTRAPELVIC LYMPH NODE (H): ICD-10-CM

## 2025-02-04 DIAGNOSIS — C61 PROSTATE CANCER METASTATIC TO INTRAPELVIC LYMPH NODE (H): ICD-10-CM

## 2025-02-04 PROCEDURE — 99214 OFFICE O/P EST MOD 30 MIN: CPT | Performed by: INTERNAL MEDICINE

## 2025-02-04 PROCEDURE — G2211 COMPLEX E/M VISIT ADD ON: HCPCS | Performed by: INTERNAL MEDICINE

## 2025-02-04 RX ORDER — ASPIRIN 81 MG/1
162 TABLET, CHEWABLE ORAL DAILY
COMMUNITY
Start: 2025-02-04

## 2025-02-04 ASSESSMENT — PAIN SCALES - GENERAL: PAINLEVEL_OUTOF10: MODERATE PAIN (5)

## 2025-02-04 NOTE — PROGRESS NOTES
1. Primary parkinsonism (H) (Primary)  He seems to be doing well with the Sinemet.  Continue same.  Follow-up with neurology as planned.    2. Peripheral polyneuropathy  As above.  Follow-up with neurology.  There is not much we can offer him for his neuropathy.    3. Dysthymia  Chronic dysthymia but he seems pretty positive today fortunately.  Reassurance.    4. Osteoporosis without current pathological fracture, unspecified osteoporosis type  He never did follow-up with the endocrinologist.  Apparently that appointment is not until later this year unfortunately.    5. Throat discomfort  I do question if his neck discomfort on exertion is an anginal equivalent.  I have urged him to consider trying a nitroglycerin as long as he is sitting down as we do not want him to pass out from hypotension.    6. Venous (peripheral) insufficiency  Reassurance.  The dusky coloration of his feet at times is not due to arterial insufficiency.  He [place] has any swelling.  He does not have varicosities.  If it really bothers him he can wear compression.    7. Prostate cancer metastatic to intrapelvic lymph node (H)  Recent PSA undetectable.      The longitudinal plan of care for the diagnosis(es)/condition(s) as documented were addressed during this visit. Due to the added complexity in care, I will continue to support Binu in the subsequent management and with ongoing continuity of care.  Subjective   Binu is a 79 year old, presenting for the following health issues:  Follow Up (2 month follow up. ), Pain (Pt reports neck pain when he rotates. ), and Recheck Medication (Pt would like to discuss Sinemet and Aspirin )        2/4/2025     4:03 PM   Additional Questions   Roomed by Ayesha Cortes    History of Present Illness       Hypertension: Binu presents for follow up of hypertension.  Binu does check blood pressure  regularly outside of the clinic. Outside blood pressures have been over 140/90. Binu does not follow a low salt  "diet. Binu consumes 1 sweetened beverage(s) daily.Binu exercises with enough effort to increase Binu's heart rate 20 to 29 minutes per day.  Binu exercises with enough effort to increase Binu's heart rate 3 or less days per week. Binu is missing 1 dose(s) of medications per week.  Binu is not taking prescribed medications regularly due to remembering to take.         Binu comes in today for follow-up.  He has been diagnosed as having parkinsonism.  Now on Sinemet.  He tried it once and did not really give it a good shot now he is giving it another try and he reports he is having some positive effects he thinks from it.  He feels that he is moving a little bit easier etc.  His neuropathy continues to plague him.  He also notes that he thinks he has some venous insufficiency.  Sometimes his feet look darker.  He has prostate cancer and recent PSA was undetectable.  Sometimes when he is exerting he feels a pressure in his neck.  Like someone hit him in the neck.  It will last about 10 to 15 minutes.  He does not know how much aspirin he supposed to be taking.  There is some confusion in the notes some say 162 and some slight 81 and some say 325.            Objective    /69 (BP Location: Left arm, Patient Position: Sitting, Cuff Size: Adult Regular)   Pulse 66   Temp 96.9  F (36.1  C) (Temporal)   Resp 16   Ht 1.778 m (5' 10\")   Wt 88.2 kg (194 lb 6.4 oz)   LMP  (LMP Unknown)   SpO2 98%   BMI 27.89 kg/m    Body mass index is 27.89 kg/m .  Physical Exam   Pleasant gentleman.  He actually is in pretty good spirits today.  Refuses immunizations            Signed Electronically by: COLBY BROCK MD    "

## 2025-02-14 NOTE — PROGRESS NOTES
AUDIOLOGY REPORT    SUBJECTIVE: Artis Galvin is a 79 year old adult was seen in the Audiology Clinic at  [facility] on 2/18/25 to discuss concerns with hearing and functional communication difficulties. The patient was accompanied by their self. Artis has been seen previously on 11/8/2024, and results revealed normal sloping to moderately severe sensorineural hearing loss (SNHL) bilaterally. Artis notes difficulty with communication in a variety of listening situations. He would like something discreet.     OBJECTIVE:  Abuse Screening:  Did not administer    Patient is a hearing aid candidate. We did review his audiogram today. Discussed that he has a discount/benefit through his insurance [employer] for hearing aids. He was unaware of this, and he would like to look into his hearing aid insurance benefits. He did not wish to continue with the hearing aid consult today.       ASSESSMENT:   Provided patient with the [facility] Hearing Aid form. He will call to schedule if he would like to pursue hearing aids at this clinic in the future. For now he will look into his hearing aid benefit with [employer].     PLAN: Patient will look into his hearing aid  benefit with [employer]. Cancelled hearing aid fitting and hearing aid initial check appointments that were previously scheduled. Please contact this clinic with any questions or concerns.      Bekah Soler., CCC-A  [place] Licensed Audiologist #[id number]

## 2025-02-18 ENCOUNTER — OFFICE VISIT (OUTPATIENT)
Dept: AUDIOLOGY | Facility: CLINIC | Age: 80
End: 2025-02-18
Payer: COMMERCIAL

## 2025-02-18 DIAGNOSIS — H90.3 SENSORINEURAL HEARING LOSS (SNHL), BILATERAL: Primary | ICD-10-CM

## 2025-02-18 PROCEDURE — V5010 ASSESSMENT FOR HEARING AID: HCPCS | Performed by: AUDIOLOGIST

## 2025-02-28 ENCOUNTER — PRE VISIT (OUTPATIENT)
Dept: NEUROLOGY | Facility: CLINIC | Age: 80
End: 2025-02-28

## 2025-02-28 ENCOUNTER — OFFICE VISIT (OUTPATIENT)
Dept: NEUROLOGY | Facility: CLINIC | Age: 80
End: 2025-02-28
Attending: PSYCHIATRY & NEUROLOGY
Payer: COMMERCIAL

## 2025-02-28 VITALS
DIASTOLIC BLOOD PRESSURE: 81 MMHG | WEIGHT: 197.6 LBS | RESPIRATION RATE: 16 BRPM | HEART RATE: 66 BPM | SYSTOLIC BLOOD PRESSURE: 138 MMHG | OXYGEN SATURATION: 97 % | BODY MASS INDEX: 28.35 KG/M2

## 2025-02-28 DIAGNOSIS — G20.C PARKINSONISM, UNSPECIFIED PARKINSONISM TYPE (H): Primary | ICD-10-CM

## 2025-02-28 DIAGNOSIS — R26.9 ABNORMAL GAIT: ICD-10-CM

## 2025-02-28 DIAGNOSIS — D86.9 SARCOIDOSIS: ICD-10-CM

## 2025-02-28 DIAGNOSIS — G20.C PRIMARY PARKINSONISM (H): ICD-10-CM

## 2025-02-28 DIAGNOSIS — Z98.890 HISTORY OF HIP SURGERY: ICD-10-CM

## 2025-02-28 DIAGNOSIS — C61 PROSTATE CANCER (H): ICD-10-CM

## 2025-02-28 DIAGNOSIS — R49.0 HOARSENESS OF VOICE: ICD-10-CM

## 2025-02-28 DIAGNOSIS — F43.21 ADJUSTMENT DISORDER WITH DEPRESSED MOOD: ICD-10-CM

## 2025-02-28 DIAGNOSIS — F80.0 ARTICULATION DISORDER: ICD-10-CM

## 2025-02-28 DIAGNOSIS — R49.9 VOICE DISORDER: ICD-10-CM

## 2025-02-28 PROCEDURE — 99215 OFFICE O/P EST HI 40 MIN: CPT | Performed by: PSYCHIATRY & NEUROLOGY

## 2025-02-28 PROCEDURE — 1125F AMNT PAIN NOTED PAIN PRSNT: CPT | Performed by: PSYCHIATRY & NEUROLOGY

## 2025-02-28 PROCEDURE — G2211 COMPLEX E/M VISIT ADD ON: HCPCS | Performed by: PSYCHIATRY & NEUROLOGY

## 2025-02-28 PROCEDURE — 3079F DIAST BP 80-89 MM HG: CPT | Performed by: PSYCHIATRY & NEUROLOGY

## 2025-02-28 PROCEDURE — 3075F SYST BP GE 130 - 139MM HG: CPT | Performed by: PSYCHIATRY & NEUROLOGY

## 2025-02-28 ASSESSMENT — UNIFIED PARKINSONS DISEASE RATING SCALE (UPDRS)
AMPLITUDE_LUE: (0) NORMAL: NO TREMOR.
FACIAL_EXPRESSION: (3) MASKED FACIES WITH LIPS PARTED SOME OF THE TIME WHEN THE MOUTH IS AT REST.
TOTAL_SCORE: 7
DYSKINESIAS_PRESENT: NO
MOVEMENTS_INTERFERE_WITH_RATINGS: NO
HANDMOVEMENTS_RIGHT: (1) SLIGHT: ANY OF THE FOLLOWING: A) THE REGULAR RHYTHM IS BROKEN WITH ONE WITH ONE OR TWO INTERRUPTIONS OR HESITATIONS OF THE MOVEMENT  B) SLIGHT SLOWING  C) THE AMPLITUDE DECREMENTS NEAR THE END OF THE 10 MOVEMENTS.
LEG_AGILITY_LEFT: (0) NORMAL: NO PROBLEMS.
TOTAL_SCORE: 17
TOETAPPING_RIGHT: (1) SLIGHT: ANY OF THE FOLLOWING: A) THE REGULAR RHYTHM IS BROKEN WITH ONE WITH ONE OR TWO INTERRUPTIONS OR HESITATIONS OF THE MOVEMENT  B) SLIGHT SLOWING  C) THE AMPLITUDE DECREMENTS NEAR THE END OF THE 10 MOVEMENTS.
LEG_AGILITY_RIGHT: (1) SLIGHT: ANY OF THE FOLLOWING: A) THE REGULAR RHYTHM IS BROKEN WITH ONE WITH ONE OR TWO INTERRUPTIONS OR HESITATIONS OF THE MOVEMENT  B) SLIGHT SLOWING  C) THE AMPLITUDE DECREMENTS NEAR THE END OF THE 10 MOVEMENTS.
RIGIDITY_LUE: (1) SLIGHT: RIGIDITY ONLY DETECTED WITH ACTIVATION MANEUVER.
TOTAL_SCORE_LEFT: 2
PARKINSONS_MEDS: ON
CONSTANCY_TREMOR_ATREST: (0) NORMAL: NO TREMOR.
POSTURE: (1) SLIGHT: NOT QUITE ERECT, BUT COULD BE NORMAL FOR OLDER PERSONS.
FINGER_TAPPING_LEFT: (1) SLIGHT: ANY OF THE FOLLOWING: A) THE REGULAR RHYTHM IS BROKEN WITH ONE WITH ONE OR TWO INTERRUPTIONS OR HESITATIONS OF THE MOVEMENT  B) SLIGHT SLOWING  C) THE AMPLITUDE DECREMENTS NEAR THE END OF THE 10 MOVEMENTS.
RIGIDITY_RUE: (2) MILD: RIGIDITY DETECTED WITHOUT THE ACTIVATION MANEUVER. FULL RANGE OF MOTION IS EASILY ACHIEVED.
AMPLITUDE_RLE: (0) NORMAL: NO TREMOR.
RIGIDITY_RLE: (0) NORMAL: NO RIGIDITY.
AXIAL_SCORE: 8
AMPLITUDE_LLE: (0) NORMAL: NO TREMOR.
HANDMOVEMENTS_LEFT: (0) NORMAL: NO PROBLEMS.
PRONATION_SUPINATION_LEFT: (0) NORMAL: NO PROBLEMS.
RIGIDITY_NECK: (1) SLIGHT: RIGIDITY ONLY DETECTED WITH ACTIVATION MANEUVER.
RIGIDITY_LLE: (0) NORMAL: NO RIGIDITY.
FINGER_TAPPING_RIGHT: (1) SLIGHT: ANY OF THE FOLLOWING: A) THE REGULAR RHYTHM IS BROKEN WITH ONE WITH ONE OR TWO INTERRUPTIONS OR HESITATIONS OF THE MOVEMENT  B) SLIGHT SLOWING  C) THE AMPLITUDE DECREMENTS NEAR THE END OF THE 10 MOVEMENTS.
ARISING_CHAIR: (0) NORMAL: NO PROBLEMS. ABLE TO ARISE QUICKLY WITHOUT HESITATION.
TOETAPPING_LEFT: (0) NORMAL: NO PROBLEMS.
SPONTANEITY_OF_MOVEMENT: (1) SLIGHT: SLIGHT GLOBAL SLOWNESS AND POVERTY OF SPONTANEOUS MOVEMENTS.
FREEZING_GAIT: (0) NORMAL: NO FREEZING.
AMPLITUDE_LIP_JAW: (0) NORMAL: NO TREMOR.
POSTURAL_STABILITY: (0) NORMAL: NO PROBLEMS. RECOVERS WITH ONE OR TWO STEPS.
PRONATION_SUPINATION_RIGHT: (1) SLIGHT: ANY OF THE FOLLOWING: A) THE REGULAR RHYTHM IS BROKEN WITH ONE WITH ONE OR TWO INTERRUPTIONS OR HESITATIONS OF THE MOVEMENT  B) SLIGHT SLOWING  C) THE AMPLITUDE DECREMENTS NEAR THE END OF THE 10 MOVEMENTS.
SPEECH: (1) SLIGHT: LOSS OF MODULATION, DICTION OR VOLUME, BUT STILL ALL WORDS EASY TO UNDERSTAND.
AMPLITUDE_RUE: (0) NORMAL: NO TREMOR.
GAIT: (1) SLIGHT: INDEPENDENT WALKING WITH MINOR GAIT IMPAIRMENT.

## 2025-02-28 ASSESSMENT — PAIN SCALES - GENERAL: PAINLEVEL_OUTOF10: MILD PAIN (2)

## 2025-02-28 NOTE — NURSING NOTE
Chief Complaint   Patient presents with    Parkinson     /81 (BP Location: Right arm, Patient Position: Sitting, Cuff Size: Adult Large)   Pulse 66   Resp 16   Wt 89.6 kg (197 lb 9.6 oz)   LMP  (LMP Unknown)   SpO2 97%   BMI 28.35 kg/m       LATIA JEFFREY

## 2025-02-28 NOTE — LETTER
2025       RE: Artis Galvin  [address]  [place] [address]     Dear Colleague,    Thank you for referring your patient, Artis Galvin, to the [facility] [place] at [facility]. Please see a copy of my visit note below.      Diagnosis/Summary/Recommendations:    PATIENT: Artis Galvin  79 year old adult     : 1945    KARTIK: 2025     MRN: [id number]  [address] [place] [address] [address]  [place] [address]  Mobile Phone  [phone]  Email  [email or web address]    Lori THANG Kamar Coronado Rosemarie [facility].     Patient Contacts  Contact Name Contact Address Communication Relationship to Patient   Lori Garciaa [address]  [facility] [address] [phone] (Mobile)  [phone] (Home) Daughter, Personal Relationship   Nay Nunes [address] [place], [place] [address] [phone] (Mobile)  [phone] (Home) Daughter, Personal Relationship   Ivonne Galvin [address] [place] [address] [phone] (Mobile)  [phone] (Home) Daughter, Personal Relationship   Yessi Hightower [address] [place], [place] [address] [phone] (Home)  [email or web address] Significant Other, Personal Relationship     Assessment:  (G20.C) Parkinsonism, unspecified Parkinsonism type (H)  (primary encounter diagnosis)  (G20.C) Primary parkinsonism (H)    Parents are [place] Setswana  [place] in [facility]    Sister   No family history of parkinson     Seen by Dr. Sapna Parker for evaluation of his numbness of his right leg and had encouraged to get a datscan on 2024 and was abnormal and then was encouraged to start on sinemet and started on the medication and had an immediate positive effect - changed his mood     At higher doses he got dizzy and stopped it and then saw Dr. Sanz and restarted on sinemet and had vomiting when he was taking  2 on an empty stomach and is now on 1 tab 3/day     Had been seen at [facility] for numbness of his toes and had problems moving around playing [employer] that began 5-6 years ago and has increased over the past few years.     He has tremor in his right hand when he yawns     Hand writing is sloppier and not sure it is smaller.     Feels like his lower extremities are not as good and right foot drops down.     He exercises and does biking.     He uses his left hand for fine movements  Write on blackboard with his right hand  He may have been born left handed and was pushed to use his right hand   He cannot switch some tasks he just does     11/21/2024  Markedly abnormal striatal radiotracer distribution/uptake, most prominent in the left putamen. Moderately decreased caudate nuclei uptake left worse than right.     Brain mri 6/16/2024  1.  No acute intracranial process is identified. Specifically, no evidence for acute/subacute infarct, intracranial hemorrhage, or mass effect.  2.  Brain atrophy and presumed chronic small vessel ischemic changes, as described.    6/16/2024 CTA head and neck  HEAD CTA:   1.  Bolus timing limits evaluation of the distal intracranial arteries. Within this limitation, no stenosis or occlusion.     NECK CTA:  1.  Severe stenosis of the left vertebral artery origin.  2.  No additional hemodynamically significant stenosis or occlusion.    Brain MRI 1/31/2023  1.  No acute/subacute infarction, intracranial hemorrhage, mass effect, or hydrocephalus.  2.  Presumed sequelae of mild chronic small vessel ischemic disease, though mildly progressed from prior.  3.  Stable mild global brain parenchymal volume loss.    Review of diagnosis    parkinson    Avoidance of dopamine blockers   Not taking    Motor complication review   No wearing off    Review of Impulse control disorders   N/a    Review of surgical or medication options   Sinemet - may have dizziness/out of it which is intermittent and  "unpredictable.     Gait/Balance/Falls   no    Exercise/Therapy performed/offered   Not exercising as much as he use to     Cognitive/Driving   Has some delayed recall on names, etc.   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      - 1 time   Lived with her for 13 years and did not divorce for 20 years.   Legally  20 years  Cohabitating   All 4 are from the same woman and him.     Dysthymia  He has some depression  Not interested in therapy  Not taking an antidepressant now  Had tried wellbutrin/bupropion  Tried duloxetine - felt dull, low libido/no libido.     Daughter - Nay Nunes  Daughter Lori Galvin  Daughter Ivonne schaefer   Daughter Meggan - lives in [place] - was put for adoption.     Yessi hightower is his girlfriend.     No spending issues.     Hallucinations/delusions   No hallucinations  May have illusion in corner of his eye.     Sleep   Variable sleep  Getting up a few times a night - wakes up for no clear reason  Has not had a sleep study  May snore on occasion  Has not had clear rem behavior features -not acting out his dreams, etc.     Bladder/Renal/Prostate/Gyn/Other   Urination at night  He had some problem with incomplete emptying.   Prostate cancer  Surgery 2018 for prostate  Had recurrence and had 30 radiation treatments.   Disorder of penis  Stress incontinence  ED    GI/Constipation/GERD   Weight has been increasing   197 lbs today - highest weight  No swallowing issues  He is eating a \"Lot\"    Occasional constipation.   Had used something in the " "past - gave himself an enema once in the past year.   Yessi tells him that he does not drink enough water.     CT Abdomen 5/18/2023  Acute uncomplicated diverticulitis of the ascending colon.     MR Pancreas 1/2/2022  1.  Mild diffuse hepatic steatosis.  2.  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 No aggressive or suspicious  features.    ENDO/Lipid/DM/Bone density/Thyroid    Vitamin D deficiency  Not taking vitamin D regularly    No results found for: \"QRF084\", \"KYUU258\", \"KUYZ87RCTHZ\", \"VITD3\", \"D2VIT\", \"D3VIT\", \"DTOT\", \"[id number]\", \"[id number]\", \"[id number]\", \"[id number]\", \"[id number]\", \"[id number]\"      TSH   Date Value Ref Range Status   08/28/2024 2.40 0.30 - 4.20 uIU/mL Final   12/08/2021 4.50 0.30 - 5.00 uIU/mL Final     Autoimmune thyroiditis  On thyroid medication.     Thyroid ultrasound 8/26/2024  1.  Bilateral thyroid nodules including 2.3 cm nodule at the lower pole of the right thyroid lobe and 1.4 cm nodule at the upper pole right thyroid lobe, both demonstrate decreased in size as compared to 10/1/2021 exam, when they measured 3.4 and 1.6 cm,   respectively. There is 0.6 cm nodule at the interpolar region of the left thyroid lobe, not seen on prior study.  2.  There is 5 mm hypoechoic nodule along the lower pole of the left upper lobe, could represent mildly prominent parathyroid gland.    Thyroid ultrasound 10/1/2021  There are TR 3 nodules in the right lobe of the thyroid as described  above the largest of which is nodule 2 for which FNA is recommended,  and the second largest is nodule 1 for which follow-up ultrasound at  1, 3, and 5 years is recommended.    Thyroid biopsy 11/30/2021    Osteoporosis   Not taking vitamin D regularly  Not taking calcium     Dexa scan 8/26/2024  IMPRESSION: OSTEOPOROSIS. T score meets the WHO criteria for osteoporosis at one or more measured sites. The risk of osteoporotic fracture increases " approximately two-fold for each standard deviation decrease in T-score.     Dexa scan 9/17/2021  The most negative and valid T-score of -2.7 at the level of the right femoral neck corresponds with osteoporosis according to WHO criteria for postmenopausal females and men age 50 and over. The risk of osteoporotic fracture increases approximately 2-fold for each 1.0 SD decrease in T-score.     Lab Results   Component Value Date    A1C 5.9 06/16/2024    A1C 5.7 01/11/2024    A1C 5.4 04/10/2020     Most recent was 5.3 on 9/20/2024 which was normal.     LIPID management     Recent Labs   Lab Test 11/11/24  1314 06/16/24 2036   CHOL 127 224*   HDL 36* 34*   LDL 54 112*   TRIG 187* 389*       Lipid agent infusion leqvio inclisiran 284 mg infusions   3 months apart and then 6 months apart.     Cardio/heart/Hyper or Hypotensive   Aortic aneurysm - stable   Left BBB  Right BBB  History of TIA  Left vertebral artery stenosis  Nonocclusive coronary artery atherosclerosis  S/p coronary angiogram  SVT  Hypertension    /81 (BP Location: Right arm, Patient Position: Sitting, Cuff Size: Adult Large)   Pulse 66   Resp 16   Wt 89.6 kg (197 lb 9.6 oz)   LMP  (LMP Unknown)   SpO2 97%   BMI 28.35 kg/m      He has had some dizziness/lightheadedness with sinemet but still has issues even with a lower dose.     Losartan cozaar    Echocardiogram 12/6/2024  Ascending aorta 4.5 cm.  Ascending Aorta dilatation is present.     This study was compared with the study from 6/2024 .  No significant changes noted..    Left ventricular size, wall motion and function are normal. The ejection  fraction is 60-65%. Left ventricular wall thickness is normal. Left  ventricular diastolic function is normal.     Right Ventricle  Right ventricular function, chamber size, wall motion, and thickness are  normal.     Atria  Both atria appear normal.     Mitral Valve  The mitral valve is normal. Trace mitral insufficiency is present.     Aortic  Valve  The aortic valve is tricuspid. Mild aortic insufficiency is present.     Tricuspid Valve  The tricuspid valve is normal.     Pulmonic Valve  The pulmonic valve is normal.     Vessels  The inferior vena cava was normal in size with preserved respiratory  variability. Sinuses of Valsalva 3.8 cm. Ascending aorta 4.5 cm. Ascending  Aorta dilatation is present.     Pericardium  No pericardial effusion is present.     Miscellaneous  No significant valvular abnormalities present.    Aortic aneurysm  Thoracic aortic aneurysm, 4.8 cm    1/15/2024  1. No acute abnormality on the CT images of the lower chest and upper  abdomen. Ascending thoracic aortic aneurysm, better detailed on  comparison CTA.  2. Please see separate report for the nuclear medicine portion of the  study.    8/31/2023 CT Chest/  1. Stable aneurysmal dilatation of the ascending aorta, with a maximal  diameter measured on true axial images of approximately 4.8 cm. No  evidence of dissection or intramural hematoma or other acute aortic  injury.     2. No abnormality to explain etiology of chest pain.    Ecg 6/16/2024  Component  Ref Range & Units 6/16/24  7:04 PM 1/24/24  3:14 PM     Systolic Blood Pressure  mmHg      Diastolic Blood Pressure  mmHg      Ventricular Rate  BPM 55 60    Atrial Rate  BPM 55 60    NV Interval  ms 204 214    QRS Duration  ms 148 92    QT  ms 462 420    QTc  ms 441 420    P Axis  degrees 25 12    R AXIS  degrees -7 13    T Axis  degrees 3 -5    Interpretation ECG Sinus bradycardia  Right bundle branch block  Possible Lateral infarct , age undetermined  Abnormal ECG  Unconfirmed report - interpretation of this ECG is computer generated - see medical record for final interpretation  Confirmed by - EMERGENCY ROOM, PHYSICIAN ([id number]),  VINOD JURADO ([id number]) on 6/17/2024 8:28:13        Vision/Dry Eyes/Cataracts/Glaucoma/Macular   RK/RA eye surgery in the past - procedure - NV radial keratotomy   Eye surgery bilateral  Laser  surgery   Cataract surgery   Eye capsulotomy   Pilocarpine drops to narrow things down.   Gray are on the upper 1/2 of vision - had two problems on one day and wondering if it was a possible branch occlusion TIA issue - seen at [facility] in the past - came into the office of fathers day and send to the ED - l ast for 3-5 minutes and resolved. Followed by eye doctors.     Heme/Anticoagulation/Antiplatelet/Anemia/Other  Aspirin 81mg   Folbee, B6/ folic acid.   No clots   No clear anemia     ENT/Resp  Tinnitus  May have some voice changes  Some swallowing problems.  Has some jaw pain  Altered voice  Lung sarcoidosis - not sure if this is present. And had a scalene node biopsy that showed non caseating granuloma and consistent with sarcoid  This is not an issue.   No breathing or respiratory problems.   Loss of smell  Has had drooling   No significant runny nose     CT 8/18/2022 showed Stable sub 6 mm pulmonary nodules. No new pulmonary nodule  demonstrated.    Skin/Cancer/Seborrhea/other  No skin cancer   onychomycosis    Musculoskeletal/Pain/Headache  Right Hip surgery   See surgical records - he says it was his right hip.     Neuropathy     Peripheral neuropathy  Hip surgery  Achilles tendon rupture    Other:  Breast ultrasound 11/16/2022  1.  Small lesion in the left breast correlates with palpable abnormality. It most likely represents a benign lipoma or hemangioma lipoma. Clinical follow-up is recommended. The patient was told that if he notices the lump increasing in size, the lesion   should be removed.     ACR BI-RADS Category 2: Benign.    Medications            Aspirin 81mg vary       Carbidopa/levodopa sinemet 25/100 1 1 1     Cholecalciferol Vit D3 125 5000 Not y et       Fa pyridoxine cyanocobalamin  1       Inclisiran leqvio infusions for lipid        Ketoconazole nizoral 2% cream        Levothyroxine synthroid 75mcg 1       Losartan cozaar 50mg   1     Nitroglycerin nitrostat 0.4        Pilocarpine pilocar  2% oph soln        Vit B12 cyanocobalamin 500mcg off                                                                                                 Plan:    Genetic testing done at [facility] 1/27/2025 and were variants of undetermined significance but no pathogenic variants.     Needs followup on his bone density - may see endocrinology August to discuss reclast, etc. Management.     Parkinson disease right side onset with some challenges with the sinemet as had intermittent dizziness, etc.     Pharmacy ([facility]) consultation and medication management  Please call the scheduling number @ [phone] to set up an appointment with pharmacists Irlanda Patterson, Bhavya Carcamo, or Rama Saenz.     Has ongoing anxiety that is affecting his ability to travel along with balance changes.     [facility]  [address] [place] [address]      Ayesha Cedeno, Ph.D.,   [phone]    Emilee Carmona,Ph.D.,LP  [phone] (inpatient)    Leidy Britt,Ph.D.,LP  [phone]    Coco Torres, Ph.D.  [phone]    Karina Chase, Ph.D.,   [phone]    Brain Pineda, Ph.D., ABPP, LP  [phone]    Omayra Noriega,Ph.D.,   [phone]    Joanna [place]a psychologist  [phone]   [place] [id number] [facility] [address] [place] [address].            Future Appointments 2/28/2025 - 8/27/2025        Date Visit Type Length Department Provider     4/7/2025  3:40 PM OFFICE VISIT 40 min \[facility]\""W INTERNAL MEDICINE Angel Hernandez MD    Location Instructions:     [facility] is located at [address]  in [place], just east of the [facility] exit off of [address] and the [employer] [facility] [place]. Access the free parking lot directly from [address] if traveling east; if traveling west on [address], turn south on [address]. [address] and the [employer] light rail also stop at the intersection of  [place] and [address].               4/28/2025  2:00 PM NURSE ONLY 30 min  CARDIOVASCULAR CTR  CVC NURSE    Location Instructions:     The [facility] [place] ([facility]) is in a dense urban area with multiple transportation and parking options. You may wish to review options for  service and self-parking in more detail on the [facility] website at [email or web address].   This appointment is in a hospital-based location.&nbsp; Before your visit, you may want to check with your insurance company for coverage and referral options, including cost differences between services provided in different clinic settings.&nbsp; For more information visit this link on the [employer] Website:&nbsp; [email or web address]              5/30/2025  2:00 PM CT CHEST W 20 min UCSC CT UCSCCT1    Location Instructions:     The [facility] ([facility]) is in a dense urban area with multiple transportation and parking options. You may wish to review options for  service and self-parking in more detail on the [facility] website at [email or web address].               8/25/2025  1:00 PM NEW ENDOCRINE 60 min MG ENDO López Shin MD                         Coding statement:   Medical Decision Making:  #  Chronic progressive medical conditions addressed  - see above --   Review and/or interpretation of unique test or documentation from a provider outside of neurology urd   Independent historian provided additional details  no I  Prescription drug management and review of potential side effects and/or monitoring for side effects  -- see above ---  Health impacted by social determinants of health  no    I have reviewed the note as documented above.  This accurately captures the substance of my conversation with the patient and total time spent preparing for visit, executing visit and completing visit on the day of the visit:  60 minutes.  The portion of this total time included face to face time     The  longitudinal plan of care for Artis Galvin was addressed during this visit. Due to the added complexity in care, I will continue to support Artis Galvin in the subsequent management of this condition(s) and with the ongoing continuity of care of this condition(s).      Isrrael Roman MD     ______________________________________    Last visit date and details:       Evaluation of 94 genes associated with Parkinson disease    Specimen WB Whole Blood   Genes Analyzed ADORA1, AHSA1, ANG, ANO3, TABATHA, BKS51T7, AT, TUQ6TY2,  ATP7B, J83zss25, CHCHD2, CHMP2B, CLN3, CP, CSF1R, OWV29P7,  DCAF17, DCTN1, DDC, DNAJB2, DNAJC12, DNAJC13, DNAJC6,  DNM1L, EIF4G1, FBXO7, FTL, FUS, GBA (GBA1), GCH1, GIGYF2,  GRN, HTRA2, KIF5A, LRP10, LRRK2, LYST, MAPT, OPTN, PANK2,  PARK7, PDE10A, PDE8B, PDGFB, PDGFRB, PEX1, PINK1, PLA2G6,  PLD3, PODXL, POLG, POLG2, PORWE6M, PRKN, PRKRA, PRRT2,  PSEN1, PSEN2, PTRHD1, RAB29, RAB39B, RIC3, SIGMAR1,  IGZ83C6, NXB99B4, YOG13I51, BSC36N80, SLC6A3, SNCA, SNCB,  SOD1, SPG11, SPR, SQSTM1, SYNJ1, TAF1, TAF15, TARDBP,  TENM4, TH, THAP1, SZON169, TOR1A, TUBA4A, TWNK, UBQLN2,  UCHL1, UNC13A, VCP, VPS13A, VPS13C, VPS35, WDR45 and XPR1   Disclaimer Clinical Correlations  The gene(s) analyzed by this test may have more than one  associated phenotype or inheritance pattern. Additionally,  the genetic variants detected may have reduced penetrance,  variable expressivity, or different classifications based  on disease state. Classification and reporting of the  variants detected is based on the intended disease state of  the test ordered. For more information regarding the  phenotypic spectrum which may be involved for a specific  gene, see OMIM (www.ncbi.nlm.nih.gov/omim) or GeneReviews  (www.genereviews.org).    An online research opportunity called GenomeIEMOnect  (genomeartandseek.org), a project of [employer], is available for  the recipient of this genetic test. This patient registry  collects de-identified genetic  and health information to  advance the knowledge of genetic variants. [facility] is a  collaborator of [employer]. This may not be applicable for all  tests.    If testing was performed because of a clinically  significant family history it is often useful to first test  an affected family member. Detection of a reportable  variant(s) in an affected family member would allow for  more informative testing of at risk individuals.    To discuss the availability of further testing options or  for assistance in the interpretation of these results, [facility] genetic counselors can be contacted at  [phone].    Technical Limitations  Next generation sequencing may not detect all types of  genomic variants. In rare cases, false negative or false  positive results may occur. The depth of coverage may be  variable for some target regions, but assay performance  below the minimum acceptable criteria or for failed regions  will be noted. The clinical phenotype observed in this  individual and/or family may be due to genetic variants not  targeted by this test. Given these limitations, negative  results do not rule out the diagnosis of a genetic  disorder. If a specific clinical disorder is suspected,  evaluation by alternative methods can be considered.    There may be regions of genes that cannot be effectively  evaluated for sequencing or deletion and duplication  analysis as a result of technical limitations of the assay,  including regions of homology, high GC content, and  repetitive sequences. Confirmation of select reportable  variants was performed by alternate methodologies based on  internal laboratory criteria.    Additionally, low level mosaic variants may not be  detected.    This test is not designed to differentiate between somatic  and germline variants. If there is a possibility that any  detected variant is somatic, additional testing may be  necessary to clarify the significance of  results.    Genes may be added or removed based on updated clinical  relevance. Please refer to the Targeted Genes and  Methodology Details for the Parkinson Disease Gene Panel in  the Special Instructions section of the Test Catalog for  the most up to date list of genes included in this test.    Reclassification of Variants Policy  See [email or web address] Hour Pal (TEST ID PARDP) for information  regarding the laboratory's policy for reclassification of  variants.    Variant Evaluation  Variant curation is performed using published ACMG-AMP  recommendations as a guideline. Other gene-specific  guidelines may also be considered. Variants classified as  benign or likely benign are not reported.    Results from in silico evaluation tools may change over  time and should be interpreted with caution and  professional clinical judgment.    TEST CLASSIFICATION  This test was developed and its performance characteristics  determined by [facility] in a manner consistent with CLIA  requirements. This test has not been cleared or approved by  the U.S. Food and Drug Administration.   Released By Chintan Serrano, Ph.D.   Result Summary Variants of Uncertain Significance Detected   Result The following VARIANTS OF UNCERTAIN SIGNIFICANCE were  detected:  ATP7B (NM_000053.4), chr13(GRCh37):g.19214428C>A,  c.3275C>T, p.Lqb0732Iih (p.C5361E), heterozygous  VPS13C (NM_020821.3), chr15(GRCh37):g.79554980V>C,  c.6803A>G, p.Cga1205Lvj (p.K4337L), heterozygous    No additional reportable variants were detected within all  other tested genes. See the Genes Analyzed section for a  complete list of genes evaluated by this assay.   Method Next generation sequencing (NGS) and/or Vaishali sequencing  was performed to test for the presence of variants in  coding regions and intron/exon boundaries of the genes  analyzed, as well as some other regions that have known  pathogenic variants. NGS and/or a polymerase chain reaction  (PCR)-based quantitative  method was performed to test for  the presence of deletions and duplications in the genes  analyzed. The human genome reference GRCh37/hg19 build was  used for sequence read alignment. At least 99% of the bases  are covered at a read depth over 30X. Sensitivity is  estimated at above 99% for single nucleotide variants,  above 94% for indels less than 40 base pairs (bp), above  95% for deletions up to 75 bp and insertions up to 47 bp.  See the Genes Analyzed field for a list of genes tested.    There may be regions of genes that cannot be effectively  evaluated for sequencing or deletion and duplication  analysis as a result of technical limitations of the assay,  including regions of homology, high GC content, and  repetitive sequences. Confirmation of select reportable  variants was performed by alternate methodologies based on  internal laboratory criteria. See [email or web address]  (TEST ID PARDP) for details regarding genes with regions  not routinely covered.   Interpretation ATP7B c.3275C>T (p.Img3395Wrm), VARIANT OF UNCERTAIN  SIGNIFICANCE  The heterozygous c.3275C>T (p.Xgg5565Bdx) missense variant  in the ATP7B gene (RADHA:[id number]) is classified as a variant  of uncertain significance. Pathogenic variants in the ATP7B  gene have been associated with autosomal recessive Vignesh  disease (1). The c.3275C>T (p.Iqv7242Ima) variant has been  reported in the compound heterozygous state with a  pathogenic or likely pathogenic variant in two individuals  with clinical presentations consistent with Vignesh disease  (2,3). However, functional studies have not been performed  to either support or refute the pathogenicity of this  variant. The overall minor allele frequency for this  variant ([id number]) is approximately 0.01% with a  frequency up to 0.028% in /Admixed American  sub-populations (4,5). This amino acid is moderately  conserved across species and an in silico meta-predictor  suggests that this amino  acid change is unlikely to impact  protein function. Taken together, the evidence is not  sufficient to determine whether this variant is benign or  pathogenic. Therefore, this variant is classified as a  variant of uncertain significance.    VPS13C c.6803A>G (p.Bsx2538Znl), VARIANT OF UNCERTAIN  SIGNIFICANCE  The heterozygous c.6803A>G (p.Ysi4396Rfs) missense variant  in the VPS13C gene (RADHA:856387) is classified as a variant  of uncertain significance. Pathogenic variants in the  VPS13C gene have been associated with autosomal recessive  early-onset Parkinson disease (6). To our knowledge, the  c.6803A>G (p.Yon6750Yur) variant has not been reported in  affected individuals in the literature. This variant is  absent from large population databases (4,5). This amino  acid is moderately conserved across species and an in  silico meta-predictor suggests that this amino acid change  is unlikely to impact protein function. Taken together, the  evidence is not sufficient to determine whether this  variant is benign or pathogenic. Therefore, this variant is  classified as a variant of uncertain significance.    Individuals may have a pathogenic variant in one of the  interrogated genes that is not detectable by the methods  utilized. Additionally, the clinical phenotype that is  observed in this individual and/or family may be due to a  pathogenic variant or variants in another gene not targeted  by this test.    Consultation with a genetics professional is recommended  for interpretation of this result and to determine whether  reproductive risk assessment and familial testing may be of  benefit to this family.    FOLLOW-UP OPTION(S)  Familial Testing:  If relevant, genetic testing for family members is  available by ordering FMTT / Familial Variant, Targeted  Testing for the specific variant(s) detected. Please  contact the laboratory at [phone] or the online test  catalog at [email or web address] for  information about  FMTT.    Reflex to Whole Exome Sequencing:  These results were interpreted from exome sequencing data  analyzed for the specific gene(s) indicated. If clinically  indicated, reflex analysis and interpretation of the whole  exome using existing data can be completed by ordering  WESPR/Whole Exome Sequencing Panel Reflex, Varies. Note:  this reflex test cannot be ordered if the current testing  was performed on a prenatal specimen.      REFERENCES:  1) GeneReviews: Vignesh Disease  (https://www.ncbi.nlm.nih.gov/books/FFK8180/) (PMID:  71739800)  2) Tashi K, Dennis Fagan A, Thania M, Raul VAZQUEZ, Tori MIRANDA.  Vignesh's disease in [place] and regional countries:  Homozygosity and hepatic phenotype predominance. World J  Gastroenterol. 2017;23(36):4120-5737.  doi:10.3748/wjg.v23.i36.6715 (PMID 24109470)  3) Darin M, Arlene M, Saroj VAZQUEZ, Mary Lou VAZQUEZ,  Mary CHU. Variants of uncertain significance in the era  of next-generation sequencing. J Am Assoc Nurse Pract.  2022;34(8):3234-6046. Published 2022 Aug 1.  doi:10.1097/JXX.5211609231794358 (PMID 52129027)  4) dbSNP: www.ncbi.nlm.nih.gov/snp/  5) gnomAD Browser: [facility][email or web address]/  6) GeneReviews: Parkinson Disease Overview  (https://www.ncbi.nlm.nih.gov/books/UHE1605/) (PMID:  82151564)   Resulting Agency [facility]       Darci garcia M.D., Ph.D. - 01/27/2025 2:30 PM CST  Formatting of this note is different from the original.  SUBJECTIVE    Referring Provider: Sapna Gibbons M.D.    CHIEF COMPLAINT/REASON FOR VISIT  Artis Galvin is a 79 y.o. male who presents for evaluation of parkinsonism.    HISTORY OF PRESENT ILLNESS  Mr. Galvin is a very pleasant 79-year-old right-handed man who comes here today together with his daughter with a transplant coordinator here at the [facility] candidate referred to the movement Disorder Clinic by Dr. Gibbons.  His symptoms started about 3 4 years ago when he was noticing that he was slower in moving.  He also mentioned that he is having problems with his legs in the sense that he feels that the lower legs do not belong to him and they seem to be having a different the way he would be having compared to rest of the body. Overall is having slower in his used to be and also his voice is softer, is having problems with handwriting .  DaTSCAN was performed by Dr. Parker the reported a positive results with left more than right hypo captation of the tracer. Carbidopa levodopa was started I need to look up to 1 tablets 3 times per day without great improvement of his symptoms. It is coming today for follow-up appointments.    OBJECTIVE  PHYSICAL EXAMINATION  In summary:    MDS-UPDRS Part III: Motor Examination  On medication? No  Patient's clinical state: Off  Is the patient on Levodopa: No  Minutes since last dose:    R L R L  3.1 Speech 2 Gait/stance  3.2 Facial expression 2 3.9 Arising from chair 0  3.10 Gait 2  Rigidity 3.11 Freezing of gait 0  3.3 Neck 2 3.12 Postural stability 0  Arms 1 0 3.13 Posture 0  Legs 1 0 3.14 Global spontaneity of movement 2    Bradykinesia Tremor  3.4 Finger tapping 2 1 3.15 Postural (hand) 0 0  3.5 Hand movements 2 1 3.16 Kinetic (hand) 0 0  3.6 Pronation/Supination 0 0 3.17a/b Rest amplitude (arms) 0 0  3.7 Toe taps 2 1 3.17c/d Rest amplitude (legs) 0 0  3.8 Leg agility 0 0 3.17e Rest amplitude (lip/jaw) 0  3.18 Rest constancy 0  TOTAL 21    Were dyskinesias present? No  If yes, did these movements interfere with ratings?  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 Galvin comes today with symptoms and signs of parkinsonism that clearly are evident today my examination. At this point, I see that he does not have any good response to the medication. On the other hand, his symptoms did not not benefit from the medication also because it did not take enough of  carbidopa levodopa to see a difference. For this reason, I would like him to start again carbidopa levodopa starting 1 tablet times per day and increasing of 0.5 tablets every week up to a maximum tablets 3 times per day. We discussed about additional tests that I would like to order, in light of his positive results on 20/3 me for a possible [employer] heritage would be appropriate to obtain a number lab works which would include lysosomal cascade and also genetic analysis.    I would like to see Mr. Galvin again in the next 2 months but I am looking forward to hear from him if there are any changes that he or his family is observing.    It has been a pleasure to talk to him today and I would like to thank marc Reyna for the very kind referral  Total Time: 38 minutes    PATIENT EDUCATION  Ready to learn, no apparent learning barriers were identified; learning preferences include listening. Explained diagnosis and treatment plan; patient expressed understanding of the content.  Electronically signed by Darci Sanz M.D., Ph.D. at 01/27/2025 3:59 PM CST         Sapna Gibbons M.D. - 12/16/2024 1:00 PM CST  Formatting of this note is different from the original.  SUBJECTIVE    Patient returns for follow up of Parkinsonism.    He is a 79 y.o. year old male who i previously saw for left amaurosis fugax. See prior notes. He then contacted me for concern of peripheral neuropathy and gait difficulty,    In the last 4-5 years patient has had a gradual progressive decline in his gait. He has attributed it to peripheral neuropathy even though that diagnosis has not been made. He tells me that from the knees down he feels like  someone else's program  . He has reduced feeling in his lower legs. He has trouble walking downstairs and feels like his calves are not as strong. He feels like he shuffles. He has mild pain in his feet and feels like there are marbles in his socks.    He has also noted that his voice has become  more hoarse, he has trouble with handwriting and trouble getting in and out of cars but not SUV. He stopped playing rac[employer]phroGenex 2-3 years ago because of an Achilles tendon rupture and he is not able to keep up with other people his age in terms of physical activities. He does not think he is forgetful, but he does have trouble remembering. He denies a tremor.    He has had back issues on and off since the age of 21. He does not think he has ever had pain down his legs. He has not had back surgery. Currently he is not having back pain. He is not sure why he had a lumbar MRI in December of last year.     Past Medical History  Hypertension  Hyperlipidemia  Coronary artery disease  Thoracic aortic aneurysm, 4.8 cm  Rheumatoid arthritis  Prostate cancer xrt/surg  Sarcoidosis  Autoimmune thyroid disease/hypothyroidism  Peripheral neuropathy  Hip surgery  Achilles tendon rupture    Social History:  Tobacco: reports that he has never smoked. He has never used smokeless tobacco.  Alcohol: reports that he does not currently use alcohol.  Drugs: reports no history of drug use.     EMG: CLINICAL INTERPRETATION: This is an abnormal study. The electrodiagnostic abnormalities are most in keeping with a chronic bilateral S1 radiculopathy without active denervation. However, it is difficult to exclude a mild, motor-predominant, length-dependent peripheral neuropathy based on the results of this study.    MPRESSION: Outside review of December 2023 MRI Lumbar Spine  Mild lumbar spondylosis, as detailed in the findings. Mild spinal canal and bilateral foraminal narrowing at L3-4. No specific cause for S1 radiculopathy is identified.    IMPRESSION:  Abnormal DaTscan, worse on the left    ASSESSMENT / PLAN  #1 Left amaurosis  #2 Incidental vertebral origin stenosis  # Hypertension  # Hyperlipidemia  Patient had 3 episodes of what sounds like left amaurosis in June of 2024. Fortunately, he has not had any recurrence. He has had a thorough  workup (will try to get outside films), but I recommended a 30 day cardiac monitor to rule out atrial fibrillation, negative.    Continue daily aspirin and risk factor management. Vertebral stenosis is incidental.    # Probable Parkinson's disease  # Gait Decline  # Hoarse voice  # Mild peripheral neuropathy  # Chronic S1 radiculopathy  I think patient has some features suggestive of parkinsonism. His voice has become more hoarse, he notes that he shuffles, he has masked face with decreased blink reflex and decreased right arm swing. AKANKSHA scan is abnormal. We discussed various options of trying empirically levodopa carbidopa starting at 1/2 tablet TID and increase to 1 tablet TID after 1 week if no response and no side effects. Reviewed side effects. Take BP 1 hour after dose and report any SBP<100 or light headedness. Report to me in 1 week about response. Continue to try to get into movement disorders.  -- --- -- -- -- -- -- --  COMMUNICATION: Send non urgent messages through portal (2-4 day turnaround); Urgent messages, call. Emergent issues, seek local emergency care.  CALL SOONER IF: Acute stroke symptoms (unilateral numbness/weakness/incoordination; speech/swallowing difficulties, gait ataxia, vision loss)    Electronically signed by Sapna Gibbons M.D. at 12/16/2024 1:31 PM CST     8/19/2024  Sapna manning M.D. - 08/19/2024 10:30 AM CDT  Formatting of this note is different from the original.  Images from the original note were not included.  SUBJECTIVE    CHIEF COMPLAINT / REASON FOR VISIT  Patient is a 79 y.o. year old male who presents for the evaluation of left amaurosis fugax. Supervisory note for Ayesha Rangel    HISTORY OF PRESENT ILLNESS  Patient has had a few prior episodes of transient left visual loss over the years.    In June of 2024 patient presented to the emergency room after experiencing 3 episodes of transient visual loss. At that time he experienced a cloudy dark area of vision  come down in the upper half of his left eye only. This lasted several minutes and then resolved. The next day he had 2similar episode which prompted him to come to the ER. There was no pain associated with this. No jaw claudication.    He subsequently underwent CT, CT angiogram demonstrating no significant stenosis of the carotid arteries, but there was severe stenosis of the left vertebral artery origin. MRI brain revealed no acute changes. His hemoglobin A1c was 5.9% and his LDL was 112. Patient was loaded with Plavix and told to take dual antiplatelet therapy and start a statin. Echocardiogram was planned for the outpatient setting and by report was unrevealing.    Patient had some additional complaints of neck pain and sitffness outlined by Dr. Rangel and hoarseness.     #1 Left amaurosis  #2 Incidental vertebral origin stenosis  # Hypertension  # Hyperlipidemia  Patient had 3 episodes of what sounds like left amaurosis in June of 2024. Fortunately, he has not had any recurrence. He has been on aspirin and Plavix since that time.    He has had a thorough workup (will try to get outside films), but I would add a 30 day cardiac monitor to rule out atrial fibrillation.    At this point in time, he can discontinue the Plavix but continue the aspirin 162 mg daily. We can recheck his lipids to see if he has responded to the lipid lowering medications prescribed (intolerant of statin).    - Risk factors:  Vascular risk factor goals for cerebral ischemia include: BP<130/80, LDL<70mg/dl If atherosclerotic stroke and beginning LDL >100mg/dl, normoglycemia, no tobacco, normal BMI (<25).     Vertebral stenosis is incidental and would be treated with antiplatelet and risk factor management    ADDENDUM: Received outside MRI and CTA. Fortunately these both look normal. No DWI changes. Minimal leukoaraiosis.     # Neck pain  # History of rheumatoid arthritis  - Plain xrays; spine center       11/1/2022 [facility]       ICD-10-CM     1. Peripheral polyneuropathy  G62.9 Adult Neurology  Referral             Mr. Galvin is a 77-year-old man here for consultation regarding possible neuropathy affecting the lower extremities.  He had a work-up completed recently through the [facility] neurology clinic.  Unfortunately I cannot review these results with him because we do not have them at this time.  He does have planned follow-up with the neurologist at [facility] so I recommended he keep this.  If he would prefer to follow-up here we will have to get the records and decide about the next steps going forward based on his test results.  We did talk about symptomatic management for neuropathy and I have given him some information about gabapentin as an option.    January 16, 2025  Marlyn Cat   MV    1/16/25  1:27 PM  Note  Action 1/16/23 MV 1.24pm   Action Taken Imaging request emailed to [facility] 1/22/25 MV 4.40pm   Action Taken Images resolved              RECORDS RECEIVED FROM: internal   REASON FOR VISIT: Abnormal gait    PROVIDER: Dr. Roman   DATE OF APPT: 2/28/25   NOTES (FOR ALL VISITS) STATUS DETAILS   OFFICE NOTE from referring provider Care Everywhere Dr Sapna Gibbons @ [facility]:  12/16/24  10/10/24  8/19/24   EMG Care Everywhere [facility]:  9/20/24   MEDICATION LIST Care Everywhere     IMAGING  (FOR ALL VISITS)       AKANKSHA SCAN (MOVEMENT) PACS [facility]:  NM Brain 11/21/24   MRI (HEAD, NECK, SPINE) Internal [facility]:  MRI Brain 6/16/24  MRI Brain 1/31/23   CT (HEAD, NECK, SPINE) [facility]:  CTA Head Neck 6/16/24                     ______________________________________      Patient was asked about 14 Review of systems including changes in vision (dry eyes, double vision), hearing, heart, lungs, musculoskeletal, depression, anxiety, snoring, RBD, insomnia, urinary frequency, urinary urgency, constipation, swallowing problems, hematological, ID, allergies, skin problems: seborrhea, endocrinological: thyroid, diabetes,  "cholesterol; balance, weight changes, and other neurological problems and these were not significant at this time except for   Patient Active Problem List   Diagnosis     Right bundle branch block     Sarcoidosis of lung     Vitamin D deficiency     Diverticular disease of colon     Erectile dysfunction following radical prostatectomy     Personal history of malignant neoplasm of prostate     Autoimmune thyroiditis     Onychomycosis     Peripheral neuropathy     SVT (supraventricular tachycardia)     Status post coronary angiogram     Osteoporosis without current pathological fracture, unspecified osteoporosis type     Dysthymia     Diverticulitis of colon     Blurred vision     Chest pain     Chest pain, unspecified type     Primary hypertension     Nonocclusive coronary atherosclerosis of native coronary artery     S/P total hip arthroplasty     History of TIA (transient ischemic attack)     Stenosis of left vertebral artery     Abnormal clinical finding     Encounter for postoperative care     Aortic aneurysm     Disorder of penis     Malignant tumor of prostate (H)     Neoplastic disease     Stress incontinence     History of malignant neoplasm of prostate     Left bundle branch block (LBBB)     abnormal dopamine scan (DaTSCAN) 11/21/2024     Primary parkinsonism (H)          Allergies   Allergen Reactions     Atorvastatin Muscle Pain (Myalgia)     Rosuvastatin Muscle Pain (Myalgia)     Ciprofloxacin Other (See Comments)     Pt states he was told not to take because of \"another medicine he is on\"     Levaquin [Levofloxacin] Other (See Comments)     Pt states he was told not to take because of \"another medicine he is on\"       Past Surgical History:   Procedure Laterality Date     CAPSULOTOMY Bilateral      CATARACT EXTRACTION Bilateral      CV CORONARY ANGIOGRAM N/A 10/20/2022    Procedure: Coronary Angiogram;  Surgeon: Mir Farr MD;  Location:  [facility]     CV FRACTIONAL " FLOW RATIO WIRE N/A 10/20/2022    Procedure: Fractional Flow Ratio Wire;  Surgeon: Mir Farr MD;  Location:  [facility]     EYE SURGERY Bilateral     Laser eye surgery     HC SHLDR ARTHROSCOP,PART ACROMIOPLAS Right     Description: Shoulder Arthroscopy, Space Decompression And Acromioplasty;  Recorded: 02/18/2014;     MI LAP,PROSTATECTOMY,RADICAL,W/NERVE SPARE,INCL ROBOTIC Bilateral 02/27/2018    Procedure: ROBOTIC ASSISTED RADICAL RETROPUBIC PROSTATECTOMY, BILATERAL PELVIC LYMPH NODE DISSECTION LYSIS OF ADHESIONS;  Surgeon: Wale Rodriguez MD;  [place] [facility] [place]: [place]rology     MI RADIAL KERATOTOMY Bilateral     Description: Cornea Radial Keratotomy;  Recorded: 02/18/2014;     TOTAL HIP ARTHROPLASTY Right 3/6/2024    Procedure: RIGHT TOTAL HIP ARTHROPLASTY DIRECT ANTERIOR APPROACH ORTHOGRID;  Surgeon: Wale Benavides MD;  Location: [facility] Main OR     Past Medical History:   Diagnosis Date     abnormal dopamine scan (DaTSCAN) 11/21/2024 01/20/2025    Dopamine Scan 11/21/2024  Markedly abnormal striatal radiotracer distribution/uptake, most prominent in the left putamen. Moderately decreased caudate nuclei uptake left worse than right.        Abnormal thyroid ultrasound      Adjustment disorder with mixed anxiety and depressed mood      Arthritis      Autoimmune thyroiditis      BBB (bundle branch block)     right     Chronic lymphocytic thyroiditis      Coronary artery disease      Diverticulosis of colon (without mention of hemorrhage)      Hypertension      Low back pain 11/2023     Malignant neoplasm of prostate (H) 2018     Nontoxic multinodular goiter      NSTEMI (non-ST elevated myocardial infarction) (H) 01/11/2024     Osteoporosis      Other and unspecified hyperlipidemia      Pain in joint, shoulder region      Peripheral neuropathy      Persistent hoarseness      Primary parkinsonism (H) 01/29/2025     Primary parkinsonism (H) 1/29/2025      Sarcoidosis     Seen on chest xray at age 28     Scleritis, unspecified      Status post coronary angiogram 10/20/2022     SVT (supraventricular tachycardia)      Unspecified hypothyroidism      Unspecified vitamin D deficiency      Social History     Socioeconomic History     Marital status: Patient Declined     Spouse name: Not on file     Number of children: Not on file     Years of education: Not on file     Highest education level: Not on file   Occupational History     Not on file   Tobacco Use     Smoking status: Never     Smokeless tobacco: Never   Vaping Use     Vaping status: Never Used   Substance and Sexual Activity     Alcohol use: Not Currently     Comment: Alcoholic Drinks/day: rarely     Drug use: No     Sexual activity: Not on file   Other Topics Concern     Not on file   Social History Narrative    Single. . 4 grown daughters.    Currently unemployed.        Social History:    Tobacco: reports that he has never smoked. He has never used smokeless tobacco.    Alcohol: reports that he does not currently use alcohol.    Drugs: reports no history of drug use.      Social Drivers of Health     Financial Resource Strain: Low Risk  (8/23/2024)    Financial Resource Strain      Within the past 12 months, have you or your family members you live with been unable to get utilities (heat, electricity) when it was really needed?: No   Food Insecurity: Low Risk  (8/23/2024)    Food Insecurity      Within the past 12 months, did you worry that your food would run out before you got money to buy more?: No      Within the past 12 months, did the food you bought just not last and you didn t have money to get more?: No   Transportation Needs: Low Risk  (8/23/2024)    Transportation Needs      Within the past 12 months, has lack of transportation kept you from medical appointments, getting your medicines, non-medical meetings or appointments, work, or from getting things that you need?: No   Physical  Activity: Unknown (8/23/2024)    Exercise Vital Sign      Days of Exercise per Week: 3 days      Minutes of Exercise per Session: Not on file   Stress: Patient Declined (8/23/2024)    [place] [employer] [employer] - Occupational Stress Questionnaire      Feeling of Stress : Patient declined   Social Connections: Unknown (8/23/2024)    Social Connection and Isolation Panel [NHANES]      Frequency of Communication with Friends and Family: Not on file      Frequency of Social Gatherings with Friends and Family: Three times a week      Attends Gnosticist Services: Not on file      Active Member of Clubs or Organizations: Not on file      Attends Club or Organization Meetings: Not on file      Marital Status: Not on file   Interpersonal Safety: Low Risk  (8/28/2024)    Interpersonal Safety      Do you feel physically and emotionally safe where you currently live?: Yes      Within the past 12 months, have you been hit, slapped, kicked or otherwise physically hurt by someone?: No      Within the past 12 months, have you been humiliated or emotionally abused in other ways by your partner or ex-partner?: No   Housing Stability: Low Risk  (8/23/2024)    Housing Stability      Do you have housing? : Yes      Are you worried about losing your housing?: No       Drug and lactation database from the [place] National [employer] of Medicine:  http://toxnet.nlm.nih.gov/cgi-bin/sis/htmlgen?LACT      B/P: Data Unavailable, T: Data Unavailable, P: Data Unavailable, R: Data Unavailable 0 lbs 0 oz  There were no vitals taken for this visit., There is no height or weight on file to calculate BMI.  Medications and Vitals not listed above were documented in the cart and reviewed by me.     Current Outpatient Medications   Medication Sig Dispense Refill     aspirin (ASA) 81 MG chewable tablet Take 325 mg by mouth daily.       carbidopa-levodopa (SINEMET)  MG tablet Take 0.5 tablets by mouth 3 (three) times a day. May  increase to 1 tablet TID if no side effects after 1 week. (Patient not taking: Reported on 1/22/2025)       cholecalciferol (VITAMIN D3) 125 mcg (5000 units) capsule Take 125 mcg by mouth daily       ketoconazole (NIZORAL) 2 % external cream APPLY TOPICALLY DAILY 30 g 1     levothyroxine (SYNTHROID/LEVOTHROID) 75 MCG tablet Take 1 tablet (75 mcg) by mouth daily. 100 tablet 2     losartan (COZAAR) 50 MG tablet Take 1 tablet (50 mg) by mouth every evening. 90 tablet 0     nitroGLYcerin (NITROSTAT) 0.4 MG sublingual tablet For chest pain place 1 tablet under the tongue every 5 minutes for 3 doses. If symptoms persist 5 minutes after 1st dose call 911. 25 tablet 3     pilocarpine (PILOCAR) 2 % ophthalmic solution Place 1-2 drops into both eyes daily       vitamin B-12 (CYANOCOBALAMIN) 500 MCG tablet Take 500 mcg by mouth daily           Isrrael Roman MD        Again, thank you for allowing me to participate in the care of your patient.      Sincerely,    Isrrael Roman MD

## 2025-02-28 NOTE — PATIENT INSTRUCTIONS
Genetic testing done at [facility] 1/27/2025 and were variants of undetermined significance but no pathogenic variants.      Needs followup on his bone density - may see endocrinology August to discuss reclast, etc. Management.      Parkinson disease right side onset with some challenges with the sinemet as had intermittent dizziness, etc.      Pharmacy ([facility]) consultation and medication management  Please call the scheduling number @ [phone] to set up an appointment with pharmacists Irlanda Patterson, Bhavya Carcamo, or Rama Saenz.      Has ongoing anxiety that is affecting his ability to travel along with balance changes.      [facility]  [address] [place] [id number]        Ayesha Cedeno, Ph.D., LP  [phone]     Emilee Carmona,Ph.D.,LP  [phone] (inpatient)     Leidy Britt,Ph.D.,LP  [phone]     Coco Torres, Ph.D.  [phone]     Karina Chase, Ph.D.,   [phone]     Brain Pineda, Ph.D., ABPP, LP  [phone]     Omayra Noriega,Ph.D., LP  [phone]     Joanna [place] psychologist  [phone]   [place] [id number] [facility] [address]  [place] [address].

## 2025-03-03 ENCOUNTER — TELEPHONE (OUTPATIENT)
Dept: NEUROLOGY | Facility: CLINIC | Age: 80
End: 2025-03-03
Payer: COMMERCIAL

## 2025-03-27 DIAGNOSIS — I47.10 SVT (SUPRAVENTRICULAR TACHYCARDIA): ICD-10-CM

## 2025-03-27 RX ORDER — LOSARTAN POTASSIUM 50 MG/1
50 TABLET ORAL EVERY EVENING
Qty: 90 TABLET | Refills: 3 | Status: SHIPPED | OUTPATIENT
Start: 2025-03-27

## 2025-04-07 ENCOUNTER — OFFICE VISIT (OUTPATIENT)
Dept: INTERNAL MEDICINE | Facility: CLINIC | Age: 80
End: 2025-04-07
Payer: COMMERCIAL

## 2025-04-07 VITALS
TEMPERATURE: 98 F | OXYGEN SATURATION: 98 % | DIASTOLIC BLOOD PRESSURE: 64 MMHG | RESPIRATION RATE: 15 BRPM | SYSTOLIC BLOOD PRESSURE: 122 MMHG | WEIGHT: 196 LBS | BODY MASS INDEX: 28.06 KG/M2 | HEART RATE: 62 BPM | HEIGHT: 70 IN

## 2025-04-07 DIAGNOSIS — C77.5 PROSTATE CANCER METASTATIC TO INTRAPELVIC LYMPH NODE (H): ICD-10-CM

## 2025-04-07 DIAGNOSIS — C61 PROSTATE CANCER METASTATIC TO INTRAPELVIC LYMPH NODE (H): ICD-10-CM

## 2025-04-07 DIAGNOSIS — G62.9 PERIPHERAL POLYNEUROPATHY: ICD-10-CM

## 2025-04-07 DIAGNOSIS — G20.C PRIMARY PARKINSONISM (H): Primary | ICD-10-CM

## 2025-04-07 DIAGNOSIS — R07.9 CHEST PAIN, UNSPECIFIED TYPE: ICD-10-CM

## 2025-04-07 DIAGNOSIS — M81.0 OSTEOPOROSIS WITHOUT CURRENT PATHOLOGICAL FRACTURE, UNSPECIFIED OSTEOPOROSIS TYPE: ICD-10-CM

## 2025-04-07 DIAGNOSIS — Z28.20 VACCINE REFUSED BY PATIENT: ICD-10-CM

## 2025-04-07 PROCEDURE — 99214 OFFICE O/P EST MOD 30 MIN: CPT | Performed by: INTERNAL MEDICINE

## 2025-04-07 PROCEDURE — 3078F DIAST BP <80 MM HG: CPT | Performed by: INTERNAL MEDICINE

## 2025-04-07 PROCEDURE — G2211 COMPLEX E/M VISIT ADD ON: HCPCS | Performed by: INTERNAL MEDICINE

## 2025-04-07 PROCEDURE — 3074F SYST BP LT 130 MM HG: CPT | Performed by: INTERNAL MEDICINE

## 2025-04-07 NOTE — PROGRESS NOTES
1. Primary parkinsonism (H) (Primary)  Continue close follow-up with neurology.    2. Peripheral polyneuropathy  I also agree that he should follow-up with neurology regarding this.  His EMGs were not suggestive of a marked neuropathic process.    3. Chest pain, unspecified type  This does not sound cardiac in nature.  I did tell him if he develops chest pain he should take an nitro and be seen.    4. Osteoporosis without current pathological fracture, unspecified osteoporosis type  He will see endocrine later this year I believe.    5. Vaccine refused by patient  He once again refuses all immunizations.    6. 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    Well over 30 minutes was spent with patient in counseling regarding his multiple issues today.    The longitudinal plan of care for the diagnosis(es)/condition(s) as documented were addressed during this visit. Due to the added complexity in care, I will continue to support Binu in the subsequent management and with ongoing continuity of care.    Subjective   Binu is a 79 year old, presenting for the following health issues:  Follow Up (2 months follow up; patient report that peripheral polyneuropathy has increased) and Chest Pain (Pt reports eps of chest pains x 10 days ago; no current Sx's at this time )      4/7/2025     3:34 PM   Additional Questions   Roomed by Elinor POOLE   Accompanied by alone         4/7/2025     3:34 PM   Patient Reported Additional Medications   Patient reports taking the following new medications none     Chest Pain     History of Present Illness       Hypertension: Binu presents for follow up of hypertension.  Binu does check blood pressure  regularly outside of the clinic. Outside blood pressures have been over 140/90. Binu does not follow a low salt diet.     Hypothyroidism:     Since last visit, patient describes the following symptoms::  Fatigue    Vascular Disease:   "Binu presents for follow up of vascular disease.      Binu takes daily aspirin.Binu consumes 1 sweetened beverage(s) daily.Binu exercises with enough effort to increase Binu's heart rate 20 to 29 minutes per day.  Binu exercises with enough effort to increase Binu's heart rate 3 or less days per week.   Binu is taking medications regularly.            He comes in today for follow-up of his multimedical problems.  Complicated him with multiple medical issues.  He has been diagnosed with parkinsonism.  They had him on escalating dose of Sinemet and after taking 25 100 more than 3 times a day he started getting more dizzy so he backed it off to the 3 times a day.  He does not think it does really much for him at all.  He complains of this \"neuropathy which is a vague numbness and tingling below his knees but he tells me sometimes he can be walking and stepped on a crumb and know that the crumb is there.  Somewhat vague, no falls.  He was having days of ongoing chest pain.  Did not take any nitro.  That has been gone now.  Has known coronary disease but no MI.  Refuses vaccines.  He will be seeing endocrinology later this year finally for follow-up of his osteoporosis.          Objective    /64 (BP Location: Left arm, Patient Position: Sitting, Cuff Size: Adult Regular)   Pulse 62   Temp 98  F (36.7  C) (Tympanic)   Resp 15   Ht 1.778 m (5' 10\")   Wt 88.9 kg (196 lb)   LMP  (LMP Unknown)   SpO2 98%   BMI 28.12 kg/m    Body mass index is 28.12 kg/m .  Physical Exam   Pleasant gentleman.  Somewhat of a flat affect but otherwise no typical parkinsonian features.  No tremor noted            Signed Electronically by: COLBY BROCK MD    "

## 2025-04-09 ENCOUNTER — VIRTUAL VISIT (OUTPATIENT)
Dept: PHARMACY | Facility: CLINIC | Age: 80
End: 2025-04-09
Attending: PSYCHIATRY & NEUROLOGY
Payer: COMMERCIAL

## 2025-04-09 ENCOUNTER — TELEPHONE (OUTPATIENT)
Dept: PHARMACY | Facility: CLINIC | Age: 80
End: 2025-04-09
Payer: COMMERCIAL

## 2025-04-09 DIAGNOSIS — I10 PRIMARY HYPERTENSION: ICD-10-CM

## 2025-04-09 DIAGNOSIS — G20.C PRIMARY PARKINSONISM (H): Primary | ICD-10-CM

## 2025-04-09 DIAGNOSIS — Z78.9 TAKES DIETARY SUPPLEMENTS: ICD-10-CM

## 2025-04-09 DIAGNOSIS — E03.9 HYPOTHYROIDISM: ICD-10-CM

## 2025-04-09 DIAGNOSIS — G47.00 INSOMNIA: ICD-10-CM

## 2025-04-09 RX ORDER — COVID-19 ANTIGEN TEST
220 KIT MISCELLANEOUS PRN
COMMUNITY

## 2025-04-09 NOTE — CONFIDENTIAL NOTE
Received [employer] message from patient regarding visit today that he is unable to connect to the video visit.     Writer attempted to call patient but went straight to voicemail. Left voicemail for patient.     MTM called patient back at 11am to try again and got a hold of patient. Please see MTM visit dated 4/9/25 for details.     Rama Saenz, Pharm.D., MPH  Medication Therapy Management Pharmacist   [facility] Neurology [facility]

## 2025-04-09 NOTE — PROGRESS NOTES
Medication Therapy Management (MTM) Encounter    ASSESSMENT:                            Medication Adherence/Access: No issues identified.    Parkinson's Disease  Discussed with patient that he may not be noticing a benefit from carbidopa/levodopa as the dose might be too low.  Did discuss that some patients do not have much of a benefit from carbidopa/levodopa and other medication options may need to be explored.  Patient would prefer to trial off of the medication at this time versus attempting to increase the dose.  Education provided on potential side effects and benefits of carbidopa/levodopa.  Reasonable for patient to trial off this medication at this time and provided guidance on things to watch for to help determine if he is having some response/improvement in symptoms from the medication.  All questions answered.    Hypertension   Controlled.       Hyperlipidemia /TIA/CAD  Managed by cardiology.  Discussed with patient that next steps that may be considered include appealing for a tier exception, applying for patient assistance programs, or trial with insurance preferred medication if deemed appropriate by cardiology.  Will defer next steps to cardiology as they are the managing and prescribing providers.     Hypothyroidism   Controlled.     Supplements   Stable.  Of note patient could consider stopping vitamin B supplement//folic acid if he is not taking carbidopa/levodopa.    Insomnia:   Controlled.    PLAN:                            Medication list updated and reviewed  We discussed that you are having a hard time deciding if carbidopa/levodopa has provided any sort of benefit.  This could be because the medication is not helpful, or because you are at a dose that is too low.  You have decided you would like to first try stopping the medication to see if there is any changes  Please continue to work with insurance in your cardiology clinic regarding the best medication for you that is affordable to  help treat cholesterol.    Follow-up: 3 months with Neuro MTM or sooner if needed   Appointments in Next Year      Apr 28, 2025 2:00 PM  Nurse Only with  CVC NURSE  [facility] ([facility] ) [phone]     Apr 29, 2025 10:00 AM  MTM New with Rama Saenz [facility] Neurology [facility] ([facility] ) [phone]     May 30, 2025 2:00 PM  (Arrive by 1:45 PM)  CT CHEST W CONTRAST with UCSCCT1  [facility] [place] ([facility] ) [phone]     Jun 11, 2025 10:50 AM  (Arrive by 10:35 AM)  New Movement Disorder with JAIDA Henry CNP  [facility] Neurology Clinic [facility] ([facility]) [phone]     Jun 17, 2025 3:40 PM  (Arrive by 3:20 PM)  Provider Visit with Angel Hernandez MD  [facility] ([facility]) [phone]     Aug 25, 2025 1:00 PM  NEW ENDOCRINE with López Shin MD  [facility] ([facility]) [phone]     Sep 04, 2025 2:40 PM  (Arrive by 2:25 PM)  Return Movement Disorder with Isrrael Roman MD  [facility] [place] ([facility] ) [phone]            SUBJECTIVE/OBJECTIVE:                          Binu Galvin is a 79 year old adult seen for an initial visit. Binu was referred to me from Isrrael Roman.      Reason for visit: Initial MTM.    Allergies/ADRs: Reviewed in chart  Past Medical History: Reviewed in chart  Tobacco: Binu reports that Binu has never smoked. Binu has never used smokeless tobacco.  Alcohol: Less than 1 beverage / week  Additional Providers: Neurologist: Dr. Roman ([facility]) also sees neurologist at [place] and [place], Cardiologist, Endocrinologist,    Medication Adherence/Access:  patient has a dish with his medications in his kitchen.     Parkinson's Disease  Medication AM Afternoon Evening   Carbidopa/levodopa 25/100mg 1 tablet 1 tablet 1 tablet      Patient initially tried 3 tablets/day but got dizzy and disoriented so he backed down the dose to 1 tablet 3 times a day.  He does not remember if he tolerated 2 tablets 3 times a day or not.  He really has not noticed much of a change in starting carbidopa/levodopa and is not sure what it is supposed to be doing.  He would like to try stopping it to see if anything changes instead of increasing the dose.    Notes that he does not swing his arms when walking and has a little tremor in his right hand on occasion.  Overall does not feel stable while walking as he is swaying and balance issues. He has noticed a little bit of nausea sometimes with carbidopa/levodopa nothing limiting. He is also having decreased libido and wonders if that has anything to do with carbidopa/levodopa.    Hypertension   - Losartan 50mg every day   Patient reports no current medication side effects  Patient self monitors blood pressure.  Home BP monitoring 110s-140s/60s with a pulse in the 60s .          Hyperlipidemia /TIA/CAD  - Inclisiran (Leqvio)  284mg injections.Had his first two injections in July and October 2024. Third injection is the end of this month. This medication is pretty expensive and the cost changes with each injection he gets.   - Aspirin 81mg every day   Currently working with cardiology.  Reports his insurance would prefer him to be on Repatha but he is not sure he wants to do an injection that frequently.  He has reached out to cardiology clinic and his insurance regarding the cost of Leqvio but has not found much of a solution.  Patient reports other side effects.  The ASCVD Risk score (Danae GARCIA, et al., 2019) failed to calculate for the following reasons:    Risk score cannot be calculated because patient has a medical history suggesting  prior/existing ASCVD     Hypothyroidism   - Levothyroxine 75mcg daily.   Patient is having the following symptoms: none.      Supplements   - Vitamin D3 5000 units a few times per week   - Folic acid-pyridoxine-cyancobalamin 2.5-25-2 MG TABS per tablet (recommended by [place] neurologist) every day   No reported issues at this time.        Insomnia:   - Magnesium glycinate 100mg at bedtime as needed for sleep   No medication side effects reported.    Today's Vitals: LMP  (LMP Unknown)   ----------------    I spent 40 minutes with this patient today. All changes were made via collaborative practice agreement with Isrrael Roman.     A summary of these recommendations was sent via [employer].    Rama Saenz, Pharm.D., MPH  Medication Therapy Management Pharmacist   [facility]    Telemedicine Visit Details  The patient's medications can be safely assessed via a telemedicine encounter.  Type of service:  Telephone visit  Originating Location (pt. Location): Home    Distant Location (provider location):  Off-site  Start Time:  11:00 AM  End Time: 11:40 AM     Medication Therapy Recommendations  No medication therapy recommendations to display

## 2025-04-09 NOTE — PATIENT INSTRUCTIONS
Recommendations from today's MTM visit:                                                    MTM (medication therapy management) is a service provided by a clinical pharmacist designed to help you get the most of out of your medicines.      Medication list updated and reviewed  We discussed that you are having a hard time deciding if carbidopa/levodopa has provided any sort of benefit.  This could be because the medication is not helpful, or because you are at a dose that is too low.  You have decided you would like to first try stopping the medication to see if there is any changes  Please continue to work with insurance in your cardiology clinic regarding the best medication for you that is affordable to help treat cholesterol.    Follow-up: 3 months with Neuro MTM or sooner if needed   Appointments in Next Year      Apr 28, 2025 2:00 PM  Nurse Only with  CVC NURSE  [facility] ([facility] ) [phone]     Apr 29, 2025 10:00 AM  MTM New with Rama Saenz [facility] Neurology [facility] ([facility] ) [phone]     May 30, 2025 2:00 PM  (Arrive by 1:45 PM)  CT CHEST W CONTRAST with UCSCCT1  [facility] [place] ([facility] ) [phone]     Jun 11, 2025 10:50 AM  (Arrive by 10:35 AM)  New Movement Disorder with JAIDA Henry CNP  [facility] ([facility]) [phone]     Jun 17, 2025 3:40 PM  (Arrive by 3:20 PM)  Provider Visit with Angel Hernandez MD  [facility] ([facility]) [phone]     Aug 25, 2025 1:00 PM  NEW ENDOCRINE with López Shin MD  [facility] ([facility]) [phone]     Sep 04, 2025 2:40 PM  (Arrive by 2:25 PM)  Return  "Movement Disorder with Isrrael Roman MD  [facility] [place] ([facility] ) [phone]            It was great speaking with you today.  I value your experience and would be very thankful for your time in providing feedback in our clinic survey. In the next few days, you may receive an email or text message from [employer] with a link to a survey related to your  clinical pharmacist.\"     To schedule another MTM appointment, please call the clinic directly or you may call the MTM scheduling line at [phone].    My Clinical Pharmacist's contact information:                                                      Please feel free to contact me with any questions or concerns you have.      Rama Saenz, Pharm.D., MPH  Medication Therapy Management Pharmacist   [facility] Neurology [facility]   "

## 2025-04-24 DIAGNOSIS — E78.5 HYPERLIPIDEMIA LDL GOAL <100: Primary | ICD-10-CM

## 2025-04-24 DIAGNOSIS — Z78.9 STATIN INTOLERANCE: ICD-10-CM

## 2025-04-24 DIAGNOSIS — I71.21 ANEURYSM OF ASCENDING AORTA WITHOUT RUPTURE: ICD-10-CM

## 2025-04-28 ENCOUNTER — ALLIED HEALTH/NURSE VISIT (OUTPATIENT)
Dept: CARDIOLOGY | Facility: CLINIC | Age: 80
End: 2025-04-28
Payer: COMMERCIAL

## 2025-04-28 DIAGNOSIS — E78.5 HYPERLIPIDEMIA LDL GOAL <100: Primary | ICD-10-CM

## 2025-04-28 PROCEDURE — 96372 THER/PROPH/DIAG INJ SC/IM: CPT | Performed by: CASE MANAGER/CARE COORDINATOR

## 2025-04-28 PROCEDURE — 250N000011 HC RX IP 250 OP 636: Mod: JZ | Performed by: CASE MANAGER/CARE COORDINATOR

## 2025-04-28 RX ADMIN — INCLISIRAN 284 MG: 284 INJECTION, SOLUTION SUBCUTANEOUS at 14:15

## 2025-04-28 NOTE — NURSING NOTE
Clinic Administered Medication Documentation        Patient was given inclisiran (LEQVIO) Prior to medication administration, verified patient's identity using patient s name and date of birth. Please see MAR and medication order for additional information. Patient instructed to remain in clinic for 15 minutes and report any adverse reaction to staff immediately.    Vial/Syringe: ORESTES Zaman on 4/28/2025 at 2:19 PM

## 2025-05-01 ENCOUNTER — NURSE TRIAGE (OUTPATIENT)
Dept: INTERNAL MEDICINE | Facility: CLINIC | Age: 80
End: 2025-05-01
Payer: COMMERCIAL

## 2025-05-01 DIAGNOSIS — J01.90 ACUTE SINUSITIS, RECURRENCE NOT SPECIFIED, UNSPECIFIED LOCATION: Primary | ICD-10-CM

## 2025-05-01 NOTE — TELEPHONE ENCOUNTER
Nurse Triage SBAR    Is this a 2nd Level Triage? YES, LICENSED PRACTITIONER REVIEW IS REQUIRED    Situation:  Patient requesting antibiotics for URI.     Background:  Saw in urgency room 4/27/25 (in records).     Assessment:  Patient calls stating he has had a cold x 3 weeks and would like to try antibiotics. Urgency room 4/27/25 influenza and covid negative. Patient reports persistent cough that has worsened in the last few days. He is coughing up yellow phlegm. Feels pressure in his sinuses and left ear. Took Aleve D with some improvement of symptoms. States that he has a sore throat and headache. Denies chest pain or shortness of breath. Denies wheezing.     Afebrile. /81 Oxygen 96%    Protocol Recommended Disposition:   Home Care    Recommendation:  Patient requesting antibiotics. Routing to PCP.     Routed to provider    Patient would like antibiotics sent to [facility] in [place].     Does the patient meet one of the following criteria for ADS visit consideration? 16+ years old, with an [facility] PCP     TIP  Providers, please consider if this condition is appropriate for management at one of our Acute and Diagnostic Services sites.     If patient is a good candidate, please use dotphrase <dot>triageresponse and select Refer to ADS to document.        Reason for Disposition   Cough    Additional Information   Negative: SEVERE difficulty breathing (e.g., struggling for each breath, speaks in single words)   Negative: Bluish (or gray) lips or face now   Negative: [1] Difficulty breathing AND [2] exposure to flames, smoke, or fumes   Negative: [1] Stridor AND [2] difficulty breathing   Negative: Sounds like a life-threatening emergency to the triager   Negative: [1] Previous asthma attacks AND [2] this feels like asthma attack   Negative: Dry cough (non-productive;  no sputum or minimal clear sputum)   Negative: [1] MODERATE difficulty breathing (e.g., speaks in phrases, SOB even at rest, pulse 100-120) AND [2]  still present when not coughing   Negative: Chest pain  (Exception: MILD central chest pain, present only when coughing.)   Negative: Patient sounds very sick or weak to the triager   Negative: [1] MILD difficulty breathing (e.g., minimal/no SOB at rest, SOB with walking, pulse <100) AND [2] still present when not coughing   Negative: [1] Coughed up blood AND [2] > 1 tablespoon (15 ml)   (Exception: Blood-tinged sputum.)   Negative: Fever > 103 F (39.4 C)   Negative: [1] Fever > 101 F (38.3 C) AND [2] age > 60 years   Negative: [1] Fever > 100.0 F (37.8 C) AND [2] bedridden (e.g., CVA, chronic illness, recovering from surgery)   Negative: [1] Fever > 100.0 F (37.8 C) AND [2] diabetes mellitus or weak immune system (e.g., HIV positive, cancer chemo, splenectomy, organ transplant, chronic steroids)   Negative: Wheezing is present   Negative: SEVERE coughing spells (e.g., whooping sound after coughing, vomiting after coughing)   Negative: [1] Continuous (nonstop) coughing interferes with work or school AND [2] no improvement using cough treatment per Care Advice   Negative: Coughing up talisha-colored (reddish-brown) sputum   Negative: Fever present > 3 days (72 hours)   Negative: [1] Fever returns after gone for over 24 hours AND [2] symptoms worse or not improved   Negative: [1] Using nasal washes and pain medicine > 24 hours AND [2] sinus pain (around cheekbone or eye) persists   Negative: Earache   Negative: [1] Known COPD or other severe lung disease (i.e., bronchiectasis, cystic fibrosis, lung surgery) AND [2] worsening symptoms (i.e., increased sputum purulence or amount, increased breathing difficulty   Negative: Cough has been present for > 3 weeks   Negative: [1] Nasal discharge AND [2] present > 10 days   Negative: [1] Coughed up blood-tinged sputum AND [2] more than once   Negative: Exposure to TB (Tuberculosis)    Protocols used: Cough - Acute Fvyehhfivy-G-PE

## 2025-05-16 ENCOUNTER — TRANSFERRED RECORDS (OUTPATIENT)
Dept: HEALTH INFORMATION MANAGEMENT | Facility: CLINIC | Age: 80
End: 2025-05-16

## 2025-05-30 ENCOUNTER — ANCILLARY PROCEDURE (OUTPATIENT)
Dept: CT IMAGING | Facility: CLINIC | Age: 80
End: 2025-05-30
Attending: CASE MANAGER/CARE COORDINATOR
Payer: COMMERCIAL

## 2025-05-30 DIAGNOSIS — I71.21 ANEURYSM OF ASCENDING AORTA WITHOUT RUPTURE: ICD-10-CM

## 2025-05-30 LAB
CREAT BLD-MCNC: 1.2 MG/DL (ref 0.5–1.2)
EGFRCR SERPLBLD CKD-EPI 2021: >60 ML/MIN/1.73M2

## 2025-05-30 PROCEDURE — 82565 ASSAY OF CREATININE: CPT | Performed by: PATHOLOGY

## 2025-05-30 PROCEDURE — 71275 CT ANGIOGRAPHY CHEST: CPT | Performed by: RADIOLOGY

## 2025-05-30 PROCEDURE — 74174 CTA ABD&PLVS W/CONTRAST: CPT | Performed by: RADIOLOGY

## 2025-05-30 RX ORDER — IOPAMIDOL 755 MG/ML
120 INJECTION, SOLUTION INTRAVASCULAR ONCE
Status: COMPLETED | OUTPATIENT
Start: 2025-05-30 | End: 2025-05-30

## 2025-05-30 RX ADMIN — IOPAMIDOL 120 ML: 755 INJECTION, SOLUTION INTRAVASCULAR at 14:05

## 2025-05-30 NOTE — DISCHARGE INSTRUCTIONS

## 2025-06-02 ENCOUNTER — RESULTS FOLLOW-UP (OUTPATIENT)
Dept: CARDIOLOGY | Facility: CLINIC | Age: 80
End: 2025-06-02

## 2025-06-11 ENCOUNTER — OFFICE VISIT (OUTPATIENT)
Dept: NEUROLOGY | Facility: CLINIC | Age: 80
End: 2025-06-11
Payer: COMMERCIAL

## 2025-06-11 VITALS — HEART RATE: 64 BPM | SYSTOLIC BLOOD PRESSURE: 118 MMHG | DIASTOLIC BLOOD PRESSURE: 74 MMHG

## 2025-06-11 DIAGNOSIS — R94.02 ABNORMAL BRAIN SCAN: Primary | ICD-10-CM

## 2025-06-11 DIAGNOSIS — R26.9 GAIT DIFFICULTY: ICD-10-CM

## 2025-06-11 PROCEDURE — 99215 OFFICE O/P EST HI 40 MIN: CPT | Performed by: NURSE PRACTITIONER

## 2025-06-11 PROCEDURE — 3078F DIAST BP <80 MM HG: CPT | Performed by: NURSE PRACTITIONER

## 2025-06-11 PROCEDURE — G2211 COMPLEX E/M VISIT ADD ON: HCPCS | Performed by: NURSE PRACTITIONER

## 2025-06-11 PROCEDURE — 3074F SYST BP LT 130 MM HG: CPT | Performed by: NURSE PRACTITIONER

## 2025-06-11 ASSESSMENT — UNIFIED PARKINSONS DISEASE RATING SCALE (UPDRS)
AMPLITUDE_LUE: (0) NORMAL: NO TREMOR.
RIGIDITY_LLE: (0) NORMAL: NO RIGIDITY.
AXIAL_SCORE: 8
POSTURAL_STABILITY: (0) NORMAL: NO PROBLEMS. RECOVERS WITH ONE OR TWO STEPS.
PRONATION_SUPINATION_RIGHT: (2) MILD: ANY OF THE FOLLOWING: A) 3 TO 5 INTERRUPTIONS DURING TAPPING  B) MILD SLOWING  C) THE AMPLITUDE DECREMENTS MIDWAY IN THE 10-MOVEMENT SEQUENCE.
RIGIDITY_LUE: (0) NORMAL: NO RIGIDITY.
RIGIDITY_RUE: (1) SLIGHT: RIGIDITY ONLY DETECTED WITH ACTIVATION MANEUVER.
TOTAL_SCORE: 19
LEG_AGILITY_RIGHT: (0) NORMAL: NO PROBLEMS.
TOTAL_SCORE_LEFT: 5
FACIAL_EXPRESSION: (3) MASKED FACIES WITH LIPS PARTED SOME OF THE TIME WHEN THE MOUTH IS AT REST.
RIGIDITY_NECK: (1) SLIGHT: RIGIDITY ONLY DETECTED WITH ACTIVATION MANEUVER.
DYSKINESIAS_PRESENT: NO
LEG_AGILITY_LEFT: (1) SLIGHT: ANY OF THE FOLLOWING: A) THE REGULAR RHYTHM IS BROKEN WITH ONE WITH ONE OR TWO INTERRUPTIONS OR HESITATIONS OF THE MOVEMENT  B) SLIGHT SLOWING  C) THE AMPLITUDE DECREMENTS NEAR THE END OF THE 10 MOVEMENTS.
HANDMOVEMENTS_RIGHT: (1) SLIGHT: ANY OF THE FOLLOWING: A) THE REGULAR RHYTHM IS BROKEN WITH ONE WITH ONE OR TWO INTERRUPTIONS OR HESITATIONS OF THE MOVEMENT  B) SLIGHT SLOWING  C) THE AMPLITUDE DECREMENTS NEAR THE END OF THE 10 MOVEMENTS.
RIGIDITY_RLE: (0) NORMAL: NO RIGIDITY.
HANDMOVEMENTS_LEFT: (1) SLIGHT: ANY OF THE FOLLOWING: A) THE REGULAR RHYTHM IS BROKEN WITH ONE WITH ONE OR TWO INTERRUPTIONS OR HESITATIONS OF THE MOVEMENT  B) SLIGHT SLOWING  C) THE AMPLITUDE DECREMENTS NEAR THE END OF THE 10 MOVEMENTS.
POSTURE: (1) SLIGHT: NOT QUITE ERECT, BUT COULD BE NORMAL FOR OLDER PERSONS.
CONSTANCY_TREMOR_ATREST: (0) NORMAL: NO TREMOR.
TOTAL_SCORE: 6
TOETAPPING_LEFT: (1) SLIGHT: ANY OF THE FOLLOWING: A) THE REGULAR RHYTHM IS BROKEN WITH ONE WITH ONE OR TWO INTERRUPTIONS OR HESITATIONS OF THE MOVEMENT  B) SLIGHT SLOWING  C) THE AMPLITUDE DECREMENTS NEAR THE END OF THE 10 MOVEMENTS.
ARISING_CHAIR: (0) NORMAL: NO PROBLEMS. ABLE TO ARISE QUICKLY WITHOUT HESITATION.
GAIT: (1) SLIGHT: INDEPENDENT WALKING WITH MINOR GAIT IMPAIRMENT.
FINGER_TAPPING_RIGHT: (1) SLIGHT: ANY OF THE FOLLOWING: A) THE REGULAR RHYTHM IS BROKEN WITH ONE WITH ONE OR TWO INTERRUPTIONS OR HESITATIONS OF THE MOVEMENT  B) SLIGHT SLOWING  C) THE AMPLITUDE DECREMENTS NEAR THE END OF THE 10 MOVEMENTS.
PARKINSONS_MEDS: ON
PRONATION_SUPINATION_LEFT: (1) SLIGHT: ANY OF THE FOLLOWING: A) THE REGULAR RHYTHM IS BROKEN WITH ONE WITH ONE OR TWO INTERRUPTIONS OR HESITATIONS OF THE MOVEMENT  B) SLIGHT SLOWING  C) THE AMPLITUDE DECREMENTS NEAR THE END OF THE 10 MOVEMENTS.
SPEECH: (1) SLIGHT: LOSS OF MODULATION, DICTION OR VOLUME, BUT STILL ALL WORDS EASY TO UNDERSTAND.
FREEZING_GAIT: (0) NORMAL: NO FREEZING.
TOETAPPING_RIGHT: (0) NORMAL: NO PROBLEMS.
AMPLITUDE_RUE: (0) NORMAL: NO TREMOR.
AMPLITUDE_LLE: (0) NORMAL: NO TREMOR.
SPONTANEITY_OF_MOVEMENT: (1) SLIGHT: SLIGHT GLOBAL SLOWNESS AND POVERTY OF SPONTANEOUS MOVEMENTS.
AMPLITUDE_LIP_JAW: (0) NORMAL: NO TREMOR.
AMPLITUDE_RLE: (0) NORMAL: NO TREMOR.
FINGER_TAPPING_LEFT: (1) SLIGHT: ANY OF THE FOLLOWING: A) THE REGULAR RHYTHM IS BROKEN WITH ONE WITH ONE OR TWO INTERRUPTIONS OR HESITATIONS OF THE MOVEMENT  B) SLIGHT SLOWING  C) THE AMPLITUDE DECREMENTS NEAR THE END OF THE 10 MOVEMENTS.

## 2025-06-11 NOTE — PROGRESS NOTES
ASSESSMENT:    Gait Difficulty: Possibly due to parkinsonism and chronic bilateral S1 radiculopathy.   At this point, no falls and no assistive device for walking. No clear benefit from Levodopa.   He has minimal PD related motor symptoms as seen in his UPDRS. It would be okay to see the progression overtime and restart Levodopa in the future.      Abnormal DaTscan:       PLAN:    The above assessment discussed, questions addressed, and the following patient instructions provided: -     __  It's reasonable to stop the Sinemet for now.  We'll continue to monitor your symptoms and restart it as needed if mobility, tremor, or rigidity got worse.     __  Call the [facility] to schedule your PT and Speech Therapy. The number is [phone].      __  You have an appointment with Dr. Roman in September. You may return sooner as needed.        MOVEMENT DISORDERS CLINIC  [facility]           PATIENT: Artis Galvin    : 1945    KARTIK: 2025    REASON FOR VISIT: Parkinson's Disease (PD) follow up.    HPI:   Artis Galvin is a 80 year old who came to the clinic by himself for a follow up visit.  Binu was last seen in the clinic on 25 by Dr. Roman for evaluation of parkinsonism. During that visit, he was noted to have significant anxiety and was referred to health psychology and was referred to  see one of our pharmacists for Medication Therapy Management (MTM). He saw Dr. Saenz, Pharm.D. with MTM and Sinemet was stopped due to ineffectiveness and the patient noted no change and was subsequently restarted on it.    Pertinent PD Hx: Seen at [facility] in [place] for peripheral neuropathy and gait difficulty. Has a Hx of gradual progressive decline in his gait since . Due to other parkinsonian features a DaTscan was done to look for dopaminergic deficit.      2024: DaTscan - Markedly abnormal striatal radiotracer distribution/uptake, most prominent in the left putamen.  Moderately decreased caudate nuclei uptake left worse than right.     He was started on Carbidopa/Levodopa (CD/LD) in Dec 2024.     09/20/2024 - EMG: This is an abnormal study. The electrodiagnostic abnormalities are most in keeping with a chronic bilateral S1 radiculopathy without active denervation. However, it is difficult to exclude a mild, motor-predominant, length-dependent peripheral neuropathy based on the results of this study.     Today, pt reports that his symptoms started with paresthesia in below knees bilaterally. He has numbness, but not much pain. He also had difficulty walking straight. He was seen at [facility] and an EMG. He also went to the [facility] as his daughter works as a transplant coordinator at [employer].     There is a decline in his gait. He played [email or web address]. He stumbles easily. No assistive device for walking like cane or walker. No falls.  Stairs are difficult.     He was experimenting how Sinemet was affecting his memory, and mobility.  The highest dose he was taking Sinemet was 3 tabs TID. He only stayed on this for a couple of days and had to lower the dose due to nausea, lightheadedness, and feeling disoriented. He tapered off Sinemet. Now he is taking 1 tab daily. He is not sure if he had any benefit in his gait.     Pertinent  Medications Indications 4 - 6 am   Sinemet 25/100 mg  PD 1          No issues with swallowing.    Short-term memory is bad.     No hallucinations. He has seen things in the corner of his eyes. But no clear hallucinations.    Sleep is inturrupted and he gets up 2-3 x a night.  No REM behavior disorder.    He has lightheadedness even when sitting. This morning, he got lightheaded while sitting that lasted for 10-15 seconds.     No constipation.       MEDICATIONS:   Current Outpatient Medications   Medication Sig Dispense Refill    aspirin (ASA) 81 MG chewable tablet Take 81 mg by mouth daily.      carbidopa-levodopa (SINEMET)  MG  tablet 1 tablet 3 times daily.      fa-pyridoxine-cyancobalamin 2.5-25-2 MG TABS per tablet Take 1 tablet by mouth daily.      gabapentin (NEURONTIN) 100 MG capsule 100MG AT BEDTIME, MAY INCREASE BY 100MG EVERY FEW DAYS AS TOLERATED TO MAX DOSE OF 400MG AT BEDTIME 120 capsule 3    ketoconazole (NIZORAL) 2 % external cream APPLY TOPICALLY DAILY (Patient taking differently: Apply topically as needed.) 30 g 1    levothyroxine (SYNTHROID/LEVOTHROID) 75 MCG tablet Take 1 tablet (75 mcg) by mouth daily. 100 tablet 2    losartan (COZAAR) 50 MG tablet TAKE 1 TABLET BY MOUTH EVERY  EVENING 90 tablet 3    MAGNESIUM GLUCONATE PO Take 100 mg by mouth nightly as needed.      naproxen sodium 220 MG capsule Take 220 mg by mouth as needed.      nitroGLYcerin (NITROSTAT) 0.4 MG sublingual tablet For chest pain place 1 tablet under the tongue every 5 minutes for 3 doses. If symptoms persist 5 minutes after 1st dose call 911. 25 tablet 3    pilocarpine (PILOCAR) 2 % ophthalmic solution Place 1-2 drops into both eyes daily       Current Facility-Administered Medications   Medication Dose Route Frequency Provider Last Rate Last Admin    inclisiran (LEQVIO) SQ injection 284 mg  284 mg Subcutaneous Once Yamileth Mendoza, JAIDA CNP           PHYSICAL EXAM:    VITAL SIGNS:  Blood pressure 118/74, pulse 64.    GENERAL:    Kamar is a pleasant patient who is well-groomed and well-developed sitting comfortably in the exam room without any distress.  Affect is appropriate.    MOVEMENT DISORDERS ASSESSMENT: MDS UPDRS III (Score range: 0-132)       2/28/2025     3:00 PM 6/11/2025    11:00 AM   UPDRS Motor Scale   Time: 15:28 11:39   Medication On On       Last Sinemet 25/100 mg 1 tab around 10 am   R Brain DBS: None None   L Brain DBS: None None   Dyskinesia (LID) No No   Did LID interfere No    Speech 1 1   Facial Expression 3 3   Rigidity Neck 1 1   Rigidity RUE 2 1   Rigidity LUE 1 0   Rigidity RLE 0 0   Rigidity LLE 0 0   Finger Taps R 1 1    Finger Taps L 1 1   Hand Mvt R 1 1   Hand Mvt L 0 1   Pron-/Supinate R 1 2   Pron-/Supinate L 0 1   Toe Tap R 1 0   Toe Tap L 0 1   Leg Agility R 1 0   Leg Agility L 0 1   Arise From Chair 0 0   Gait 1 1       Reduced arm swing bilaterally Rt > Lt.   Gait Freezing 0 0   Postural Stability 0 0   Posture 1 1   Global Spont Mvt 1 1   Postural Tremor RUE 0 1   Postural Tremor LUE 0 0   Kinetic Tremor RUE 0 0   Kinetic Tremor LUE 0 0   Rest Tremor RUE 0 0   Rest Tremor LUE 0 0   Rest Tremor RLE 0 0   Rest Tremor LLE 0 0   Rest Tremor Lip/Jaw 0 0   Rest Tremor Constancy 0 0   Total Right 7 6   Total Left 2 5   Axial Total 8 8   Total 17 19       Today I spent 55 minutes caring for the patient. Time was spent in reviewing records, obtaining history,  answering questions, examining, and documentation.    Lenore Stover, WHITNEY, APRN  [facility]    The longitudinal plan of care for the diagnosis(es)/ condition(s) as documented were addressed during this visit. Due to the added complexity in care, I will continue to support Binu in the subsequent management and with ongoing continuity of care.

## 2025-06-11 NOTE — NURSING NOTE
Chief Complaint   Patient presents with    Consult For     Parkinson   Patient of Dr. Jessie Damon [facility]

## 2025-06-11 NOTE — LETTER
2025      Artis Galvin  [address]  [place] [address]      Dear Colleague,    Thank you for referring your patient, Artis Galvin, to the [facility]. Please see a copy of my visit note below.      ASSESSMENT:    Gait Difficulty: Possibly due to parkinsonism and chronic bilateral S1 radiculopathy.   At this point, no falls and no assistive device for walking. No clear benefit from Levodopa.   He has minimal PD related motor symptoms as seen in his UPDRS. It would be okay to see the progression overtime and restart Levodopa in the future.      Abnormal DaTscan:       PLAN:    The above assessment discussed, questions addressed, and the following patient instructions provided: -     __  It's reasonable to stop the Sinemet for now.  We'll continue to monitor your symptoms and restart it as needed if mobility, tremor, or rigidity got worse.     __  Call the [facility] to schedule your PT and Speech Therapy. The number is [phone].      __  You have an appointment with Dr. Roman in September. You may return sooner as needed.        MOVEMENT DISORDERS CLINIC  [facility]           PATIENT: Artis Galvin    : 1945    KARTIK: 2025    REASON FOR VISIT: Parkinson's Disease (PD) follow up.    HPI:   Artis Galvin is a 80 year old who came to the clinic by himself for a follow up visit.  Binu was last seen in the clinic on 25 by Dr. Roman for evaluation of parkinsonism. During that visit, he was noted to have significant anxiety and was referred to health psychology and was referred to  see one of our pharmacists for Medication Therapy Management (MTM). He saw Dr. Saenz, Pharm.D. with MTM and Sinemet was stopped due to ineffectiveness and the patient noted no change and was subsequently restarted on it.    Pertinent PD Hx: Seen at [facility] in [place] for peripheral neuropathy and gait difficulty. Has a  Hx of gradual progressive decline in his gait since 2020. Due to other parkinsonian features a DaTscan was done to look for dopaminergic deficit.      11/21/2024: DaTscan - Markedly abnormal striatal radiotracer distribution/uptake, most prominent in the left putamen. Moderately decreased caudate nuclei uptake left worse than right.     He was started on Carbidopa/Levodopa (CD/LD) in Dec 2024.     09/20/2024 - EMG: This is an abnormal study. The electrodiagnostic abnormalities are most in keeping with a chronic bilateral S1 radiculopathy without active denervation. However, it is difficult to exclude a mild, motor-predominant, length-dependent peripheral neuropathy based on the results of this study.     Today, pt reports that his symptoms started with paresthesia in below knees bilaterally. He has numbness, but not much pain. He also had difficulty walking straight. He was seen at [facility] and an EMG. He also went to the [facility] as his daughter works as a transplant coordinator at [employer].     There is a decline in his gait. He played rac[employer]right[employer]ALK. He stumbles easily. No assistive device for walking like cane or walker. No falls.  Stairs are difficult.     He was experimenting how Sinemet was affecting his memory, and mobility.  The highest dose he was taking Sinemet was 3 tabs TID. He only stayed on this for a couple of days and had to lower the dose due to nausea, lightheadedness, and feeling disoriented. He tapered off Sinemet. Now he is taking 1 tab daily. He is not sure if he had any benefit in his gait.     Pertinent  Medications Indications 4 - 6 am   Sinemet 25/100 mg  PD 1          No issues with swallowing.    Short-term memory is bad.     No hallucinations. He has seen things in the corner of his eyes. But no clear hallucinations.    Sleep is inturrupted and he gets up 2-3 x a night.  No REM behavior disorder.    He has lightheadedness even when sitting. This morning, he got  lightheaded while sitting that lasted for 10-15 seconds.     No constipation.       MEDICATIONS:   Current Outpatient Medications   Medication Sig Dispense Refill     aspirin (ASA) 81 MG chewable tablet Take 81 mg by mouth daily.       carbidopa-levodopa (SINEMET)  MG tablet 1 tablet 3 times daily.       fa-pyridoxine-cyancobalamin 2.5-25-2 MG TABS per tablet Take 1 tablet by mouth daily.       gabapentin (NEURONTIN) 100 MG capsule 100MG AT BEDTIME, MAY INCREASE BY 100MG EVERY FEW DAYS AS TOLERATED TO MAX DOSE OF 400MG AT BEDTIME 120 capsule 3     ketoconazole (NIZORAL) 2 % external cream APPLY TOPICALLY DAILY (Patient taking differently: Apply topically as needed.) 30 g 1     levothyroxine (SYNTHROID/LEVOTHROID) 75 MCG tablet Take 1 tablet (75 mcg) by mouth daily. 100 tablet 2     losartan (COZAAR) 50 MG tablet TAKE 1 TABLET BY MOUTH EVERY  EVENING 90 tablet 3     MAGNESIUM GLUCONATE PO Take 100 mg by mouth nightly as needed.       naproxen sodium 220 MG capsule Take 220 mg by mouth as needed.       nitroGLYcerin (NITROSTAT) 0.4 MG sublingual tablet For chest pain place 1 tablet under the tongue every 5 minutes for 3 doses. If symptoms persist 5 minutes after 1st dose call 911. 25 tablet 3     pilocarpine (PILOCAR) 2 % ophthalmic solution Place 1-2 drops into both eyes daily       Current Facility-Administered Medications   Medication Dose Route Frequency Provider Last Rate Last Admin     inclisiran (LEQVIO) SQ injection 284 mg  284 mg Subcutaneous Once Yamileth Mendoza, APRN CNP           PHYSICAL EXAM:    VITAL SIGNS:  Blood pressure 118/74, pulse 64.    GENERAL:    Kamar is a pleasant patient who is well-groomed and well-developed sitting comfortably in the exam room without any distress.  Affect is appropriate.    MOVEMENT DISORDERS ASSESSMENT: MDS UPDRS III (Score range: 0-132)       2/28/2025     3:00 PM 6/11/2025    11:00 AM   UPDRS Motor Scale   Time: 15:28 11:39   Medication On On       Last Sinemet  25/100 mg 1 tab around 10 am   R Brain DBS: None None   L Brain DBS: None None   Dyskinesia (LID) No No   Did LID interfere No    Speech 1 1   Facial Expression 3 3   Rigidity Neck 1 1   Rigidity RUE 2 1   Rigidity LUE 1 0   Rigidity RLE 0 0   Rigidity LLE 0 0   Finger Taps R 1 1   Finger Taps L 1 1   Hand Mvt R 1 1   Hand Mvt L 0 1   Pron-/Supinate R 1 2   Pron-/Supinate L 0 1   Toe Tap R 1 0   Toe Tap L 0 1   Leg Agility R 1 0   Leg Agility L 0 1   Arise From Chair 0 0   Gait 1 1       Reduced arm swing bilaterally Rt > Lt.   Gait Freezing 0 0   Postural Stability 0 0   Posture 1 1   Global Spont Mvt 1 1   Postural Tremor RUE 0 1   Postural Tremor LUE 0 0   Kinetic Tremor RUE 0 0   Kinetic Tremor LUE 0 0   Rest Tremor RUE 0 0   Rest Tremor LUE 0 0   Rest Tremor RLE 0 0   Rest Tremor LLE 0 0   Rest Tremor Lip/Jaw 0 0   Rest Tremor Constancy 0 0   Total Right 7 6   Total Left 2 5   Axial Total 8 8   Total 17 19       Today I spent 55 minutes caring for the patient. Time was spent in reviewing records, obtaining history,  answering questions, examining, and documentation.    Lenore Stover DNP, APRN  [facility]    The longitudinal plan of care for the diagnosis(es)/ condition(s) as documented were addressed during this visit. Due to the added complexity in care, I will continue to support Binu in the subsequent management and with ongoing continuity of care.      Again, thank you for allowing me to participate in the care of your patient.        Sincerely,        JAIDA Jin CNP    Electronically signed

## 2025-06-11 NOTE — PATIENT INSTRUCTIONS
Dear   Artis Galvin,    Thank you for coming today.  During your visit, we have discussed the following:     __  It's reasonable to stop the Sinemet for now.  We'll continue to monitor your symptoms and restart it as needed if mobility, tremor, or rigidity got worse.     __  Call the [facility] to schedule your PT and Speech Therapy. The number is [phone].      __  You have an appointment with Dr. Roman in September. You may return sooner as needed.      PD Resources:    [facility] Resource:  [email or web address]  Taking charge of your Health and Wellbing.     Parkinson's Foundation: https://www.parkinson.org/    American Parkinson Disease Association: https://www.apdaparkinson.org/    Loud Therapy Speech Exercises:   https://www.youViolin Memoryube.com/watch?v=B2cezpq5IDj     Big Therapy Physical Therapy Exercises:   https://www.youtube.com/watch?v=pgtGOgVIhqc    Dance for PD  [email or web address]://[email or web address]     Research that has shown motor and non-motor PD symptoms Improvement with Dancing: -    https://www.mdpi.com/4137-2917/11/7/895/htm    Power for PD  https://www.powerforparkinsons.org/   Has several exercises - Balance, Strength, Rhythm Cognitive, Speech, Seated, Standing . . . .     For questions, you may send us a mPATH message or call [phone]    Fax number: [phone]    Lenore Stover DNP, APRN  [facility]

## 2025-06-17 ENCOUNTER — LAB (OUTPATIENT)
Dept: LAB | Facility: CLINIC | Age: 80
End: 2025-06-17
Payer: COMMERCIAL

## 2025-06-17 ENCOUNTER — ORDERS ONLY (AUTO-RELEASED) (OUTPATIENT)
Dept: INTERNAL MEDICINE | Facility: CLINIC | Age: 80
End: 2025-06-17

## 2025-06-17 ENCOUNTER — OFFICE VISIT (OUTPATIENT)
Dept: INTERNAL MEDICINE | Facility: CLINIC | Age: 80
End: 2025-06-17
Payer: COMMERCIAL

## 2025-06-17 VITALS
SYSTOLIC BLOOD PRESSURE: 126 MMHG | HEIGHT: 70 IN | OXYGEN SATURATION: 96 % | RESPIRATION RATE: 16 BRPM | WEIGHT: 192.4 LBS | HEART RATE: 64 BPM | TEMPERATURE: 98 F | BODY MASS INDEX: 27.54 KG/M2 | DIASTOLIC BLOOD PRESSURE: 71 MMHG

## 2025-06-17 DIAGNOSIS — M81.0 OSTEOPOROSIS WITHOUT CURRENT PATHOLOGICAL FRACTURE, UNSPECIFIED OSTEOPOROSIS TYPE: ICD-10-CM

## 2025-06-17 DIAGNOSIS — C61 MALIGNANT TUMOR OF PROSTATE (H): ICD-10-CM

## 2025-06-17 DIAGNOSIS — R55 PRE-SYNCOPE: ICD-10-CM

## 2025-06-17 DIAGNOSIS — R55 PRE-SYNCOPE: Primary | ICD-10-CM

## 2025-06-17 DIAGNOSIS — C61 MALIGNANT NEOPLASM OF PROSTATE (H): Primary | ICD-10-CM

## 2025-06-17 DIAGNOSIS — I25.10 NONOCCLUSIVE CORONARY ATHEROSCLEROSIS OF NATIVE CORONARY ARTERY: ICD-10-CM

## 2025-06-17 DIAGNOSIS — F34.1 DYSTHYMIA: ICD-10-CM

## 2025-06-17 DIAGNOSIS — G20.C PRIMARY PARKINSONISM (H): ICD-10-CM

## 2025-06-17 PROCEDURE — 3074F SYST BP LT 130 MM HG: CPT | Performed by: INTERNAL MEDICINE

## 2025-06-17 PROCEDURE — 3078F DIAST BP <80 MM HG: CPT | Performed by: INTERNAL MEDICINE

## 2025-06-17 PROCEDURE — 99214 OFFICE O/P EST MOD 30 MIN: CPT | Performed by: INTERNAL MEDICINE

## 2025-06-17 PROCEDURE — G2211 COMPLEX E/M VISIT ADD ON: HCPCS | Performed by: INTERNAL MEDICINE

## 2025-06-17 ASSESSMENT — PATIENT HEALTH QUESTIONNAIRE - PHQ9
SUM OF ALL RESPONSES TO PHQ QUESTIONS 1-9: 0
10. IF YOU CHECKED OFF ANY PROBLEMS, HOW DIFFICULT HAVE THESE PROBLEMS MADE IT FOR YOU TO DO YOUR WORK, TAKE CARE OF THINGS AT HOME, OR GET ALONG WITH OTHER PEOPLE: NOT DIFFICULT AT ALL
SUM OF ALL RESPONSES TO PHQ QUESTIONS 1-9: 0

## 2025-06-17 NOTE — PROGRESS NOTES
1. Pre-syncope (Primary)  His symptoms almost sound like presyncope.  I will do another Zio patch.  It has been sometime.  He has never had syncope  - ZIO PATCH MAIL OUT; Future    2. Malignant tumor of prostate (H)  He had a PSA drawn today.    3. Primary parkinsonism (H)  He is off Sinemet as it did not seem to do much for him.    4. Osteoporosis without current pathological fracture, unspecified osteoporosis type  He will be seeing endocrinology later this year    5. Dysthymia  He has been intolerant of medications in the past.  Denies wanting to take anything for this.    6. Nonocclusive coronary atherosclerosis of native coronary artery  Continue inclisiran which has brought his lipids down nicely.  Continue low-dose aspirin as well.    I will see him back in 2 to 3 months again.  Sooner if anything comes up.    The longitudinal plan of care for the diagnosis(es)/condition(s) as documented were addressed during this visit. Due to the added complexity in care, I will continue to support Binu in the subsequent management and with ongoing continuity of care.    Subjective   Binu is a 80 year old, presenting for the following health issues:  RECHECK (Follow-up health check for their heart and bad circulation problems)      6/17/2025     3:22 PM   Additional Questions   Roomed by Pat   Accompanied by Alone     History of Present Illness       Vascular Disease:  Binu presents for follow up of vascular disease.      Binu takes daily aspirin.    Bniu eats 2-3 servings of fruits and vegetables daily. Binu exercises with enough effort to increase Binu's heart rate 3 or less days per week.             Binu comes in today for follow-up of his multimedical problems.  Very pleasant gentleman who has a whole host of ailments including prostate cancer parkinsonism.sex somewhat atypical osteoporosis dysthymia and coronary disease.  He is here for his general follow-up.  He has poor motivation to do a lot of things.  He feels  "fatigued a lot.  This has been longstanding for him.  He has not really had any new changes.  He does note that on occasion he will be sitting there and he feels like the whole room goes black like he may possibly pass out.          Objective    /71 (BP Location: Left arm, Patient Position: Sitting, Cuff Size: Adult Regular)   Pulse 64   Temp 98  F (36.7  C) (Tympanic)   Resp 16   Ht 1.778 m (5' 10\")   Wt 87.3 kg (192 lb 6.4 oz)   SpO2 96%   BMI 27.61 kg/m    Body mass index is 27.61 kg/m .  Physical Exam   Pleasant gentleman.  PHQ-9 is noted.            Signed Electronically by: COLBY BROCK MD    "

## 2025-07-17 LAB — CV ZIO PRELIM RESULTS: NORMAL

## 2025-07-24 ENCOUNTER — TRANSFERRED RECORDS (OUTPATIENT)
Dept: HEALTH INFORMATION MANAGEMENT | Facility: CLINIC | Age: 80
End: 2025-07-24
Payer: COMMERCIAL

## 2025-07-26 ENCOUNTER — MYC MEDICAL ADVICE (OUTPATIENT)
Dept: CARDIOLOGY | Facility: CLINIC | Age: 80
End: 2025-07-26
Payer: COMMERCIAL

## 2025-07-26 DIAGNOSIS — I48.91 A-FIB (H): Primary | ICD-10-CM

## 2025-07-29 ENCOUNTER — PATIENT OUTREACH (OUTPATIENT)
Dept: CARE COORDINATION | Facility: CLINIC | Age: 80
End: 2025-07-29

## 2025-07-29 ENCOUNTER — HOSPITAL ENCOUNTER (EMERGENCY)
Facility: CLINIC | Age: 80
Discharge: HOME | End: 2025-07-29
Attending: EMERGENCY MEDICINE
Payer: COMMERCIAL

## 2025-07-29 VITALS
SYSTOLIC BLOOD PRESSURE: 143 MMHG | TEMPERATURE: 97.8 F | OXYGEN SATURATION: 95 % | RESPIRATION RATE: 18 BRPM | HEART RATE: 54 BPM | DIASTOLIC BLOOD PRESSURE: 75 MMHG

## 2025-07-29 DIAGNOSIS — R07.89 ATYPICAL CHEST PAIN: ICD-10-CM

## 2025-07-29 DIAGNOSIS — R00.2 HEART PALPITATIONS: Primary | ICD-10-CM

## 2025-07-29 LAB
ANION GAP SERPL CALCULATED.3IONS-SCNC: 12 MMOL/L (ref 7–15)
ATRIAL RATE - MUSE: 57 BPM
BASOPHILS # BLD AUTO: 0.1 10E3/UL (ref 0–0.2)
BASOPHILS NFR BLD AUTO: 1 %
BUN SERPL-MCNC: 17.5 MG/DL (ref 8–23)
CALCIUM SERPL-MCNC: 8.9 MG/DL (ref 8.8–10.4)
CHLORIDE SERPL-SCNC: 107 MMOL/L (ref 98–107)
CREAT SERPL-MCNC: 1.12 MG/DL (ref 0.51–1.17)
DIASTOLIC BLOOD PRESSURE - MUSE: NORMAL MMHG
EGFRCR SERPLBLD CKD-EPI 2021: 67 ML/MIN/1.73M2
EOSINOPHIL # BLD AUTO: 0.1 10E3/UL (ref 0–0.7)
EOSINOPHIL NFR BLD AUTO: 2 %
ERYTHROCYTE [DISTWIDTH] IN BLOOD BY AUTOMATED COUNT: 12.5 % (ref 10–15)
GLUCOSE SERPL-MCNC: 88 MG/DL (ref 70–99)
HCO3 SERPL-SCNC: 21 MMOL/L (ref 22–29)
HCT VFR BLD AUTO: 46.2 % (ref 35–53)
HGB BLD-MCNC: 16 G/DL (ref 11.7–17.7)
HOLD SPECIMEN: NORMAL
IMM GRANULOCYTES # BLD: 0 10E3/UL
IMM GRANULOCYTES NFR BLD: 0 %
INTERPRETATION ECG - MUSE: NORMAL
LYMPHOCYTES # BLD AUTO: 0.7 10E3/UL (ref 0.8–5.3)
LYMPHOCYTES NFR BLD AUTO: 10 %
MCH RBC QN AUTO: 31.5 PG (ref 26.5–33)
MCHC RBC AUTO-ENTMCNC: 34.6 G/DL (ref 31.5–36.5)
MCV RBC AUTO: 91 FL (ref 78–100)
MONOCYTES # BLD AUTO: 0.7 10E3/UL (ref 0–1.3)
MONOCYTES NFR BLD AUTO: 10 %
NEUTROPHILS # BLD AUTO: 5.4 10E3/UL (ref 1.6–8.3)
NEUTROPHILS NFR BLD AUTO: 78 %
NRBC # BLD AUTO: 0 10E3/UL
NRBC BLD AUTO-RTO: 0 /100
P AXIS - MUSE: 31 DEGREES
PLATELET # BLD AUTO: 231 10E3/UL (ref 150–450)
POTASSIUM SERPL-SCNC: 4.3 MMOL/L (ref 3.4–5.3)
PR INTERVAL - MUSE: 206 MS
QRS DURATION - MUSE: 90 MS
QT - MUSE: 418 MS
QTC - MUSE: 406 MS
R AXIS - MUSE: 10 DEGREES
RBC # BLD AUTO: 5.08 10E6/UL (ref 3.8–5.9)
SODIUM SERPL-SCNC: 140 MMOL/L (ref 135–145)
SYSTOLIC BLOOD PRESSURE - MUSE: NORMAL MMHG
T AXIS - MUSE: 0 DEGREES
TROPONIN T SERPL HS-MCNC: 25 NG/L
TROPONIN T SERPL HS-MCNC: 28 NG/L
TSH SERPL DL<=0.005 MIU/L-ACNC: 2.38 UIU/ML (ref 0.3–4.2)
VENTRICULAR RATE- MUSE: 57 BPM
WBC # BLD AUTO: 6.9 10E3/UL (ref 4–11)

## 2025-07-29 PROCEDURE — 85014 HEMATOCRIT: CPT | Performed by: EMERGENCY MEDICINE

## 2025-07-29 PROCEDURE — 84443 ASSAY THYROID STIM HORMONE: CPT | Performed by: EMERGENCY MEDICINE

## 2025-07-29 PROCEDURE — 80048 BASIC METABOLIC PNL TOTAL CA: CPT | Performed by: EMERGENCY MEDICINE

## 2025-07-29 PROCEDURE — 85004 AUTOMATED DIFF WBC COUNT: CPT | Performed by: EMERGENCY MEDICINE

## 2025-07-29 PROCEDURE — 99284 EMERGENCY DEPT VISIT MOD MDM: CPT | Performed by: EMERGENCY MEDICINE

## 2025-07-29 PROCEDURE — 84484 ASSAY OF TROPONIN QUANT: CPT | Performed by: EMERGENCY MEDICINE

## 2025-07-29 PROCEDURE — 36415 COLL VENOUS BLD VENIPUNCTURE: CPT | Performed by: EMERGENCY MEDICINE

## 2025-07-29 PROCEDURE — 93005 ELECTROCARDIOGRAM TRACING: CPT | Performed by: EMERGENCY MEDICINE

## 2025-07-29 ASSESSMENT — COLUMBIA-SUICIDE SEVERITY RATING SCALE - C-SSRS
1. IN THE PAST MONTH, HAVE YOU WISHED YOU WERE DEAD OR WISHED YOU COULD GO TO SLEEP AND NOT WAKE UP?: NO
6. HAVE YOU EVER DONE ANYTHING, STARTED TO DO ANYTHING, OR PREPARED TO DO ANYTHING TO END YOUR LIFE?: NO
2. HAVE YOU ACTUALLY HAD ANY THOUGHTS OF KILLING YOURSELF IN THE PAST MONTH?: NO

## 2025-07-29 ASSESSMENT — ACTIVITIES OF DAILY LIVING (ADL)
ADLS_ACUITY_SCORE: 57

## 2025-07-29 NOTE — ED PROVIDER NOTES
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     EMS gave 324 ASA in route, BG 99.    {History Review Selection (Optional):857920}  {ROS Selection (Optional):274180}    Physical Exam      Physical Exam  Constitutional:       General: Binu is not in acute distress.     Appearance: Normal appearance. Binu is not diaphoretic.   HENT:      Head: Atraumatic.      Mouth/Throat:      Mouth: Mucous membranes are moist.   Eyes:      General: No scleral icterus.     Conjunctiva/sclera: Conjunctivae normal.   Cardiovascular:      Rate and Rhythm: Normal rate.      Heart sounds: Normal heart sounds.   Pulmonary:      Effort: No respiratory distress.      Breath sounds: Normal breath sounds.   Abdominal:      General: Abdomen is flat.   Musculoskeletal:      Cervical back: Neck supple.   Skin:     General: Skin is warm.      Findings: No rash.   Neurological:      Mental Status: Binu is alert.       ***    ED Course, Procedures, & Data      Procedures       {ED Course Selections (Optional):428419}  {ED Sepsis CMS Documentation (Optional):696180::\" \"}          Medications - No data to display       {Critical Care Performed?:506429}    Assessment & Plan    ***    I have reviewed the nursing notes. I have reviewed the findings, diagnosis, plan and need for follow up with the patient.    New Prescriptions    No medications on file       Final diagnoses:   None       Brain Cassidy MD  [facility] EMERGENCY DEPARTMENT  7/29/2025  " "prostate    CAPSULOTOMY Bilateral     CATARACT EXTRACTION Bilateral     CV CORONARY ANGIOGRAM N/A 10/20/2022    Procedure: Coronary Angiogram;  Surgeon: Mir Farr MD;  Location:  HEART CARDIAC CATH LAB    CV FRACTIONAL FLOW RATIO WIRE N/A 10/20/2022    Procedure: Fractional Flow Ratio Wire;  Surgeon: Mir Farr MD;  Location:  HEART CARDIAC CATH LAB    EYE SURGERY Bilateral     Laser eye surgery    HC SHLDR ARTHROSCOP,PART ACROMIOPLAS Right     Description: Shoulder Arthroscopy, Space Decompression And Acromioplasty;  Recorded: 02/18/2014;    MN LAP,PROSTATECTOMY,RADICAL,W/NERVE SPARE,INCL ROBOTIC Bilateral 02/27/2018    Procedure: ROBOTIC ASSISTED RADICAL RETROPUBIC PROSTATECTOMY, BILATERAL PELVIC LYMPH NODE DISSECTION LYSIS OF ADHESIONS;  Surgeon: Wale Rodriguez MD;  Location: [facility];  Service: Urology    MN RADIAL KERATOTOMY Bilateral     Description: Cornea Radial Keratotomy;  Recorded: 02/18/2014;    TOTAL HIP ARTHROPLASTY Right 03/06/2024    Procedure: RIGHT TOTAL HIP ARTHROPLASTY DIRECT ANTERIOR APPROACH ORTHOGRID;  Surgeon: Wale Benavides MD;  Location: [facility]     aspirin (ASA) 81 MG chewable tablet  ketoconazole (NIZORAL) 2 % external cream  levothyroxine (SYNTHROID/LEVOTHROID) 75 MCG tablet  losartan (COZAAR) 50 MG tablet  MAGNESIUM GLUCONATE PO  naproxen sodium 220 MG capsule  nitroGLYcerin (NITROSTAT) 0.4 MG sublingual tablet  pilocarpine (PILOCAR) 2 % ophthalmic solution      Allergies   Allergen Reactions    Atorvastatin Muscle Pain (Myalgia)    Rosuvastatin Muscle Pain (Myalgia)    Ciprofloxacin Other (See Comments)     Pt states he was told not to take because of \"another medicine he is on\"    Levaquin [Levofloxacin] Other (See Comments)     Pt states he was told not to take because of \"another medicine he is on\"       Family History  Family History   Problem Relation Age of Onset    No Known Problems Mother     " Coronary Artery Disease Father     Aortic aneurysm Father     Heart Disease Father     Prostate Cancer Maternal Grandfather     Genetic Disorder Daughter     Other - See Comments Daughter         transplant coordinator    Other - See Comments Daughter     Other - See Comments Daughter     Prostate Cancer Maternal Uncle     Other - See Comments Other         significant other     Social History   Social History     Tobacco Use    Smoking status: Never    Smokeless tobacco: Never   Vaping Use    Vaping status: Never Used   Substance Use Topics    Alcohol use: Not Currently     Comment: on occasion    Drug use: No      Past medical history, past surgical history, medications, allergies, family history, and social history were reviewed with the patient. No additional pertinent items.   A medically appropriate review of systems was performed with pertinent positives and negatives noted in the HPI, and all other systems negative.    Physical Exam   BP: (!) 134/96  Pulse: 60  Temp: 98.5  F (36.9  C)  Resp: 18  SpO2: 98 %  Physical Exam  Constitutional:       General: Binu is not in acute distress.     Appearance: Normal appearance. Binu is not diaphoretic.   HENT:      Head: Atraumatic.      Mouth/Throat:      Mouth: Mucous membranes are moist.   Eyes:      General: No scleral icterus.     Conjunctiva/sclera: Conjunctivae normal.   Cardiovascular:      Rate and Rhythm: Normal rate.      Heart sounds: Normal heart sounds.   Pulmonary:      Effort: No respiratory distress.      Breath sounds: Normal breath sounds.   Abdominal:      General: Abdomen is flat.   Musculoskeletal:      Cervical back: Neck supple.   Skin:     General: Skin is warm.      Findings: No rash.   Neurological:      Mental Status: Binu is alert.           ED Course, Procedures, & Data      Procedures                Results for orders placed or performed during the hospital encounter of 07/29/25   Extra Blue Top Tube   Result Value Ref Range    Hold  Specimen JIC    Extra Red Top Tube   Result Value Ref Range    Hold Specimen JIC    Extra Green Top (Lithium Heparin) Tube   Result Value Ref Range    Hold Specimen JIC    Extra Purple Top Tube   Result Value Ref Range    Hold Specimen JIC    Basic Metabolic Panel (Limited Occurrences)   Result Value Ref Range    Sodium 140 135 - 145 mmol/L    Potassium 4.3 3.4 - 5.3 mmol/L    Chloride 107 98 - 107 mmol/L    Carbon Dioxide (CO2) 21 (L) 22 - 29 mmol/L    Anion Gap 12 7 - 15 mmol/L    Urea Nitrogen 17.5 8.0 - 23.0 mg/dL    Creatinine 1.12 0.51 - 1.17 mg/dL    GFR Estimate 67 >60 mL/min/1.73m2    Calcium 8.9 8.8 - 10.4 mg/dL    Glucose 88 70 - 99 mg/dL   Result Value Ref Range    Troponin T, High Sensitivity 25 (H) <=22 ng/L   CBC with platelets and differential   Result Value Ref Range    WBC Count 6.9 4.0 - 11.0 10e3/uL    RBC Count 5.08 3.80 - 5.90 10e6/uL    Hemoglobin 16.0 11.7 - 17.7 g/dL    Hematocrit 46.2 35.0 - 53.0 %    MCV 91 78 - 100 fL    MCH 31.5 26.5 - 33.0 pg    MCHC 34.6 31.5 - 36.5 g/dL    RDW 12.5 10.0 - 15.0 %    Platelet Count 231 150 - 450 10e3/uL    % Neutrophils 78 %    % Lymphocytes 10 %    % Monocytes 10 %    % Eosinophils 2 %    % Basophils 1 %    % Immature Granulocytes 0 %    NRBCs per 100 WBC 0 <1 /100    Absolute Neutrophils 5.4 1.6 - 8.3 10e3/uL    Absolute Lymphocytes 0.7 (L) 0.8 - 5.3 10e3/uL    Absolute Monocytes 0.7 0.0 - 1.3 10e3/uL    Absolute Eosinophils 0.1 0.0 - 0.7 10e3/uL    Absolute Basophils 0.1 0.0 - 0.2 10e3/uL    Absolute Immature Granulocytes 0.0 <=0.4 10e3/uL    Absolute NRBCs 0.0 10e3/uL   TSH with free T4 reflex   Result Value Ref Range    TSH 2.38 0.30 - 4.20 uIU/mL   Result Value Ref Range    Troponin T, High Sensitivity 28 (H) <=22 ng/L     Medications - No data to display       Critical care was not performed.     Medical Decision Making  The patient's presentation was of moderate complexity (an undiagnosed new problem with uncertain prognosis).    The patient's  evaluation involved:  review of external note(s) from 1 sources (see separate area of note for details)  review of 3+ test result(s) ordered prior to this encounter (see separate area of note for details)  ordering and/or review of 3+ test(s) in this encounter (see separate area of note for details)    The patient's management necessitated only low risk treatment.    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 Patient nontoxic appearing on exam, has vital signs within normal limits with exception of mild elevation of BP at 143/75. Patient worked up as above, had reassuring workup today with downtrending troponin. We discussed options and will plan for outpatient follow up and return precautions.     I have reviewed the nursing notes. I have reviewed the findings, diagnosis, plan and need for follow up with the patient.    Discharge Medication List as of 7/29/2025  5:23 PM          Final diagnoses:   Heart palpitations   Atypical chest pain       Brain Cassidy MD  [facility] EMERGENCY DEPARTMENT  7/29/2025     Brain Cassidy MD  08/02/25 [id number]

## 2025-07-29 NOTE — DISCHARGE INSTRUCTIONS
You had a reassuring evaluation in the emergency department today. Please follow up with your primary care physician and cardiology (referral placed) for further routine evaluation. If you develop worsening symptoms or other new emergent concerns, then return to the emergency department for further evaluation.

## 2025-07-29 NOTE — ED TRIAGE NOTES
BIBA from car for c/o chest pain/SOB for a few days on and off. Pale and clammy for EMS. 324 ASA given in route, 18G LAC, BG 99.  Pt took 1 nitroglycerin this morning.

## 2025-07-30 ENCOUNTER — PATIENT OUTREACH (OUTPATIENT)
Dept: CARE COORDINATION | Facility: CLINIC | Age: 80
End: 2025-07-30
Payer: COMMERCIAL

## 2025-08-05 PROBLEM — I10 HYPERTENSION: Status: ACTIVE | Noted: 2020-04-07

## 2025-08-05 PROBLEM — G20.A1 PARKINSON'S DISEASE WITHOUT DYSKINESIA, WITHOUT MENTION OF FLUCTUATIONS (H): Status: ACTIVE | Noted: 2025-02-21

## 2025-08-05 PROBLEM — M54.50 LUMBAGO: Status: ACTIVE | Noted: 2025-05-16

## 2025-08-05 PROBLEM — G56.20 LESION OF ULNAR NERVE: Status: ACTIVE | Noted: 2022-08-12

## 2025-08-05 PROBLEM — R20.0 NUMBNESS AND TINGLING SENSATION OF SKIN: Status: ACTIVE | Noted: 2018-01-18

## 2025-08-05 PROBLEM — R25.3 FASCICULATIONS: Status: ACTIVE | Noted: 2022-08-12

## 2025-08-05 PROBLEM — I71.21 ANEURYSM OF ASCENDING AORTA: Status: ACTIVE | Noted: 2020-04-07

## 2025-08-05 PROBLEM — R20.2 NUMBNESS AND TINGLING SENSATION OF SKIN: Status: ACTIVE | Noted: 2018-01-18

## 2025-08-05 PROBLEM — E78.5 HYPERLIPIDEMIA: Status: ACTIVE | Noted: 2020-04-07

## 2025-08-05 PROBLEM — G45.3 AMAUROSIS FUGAX: Status: ACTIVE | Noted: 2020-04-07

## 2025-08-05 PROBLEM — I73.00 RAYNAUD'S SYNDROME: Status: ACTIVE | Noted: 2022-08-12

## 2025-08-05 RX ORDER — AMOXICILLIN 500 MG/1
CAPSULE ORAL
COMMUNITY
Start: 2025-05-08 | End: 2025-08-19

## 2025-08-05 RX ORDER — DIAZEPAM 5 MG/1
TABLET ORAL
COMMUNITY
Start: 2025-05-16 | End: 2025-08-19

## 2025-08-05 RX ORDER — BENZONATATE 100 MG/1
100 CAPSULE ORAL 3 TIMES DAILY PRN
COMMUNITY
Start: 2025-04-27 | End: 2025-08-19

## 2025-08-05 RX ORDER — ASPIRIN 81 MG
TABLET,CHEWABLE ORAL
COMMUNITY
Start: 2025-02-21 | End: 2025-08-19

## 2025-08-12 ENCOUNTER — PATIENT OUTREACH (OUTPATIENT)
Dept: CARE COORDINATION | Facility: CLINIC | Age: 80
End: 2025-08-12
Payer: COMMERCIAL

## 2025-08-19 ENCOUNTER — OFFICE VISIT (OUTPATIENT)
Dept: INTERNAL MEDICINE | Facility: CLINIC | Age: 80
End: 2025-08-19
Payer: COMMERCIAL

## 2025-08-19 VITALS
HEIGHT: 70 IN | WEIGHT: 194.5 LBS | DIASTOLIC BLOOD PRESSURE: 64 MMHG | OXYGEN SATURATION: 97 % | TEMPERATURE: 97.7 F | RESPIRATION RATE: 9 BRPM | SYSTOLIC BLOOD PRESSURE: 120 MMHG | HEART RATE: 67 BPM | BODY MASS INDEX: 27.84 KG/M2

## 2025-08-19 DIAGNOSIS — R07.9 CHEST PAIN, UNSPECIFIED TYPE: ICD-10-CM

## 2025-08-19 DIAGNOSIS — Z85.46 PERSONAL HISTORY OF MALIGNANT NEOPLASM OF PROSTATE: ICD-10-CM

## 2025-08-19 DIAGNOSIS — R55 PRE-SYNCOPE: Primary | ICD-10-CM

## 2025-08-19 DIAGNOSIS — G62.9 PERIPHERAL POLYNEUROPATHY: ICD-10-CM

## 2025-08-19 DIAGNOSIS — I47.10 SVT (SUPRAVENTRICULAR TACHYCARDIA): ICD-10-CM

## 2025-08-19 DIAGNOSIS — H91.93 BILATERAL HEARING LOSS, UNSPECIFIED HEARING LOSS TYPE: ICD-10-CM

## 2025-08-19 PROCEDURE — 1126F AMNT PAIN NOTED NONE PRSNT: CPT | Performed by: INTERNAL MEDICINE

## 2025-08-19 PROCEDURE — G2211 COMPLEX E/M VISIT ADD ON: HCPCS | Performed by: INTERNAL MEDICINE

## 2025-08-19 PROCEDURE — 3074F SYST BP LT 130 MM HG: CPT | Performed by: INTERNAL MEDICINE

## 2025-08-19 PROCEDURE — 99214 OFFICE O/P EST MOD 30 MIN: CPT | Performed by: INTERNAL MEDICINE

## 2025-08-19 PROCEDURE — 3078F DIAST BP <80 MM HG: CPT | Performed by: INTERNAL MEDICINE

## 2025-08-19 ASSESSMENT — PAIN SCALES - GENERAL: PAINLEVEL_OUTOF10: NO PAIN (0)

## 2025-08-21 ENCOUNTER — TRANSFERRED RECORDS (OUTPATIENT)
Dept: HEALTH INFORMATION MANAGEMENT | Facility: CLINIC | Age: 80
End: 2025-08-21
Payer: COMMERCIAL

## 2025-08-25 ENCOUNTER — OFFICE VISIT (OUTPATIENT)
Dept: ENDOCRINOLOGY | Facility: CLINIC | Age: 80
End: 2025-08-25
Attending: INTERNAL MEDICINE
Payer: COMMERCIAL

## 2025-08-25 VITALS
HEART RATE: 60 BPM | BODY MASS INDEX: 27.76 KG/M2 | DIASTOLIC BLOOD PRESSURE: 92 MMHG | WEIGHT: 193.5 LBS | RESPIRATION RATE: 16 BRPM | OXYGEN SATURATION: 95 % | SYSTOLIC BLOOD PRESSURE: 164 MMHG

## 2025-08-25 DIAGNOSIS — E06.3 HASHIMOTO'S THYROIDITIS: ICD-10-CM

## 2025-08-25 DIAGNOSIS — M81.0 OSTEOPOROSIS WITHOUT CURRENT PATHOLOGICAL FRACTURE, UNSPECIFIED OSTEOPOROSIS TYPE: Primary | ICD-10-CM

## 2025-08-25 PROCEDURE — 99214 OFFICE O/P EST MOD 30 MIN: CPT | Performed by: INTERNAL MEDICINE

## 2025-08-25 PROCEDURE — 3080F DIAST BP >= 90 MM HG: CPT | Performed by: INTERNAL MEDICINE

## 2025-08-25 PROCEDURE — G2211 COMPLEX E/M VISIT ADD ON: HCPCS | Performed by: INTERNAL MEDICINE

## 2025-08-25 PROCEDURE — 3077F SYST BP >= 140 MM HG: CPT | Performed by: INTERNAL MEDICINE

## 2025-08-25 RX ORDER — ZOLEDRONIC ACID 0.05 MG/ML
5 INJECTION, SOLUTION INTRAVENOUS ONCE
Start: 2025-09-08

## 2025-08-25 RX ORDER — EPINEPHRINE 1 MG/ML
0.3 INJECTION, SOLUTION INTRAMUSCULAR; SUBCUTANEOUS EVERY 5 MIN PRN
OUTPATIENT
Start: 2025-09-08

## 2025-08-25 RX ORDER — METHYLPREDNISOLONE SODIUM SUCCINATE 40 MG/ML
40 INJECTION INTRAMUSCULAR; INTRAVENOUS
Start: 2025-09-08

## 2025-08-25 RX ORDER — ACETAMINOPHEN 325 MG/1
650 TABLET ORAL
OUTPATIENT
Start: 2025-09-08

## 2025-08-25 RX ORDER — MEPERIDINE HYDROCHLORIDE 25 MG/ML
25 INJECTION INTRAMUSCULAR; INTRAVENOUS; SUBCUTANEOUS
OUTPATIENT
Start: 2025-09-08

## 2025-08-25 RX ORDER — ALBUTEROL SULFATE 0.83 MG/ML
2.5 SOLUTION RESPIRATORY (INHALATION)
OUTPATIENT
Start: 2025-09-08

## 2025-08-25 RX ORDER — HEPARIN SODIUM (PORCINE) LOCK FLUSH IV SOLN 100 UNIT/ML 100 UNIT/ML
5 SOLUTION INTRAVENOUS
OUTPATIENT
Start: 2025-09-08

## 2025-08-25 RX ORDER — DIPHENHYDRAMINE HYDROCHLORIDE 50 MG/ML
25 INJECTION, SOLUTION INTRAMUSCULAR; INTRAVENOUS
Start: 2025-09-08

## 2025-08-25 RX ORDER — DIPHENHYDRAMINE HYDROCHLORIDE 50 MG/ML
50 INJECTION, SOLUTION INTRAMUSCULAR; INTRAVENOUS
Start: 2025-09-08

## 2025-08-25 RX ORDER — LEVOTHYROXINE SODIUM 75 UG/1
75 TABLET ORAL DAILY
Qty: 100 TABLET | Refills: 3 | Status: SHIPPED | OUTPATIENT
Start: 2025-08-25

## 2025-08-25 RX ORDER — ALBUTEROL SULFATE 90 UG/1
1-2 INHALANT RESPIRATORY (INHALATION)
Start: 2025-09-08

## 2025-08-25 RX ORDER — HEPARIN SODIUM,PORCINE 10 UNIT/ML
5-20 VIAL (ML) INTRAVENOUS DAILY PRN
OUTPATIENT
Start: 2025-09-08

## 2025-09-04 ENCOUNTER — OFFICE VISIT (OUTPATIENT)
Dept: NEUROLOGY | Facility: CLINIC | Age: 80
End: 2025-09-04
Payer: COMMERCIAL

## 2025-09-04 VITALS
SYSTOLIC BLOOD PRESSURE: 124 MMHG | BODY MASS INDEX: 27.78 KG/M2 | HEART RATE: 67 BPM | WEIGHT: 193.6 LBS | OXYGEN SATURATION: 97 % | DIASTOLIC BLOOD PRESSURE: 81 MMHG | RESPIRATION RATE: 12 BRPM

## 2025-09-04 DIAGNOSIS — B35.3 TINEA PEDIS, UNSPECIFIED LATERALITY: ICD-10-CM

## 2025-09-04 DIAGNOSIS — G20.C PRIMARY PARKINSONISM (H): Primary | ICD-10-CM

## 2025-09-04 RX ORDER — CARBIDOPA AND LEVODOPA 25; 100 MG/1; MG/1
TABLET ORAL
Qty: 90 TABLET | Refills: 11 | Status: SHIPPED | OUTPATIENT
Start: 2025-09-04

## 2025-09-04 RX ORDER — KETOCONAZOLE 20 MG/G
CREAM TOPICAL PRN
Qty: 90 G | Refills: 3 | Status: SHIPPED | OUTPATIENT
Start: 2025-09-04

## 2025-09-04 ASSESSMENT — PAIN SCALES - GENERAL: PAINLEVEL_OUTOF10: MODERATE PAIN (4)

## (undated) DEVICE — SOL NACL 0.9% IRRIG 1000ML BOTTLE 2F7124

## (undated) DEVICE — SUCTION IRR SYSTEM W/O TIP INTERPULSE HANDPIECE 0210-100-000

## (undated) DEVICE — VALVE HEMOSTASIS .115" DUOSTAT ADAPTER 23245

## (undated) DEVICE — SOL WATER IRRIG 1000ML BOTTLE 2F7114

## (undated) DEVICE — MANIFOLD KIT ANGIO AUTOMATED 014613

## (undated) DEVICE — SUTURE MONOCRYL 3-0 18 PS2 UND MCP497G

## (undated) DEVICE — KIT HAND CONTROL ACIST 016795

## (undated) DEVICE — INTRO GLIDESHEATH SLENDER 6FR 10X45CM 60-1060

## (undated) DEVICE — GLOVE BIOGEL PI INDICATOR 8.0 LF 41680

## (undated) DEVICE — CATH GD ST+ 100CM 6FR JR5.0

## (undated) DEVICE — PACK HEART LEFT CUSTOM

## (undated) DEVICE — DRAPE IOBAN 2 C-SECTION 3M 6697

## (undated) DEVICE — A3 SUPPLIES- SEE NURSING INFO PAGE

## (undated) DEVICE — VALVE HMSTS PHD SM BORE W/ SPR MTL INS TOOL TRQ DVC MAP802

## (undated) DEVICE — SUCTION MANIFOLD NEPTUNE 2 SYS 4 PORT 0702-020-000

## (undated) DEVICE — CUSTOM PACK ANTERIOR HIP SOP5BAHHED

## (undated) DEVICE — CATH GUIDING BLUE YELLOW PTFE XB3.5 6FRX100CM 67005400

## (undated) DEVICE — GLOVE SURG PI ULTRA TOUCH M SZ 8-1/2 LF

## (undated) DEVICE — GUIDEWIRE OPTOWIRE 3 W/O GAUGE FACTOR CONNECTOR F1032

## (undated) DEVICE — Device

## (undated) DEVICE — SLEEVE TR BAND RADIAL COMPRESSION DEVICE 24CM TRB24-REG

## (undated) DEVICE — FASTENER CATH BALLOON CLAMPX2 STATLOCK 0684-00-493

## (undated) DEVICE — SU FIBERWIRE 2 38" T-8 NDL  AR-7206

## (undated) DEVICE — GLOVE UNDER INDICATOR PI SZ 8.5 LF 41685

## (undated) DEVICE — GOWN IMPERVIOUS BREATHABLE SMART XLG 89045

## (undated) DEVICE — CATH GUIDING JR5 6FR .070IN 100CM

## (undated) DEVICE — SUTURE VICRYL+ 2-0 27IN CT-1 UND VCP259H

## (undated) DEVICE — ELECTRODE PATIENT RETURN ADULT L10 FT 2 PLATE CORD 0855C

## (undated) DEVICE — GLOVE BIOGEL PI ULTRATOUCH G SZ 8.0 42180

## (undated) DEVICE — MAT FLOOR SURGICAL 40X38 0702140238

## (undated) DEVICE — KIT PATIENT CARE HANA TABLE PROFX SUPINE 6855

## (undated) DEVICE — BLADE SAGITTAL WIDE (SO-618) 2108-118

## (undated) DEVICE — SOL NACL 0.9% INJ 1000ML BAG 2B1324X

## (undated) DEVICE — TUBING PRESSURE 30"

## (undated) DEVICE — DECANTER VIAL 2006S

## (undated) DEVICE — GW VASC .035IN DIA 260CML 7CML 3 MM RADIUS J CURVE 502455

## (undated) DEVICE — BONE CLEANING TIP INTERPULSE  0210-010-000

## (undated) DEVICE — NEEDLE HYPO 21GA X 1-1/2 SAFETY 305917

## (undated) DEVICE — SU STRATAFIX PDS PLUS 2 CTX SPIRAL L14 IN SXPP2B405

## (undated) RX ORDER — SODIUM CHLORIDE 9 MG/ML
INJECTION, SOLUTION INTRAVENOUS
Status: DISPENSED
Start: 2022-10-20

## (undated) RX ORDER — NITROGLYCERIN 0.4 MG/1
TABLET SUBLINGUAL
Status: DISPENSED
Start: 2020-05-05

## (undated) RX ORDER — FENTANYL CITRATE 50 UG/ML
INJECTION, SOLUTION INTRAMUSCULAR; INTRAVENOUS
Status: DISPENSED
Start: 2024-03-06

## (undated) RX ORDER — HEPARIN SODIUM 1000 [USP'U]/ML
INJECTION, SOLUTION INTRAVENOUS; SUBCUTANEOUS
Status: DISPENSED
Start: 2022-10-20

## (undated) RX ORDER — NITROGLYCERIN 5 MG/ML
VIAL (ML) INTRAVENOUS
Status: DISPENSED
Start: 2022-10-20

## (undated) RX ORDER — REGADENOSON 0.08 MG/ML
INJECTION, SOLUTION INTRAVENOUS
Status: DISPENSED
Start: 2024-01-15

## (undated) RX ORDER — LIDOCAINE HYDROCHLORIDE 10 MG/ML
INJECTION, SOLUTION EPIDURAL; INFILTRATION; INTRACAUDAL; PERINEURAL
Status: DISPENSED
Start: 2021-11-30

## (undated) RX ORDER — METOPROLOL TARTRATE 25 MG/1
TABLET, FILM COATED ORAL
Status: DISPENSED
Start: 2020-05-05

## (undated) RX ORDER — NICARDIPINE HCL-0.9% SOD CHLOR 1 MG/10 ML
SYRINGE (ML) INTRAVENOUS
Status: DISPENSED
Start: 2022-10-20

## (undated) RX ORDER — EPHEDRINE SULFATE 50 MG/ML
INJECTION, SOLUTION INTRAMUSCULAR; INTRAVENOUS; SUBCUTANEOUS
Status: DISPENSED
Start: 2024-03-06

## (undated) RX ORDER — PROPOFOL 10 MG/ML
INJECTION, EMULSION INTRAVENOUS
Status: DISPENSED
Start: 2024-03-06

## (undated) RX ORDER — FENTANYL CITRATE 50 UG/ML
INJECTION, SOLUTION INTRAMUSCULAR; INTRAVENOUS
Status: DISPENSED
Start: 2022-10-20

## (undated) RX ORDER — LIDOCAINE HYDROCHLORIDE 10 MG/ML
INJECTION, SOLUTION EPIDURAL; INFILTRATION; INTRACAUDAL; PERINEURAL
Status: DISPENSED
Start: 2022-10-20

## (undated) RX ORDER — LIDOCAINE HYDROCHLORIDE 10 MG/ML
INJECTION, SOLUTION EPIDURAL; INFILTRATION; INTRACAUDAL; PERINEURAL
Status: DISPENSED
Start: 2024-03-06

## (undated) RX ORDER — PHENYLEPHRINE HYDROCHLORIDE 10 MG/ML
INJECTION INTRAVENOUS
Status: DISPENSED
Start: 2024-03-06